# Patient Record
Sex: MALE | Race: WHITE | Employment: OTHER | URBAN - NONMETROPOLITAN AREA
[De-identification: names, ages, dates, MRNs, and addresses within clinical notes are randomized per-mention and may not be internally consistent; named-entity substitution may affect disease eponyms.]

---

## 2017-01-06 RX ORDER — POTASSIUM CHLORIDE 20 MEQ/1
TABLET, EXTENDED RELEASE ORAL
Qty: 180 TABLET | Refills: 3 | Status: SHIPPED | OUTPATIENT
Start: 2017-01-06 | End: 2018-02-19 | Stop reason: SDUPTHER

## 2017-02-13 ENCOUNTER — PROCEDURE VISIT (OUTPATIENT)
Dept: CARDIOLOGY | Age: 57
End: 2017-02-13

## 2017-02-13 DIAGNOSIS — Z95.810 AUTOMATIC IMPLANTABLE CARDIOVERTER-DEFIBRILLATOR IN SITU: Primary | ICD-10-CM

## 2017-02-13 PROCEDURE — 93296 REM INTERROG EVL PM/IDS: CPT | Performed by: INTERNAL MEDICINE

## 2017-02-13 PROCEDURE — 93295 DEV INTERROG REMOTE 1/2/MLT: CPT | Performed by: INTERNAL MEDICINE

## 2017-02-17 ENCOUNTER — TELEPHONE (OUTPATIENT)
Dept: CARDIOLOGY | Age: 57
End: 2017-02-17

## 2017-02-17 DIAGNOSIS — I50.42 CHRONIC COMBINED SYSTOLIC AND DIASTOLIC CONGESTIVE HEART FAILURE (HCC): Primary | ICD-10-CM

## 2017-02-17 DIAGNOSIS — I51.9 HEART DISEASE: ICD-10-CM

## 2017-02-17 DIAGNOSIS — I42.0 CARDIOMYOPATHY, DILATED (HCC): ICD-10-CM

## 2017-02-17 DIAGNOSIS — E78.00 PURE HYPERCHOLESTEROLEMIA: ICD-10-CM

## 2017-03-20 ENCOUNTER — PROCEDURE VISIT (OUTPATIENT)
Dept: CARDIOLOGY | Age: 57
End: 2017-03-20

## 2017-03-20 DIAGNOSIS — Z95.810 AUTOMATIC IMPLANTABLE CARDIOVERTER-DEFIBRILLATOR IN SITU: Primary | ICD-10-CM

## 2017-03-20 PROCEDURE — 93283 PRGRMG EVAL IMPLANTABLE DFB: CPT | Performed by: INTERNAL MEDICINE

## 2017-04-13 ENCOUNTER — OFFICE VISIT (OUTPATIENT)
Dept: CARDIOLOGY | Age: 57
End: 2017-04-13

## 2017-04-13 VITALS
HEIGHT: 69 IN | SYSTOLIC BLOOD PRESSURE: 116 MMHG | BODY MASS INDEX: 42.06 KG/M2 | HEART RATE: 89 BPM | DIASTOLIC BLOOD PRESSURE: 72 MMHG | WEIGHT: 284 LBS

## 2017-04-13 DIAGNOSIS — Z01.818 PRE-OP EVALUATION: ICD-10-CM

## 2017-04-13 DIAGNOSIS — G89.4 CHRONIC PAIN SYNDROME: Primary | ICD-10-CM

## 2017-04-13 DIAGNOSIS — I42.0 CARDIOMYOPATHY, DILATED (HCC): ICD-10-CM

## 2017-04-13 DIAGNOSIS — I51.9 HEART DISEASE: ICD-10-CM

## 2017-04-13 DIAGNOSIS — I50.9 CONGESTIVE HEART FAILURE, UNSPECIFIED CONGESTIVE HEART FAILURE CHRONICITY, UNSPECIFIED CONGESTIVE HEART FAILURE TYPE: ICD-10-CM

## 2017-04-13 PROCEDURE — G8427 DOCREV CUR MEDS BY ELIG CLIN: HCPCS | Performed by: NUCLEAR MEDICINE

## 2017-04-13 PROCEDURE — G8417 CALC BMI ABV UP PARAM F/U: HCPCS | Performed by: NUCLEAR MEDICINE

## 2017-04-13 PROCEDURE — 93000 ELECTROCARDIOGRAM COMPLETE: CPT | Performed by: NUCLEAR MEDICINE

## 2017-04-13 PROCEDURE — 3017F COLORECTAL CA SCREEN DOC REV: CPT | Performed by: NUCLEAR MEDICINE

## 2017-04-13 PROCEDURE — 1036F TOBACCO NON-USER: CPT | Performed by: NUCLEAR MEDICINE

## 2017-04-13 PROCEDURE — 99214 OFFICE O/P EST MOD 30 MIN: CPT | Performed by: NUCLEAR MEDICINE

## 2017-05-16 ENCOUNTER — TELEPHONE (OUTPATIENT)
Dept: CARDIOLOGY | Age: 57
End: 2017-05-16

## 2017-07-24 ENCOUNTER — TELEPHONE (OUTPATIENT)
Dept: CARDIOLOGY CLINIC | Age: 57
End: 2017-07-24

## 2017-08-02 ENCOUNTER — PATIENT MESSAGE (OUTPATIENT)
Dept: CARDIOLOGY | Age: 57
End: 2017-08-02

## 2017-08-03 ENCOUNTER — PROCEDURE VISIT (OUTPATIENT)
Dept: CARDIOLOGY CLINIC | Age: 57
End: 2017-08-03
Payer: MEDICARE

## 2017-08-03 DIAGNOSIS — Z95.810 AUTOMATIC IMPLANTABLE CARDIOVERTER-DEFIBRILLATOR IN SITU: Primary | ICD-10-CM

## 2017-08-03 PROCEDURE — 93296 REM INTERROG EVL PM/IDS: CPT | Performed by: INTERNAL MEDICINE

## 2017-08-03 PROCEDURE — 93295 DEV INTERROG REMOTE 1/2/MLT: CPT | Performed by: INTERNAL MEDICINE

## 2017-08-17 ENCOUNTER — OFFICE VISIT (OUTPATIENT)
Dept: PHYSICAL MEDICINE AND REHAB | Age: 57
End: 2017-08-17
Payer: MEDICARE

## 2017-08-17 VITALS
WEIGHT: 286 LBS | HEART RATE: 75 BPM | HEIGHT: 69 IN | BODY MASS INDEX: 42.36 KG/M2 | SYSTOLIC BLOOD PRESSURE: 110 MMHG | DIASTOLIC BLOOD PRESSURE: 72 MMHG

## 2017-08-17 DIAGNOSIS — M96.1 LUMBAR POSTLAMINECTOMY SYNDROME: ICD-10-CM

## 2017-08-17 DIAGNOSIS — M43.22 FUSION OF SPINE, CERVICAL REGION: ICD-10-CM

## 2017-08-17 DIAGNOSIS — M47.816 SPONDYLOSIS OF LUMBAR REGION WITHOUT MYELOPATHY OR RADICULOPATHY: Primary | ICD-10-CM

## 2017-08-17 DIAGNOSIS — M54.12 CERVICAL RADICULITIS: ICD-10-CM

## 2017-08-17 PROCEDURE — G8427 DOCREV CUR MEDS BY ELIG CLIN: HCPCS | Performed by: NURSE PRACTITIONER

## 2017-08-17 PROCEDURE — 1036F TOBACCO NON-USER: CPT | Performed by: NURSE PRACTITIONER

## 2017-08-17 PROCEDURE — 3017F COLORECTAL CA SCREEN DOC REV: CPT | Performed by: NURSE PRACTITIONER

## 2017-08-17 PROCEDURE — 99213 OFFICE O/P EST LOW 20 MIN: CPT | Performed by: NURSE PRACTITIONER

## 2017-08-17 PROCEDURE — G8417 CALC BMI ABV UP PARAM F/U: HCPCS | Performed by: NURSE PRACTITIONER

## 2017-08-17 ASSESSMENT — ENCOUNTER SYMPTOMS
VOMITING: 0
SORE THROAT: 0
ABDOMINAL PAIN: 0
WHEEZING: 0
PHOTOPHOBIA: 0
EYE PAIN: 0
COUGH: 0
CHEST TIGHTNESS: 0
NAUSEA: 0
CONSTIPATION: 0
DIARRHEA: 0
BACK PAIN: 1
SHORTNESS OF BREATH: 0
COLOR CHANGE: 0
SINUS PRESSURE: 0
RHINORRHEA: 0

## 2017-09-11 ENCOUNTER — HOSPITAL ENCOUNTER (OUTPATIENT)
Age: 57
Setting detail: OUTPATIENT SURGERY
Discharge: HOME | End: 2017-09-11
Attending: PAIN MEDICINE | Admitting: PAIN MEDICINE
Payer: MEDICARE

## 2017-09-11 ENCOUNTER — APPOINTMENT (OUTPATIENT)
Dept: GENERAL RADIOLOGY | Age: 57
End: 2017-09-11
Attending: PAIN MEDICINE
Payer: MEDICARE

## 2017-09-11 ENCOUNTER — ANESTHESIA EVENT (OUTPATIENT)
Dept: OPERATING ROOM | Age: 57
End: 2017-09-11
Payer: MEDICARE

## 2017-09-11 ENCOUNTER — ANESTHESIA (OUTPATIENT)
Dept: OPERATING ROOM | Age: 57
End: 2017-09-11
Payer: MEDICARE

## 2017-09-11 VITALS
HEIGHT: 69 IN | BODY MASS INDEX: 41.95 KG/M2 | RESPIRATION RATE: 16 BRPM | DIASTOLIC BLOOD PRESSURE: 88 MMHG | SYSTOLIC BLOOD PRESSURE: 136 MMHG | TEMPERATURE: 98 F | HEART RATE: 71 BPM | WEIGHT: 283.2 LBS | OXYGEN SATURATION: 95 %

## 2017-09-11 VITALS — SYSTOLIC BLOOD PRESSURE: 126 MMHG | OXYGEN SATURATION: 97 % | DIASTOLIC BLOOD PRESSURE: 75 MMHG

## 2017-09-11 PROCEDURE — 7100000010 HC PHASE II RECOVERY - FIRST 15 MIN: Performed by: PAIN MEDICINE

## 2017-09-11 PROCEDURE — 3209999900 FLUORO FOR SURGICAL PROCEDURES

## 2017-09-11 PROCEDURE — 2500000003 HC RX 250 WO HCPCS: Performed by: PAIN MEDICINE

## 2017-09-11 PROCEDURE — 2580000003 HC RX 258: Performed by: NURSE ANESTHETIST, CERTIFIED REGISTERED

## 2017-09-11 PROCEDURE — 3700000000 HC ANESTHESIA ATTENDED CARE: Performed by: PAIN MEDICINE

## 2017-09-11 PROCEDURE — 3600000057 HC PAIN LEVEL 4 ADDL 15 MIN: Performed by: PAIN MEDICINE

## 2017-09-11 PROCEDURE — 6360000002 HC RX W HCPCS: Performed by: PAIN MEDICINE

## 2017-09-11 PROCEDURE — 64636 DESTROY L/S FACET JNT ADDL: CPT | Performed by: PAIN MEDICINE

## 2017-09-11 PROCEDURE — 6360000002 HC RX W HCPCS: Performed by: NURSE ANESTHETIST, CERTIFIED REGISTERED

## 2017-09-11 PROCEDURE — 3600000056 HC PAIN LEVEL 4 BASE: Performed by: PAIN MEDICINE

## 2017-09-11 PROCEDURE — 64635 DESTROY LUMB/SAC FACET JNT: CPT | Performed by: PAIN MEDICINE

## 2017-09-11 PROCEDURE — 7100000011 HC PHASE II RECOVERY - ADDTL 15 MIN: Performed by: PAIN MEDICINE

## 2017-09-11 PROCEDURE — 3700000001 HC ADD 15 MINUTES (ANESTHESIA): Performed by: PAIN MEDICINE

## 2017-09-11 RX ORDER — FENTANYL CITRATE 50 UG/ML
INJECTION, SOLUTION INTRAMUSCULAR; INTRAVENOUS PRN
Status: DISCONTINUED | OUTPATIENT
Start: 2017-09-11 | End: 2017-09-11 | Stop reason: SDUPTHER

## 2017-09-11 RX ORDER — BUPIVACAINE HYDROCHLORIDE 2.5 MG/ML
INJECTION, SOLUTION EPIDURAL; INFILTRATION; INTRACAUDAL PRN
Status: DISCONTINUED | OUTPATIENT
Start: 2017-09-11 | End: 2017-09-11 | Stop reason: HOSPADM

## 2017-09-11 RX ORDER — PROPOFOL 10 MG/ML
INJECTION, EMULSION INTRAVENOUS PRN
Status: DISCONTINUED | OUTPATIENT
Start: 2017-09-11 | End: 2017-09-11 | Stop reason: SDUPTHER

## 2017-09-11 RX ORDER — BETAMETHASONE SODIUM PHOSPHATE AND BETAMETHASONE ACETATE 3; 3 MG/ML; MG/ML
INJECTION, SUSPENSION INTRA-ARTICULAR; INTRALESIONAL; INTRAMUSCULAR; SOFT TISSUE PRN
Status: DISCONTINUED | OUTPATIENT
Start: 2017-09-11 | End: 2017-09-11 | Stop reason: HOSPADM

## 2017-09-11 RX ORDER — SODIUM CHLORIDE 9 MG/ML
INJECTION, SOLUTION INTRAVENOUS CONTINUOUS PRN
Status: DISCONTINUED | OUTPATIENT
Start: 2017-09-11 | End: 2017-09-11 | Stop reason: SDUPTHER

## 2017-09-11 RX ORDER — LIDOCAINE HYDROCHLORIDE 10 MG/ML
INJECTION, SOLUTION EPIDURAL; INFILTRATION; INTRACAUDAL; PERINEURAL PRN
Status: DISCONTINUED | OUTPATIENT
Start: 2017-09-11 | End: 2017-09-11 | Stop reason: HOSPADM

## 2017-09-11 RX ADMIN — PROPOFOL 60 MG: 10 INJECTION, EMULSION INTRAVENOUS at 08:55

## 2017-09-11 RX ADMIN — SODIUM CHLORIDE: 9 INJECTION, SOLUTION INTRAVENOUS at 08:48

## 2017-09-11 RX ADMIN — FENTANYL CITRATE 100 MCG: 50 INJECTION INTRAMUSCULAR; INTRAVENOUS at 09:00

## 2017-09-11 RX ADMIN — PROPOFOL 40 MG: 10 INJECTION, EMULSION INTRAVENOUS at 08:50

## 2017-09-11 ASSESSMENT — PULMONARY FUNCTION TESTS
PIF_VALUE: 0

## 2017-09-11 ASSESSMENT — PAIN - FUNCTIONAL ASSESSMENT: PAIN_FUNCTIONAL_ASSESSMENT: 0-10

## 2017-09-11 ASSESSMENT — PAIN SCALES - GENERAL: PAINLEVEL_OUTOF10: 0

## 2017-09-11 ASSESSMENT — PAIN DESCRIPTION - DESCRIPTORS: DESCRIPTORS: CONSTANT;SHARP

## 2017-10-18 ENCOUNTER — OFFICE VISIT (OUTPATIENT)
Dept: PHYSICAL MEDICINE AND REHAB | Age: 57
End: 2017-10-18
Payer: MEDICARE

## 2017-10-18 VITALS
WEIGHT: 283.29 LBS | BODY MASS INDEX: 41.96 KG/M2 | HEIGHT: 69 IN | DIASTOLIC BLOOD PRESSURE: 78 MMHG | HEART RATE: 79 BPM | SYSTOLIC BLOOD PRESSURE: 99 MMHG

## 2017-10-18 DIAGNOSIS — M43.22 FUSION OF SPINE, CERVICAL REGION: ICD-10-CM

## 2017-10-18 DIAGNOSIS — M47.816 SPONDYLOSIS OF LUMBAR REGION WITHOUT MYELOPATHY OR RADICULOPATHY: Primary | ICD-10-CM

## 2017-10-18 DIAGNOSIS — M96.1 LUMBAR POSTLAMINECTOMY SYNDROME: ICD-10-CM

## 2017-10-18 DIAGNOSIS — M54.12 CERVICAL RADICULITIS: ICD-10-CM

## 2017-10-18 PROCEDURE — 99213 OFFICE O/P EST LOW 20 MIN: CPT | Performed by: NURSE PRACTITIONER

## 2017-10-18 PROCEDURE — G8484 FLU IMMUNIZE NO ADMIN: HCPCS | Performed by: NURSE PRACTITIONER

## 2017-10-18 PROCEDURE — G8417 CALC BMI ABV UP PARAM F/U: HCPCS | Performed by: NURSE PRACTITIONER

## 2017-10-18 PROCEDURE — 1036F TOBACCO NON-USER: CPT | Performed by: NURSE PRACTITIONER

## 2017-10-18 PROCEDURE — 3017F COLORECTAL CA SCREEN DOC REV: CPT | Performed by: NURSE PRACTITIONER

## 2017-10-18 PROCEDURE — G8427 DOCREV CUR MEDS BY ELIG CLIN: HCPCS | Performed by: NURSE PRACTITIONER

## 2017-10-18 ASSESSMENT — ENCOUNTER SYMPTOMS: BACK PAIN: 1

## 2017-10-18 NOTE — PROGRESS NOTES
[facility]  [address]  Dept: [phone]  Dept Fax: [phone]: [phone]    Visit Date: 10/18/2017    Functionality Assessment/Goals Worksheet     On a scale of 0 (Does not Interfere) to 10 (Completely Interferes)     1. Which number describes how during the past week pain has interfered with       the following:  A. General Activity:  6  B. Mood: 4  C. Walking Ability:  7  D. Normal Work (Includes both work outside the home and housework):  6  E. Relations with Other People:   0  F. Sleep:   5  G. Enjoyment of Life:   0    2. Patient Prefers to Take their Pain Medications:     []  On a regular basis   [x]  Only when necessary    []  Does not take pain medications    3. What are the Patient's Goals/Expectations for Visiting Pain Management? []  Learn about my pain    []  Receive Medication   []  Physical Therapy     []  Treat Depression   [x]  Receive Injections    []  Treat Sleep   []  Deal with Anxiety and Stress   []  Treat Opoid Dependence/Addiction   []  Other:      HPI:   Bunny Bangura is a 59 y.o. male is here today for    Chief Complaint: Lower back pain, Neck pain     HPI   FU L-RFA left side from 9/11/2017. Receiving 95% relief of pain in left lower back. Continues to have lower back pain all on right side. States right lower back is the level that left side used to be. Able to move better. Continues to have achy pain in neck     Taking tylenol prn for pain     Medications reviewed. Patient denies  side effects with medications. Patient states he is  taking medications as prescribed. He denies receiving pain medications from other sources. He denies any ER visits since last visit. Pain scale with out pain medications is 6/10.   Pain scale with pain medications is  4/10 with tylenol     Drug screen reviewed from 8/17/2017 + Ultram last visit- states took from wife, does not take them any more did not help     The patient has No Known Allergies. Past Medical History  Telma Ray  has a past medical history of Cardiomyopathy, dilated ([address] Congestive heart failure (CHF) ([address] Hyperlipidemia; Medtronic dual ICD; Osteoarthritis; Osteoarthritis of spine with radiculopathy, lumbar region; S/P radiofrequency ablation operation for arrhythmia; Spinal stenosis; and Unspecified sleep apnea. Past Surgical History  The patient  has a past surgical history that includes cardiovascular stress test (09/2013); doppler echocardiography (09/2013); doppler echocardiography (09/2013); Nasal sinus surgery; Carpal tunnel release (Right); Colonoscopy; back surgery (8/26/2014); Endoscopy, colon, diagnostic; Vasectomy (???); sinus surgery (1980's???); other surgical history (10/9/2015); Neck surgery (2015); back surgery (2013); Cardiac catheterization (10/2013); Cardiac defibrillator placement (2013???); Tonsillectomy (1980's??); hernia repair (???); pacemaker placement (2013?? ); other surgical history (01/18/2016); other surgical history (2-8-2016); other surgical history (N/A, 03-21-16); other surgical history (Bilateral, 05-16-16); other surgical history (08/01/2016); cervical fusion (05/17/2017); other surgical history (Left, 09/11/2017); and Lumbar spine surgery (Left, 9/11/2017). Family History  This patient's family history includes Coronary Art Dis in his father; Heart Attack in his father; Heart Disease in his father; High Blood Pressure in his father; Parkinsonism in his father. Social History  Cresencio  reports that he has never smoked. He has never used smokeless tobacco. He reports that he drinks about 12.0 oz of alcohol per week . He reports that he does not use drugs.     Medications    Current Outpatient Prescriptions:     sacubitril-valsartan (ENTRESTO) 24-26 MG per tablet, Take 1 tablet by mouth 2 times daily, Disp: 56 tablet, Rfl: 0    potassium chloride (KLOR-CON M) 20 MEQ extended release tablet, TAKE 1 TABLET sounds are normal. He exhibits no distension. There is no tenderness. There is no rebound and no guarding. Musculoskeletal:        Cervical back: He exhibits decreased range of motion, tenderness, pain and spasm. Lumbar back: He exhibits tenderness. Back:    Neurological: He is alert and oriented to person, place, and time. He has normal strength. He is not disoriented. He displays no atrophy. No cranial nerve deficit or sensory deficit. He exhibits normal muscle tone. He displays a negative Romberg sign. Gait abnormal. Coordination normal. He displays no Babinski's sign on the right side. Reflex Scores:       Tricep reflexes are 2+ on the right side and 2+ on the left side. Bicep reflexes are 2+ on the right side and 2+ on the left side. Brachioradialis reflexes are 2+ on the right side and 2+ on the left side. Patellar reflexes are 1+ on the right side and 1+ on the left side. Achilles reflexes are 1+ on the right side and 1+ on the left side. Skin: Skin is warm. No rash noted. He is not diaphoretic. No erythema. No pallor. Psychiatric: His mood appears not anxious. His affect is not angry, not blunt, not labile and not inappropriate. His speech is not rapid and/or pressured, not delayed, not tangential and not slurred. He is not agitated, not aggressive, not hyperactive, not slowed, not withdrawn, not actively hallucinating and not combative. Thought content is not paranoid and not delusional. Cognition and memory are not impaired. He does not express impulsivity or inappropriate judgment. He does not exhibit a depressed mood. He expresses no homicidal and no suicidal ideation. He expresses no suicidal plans and no homicidal plans. He is communicative. He exhibits normal recent memory and normal remote memory. He is attentive. Nursing note and vitals reviewed. Assessment:     1. Spondylosis of lumbar region without myelopathy or radiculopathy    2.  Lumbar postlaminectomy syndrome    3. Cervical radiculitis    4. Fusion of spine, cervical region            Plan:      · OARRS reviewed. · Discussed long term side effects of medications. · Discussed tolerance, dependency and addiction. · Previous UDS reviewed. · Patient told can not receive any pain medications from any other source. · Discussed with pt.may not be pain free. · No evidence of abuse, diversion or aberrant behavior. · Medications and/or procedures improve function and quality of life. · Procedure notes reviewed in detail. · Receiving 95% relief from pain left lower back from L-RFA and continues to receive relief. Pain all in right lower back. · Plan L-RFA right side for longer term pain relief. Procedure and risks discussed in detail with patient. · Patient not interested in any medications- continue tylenol prn     Meds. Prescribed:   No orders of the defined types were placed in this encounter. Return for L-facet RFA @ L2-3, L3-4,L4-5 and L5-S1 RIGHT SIDE. , follow up after procedure.          Electronically signed by Delbert Wheatley CNP on 10/18/2017 at 8:38 AM

## 2017-10-31 ENCOUNTER — HOSPITAL ENCOUNTER (OUTPATIENT)
Age: 57
Setting detail: OUTPATIENT SURGERY
Discharge: HOME | End: 2017-10-31
Attending: PAIN MEDICINE | Admitting: PAIN MEDICINE
Payer: MEDICARE

## 2017-10-31 ENCOUNTER — APPOINTMENT (OUTPATIENT)
Dept: GENERAL RADIOLOGY | Age: 57
End: 2017-10-31
Attending: PAIN MEDICINE
Payer: MEDICARE

## 2017-10-31 ENCOUNTER — ANESTHESIA (OUTPATIENT)
Dept: OPERATING ROOM | Age: 57
End: 2017-10-31
Payer: MEDICARE

## 2017-10-31 ENCOUNTER — ANESTHESIA EVENT (OUTPATIENT)
Dept: OPERATING ROOM | Age: 57
End: 2017-10-31
Payer: MEDICARE

## 2017-10-31 VITALS
OXYGEN SATURATION: 93 % | RESPIRATION RATE: 17 BRPM | SYSTOLIC BLOOD PRESSURE: 144 MMHG | DIASTOLIC BLOOD PRESSURE: 86 MMHG

## 2017-10-31 VITALS
HEIGHT: 69 IN | RESPIRATION RATE: 14 BRPM | HEART RATE: 80 BPM | DIASTOLIC BLOOD PRESSURE: 90 MMHG | WEIGHT: 287 LBS | BODY MASS INDEX: 42.51 KG/M2 | OXYGEN SATURATION: 94 % | TEMPERATURE: 97.1 F | SYSTOLIC BLOOD PRESSURE: 150 MMHG

## 2017-10-31 PROCEDURE — 3600000058 HC PAIN LEVEL 5 BASE: Performed by: PAIN MEDICINE

## 2017-10-31 PROCEDURE — 6360000002 HC RX W HCPCS: Performed by: PAIN MEDICINE

## 2017-10-31 PROCEDURE — 3600000059 HC PAIN LEVEL 5 ADDL 15 MIN: Performed by: PAIN MEDICINE

## 2017-10-31 PROCEDURE — 6360000002 HC RX W HCPCS: Performed by: NURSE ANESTHETIST, CERTIFIED REGISTERED

## 2017-10-31 PROCEDURE — 3700000001 HC ADD 15 MINUTES (ANESTHESIA): Performed by: PAIN MEDICINE

## 2017-10-31 PROCEDURE — 2500000003 HC RX 250 WO HCPCS: Performed by: NURSE ANESTHETIST, CERTIFIED REGISTERED

## 2017-10-31 PROCEDURE — 7100000011 HC PHASE II RECOVERY - ADDTL 15 MIN: Performed by: PAIN MEDICINE

## 2017-10-31 PROCEDURE — 64635 DESTROY LUMB/SAC FACET JNT: CPT | Performed by: PAIN MEDICINE

## 2017-10-31 PROCEDURE — 2500000003 HC RX 250 WO HCPCS: Performed by: PAIN MEDICINE

## 2017-10-31 PROCEDURE — 64636 DESTROY L/S FACET JNT ADDL: CPT | Performed by: PAIN MEDICINE

## 2017-10-31 PROCEDURE — 3209999900 FLUORO FOR SURGICAL PROCEDURES

## 2017-10-31 PROCEDURE — 7100000010 HC PHASE II RECOVERY - FIRST 15 MIN: Performed by: PAIN MEDICINE

## 2017-10-31 PROCEDURE — 2580000003 HC RX 258: Performed by: NURSE ANESTHETIST, CERTIFIED REGISTERED

## 2017-10-31 PROCEDURE — 3700000000 HC ANESTHESIA ATTENDED CARE: Performed by: PAIN MEDICINE

## 2017-10-31 RX ORDER — SODIUM CHLORIDE 9 MG/ML
INJECTION, SOLUTION INTRAVENOUS CONTINUOUS PRN
Status: DISCONTINUED | OUTPATIENT
Start: 2017-10-31 | End: 2017-10-31 | Stop reason: SDUPTHER

## 2017-10-31 RX ORDER — LIDOCAINE HYDROCHLORIDE 20 MG/ML
INJECTION, SOLUTION INFILTRATION; PERINEURAL PRN
Status: DISCONTINUED | OUTPATIENT
Start: 2017-10-31 | End: 2017-10-31 | Stop reason: SDUPTHER

## 2017-10-31 RX ORDER — BETAMETHASONE SODIUM PHOSPHATE AND BETAMETHASONE ACETATE 3; 3 MG/ML; MG/ML
INJECTION, SUSPENSION INTRA-ARTICULAR; INTRALESIONAL; INTRAMUSCULAR; SOFT TISSUE PRN
Status: DISCONTINUED | OUTPATIENT
Start: 2017-10-31 | End: 2017-10-31 | Stop reason: HOSPADM

## 2017-10-31 RX ORDER — PROPOFOL 10 MG/ML
INJECTION, EMULSION INTRAVENOUS PRN
Status: DISCONTINUED | OUTPATIENT
Start: 2017-10-31 | End: 2017-10-31 | Stop reason: SDUPTHER

## 2017-10-31 RX ORDER — LIDOCAINE HYDROCHLORIDE 10 MG/ML
INJECTION, SOLUTION EPIDURAL; INFILTRATION; INTRACAUDAL; PERINEURAL PRN
Status: DISCONTINUED | OUTPATIENT
Start: 2017-10-31 | End: 2017-10-31 | Stop reason: HOSPADM

## 2017-10-31 RX ORDER — BUPIVACAINE HYDROCHLORIDE 2.5 MG/ML
INJECTION, SOLUTION EPIDURAL; INFILTRATION; INTRACAUDAL PRN
Status: DISCONTINUED | OUTPATIENT
Start: 2017-10-31 | End: 2017-10-31 | Stop reason: HOSPADM

## 2017-10-31 RX ORDER — FENTANYL CITRATE 50 UG/ML
INJECTION, SOLUTION INTRAMUSCULAR; INTRAVENOUS PRN
Status: DISCONTINUED | OUTPATIENT
Start: 2017-10-31 | End: 2017-10-31 | Stop reason: SDUPTHER

## 2017-10-31 RX ADMIN — SODIUM CHLORIDE: 9 INJECTION, SOLUTION INTRAVENOUS at 08:39

## 2017-10-31 RX ADMIN — PROPOFOL 60 MG: 10 INJECTION, EMULSION INTRAVENOUS at 08:57

## 2017-10-31 RX ADMIN — FENTANYL CITRATE 50 MCG: 50 INJECTION INTRAMUSCULAR; INTRAVENOUS at 08:40

## 2017-10-31 RX ADMIN — LIDOCAINE HYDROCHLORIDE 60 MG: 20 INJECTION, SOLUTION INFILTRATION; PERINEURAL at 08:57

## 2017-10-31 RX ADMIN — FENTANYL CITRATE 50 MCG: 50 INJECTION INTRAMUSCULAR; INTRAVENOUS at 08:50

## 2017-10-31 ASSESSMENT — PAIN - FUNCTIONAL ASSESSMENT: PAIN_FUNCTIONAL_ASSESSMENT: 0-10

## 2017-10-31 ASSESSMENT — PULMONARY FUNCTION TESTS
PIF_VALUE: 0

## 2017-10-31 ASSESSMENT — PAIN DESCRIPTION - DESCRIPTORS: DESCRIPTORS: ACHING;DULL

## 2017-10-31 NOTE — OP NOTE
Indication for the Procedure: The patient has ahistory of chronic low back pain that is not responding well to the conservative treatment. Patient's pain is mostly axial in nature. Pain is interfering with the activities of daily living. Physical examination revealed facet tenderness and facet loading is positive. Patient had undergone lumbar facet joint injections with pain relief that lasted for only a short period of time and had greater than 70% pain relief with confirmatory median branch blocks. Hence we decided to do radiofrequency abalation of median branches for long term pain releif. The procedure and risks  were discussed with the patient and an informed consent was obtained. Procedure:  Right side   A meaningful communication was kept up with the patient throughout the procedure. The patient is placed in prone position and skin over the back was prepped and draped in sterile manner. Then using fluoroscopy the junction of the transverse process of the vertebra with the superior process of the facet joint was observed and the view was optimized. The skin and deep tissues posterior were infiltrated with 12 ml of  1% xylocaine. The RF canula with the 15 mm active tip was introduced through the skin wheal under fluoroscopy guidance such that the tip of the needle lies in the groove of the transverse process with the superior processes of the facet joint. Then a lateral view of the lumbar spine was obtained to make sure the tip of needle is not in the neural foramen. Then electric impedence was checked to make sure it is acceptable. Then a sensory stimulus was applied at 50 Hz up to 1 volt and concordant pain symptoms were reproduced. Then a motor stimulus was applied at 2 Hz up to 2 volts and no motor stimulation was seen in lower extremities. Some multifidus stimulus was seen.   Then after negative aspiration a mixture of Celestone 12 mg and 0.25%  Marcaine 2  Cc was injected through the needle. Then a initial lesion was done at 80 degrees centigrade for 90 seconds. For L5 median branch block the junction of the ala of  the sacrum with the superior articular process of the facet joint was taken as a reference point. For the L4 median branch the junction of the transverse process of L5 with the superolateral possible facet joint was taken as a reference point and for S1 median branch the most lateral and superior aspect of S1 foramina was taken as a reference point,. For L3 median branch the junction of L4 transverse process and superior articular process of facet joint was taken as reference point and so on. Patient's vital signs and neurological status remained stable throughout the procedure and post procedural period. The patient tolerated the procedure well and was discharged home in stable condition.     Electronically signed by Darius Irene MD on 10/31/2017 at 9:06 AM

## 2017-10-31 NOTE — H&P
[address] [address]  History and Physical Update    Pt Name: Zofia Mccoy  MRN: [id number]  YOB: 1960  Date of evaluation: 10/18/2017    I have examined the patient and reviewed the H&P/Consult and there are no changes to the patient or plans.         Electronically signed by Mike Wade MD on 10/31/2017 at 9:01 AM

## 2017-10-31 NOTE — ANESTHESIA POSTPROCEDURE EVALUATION
Department of Anesthesiology  Postprocedure Note    Patient: Ricky Stuart  MRN: [id number]  YOB: 1960  Date of evaluation: 10/31/2017  Time:  9:45 AM     Procedure Summary     Date:  10/31/17 Room / Location:  [facility] / [address] [facility]    Anesthesia Start:  8607 Anesthesia Stop:      Procedure:  LUMBAR FACET RFA @ L2-3, L3-4, L4-5 AND L5-S1 RIGHT SIDE (Right ) Diagnosis:  (LUMBAR SPONDYLOSIS)    Surgeon:  Bhargav Bryant MD Responsible Provider:  Tyshawn Hernández DO    Anesthesia Type:  MAC ASA Status:  4          Anesthesia Type: MAC    Fifi Phase I:      Fifi Phase II: Fifi Score: 10    Last vitals: Reviewed and per EMR flowsheets.        Anesthesia Post Evaluation    Patient location during evaluation: PACU  Patient participation: complete - patient participated  Level of consciousness: awake and alert  Airway patency: patent  Nausea & Vomiting: no nausea and no vomiting  Complications: no  Cardiovascular status: hemodynamically stable  Respiratory status: acceptable  Hydration status: stable

## 2017-10-31 NOTE — ANESTHESIA PRE PROCEDURE
Department of Anesthesiology  Preprocedure Note       Name:  Bunny Bangura   Age:  59 y.o.  :  1960                                          MRN:  [id number]         Date:  10/31/2017      Surgeon: Livan Nazario):  Julia Wheatley MD    Procedure: Procedure(s):  LUMBAR FACET RFA @ L2-3, L3-4, L4-5 AND L5-S1 RIGHT SIDE    Medications prior to admission:   Prior to Admission medications    Medication Sig Start Date End Date Taking? Authorizing Provider   sacubitril-valsartan (ENTRESTO) 24-26 MG per tablet Take 1 tablet by mouth 2 times daily 17  Yes Val Westfall MD   potassium chloride (KLOR-CON M) 20 MEQ extended release tablet TAKE 1 TABLET TWICE DAILY 17  Yes Val Westfall MD   bumetanide (BUMEX) 2 MG tablet Take 1 tablet by mouth 2 times daily 10/26/16  Yes Val Westfall MD   carvedilol (COREG) 25 MG tablet Take 1 tablet by mouth 2 times daily  Patient taking differently: Take 12.5 mg by mouth 2 times daily  10/26/16  Yes Val Westfall MD   spironolactone (ALDACTONE) 25 MG tablet Take 1 tablet by mouth daily 10/26/16  Yes Val Westfall MD   VIAGRA 50 MG tablet  3/7/16  Yes Historical Provider, MD   allopurinol (ZYLOPRIM) 300 MG tablet Take 300 mg by mouth daily. Yes Historical Provider, MD       Current medications:    No current facility-administered medications for this encounter.         Allergies:  No Known Allergies    Problem List:    Patient Active Problem List   Diagnosis Code    Snoring R06.83    Obstructive sleep apnea G47.33    Insomnia G47.00    Daytime somnolence R40.0    Sleep difficulties G47.9    Sleep talking G47.8    Restless sleeper G47.9    Claustrophobia F40.240    Heart disease I51.9    CHF (congestive heart failure) (HCC) I50.9    Gout M10.9    Arthritis M19.90    Hyperlipemia E78.5    Cardiomyopathy, dilated (HCC) I42.0    Congestive heart failure (CHF) (HCC) I50.9    Medtronic dual ICD Z95.810    Hyponatremia E87.1    S/P lumbar laminectomy Z98.890    Ileus, postoperative (HCC) K91.89, K56.7    Obesity E66.9    Osteoarthritis of spine with radiculopathy, lumbar region M47.26    Lumbar postlaminectomy syndrome M96.1    Chronic pain syndrome G89.4    Spondylosis of lumbar region without myelopathy or radiculopathy M47.816       Past Medical History:        Diagnosis Date    Cardiomyopathy, dilated ([address]     Congestive heart failure (CHF) ([address]     Hyperlipidemia     Medtronic dual ICD 2/14/2014    Osteoarthritis     Osteoarthritis of spine with radiculopathy, lumbar region 12/28/2015    S/P radiofrequency ablation operation for arrhythmia 02-    RFA L3 - S1    Spinal stenosis     Unspecified sleep apnea     unable to wear CPAP       Past Surgical History:        Procedure Laterality Date    BACK SURGERY  8/26/2014    L4- S1 decompression, L4-5 PSF and TLIF    BACK SURGERY  2013   Olery Fenwick CARDIAC CATHETERIZATION  10/2013    [address]  2013???    CARDIOVASCULAR STRESS TEST  09/2013    CARPAL TUNNEL RELEASE Right     CERVICAL FUSION  05/17/2017    [address]    COLONOSCOPY      DOPPLER ECHOCARDIOGRAPHY  09/2013    DOPPLER ECHOCARDIOGRAPHY  09/2013    [facility]    ENDOSCOPY, COLON, DIAGNOSTIC      HERNIA REPAIR  ? ??    umbilical    LUMBAR SPINE SURGERY Left 9/11/2017    LUMBAR RFA L2-3, L3-4, L4-5, L5-S1 LEFT SIDE performed by Ramon Medrano MD at [address] [place] [address]  2015    OTHER SURGICAL HISTORY  10/9/2015    C3-6 ACDF    OTHER SURGICAL HISTORY  01/18/2016    FACET INJECTIONS L3-L4 L4-L5 L5 S1    OTHER SURGICAL HISTORY  2-8-2016    RFA - L3-S1    OTHER SURGICAL HISTORY N/A 03-21-16    cervical epidural block C7    OTHER SURGICAL HISTORY Bilateral 05-16-16    cervical facet block C7-T1    OTHER SURGICAL HISTORY  08/01/2016    CERVICAL ABLATION    OTHER SURGICAL HISTORY Left 09/11/2017    Lumbar RFA at L2-3, L3-4, L4-5, L5-S1    PACEMAKER PLACEMENT  2013??    ICD    SINUS SURGERY  1980's???    TONSILLECTOMY  1980's??    VASECTOMY  ? ??       Social History:    Social History   Substance Use Topics    Smoking status: Never Smoker    Smokeless tobacco: Never Used    Alcohol use 12.0 oz/week     14 Cans of beer, 6 Standard drinks or equivalent per week      Comment: social                                Counseling given: Not Answered      Vital Signs (Current):   Vitals:    10/31/17 0759   BP: 122/78   Pulse: 71   Resp: 16   Temp: 97.9 °F (36.6 °C)   TempSrc: Temporal   SpO2: 96%   Weight: 287 lb (130.2 kg)   Height: 5' 9\" (1.753 m)                                              BP Readings from Last 3 Encounters:   10/31/17 122/78   10/18/17 99/78   09/11/17 136/88       NPO Status: Time of last liquid consumption: 0630 (sip of water of water with meds)                        Time of last solid consumption: 1730                        Date of last liquid consumption: 10/31/17                        Date of last solid food consumption: 10/30/17    BMI:   Wt Readings from Last 3 Encounters:   10/31/17 287 lb (130.2 kg)   10/18/17 283 lb 4.7 oz (128.5 kg)   09/11/17 283 lb 3.2 oz (128.5 kg)     Body mass index is 42.38 kg/m².     CBC:   Lab Results   Component Value Date    WBC 9.4 05/09/2017    RBC 5.06 05/09/2017    HGB 16.2 05/09/2017    HCT 47.6 05/09/2017    MCV 94.0 05/09/2017    RDW 13.9 05/09/2017     05/09/2017       CMP:   Lab Results   Component Value Date     05/09/2017    K 4.1 05/09/2017    CL 95 05/09/2017    CO2 29 05/09/2017    BUN 18 05/09/2017    CREATININE 1.0 05/09/2017    LABGLOM 76 05/09/2017    GLUCOSE 107 05/09/2017    PROT 7.7 08/15/2014    CALCIUM 10.2 05/09/2017    BILITOT 0.5 08/15/2014    ALKPHOS 84 08/15/2014    AST 23 08/15/2014    ALT 27 08/15/2014       POC Tests: No results for input(s): POCGLU, POCNA, POCK, POCCL, POCBUN, POCHEMO, POCHCT in the last 72

## 2017-11-09 ENCOUNTER — PATIENT MESSAGE (OUTPATIENT)
Dept: CARDIOLOGY | Age: 57
End: 2017-11-09

## 2017-11-10 NOTE — TELEPHONE ENCOUNTER
From: Bryanna Mccollum  To: Maxi Sanderson MD  Sent: 11/9/2017 6:17 PM EST  Subject: Prescription Question    Are samples available of Entresto?

## 2017-11-15 ENCOUNTER — PROCEDURE VISIT (OUTPATIENT)
Dept: CARDIOLOGY CLINIC | Age: 57
End: 2017-11-15
Payer: MEDICARE

## 2017-11-15 DIAGNOSIS — Z95.810 AUTOMATIC IMPLANTABLE CARDIOVERTER-DEFIBRILLATOR IN SITU: Primary | ICD-10-CM

## 2017-11-15 PROCEDURE — 93296 REM INTERROG EVL PM/IDS: CPT | Performed by: INTERNAL MEDICINE

## 2017-11-15 PROCEDURE — 93295 DEV INTERROG REMOTE 1/2/MLT: CPT | Performed by: INTERNAL MEDICINE

## 2017-11-15 NOTE — PROGRESS NOTES
DR Darius Lam PT  MEDTRONIC DUAL ICD  BATTERY 7.4 YRS REMAINING ON DEVICE  ATRIAL IMPEDENCE 513  RV IMPEDENCE 494  RV 76  P WAVES 3.8  RV WAVES 6.5  AAI<=>DDD   A PACED 0.4%  V PACED <0.1%  NS VT AND SVT  OPTIVOL WNL

## 2017-11-21 ENCOUNTER — OFFICE VISIT (OUTPATIENT)
Dept: PHYSICAL MEDICINE AND REHAB | Age: 57
End: 2017-11-21
Payer: MEDICARE

## 2017-11-21 VITALS
SYSTOLIC BLOOD PRESSURE: 123 MMHG | BODY MASS INDEX: 42.75 KG/M2 | WEIGHT: 288.6 LBS | DIASTOLIC BLOOD PRESSURE: 79 MMHG | HEART RATE: 82 BPM | HEIGHT: 69 IN

## 2017-11-21 DIAGNOSIS — M96.1 LUMBAR POSTLAMINECTOMY SYNDROME: ICD-10-CM

## 2017-11-21 DIAGNOSIS — M43.22 FUSION OF SPINE, CERVICAL REGION: ICD-10-CM

## 2017-11-21 DIAGNOSIS — M54.12 CERVICAL RADICULITIS: ICD-10-CM

## 2017-11-21 DIAGNOSIS — M47.816 SPONDYLOSIS OF LUMBAR REGION WITHOUT MYELOPATHY OR RADICULOPATHY: Primary | ICD-10-CM

## 2017-11-21 DIAGNOSIS — G89.4 CHRONIC PAIN SYNDROME: ICD-10-CM

## 2017-11-21 PROCEDURE — G8417 CALC BMI ABV UP PARAM F/U: HCPCS | Performed by: NURSE PRACTITIONER

## 2017-11-21 PROCEDURE — G8484 FLU IMMUNIZE NO ADMIN: HCPCS | Performed by: NURSE PRACTITIONER

## 2017-11-21 PROCEDURE — G8427 DOCREV CUR MEDS BY ELIG CLIN: HCPCS | Performed by: NURSE PRACTITIONER

## 2017-11-21 PROCEDURE — 3017F COLORECTAL CA SCREEN DOC REV: CPT | Performed by: NURSE PRACTITIONER

## 2017-11-21 PROCEDURE — 1036F TOBACCO NON-USER: CPT | Performed by: NURSE PRACTITIONER

## 2017-11-21 PROCEDURE — 99213 OFFICE O/P EST LOW 20 MIN: CPT | Performed by: NURSE PRACTITIONER

## 2017-11-21 ASSESSMENT — ENCOUNTER SYMPTOMS: BACK PAIN: 1

## 2017-11-21 NOTE — PROGRESS NOTES
[facility]  [address]  Dept: [phone]  Dept Fax: [phone]: [phone]    Visit Date: 11/21/2017    Functionality Assessment/Goals Worksheet     On a scale of 0 (Does not Interfere) to 10 (Completely Interferes)     1. Which number describes how during the past week pain has interfered with       the following:  A. General Activity:  3  B. Mood: 0  C. Walking Ability:  2  D. Normal Work (Includes both work outside the home and housework):  4  E. Relations with Other People:   0  F. Sleep:   4  G. Enjoyment of Life:   0    2. Patient Prefers to Take their Pain Medications:     []  On a regular basis   [x]  Only when necessary    []  Does not take pain medications    3. What are the Patient's Goals/Expectations for Visiting Pain Management? []  Learn about my pain    []  Receive Medication   []  Physical Therapy     []  Treat Depression   []  Receive Injections    []  Treat Sleep   []  Deal with Anxiety and Stress   []  Treat Opoid Dependence/Addiction   [x]  Other: 80% pain free      HPI:   Arnel Fraser is a 59 y.o. male is here today for    Chief Complaint:  Lower back pain, neck pain     HPI   FU Bilateral L-RFA Left side from 9/11/2017, Right side from 10/31/2017. Receiving 80%- 90% pain relie from bilateral L-RFA. Able to do normal activity, move better. Lower back pain remains tolerable. Continues to have neck pain, not too bad- ache    Only using tylenol prn for pain     Medications reviewed. Patient denies  side effects with medications. Patient states he is  taking medications as prescribed. He denies any ER visits since last visit. Pain scale with out pain medications is 0/10. Drug screen reviewed from 8/17/2017- reina     The patient has No Known Allergies. Past Medical History  Mega Watkins  has a past medical history of Cardiomyopathy, dilated ([facility] [place] [address]  Congestive heart failure (CHF) ([facility] [place] [address] Hyperlipidemia; Medtronic dual ICD; Osteoarthritis; Osteoarthritis of spine with radiculopathy, lumbar region; S/P radiofrequency ablation operation for arrhythmia; Spinal stenosis; and Unspecified sleep apnea. Past Surgical History  The patient  has a past surgical history that includes cardiovascular stress test (09/2013); doppler echocardiography (09/2013); doppler echocardiography (09/2013); Nasal sinus surgery; Carpal tunnel release (Right); Colonoscopy; back surgery (8/26/2014); Endoscopy, colon, diagnostic; Vasectomy (???); sinus surgery (1980's???); other surgical history (10/9/2015); Neck surgery (2015); back surgery (2013); Cardiac catheterization (10/2013); Cardiac defibrillator placement (2013???); Tonsillectomy (1980's??); hernia repair (???); pacemaker placement (2013?? ); other surgical history (01/18/2016); other surgical history (2-8-2016); other surgical history (N/A, 03-21-16); other surgical history (Bilateral, 05-16-16); other surgical history (08/01/2016); cervical fusion (05/17/2017); other surgical history (Left, 09/11/2017); Lumbar spine surgery (Left, 9/11/2017); other surgical history (Right, 10/31/2017); and Lumbar spine surgery (Right, 10/31/2017). Family History  This patient's family history includes Coronary Art Dis in his father; Heart Attack in his father; Heart Disease in his father; High Blood Pressure in his father; Parkinsonism in his father. Social History  Cresencio  reports that he has never smoked. He has never used smokeless tobacco. He reports that he drinks about 12.0 oz of alcohol per week . He reports that he does not use drugs.     Medications    Current Outpatient Prescriptions:     sacubitril-valsartan (ENTRESTO) 24-26 MG per tablet, Take 1 tablet by mouth 2 times daily, Disp: 84 tablet, Rfl: 0    potassium chloride (KLOR-CON M) 20 MEQ extended release tablet, TAKE 1 TABLET TWICE DAILY, Disp: 180 tablet, Rfl: 3    bumetanide (BUMEX) 2 MG tablet, Take 1 tablet guarding. Musculoskeletal:        Cervical back: He exhibits decreased range of motion, tenderness, pain and spasm. Lumbar back: He exhibits tenderness. Back:    Neurological: He is alert and oriented to person, place, and time. He has normal strength. He is not disoriented. He displays no atrophy. No cranial nerve deficit or sensory deficit. He exhibits normal muscle tone. He displays a negative Romberg sign. Gait abnormal. Coordination normal. He displays no Babinski's sign on the right side. Reflex Scores:       Tricep reflexes are 2+ on the right side and 2+ on the left side. Bicep reflexes are 2+ on the right side and 2+ on the left side. Brachioradialis reflexes are 2+ on the right side and 2+ on the left side. Patellar reflexes are 1+ on the right side and 1+ on the left side. Achilles reflexes are 1+ on the right side and 1+ on the left side. Skin: Skin is warm. No rash noted. He is not diaphoretic. No erythema. No pallor. Psychiatric: His mood appears not anxious. His affect is not angry, not blunt, not labile and not inappropriate. His speech is not rapid and/or pressured, not delayed, not tangential and not slurred. He is not agitated, not aggressive, not hyperactive, not slowed, not withdrawn, not actively hallucinating and not combative. Thought content is not paranoid and not delusional. Cognition and memory are not impaired. He does not express impulsivity or inappropriate judgment. He does not exhibit a depressed mood. He expresses no homicidal and no suicidal ideation. He expresses no suicidal plans and no homicidal plans. He is communicative. He exhibits normal recent memory and normal remote memory. He is attentive. Nursing note and vitals reviewed. Assessment:     1. Spondylosis of lumbar region without myelopathy or radiculopathy    2. Lumbar postlaminectomy syndrome    3. Cervical radiculitis    4. Fusion of spine, cervical region    5. Chronic pain syndrome            Plan:      · OARRS reviewed. · Discussed long term side effects of medications. · Discussed tolerance, dependency and addiction. · Previous UDS reviewed. · Patient told can not receive any pain medications from any other source. · Discussed with pt.may not be pain free. · No evidence of abuse, diversion or aberrant behavior. · Medications and/or procedures improve function and quality of life. · Procedure notes reviewed in detail. · Receiving 80%-90% relief of lower back pain from bilateral L-RFA   · Continue tylenol prn- not interested in any medications  · Will plan to repeat bilateral L-RFA in future as needed of and when pain returns   · Follow up prn     Meds. Prescribed:   No orders of the defined types were placed in this encounter. Return if symptoms worsen or fail to improve.          Electronically signed by Frances Garcia CNP on 11/21/2017 at 7:54 AM

## 2017-11-27 ENCOUNTER — PATIENT MESSAGE (OUTPATIENT)
Dept: CARDIOLOGY | Age: 57
End: 2017-11-27

## 2017-11-27 RX ORDER — BUMETANIDE 2 MG/1
TABLET ORAL
Qty: 180 TABLET | Refills: 2 | Status: SHIPPED | OUTPATIENT
Start: 2017-11-27 | End: 2018-01-11 | Stop reason: SDUPTHER

## 2017-11-27 RX ORDER — BUMETANIDE 2 MG/1
2 TABLET ORAL 2 TIMES DAILY
Qty: 60 TABLET | Refills: 0 | Status: SHIPPED | OUTPATIENT
Start: 2017-11-27 | End: 2018-04-30 | Stop reason: SDUPTHER

## 2017-11-27 NOTE — TELEPHONE ENCOUNTER
From: Shaheen Lewis  To: La Shipman MD  Sent: 11/27/2017 8:40 AM EST  Subject: Prescription Question    I need a Bumex sent to [facility] for a 30 day supply until [facility] can request a new RX, fill and ship.

## 2017-12-08 ENCOUNTER — HOSPITAL ENCOUNTER (OUTPATIENT)
Dept: GENERAL RADIOLOGY | Age: 57
Discharge: HOME | End: 2017-12-08
Payer: MEDICARE

## 2017-12-08 ENCOUNTER — HOSPITAL ENCOUNTER (OUTPATIENT)
Age: 57
Discharge: HOME | End: 2017-12-08
Payer: MEDICARE

## 2017-12-08 DIAGNOSIS — J40 BRONCHITIS: ICD-10-CM

## 2017-12-08 PROCEDURE — 71020 XR CHEST STANDARD TWO VW: CPT

## 2018-01-05 ENCOUNTER — TELEPHONE (OUTPATIENT)
Dept: PHYSICAL MEDICINE AND REHAB | Age: 58
End: 2018-01-05

## 2018-01-05 NOTE — TELEPHONE ENCOUNTER
Pt called and had a fall on his back. He saw his family doctor this week regarding it and was given Norco that he filled. He states his right lower back is in a lot of pain since the fall and would like to know if we are able to order him an xray of his lower back. LM to try and get him in here sometime next week. Please advise on xray.

## 2018-01-08 DIAGNOSIS — M54.50 CHRONIC BILATERAL LOW BACK PAIN WITHOUT SCIATICA: ICD-10-CM

## 2018-01-08 DIAGNOSIS — G89.29 CHRONIC BILATERAL LOW BACK PAIN WITHOUT SCIATICA: ICD-10-CM

## 2018-01-08 DIAGNOSIS — M96.1 LUMBAR POST-LAMINECTOMY SYNDROME: ICD-10-CM

## 2018-01-08 DIAGNOSIS — M47.816 LUMBAR SPONDYLOSIS: Primary | ICD-10-CM

## 2018-01-08 DIAGNOSIS — M25.551 RIGHT HIP PAIN: ICD-10-CM

## 2018-01-09 ENCOUNTER — HOSPITAL ENCOUNTER (OUTPATIENT)
Age: 58
Discharge: HOME | End: 2018-01-09
Payer: MEDICARE

## 2018-01-09 ENCOUNTER — HOSPITAL ENCOUNTER (OUTPATIENT)
Dept: GENERAL RADIOLOGY | Age: 58
Discharge: HOME | End: 2018-01-09
Payer: MEDICARE

## 2018-01-09 DIAGNOSIS — M96.1 LUMBAR POST-LAMINECTOMY SYNDROME: ICD-10-CM

## 2018-01-09 DIAGNOSIS — M47.816 LUMBAR SPONDYLOSIS: ICD-10-CM

## 2018-01-09 DIAGNOSIS — G89.29 CHRONIC BILATERAL LOW BACK PAIN WITHOUT SCIATICA: ICD-10-CM

## 2018-01-09 DIAGNOSIS — M54.50 CHRONIC BILATERAL LOW BACK PAIN WITHOUT SCIATICA: ICD-10-CM

## 2018-01-09 DIAGNOSIS — M25.551 RIGHT HIP PAIN: ICD-10-CM

## 2018-01-09 PROCEDURE — 73502 X-RAY EXAM HIP UNI 2-3 VIEWS: CPT

## 2018-01-09 PROCEDURE — 72100 X-RAY EXAM L-S SPINE 2/3 VWS: CPT

## 2018-01-11 ENCOUNTER — OFFICE VISIT (OUTPATIENT)
Dept: PHYSICAL MEDICINE AND REHAB | Age: 58
End: 2018-01-11
Payer: MEDICARE

## 2018-01-11 VITALS
HEART RATE: 81 BPM | SYSTOLIC BLOOD PRESSURE: 132 MMHG | BODY MASS INDEX: 42.65 KG/M2 | DIASTOLIC BLOOD PRESSURE: 88 MMHG | WEIGHT: 288 LBS | HEIGHT: 69 IN

## 2018-01-11 DIAGNOSIS — M54.12 CERVICAL RADICULITIS: ICD-10-CM

## 2018-01-11 DIAGNOSIS — M47.816 LUMBAR SPONDYLOSIS: Primary | ICD-10-CM

## 2018-01-11 DIAGNOSIS — G89.4 CHRONIC PAIN SYNDROME: ICD-10-CM

## 2018-01-11 DIAGNOSIS — M47.816 SPONDYLOSIS OF LUMBAR REGION WITHOUT MYELOPATHY OR RADICULOPATHY: ICD-10-CM

## 2018-01-11 DIAGNOSIS — M25.551 RIGHT HIP PAIN: ICD-10-CM
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
## 2018-01-11 DIAGNOSIS — M54.50 CHRONIC BILATERAL LOW BACK PAIN WITHOUT SCIATICA: ICD-10-CM

## 2018-01-11 DIAGNOSIS — G89.29 CHRONIC BILATERAL LOW BACK PAIN WITHOUT SCIATICA: ICD-10-CM

## 2018-01-11 DIAGNOSIS — G89.11 ACUTE PAIN DUE TO TRAUMA: ICD-10-CM

## 2018-01-11 DIAGNOSIS — M96.1 LUMBAR POST-LAMINECTOMY SYNDROME: ICD-10-CM

## 2018-01-11 PROCEDURE — G8417 CALC BMI ABV UP PARAM F/U: HCPCS | Performed by: NURSE PRACTITIONER

## 2018-01-11 PROCEDURE — 99213 OFFICE O/P EST LOW 20 MIN: CPT | Performed by: NURSE PRACTITIONER

## 2018-01-11 PROCEDURE — G8484 FLU IMMUNIZE NO ADMIN: HCPCS | Performed by: NURSE PRACTITIONER

## 2018-01-11 PROCEDURE — 3017F COLORECTAL CA SCREEN DOC REV: CPT | Performed by: NURSE PRACTITIONER

## 2018-01-11 PROCEDURE — G8427 DOCREV CUR MEDS BY ELIG CLIN: HCPCS | Performed by: NURSE PRACTITIONER

## 2018-01-11 PROCEDURE — 1036F TOBACCO NON-USER: CPT | Performed by: NURSE PRACTITIONER

## 2018-01-11 RX ORDER — TRAMADOL HYDROCHLORIDE 50 MG/1
50 TABLET ORAL EVERY 8 HOURS PRN
Qty: 42 TABLET | Refills: 0 | Status: SHIPPED | OUTPATIENT
Start: 2018-01-11 | End: 2018-01-25

## 2018-01-11 RX ORDER — METHYLPREDNISOLONE 4 MG/1
TABLET ORAL
Qty: 1 KIT | Refills: 0 | Status: SHIPPED | OUTPATIENT
Start: 2018-01-11 | End: 2018-01-17

## 2018-01-11 ASSESSMENT — ENCOUNTER SYMPTOMS: BACK PAIN: 1

## 2018-01-11 NOTE — PROGRESS NOTES
Drug screen reviewed from 8/17/2017    Right hip x-ray:  Normal hip. L-xray reviewed  1. Prior posterior lumbar fusion L5-S1. Metallic pedicle screws and rods are present and there is a disc spacer device at L5-S1 level. 2. Mild degenerative spondylosis lower thoracic and upper lumbar spine. Mild disc space narrowing L1-2 and L4-5.   3. No acute findings. Sacroiliac joints unremarkable. The patient has No Known Allergies. Past Medical History  Chaparro Pena  has a past medical history of Cardiomyopathy, dilated ([address] Congestive heart failure (CHF) ([address] Hyperlipidemia; Medtronic dual ICD; Osteoarthritis; Osteoarthritis of spine with radiculopathy, lumbar region; S/P radiofrequency ablation operation for arrhythmia; Spinal stenosis; and Unspecified sleep apnea. Past Surgical History  The patient  has a past surgical history that includes cardiovascular stress test (09/2013); doppler echocardiography (09/2013); doppler echocardiography (09/2013); Nasal sinus surgery; Carpal tunnel release (Right); Colonoscopy; back surgery (8/26/2014); Endoscopy, colon, diagnostic; Vasectomy (???); sinus surgery (1980's???); other surgical history (10/9/2015); Neck surgery (2015); back surgery (2013); Cardiac catheterization (10/2013); Cardiac defibrillator placement (2013???); Tonsillectomy (1980's??); hernia repair (???); pacemaker placement (2013?? ); other surgical history (01/18/2016); other surgical history (2-8-2016); other surgical history (N/A, 03-21-16); other surgical history (Bilateral, 05-16-16); other surgical history (08/01/2016); cervical fusion (05/17/2017); other surgical history (Left, 09/11/2017); Lumbar spine surgery (Left, 9/11/2017); other surgical history (Right, 10/31/2017); and Lumbar spine surgery (Right, 10/31/2017).     Family History  This patient's family history includes Coronary Art Dis in his father; Heart Attack in his father; Heart Disease in his father; High Blood Pressure in his tablet by mouth every 8 hours as needed for Pain for up to 14 days. Dispense:  42 tablet     Refill:  0       Return in about 1 week (around 1/18/2018), or if symptoms worsen or fail to improve, for follow up  for medications.          Electronically signed by Iza Mustafa CNP on 1/11/2018 at 8:24 AM

## 2018-01-18 ENCOUNTER — OFFICE VISIT (OUTPATIENT)
Dept: PHYSICAL MEDICINE AND REHAB | Age: 58
End: 2018-01-18
Payer: MEDICARE

## 2018-01-18 VITALS
SYSTOLIC BLOOD PRESSURE: 126 MMHG | HEIGHT: 69 IN | WEIGHT: 287.92 LBS | DIASTOLIC BLOOD PRESSURE: 84 MMHG | BODY MASS INDEX: 42.64 KG/M2 | HEART RATE: 76 BPM

## 2018-01-18 DIAGNOSIS — M47.816 SPONDYLOSIS OF LUMBAR REGION WITHOUT MYELOPATHY OR RADICULOPATHY: Primary | ICD-10-CM

## 2018-01-18 DIAGNOSIS — M47.816 LUMBAR SPONDYLOSIS: ICD-10-CM

## 2018-01-18 DIAGNOSIS — G89.4 CHRONIC PAIN SYNDROME: ICD-10-CM

## 2018-01-18 DIAGNOSIS — M54.12 CERVICAL RADICULITIS: ICD-10-CM

## 2018-01-18 DIAGNOSIS — M43.22 FUSION OF SPINE OF CERVICAL REGION: ICD-10-CM

## 2018-01-18 DIAGNOSIS — M47.812 SPONDYLOSIS OF CERVICAL REGION WITHOUT MYELOPATHY OR RADICULOPATHY: ICD-10-CM

## 2018-01-18 DIAGNOSIS — M54.50 CHRONIC BILATERAL LOW BACK PAIN WITHOUT SCIATICA: ICD-10-CM

## 2018-01-18 DIAGNOSIS — G89.29 CHRONIC BILATERAL LOW BACK PAIN WITHOUT SCIATICA: ICD-10-CM

## 2018-01-18 DIAGNOSIS — M96.1 LUMBAR POST-LAMINECTOMY SYNDROME: ICD-10-CM

## 2018-01-18 PROCEDURE — 3017F COLORECTAL CA SCREEN DOC REV: CPT | Performed by: NURSE PRACTITIONER

## 2018-01-18 PROCEDURE — G8427 DOCREV CUR MEDS BY ELIG CLIN: HCPCS | Performed by: NURSE PRACTITIONER

## 2018-01-18 PROCEDURE — G8417 CALC BMI ABV UP PARAM F/U: HCPCS | Performed by: NURSE PRACTITIONER

## 2018-01-18 PROCEDURE — 99213 OFFICE O/P EST LOW 20 MIN: CPT | Performed by: NURSE PRACTITIONER

## 2018-01-18 PROCEDURE — G8484 FLU IMMUNIZE NO ADMIN: HCPCS | Performed by: NURSE PRACTITIONER

## 2018-01-18 PROCEDURE — 1036F TOBACCO NON-USER: CPT | Performed by: NURSE PRACTITIONER

## 2018-01-18 ASSESSMENT — ENCOUNTER SYMPTOMS: BACK PAIN: 1

## 2018-01-18 NOTE — PROGRESS NOTES
[facility]  [facility]  [address]  Dept: [phone]  Dept Fax: [phone]  Loc: [phone]    Visit Date: 1/18/2018    Functionality Assessment/Goals Worksheet     On a scale of 0 (Does not Interfere) to 10 (Completely Interferes)     1. Which number describes how during the past week pain has interfered with       the following:  A. General Activity:  4  B. Mood: 0  C. Walking Ability:  3  D. Normal Work (Includes both work outside the home and housework):  3  E. Relations with Other People:   0  F. Sleep:   0  G. Enjoyment of Life:   0    2. Patient Prefers to Take their Pain Medications:     []  On a regular basis   [x]  Only when necessary    []  Does not take pain medications    3. What are the Patient's Goals/Expectations for Visiting Pain Management? [x]  Learn about my pain    []  Receive Medication   []  Physical Therapy     []  Treat Depression   []  Receive Injections    []  Treat Sleep   []  Deal with Anxiety and Stress   []  Treat Opoid Dependence/Addiction   []  Other:      HPI:   Wilber Paris is a 59 y.o. male is here today for    Chief Complaint:  Lower back, right hip     HPI   1 week follow-up. Pain completely resolved from medrol dose back. Dose have his normal lower back pain- minimal. \"light dull pain\". Still feels good relief from bilateral L-RFA- 80%-90%. Able to lift leg, perform daily activities. Ultram was helping pain     Medications reviewed. Patient denies  side effects with medications. Patient states he is  taking medications as prescribed. He denies receiving pain medications from other sources. He denies any ER visits since last visit. Pain scale with out pain medications is 3/10. Pain scale with pain medications is  0/10. Last dose of Ultram was 1/17/2018- okay   Drug screen reviewed from 8/17/2017- [facility]     The patient has No Known Allergies.     Past Medical History  Katheryn Rios  has a past has no rales. He exhibits no tenderness. Abdominal: Soft. Bowel sounds are normal. He exhibits no distension. There is no tenderness. There is no rebound and no guarding. Musculoskeletal:        Cervical back: He exhibits decreased range of motion, tenderness, pain and spasm. Lumbar back: He exhibits tenderness. Back:    Neurological: He is alert and oriented to person, place, and time. He has normal strength. He is not disoriented. He displays no atrophy. No cranial nerve deficit or sensory deficit. He exhibits normal muscle tone. He displays a negative Romberg sign. Gait abnormal. Coordination normal. He displays no Babinski's sign on the right side. Reflex Scores:       Tricep reflexes are 2+ on the right side and 2+ on the left side. Bicep reflexes are 2+ on the right side and 2+ on the left side. Brachioradialis reflexes are 2+ on the right side and 2+ on the left side. Patellar reflexes are 1+ on the right side and 1+ on the left side. Achilles reflexes are 1+ on the right side and 1+ on the left side. Skin: Skin is warm. No rash noted. He is not diaphoretic. No erythema. No pallor. Psychiatric: His mood appears not anxious. His affect is not angry, not blunt, not labile and not inappropriate. His speech is not rapid and/or pressured, not delayed, not tangential and not slurred. He is not agitated, not aggressive, not hyperactive, not slowed, not withdrawn, not actively hallucinating and not combative. Thought content is not paranoid and not delusional. Cognition and memory are not impaired. He does not express impulsivity or inappropriate judgment. He does not exhibit a depressed mood. He expresses no homicidal and no suicidal ideation. He expresses no suicidal plans and no homicidal plans. He is communicative. He exhibits normal recent memory and normal remote memory. He is attentive. Nursing note and vitals reviewed. Assessment:     1. Spondylosis of lumbar region without myelopathy or radiculopathy    2. Chronic bilateral low back pain without sciatica    3. Lumbar spondylosis    4. Lumbar post-laminectomy syndrome    5. Cervical radiculitis    6. Spondylosis of cervical region without myelopathy or radiculopathy    7. Fusion of spine of cervical region    8. Chronic pain syndrome            Plan:      · OARRS reviewed. · Discussed long term side effects of medications. · Discussed tolerance, dependency and addiction. · Previous UDS reviewed. · Patient told can not receive any pain medications from any other source. · Discussed with pt.may not be pain free. · No evidence of abuse, diversion or aberrant behavior. · Medications and/or procedures improve function and quality of life. · Received complete relief from Medrol dose pack- doing much better. · Continues to receive over 80% relief from bilateral L-RFA  · Will plan to repeat bilateral L-RFA in future as needed of and when pain returns   · Continue tylenol prn   · Follow up prn     Meds. Prescribed:   No orders of the defined types were placed in this encounter. Return in about 1 month (around 2/18/2018), or if symptoms worsen or fail to improve.          Electronically signed by Jie Sharp CNP on 1/18/2018 at 8:14 AM

## 2018-02-19 RX ORDER — SPIRONOLACTONE 25 MG/1
25 TABLET ORAL DAILY
Qty: 30 TABLET | Refills: 0 | Status: SHIPPED | OUTPATIENT
Start: 2018-02-19 | End: 2018-04-13 | Stop reason: ALTCHOICE

## 2018-02-19 RX ORDER — CARVEDILOL 25 MG/1
TABLET ORAL
Qty: 180 TABLET | Refills: 0 | Status: SHIPPED | OUTPATIENT
Start: 2018-02-19 | End: 2018-04-30 | Stop reason: SDUPTHER

## 2018-02-19 RX ORDER — POTASSIUM CHLORIDE 20 MEQ/1
TABLET, EXTENDED RELEASE ORAL
Qty: 180 TABLET | Refills: 0 | Status: SHIPPED | OUTPATIENT
Start: 2018-02-19 | End: 2018-04-30 | Stop reason: SDUPTHER

## 2018-02-19 RX ORDER — SPIRONOLACTONE 25 MG/1
TABLET ORAL
Qty: 90 TABLET | Refills: 0 | Status: SHIPPED | OUTPATIENT
Start: 2018-02-19 | End: 2018-04-30 | Stop reason: SDUPTHER

## 2018-03-19 ENCOUNTER — NURSE ONLY (OUTPATIENT)
Dept: CARDIOLOGY CLINIC | Age: 58
End: 2018-03-19
Payer: MEDICARE

## 2018-03-19 DIAGNOSIS — Z95.810 AUTOMATIC IMPLANTABLE CARDIOVERTER-DEFIBRILLATOR IN SITU: Primary | ICD-10-CM

## 2018-03-19 PROCEDURE — 93283 PRGRMG EVAL IMPLANTABLE DFB: CPT | Performed by: INTERNAL MEDICINE

## 2018-04-13 ENCOUNTER — OFFICE VISIT (OUTPATIENT)
Dept: CARDIOLOGY CLINIC | Age: 58
End: 2018-04-13
Payer: MEDICARE

## 2018-04-13 VITALS
SYSTOLIC BLOOD PRESSURE: 90 MMHG | HEART RATE: 81 BPM | HEIGHT: 69 IN | WEIGHT: 289 LBS | DIASTOLIC BLOOD PRESSURE: 52 MMHG | BODY MASS INDEX: 42.8 KG/M2

## 2018-04-13 DIAGNOSIS — I50.42 CHRONIC COMBINED SYSTOLIC AND DIASTOLIC CONGESTIVE HEART FAILURE (HCC): Primary | ICD-10-CM

## 2018-04-13 DIAGNOSIS — I25.10 CORONARY ARTERY DISEASE INVOLVING NATIVE CORONARY ARTERY OF NATIVE HEART WITHOUT ANGINA PECTORIS: ICD-10-CM

## 2018-04-13 DIAGNOSIS — I42.0 DILATED CARDIOMYOPATHY (HCC): ICD-10-CM

## 2018-04-13 PROCEDURE — G8427 DOCREV CUR MEDS BY ELIG CLIN: HCPCS | Performed by: NUCLEAR MEDICINE

## 2018-04-13 PROCEDURE — 99213 OFFICE O/P EST LOW 20 MIN: CPT | Performed by: NUCLEAR MEDICINE

## 2018-04-13 PROCEDURE — 93000 ELECTROCARDIOGRAM COMPLETE: CPT | Performed by: NUCLEAR MEDICINE

## 2018-04-13 PROCEDURE — 1036F TOBACCO NON-USER: CPT | Performed by: NUCLEAR MEDICINE

## 2018-04-13 PROCEDURE — 3017F COLORECTAL CA SCREEN DOC REV: CPT | Performed by: NUCLEAR MEDICINE

## 2018-04-13 PROCEDURE — G8599 NO ASA/ANTIPLAT THER USE RNG: HCPCS | Performed by: NUCLEAR MEDICINE

## 2018-04-13 PROCEDURE — G8417 CALC BMI ABV UP PARAM F/U: HCPCS | Performed by: NUCLEAR MEDICINE

## 2018-04-25 ENCOUNTER — TELEPHONE (OUTPATIENT)
Dept: SURGERY | Age: 58
End: 2018-04-25

## 2018-04-30 ENCOUNTER — TELEPHONE (OUTPATIENT)
Dept: SURGERY | Age: 58
End: 2018-04-30

## 2018-04-30 ENCOUNTER — OFFICE VISIT (OUTPATIENT)
Dept: SURGERY | Age: 58
End: 2018-04-30
Payer: MEDICARE

## 2018-04-30 VITALS
RESPIRATION RATE: 18 BRPM | HEIGHT: 69 IN | HEART RATE: 72 BPM | DIASTOLIC BLOOD PRESSURE: 64 MMHG | WEIGHT: 290 LBS | OXYGEN SATURATION: 95 % | TEMPERATURE: 96.9 F | SYSTOLIC BLOOD PRESSURE: 118 MMHG | BODY MASS INDEX: 42.95 KG/M2

## 2018-04-30 DIAGNOSIS — Z01.818 PRE-OP TESTING: ICD-10-CM

## 2018-04-30 DIAGNOSIS — M79.89 MASS OF SOFT TISSUE OF FACE: Primary | ICD-10-CM

## 2018-04-30 PROCEDURE — 3017F COLORECTAL CA SCREEN DOC REV: CPT | Performed by: SURGERY

## 2018-04-30 PROCEDURE — G8427 DOCREV CUR MEDS BY ELIG CLIN: HCPCS | Performed by: SURGERY

## 2018-04-30 PROCEDURE — 99202 OFFICE O/P NEW SF 15 MIN: CPT | Performed by: SURGERY

## 2018-04-30 PROCEDURE — G8417 CALC BMI ABV UP PARAM F/U: HCPCS | Performed by: SURGERY

## 2018-04-30 RX ORDER — SPIRONOLACTONE 25 MG/1
TABLET ORAL
Qty: 90 TABLET | Refills: 3 | Status: SHIPPED | OUTPATIENT
Start: 2018-04-30 | End: 2019-06-03 | Stop reason: SDUPTHER

## 2018-04-30 RX ORDER — BUMETANIDE 2 MG/1
2 TABLET ORAL 2 TIMES DAILY
Qty: 180 TABLET | Refills: 3 | Status: SHIPPED | OUTPATIENT
Start: 2018-04-30 | End: 2019-02-28 | Stop reason: SDUPTHER

## 2018-04-30 RX ORDER — POTASSIUM CHLORIDE 20 MEQ/1
TABLET, EXTENDED RELEASE ORAL
Qty: 180 TABLET | Refills: 3 | Status: SHIPPED | OUTPATIENT
Start: 2018-04-30 | End: 2019-06-03 | Stop reason: SDUPTHER

## 2018-04-30 RX ORDER — CARVEDILOL 25 MG/1
TABLET ORAL
Qty: 180 TABLET | Refills: 3 | Status: SHIPPED | OUTPATIENT
Start: 2018-04-30 | End: 2019-01-17 | Stop reason: ALTCHOICE

## 2018-04-30 ASSESSMENT — ENCOUNTER SYMPTOMS
EYE PAIN: 0
ABDOMINAL DISTENTION: 0
BLOOD IN STOOL: 0
COLOR CHANGE: 0
TROUBLE SWALLOWING: 0
APNEA: 1
NAUSEA: 0
BACK PAIN: 1
SHORTNESS OF BREATH: 1

## 2018-05-02 LAB
ANION GAP SERPL CALCULATED.3IONS-SCNC: 12 MEQ/L (ref 10–19)
BUN BLDV-MCNC: 17 MG/DL (ref 8–23)
CALCIUM SERPL-MCNC: 9.4 MG/DL (ref 8.5–10.5)
CHLORIDE BLD-SCNC: 97 MEQ/L (ref 95–107)
CO2: 30 MEQ/L (ref 19–31)
CREAT SERPL-MCNC: 1.3 MG/DL (ref 0.8–1.4)
EGFR AFRICAN AMERICAN: 69.2 ML/MIN/1.73 M2
EGFR IF NONAFRICAN AMERICAN: 59.7 ML/MIN/1.73 M2
GLUCOSE: 100 MG/DL (ref 70–99)
POTASSIUM SERPL-SCNC: 4.5 MEQ/L (ref 3.5–5.4)
SODIUM BLD-SCNC: 139 MEQ/L (ref 135–146)

## 2018-05-09 ENCOUNTER — HOSPITAL ENCOUNTER (OUTPATIENT)
Age: 58
Setting detail: OUTPATIENT SURGERY
Discharge: HOME | End: 2018-05-09
Attending: SURGERY | Admitting: SURGERY
Payer: MEDICARE

## 2018-05-09 ENCOUNTER — ANESTHESIA (OUTPATIENT)
Dept: OPERATING ROOM | Age: 58
End: 2018-05-09
Payer: MEDICARE

## 2018-05-09 ENCOUNTER — ANESTHESIA EVENT (OUTPATIENT)
Dept: OPERATING ROOM | Age: 58
End: 2018-05-09
Payer: MEDICARE

## 2018-05-09 VITALS
HEIGHT: 69 IN | DIASTOLIC BLOOD PRESSURE: 55 MMHG | TEMPERATURE: 97.4 F | WEIGHT: 282 LBS | HEART RATE: 63 BPM | SYSTOLIC BLOOD PRESSURE: 108 MMHG | BODY MASS INDEX: 41.77 KG/M2 | RESPIRATION RATE: 18 BRPM | OXYGEN SATURATION: 96 %

## 2018-05-09 VITALS
OXYGEN SATURATION: 90 % | RESPIRATION RATE: 20 BRPM | DIASTOLIC BLOOD PRESSURE: 79 MMHG | SYSTOLIC BLOOD PRESSURE: 134 MMHG

## 2018-05-09 DIAGNOSIS — R22.0 SCALP MASS: Primary | ICD-10-CM

## 2018-05-09 PROCEDURE — 6360000002 HC RX W HCPCS: Performed by: NURSE ANESTHETIST, CERTIFIED REGISTERED

## 2018-05-09 PROCEDURE — 2500000003 HC RX 250 WO HCPCS: Performed by: NURSE ANESTHETIST, CERTIFIED REGISTERED

## 2018-05-09 PROCEDURE — 3700000000 HC ANESTHESIA ATTENDED CARE: Performed by: SURGERY

## 2018-05-09 PROCEDURE — 3700000001 HC ADD 15 MINUTES (ANESTHESIA): Performed by: SURGERY

## 2018-05-09 PROCEDURE — 88304 TISSUE EXAM BY PATHOLOGIST: CPT

## 2018-05-09 PROCEDURE — 12032 INTMD RPR S/A/T/EXT 2.6-7.5: CPT | Performed by: SURGERY

## 2018-05-09 PROCEDURE — 3600000012 HC SURGERY LEVEL 2 ADDTL 15MIN: Performed by: SURGERY

## 2018-05-09 PROCEDURE — 3600000002 HC SURGERY LEVEL 2 BASE: Performed by: SURGERY

## 2018-05-09 PROCEDURE — 6360000002 HC RX W HCPCS: Performed by: SURGERY

## 2018-05-09 PROCEDURE — 7100000010 HC PHASE II RECOVERY - FIRST 15 MIN: Performed by: SURGERY

## 2018-05-09 PROCEDURE — 2500000003 HC RX 250 WO HCPCS: Performed by: SURGERY

## 2018-05-09 PROCEDURE — 7100000011 HC PHASE II RECOVERY - ADDTL 15 MIN: Performed by: SURGERY

## 2018-05-09 PROCEDURE — 11423 EXC H-F-NK-SP B9+MARG 2.1-3: CPT | Performed by: SURGERY

## 2018-05-09 PROCEDURE — 2580000003 HC RX 258: Performed by: SURGERY

## 2018-05-09 RX ORDER — SODIUM CHLORIDE 9 MG/ML
INJECTION, SOLUTION INTRAVENOUS CONTINUOUS
Status: DISCONTINUED | OUTPATIENT
Start: 2018-05-09 | End: 2018-05-09 | Stop reason: HOSPADM

## 2018-05-09 RX ORDER — PROPOFOL 10 MG/ML
INJECTION, EMULSION INTRAVENOUS PRN
Status: DISCONTINUED | OUTPATIENT
Start: 2018-05-09 | End: 2018-05-09 | Stop reason: SDUPTHER

## 2018-05-09 RX ORDER — ONDANSETRON 2 MG/ML
INJECTION INTRAMUSCULAR; INTRAVENOUS PRN
Status: DISCONTINUED | OUTPATIENT
Start: 2018-05-09 | End: 2018-05-09 | Stop reason: SDUPTHER

## 2018-05-09 RX ORDER — SODIUM CHLORIDE 0.9 % (FLUSH) 0.9 %
10 SYRINGE (ML) INJECTION EVERY 12 HOURS SCHEDULED
Status: DISCONTINUED | OUTPATIENT
Start: 2018-05-09 | End: 2018-05-09 | Stop reason: HOSPADM

## 2018-05-09 RX ORDER — HYDROCODONE BITARTRATE AND ACETAMINOPHEN 5; 325 MG/1; MG/1
1 TABLET ORAL EVERY 6 HOURS PRN
Qty: 10 TABLET | Refills: 0 | Status: SHIPPED | OUTPATIENT
Start: 2018-05-09 | End: 2018-05-14

## 2018-05-09 RX ORDER — SODIUM CHLORIDE 0.9 % (FLUSH) 0.9 %
10 SYRINGE (ML) INJECTION PRN
Status: DISCONTINUED | OUTPATIENT
Start: 2018-05-09 | End: 2018-05-09 | Stop reason: HOSPADM

## 2018-05-09 RX ORDER — LIDOCAINE HYDROCHLORIDE 10 MG/ML
INJECTION, SOLUTION EPIDURAL; INFILTRATION; INTRACAUDAL; PERINEURAL PRN
Status: DISCONTINUED | OUTPATIENT
Start: 2018-05-09 | End: 2018-05-09 | Stop reason: HOSPADM

## 2018-05-09 RX ORDER — ONDANSETRON 2 MG/ML
4 INJECTION INTRAMUSCULAR; INTRAVENOUS EVERY 6 HOURS PRN
Status: DISCONTINUED | OUTPATIENT
Start: 2018-05-09 | End: 2018-05-09 | Stop reason: HOSPADM

## 2018-05-09 RX ORDER — HYDROCODONE BITARTRATE AND ACETAMINOPHEN 5; 325 MG/1; MG/1
1 TABLET ORAL EVERY 4 HOURS PRN
Status: DISCONTINUED | OUTPATIENT
Start: 2018-05-09 | End: 2018-05-09 | Stop reason: HOSPADM

## 2018-05-09 RX ORDER — BUPIVACAINE HYDROCHLORIDE AND EPINEPHRINE 5; 5 MG/ML; UG/ML
INJECTION, SOLUTION EPIDURAL; INTRACAUDAL; PERINEURAL PRN
Status: DISCONTINUED | OUTPATIENT
Start: 2018-05-09 | End: 2018-05-09 | Stop reason: HOSPADM

## 2018-05-09 RX ORDER — DEXAMETHASONE SODIUM PHOSPHATE 4 MG/ML
INJECTION, SOLUTION INTRA-ARTICULAR; INTRALESIONAL; INTRAMUSCULAR; INTRAVENOUS; SOFT TISSUE PRN
Status: DISCONTINUED | OUTPATIENT
Start: 2018-05-09 | End: 2018-05-09 | Stop reason: SDUPTHER

## 2018-05-09 RX ORDER — FENTANYL CITRATE 50 UG/ML
INJECTION, SOLUTION INTRAMUSCULAR; INTRAVENOUS PRN
Status: DISCONTINUED | OUTPATIENT
Start: 2018-05-09 | End: 2018-05-09 | Stop reason: SDUPTHER

## 2018-05-09 RX ORDER — ACETAMINOPHEN 325 MG/1
650 TABLET ORAL EVERY 4 HOURS PRN
Status: DISCONTINUED | OUTPATIENT
Start: 2018-05-09 | End: 2018-05-09 | Stop reason: HOSPADM

## 2018-05-09 RX ORDER — HYDROCODONE BITARTRATE AND ACETAMINOPHEN 5; 325 MG/1; MG/1
2 TABLET ORAL EVERY 4 HOURS PRN
Status: DISCONTINUED | OUTPATIENT
Start: 2018-05-09 | End: 2018-05-09 | Stop reason: HOSPADM

## 2018-05-09 RX ORDER — LIDOCAINE HYDROCHLORIDE 10 MG/ML
INJECTION, SOLUTION INFILTRATION; PERINEURAL PRN
Status: DISCONTINUED | OUTPATIENT
Start: 2018-05-09 | End: 2018-05-09 | Stop reason: SDUPTHER

## 2018-05-09 RX ORDER — MIDAZOLAM HYDROCHLORIDE 1 MG/ML
INJECTION INTRAMUSCULAR; INTRAVENOUS PRN
Status: DISCONTINUED | OUTPATIENT
Start: 2018-05-09 | End: 2018-05-09 | Stop reason: SDUPTHER

## 2018-05-09 RX ADMIN — PROPOFOL 50 MG: 10 INJECTION, EMULSION INTRAVENOUS at 10:10

## 2018-05-09 RX ADMIN — FENTANYL CITRATE 50 MCG: 50 INJECTION INTRAMUSCULAR; INTRAVENOUS at 10:10

## 2018-05-09 RX ADMIN — LIDOCAINE HYDROCHLORIDE 20 MG: 10 INJECTION, SOLUTION INFILTRATION; PERINEURAL at 10:10

## 2018-05-09 RX ADMIN — SODIUM CHLORIDE: 9 INJECTION, SOLUTION INTRAVENOUS at 10:04

## 2018-05-09 RX ADMIN — CEFAZOLIN SODIUM 3 G: 10 INJECTION, POWDER, FOR SOLUTION INTRAVENOUS at 10:08

## 2018-05-09 RX ADMIN — MIDAZOLAM HYDROCHLORIDE 2 MG: 1 INJECTION, SOLUTION INTRAMUSCULAR; INTRAVENOUS at 10:08

## 2018-05-09 RX ADMIN — ONDANSETRON 4 MG: 2 INJECTION INTRAMUSCULAR; INTRAVENOUS at 10:11

## 2018-05-09 RX ADMIN — DEXAMETHASONE SODIUM PHOSPHATE 8 MG: 4 INJECTION, SOLUTION INTRAMUSCULAR; INTRAVENOUS at 10:11

## 2018-05-09 RX ADMIN — PROPOFOL 50 MG: 10 INJECTION, EMULSION INTRAVENOUS at 10:12

## 2018-05-09 ASSESSMENT — PULMONARY FUNCTION TESTS
PIF_VALUE: 0

## 2018-05-09 ASSESSMENT — PAIN SCALES - GENERAL: PAINLEVEL_OUTOF10: 0

## 2018-05-09 ASSESSMENT — ENCOUNTER SYMPTOMS: SHORTNESS OF BREATH: 1

## 2018-05-09 ASSESSMENT — PAIN - FUNCTIONAL ASSESSMENT: PAIN_FUNCTIONAL_ASSESSMENT: 0-10

## 2018-05-10 ENCOUNTER — TELEPHONE (OUTPATIENT)
Dept: SURGERY | Age: 58
End: 2018-05-10

## 2018-05-17 ENCOUNTER — OFFICE VISIT (OUTPATIENT)
Dept: SURGERY | Age: 58
End: 2018-05-17

## 2018-05-17 VITALS
HEART RATE: 71 BPM | DIASTOLIC BLOOD PRESSURE: 80 MMHG | OXYGEN SATURATION: 96 % | WEIGHT: 290 LBS | HEIGHT: 69 IN | BODY MASS INDEX: 42.95 KG/M2 | RESPIRATION RATE: 18 BRPM | TEMPERATURE: 96.7 F | SYSTOLIC BLOOD PRESSURE: 122 MMHG

## 2018-05-17 DIAGNOSIS — M79.89 MASS OF SOFT TISSUE OF FACE: Primary | ICD-10-CM

## 2018-05-17 DIAGNOSIS — Z98.890 POSTOPERATIVE STATE: ICD-10-CM

## 2018-05-17 PROCEDURE — 99024 POSTOP FOLLOW-UP VISIT: CPT | Performed by: SURGERY

## 2018-05-22 ENCOUNTER — TELEPHONE (OUTPATIENT)
Dept: PHYSICAL MEDICINE AND REHAB | Age: 58
End: 2018-05-22

## 2018-05-22 DIAGNOSIS — M54.6 ACUTE MIDLINE THORACIC BACK PAIN: ICD-10-CM

## 2018-05-22 DIAGNOSIS — M54.12 CERVICAL RADICULITIS: Primary | ICD-10-CM

## 2018-05-22 DIAGNOSIS — Z01.812 PRE-PROCEDURE LAB EXAM: Primary | ICD-10-CM

## 2018-05-23 ENCOUNTER — TELEPHONE (OUTPATIENT)
Dept: PHYSICAL MEDICINE AND REHAB | Age: 58
End: 2018-05-23

## 2018-05-23 DIAGNOSIS — M54.12 CERVICAL RADICULITIS: Primary | ICD-10-CM

## 2018-05-23 DIAGNOSIS — M54.6 ACUTE MIDLINE THORACIC BACK PAIN: ICD-10-CM

## 2018-05-24 DIAGNOSIS — G89.4 CHRONIC PAIN SYNDROME: Primary | ICD-10-CM

## 2018-05-24 RX ORDER — HYDROCODONE BITARTRATE AND ACETAMINOPHEN 5; 325 MG/1; MG/1
1 TABLET ORAL EVERY 8 HOURS PRN
Qty: 42 TABLET | Refills: 0 | Status: SHIPPED | OUTPATIENT
Start: 2018-05-24 | End: 2018-05-25 | Stop reason: SDUPTHER

## 2018-05-25 DIAGNOSIS — G89.4 CHRONIC PAIN SYNDROME: ICD-10-CM

## 2018-05-25 RX ORDER — HYDROCODONE BITARTRATE AND ACETAMINOPHEN 5; 325 MG/1; MG/1
1 TABLET ORAL EVERY 8 HOURS PRN
Qty: 42 TABLET | Refills: 0 | Status: SHIPPED | OUTPATIENT
Start: 2018-05-25 | End: 2018-06-08

## 2018-06-01 RX ORDER — SODIUM CHLORIDE 450 MG/100ML
INJECTION, SOLUTION INTRAVENOUS CONTINUOUS
Status: CANCELLED | OUTPATIENT
Start: 2018-06-01

## 2018-06-04 ENCOUNTER — HOSPITAL ENCOUNTER (OUTPATIENT)
Dept: GENERAL RADIOLOGY | Age: 58
Discharge: HOME | End: 2018-06-04
Payer: MEDICARE

## 2018-06-04 ENCOUNTER — HOSPITAL ENCOUNTER (OUTPATIENT)
Dept: CT IMAGING | Age: 58
Discharge: HOME | End: 2018-06-04
Payer: MEDICARE

## 2018-06-04 VITALS
OXYGEN SATURATION: 99 % | TEMPERATURE: 98.3 F | DIASTOLIC BLOOD PRESSURE: 85 MMHG | WEIGHT: 282 LBS | BODY MASS INDEX: 41.64 KG/M2 | RESPIRATION RATE: 16 BRPM | SYSTOLIC BLOOD PRESSURE: 124 MMHG | HEART RATE: 80 BPM

## 2018-06-04 DIAGNOSIS — M54.6 ACUTE MIDLINE THORACIC BACK PAIN: ICD-10-CM

## 2018-06-04 DIAGNOSIS — M54.12 CERVICAL RADICULITIS: ICD-10-CM

## 2018-06-04 PROCEDURE — 62303 MYELOGRAPHY LUMBAR INJECTION: CPT

## 2018-06-04 PROCEDURE — 6360000004 HC RX CONTRAST MEDICATION: Performed by: PAIN MEDICINE

## 2018-06-04 PROCEDURE — 72129 CT CHEST SPINE W/DYE: CPT

## 2018-06-04 PROCEDURE — 2580000003 HC RX 258: Performed by: RADIOLOGY

## 2018-06-04 RX ORDER — ONDANSETRON 2 MG/ML
4 INJECTION INTRAMUSCULAR; INTRAVENOUS EVERY 6 HOURS PRN
Status: DISCONTINUED | OUTPATIENT
Start: 2018-06-04 | End: 2018-06-05 | Stop reason: HOSPADM

## 2018-06-04 RX ORDER — SODIUM CHLORIDE 450 MG/100ML
INJECTION, SOLUTION INTRAVENOUS CONTINUOUS
Status: DISCONTINUED | OUTPATIENT
Start: 2018-06-04 | End: 2018-06-05 | Stop reason: HOSPADM

## 2018-06-04 RX ADMIN — SODIUM CHLORIDE: 4.5 INJECTION, SOLUTION INTRAVENOUS at 08:58

## 2018-06-04 RX ADMIN — IOHEXOL 20 ML: 180 INJECTION INTRAVENOUS at 09:57

## 2018-06-04 ASSESSMENT — PAIN - FUNCTIONAL ASSESSMENT: PAIN_FUNCTIONAL_ASSESSMENT: 0-10

## 2018-06-05 RX ORDER — LOSARTAN POTASSIUM 25 MG/1
25 TABLET ORAL DAILY
Qty: 30 TABLET | Refills: 3 | Status: SHIPPED | OUTPATIENT
Start: 2018-06-05 | End: 2018-07-27 | Stop reason: SDUPTHER

## 2018-06-06 ENCOUNTER — OFFICE VISIT (OUTPATIENT)
Dept: FAMILY MEDICINE CLINIC | Age: 58
End: 2018-06-06
Payer: MEDICARE

## 2018-06-06 VITALS
SYSTOLIC BLOOD PRESSURE: 124 MMHG | HEART RATE: 80 BPM | TEMPERATURE: 98.5 F | OXYGEN SATURATION: 96 % | HEIGHT: 69 IN | WEIGHT: 283 LBS | RESPIRATION RATE: 16 BRPM | DIASTOLIC BLOOD PRESSURE: 80 MMHG | BODY MASS INDEX: 41.92 KG/M2

## 2018-06-06 DIAGNOSIS — S93.602A FOOT SPRAIN, LEFT, INITIAL ENCOUNTER: Primary | ICD-10-CM

## 2018-06-06 PROCEDURE — 99213 OFFICE O/P EST LOW 20 MIN: CPT | Performed by: NURSE PRACTITIONER

## 2018-06-06 RX ORDER — LOSARTAN POTASSIUM 25 MG/1
25 TABLET ORAL DAILY
Qty: 30 TABLET | Refills: 1 | Status: SHIPPED | OUTPATIENT
Start: 2018-06-06 | End: 2018-07-18

## 2018-06-06 ASSESSMENT — ENCOUNTER SYMPTOMS
BACK PAIN: 0
COLOR CHANGE: 1

## 2018-06-06 ASSESSMENT — PATIENT HEALTH QUESTIONNAIRE - PHQ9
1. LITTLE INTEREST OR PLEASURE IN DOING THINGS: 0
SUM OF ALL RESPONSES TO PHQ9 QUESTIONS 1 & 2: 0
SUM OF ALL RESPONSES TO PHQ QUESTIONS 1-9: 0
2. FEELING DOWN, DEPRESSED OR HOPELESS: 0

## 2018-06-14 ENCOUNTER — OFFICE VISIT (OUTPATIENT)
Dept: PHYSICAL MEDICINE AND REHAB | Age: 58
End: 2018-06-14
Payer: MEDICARE

## 2018-06-14 VITALS — SYSTOLIC BLOOD PRESSURE: 128 MMHG | DIASTOLIC BLOOD PRESSURE: 84 MMHG | HEART RATE: 92 BPM

## 2018-06-14 DIAGNOSIS — G89.29 CHRONIC BILATERAL LOW BACK PAIN WITHOUT SCIATICA: ICD-10-CM

## 2018-06-14 DIAGNOSIS — M54.12 LEFT CERVICAL RADICULOPATHY: ICD-10-CM

## 2018-06-14 DIAGNOSIS — M47.816 LUMBAR SPONDYLOSIS: ICD-10-CM

## 2018-06-14 DIAGNOSIS — M47.816 SPONDYLOSIS OF LUMBAR REGION WITHOUT MYELOPATHY OR RADICULOPATHY: Primary | ICD-10-CM

## 2018-06-14 DIAGNOSIS — M54.6 ACUTE MIDLINE THORACIC BACK PAIN: ICD-10-CM

## 2018-06-14 DIAGNOSIS — M96.1 LUMBAR POST-LAMINECTOMY SYNDROME: ICD-10-CM

## 2018-06-14 DIAGNOSIS — M54.12 CERVICAL RADICULITIS: ICD-10-CM

## 2018-06-14 DIAGNOSIS — M54.50 CHRONIC BILATERAL LOW BACK PAIN WITHOUT SCIATICA: ICD-10-CM

## 2018-06-14 DIAGNOSIS — M47.812 SPONDYLOSIS OF CERVICAL REGION WITHOUT MYELOPATHY OR RADICULOPATHY: ICD-10-CM

## 2018-06-14 DIAGNOSIS — G89.4 CHRONIC PAIN SYNDROME: ICD-10-CM

## 2018-06-14 PROCEDURE — 1036F TOBACCO NON-USER: CPT | Performed by: NURSE PRACTITIONER

## 2018-06-14 PROCEDURE — G8599 NO ASA/ANTIPLAT THER USE RNG: HCPCS | Performed by: NURSE PRACTITIONER

## 2018-06-14 PROCEDURE — G8427 DOCREV CUR MEDS BY ELIG CLIN: HCPCS | Performed by: NURSE PRACTITIONER

## 2018-06-14 PROCEDURE — 3017F COLORECTAL CA SCREEN DOC REV: CPT | Performed by: NURSE PRACTITIONER

## 2018-06-14 PROCEDURE — G8417 CALC BMI ABV UP PARAM F/U: HCPCS | Performed by: NURSE PRACTITIONER

## 2018-06-14 PROCEDURE — 99214 OFFICE O/P EST MOD 30 MIN: CPT | Performed by: NURSE PRACTITIONER

## 2018-06-14 ASSESSMENT — ENCOUNTER SYMPTOMS: BACK PAIN: 1

## 2018-06-19 ENCOUNTER — ANESTHESIA (OUTPATIENT)
Dept: OPERATING ROOM | Age: 58
End: 2018-06-19
Payer: MEDICARE

## 2018-06-19 ENCOUNTER — TELEPHONE (OUTPATIENT)
Dept: CARDIOLOGY CLINIC | Age: 58
End: 2018-06-19

## 2018-06-19 ENCOUNTER — HOSPITAL ENCOUNTER (OUTPATIENT)
Age: 58
Setting detail: OUTPATIENT SURGERY
Discharge: HOME | End: 2018-06-19
Attending: PAIN MEDICINE | Admitting: PAIN MEDICINE
Payer: MEDICARE

## 2018-06-19 ENCOUNTER — ANESTHESIA EVENT (OUTPATIENT)
Dept: OPERATING ROOM | Age: 58
End: 2018-06-19
Payer: MEDICARE

## 2018-06-19 ENCOUNTER — APPOINTMENT (OUTPATIENT)
Dept: GENERAL RADIOLOGY | Age: 58
End: 2018-06-19
Attending: PAIN MEDICINE
Payer: MEDICARE

## 2018-06-19 VITALS
BODY MASS INDEX: 43.44 KG/M2 | WEIGHT: 286.6 LBS | DIASTOLIC BLOOD PRESSURE: 58 MMHG | OXYGEN SATURATION: 95 % | HEIGHT: 68 IN | SYSTOLIC BLOOD PRESSURE: 113 MMHG | HEART RATE: 90 BPM | TEMPERATURE: 97.1 F | RESPIRATION RATE: 18 BRPM

## 2018-06-19 VITALS
DIASTOLIC BLOOD PRESSURE: 77 MMHG | SYSTOLIC BLOOD PRESSURE: 131 MMHG | OXYGEN SATURATION: 94 % | RESPIRATION RATE: 14 BRPM

## 2018-06-19 PROCEDURE — 6360000002 HC RX W HCPCS: Performed by: PAIN MEDICINE

## 2018-06-19 PROCEDURE — 64635 DESTROY LUMB/SAC FACET JNT: CPT | Performed by: PAIN MEDICINE

## 2018-06-19 PROCEDURE — 3700000000 HC ANESTHESIA ATTENDED CARE: Performed by: PAIN MEDICINE

## 2018-06-19 PROCEDURE — 7100000011 HC PHASE II RECOVERY - ADDTL 15 MIN: Performed by: PAIN MEDICINE

## 2018-06-19 PROCEDURE — 3700000001 HC ADD 15 MINUTES (ANESTHESIA): Performed by: PAIN MEDICINE

## 2018-06-19 PROCEDURE — 6360000002 HC RX W HCPCS: Performed by: NURSE ANESTHETIST, CERTIFIED REGISTERED

## 2018-06-19 PROCEDURE — 2500000003 HC RX 250 WO HCPCS: Performed by: PAIN MEDICINE

## 2018-06-19 PROCEDURE — 3600000056 HC PAIN LEVEL 4 BASE: Performed by: PAIN MEDICINE

## 2018-06-19 PROCEDURE — 3600000057 HC PAIN LEVEL 4 ADDL 15 MIN: Performed by: PAIN MEDICINE

## 2018-06-19 PROCEDURE — 3209999900 FLUORO FOR SURGICAL PROCEDURES

## 2018-06-19 PROCEDURE — 7100000010 HC PHASE II RECOVERY - FIRST 15 MIN: Performed by: PAIN MEDICINE

## 2018-06-19 PROCEDURE — 64636 DESTROY L/S FACET JNT ADDL: CPT | Performed by: PAIN MEDICINE

## 2018-06-19 RX ORDER — BUPIVACAINE HYDROCHLORIDE 2.5 MG/ML
INJECTION, SOLUTION EPIDURAL; INFILTRATION; INTRACAUDAL PRN
Status: DISCONTINUED | OUTPATIENT
Start: 2018-06-19 | End: 2018-06-19 | Stop reason: HOSPADM

## 2018-06-19 RX ORDER — LIDOCAINE HYDROCHLORIDE 10 MG/ML
INJECTION, SOLUTION EPIDURAL; INFILTRATION; INTRACAUDAL; PERINEURAL PRN
Status: DISCONTINUED | OUTPATIENT
Start: 2018-06-19 | End: 2018-06-19 | Stop reason: HOSPADM

## 2018-06-19 RX ORDER — PROPOFOL 10 MG/ML
INJECTION, EMULSION INTRAVENOUS PRN
Status: DISCONTINUED | OUTPATIENT
Start: 2018-06-19 | End: 2018-06-19 | Stop reason: SDUPTHER

## 2018-06-19 RX ORDER — FENTANYL CITRATE 50 UG/ML
INJECTION, SOLUTION INTRAMUSCULAR; INTRAVENOUS PRN
Status: DISCONTINUED | OUTPATIENT
Start: 2018-06-19 | End: 2018-06-19 | Stop reason: SDUPTHER

## 2018-06-19 RX ORDER — BETAMETHASONE SODIUM PHOSPHATE AND BETAMETHASONE ACETATE 3; 3 MG/ML; MG/ML
INJECTION, SUSPENSION INTRA-ARTICULAR; INTRALESIONAL; INTRAMUSCULAR; SOFT TISSUE PRN
Status: DISCONTINUED | OUTPATIENT
Start: 2018-06-19 | End: 2018-06-19 | Stop reason: HOSPADM

## 2018-06-19 RX ADMIN — PROPOFOL 50 MG: 10 INJECTION, EMULSION INTRAVENOUS at 10:16

## 2018-06-19 RX ADMIN — FENTANYL CITRATE 50 MCG: 50 INJECTION INTRAMUSCULAR; INTRAVENOUS at 09:58

## 2018-06-19 RX ADMIN — PROPOFOL 50 MG: 10 INJECTION, EMULSION INTRAVENOUS at 10:15

## 2018-06-19 RX ADMIN — FENTANYL CITRATE 50 MCG: 50 INJECTION INTRAMUSCULAR; INTRAVENOUS at 10:15

## 2018-06-19 ASSESSMENT — PULMONARY FUNCTION TESTS
PIF_VALUE: 0

## 2018-06-19 ASSESSMENT — PAIN SCALES - GENERAL: PAINLEVEL_OUTOF10: 0

## 2018-06-19 ASSESSMENT — ENCOUNTER SYMPTOMS: SHORTNESS OF BREATH: 1

## 2018-06-19 ASSESSMENT — PAIN - FUNCTIONAL ASSESSMENT: PAIN_FUNCTIONAL_ASSESSMENT: 0-10

## 2018-06-28 ENCOUNTER — PROCEDURE VISIT (OUTPATIENT)
Dept: CARDIOLOGY CLINIC | Age: 58
End: 2018-06-28
Payer: MEDICARE

## 2018-06-28 DIAGNOSIS — Z95.810 AUTOMATIC IMPLANTABLE CARDIOVERTER-DEFIBRILLATOR IN SITU: Primary | ICD-10-CM

## 2018-06-28 PROCEDURE — 93296 REM INTERROG EVL PM/IDS: CPT | Performed by: INTERNAL MEDICINE

## 2018-06-28 PROCEDURE — 93294 REM INTERROG EVL PM/LDLS PM: CPT | Performed by: INTERNAL MEDICINE

## 2018-07-05 ENCOUNTER — HOSPITAL ENCOUNTER (OUTPATIENT)
Dept: PHYSICAL THERAPY | Age: 58
Setting detail: THERAPIES SERIES
Discharge: HOME | End: 2018-07-05
Payer: MEDICARE

## 2018-07-05 PROCEDURE — 97161 PT EVAL LOW COMPLEX 20 MIN: CPT

## 2018-07-05 PROCEDURE — G8979 MOBILITY GOAL STATUS: HCPCS

## 2018-07-05 PROCEDURE — G8978 MOBILITY CURRENT STATUS: HCPCS

## 2018-07-05 PROCEDURE — 97110 THERAPEUTIC EXERCISES: CPT

## 2018-07-05 ASSESSMENT — PAIN DESCRIPTION - LOCATION: LOCATION: NECK;ARM

## 2018-07-05 ASSESSMENT — PAIN SCALES - GENERAL: PAINLEVEL_OUTOF10: 0

## 2018-07-05 ASSESSMENT — PAIN DESCRIPTION - FREQUENCY: FREQUENCY: INTERMITTENT

## 2018-07-05 ASSESSMENT — PAIN DESCRIPTION - PAIN TYPE: TYPE: CHRONIC PAIN

## 2018-07-05 ASSESSMENT — PAIN DESCRIPTION - ORIENTATION: ORIENTATION: LEFT

## 2018-07-05 ASSESSMENT — PAIN DESCRIPTION - PROGRESSION: CLINICAL_PROGRESSION: NOT CHANGED

## 2018-07-05 NOTE — PROGRESS NOTES
extension 20 degrees, retraction moderate restriction, protrusion WNLS. ROM RUE: Active Shoulder ROM WNLS painfree   ROM LUE: Active shoulder ROM WNLS and painfree no neck pain or strain reported, with above shoulder level. Comment: Strength: right and left shoulder strength 5/5,  strength with Bishop unit right 80 # left 78#  (right hand dominant)             Special Tests: Movement testing: neutral position of neck sitting with lumbar roll abolishes pain and dull ache between shoulder blades,  Protrusion of neck causes symptoms pulling aching between shoulder blades. Exercises  Exercise 1: Discussion on diagnosis of stenosis and spondylosis at lower cervical spine. Exercise 2: posture awarenss with sitting and standing posture. Use of lumbar roll to correct sitting posture. Exercise 3: Discussed neutral posture/position of neck to avoid extension and retraction past neutral.    Exercise 4: neck retraction to neutral 5x hold  rotation painfree ranges 5x hold 5 seconds,    Exercise 5: pec major stretch 3x hold 10 seconds (pec stretch in doorway. Activity Tolerance:  Activity Tolerance: Patient Tolerated treatment well    Assessment: Body structures, Functions, Activity limitations: Decreased ROM, Decreased sensation  Assessment: 59 year male with neck pain and intermittent radicular symptoms LUE with difficulty sleeping and completing work activities at home. Patient demonstrates moderate to major restriction with neck ROM. Moderate muscle gaurding or tightness noted also. Patient does have history of stenosis at cervical spine so will avoid extension and ROM in painful ranges. Posture awareness will also be addressed. Will follow 3x per week . Prognosis: Good  Discharge Recommendations: Continue to assess pending progress    Patient Education:  Patient Education: Patient educated in plan of care.  Initiated posture and position at left arm to hand at night from 6/10 to 3/10 and improved sleeping pattern. Short term goal 2: Patient to demonstrate increased neck ROM with left 55, right 52 degrees with increased rotation to 60 degrees with increased ease with turning neck when driving. Short term goal 3: Patient to demonstrate increased posture and position awareness of neck to manage pain and radicular symptoms LUE with no more than 3 cues in session. Short term goal 4: Neck Disability Index from 14% to 10%. Long term goals  Time Frame for Long term goals : 8 weeks  Long term goal 1: Patient to demonstrate independence in home ex program in order to meet above goals. Long term goal 2: Neck Disability Index to 5%. PT G-Codes  Functional Assessment Tool Used: Neck disability index  Score: 7/50 @ 14%   Functional Limitation: Mobility: Walking and moving around  Mobility: Walking and Moving Around Current Status (): At least 1 percent but less than 20 percent impaired, limited or restricted  Mobility: Walking and Moving Around Goal Status ():  At least 1 percent but less than 20 percent impaired, limited or restricted    Chilo Tovar, [id number]

## 2018-07-09 ENCOUNTER — HOSPITAL ENCOUNTER (OUTPATIENT)
Dept: PHYSICAL THERAPY | Age: 58
Setting detail: THERAPIES SERIES
Discharge: HOME | End: 2018-07-09
Payer: MEDICARE

## 2018-07-09 PROCEDURE — 97124 MASSAGE THERAPY: CPT

## 2018-07-09 PROCEDURE — 97110 THERAPEUTIC EXERCISES: CPT

## 2018-07-09 NOTE — PROGRESS NOTES
[place]     Time In: 0815  Time Out: 0845  Minutes: 30  Timed Code Treatment Minutes: 30 Minutes     Date: 2018  Patient Name: Liseth Nolan,  Gender:  male        CSN: [id number]   : 1960  (59 y.o.)       Referring Practitioner: Idania Toledo DO       Diagnosis: M48.02 spinal stenosis cervical region, M47.812 spondylosis without cervical myelopathy   Treatment Diagnosis: neck pain with difficulty with ROM, radicular symptoms LUE. Additional Pertinent Hx: comorbidities: back surgeries , , Cervical fusion , Lumbar spine surgery 17, 10/31/17 lumbar fusion L4-S1/TLIF , multiple  and lumbar facet blocks and nerve ablation  , CHF, ICD internal cardiac defibrillator, shortness of breath following activity. General:  PT Visit Information  Onset Date: 18  PT Insurance Information: Medicare aquatic therapy yes, modalities yes, iontophoresis, HP/CP are not covered. no precertification  Total # of Visits to Date: 2  Plan of Care/Certification Expiration Date: 18  Progress Note Counter: 2/10 for PN               Subjective:  Chart Reviewed: Yes  Patient assessed for rehabilitation services?: Yes  Response To Previous Treatment: Patient with no complaints from previous session. Family / Caregiver Present: No  Comments: Follow up with Dr. Efrain Torres in 2018. Subjective: Pt reports \" I don't know why I'm here. I'm stiff from diggin holes yesterday. \"     Pain:  Patient Currently in Pain: No         Objective               Exercises  Exercise 2: posture awarenss with sitting and standing posture. Use of lumbar roll to correct sitting posture.    Exercise 3: Discussed neutral posture/position of neck to avoid extension and retraction past neutral.    Exercise 4: neck retraction to neutral 5x hold  rotation painfree ranges 5x hold 5 seconds,    Exercise 5: pec major increased rotation to 60 degrees with increased ease with turning neck when driving. Short term goal 3: Patient to demonstrate increased posture and position awareness of neck to manage pain and radicular symptoms LUE with no more than 3 cues in session. Short term goal 4: Neck Disability Index from 14% to 10%. Long term goals  Time Frame for Long term goals : 8 weeks  Long term goal 1: Patient to demonstrate independence in home ex program in order to meet above goals. Long term goal 2: Neck Disability Index to 5%.            April Davis  [id number]

## 2018-07-11 ENCOUNTER — HOSPITAL ENCOUNTER (OUTPATIENT)
Dept: PHYSICAL THERAPY | Age: 58
Setting detail: THERAPIES SERIES
Discharge: HOME | End: 2018-07-11
Payer: MEDICARE

## 2018-07-11 PROCEDURE — 97124 MASSAGE THERAPY: CPT

## 2018-07-11 PROCEDURE — 97110 THERAPEUTIC EXERCISES: CPT

## 2018-07-11 ASSESSMENT — PAIN SCALES - GENERAL: PAINLEVEL_OUTOF10: 7

## 2018-07-11 ASSESSMENT — PAIN DESCRIPTION - ORIENTATION: ORIENTATION: LEFT

## 2018-07-11 ASSESSMENT — PAIN DESCRIPTION - LOCATION: LOCATION: NECK

## 2018-07-11 ASSESSMENT — PAIN DESCRIPTION - PAIN TYPE: TYPE: CHRONIC PAIN

## 2018-07-13 ENCOUNTER — APPOINTMENT (OUTPATIENT)
Dept: PHYSICAL THERAPY | Age: 58
End: 2018-07-13
Payer: MEDICARE

## 2018-07-16 ENCOUNTER — HOSPITAL ENCOUNTER (OUTPATIENT)
Dept: PHYSICAL THERAPY | Age: 58
Setting detail: THERAPIES SERIES
Discharge: HOME | End: 2018-07-16
Payer: MEDICARE

## 2018-07-16 PROCEDURE — 97110 THERAPEUTIC EXERCISES: CPT

## 2018-07-16 PROCEDURE — 97124 MASSAGE THERAPY: CPT

## 2018-07-16 ASSESSMENT — PAIN DESCRIPTION - PAIN TYPE: TYPE: CHRONIC PAIN

## 2018-07-16 ASSESSMENT — PAIN SCALES - GENERAL: PAINLEVEL_OUTOF10: 8

## 2018-07-16 ASSESSMENT — PAIN DESCRIPTION - LOCATION: LOCATION: NECK;ARM;HAND

## 2018-07-16 ASSESSMENT — PAIN DESCRIPTION - ORIENTATION: ORIENTATION: LEFT

## 2018-07-16 NOTE — PROGRESS NOTES
Exercise 6: Attempted Cerv stabs , but increased pain and stopped x 10 3 way shoulder 0 wt and posture exs x 10 each > sh shrugs, retro rolls and scap retractions with ball at head  Exercise 7: UBE x 2 min retro 120 rpm increased ache in L UE and tingling reported after activity. Exercise 8: Red T band x 15 sh rows/scap retractions, horiz sh abd   with cerv neutral   Exercise 9: Massage x 10 -12 min B cerv/ UT/scap areas \"Taught\" tissues noted throughout. \"Feels better\" after massage Monitor relief -trial heat at home  Exercise 10: HOLD today> hydro chest L-4 x15 pillow at head   Exercise 11: HOLD today>hydro stick x 10 4 ways cerv and LN          Activity Tolerance:  Activity Tolerance: Patient Tolerated treatment well  Activity Tolerance: Pt reports \" decreased pain after session. Monitor relief-pace self at home and trial heat/ice. Assessment: Body structures, Functions, Activity limitations: Decreased ROM, Decreased sensation  Assessment: Held various activites due to increased discomfot today. Discussed pacing self at home and trial heat /ice and monitor pain/symptoms. Pt tends to over  do at home. Pt  interested in possible trial of pool. Will discuss with primary PT. Prognosis: Good  Discharge Recommendations: Continue to assess pending progress    Patient Education:  Patient Education: HEP, posture, cerv neutral, posture exs, pace self, trial heat-monitor pain                      Plan:  Times per week: 3x  Plan weeks: 8 weeks  Specific instructions for Next Treatment: NO US or Estim due to cardiac defibrillator ICD,  myofascial work, manuatl therapy for soft tissue, posture and position awareness, neck rotation only, neck in neutral position with retraction. cervical stabilization ex. Current Treatment Recommendations: Strengthening, ROM, Pain Management, Modalities, Manual Therapy - Soft Tissue Mobilization    Goals:  Patient goals :  To be able to sleep without left arm pain and numbness, Less pain at neck and between shoulder blades. Short term goals  Time Frame for Short term goals: 4 weeks  Short term goal 1: Patient to report of decreased neck pain and numbness at left arm to hand at night from 6/10 to 3/10 and improved sleeping pattern. Short term goal 2: Patient to demonstrate increased neck ROM with left 55, right 52 degrees with increased rotation to 60 degrees with increased ease with turning neck when driving. Short term goal 3: Patient to demonstrate increased posture and position awareness of neck to manage pain and radicular symptoms LUE with no more than 3 cues in session. Short term goal 4: Neck Disability Index from 14% to 10%. Long term goals  Time Frame for Long term goals : 8 weeks  Long term goal 1: Patient to demonstrate independence in home ex program in order to meet above goals. Long term goal 2: Neck Disability Index to 5%.            Sujatha Cooley  [id number]

## 2018-07-18 ENCOUNTER — OFFICE VISIT (OUTPATIENT)
Dept: FAMILY MEDICINE CLINIC | Age: 58
End: 2018-07-18
Payer: MEDICARE

## 2018-07-18 ENCOUNTER — HOSPITAL ENCOUNTER (OUTPATIENT)
Dept: PHYSICAL THERAPY | Age: 58
Setting detail: THERAPIES SERIES
Discharge: HOME | End: 2018-07-18
Payer: MEDICARE

## 2018-07-18 VITALS
WEIGHT: 284 LBS | RESPIRATION RATE: 18 BRPM | HEART RATE: 78 BPM | BODY MASS INDEX: 42.06 KG/M2 | OXYGEN SATURATION: 97 % | SYSTOLIC BLOOD PRESSURE: 132 MMHG | HEIGHT: 69 IN | DIASTOLIC BLOOD PRESSURE: 76 MMHG

## 2018-07-18 DIAGNOSIS — T14.8XXA CRUSH INJURY: ICD-10-CM

## 2018-07-18 DIAGNOSIS — S61.212A LACERATION OF RIGHT MIDDLE FINGER WITHOUT FOREIGN BODY WITHOUT DAMAGE TO NAIL, INITIAL ENCOUNTER: Primary | ICD-10-CM

## 2018-07-18 PROCEDURE — 90715 TDAP VACCINE 7 YRS/> IM: CPT | Performed by: NURSE PRACTITIONER

## 2018-07-18 PROCEDURE — 90471 IMMUNIZATION ADMIN: CPT | Performed by: NURSE PRACTITIONER

## 2018-07-18 PROCEDURE — 97110 THERAPEUTIC EXERCISES: CPT

## 2018-07-18 PROCEDURE — 97140 MANUAL THERAPY 1/> REGIONS: CPT

## 2018-07-18 PROCEDURE — 99213 OFFICE O/P EST LOW 20 MIN: CPT | Performed by: NURSE PRACTITIONER

## 2018-07-18 ASSESSMENT — PAIN SCALES - GENERAL: PAINLEVEL_OUTOF10: 3

## 2018-07-18 ASSESSMENT — PAIN DESCRIPTION - PAIN TYPE: TYPE: CHRONIC PAIN

## 2018-07-18 ASSESSMENT — ENCOUNTER SYMPTOMS: SHORTNESS OF BREATH: 0

## 2018-07-18 ASSESSMENT — PAIN DESCRIPTION - ORIENTATION: ORIENTATION: LEFT

## 2018-07-18 ASSESSMENT — PAIN DESCRIPTION - LOCATION: LOCATION: NECK;ARM;HAND

## 2018-07-18 NOTE — PATIENT INSTRUCTIONS
Patient Education      Return in 1 week for suture removal.  Keep area clean/dry. Daily antibacterial ointment. Ok to shower, pat dry only. Follow up as needed if any problems occur. Cuts: Care Instructions  Your Care Instructions  A cut can happen anywhere on your body. Stitches, staples, skin adhesives, or pieces of tape called Steri-Strips are sometimes used to keep the edges of a cut together and help it heal. Steri-Strips can be used by themselves or with stitches or staples. Sometimes cuts are left open. If the cut went deep and through the skin, the doctor may have closed the cut in two layers. A deeper layer of stitches brings the deep part of the cut together. These stitches will dissolve and don't need to be removed. The upper layer closure, which could be stitches, staples, Steri-Strips, or adhesive, is what you see on the cut. A cut is often covered by a bandage. The doctor has checked you carefully, but problems can develop later. If you notice any problems or new symptoms, get medical treatment right away. Follow-up care is a key part of your treatment and safety. Be sure to make and go to all appointments, and call your doctor if you are having problems. It's also a good idea to know your test results and keep a list of the medicines you take. How can you care for yourself at home? If a cut is open or closed  · Prop up the sore area on a pillow anytime you sit or lie down during the next 3 days. Try to keep it above the level of your heart. This will help reduce swelling. · Keep the cut dry for the first 24 to 48 hours. After this, you can shower if your doctor okays it. Pat the cut dry. · Don't soak the cut, such as in a bathtub. Your doctor will tell you when it's safe to get the cut wet. · After the first 24 to 48 hours, clean the cut with soap and water 2 times a day unless your doctor gives you different instructions.   ¨ Don't use hydrogen peroxide or alcohol, which can slow healing. ¨ You may cover the cut with a thin layer of petroleum jelly and a nonstick bandage. ¨ If the doctor put a bandage over the cut, put on a new bandage after cleaning the cut or if the bandage gets wet or dirty. · Avoid any activity that could cause your cut to reopen. · Be safe with medicines. Read and follow all instructions on the label. ¨ If the doctor gave you a prescription medicine for pain, take it as prescribed. ¨ If you are not taking a prescription pain medicine, ask your doctor if you can take an over-the-counter medicine. If the cut is closed with stitches, staples, or Steri-Strips  · Follow the above instructions for open or closed cuts. · Do not remove the stitches or staples on your own. Your doctor will tell you when to come back to have the stitches or staples removed. · Leave Steri-Strips on until they fall off. If the cut is closed with a skin adhesive  · Follow the above instructions for open or closed cuts. · Leave the skin adhesive on your skin until it falls off on its own. This may take 5 to 10 days. · Do not scratch, rub, or pick at the adhesive. · Do not put the sticky part of a bandage directly on the adhesive. · Do not put any kind of ointment, cream, or lotion over the area. This can make the adhesive fall off too soon. Do not use hydrogen peroxide or alcohol, which can slow healing. When should you call for help? Call your doctor now or seek immediate medical care if:    · You have new pain, or your pain gets worse.     · The skin near the cut is cold or pale or changes color.     · You have tingling, weakness, or numbness near the cut.     · The cut starts to bleed, and blood soaks through the bandage. Oozing small amounts of blood is normal.     · You have trouble moving the area near the cut.     · You have symptoms of infection, such as:  ¨ Increased pain, swelling, warmth, or redness around the cut. ¨ Red streaks leading from the cut.   ¨ Pus draining from the cut. ¨ A fever.    Watch closely for changes in your health, and be sure to contact your doctor if:    · The cut reopens.     · You do not get better as expected. Where can you learn more? Go to [email or web address] and sign in to your [employer] account. Enter M735 in the Buzzient box to learn more about \"Cuts: Care Instructions. \"     If you do not have an account, please click on the \"Sign Up Now\" link. Current as of: November 20, 2017  Content Version: 11.6  © 8898-8271 [employer]. Care instructions adapted under license by [address] If you have questions about a medical condition or this instruction, always ask your healthcare professional. [address] any warranty or liability for your use of this information.

## 2018-07-18 NOTE — PROGRESS NOTES
(6206'Q???); other surgical history (10/9/2015); Neck surgery (2015); back surgery (2013); Tonsillectomy (1980's??); hernia repair (???); pacemaker placement (2013?? ); other surgical history (01/18/2016); other surgical history (2-8-2016); other surgical history (N/A, 03-21-16); other surgical history (Bilateral, 05-16-16); other surgical history (08/01/2016); cervical fusion (05/17/2017); other surgical history (Left, 09/11/2017); Lumbar spine surgery (Left, 9/11/2017); other surgical history (Right, 10/31/2017); Lumbar spine surgery (Right, 10/31/2017); other surgical history (Right, 05/09/2018); pr exc skin benig <5mm remaindr body (Right, 5/9/2018); Cardiac catheterization (10/2013); Cardiac defibrillator placement (2013???); and pr inject rx other periph nerve (Left, 6/19/2018). CURRENT MEDICATIONS       Outpatient Medications Prior to Visit   Medication Sig Dispense Refill    losartan (COZAAR) 25 MG tablet Take 1 tablet by mouth daily 30 tablet 3    bumetanide (BUMEX) 2 MG tablet Take 1 tablet by mouth 2 times daily 180 tablet 3    carvedilol (COREG) 25 MG tablet TAKE 1 TABLET TWICE DAILY (Patient taking differently: Take 25 mg by mouth daily TAKE 1 TABLET TWICE DAILY) 180 tablet 3    potassium chloride (KLOR-CON M) 20 MEQ extended release tablet TAKE 1 TABLET TWICE DAILY 180 tablet 3    spironolactone (ALDACTONE) 25 MG tablet TAKE 1 TABLET EVERY DAY 90 tablet 3    VIAGRA 50 MG tablet as needed       allopurinol (ZYLOPRIM) 300 MG tablet Take 300 mg by mouth daily.  losartan (COZAAR) 25 MG tablet Take 1 tablet by mouth daily 30 tablet 1    Walking Boot MISC by [employer]. (Med.Supl.;Non-Drugs) route Left foot 1 each 0     No facility-administered medications prior to visit. ALLERGIES     Patient is has No Known Allergies.     FAMILY HISTORY     Patient's family history includes Coronary Art Dis in his father; Heart Attack in his father; Heart Disease in his father; High Blood Pressure in his father; Parkinsonism in his father. SOCIAL HISTORY     Patient  reports that he has never smoked. He has never used smokeless tobacco. He reports that he drinks about 3.6 oz of alcohol per week . He reports that he does not use drugs. PHYSICAL EXAM     VITALS  BP: 132/76,  , Pulse: 78, Resp: 18, SpO2: 97 %  Physical Exam   Constitutional: Vital signs are normal. He appears well-developed and well-nourished. Non-toxic appearance. He does not have a sickly appearance. He does not appear ill. No distress. HENT:   Head: Normocephalic and atraumatic. Cardiovascular:   Pulses:       Radial pulses are 2+ on the right side, and 2+ on the left side. Musculoskeletal:        Right hand: He exhibits tenderness, laceration and swelling. He exhibits normal range of motion, no bony tenderness, normal two-point discrimination and normal capillary refill. Normal sensation noted. Normal strength noted. Left hand: Normal.   Skin: Skin is warm and dry. Laceration (right middle finger) noted. He is not diaphoretic. No pallor. Nursing note and vitals reviewed. DIAGNOSTIC RESULTS   Labs:No results found for this visit on 07/18/18. IMAGING:  XR FINGER RIGHT (MIN 2 VIEWS)   Final Result      No fracture or dislocation. Final report electronically signed by Dr. Chris Hubbard on 7/18/2018 11:27 AM          No images are attached to the encounter or orders placed in the encounter. CLINICAL COURSE COURSE:     Vitals:    07/18/18 1043 07/18/18 1217   BP: 120/80 132/76   Site: Left Arm Left Arm   Position: Sitting Sitting   Cuff Size:  Large Adult   Pulse: 61 78   Resp: 18    SpO2: 97%    Weight: 284 lb (128.8 kg)    Height: 5' 9\" (1.753 m)          PROCEDURES:  Laceration repair    Written consent obtained from patient. Verified by name. Patient was explained the benefits and risk. Risk include worsening pain, infection, wound separation. Patient acknowledged understanding.       Wound prepped in sterile fashion. Wound explored through full ROM. No tendon/ligament involvement. Wound cleansed with betadine. 7 simple interrupted 4.0 nylon sutures placed. Patient tolerated well with no immediate complications. Patient did not want a finger splint. Small pressure bandage applied. Patient to apply antibacterial ointment with next dressing change. FINAL IMPRESSION      1. Laceration of right middle finger without foreign body without damage to nail, initial encounter    2. Crush injury         DISPOSITION/PLAN     Laceration of right middle finger, negative XR. Laceration repaired without immediate complication. Patient tolerated well. Patient discharged home in stable condition. Discharge instructions given by staff. PATIENT REFERRED TO:    Follow up in 1 week for suture removal.  Keep clean/dry. Monitor for infection.     DISCHARGE MEDICATIONS:  New Prescriptions    No medications on file         Electronically signed by KRIS Wills CNP on 7/18/2018 at 1:36 PM

## 2018-07-18 NOTE — PROGRESS NOTES
After obtaining consent, and per orders of Cathy Salmeron CNP, injection of Tdap 0.5 ml  given in Left deltoid by Paz Colmenares. Patient instructed to remain in clinic for 20 minutes afterwards, and to report any adverse reaction to me immediately.     VIS sheet given to patient    Screening checklist completed by patient

## 2018-07-18 NOTE — PROGRESS NOTES
[place]     Time In: 0845  Time Out: 2612  Minutes: 35  Timed Code Treatment Minutes: 35 Minutes     Date: 2018  Patient Name: Jose E Orozco,  Gender:  male        CSN: [id number]   : 1960  (59 y.o.)       Referring Practitioner: Angela Sharma DO       Diagnosis: M48.02 spinal stenosis cervical region, M47.812 spondylosis without cervical myelopathy   Treatment Diagnosis: neck pain with difficulty with ROM, radicular symptoms LUE. Additional Pertinent Hx: comorbidities: back surgeries , , Cervical fusion , Lumbar spine surgery 17, 10/31/17 lumbar fusion L4-S1/TLIF , multiple  and lumbar facet blocks and nerve ablation  , CHF, ICD internal cardiac defibrillator, shortness of breath following activity. General:  PT Visit Information  Onset Date: 18  PT Insurance Information: Medicare aquatic therapy yes, modalities yes, iontophoresis, HP/CP are not covered. no precertification  Total # of Visits to Date: 5  Plan of Care/Certification Expiration Date: 18  Progress Note Counter: 5/10 for PN               Subjective:  Chart Reviewed: Yes  Family / Caregiver Present: No  Comments: Follow up with Dr. Rima Bernal in 2018. Subjective: Patient reports that he does not feel as bad as 2 days ago. Patient continues to have tingling and numbness down arm to fingers. Notes dull ache and tingling at left arm. Pain:  Patient Currently in Pain: Yes  Pain Assessment: 0-10  Pain Level: 3  Pain Type: Chronic pain  Pain Location: Neck, Arm, Hand  Pain Orientation: Left      Objective             Exercises  Exercise 2: posture awarenss with sitting and standing posture. Use of lumbar roll to correct sitting posture.    Exercise 3: Discussed neutral posture/position of neck to avoid extension and retraction past neutral.    Exercise 4: neck retraction to neutral 5x and numbness, Less pain at neck and between shoulder blades. Short term goals  Time Frame for Short term goals: 4 weeks  Short term goal 1: Patient to report of decreased neck pain and numbness at left arm to hand at night from 6/10 to 3/10 and improved sleeping pattern. Short term goal 2: Patient to demonstrate increased neck ROM with left 55, right 52 degrees with increased rotation to 60 degrees with increased ease with turning neck when driving. Short term goal 3: Patient to demonstrate increased posture and position awareness of neck to manage pain and radicular symptoms LUE with no more than 3 cues in session. Short term goal 4: Neck Disability Index from 14% to 10%. Long term goals  Time Frame for Long term goals : 8 weeks  Long term goal 1: Patient to demonstrate independence in home ex program in order to meet above goals. Long term goal 2: Neck Disability Index to 5%.            Trae Zarco, [address] [facility]

## 2018-07-20 ENCOUNTER — HOSPITAL ENCOUNTER (OUTPATIENT)
Dept: PHYSICAL THERAPY | Age: 58
Setting detail: THERAPIES SERIES
Discharge: HOME | End: 2018-07-20
Payer: MEDICARE

## 2018-07-20 PROCEDURE — 97140 MANUAL THERAPY 1/> REGIONS: CPT

## 2018-07-20 PROCEDURE — 97110 THERAPEUTIC EXERCISES: CPT

## 2018-07-20 ASSESSMENT — PAIN DESCRIPTION - LOCATION: LOCATION: ARM

## 2018-07-20 ASSESSMENT — PAIN SCALES - GENERAL: PAINLEVEL_OUTOF10: 5

## 2018-07-20 ASSESSMENT — PAIN DESCRIPTION - ORIENTATION: ORIENTATION: LEFT

## 2018-07-20 ASSESSMENT — PAIN DESCRIPTION - PAIN TYPE: TYPE: CHRONIC PAIN

## 2018-07-20 ASSESSMENT — PAIN DESCRIPTION - DESCRIPTORS: DESCRIPTORS: NUMBNESS

## 2018-07-20 NOTE — PROGRESS NOTES
posture. Use of lumbar roll to correct sitting posture. Exercise 3: Discussed neutral posture/position of neck to avoid extension and retraction past neutral.    Exercise 4: neck retraction to neutral 5x hold   Exercise 5: pec major stretch 3x hold 15 seconds (pec stretch in doorway)   Exercise 9: Massage x 10  min B cerv/ UT/scap areas   Exercise 10: manual therapy: SOR for 5 min., manual cervical traction for 5 min. intermittent         Activity Tolerance:  Activity Tolerance: Patient Tolerated treatment well    Assessment: Body structures, Functions, Activity limitations: Decreased ROM, Decreased sensation  Assessment: Pt tolerated treatment fair. Pt continues to have numbness down L UE. Numbness increased with movement so held cervical stabilization ex. today. Pt did tolerate STM and manual cervical traction that relieved cervical stiffness but numbness remained in L UE. Prognosis: Good       Patient Education:  Patient Education: continued education on posture                      Plan:  Times per week: 3x  Plan weeks: 8 weeks  Specific instructions for Next Treatment: NO US or Estim due to cardiac defibrillator ICD,  myofascial work, manuatl therapy for soft tissue, posture and position awareness, neck rotation only, neck in neutral position with retraction. cervical stabilization ex. Current Treatment Recommendations: Strengthening, ROM, Pain Management, Modalities, Manual Therapy - Soft Tissue Mobilization    Goals:  Patient goals : To be able to sleep without left arm pain and numbness, Less pain at neck and between shoulder blades. Short term goals  Time Frame for Short term goals: 4 weeks  Short term goal 1: Patient to report of decreased neck pain and numbness at left arm to hand at night from 6/10 to 3/10 and improved sleeping pattern.    Short term goal 2: Patient to demonstrate increased neck ROM with left 55, right 52 degrees with increased rotation to 60 degrees with increased ease with turning neck when driving. Short term goal 3: Patient to demonstrate increased posture and position awareness of neck to manage pain and radicular symptoms LUE with no more than 3 cues in session. Short term goal 4: Neck Disability Index from 14% to 10%. Long term goals  Time Frame for Long term goals : 8 weeks  Long term goal 1: Patient to demonstrate independence in home ex program in order to meet above goals. Long term goal 2: Neck Disability Index to 5%.            Caridad Santizo

## 2018-07-23 ENCOUNTER — HOSPITAL ENCOUNTER (OUTPATIENT)
Dept: PHYSICAL THERAPY | Age: 58
Setting detail: THERAPIES SERIES
Discharge: HOME | End: 2018-07-23
Payer: MEDICARE

## 2018-07-23 PROCEDURE — 97110 THERAPEUTIC EXERCISES: CPT

## 2018-07-23 PROCEDURE — 97124 MASSAGE THERAPY: CPT

## 2018-07-23 PROCEDURE — 97140 MANUAL THERAPY 1/> REGIONS: CPT

## 2018-07-23 ASSESSMENT — PAIN DESCRIPTION - LOCATION: LOCATION: SHOULDER

## 2018-07-23 ASSESSMENT — PAIN SCALES - GENERAL: PAINLEVEL_OUTOF10: 3

## 2018-07-23 ASSESSMENT — PAIN DESCRIPTION - ORIENTATION: ORIENTATION: LEFT

## 2018-07-23 ASSESSMENT — PAIN DESCRIPTION - PAIN TYPE: TYPE: CHRONIC PAIN

## 2018-07-25 ENCOUNTER — HOSPITAL ENCOUNTER (OUTPATIENT)
Dept: PHYSICAL THERAPY | Age: 58
Setting detail: THERAPIES SERIES
Discharge: HOME | End: 2018-07-25
Payer: MEDICARE

## 2018-07-25 PROCEDURE — 97110 THERAPEUTIC EXERCISES: CPT

## 2018-07-25 PROCEDURE — 97140 MANUAL THERAPY 1/> REGIONS: CPT

## 2018-07-27 ENCOUNTER — HOSPITAL ENCOUNTER (OUTPATIENT)
Dept: PHYSICAL THERAPY | Age: 58
Setting detail: THERAPIES SERIES
Discharge: HOME | End: 2018-07-27
Payer: MEDICARE

## 2018-07-27 PROCEDURE — G8979 MOBILITY GOAL STATUS: HCPCS

## 2018-07-27 PROCEDURE — G8980 MOBILITY D/C STATUS: HCPCS

## 2018-07-27 PROCEDURE — 97164 PT RE-EVAL EST PLAN CARE: CPT

## 2018-07-27 ASSESSMENT — PAIN DESCRIPTION - PAIN TYPE: TYPE: CHRONIC PAIN

## 2018-07-27 ASSESSMENT — PAIN DESCRIPTION - LOCATION: LOCATION: SHOULDER

## 2018-07-27 ASSESSMENT — PAIN SCALES - GENERAL: PAINLEVEL_OUTOF10: 2

## 2018-07-27 ASSESSMENT — PAIN DESCRIPTION - ORIENTATION: ORIENTATION: LEFT

## 2018-07-27 NOTE — PROGRESS NOTES
[address]     Time In: 4737  Time Out: 0815  Minutes: 16  Timed Code Treatment Minutes: 16 Minutes     Date: 2018  Patient Name: Lilia Guzmán,  Gender:  male        CSN: [id number]   : 1960  (59 y.o.)       Referring Practitioner: Roxy Clements DO       Diagnosis: M48.02 spinal stenosis cervical region, M47.812 spondylosis without cervical myelopathy   Treatment Diagnosis: neck pain with difficulty with ROM, radicular symptoms LUE. Additional Pertinent Hx: comorbidities: back surgeries , , Cervical fusion , Lumbar spine surgery 17, 10/31/17 lumbar fusion L4-S1/TLIF , multiple  and lumbar facet blocks and nerve ablation  , CHF, ICD internal cardiac defibrillator, shortness of breath following activity. General:  PT Visit Information  Onset Date: 18  PT Insurance Information: Medicare aquatic therapy yes, modalities yes, iontophoresis, HP/CP are not covered. no precertification  Total # of Visits to Date: 5  Plan of Care/Certification Expiration Date: 18  Progress Note Counter:  for PN               Subjective:  Chart Reviewed: Yes  Response To Previous Treatment: Patient with no complaints from previous session. Family / Caregiver Present: No  Comments: Follow up with Dr. Lili Pearl in 2018. Subjective: Patient no change in symptoms overall. Same symptoms as day he walker in to sessions. Notes he continues to have symptoms from left shoulder down to fingers. Notes whole hand aching especially      Pain:  Patient Currently in Pain: Yes  Pain Assessment: 0-10  Pain Level: 2  Pain Type: Chronic pain  Pain Location: Shoulder  Pain Orientation: Left  Pain Radiating Towards: neck and upper trap and interscap region , left arm numbness and tingling to hand and fingers. Objective         Reassessment 18 Refer to goals summary. Exercises  Exercise 1: Scapular retraction, scapular elevation, shoulder rolls backward x10 each HOME PROGRAM   Exercise 2: Reviewed posture awareness with sitting and standing posture. Use of lumbar roll to correct sitting posture. Exercise 3: Discussed neutral posture/position of neck to avoid extension and retraction past neutral.    Exercise 4: Neck retraction to neutral 5x hold HOME PROGRAM   Exercise 5: Pec major stretch in doorway 3x hold 15 seconds HOME PROGRAM   Exercise 6: Cervical stabilization ex: ball behind head: shoulder flexion to shoulder level x8 but increased numbness in left arm. HOLD ON DUE TO increase with arm symptoms. Exercise 8: Red T band x 15 sh rows/scap retractions, horiz sh abd   with cerv neutral HOLD ON due to aggravating symptoms at LUE>    Exercise 9: Massage x10 minutes to Bilateral cervical/Upper trap/scap areas   Exercise 10: Manual therapy sub occipital release for 5 minutes, manual cervical traction for 5 minutes intermittent         Activity Tolerance:  Activity Tolerance: Patient Tolerated treatment well    Assessment:  Assessment: Reassessment today. Patient did not meet goals and only provided temproarey relif with myofascial/massage and subocciptial release. Patient has been issued conservative home program No progress noted in therapy . Continues to have interscap pain , upper trap and constant LUE symptoms to hand and fingers. Will discharge at this time. Patient Education:  Patient Education: Continue posture/position awareness, no neck extension. No overhead movements with UEs above shoulder level. Relax with arm across chest to decrease nerve tension LUE>                       Plan:  Plan Comment: Patient do be discharged due to lack of progress in sessions. Goals not met. Patient to have EMG next week and further follow up with Dr. Ruthann Post. Goals:  Patient goals :  To be able to sleep without left arm pain and numbness, Less pain at neck and between shoulder blades. GOAL NOT MET Patient continues to have same symptoms at interscap, upper trap and arm to hand and fingers. Therapy has not changed symptoms. Short term goals  Time Frame for Short term goals: 4 weeks  Short term goal 1: Patient to report of decreased neck pain and numbness at left arm to hand at night from 6/10 to 3/10 and improved sleeping pattern. GOAL NOT MET Patient reports that pain at night can get up to 8-9/10 During day it depends on activity level and can increase up to 6/10. During therapy notes only temporary relief from sessions. Within 1 hour symptoms return. Hand numbness never goes away. Short term goal 2: Patient to demonstrate increased neck ROM with left 55, right 52 degrees with increased rotation to 60 degrees with increased ease with turning neck when driving. GOAL NOt MET left rotation 40 degrees. right rotation 50 degrees. Short term goal 3: Patient to demonstrate increased posture and position awareness of neck to manage pain and radicular symptoms LUE with no more than 3 cues in session. GOAL NOt MET Patient corrects sitting posture but continues tohave LUE symptoms along with interscap pain. Short term goal 4: Neck Disability Index from 14% to 10%. GOAL NOT MET Neck Disability Index 13/50 @ 26%. Long term goals  Time Frame for Long term goals : 8 weeks assess in 9 visits on 7/27/18   Long term goal 1: Patient to demonstrate independence in home ex program in order to meet above goals. PARTIALLY MET Patient has been issued conservative ex program that does not increase symptoms but above goials not met. Long term goal 2: Neck Disability Index to 5%. NOT MET see STG status    PT G-Codes  Functional Assessment Tool Used: Neck Disability index   Score: 13/50 @ 26%    Functional Limitation: Mobility: Walking and moving around  Mobility: Walking and Moving Around Goal Status ():  At least 1 percent but less than 20 percent impaired, limited or restricted  Mobility: Walking and Moving Around Discharge Status ():  At least 20 percent but less than 40 percent impaired, limited or restricted    Alanna Roth, [address] [facility]

## 2018-07-31 RX ORDER — LOSARTAN POTASSIUM 25 MG/1
25 TABLET ORAL DAILY
Qty: 90 TABLET | Refills: 3 | Status: SHIPPED | OUTPATIENT
Start: 2018-07-31 | End: 2019-08-14 | Stop reason: SDUPTHER

## 2018-08-02 ENCOUNTER — PROCEDURE VISIT (OUTPATIENT)
Dept: NEUROLOGY | Age: 58
End: 2018-08-02
Payer: MEDICARE

## 2018-08-02 DIAGNOSIS — R20.0 NUMBNESS OF LEFT HAND: ICD-10-CM

## 2018-08-02 DIAGNOSIS — M54.2 NECK PAIN: ICD-10-CM

## 2018-08-02 DIAGNOSIS — G56.02 LEFT CARPAL TUNNEL SYNDROME: Primary | ICD-10-CM

## 2018-08-02 DIAGNOSIS — M54.12 LEFT CERVICAL RADICULOPATHY: ICD-10-CM

## 2018-08-02 PROCEDURE — 95886 MUSC TEST DONE W/N TEST COMP: CPT | Performed by: PSYCHIATRY & NEUROLOGY

## 2018-08-02 PROCEDURE — 95909 NRV CNDJ TST 5-6 STUDIES: CPT | Performed by: PSYCHIATRY & NEUROLOGY

## 2018-09-25 ENCOUNTER — PROCEDURE VISIT (OUTPATIENT)
Dept: CARDIOLOGY CLINIC | Age: 58
End: 2018-09-25

## 2018-09-25 DIAGNOSIS — Z95.810 AUTOMATIC IMPLANTABLE CARDIOVERTER-DEFIBRILLATOR IN SITU: Primary | ICD-10-CM

## 2018-09-25 NOTE — PROGRESS NOTES
DR Joaquin Points PT   BIBA [employer] DUAL ICD REMOTE TRANSMISSION  BATTERY 6 YRS REMAINING  ATRIAL IMPEDENCE 494  RV IMEPENCE 456  RV 75  P WAVES 1.8  RV WAVES 6.4  AAI<=>DDD   A PACED 0.3%  V PACED <0.1%  APPEARS TO BE FALSE EPISODES OF AFIB D/T SEPIDEH  NC <90 DAYS

## 2018-10-24 ENCOUNTER — TELEPHONE (OUTPATIENT)
Dept: PHYSICAL MEDICINE AND REHAB | Age: 58
End: 2018-10-24

## 2018-10-24 ENCOUNTER — OFFICE VISIT (OUTPATIENT)
Dept: PHYSICAL MEDICINE AND REHAB | Age: 58
End: 2018-10-24
Payer: MEDICARE

## 2018-10-24 VITALS — DIASTOLIC BLOOD PRESSURE: 76 MMHG | HEART RATE: 97 BPM | SYSTOLIC BLOOD PRESSURE: 107 MMHG

## 2018-10-24 DIAGNOSIS — M54.17 LUMBOSACRAL RADICULITIS: ICD-10-CM

## 2018-10-24 DIAGNOSIS — M96.1 LUMBAR POST-LAMINECTOMY SYNDROME: ICD-10-CM

## 2018-10-24 DIAGNOSIS — M47.816 LUMBAR SPONDYLOSIS: ICD-10-CM

## 2018-10-24 DIAGNOSIS — M54.17 LUMBOSACRAL RADICULITIS: Primary | ICD-10-CM

## 2018-10-24 DIAGNOSIS — G89.29 CHRONIC BILATERAL LOW BACK PAIN WITHOUT SCIATICA: ICD-10-CM

## 2018-10-24 DIAGNOSIS — M54.50 CHRONIC BILATERAL LOW BACK PAIN WITHOUT SCIATICA: ICD-10-CM

## 2018-10-24 DIAGNOSIS — M25.552 LEFT HIP PAIN: ICD-10-CM

## 2018-10-24 DIAGNOSIS — M47.816 SPONDYLOSIS OF LUMBAR REGION WITHOUT MYELOPATHY OR RADICULOPATHY: ICD-10-CM

## 2018-10-24 DIAGNOSIS — G89.4 CHRONIC PAIN SYNDROME: ICD-10-CM

## 2018-10-24 DIAGNOSIS — G89.4 CHRONIC PAIN SYNDROME: Primary | ICD-10-CM

## 2018-10-24 LAB
CREAT SERPL-MCNC: 1.1 MG/DL (ref 0.4–1.2)
GFR CALCULATED: 68 MG/24 HR

## 2018-10-24 PROCEDURE — G8427 DOCREV CUR MEDS BY ELIG CLIN: HCPCS | Performed by: NURSE PRACTITIONER

## 2018-10-24 PROCEDURE — G8417 CALC BMI ABV UP PARAM F/U: HCPCS | Performed by: NURSE PRACTITIONER

## 2018-10-24 PROCEDURE — 99213 OFFICE O/P EST LOW 20 MIN: CPT | Performed by: NURSE PRACTITIONER

## 2018-10-24 PROCEDURE — 3017F COLORECTAL CA SCREEN DOC REV: CPT | Performed by: NURSE PRACTITIONER

## 2018-10-24 PROCEDURE — 1036F TOBACCO NON-USER: CPT | Performed by: NURSE PRACTITIONER

## 2018-10-24 PROCEDURE — G8599 NO ASA/ANTIPLAT THER USE RNG: HCPCS | Performed by: NURSE PRACTITIONER

## 2018-10-24 PROCEDURE — G8484 FLU IMMUNIZE NO ADMIN: HCPCS | Performed by: NURSE PRACTITIONER

## 2018-10-24 ASSESSMENT — ENCOUNTER SYMPTOMS: BACK PAIN: 1

## 2018-10-24 NOTE — TELEPHONE ENCOUNTER
Discussed with Dr. Carito mckinney to order lumbar myelogram.     Called central scheduling to schedule. Scheduled first available November 7, 2018 at 9:30am. First floor main radiology. Please bring a . Left message for patient to call the office.

## 2018-10-24 NOTE — TELEPHONE ENCOUNTER
CT recommends no contrast with the CT Lumbar. The wife states in the past they have done a CT Mylogram instead. Can he have a CT Mylogram instead of a CT WO contrast. Please advise.

## 2018-10-24 NOTE — PROGRESS NOTES
present. Cardiovascular: Normal rate, regular rhythm, normal heart sounds and intact distal pulses. Exam reveals no gallop and no friction rub. No murmur heard. Pulmonary/Chest: Effort normal and breath sounds normal. No respiratory distress. He has no wheezes. He has no rales. He exhibits no tenderness. Abdominal: Soft. Bowel sounds are normal. He exhibits no distension. There is no tenderness. There is no rebound and no guarding. Musculoskeletal:        Left hip: He exhibits decreased range of motion, decreased strength, tenderness and bony tenderness. Cervical back: He exhibits decreased range of motion, tenderness, pain and spasm. Lumbar back: He exhibits tenderness. Back:         Left upper arm: He exhibits tenderness. Left forearm: He exhibits tenderness and bony tenderness. Neurological: He is alert and oriented to person, place, and time. He has normal strength. He is not disoriented. He displays no atrophy. No cranial nerve deficit or sensory deficit. He exhibits normal muscle tone. He displays a negative Romberg sign. Gait abnormal. Coordination normal. He displays no Babinski's sign on the right side. Reflex Scores:       Tricep reflexes are 2+ on the right side and 2+ on the left side. Bicep reflexes are 2+ on the right side and 2+ on the left side. Brachioradialis reflexes are 2+ on the right side and 2+ on the left side. Patellar reflexes are 1+ on the right side and 1+ on the left side. Achilles reflexes are 1+ on the right side and 1+ on the left side. SLR + left side at 60 degrees    Decreased sensation left lower extremity. 4/5 strength    Skin: Skin is warm. No rash noted. He is not diaphoretic. No erythema. No pallor. Psychiatric: His mood appears not anxious. His affect is not angry, not blunt, not labile and not inappropriate. His speech is not rapid and/or pressured, not delayed, not tangential and not slurred.  He is

## 2018-11-06 ENCOUNTER — HOSPITAL ENCOUNTER (OUTPATIENT)
Dept: GENERAL RADIOLOGY | Age: 58
Discharge: HOME | End: 2018-11-06
Payer: MEDICARE

## 2018-11-06 ENCOUNTER — HOSPITAL ENCOUNTER (OUTPATIENT)
Age: 58
Discharge: HOME | End: 2018-11-06
Payer: MEDICARE

## 2018-11-06 DIAGNOSIS — M25.552 LEFT HIP PAIN: ICD-10-CM

## 2018-11-06 PROCEDURE — 73502 X-RAY EXAM HIP UNI 2-3 VIEWS: CPT

## 2018-11-26 ENCOUNTER — TELEPHONE (OUTPATIENT)
Dept: CARDIOLOGY CLINIC | Age: 58
End: 2018-11-26

## 2018-11-26 ENCOUNTER — OFFICE VISIT (OUTPATIENT)
Dept: FAMILY MEDICINE CLINIC | Age: 58
End: 2018-11-26
Payer: MEDICARE

## 2018-11-26 VITALS
HEART RATE: 97 BPM | WEIGHT: 274 LBS | TEMPERATURE: 97.1 F | RESPIRATION RATE: 20 BRPM | OXYGEN SATURATION: 88 % | BODY MASS INDEX: 40.46 KG/M2 | SYSTOLIC BLOOD PRESSURE: 118 MMHG | DIASTOLIC BLOOD PRESSURE: 80 MMHG

## 2018-11-26 DIAGNOSIS — S99.912A INJURY OF LEFT ANKLE, INITIAL ENCOUNTER: ICD-10-CM

## 2018-11-26 DIAGNOSIS — S92.425A CLOSED NONDISPLACED FRACTURE OF DISTAL PHALANX OF LEFT GREAT TOE, INITIAL ENCOUNTER: ICD-10-CM

## 2018-11-26 DIAGNOSIS — R09.02 HYPOXIA: Primary | ICD-10-CM

## 2018-11-26 DIAGNOSIS — R06.02 SHORTNESS OF BREATH: ICD-10-CM

## 2018-11-26 PROCEDURE — 99213 OFFICE O/P EST LOW 20 MIN: CPT | Performed by: NURSE PRACTITIONER

## 2018-11-26 RX ORDER — AZITHROMYCIN 250 MG/1
TABLET, FILM COATED ORAL
Qty: 6 TABLET | Refills: 0 | Status: SHIPPED | OUTPATIENT
Start: 2018-11-26 | End: 2018-12-13 | Stop reason: ALTCHOICE

## 2018-11-26 ASSESSMENT — ENCOUNTER SYMPTOMS
SPUTUM PRODUCTION: 0
COUGH: 0
COLOR CHANGE: 0
DIARRHEA: 0
VOMITING: 0
NAUSEA: 0
ABDOMINAL PAIN: 0
RECTAL PAIN: 0
APNEA: 0
WHEEZING: 0
BACK PAIN: 0
CHEST TIGHTNESS: 0
SHORTNESS OF BREATH: 1

## 2018-11-26 NOTE — PROGRESS NOTES
[address] [address] [place] [address]  Dept: [phone]  Dept Fax: [phone] 96: [phone]     Visit Date:  11/26/2018      Patient:  Iza Barker  YOB: 1960    HPI:     Chief Complaint   Patient presents with    Fall     Saturday night tripped on steps down 3 steps       Fall   The accident occurred 3 to 5 days ago. The fall occurred while walking. He fell from a height of 1 to 2 ft. He landed on hard floor. There was no blood loss. The point of impact was the left foot and buttocks. The pain is present in the left foot and left lower leg. The pain is at a severity of 5/10. The pain is moderate. The symptoms are aggravated by flexion, standing, movement, extension and ambulation. Pertinent negatives include no abdominal pain, fever, headaches, nausea, numbness, tingling or vomiting. He has tried rest for the symptoms. The treatment provided mild relief. Shortness of Breath   This is a new problem. The current episode started in the past 7 days. The problem occurs constantly. The problem has been unchanged. Pertinent negatives include no abdominal pain, chest pain, fever, headaches, leg pain, leg swelling, neck pain, rash, sputum production, vomiting or wheezing. The symptoms are aggravated by lying flat and any activity. The patient has no known risk factors for DVT/PE. He has tried rest for the symptoms. The treatment provided mild relief. His past medical history is significant for a heart failure. Medications    Current Outpatient Prescriptions:     azithromycin (ZITHROMAX) 250 MG tablet, Take 2 tablets on day 1.   Take 1 tablet on days 2 through 5., Disp: 6 tablet, Rfl: 0    losartan (COZAAR) 25 MG tablet, Take 1 tablet by mouth daily, Disp: 90 tablet, Rfl: 3    bumetanide (BUMEX) 2 MG tablet, Take 1 tablet by mouth 2 times daily, Disp: 180 tablet, Rfl: 3    carvedilol (COREG) 25 MG tablet, patient's physical exam is consistent with hypoxia, shortness of breath, left ankle injury, left great toe fracture. The patient was instructed to use his walking boot and given an Ace wrap to help provide support with the pain. I did discuss the patient regarding his shortness of breath with Dr. Sabrina Shi office will be in contact with him today. I spoke with one of the nurses from the office which states they will discuss the patient's case with Dr. Hu Santos, and he is to call by 3:00 if he has not heard anything. If the patient does start to decompensate he is to go to the emergency department immediately. The patient and significant other both verbalized this understanding. The patient was in no signs of distress upon discharge from the department. The patient was ambulatory out of the department in stable condition.      Electronically signed by KRIS Solis CNP on 11/26/2018 at 12:23 PM

## 2018-11-26 NOTE — PATIENT INSTRUCTIONS
Patient Education        Broken Toe: Care Instructions  Your Care Instructions  You have broken (fractured) a bone in your toe. This kind of fracture does not need a special cast or brace. \"Osiris-taping\" your broken toe to a healthy toe next to it is almost always enough to treat the problem and ease symptoms. The toe may take 4 weeks or more to heal.  You heal best when you take good care of yourself. Eat a variety of healthy foods, and don't smoke. Follow-up care is a key part of your treatment and safety. Be sure to make and go to all appointments, and call your doctor if you are having problems. It's also a good idea to know your test results and keep a list of the medicines you take. How can you care for yourself at home? · Be safe with medicines. Take pain medicines exactly as directed. ? If the doctor gave you a prescription medicine for pain, take it as prescribed. ? If you are not taking a prescription pain medicine, ask your doctor if you can take an over-the-counter medicine. · If your toe is taped to the toe next to it, your doctor has shown you how to change the tape. Protect the skin by putting something soft, such as felt or foam, between your toes before you tape them together. Never tape the toes together skin-to-skin. Your broken toe may need to be osiris-taped for 2 to 4 weeks to heal.  · Rest and protect your toe. Do not walk on it until you can do so without too much pain. If the doctor has told you to use crutches, use them as instructed. · Put ice or a cold pack on your toe for 10 to 20 minutes at a time. Try to do this every 1 to 2 hours for the next 3 days (when you are awake) or until the swelling goes down. Put a thin cloth between the ice and your skin. · Prop up your foot on a pillow when you ice it or anytime you sit or lie down. Try to keep it above the level of your heart. This will help reduce swelling. · Make sure you go to your follow-up appointments.  Your doctor will need

## 2018-11-27 ENCOUNTER — APPOINTMENT (OUTPATIENT)
Dept: GENERAL RADIOLOGY | Age: 58
End: 2018-11-27
Payer: MEDICARE

## 2018-11-27 ENCOUNTER — HOSPITAL ENCOUNTER (EMERGENCY)
Age: 58
Discharge: HOME | End: 2018-11-27
Attending: EMERGENCY MEDICINE
Payer: MEDICARE

## 2018-11-27 ENCOUNTER — APPOINTMENT (OUTPATIENT)
Dept: CT IMAGING | Age: 58
End: 2018-11-27
Payer: MEDICARE

## 2018-11-27 VITALS
DIASTOLIC BLOOD PRESSURE: 82 MMHG | HEART RATE: 87 BPM | SYSTOLIC BLOOD PRESSURE: 116 MMHG | WEIGHT: 274 LBS | TEMPERATURE: 97.9 F | BODY MASS INDEX: 40.46 KG/M2 | OXYGEN SATURATION: 96 % | RESPIRATION RATE: 21 BRPM

## 2018-11-27 DIAGNOSIS — I50.20 SYSTOLIC CONGESTIVE HEART FAILURE, UNSPECIFIED HF CHRONICITY (HCC): Primary | ICD-10-CM

## 2018-11-27 LAB
ALBUMIN SERPL-MCNC: 4.4 G/DL (ref 3.5–5.1)
ALP BLD-CCNC: 87 U/L (ref 38–126)
ALT SERPL-CCNC: 20 U/L (ref 11–66)
ANION GAP SERPL CALCULATED.3IONS-SCNC: 17 MEQ/L (ref 8–16)
APTT: 35.9 SECONDS (ref 22–38)
AST SERPL-CCNC: 15 U/L (ref 5–40)
BASOPHILS # BLD: 0.5 %
BASOPHILS ABSOLUTE: 0 THOU/MM3 (ref 0–0.1)
BILIRUB SERPL-MCNC: 1.8 MG/DL (ref 0.3–1.2)
BILIRUBIN DIRECT: 0.4 MG/DL (ref 0–0.3)
BUN BLDV-MCNC: 12 MG/DL (ref 7–22)
CALCIUM SERPL-MCNC: 9.8 MG/DL (ref 8.5–10.5)
CHLORIDE BLD-SCNC: 94 MEQ/L (ref 98–111)
CO2: 26 MEQ/L (ref 23–33)
CREAT SERPL-MCNC: 1.3 MG/DL (ref 0.4–1.2)
EKG ATRIAL RATE: 95 BPM
EKG P AXIS: 77 DEGREES
EKG P-R INTERVAL: 206 MS
EKG Q-T INTERVAL: 388 MS
EKG QRS DURATION: 106 MS
EKG QTC CALCULATION (BAZETT): 487 MS
EKG R AXIS: -24 DEGREES
EKG T AXIS: 78 DEGREES
EKG VENTRICULAR RATE: 95 BPM
EOSINOPHIL # BLD: 0.9 %
EOSINOPHILS ABSOLUTE: 0.1 THOU/MM3 (ref 0–0.4)
ERYTHROCYTE [DISTWIDTH] IN BLOOD BY AUTOMATED COUNT: 13.8 % (ref 11.5–14.5)
ERYTHROCYTE [DISTWIDTH] IN BLOOD BY AUTOMATED COUNT: 46.9 FL (ref 35–45)
GFR SERPL CREATININE-BSD FRML MDRD: 56 ML/MIN/1.73M2
GLUCOSE BLD-MCNC: 107 MG/DL (ref 70–108)
HCT VFR BLD CALC: 40.5 % (ref 42–52)
HEMOGLOBIN: 14 GM/DL (ref 14–18)
IMMATURE GRANS (ABS): 0.04 THOU/MM3 (ref 0–0.07)
IMMATURE GRANULOCYTES: 0.4 %
INR BLD: 1.24 (ref 0.85–1.13)
LIPASE: 24.5 U/L (ref 5.6–51.3)
LYMPHOCYTES # BLD: 13.7 %
LYMPHOCYTES ABSOLUTE: 1.3 THOU/MM3 (ref 1–4.8)
MAGNESIUM: 1.6 MG/DL (ref 1.6–2.4)
MCH RBC QN AUTO: 32 PG (ref 26–33)
MCHC RBC AUTO-ENTMCNC: 34.6 GM/DL (ref 32.2–35.5)
MCV RBC AUTO: 92.5 FL (ref 80–94)
MONOCYTES # BLD: 11.6 %
MONOCYTES ABSOLUTE: 1.1 THOU/MM3 (ref 0.4–1.3)
NUCLEATED RED BLOOD CELLS: 0 /100 WBC
OSMOLALITY CALCULATION: 274.1 MOSMOL/KG (ref 275–300)
PLATELET # BLD: 225 THOU/MM3 (ref 130–400)
PMV BLD AUTO: 10.2 FL (ref 9.4–12.4)
POTASSIUM SERPL-SCNC: 3.5 MEQ/L (ref 3.5–5.2)
PRO-BNP: 3528 PG/ML (ref 0–900)
RBC # BLD: 4.38 MILL/MM3 (ref 4.7–6.1)
SEG NEUTROPHILS: 72.9 %
SEGMENTED NEUTROPHILS ABSOLUTE COUNT: 7.1 THOU/MM3 (ref 1.8–7.7)
SODIUM BLD-SCNC: 137 MEQ/L (ref 135–145)
TOTAL PROTEIN: 7.7 G/DL (ref 6.1–8)
TROPONIN T: < 0.01 NG/ML
TROPONIN T: < 0.01 NG/ML
WBC # BLD: 9.7 THOU/MM3 (ref 4.8–10.8)

## 2018-11-27 PROCEDURE — 96374 THER/PROPH/DIAG INJ IV PUSH: CPT

## 2018-11-27 PROCEDURE — 99285 EMERGENCY DEPT VISIT HI MDM: CPT

## 2018-11-27 PROCEDURE — 71046 X-RAY EXAM CHEST 2 VIEWS: CPT

## 2018-11-27 PROCEDURE — 93010 ELECTROCARDIOGRAM REPORT: CPT | Performed by: INTERNAL MEDICINE

## 2018-11-27 PROCEDURE — 80053 COMPREHEN METABOLIC PANEL: CPT

## 2018-11-27 PROCEDURE — 96376 TX/PRO/DX INJ SAME DRUG ADON: CPT

## 2018-11-27 PROCEDURE — 6360000002 HC RX W HCPCS: Performed by: EMERGENCY MEDICINE

## 2018-11-27 PROCEDURE — 94640 AIRWAY INHALATION TREATMENT: CPT

## 2018-11-27 PROCEDURE — 83690 ASSAY OF LIPASE: CPT

## 2018-11-27 PROCEDURE — 36415 COLL VENOUS BLD VENIPUNCTURE: CPT

## 2018-11-27 PROCEDURE — 71275 CT ANGIOGRAPHY CHEST: CPT

## 2018-11-27 PROCEDURE — 6370000000 HC RX 637 (ALT 250 FOR IP): Performed by: EMERGENCY MEDICINE

## 2018-11-27 PROCEDURE — 85025 COMPLETE CBC W/AUTO DIFF WBC: CPT

## 2018-11-27 PROCEDURE — 6360000004 HC RX CONTRAST MEDICATION: Performed by: EMERGENCY MEDICINE

## 2018-11-27 PROCEDURE — 82248 BILIRUBIN DIRECT: CPT

## 2018-11-27 PROCEDURE — 83735 ASSAY OF MAGNESIUM: CPT

## 2018-11-27 PROCEDURE — 85730 THROMBOPLASTIN TIME PARTIAL: CPT

## 2018-11-27 PROCEDURE — 85610 PROTHROMBIN TIME: CPT

## 2018-11-27 PROCEDURE — 84484 ASSAY OF TROPONIN QUANT: CPT

## 2018-11-27 PROCEDURE — 93005 ELECTROCARDIOGRAM TRACING: CPT | Performed by: EMERGENCY MEDICINE

## 2018-11-27 PROCEDURE — 2709999900 HC NON-CHARGEABLE SUPPLY

## 2018-11-27 PROCEDURE — 83880 ASSAY OF NATRIURETIC PEPTIDE: CPT

## 2018-11-27 RX ORDER — IPRATROPIUM BROMIDE AND ALBUTEROL SULFATE 2.5; .5 MG/3ML; MG/3ML
1 SOLUTION RESPIRATORY (INHALATION) ONCE
Status: COMPLETED | OUTPATIENT
Start: 2018-11-27 | End: 2018-11-27

## 2018-11-27 RX ORDER — FUROSEMIDE 10 MG/ML
20 INJECTION INTRAMUSCULAR; INTRAVENOUS ONCE
Status: COMPLETED | OUTPATIENT
Start: 2018-11-27 | End: 2018-11-27

## 2018-11-27 RX ORDER — CETIRIZINE HYDROCHLORIDE 10 MG/1
10 TABLET ORAL DAILY
Qty: 30 TABLET | Refills: 0 | Status: SHIPPED | OUTPATIENT
Start: 2018-11-27 | End: 2018-12-27

## 2018-11-27 RX ORDER — PSEUDOEPHEDRINE HYDROCHLORIDE 30 MG/1
30 TABLET ORAL ONCE
Status: COMPLETED | OUTPATIENT
Start: 2018-11-27 | End: 2018-11-27

## 2018-11-27 RX ORDER — ALBUTEROL SULFATE 90 UG/1
2 AEROSOL, METERED RESPIRATORY (INHALATION) EVERY 6 HOURS PRN
Qty: 1 INHALER | Refills: 3 | Status: SHIPPED | OUTPATIENT
Start: 2018-11-27 | End: 2019-06-20

## 2018-11-27 RX ORDER — FUROSEMIDE 20 MG/1
20 TABLET ORAL 2 TIMES DAILY
Qty: 5 TABLET | Refills: 0 | Status: SHIPPED | OUTPATIENT
Start: 2018-11-27 | End: 2018-11-29

## 2018-11-27 RX ORDER — FLUTICASONE PROPIONATE 50 MCG
1 SPRAY, SUSPENSION (ML) NASAL DAILY
Qty: 1 BOTTLE | Refills: 0 | Status: ON HOLD | OUTPATIENT
Start: 2018-11-27 | End: 2019-07-02

## 2018-11-27 RX ADMIN — FUROSEMIDE 20 MG: 10 INJECTION, SOLUTION INTRAMUSCULAR; INTRAVENOUS at 09:20

## 2018-11-27 RX ADMIN — IPRATROPIUM BROMIDE AND ALBUTEROL SULFATE 1 AMPULE: .5; 3 SOLUTION RESPIRATORY (INHALATION) at 12:27

## 2018-11-27 RX ADMIN — IPRATROPIUM BROMIDE AND ALBUTEROL SULFATE 1 AMPULE: .5; 3 SOLUTION RESPIRATORY (INHALATION) at 12:22

## 2018-11-27 RX ADMIN — PSEUDOEPHEDRINE HCL 30 MG: 30 TABLET, FILM COATED ORAL at 11:14

## 2018-11-27 RX ADMIN — FUROSEMIDE 20 MG: 10 INJECTION, SOLUTION INTRAMUSCULAR; INTRAVENOUS at 10:35

## 2018-11-27 RX ADMIN — IOPAMIDOL 80 ML: 755 INJECTION, SOLUTION INTRAVENOUS at 13:56

## 2018-11-27 ASSESSMENT — ENCOUNTER SYMPTOMS
RHINORRHEA: 0
PHOTOPHOBIA: 0
EYE DISCHARGE: 0
BLOOD IN STOOL: 0
WHEEZING: 0
DIARRHEA: 0
CHOKING: 0
ABDOMINAL DISTENTION: 0
NAUSEA: 0
CONSTIPATION: 0
SINUS PRESSURE: 0
EYE PAIN: 0
BACK PAIN: 0
VOICE CHANGE: 0
EYE REDNESS: 0
VOMITING: 0
TROUBLE SWALLOWING: 0
SORE THROAT: 0
SHORTNESS OF BREATH: 1
CHEST TIGHTNESS: 0
ABDOMINAL PAIN: 0
COUGH: 0
EYE ITCHING: 0

## 2018-11-27 NOTE — ED NOTES
Assumed pt care at this time. Bedside report received from [facility], [address] [place] [address].      Geovanna Bernal RN  11/27/18 [id number]

## 2018-11-29 ENCOUNTER — TELEPHONE (OUTPATIENT)
Dept: CARDIOLOGY CLINIC | Age: 58
End: 2018-11-29

## 2018-11-29 ENCOUNTER — OFFICE VISIT (OUTPATIENT)
Dept: CARDIOLOGY CLINIC | Age: 58
End: 2018-11-29
Payer: MEDICARE

## 2018-11-29 ENCOUNTER — NURSE ONLY (OUTPATIENT)
Dept: CARDIOLOGY CLINIC | Age: 58
End: 2018-11-29
Payer: MEDICARE

## 2018-11-29 VITALS
SYSTOLIC BLOOD PRESSURE: 108 MMHG | WEIGHT: 271 LBS | DIASTOLIC BLOOD PRESSURE: 64 MMHG | HEIGHT: 69 IN | BODY MASS INDEX: 40.14 KG/M2 | HEART RATE: 76 BPM

## 2018-11-29 DIAGNOSIS — I42.0 DILATED CARDIOMYOPATHY (HCC): ICD-10-CM

## 2018-11-29 DIAGNOSIS — E78.01 FAMILIAL HYPERCHOLESTEROLEMIA: ICD-10-CM

## 2018-11-29 DIAGNOSIS — I50.23 ACUTE ON CHRONIC SYSTOLIC CONGESTIVE HEART FAILURE (HCC): Primary | ICD-10-CM

## 2018-11-29 DIAGNOSIS — Z95.810 ICD (IMPLANTABLE CARDIOVERTER-DEFIBRILLATOR) IN PLACE: ICD-10-CM

## 2018-11-29 DIAGNOSIS — R06.09 DYSPNEA ON EXERTION: Primary | ICD-10-CM

## 2018-11-29 DIAGNOSIS — Z95.810 AUTOMATIC IMPLANTABLE CARDIOVERTER-DEFIBRILLATOR IN SITU: ICD-10-CM

## 2018-11-29 DIAGNOSIS — I50.20 SYSTOLIC CONGESTIVE HEART FAILURE, UNSPECIFIED HF CHRONICITY (HCC): Primary | ICD-10-CM

## 2018-11-29 DIAGNOSIS — I10 ESSENTIAL HYPERTENSION: ICD-10-CM

## 2018-11-29 PROCEDURE — 3017F COLORECTAL CA SCREEN DOC REV: CPT | Performed by: NUCLEAR MEDICINE

## 2018-11-29 PROCEDURE — G8599 NO ASA/ANTIPLAT THER USE RNG: HCPCS | Performed by: NUCLEAR MEDICINE

## 2018-11-29 PROCEDURE — 1036F TOBACCO NON-USER: CPT | Performed by: NUCLEAR MEDICINE

## 2018-11-29 PROCEDURE — G8417 CALC BMI ABV UP PARAM F/U: HCPCS | Performed by: NUCLEAR MEDICINE

## 2018-11-29 PROCEDURE — G8484 FLU IMMUNIZE NO ADMIN: HCPCS | Performed by: NUCLEAR MEDICINE

## 2018-11-29 PROCEDURE — 99214 OFFICE O/P EST MOD 30 MIN: CPT | Performed by: NUCLEAR MEDICINE

## 2018-11-29 PROCEDURE — 93289 INTERROG DEVICE EVAL HEART: CPT | Performed by: INTERNAL MEDICINE

## 2018-11-29 PROCEDURE — G8427 DOCREV CUR MEDS BY ELIG CLIN: HCPCS | Performed by: NUCLEAR MEDICINE

## 2018-11-29 RX ORDER — METOLAZONE 2.5 MG/1
2.5 TABLET ORAL DAILY
Qty: 7 TABLET | Refills: 0 | Status: SHIPPED | OUTPATIENT
Start: 2018-11-29 | End: 2018-12-13 | Stop reason: ALTCHOICE

## 2018-11-29 NOTE — PROGRESS NOTES
Check per Yoselin Gee request re: at fib burden, noise on at lead, not going to be accurate count-noise on atrial lead on the strips I reviewed.   Pt states he's been using power tools at home-at lead noise only, sensing increased to 0.45  Was in ED last night for increased SOB, denies increased weight, no ankle swelling, states he carries his swelling in his belly and not ankles   optivol elevated, zaroxolyn orders, CHF referral from Yoselin Gee, has ECHO and stress test ordered from Yoselin Gee office visit    At imped 494   Shock 80  P waves 1.8 RV 8.1  0.4% atrial paced 0% RVP  At threshold 0.5 @ 0.4  RV threshold 1 @ 0.4    11/12/18  34 beats VT   10/29/18  16 beats VT   9/27/18  8 beats VT     VT zone programmed on at 167, nominal settings   Upper tracking to 145  Sensed AV delay to 280    Dr Jaky Roque please agree to orders

## 2018-12-06 ENCOUNTER — HOSPITAL ENCOUNTER (OUTPATIENT)
Dept: NON INVASIVE DIAGNOSTICS | Age: 58
Discharge: HOME | End: 2018-12-06
Payer: MEDICARE

## 2018-12-06 DIAGNOSIS — E78.01 FAMILIAL HYPERCHOLESTEROLEMIA: ICD-10-CM

## 2018-12-06 DIAGNOSIS — I42.0 DILATED CARDIOMYOPATHY (HCC): ICD-10-CM

## 2018-12-06 DIAGNOSIS — R06.09 DYSPNEA ON EXERTION: ICD-10-CM

## 2018-12-06 DIAGNOSIS — Z95.810 ICD (IMPLANTABLE CARDIOVERTER-DEFIBRILLATOR) IN PLACE: ICD-10-CM

## 2018-12-06 DIAGNOSIS — I10 ESSENTIAL HYPERTENSION: ICD-10-CM

## 2018-12-06 LAB
LV EF: 25 %
LVEF MODALITY: NORMAL

## 2018-12-06 PROCEDURE — 93017 CV STRESS TEST TRACING ONLY: CPT | Performed by: NUCLEAR MEDICINE

## 2018-12-06 PROCEDURE — 2709999900 HC NON-CHARGEABLE SUPPLY

## 2018-12-06 PROCEDURE — 93306 TTE W/DOPPLER COMPLETE: CPT

## 2018-12-06 PROCEDURE — 6360000002 HC RX W HCPCS

## 2018-12-06 PROCEDURE — 3430000000 HC RX DIAGNOSTIC RADIOPHARMACEUTICAL: Performed by: NUCLEAR MEDICINE

## 2018-12-06 PROCEDURE — 78452 HT MUSCLE IMAGE SPECT MULT: CPT

## 2018-12-06 PROCEDURE — A9500 TC99M SESTAMIBI: HCPCS | Performed by: NUCLEAR MEDICINE

## 2018-12-06 RX ADMIN — Medication 10.3 MILLICURIE: at 11:20

## 2018-12-06 RX ADMIN — Medication 33.4 MILLICURIE: at 12:25

## 2018-12-07 ENCOUNTER — TELEPHONE (OUTPATIENT)
Dept: CARDIOLOGY CLINIC | Age: 58
End: 2018-12-07

## 2018-12-13 ENCOUNTER — OFFICE VISIT (OUTPATIENT)
Dept: CARDIOLOGY CLINIC | Age: 58
End: 2018-12-13
Payer: MEDICARE

## 2018-12-13 VITALS
BODY MASS INDEX: 39.99 KG/M2 | HEIGHT: 69 IN | WEIGHT: 270 LBS | SYSTOLIC BLOOD PRESSURE: 130 MMHG | HEART RATE: 88 BPM | DIASTOLIC BLOOD PRESSURE: 66 MMHG | OXYGEN SATURATION: 92 %

## 2018-12-13 DIAGNOSIS — I50.22 CHF (CONGESTIVE HEART FAILURE), NYHA CLASS III, CHRONIC, SYSTOLIC (HCC): Primary | ICD-10-CM

## 2018-12-13 PROCEDURE — G8599 NO ASA/ANTIPLAT THER USE RNG: HCPCS | Performed by: NURSE PRACTITIONER

## 2018-12-13 PROCEDURE — G8427 DOCREV CUR MEDS BY ELIG CLIN: HCPCS | Performed by: NURSE PRACTITIONER

## 2018-12-13 PROCEDURE — 1036F TOBACCO NON-USER: CPT | Performed by: NURSE PRACTITIONER

## 2018-12-13 PROCEDURE — G8484 FLU IMMUNIZE NO ADMIN: HCPCS | Performed by: NURSE PRACTITIONER

## 2018-12-13 PROCEDURE — G8417 CALC BMI ABV UP PARAM F/U: HCPCS | Performed by: NURSE PRACTITIONER

## 2018-12-13 PROCEDURE — 3017F COLORECTAL CA SCREEN DOC REV: CPT | Performed by: NURSE PRACTITIONER

## 2018-12-13 PROCEDURE — 99213 OFFICE O/P EST LOW 20 MIN: CPT | Performed by: NURSE PRACTITIONER

## 2018-12-13 RX ORDER — METOLAZONE 2.5 MG/1
2.5 TABLET ORAL DAILY
Qty: 30 TABLET | Refills: 1 | Status: SHIPPED | OUTPATIENT
Start: 2018-12-13 | End: 2019-02-28 | Stop reason: SDUPTHER

## 2018-12-13 ASSESSMENT — ENCOUNTER SYMPTOMS
NAUSEA: 0
WHEEZING: 0
ABDOMINAL DISTENTION: 1
VOMITING: 0
COUGH: 0
SHORTNESS OF BREATH: 1

## 2018-12-13 NOTE — PROGRESS NOTES
Heart Failure Clinic       Visit Date: 12/13/2018  Cardiologist:  Dr. Yoselin Gee  Primary Care Physician: Dr. Diana Cook MD    Marvin Sanchez is a 59 y.o. male who presents today for:  No chief complaint on file. HPI:   Marvin Sanchez is a 59 y.o. male who presents to the office for a new patient visit in the heart failure clinic. Accompanied by no one  HX: Non Ischemic Cardiomyopathy (viral illness years ago), ICD (2013), SYED, HLD  Lexiscan 12/6 - Negative  Echo 12/6 - EF down to 25% from 30%    Activity: Sprained ankle - boot off recently thus mobility has been limited. Completes all ADLs without rest/SOB. He walked from parking lot without SOB/fatigue. Diet: Adds some salt - He is a former , makes all his food homemade - no processed or canned      At Yoselin Gee office on 11/29 - ICD interrogated, No Afib. Started on Metolazone 2.5 mg daily x 7 days. Completed this 8 days ago and notes he is feeling slightly more SOB today. Weight went down to lowest of 257 at home and now back up to 266#. However he states he is feeling 80% better than then.       Last hospital admission r/t Heart Failure:  > 1year    Patient has:  Chest Pain: Few weeks ago  Worsening SOB: today worse  Difficulty sleeping: SYED: does not tolerate mask   Orthopnea/PND: none today  Edema: no  Fatigue: Ongoing  Abdominal bloating: Yes  Appetite: Good  Cough: None today  Any extra diuretic use: Finished last Metolazone 8 days ago  Weight gain: Yes - is up 6# 1-2 days   Compliant checking home weight: Yes  Compliant checking blood pressure: No  Any refills on CHF medications needed at this time: No    Past Medical History:   Diagnosis Date    Cardiomyopathy, dilated ([facility] [address]     VIRAL    Claustrophobia     Congestive heart failure (CHF) (HCC)     Hyperlipidemia     Medtronic dual ICD 2/14/2014    Osteoarthritis     Osteoarthritis of spine with radiculopathy, lumbar region 12/28/2015    Spinal stenosis     Unspecified sleep apnea     unable to wear CPAP     Past Surgical History:   Procedure Laterality Date    BACK SURGERY  8/26/2014    L4- S1 decompression, L4-5 PSF and TLIF    BACK SURGERY  2013   [facility] CARDIAC CATHETERIZATION  10/2013    [facility]    CARDIAC DEFIBRILLATOR PLACEMENT  2013???    CARDIOVASCULAR STRESS TEST  09/2013    CARPAL TUNNEL RELEASE Right     CERVICAL FUSION  05/17/2017    [facility] [address]    COLONOSCOPY      DOPPLER ECHOCARDIOGRAPHY  09/2013    DOPPLER ECHOCARDIOGRAPHY  09/2013    [facility]    ENDOSCOPY, COLON, DIAGNOSTIC      HERNIA REPAIR  ? ??    umbilical    LUMBAR SPINE SURGERY Left 9/11/2017    LUMBAR RFA L2-3, L3-4, L4-5, L5-S1 LEFT SIDE performed by Daniela Hyde MD at [address]"" Right 10/31/2017    LUMBAR FACET RFA @ L2-3, L3-4, L4-5 AND L5-S1 RIGHT SIDE performed by Daniela Hyde MD at [address] [facility]  2015    OTHER SURGICAL HISTORY  10/9/2015    C3-6 ACDF    OTHER SURGICAL HISTORY  01/18/2016    FACET INJECTIONS L3-L4 L4-L5 L5 S1    OTHER SURGICAL HISTORY  2-8-2016    RFA - L3-S1    OTHER SURGICAL HISTORY N/A 03-21-16    cervical epidural block C7    OTHER SURGICAL HISTORY Bilateral 05-16-16    cervical facet block C7-T1    OTHER SURGICAL HISTORY  08/01/2016    CERVICAL ABLATION    OTHER SURGICAL HISTORY Left 09/11/2017    Lumbar RFA at L2-3, L3-4, L4-5, L5-S1    OTHER SURGICAL HISTORY Right 10/31/2017    Lumbar RFA at L2-3, L3-4, L4-5, L5-S1    OTHER SURGICAL HISTORY Right 05/09/2018    Excision scalp mass right forehead    PACEMAKER PLACEMENT  2013??    ICD    CO EXC SKIN BENIG <5MM REMAINDR BODY Right 5/9/2018    EXCISION RIGHT FOREHEAD CYST performed by Fan Ness MD at [address] Left 6/19/2018    L-RFA @ L2-3, L3-4, L4-5, L5-S1 LEFT SIDE FIRST.  performed by Daniela Hyde MD at [address] [facility] palpitations and leg swelling. Gastrointestinal: Positive for abdominal distention (slight). Negative for nausea and vomiting. Genitourinary: Negative for difficulty urinating. Musculoskeletal: Negative for gait problem. Skin: Negative for pallor. Neurological: Negative for dizziness. Psychiatric/Behavioral: Negative for agitation and sleep disturbance. OBJECTIVE:   Today's Vitals:  /66 (Site: Left Upper Arm)   Pulse 88   Ht 5' 9\" (1.753 m)   Wt 270 lb (122.5 kg)   SpO2 92%   BMI 39.87 kg/m²     Physical Exam   Constitutional: He is oriented to person, place, and time. He appears well-developed and well-nourished. No distress. HENT:   Head: Normocephalic and atraumatic. Eyes: Conjunctivae are normal.   Neck: Normal range of motion. Neck supple. No JVD   Cardiovascular: Normal rate, regular rhythm and normal heart sounds. No murmur heard. Pulmonary/Chest: Effort normal and breath sounds normal. No respiratory distress. He has no wheezes. He has no rales. Abdominal: Soft. Bowel sounds are normal. He exhibits distension (mild). There is no tenderness. Musculoskeletal: Normal range of motion. He exhibits no edema. Neurological: He is alert and oriented to person, place, and time. Coordination normal.   Skin: Skin is warm and dry. He is not diaphoretic. Psychiatric: He has a normal mood and affect. His behavior is normal.   Vitals reviewed. Wt Readings from Last 3 Encounters:   12/13/18 270 lb (122.5 kg)   11/29/18 271 lb (122.9 kg)   11/27/18 274 lb (124.3 kg)     BP Readings from Last 3 Encounters:   12/13/18 130/66   11/29/18 108/64   11/27/18 116/82     Pulse Readings from Last 3 Encounters:   12/13/18 88   11/29/18 76   11/27/18 87     Body mass index is 39.87 kg/m².     ECHO:   Conclusions      Summary   Ejection fraction is visually estimated at 25%.   There was severe global hypokinesis of the left ventricle.   Left Ventricular size is Moderately increased .      Signature      ----------------------------------------------------------------   Electronically signed by Bassam Bowden MD (Interpreting   [id number]) on 12/06/2018 at 05:13 PM   ----------------------------------------------------------------     STRESS:   Summary   This Nuclear Medicine study was negative for ischemia .   dilated cardiomyopathy   difficult scan      Recommendation   Clinical correlation is recommended.      Signatures      ----------------------------------------------------------------   Electronically signed by Bassam Bowden MD (Interpreting   Cardiologist) on 12/06/2018 at 17:22   ----------------------------------------------------------------    Results reviewed:  BNP: No results found for: BNP  CBC:   Lab Results   Component Value Date    WBC 9.7 11/27/2018    RBC 4.38 11/27/2018    HGB 14.0 11/27/2018    HCT 40.5 11/27/2018     11/27/2018     CMP:    Lab Results   Component Value Date     11/27/2018    K 3.5 11/27/2018    CL 94 11/27/2018    CO2 26 11/27/2018    BUN 12 11/27/2018    CREATININE 1.3 11/27/2018    LABGLOM 56 11/27/2018    GLUCOSE 107 11/27/2018    GLUCOSE 100 05/02/2018    CALCIUM 9.8 11/27/2018     Hepatic Function Panel:    Lab Results   Component Value Date    ALKPHOS 87 11/27/2018    ALT 20 11/27/2018    AST 15 11/27/2018    PROT 7.7 11/27/2018    BILITOT 1.8 11/27/2018    BILIDIR 0.4 11/27/2018    LABALBU 4.4 11/27/2018     Magnesium:    Lab Results   Component Value Date    MG 1.6 11/27/2018     PT/INR:    Lab Results   Component Value Date    INR 1.24 11/27/2018     Lipids:  No results found for: TRIG, HDL, LDLCALC, LDLDIRECT, LABVLDL    ASSESSMENT AND PLAN:   The patient's condition/symptoms are Stable, however fluid appears to be increasing. see orders below.      Diagnosis Orders   1.  CHF (congestive heart failure), NYHA class III, chronic, systolic (HCC)  Brain Natriuretic Peptide    metolazone (ZAROXOLYN) 2.5 MG tablet

## 2018-12-14 ENCOUNTER — TELEPHONE (OUTPATIENT)
Dept: CARDIOLOGY CLINIC | Age: 58
End: 2018-12-14

## 2018-12-14 LAB
ANION GAP SERPL CALCULATED.3IONS-SCNC: 15 MEQ/L (ref 10–19)
BUN BLDV-MCNC: 26 MG/DL (ref 8–23)
CALCIUM SERPL-MCNC: 10 MG/DL (ref 8.5–10.5)
CHLORIDE BLD-SCNC: 96 MEQ/L (ref 95–107)
CO2: 30 MEQ/L (ref 19–31)
CREAT SERPL-MCNC: 1.3 MG/DL (ref 0.8–1.4)
EGFR AFRICAN AMERICAN: 68.7 ML/MIN/1.73 M2
EGFR IF NONAFRICAN AMERICAN: 59.3 ML/MIN/1.73 M2
GLUCOSE: 104 MG/DL (ref 70–99)
POTASSIUM SERPL-SCNC: 4.3 MEQ/L (ref 3.5–5.4)
SODIUM BLD-SCNC: 141 MEQ/L (ref 135–146)

## 2018-12-14 NOTE — TELEPHONE ENCOUNTER
Spoke with pt - took Metolazone 2.5 mg yesterday and is down 6# this morning. Discussed needing weekly Metolazone - will monitor and call with update next week. Labs reviewed.   Rand Hamm

## 2018-12-27 ENCOUNTER — TELEPHONE (OUTPATIENT)
Dept: CARDIOLOGY CLINIC | Age: 58
End: 2018-12-27

## 2019-01-17 ENCOUNTER — OFFICE VISIT (OUTPATIENT)
Dept: CARDIOLOGY CLINIC | Age: 59
End: 2019-01-17
Payer: MEDICARE

## 2019-01-17 VITALS
HEART RATE: 83 BPM | SYSTOLIC BLOOD PRESSURE: 106 MMHG | WEIGHT: 260 LBS | BODY MASS INDEX: 38.51 KG/M2 | DIASTOLIC BLOOD PRESSURE: 70 MMHG | HEIGHT: 69 IN | OXYGEN SATURATION: 96 %

## 2019-01-17 DIAGNOSIS — I50.22 CHF (CONGESTIVE HEART FAILURE), NYHA CLASS III, CHRONIC, SYSTOLIC (HCC): Primary | ICD-10-CM

## 2019-01-17 PROCEDURE — G8484 FLU IMMUNIZE NO ADMIN: HCPCS | Performed by: NURSE PRACTITIONER

## 2019-01-17 PROCEDURE — G8417 CALC BMI ABV UP PARAM F/U: HCPCS | Performed by: NURSE PRACTITIONER

## 2019-01-17 PROCEDURE — 99213 OFFICE O/P EST LOW 20 MIN: CPT | Performed by: NURSE PRACTITIONER

## 2019-01-17 PROCEDURE — G8427 DOCREV CUR MEDS BY ELIG CLIN: HCPCS | Performed by: NURSE PRACTITIONER

## 2019-01-17 PROCEDURE — 3017F COLORECTAL CA SCREEN DOC REV: CPT | Performed by: NURSE PRACTITIONER

## 2019-01-17 PROCEDURE — 1036F TOBACCO NON-USER: CPT | Performed by: NURSE PRACTITIONER

## 2019-01-17 RX ORDER — CARVEDILOL 6.25 MG/1
6.25 TABLET ORAL 2 TIMES DAILY
Qty: 180 TABLET | Refills: 1 | Status: SHIPPED | OUTPATIENT
Start: 2019-01-17 | End: 2019-05-21 | Stop reason: ALTCHOICE

## 2019-01-17 ASSESSMENT — ENCOUNTER SYMPTOMS
WHEEZING: 0
ABDOMINAL DISTENTION: 0
COUGH: 0
SHORTNESS OF BREATH: 1
VOMITING: 0
NAUSEA: 0

## 2019-01-30 ENCOUNTER — PROCEDURE VISIT (OUTPATIENT)
Dept: CARDIOLOGY CLINIC | Age: 59
End: 2019-01-30
Payer: MEDICARE

## 2019-01-30 DIAGNOSIS — I50.20 SYSTOLIC CONGESTIVE HEART FAILURE, UNSPECIFIED HF CHRONICITY (HCC): Primary | ICD-10-CM

## 2019-01-30 PROCEDURE — 93299 PR REM INTERROG ICPMS/SCRMS <30 D TECH REVIEW: CPT | Performed by: INTERNAL MEDICINE

## 2019-01-30 PROCEDURE — 93297 REM INTERROG DEV EVAL ICPMS: CPT | Performed by: INTERNAL MEDICINE

## 2019-02-06 ENCOUNTER — OFFICE VISIT (OUTPATIENT)
Dept: PHYSICAL MEDICINE AND REHAB | Age: 59
End: 2019-02-06
Payer: MEDICARE

## 2019-02-06 VITALS
SYSTOLIC BLOOD PRESSURE: 96 MMHG | HEART RATE: 83 BPM | DIASTOLIC BLOOD PRESSURE: 70 MMHG | WEIGHT: 259.92 LBS | BODY MASS INDEX: 38.5 KG/M2 | HEIGHT: 69 IN

## 2019-02-06 DIAGNOSIS — M43.22 FUSION OF SPINE OF CERVICAL REGION: ICD-10-CM

## 2019-02-06 DIAGNOSIS — M47.816 SPONDYLOSIS OF LUMBAR REGION WITHOUT MYELOPATHY OR RADICULOPATHY: ICD-10-CM

## 2019-02-06 DIAGNOSIS — M54.81 OCCIPITAL NEURALGIA OF LEFT SIDE: ICD-10-CM

## 2019-02-06 DIAGNOSIS — G89.4 CHRONIC PAIN SYNDROME: ICD-10-CM

## 2019-02-06 DIAGNOSIS — M47.812 SPONDYLOSIS OF CERVICAL REGION WITHOUT MYELOPATHY OR RADICULOPATHY: Primary | ICD-10-CM

## 2019-02-06 DIAGNOSIS — M96.1 LUMBAR POST-LAMINECTOMY SYNDROME: ICD-10-CM

## 2019-02-06 PROCEDURE — 3017F COLORECTAL CA SCREEN DOC REV: CPT | Performed by: PAIN MEDICINE

## 2019-02-06 PROCEDURE — 99213 OFFICE O/P EST LOW 20 MIN: CPT | Performed by: PAIN MEDICINE

## 2019-02-06 PROCEDURE — 1036F TOBACCO NON-USER: CPT | Performed by: PAIN MEDICINE

## 2019-02-06 PROCEDURE — G8417 CALC BMI ABV UP PARAM F/U: HCPCS | Performed by: PAIN MEDICINE

## 2019-02-06 PROCEDURE — G8484 FLU IMMUNIZE NO ADMIN: HCPCS | Performed by: PAIN MEDICINE

## 2019-02-06 PROCEDURE — G8427 DOCREV CUR MEDS BY ELIG CLIN: HCPCS | Performed by: PAIN MEDICINE

## 2019-02-06 ASSESSMENT — ENCOUNTER SYMPTOMS
SINUS PRESSURE: 0
ABDOMINAL PAIN: 0
RHINORRHEA: 0
CHEST TIGHTNESS: 0
EYE PAIN: 0
CONSTIPATION: 0
PHOTOPHOBIA: 0
COUGH: 0
BACK PAIN: 1
COLOR CHANGE: 0
NAUSEA: 0
DIARRHEA: 0
WHEEZING: 0
VOMITING: 0
SORE THROAT: 0
SHORTNESS OF BREATH: 0

## 2019-02-14 ENCOUNTER — HOSPITAL ENCOUNTER (OUTPATIENT)
Dept: PHYSICAL THERAPY | Age: 59
Setting detail: THERAPIES SERIES
Discharge: HOME | End: 2019-02-14
Payer: MEDICARE

## 2019-02-14 PROCEDURE — 97162 PT EVAL MOD COMPLEX 30 MIN: CPT

## 2019-02-14 PROCEDURE — 97110 THERAPEUTIC EXERCISES: CPT

## 2019-02-14 ASSESSMENT — PAIN DESCRIPTION - PAIN TYPE: TYPE: CHRONIC PAIN

## 2019-02-14 ASSESSMENT — PAIN SCALES - GENERAL: PAINLEVEL_OUTOF10: 7

## 2019-02-14 ASSESSMENT — PAIN DESCRIPTION - LOCATION: LOCATION: NECK

## 2019-02-14 ASSESSMENT — PAIN DESCRIPTION - ORIENTATION: ORIENTATION: LEFT

## 2019-02-18 ENCOUNTER — HOSPITAL ENCOUNTER (OUTPATIENT)
Dept: PHYSICAL THERAPY | Age: 59
Setting detail: THERAPIES SERIES
Discharge: HOME | End: 2019-02-18
Payer: MEDICARE

## 2019-02-18 PROCEDURE — 97140 MANUAL THERAPY 1/> REGIONS: CPT

## 2019-02-18 PROCEDURE — 97110 THERAPEUTIC EXERCISES: CPT

## 2019-02-18 ASSESSMENT — PAIN DESCRIPTION - ORIENTATION: ORIENTATION: LEFT

## 2019-02-18 ASSESSMENT — PAIN DESCRIPTION - LOCATION: LOCATION: NECK

## 2019-02-18 ASSESSMENT — PAIN SCALES - GENERAL: PAINLEVEL_OUTOF10: 5

## 2019-02-18 ASSESSMENT — PAIN DESCRIPTION - PAIN TYPE: TYPE: CHRONIC PAIN

## 2019-02-20 ENCOUNTER — HOSPITAL ENCOUNTER (OUTPATIENT)
Dept: PHYSICAL THERAPY | Age: 59
Setting detail: THERAPIES SERIES
Discharge: HOME | End: 2019-02-20
Payer: MEDICARE

## 2019-02-20 PROCEDURE — 97124 MASSAGE THERAPY: CPT

## 2019-02-20 PROCEDURE — 97110 THERAPEUTIC EXERCISES: CPT

## 2019-02-20 ASSESSMENT — PAIN SCALES - GENERAL: PAINLEVEL_OUTOF10: 3

## 2019-02-20 ASSESSMENT — PAIN DESCRIPTION - LOCATION: LOCATION: NECK

## 2019-02-20 ASSESSMENT — PAIN DESCRIPTION - ORIENTATION: ORIENTATION: LEFT

## 2019-02-20 ASSESSMENT — PAIN DESCRIPTION - PAIN TYPE: TYPE: CHRONIC PAIN

## 2019-02-25 ENCOUNTER — HOSPITAL ENCOUNTER (OUTPATIENT)
Dept: PHYSICAL THERAPY | Age: 59
Setting detail: THERAPIES SERIES
Discharge: HOME | End: 2019-02-25
Payer: MEDICARE

## 2019-02-25 PROCEDURE — 97110 THERAPEUTIC EXERCISES: CPT

## 2019-02-25 PROCEDURE — 97140 MANUAL THERAPY 1/> REGIONS: CPT

## 2019-02-25 ASSESSMENT — PAIN SCALES - GENERAL: PAINLEVEL_OUTOF10: 4

## 2019-02-25 ASSESSMENT — PAIN DESCRIPTION - PAIN TYPE: TYPE: CHRONIC PAIN

## 2019-02-25 ASSESSMENT — PAIN DESCRIPTION - LOCATION: LOCATION: NECK

## 2019-02-25 ASSESSMENT — PAIN DESCRIPTION - ORIENTATION: ORIENTATION: RIGHT;LEFT

## 2019-02-27 ENCOUNTER — HOSPITAL ENCOUNTER (OUTPATIENT)
Dept: PHYSICAL THERAPY | Age: 59
Setting detail: THERAPIES SERIES
Discharge: HOME | End: 2019-02-27
Payer: MEDICARE

## 2019-02-27 LAB
ANION GAP SERPL CALCULATED.3IONS-SCNC: 13 MEQ/L (ref 10–19)
BUN BLDV-MCNC: 34 MG/DL (ref 8–23)
CALCIUM SERPL-MCNC: 9.9 MG/DL (ref 8.5–10.5)
CHLORIDE BLD-SCNC: 85 MEQ/L (ref 95–107)
CO2: 36 MEQ/L (ref 19–31)
CREAT SERPL-MCNC: 1.7 MG/DL (ref 0.8–1.4)
EGFR AFRICAN AMERICAN: 49.7 ML/MIN/1.73 M2
EGFR IF NONAFRICAN AMERICAN: 42.9 ML/MIN/1.73 M2
GLUCOSE: 109 MG/DL (ref 70–99)
POTASSIUM SERPL-SCNC: 3.3 MEQ/L (ref 3.5–5.4)
SODIUM BLD-SCNC: 134 MEQ/L (ref 135–146)

## 2019-02-27 PROCEDURE — 97140 MANUAL THERAPY 1/> REGIONS: CPT

## 2019-02-27 PROCEDURE — 97110 THERAPEUTIC EXERCISES: CPT

## 2019-02-27 ASSESSMENT — PAIN SCALES - GENERAL: PAINLEVEL_OUTOF10: 0

## 2019-02-28 ENCOUNTER — TELEPHONE (OUTPATIENT)
Dept: CARDIOLOGY CLINIC | Age: 59
End: 2019-02-28

## 2019-02-28 DIAGNOSIS — I50.22 CHF (CONGESTIVE HEART FAILURE), NYHA CLASS III, CHRONIC, SYSTOLIC (HCC): ICD-10-CM

## 2019-02-28 RX ORDER — BUMETANIDE 2 MG/1
2 TABLET ORAL DAILY
Qty: 180 TABLET | Refills: 3
Start: 2019-02-28 | End: 2019-03-15 | Stop reason: ALTCHOICE

## 2019-02-28 RX ORDER — METOLAZONE 2.5 MG/1
2.5 TABLET ORAL DAILY PRN
Qty: 30 TABLET | Refills: 1
Start: 2019-02-28 | End: 2019-04-04

## 2019-03-04 ENCOUNTER — HOSPITAL ENCOUNTER (OUTPATIENT)
Dept: PHYSICAL THERAPY | Age: 59
Setting detail: THERAPIES SERIES
Discharge: HOME | End: 2019-03-04
Payer: MEDICARE

## 2019-03-04 PROCEDURE — 97140 MANUAL THERAPY 1/> REGIONS: CPT

## 2019-03-04 PROCEDURE — 97110 THERAPEUTIC EXERCISES: CPT

## 2019-03-04 ASSESSMENT — PAIN SCALES - GENERAL: PAINLEVEL_OUTOF10: 3

## 2019-03-04 ASSESSMENT — PAIN DESCRIPTION - LOCATION: LOCATION: NECK

## 2019-03-06 ENCOUNTER — PROCEDURE VISIT (OUTPATIENT)
Dept: CARDIOLOGY CLINIC | Age: 59
End: 2019-03-06
Payer: MEDICARE

## 2019-03-06 DIAGNOSIS — I50.22 CHF (CONGESTIVE HEART FAILURE), NYHA CLASS III, CHRONIC, SYSTOLIC (HCC): Primary | ICD-10-CM

## 2019-03-06 PROCEDURE — 93297 REM INTERROG DEV EVAL ICPMS: CPT | Performed by: INTERNAL MEDICINE

## 2019-03-06 PROCEDURE — 93299 PR REM INTERROG ICPMS/SCRMS <30 D TECH REVIEW: CPT | Performed by: INTERNAL MEDICINE

## 2019-03-07 ENCOUNTER — HOSPITAL ENCOUNTER (OUTPATIENT)
Dept: PHYSICAL THERAPY | Age: 59
Setting detail: THERAPIES SERIES
Discharge: HOME | End: 2019-03-07
Payer: MEDICARE

## 2019-03-07 PROCEDURE — 97124 MASSAGE THERAPY: CPT

## 2019-03-07 PROCEDURE — 97110 THERAPEUTIC EXERCISES: CPT

## 2019-03-07 ASSESSMENT — PAIN SCALES - GENERAL: PAINLEVEL_OUTOF10: 3

## 2019-03-08 LAB
ANION GAP SERPL CALCULATED.3IONS-SCNC: 14 MEQ/L (ref 10–19)
BUN BLDV-MCNC: 14 MG/DL (ref 8–23)
CALCIUM SERPL-MCNC: 9.4 MG/DL (ref 8.5–10.5)
CHLORIDE BLD-SCNC: 90 MEQ/L (ref 95–107)
CO2: 33 MEQ/L (ref 19–31)
CREAT SERPL-MCNC: 1.2 MG/DL (ref 0.8–1.4)
EGFR AFRICAN AMERICAN: 75.7 ML/MIN/1.73 M2
EGFR IF NONAFRICAN AMERICAN: 65.3 ML/MIN/1.73 M2
GLUCOSE: 97 MG/DL (ref 70–99)
POTASSIUM SERPL-SCNC: 4.1 MEQ/L (ref 3.5–5.4)
SODIUM BLD-SCNC: 137 MEQ/L (ref 135–146)

## 2019-03-11 ENCOUNTER — HOSPITAL ENCOUNTER (OUTPATIENT)
Dept: PHYSICAL THERAPY | Age: 59
Setting detail: THERAPIES SERIES
Discharge: HOME | End: 2019-03-11
Payer: MEDICARE

## 2019-03-11 ENCOUNTER — TELEPHONE (OUTPATIENT)
Dept: PHYSICAL MEDICINE AND REHAB | Age: 59
End: 2019-03-11

## 2019-03-11 DIAGNOSIS — M47.816 SPONDYLOSIS OF LUMBAR REGION WITHOUT MYELOPATHY OR RADICULOPATHY: Primary | ICD-10-CM

## 2019-03-11 PROCEDURE — 97110 THERAPEUTIC EXERCISES: CPT

## 2019-03-11 ASSESSMENT — PAIN SCALES - GENERAL: PAINLEVEL_OUTOF10: 0

## 2019-03-11 ASSESSMENT — PAIN DESCRIPTION - PAIN TYPE: TYPE: CHRONIC PAIN

## 2019-03-14 ENCOUNTER — TELEPHONE (OUTPATIENT)
Dept: PHYSICAL MEDICINE AND REHAB | Age: 59
End: 2019-03-14

## 2019-03-14 DIAGNOSIS — M25.552 LEFT HIP PAIN: ICD-10-CM

## 2019-03-14 DIAGNOSIS — M47.816 LUMBAR SPONDYLOSIS: ICD-10-CM

## 2019-03-14 DIAGNOSIS — M47.816 SPONDYLOSIS OF LUMBAR REGION WITHOUT MYELOPATHY OR RADICULOPATHY: Primary | ICD-10-CM

## 2019-03-14 DIAGNOSIS — M54.81 OCCIPITAL NEURALGIA OF LEFT SIDE: ICD-10-CM

## 2019-03-14 DIAGNOSIS — M47.812 SPONDYLOSIS OF CERVICAL REGION WITHOUT MYELOPATHY OR RADICULOPATHY: ICD-10-CM

## 2019-03-14 DIAGNOSIS — M54.17 LUMBOSACRAL RADICULITIS: ICD-10-CM

## 2019-03-14 DIAGNOSIS — M54.50 CHRONIC BILATERAL LOW BACK PAIN WITHOUT SCIATICA: ICD-10-CM

## 2019-03-14 DIAGNOSIS — M54.12 CERVICAL RADICULITIS: ICD-10-CM

## 2019-03-14 DIAGNOSIS — G89.29 CHRONIC BILATERAL LOW BACK PAIN WITHOUT SCIATICA: ICD-10-CM

## 2019-03-14 DIAGNOSIS — M96.1 LUMBAR POST-LAMINECTOMY SYNDROME: ICD-10-CM

## 2019-03-14 DIAGNOSIS — G89.4 CHRONIC PAIN SYNDROME: ICD-10-CM

## 2019-03-14 DIAGNOSIS — M54.6 ACUTE MIDLINE THORACIC BACK PAIN: ICD-10-CM

## 2019-03-15 RX ORDER — TORSEMIDE 20 MG/1
20 TABLET ORAL DAILY
Qty: 30 TABLET | Refills: 3 | Status: SHIPPED | OUTPATIENT
Start: 2019-03-15 | End: 2019-04-04

## 2019-03-21 ENCOUNTER — TELEPHONE (OUTPATIENT)
Dept: CARDIOLOGY CLINIC | Age: 59
End: 2019-03-21

## 2019-03-25 ENCOUNTER — HOSPITAL ENCOUNTER (OUTPATIENT)
Dept: PHYSICAL THERAPY | Age: 59
Setting detail: THERAPIES SERIES
Discharge: HOME | End: 2019-03-25
Payer: MEDICARE

## 2019-03-25 PROCEDURE — 97164 PT RE-EVAL EST PLAN CARE: CPT

## 2019-03-25 PROCEDURE — 97110 THERAPEUTIC EXERCISES: CPT

## 2019-03-25 ASSESSMENT — PAIN DESCRIPTION - LOCATION: LOCATION: BACK;BUTTOCKS

## 2019-03-25 ASSESSMENT — PAIN DESCRIPTION - ORIENTATION: ORIENTATION: LEFT;RIGHT

## 2019-03-25 ASSESSMENT — PAIN DESCRIPTION - PAIN TYPE: TYPE: CHRONIC PAIN

## 2019-03-25 ASSESSMENT — PAIN SCALES - GENERAL: PAINLEVEL_OUTOF10: 5

## 2019-03-29 ENCOUNTER — HOSPITAL ENCOUNTER (OUTPATIENT)
Dept: PHYSICAL THERAPY | Age: 59
Setting detail: THERAPIES SERIES
Discharge: HOME | End: 2019-03-29
Payer: MEDICARE

## 2019-03-29 LAB
ANION GAP SERPL CALCULATED.3IONS-SCNC: 13 MEQ/L (ref 10–19)
BUN BLDV-MCNC: 13 MG/DL (ref 8–23)
CALCIUM SERPL-MCNC: 9.2 MG/DL (ref 8.5–10.5)
CHLORIDE BLD-SCNC: 96 MEQ/L (ref 95–107)
CO2: 28 MEQ/L (ref 19–31)
CREAT SERPL-MCNC: 1.2 MG/DL (ref 0.8–1.4)
EGFR AFRICAN AMERICAN: 75.7 ML/MIN/1.73 M2
EGFR IF NONAFRICAN AMERICAN: 65.3 ML/MIN/1.73 M2
GLUCOSE: 106 MG/DL (ref 70–99)
POTASSIUM SERPL-SCNC: 4.3 MEQ/L (ref 3.5–5.4)
SODIUM BLD-SCNC: 137 MEQ/L (ref 135–146)

## 2019-03-29 PROCEDURE — 97110 THERAPEUTIC EXERCISES: CPT

## 2019-03-29 PROCEDURE — 97140 MANUAL THERAPY 1/> REGIONS: CPT

## 2019-03-29 ASSESSMENT — PAIN DESCRIPTION - ORIENTATION: ORIENTATION: LEFT;RIGHT

## 2019-03-29 ASSESSMENT — PAIN SCALES - GENERAL: PAINLEVEL_OUTOF10: 7

## 2019-03-29 ASSESSMENT — PAIN DESCRIPTION - PAIN TYPE: TYPE: CHRONIC PAIN

## 2019-03-29 ASSESSMENT — PAIN DESCRIPTION - LOCATION: LOCATION: NECK;BACK;BUTTOCKS

## 2019-04-01 ENCOUNTER — PREP FOR PROCEDURE (OUTPATIENT)
Dept: PHYSICAL MEDICINE AND REHAB | Age: 59
End: 2019-04-01

## 2019-04-01 ENCOUNTER — TELEPHONE (OUTPATIENT)
Dept: CARDIOLOGY CLINIC | Age: 59
End: 2019-04-01

## 2019-04-01 NOTE — TELEPHONE ENCOUNTER
----- Message from KRIS Carney CNP sent at 3/31/2019  2:02 PM EDT -----  Labs look good. Continue Torsemide.

## 2019-04-01 NOTE — H&P (VIEW-ONLY)
Zenaida Ceron is a 59 y.o. male is here today for     Chief Complaint: lower back pain          Medications reviewed. The patienthas No Known Allergies. Past Medical History  Fran Friend  has a past medical history of Cardiomyopathy, dilated ([address] Claustrophobia; Congestive heart failure (CHF) ([address] Hyperlipidemia; Medtronic dual ICD; Osteoarthritis; Osteoarthritis of spine with radiculopathy, lumbar region; Spinal stenosis; and Unspecified sleep apnea. Past Surgical History  The patient  has a past surgical history that includes cardiovascular stress test (09/2013); doppler echocardiography (09/2013); doppler echocardiography (09/2013); Nasal sinus surgery; Carpal tunnel release (Right); Colonoscopy; back surgery (8/26/2014); Endoscopy, colon, diagnostic; Vasectomy (???); sinus surgery (1980's???); other surgical history (10/9/2015); Neck surgery (2015); back surgery (2013); Tonsillectomy (1980's??); hernia repair (???); pacemaker placement (2013?? ); other surgical history (01/18/2016); other surgical history (2-8-2016); other surgical history (N/A, 03-21-16); other surgical history (Bilateral, 05-16-16); other surgical history (08/01/2016); cervical fusion (05/17/2017); other surgical history (Left, 09/11/2017); Lumbar spine surgery (Left, 9/11/2017); other surgical history (Right, 10/31/2017); Lumbar spine surgery (Right, 10/31/2017); other surgical history (Right, 05/09/2018); pr exc skin benig <5mm remaindr body (Right, 5/9/2018); Cardiac catheterization (10/2013); Cardiac defibrillator placement (2013???); and pr inject rx other periph nerve (Left, 6/19/2018). Family History  This patient's family history includes Coronary Art Dis in his father; Heart Attack in his father; Heart Disease in his father; High Blood Pressure in his father; Parkinsonism in his father. Social History  Cresencio  reports that he has never smoked.  He has never used smokeless tobacco. He reports that he drinks about 3.6 oz hematuria. Musculoskeletal: Positive for arthralgias, back pain and neck pain. Negative for gait problem, joint swelling, myalgias and neck stiffness. Skin: Negative for color change and rash. Allergic/Immunologic: Negative for food allergies and immunocompromised state. Neurological: Positive for numbness. Negative for dizziness, tremors, seizures, syncope, facial asymmetry, speech difficulty, weakness, light-headedness and headaches. Hematological: Does not bruise/bleed easily. Psychiatric/Behavioral: Positive for sleep disturbance. Negative for agitation, behavioral problems, confusion, decreased concentration, dysphoric mood, hallucinations, self-injury and suicidal ideas. The patient is nervous/anxious. The patient is not hyperactive. Objective:      Vitals                                  Weight: 259 lb 14.8 oz (117.9 kg)   Height: 5' 9.02\" (1.753 m)            Physical Exam   Constitutional: He is oriented to person, place, and time. He appears well-developed and well-nourished. No distress. HENT:   Head: Normocephalic and atraumatic. Right Ear: External ear normal.   Left Ear: External ear normal.   Nose: Nose normal.   Mouth/Throat: Oropharynx is clear and moist. No oropharyngeal exudate. Eyes: Pupils are equal, round, and reactive to light. Conjunctivae and EOM are normal. Right eye exhibits no discharge. Left eye exhibits no discharge. No scleral icterus. Neck: Normal range of motion and full passive range of motion without pain. Neck supple. No muscular tenderness present. No neck rigidity. No edema, no erythema and normal range of motion present. No thyromegaly present. Cardiovascular: Normal rate, regular rhythm, normal heart sounds and intact distal pulses. Exam reveals no gallop and no friction rub. No murmur heard. Pulmonary/Chest: Effort normal and breath sounds normal. No respiratory distress. He has no wheezes. He has no rales. He exhibits no tenderness. Abdominal: Soft. Bowel sounds are normal. He exhibits no distension. There is no tenderness. There is no rebound and no guarding. Musculoskeletal:        Right knee: He exhibits normal range of motion. No tenderness found. Left knee: He exhibits normal range of motion. No tenderness found. Right ankle: He exhibits normal range of motion and no swelling. Left ankle: He exhibits normal range of motion and no swelling. Cervical back: He exhibits decreased range of motion, tenderness and pain. Thoracic back: He exhibits tenderness. Lumbar back: He exhibits decreased range of motion, tenderness and pain. Back:         Right lower leg: He exhibits no tenderness. Left lower leg: He exhibits no tenderness. Right foot: There is no tenderness. Left foot: There is no tenderness. Neurological: He is alert and oriented to person, place, and time. He has normal strength and normal reflexes. He is not disoriented. He displays no atrophy. No cranial nerve deficit or sensory deficit. He exhibits normal muscle tone. He displays a negative Romberg sign. Coordination and gait normal. He displays no Babinski's sign on the right side. He displays no Babinski's sign on the left side. Skin: Skin is warm. No rash noted. He is not diaphoretic. No erythema. No pallor. Psychiatric: His speech is normal and behavior is normal. Judgment normal. His mood appears not anxious. His affect is blunt. His affect is not angry, not labile and not inappropriate. He is not agitated, not aggressive, not hyperactive, not slowed, not withdrawn, not actively hallucinating and not combative. Thought content is not paranoid and not delusional. Cognition and memory are normal. Cognition and memory are not impaired. He does not express impulsivity or inappropriate judgment. He does not exhibit a depressed mood. He expresses no homicidal and no suicidal ideation.  He expresses no suicidal plans and no homicidal plans. He exhibits normal recent memory and normal remote memory. He is attentive. Nursing note and vitals reviewed. Assessment:     3. Spondylosis of lumbar region without myelopathy or radiculopathy    4. Lumbar post-laminectomy syndrome    5. Chronic pain syndrome    6.  Occipital neuralgia of left side            Plan:  L-RFA @ L2-3, L3-4, L4-5, L5-S1 LEFT SIDE FIRST       Desmond Perry, KRIS - CNP  4/1/2019

## 2019-04-01 NOTE — H&P
Beryl Baldwin is a 59 y.o. male is here today for     Chief Complaint: lower back pain          Medications reviewed. The patienthas No Known Allergies. Past Medical History  Justin Lebron  has a past medical history of Cardiomyopathy, dilated ([address] Claustrophobia; Congestive heart failure (CHF) ([address] Hyperlipidemia; Medtronic dual ICD; Osteoarthritis; Osteoarthritis of spine with radiculopathy, lumbar region; Spinal stenosis; and Unspecified sleep apnea. Past Surgical History  The patient  has a past surgical history that includes cardiovascular stress test (09/2013); doppler echocardiography (09/2013); doppler echocardiography (09/2013); Nasal sinus surgery; Carpal tunnel release (Right); Colonoscopy; back surgery (8/26/2014); Endoscopy, colon, diagnostic; Vasectomy (???); sinus surgery (1980's???); other surgical history (10/9/2015); Neck surgery (2015); back surgery (2013); Tonsillectomy (1980's??); hernia repair (???); pacemaker placement (2013?? ); other surgical history (01/18/2016); other surgical history (2-8-2016); other surgical history (N/A, 03-21-16); other surgical history (Bilateral, 05-16-16); other surgical history (08/01/2016); cervical fusion (05/17/2017); other surgical history (Left, 09/11/2017); Lumbar spine surgery (Left, 9/11/2017); other surgical history (Right, 10/31/2017); Lumbar spine surgery (Right, 10/31/2017); other surgical history (Right, 05/09/2018); pr exc skin benig <5mm remaindr body (Right, 5/9/2018); Cardiac catheterization (10/2013); Cardiac defibrillator placement (2013???); and pr inject rx other periph nerve (Left, 6/19/2018). Family History  This patient's family history includes Coronary Art Dis in his father; Heart Attack in his father; Heart Disease in his father; High Blood Pressure in his father; Parkinsonism in his father. Social History  Cresencio  reports that he has never smoked.  He has never used smokeless tobacco. He reports that he drinks about 3.6 oz of alcohol per week . He reports that he does not use drugs. Medications    Current Medication      Current Outpatient Prescriptions:     carvedilol (COREG) 6.25 MG tablet, Take 1 tablet by mouth 2 times daily, Disp: 180 tablet, Rfl: 1    metolazone (ZAROXOLYN) 2.5 MG tablet, Take 1 tablet by mouth daily, Disp: 30 tablet, Rfl: 1    fluticasone (FLONASE) 50 MCG/ACT nasal spray, 1 spray by Each Nare route daily, Disp: 1 Bottle, Rfl: 0    albuterol sulfate HFA (PROVENTIL HFA) 108 (90 Base) MCG/ACT inhaler, Inhale 2 puffs into the lungs every 6 hours as needed for Wheezing, Disp: 1 Inhaler, Rfl: 3    losartan (COZAAR) 25 MG tablet, Take 1 tablet by mouth daily, Disp: 90 tablet, Rfl: 3    bumetanide (BUMEX) 2 MG tablet, Take 1 tablet by mouth 2 times daily, Disp: 180 tablet, Rfl: 3    potassium chloride (KLOR-CON M) 20 MEQ extended release tablet, TAKE 1 TABLET TWICE DAILY, Disp: 180 tablet, Rfl: 3    spironolactone (ALDACTONE) 25 MG tablet, TAKE 1 TABLET EVERY DAY, Disp: 90 tablet, Rfl: 3    VIAGRA 50 MG tablet, as needed , Disp: , Rfl:     allopurinol (ZYLOPRIM) 300 MG tablet, Take 300 mg by mouth daily. , Disp: , Rfl:         Subjective:      Review of Systems   Constitutional: Negative for activity change, appetite change, chills, diaphoresis, fatigue, fever and unexpected weight change. HENT: Negative for congestion, ear pain, hearing loss, mouth sores, nosebleeds, rhinorrhea, sinus pressure and sore throat. Eyes: Negative for photophobia, pain and visual disturbance. Respiratory: Negative for cough, chest tightness, shortness of breath and wheezing. Cardiovascular: Negative for chest pain and palpitations. Gastrointestinal: Negative for abdominal pain, constipation, diarrhea, nausea and vomiting. Endocrine: Negative for cold intolerance, heat intolerance, polydipsia, polyphagia and polyuria.    Genitourinary: Negative for decreased urine volume, difficulty urinating, frequency and hematuria. Musculoskeletal: Positive for arthralgias, back pain and neck pain. Negative for gait problem, joint swelling, myalgias and neck stiffness. Skin: Negative for color change and rash. Allergic/Immunologic: Negative for food allergies and immunocompromised state. Neurological: Positive for numbness. Negative for dizziness, tremors, seizures, syncope, facial asymmetry, speech difficulty, weakness, light-headedness and headaches. Hematological: Does not bruise/bleed easily. Psychiatric/Behavioral: Positive for sleep disturbance. Negative for agitation, behavioral problems, confusion, decreased concentration, dysphoric mood, hallucinations, self-injury and suicidal ideas. The patient is nervous/anxious. The patient is not hyperactive. Objective:      Vitals                                  Weight: 259 lb 14.8 oz (117.9 kg)   Height: 5' 9.02\" (1.753 m)            Physical Exam   Constitutional: He is oriented to person, place, and time. He appears well-developed and well-nourished. No distress. HENT:   Head: Normocephalic and atraumatic. Right Ear: External ear normal.   Left Ear: External ear normal.   Nose: Nose normal.   Mouth/Throat: Oropharynx is clear and moist. No oropharyngeal exudate. Eyes: Pupils are equal, round, and reactive to light. Conjunctivae and EOM are normal. Right eye exhibits no discharge. Left eye exhibits no discharge. No scleral icterus. Neck: Normal range of motion and full passive range of motion without pain. Neck supple. No muscular tenderness present. No neck rigidity. No edema, no erythema and normal range of motion present. No thyromegaly present. Cardiovascular: Normal rate, regular rhythm, normal heart sounds and intact distal pulses. Exam reveals no gallop and no friction rub. No murmur heard. Pulmonary/Chest: Effort normal and breath sounds normal. No respiratory distress. He has no wheezes. He has no rales. He exhibits no tenderness. Abdominal: Soft. Bowel sounds are normal. He exhibits no distension. There is no tenderness. There is no rebound and no guarding. Musculoskeletal:        Right knee: He exhibits normal range of motion. No tenderness found. Left knee: He exhibits normal range of motion. No tenderness found. Right ankle: He exhibits normal range of motion and no swelling. Left ankle: He exhibits normal range of motion and no swelling. Cervical back: He exhibits decreased range of motion, tenderness and pain. Thoracic back: He exhibits tenderness. Lumbar back: He exhibits decreased range of motion, tenderness and pain. Back:         Right lower leg: He exhibits no tenderness. Left lower leg: He exhibits no tenderness. Right foot: There is no tenderness. Left foot: There is no tenderness. Neurological: He is alert and oriented to person, place, and time. He has normal strength and normal reflexes. He is not disoriented. He displays no atrophy. No cranial nerve deficit or sensory deficit. He exhibits normal muscle tone. He displays a negative Romberg sign. Coordination and gait normal. He displays no Babinski's sign on the right side. He displays no Babinski's sign on the left side. Skin: Skin is warm. No rash noted. He is not diaphoretic. No erythema. No pallor. Psychiatric: His speech is normal and behavior is normal. Judgment normal. His mood appears not anxious. His affect is blunt. His affect is not angry, not labile and not inappropriate. He is not agitated, not aggressive, not hyperactive, not slowed, not withdrawn, not actively hallucinating and not combative. Thought content is not paranoid and not delusional. Cognition and memory are normal. Cognition and memory are not impaired. He does not express impulsivity or inappropriate judgment. He does not exhibit a depressed mood. He expresses no homicidal and no suicidal ideation.  He expresses no suicidal plans and no homicidal plans. He exhibits normal recent memory and normal remote memory. He is attentive. Nursing note and vitals reviewed. Assessment:     3. Spondylosis of lumbar region without myelopathy or radiculopathy    4. Lumbar post-laminectomy syndrome    5. Chronic pain syndrome    6.  Occipital neuralgia of left side            Plan:  L-RFA @ L2-3, L3-4, L4-5, L5-S1 LEFT SIDE FIRST       Abiola Diaz, APRN - CNP  4/1/2019

## 2019-04-04 ENCOUNTER — HOSPITAL ENCOUNTER (OUTPATIENT)
Dept: PHYSICAL THERAPY | Age: 59
Setting detail: THERAPIES SERIES
Discharge: HOME | End: 2019-04-04
Payer: MEDICARE

## 2019-04-04 DIAGNOSIS — I50.22 CHF (CONGESTIVE HEART FAILURE), NYHA CLASS III, CHRONIC, SYSTOLIC (HCC): ICD-10-CM

## 2019-04-04 PROCEDURE — 97140 MANUAL THERAPY 1/> REGIONS: CPT

## 2019-04-04 RX ORDER — METOLAZONE 2.5 MG/1
2.5 TABLET ORAL DAILY PRN
Qty: 30 TABLET | Refills: 1 | Status: ON HOLD | OUTPATIENT
Start: 2019-04-04 | End: 2019-04-11 | Stop reason: CLARIF

## 2019-04-04 RX ORDER — TORSEMIDE 20 MG/1
TABLET ORAL
Qty: 45 TABLET | Refills: 3 | Status: SHIPPED | OUTPATIENT
Start: 2019-04-04 | End: 2019-06-19

## 2019-04-04 ASSESSMENT — PAIN SCALES - GENERAL: PAINLEVEL_OUTOF10: 4

## 2019-04-04 ASSESSMENT — PAIN DESCRIPTION - PAIN TYPE: TYPE: CHRONIC PAIN

## 2019-04-04 ASSESSMENT — PAIN DESCRIPTION - ORIENTATION: ORIENTATION: LEFT;RIGHT

## 2019-04-04 ASSESSMENT — PAIN DESCRIPTION - LOCATION: LOCATION: NECK

## 2019-04-04 NOTE — PROGRESS NOTES
[facility] [place]     Time In: 7645  Time Out: 451 [facility]  Minutes: 45  Timed Code Treatment Minutes: 30 Minutes             Date: 2019  Patient Name: Michelle Boo,  Gender:  male        CSN: [id number]   : 1960  (59 y.o.)       Referring Practitioner: Camila Ingram MD      Diagnosis: V07.433 (ICD-10-CM) - Spondylosis of cervical region without myelopathy or [id number] (ICD-10-CM) - Fusion of spine of cervical region G96.1  lumbar post laminectomy syndrome, G89.4 chronic pain syndrome, M54.17 lumbosacral radiculitis. M54.6 acute midline thoracic pain. Treatment Diagnosis: cervical pain with headaches. low back pain with radiculopathy and muscle tension with difficulty walking. Additional Pertinent Hx: comorbidities: history of back surgery lumbar spine L4-S1 decompressions and fusion , CDF fibrillator placement, Cervical fusion 17, history of lumbar facet blocks. General:  PT Visit Information  Onset Date: 19  PT Insurance Information: Medicare no precertification, aquatic therapy yes, modalities yes HP, CP and iontophoresis not covered. Total # of Visits to Date: 6  Plan of Care/Certification Expiration Date: 19  Progress Note Counter: 3/8 for PN               Subjective:  Chart Reviewed: Yes  Family / Caregiver Present: No  Comments: Follow up with Dr. Rebecca Dunham  for nerve ablation on 19    Other (Comment): Dry Needling , therapy     Subjective: I detailed my truck yesterday and I am a little sore. Low back is 7/10 and neck 4/10. My neck is not that bad. Notes feels good for 2-3 days after  session. Notes activity like vacuuming can aggravate his symptoms. Notes that gentle pressure on his upper neck made it feel better 60% . Silver Forester Decreased soreness on left side of neck. Nerve ablation scheduled for 19 left lumbar area.        Pain:  Patient Currently in Pain: Yes  Pain Assessment: 0-10  Pain Level: 4  Pain Type: Chronic pain  Pain Location: Neck  Pain Orientation: Left, Right  Pain Radiating Towards: bilateral low back p ain buttock pain left>right 7/10 and neck 4/10       Objective      Exercises  Exercise 6: manual therapyy : soft tissue myofascial release to upper trap, levator scap muscles right and left, bilateral cervical, suboccipital region 15 minutes  upper cervical flexion traction x3 minutes   Exercise 17: Integrated dry needling: left PSIS region with 3 needles 50 mm.  left piriformis with 75 mm needles, left gluteus medius 60 mm needle  inferior gluteal HP: 40 mm needles right/left gluteal, gluteus kristy 2 other points on each side of gluteal right/left. superior  cluneal 30 mm needle 3 on left and 3 on right Dry needling therapy: consisted needle was inserted, piston, rotated, and coned to produce intramuscular mobilization. These techniques were used to restore functional range of motion, reduce muscle spasm and induce healing in the corresponding musculature. (94444)  Clean Technique was utilized today while applying Dry needling treatment. The treatment sites where cleaned with 70% solution of isopropyl alcohol . The PT washed their hands and utilized treatment gloves along with hand  prior to inserting the needles. All needles where removed and discarded in the appropriate sharps container. Activity Tolerance:  Activity Tolerance: Patient Tolerated treatment well  Activity Tolerance: Pain level at low back and SI decreased 2/10 and neck 0/10 Note painfree neck range of motion in rotation Notes slight pulling sensation on left side of neck with rotation. Assessment: Body structures, Functions, Activity limitations: Decreased ROM, Increased Pain, Decreased functional mobility   Assessment: completed dry needling to left SI region and cluneals and gluteas medius, piriformis and inferior gluteal region.  Noted decreased pain level to 2/10 in this region. Neck following manual therapy with suboccipital release and upper cervical traction noted 0/10 pain. Improved neck rotation ROM noted and painfree. Discharge Recommendations: Continue to assess pending progress    Patient Education:  Patient Education: Continue HEP as previously instructed for neck and shoulder girdles region. Concentrate on abdominal bracing with activities to decrease stress on low back. Plan:  Times per week: 1-2 x per week   Plan weeks: 8 weeks  Specific instructions for Next Treatment: Initiate core strengthening and back stabilization program as well as dry needling for low back pain and buttock symtptoms Progression and resume dry needling if indicated. Progress strengthening ex for shoulder and use of UBE as tolerated. Current Treatment Recommendations: Strengthening, ROM, Integrated Dry Needling, Manual Therapy - Soft Tissue Mobilization, Pain Management, Home Exercise Program, Patient/Caregiver Education & Training    Goals:  Patient goals : To have less left sided neck pain and less pain with movement of neck. Less headaches. BEING MET Patient has had no dizziness or headaches for 2 weeks. Noting less pain with neck movements and less pain at left side of neck 0/10 at beginning of session today    Short term goals  Time Frame for Short term goals: 4 weeks  Short term goal 1: Patient to report of decreased pain levels at left lateral cervical spine, left upper trap, levator scap region from 7/10 to no greater than 3/10 with movement. GOAL MET  Patient 0/10 today with pain level today. Pain range can be anywhere from 0/10 to 3/10    Short term goal 2: Patient to demonstrate increased ease with rotation from 30-35 degrees to 45 degrees and less symptoms of pain. GOAL NOT MET but did improve to right rotation at 57degrees and left rotation to 35 degrees.    Short term goal 3: Patient to report of decreased frequency

## 2019-04-04 NOTE — PROGRESS NOTES
Attempted to update patient health hx and give instructions via phone call- Patient states he has\" been thru this before and just recently reviewed his hx and meds with Doctor Alber Williamson. He states no changes and does not wish to go thru his hx or meds at this time. Patient states he knows Dr. Fe De La Paz procedure instructions and med instructions, stating he has had this done several times at the surgery center.

## 2019-04-08 ENCOUNTER — HOSPITAL ENCOUNTER (OUTPATIENT)
Dept: PHYSICAL THERAPY | Age: 59
Setting detail: THERAPIES SERIES
Discharge: HOME | End: 2019-04-08
Payer: MEDICARE

## 2019-04-08 PROCEDURE — 97140 MANUAL THERAPY 1/> REGIONS: CPT

## 2019-04-08 ASSESSMENT — PAIN SCALES - GENERAL: PAINLEVEL_OUTOF10: 3

## 2019-04-08 ASSESSMENT — PAIN DESCRIPTION - PAIN TYPE: TYPE: CHRONIC PAIN

## 2019-04-08 ASSESSMENT — PAIN DESCRIPTION - LOCATION: LOCATION: BACK;NECK

## 2019-04-08 NOTE — PROGRESS NOTES
low back SI region 3/10, neck 3/10 mid thoracic. Objective           Exercises  Exercise 13: Manual therapy seated Left C2 rotation, x3 (1) ,  C1 left rotation x3 (1) ,  thoracic SNAG T1 extension x3,    Exercise 14: Manual therapy: suboccipital release 5 minutes,  upper cervical traction x10 .  decreased neck pain with improved left neck rotation 2/10 pain level . 0/10 with rotation movement now. follwing C2 rotation  and upper cervical traction. Exercise 17: Integrated dry needling: left PSIS region with 3 needles 40 mm.  left piriformis with 75 mm needles, left gluteus medius 60 mm needle  inferior gluteal HP: 40 mm needles right/left gluteal, gluteus kristy 2 other points on each side of gluteal right/left. superior  cluneal 30 mm needle 2 on left. Dry needling herapy: consisted on the placement  needle was inserted, produce intramuscular mobilization. These techniques were used to restore functional range of motion, reduce muscle spasm and induce healing in the corresponding musculature. (63555)  Clean Technique was utilized today while applying Dry needling treatment. The treatment sites where cleaned with 70% solution of isopropyl alcohol . The PT washed their hands and utilized treatment gloves along with hand  prior to inserting the needles. All needles where removed and discarded in the appropriate sharps container. Activity Tolerance:  Activity Tolerance: Patient Tolerated treatment well  Activity Tolerance: Low back and SI region 0/10 pain level, neck 2/10 at rest.  0/10 with neck rotation to right/left. Note improved left rotation painfree following C2 SNAG. rotation mob. Assessment: Body structures, Functions, Activity limitations: Decreased ROM, Increased Pain, Decreased functional mobility   Assessment: Mulligan technique for C2 left rotation and upper cervical traction. 0/10 pain following with rotation with increased ROM.   Noted dry needling continued with weeks.    Short term goal 4: Note decreased muscle gaurding and tenderness over left lateral cervical, upper trap and levator scap muscles. GOAL MET Noting muscle tension on right upper trap and levator normal.  left upper trap and levator mild tension but significant reduction in tenderness and muscle gaurding. Short term goal 5: Patient to report of decreased low back pain in AM and improved sleeping pattern with pain levle in AM 8/10  and 3/10. During day pain level decreased 2/10. Long term goals  Time Frame for Long term goals : 8 weeks ( 4 weeks addressed on 3/11/19   Long term goal 1: Neck Disability Index from 40% to no greater than 20%. GOAL MET Neck Disability Index at 12%. Long term goal 2: Patient to demonstrate independence in home ex program with progression to neck stabilization ex, posture awareness, with decreased frequency and intensity of symptoms. ONGOING. Patient has home ex program with resistance bands, neck stabilizaiton and scap stabilization ex. shoulder strengthening. posture and position awarenss. pec stretch. Long term goal 3: Patient to demonstrate progression in lumbar stabilization program and core strengthening program with progression to 15 reps. Long term goal 4: Improve left ankle inversion strength from 3+/5 to 4/5.  with iincreased stability at left ankle with walking.       Darroll Junes, [address] [facility]

## 2019-04-09 ENCOUNTER — TELEPHONE (OUTPATIENT)
Dept: CARDIOLOGY CLINIC | Age: 59
End: 2019-04-09

## 2019-04-10 ENCOUNTER — PROCEDURE VISIT (OUTPATIENT)
Dept: CARDIOLOGY CLINIC | Age: 59
End: 2019-04-10
Payer: MEDICARE

## 2019-04-10 DIAGNOSIS — Z95.810 AUTOMATIC IMPLANTABLE CARDIOVERTER-DEFIBRILLATOR IN SITU: Primary | ICD-10-CM

## 2019-04-10 PROCEDURE — 93295 DEV INTERROG REMOTE 1/2/MLT: CPT | Performed by: INTERNAL MEDICINE

## 2019-04-10 PROCEDURE — 93296 REM INTERROG EVL PM/IDS: CPT | Performed by: INTERNAL MEDICINE

## 2019-04-10 NOTE — PROGRESS NOTES
5.6 years on device   At imped 513  shock 81  P waves 3.3 RV 6.4  0.4% atrial paced 0% RVP  0 mode switches     4/1/19  6 beats VT   3/30/19 9 beats VT   3/15/19  5 beats VT   3/11/19  14 beats VT   optivol elevated but is improving     Call to Cresencio, weight today was 252, normal weight for him is 250-251. States weight yesterday was 256 and 257 the day before. Metolazone has been denied by his insurance, they want him to try other diuretics first.  Will discuss with chf .  Is taking demadex 20mg 1.5 tabs . Breathing is ok today, was worse a couple days ago. No pedal swelling. Belly is not as tight as it had been. Leaves for a cruise on Friday.

## 2019-04-10 NOTE — TELEPHONE ENCOUNTER
Please call and check on Cresencio, Metolazone not covered - how much was he using the Metolazone since starting the Torsemide? We have room to go up on Torsemide if needed.   Encourage him to call us if weights go up and s/s HF.  thx

## 2019-04-11 ENCOUNTER — HOSPITAL ENCOUNTER (OUTPATIENT)
Age: 59
Setting detail: OUTPATIENT SURGERY
Discharge: HOME | End: 2019-04-11
Attending: PAIN MEDICINE | Admitting: PAIN MEDICINE
Payer: MEDICARE

## 2019-04-11 ENCOUNTER — ANESTHESIA EVENT (OUTPATIENT)
Dept: OPERATING ROOM | Age: 59
End: 2019-04-11
Payer: MEDICARE

## 2019-04-11 ENCOUNTER — ANESTHESIA (OUTPATIENT)
Dept: OPERATING ROOM | Age: 59
End: 2019-04-11
Payer: MEDICARE

## 2019-04-11 ENCOUNTER — APPOINTMENT (OUTPATIENT)
Dept: GENERAL RADIOLOGY | Age: 59
End: 2019-04-11
Attending: PAIN MEDICINE
Payer: MEDICARE

## 2019-04-11 VITALS
HEART RATE: 95 BPM | OXYGEN SATURATION: 95 % | BODY MASS INDEX: 37.62 KG/M2 | SYSTOLIC BLOOD PRESSURE: 130 MMHG | HEIGHT: 69 IN | DIASTOLIC BLOOD PRESSURE: 71 MMHG | RESPIRATION RATE: 14 BRPM | TEMPERATURE: 99.2 F | WEIGHT: 254 LBS

## 2019-04-11 VITALS
DIASTOLIC BLOOD PRESSURE: 99 MMHG | SYSTOLIC BLOOD PRESSURE: 148 MMHG | OXYGEN SATURATION: 92 % | RESPIRATION RATE: 19 BRPM

## 2019-04-11 PROCEDURE — 2500000003 HC RX 250 WO HCPCS: Performed by: PAIN MEDICINE

## 2019-04-11 PROCEDURE — 6360000002 HC RX W HCPCS: Performed by: PAIN MEDICINE

## 2019-04-11 PROCEDURE — 64636 DESTROY L/S FACET JNT ADDL: CPT | Performed by: PAIN MEDICINE

## 2019-04-11 PROCEDURE — 7100000011 HC PHASE II RECOVERY - ADDTL 15 MIN: Performed by: PAIN MEDICINE

## 2019-04-11 PROCEDURE — 64635 DESTROY LUMB/SAC FACET JNT: CPT | Performed by: PAIN MEDICINE

## 2019-04-11 PROCEDURE — 3600000056 HC PAIN LEVEL 4 BASE: Performed by: PAIN MEDICINE

## 2019-04-11 PROCEDURE — 6360000002 HC RX W HCPCS: Performed by: NURSE ANESTHETIST, CERTIFIED REGISTERED

## 2019-04-11 PROCEDURE — 2709999900 HC NON-CHARGEABLE SUPPLY: Performed by: PAIN MEDICINE

## 2019-04-11 PROCEDURE — 3700000000 HC ANESTHESIA ATTENDED CARE: Performed by: PAIN MEDICINE

## 2019-04-11 PROCEDURE — 3209999900 FLUORO FOR SURGICAL PROCEDURES

## 2019-04-11 PROCEDURE — 7100000010 HC PHASE II RECOVERY - FIRST 15 MIN: Performed by: PAIN MEDICINE

## 2019-04-11 RX ORDER — LIDOCAINE HYDROCHLORIDE 10 MG/ML
INJECTION, SOLUTION EPIDURAL; INFILTRATION; INTRACAUDAL; PERINEURAL PRN
Status: DISCONTINUED | OUTPATIENT
Start: 2019-04-11 | End: 2019-04-11 | Stop reason: ALTCHOICE

## 2019-04-11 RX ORDER — ROPIVACAINE HYDROCHLORIDE 2 MG/ML
INJECTION, SOLUTION EPIDURAL; INFILTRATION; PERINEURAL PRN
Status: DISCONTINUED | OUTPATIENT
Start: 2019-04-11 | End: 2019-04-11 | Stop reason: ALTCHOICE

## 2019-04-11 RX ORDER — FENTANYL CITRATE 50 UG/ML
INJECTION, SOLUTION INTRAMUSCULAR; INTRAVENOUS PRN
Status: DISCONTINUED | OUTPATIENT
Start: 2019-04-11 | End: 2019-04-11 | Stop reason: SDUPTHER

## 2019-04-11 RX ORDER — METHYLPREDNISOLONE ACETATE 80 MG/ML
INJECTION, SUSPENSION INTRA-ARTICULAR; INTRALESIONAL; INTRAMUSCULAR; SOFT TISSUE PRN
Status: DISCONTINUED | OUTPATIENT
Start: 2019-04-11 | End: 2019-04-11 | Stop reason: ALTCHOICE

## 2019-04-11 RX ADMIN — FENTANYL CITRATE 50 MCG: 50 INJECTION INTRAMUSCULAR; INTRAVENOUS at 14:12

## 2019-04-11 RX ADMIN — FENTANYL CITRATE 50 MCG: 50 INJECTION INTRAMUSCULAR; INTRAVENOUS at 14:13

## 2019-04-11 RX ADMIN — PROPOFOL 80 MG: 10 INJECTION, EMULSION INTRAVENOUS at 14:29

## 2019-04-11 ASSESSMENT — PULMONARY FUNCTION TESTS
PIF_VALUE: 0

## 2019-04-11 ASSESSMENT — PAIN DESCRIPTION - DESCRIPTORS: DESCRIPTORS: ACHING;CONSTANT;DISCOMFORT

## 2019-04-11 ASSESSMENT — ENCOUNTER SYMPTOMS: SHORTNESS OF BREATH: 1

## 2019-04-11 ASSESSMENT — PAIN - FUNCTIONAL ASSESSMENT: PAIN_FUNCTIONAL_ASSESSMENT: 0-10

## 2019-04-11 ASSESSMENT — PAIN SCALES - GENERAL: PAINLEVEL_OUTOF10: 0

## 2019-04-11 NOTE — ANESTHESIA POSTPROCEDURE EVALUATION
Department of Anesthesiology  Postprocedure Note    Patient: Malissa Maddox  MRN: [id number]  YOB: 1960  Date of evaluation: 4/11/2019  Time:  2:41 PM     Procedure Summary     Date:  04/11/19 Room / Location:  [facility] OR 03 / [address] [place]    Anesthesia Start:  1410 Anesthesia Stop:  9244    Procedure:  L-RFA @ L2-3, L3-4, L4-5, L5-S1 LEFT SIDE FIRST. (Left ) Diagnosis:  (LUMBAR SPONDYLOSIS)    Surgeon:  John Andrews MD Responsible Provider:  Bessie Nance MD    Anesthesia Type:  MAC ASA Status:  3          Anesthesia Type: MAC    Fifi Phase I:      Fifi Phase II:      Last vitals: Reviewed and per EMR flowsheets. Anesthesia Post Evaluation    Patient location during evaluation: PACU  Patient participation: complete - patient participated  Level of consciousness: awake and alert  Airway patency: patent  Nausea & Vomiting: no nausea and no vomiting  Complications: no  Cardiovascular status: hemodynamically stable  Respiratory status: acceptable  Hydration status: euvolemic      [address]  POST-ANESTHESIA NOTE       Name:  Malissa Maddox                                         Age:  [age] [facility] y.o.   MRN:  [id number]      Last Vitals:  /72   Pulse 87   Temp 97.1 °F (36.2 °C) (Temporal)   Resp 16   Ht 5' 9\" (1.753 m)   Wt 254 lb (115.2 kg)   SpO2 96%   BMI 37.51 kg/m²   Patient Vitals for the past 4 hrs:   BP Temp Temp src Pulse Resp SpO2 Height Weight   04/11/19 1350 120/72 97.1 °F (36.2 °C) Temporal 87 16 96 % 5' 9\" (1.753 m) 254 lb (115.2 kg)       Level of Consciousness:  Awake    Respiratory:  Stable    Oxygen Saturation:  Stable    Cardiovascular:  Stable    Hydration:  Adequate    PONV:  Stable    Post-op Pain:  Adequate analgesia    Post-op Assessment:  No apparent anesthetic complications    Additional Follow-Up / Treatment / Comment:  None    Lebron Grigsby MD  April 11, 2019   2:42 PM

## 2019-04-11 NOTE — INTERVAL H&P NOTE
H&P Update    Patient's History and Physical from April 1, 2019 was reviewed. Patient examined. There has been no change.     Janiya Skaggs

## 2019-04-11 NOTE — OP NOTE
Pre-Procedure Note    Patient Name: Jojo Mcneil   YOB: 1960  Medical Record Number: [id number]  Date: 3/19/2019    Indication:  Lower back pain    Consent: On file. Vital Signs:   Vitals:    04/11/19 1350   BP: 120/72   Pulse: 87   Resp: 16   Temp: 97.1 °F (36.2 °C)   SpO2: 96%       Past Medical History:   has a past medical history of Cardiomyopathy, dilated ([address] Claustrophobia, Congestive heart failure (CHF) ([address] Hyperlipidemia, Medtronic dual ICD, Osteoarthritis, Osteoarthritis of spine with radiculopathy, lumbar region, Spinal stenosis, and Unspecified sleep apnea. Past Surgical History:   has a past surgical history that includes cardiovascular stress test (09/2013); doppler echocardiography (09/2013); doppler echocardiography (09/2013); Nasal sinus surgery; Carpal tunnel release (Right); Colonoscopy; back surgery (8/26/2014); Endoscopy, colon, diagnostic; Vasectomy (???); sinus surgery (1980's???); other surgical history (10/9/2015); Neck surgery (2015); back surgery (2013); Tonsillectomy (1980's??); hernia repair (???); pacemaker placement (2013?? ); other surgical history (01/18/2016); other surgical history (2-8-2016); other surgical history (N/A, 03-21-16); other surgical history (Bilateral, 05-16-16); other surgical history (08/01/2016); cervical fusion (05/17/2017); other surgical history (Left, 09/11/2017); Lumbar spine surgery (Left, 9/11/2017); other surgical history (Right, 10/31/2017); Lumbar spine surgery (Right, 10/31/2017); other surgical history (Right, 05/09/2018); pr exc skin benig <5mm remaindr body (Right, 5/9/2018); Cardiac catheterization (10/2013); Cardiac defibrillator placement (2013???); and pr inject rx other periph nerve (Left, 6/19/2018). Pre-Sedation Documentation and Exam:   Vital signs have been reviewed (see flow sheet for vitals).      Sedation/ Anesthesia Plan:   MAC    Patient is an appropriate candidate for plan of sedation: yes    Preoperative stimulation was seen in lower extremities. Some multifidus stimulus was seen. Then after negative aspiration a mixture of depomedrol 80 mg and 0.2% ropivacaine 2 cc was injected through the needle. Then a initial lesion was done at 80 degrees centigrade for 90 seconds. For L5 median branch block the junction of the ala of  the sacrum with the superior articular process of the facet joint was taken as a reference point. For the L4 median branch the junction of the transverse process of L5 with the superolateral possible facet joint was taken as a reference point and for S1 median branch the most lateral and superior aspect of S1 foramina was taken as a reference point,. For L3 median branch the junction of L4 transverse process and superior articular process of facet joint was taken as reference point and so on. Patient's vital signs and neurological status remained stable throughout the procedure and post procedural period. The patient tolerated the procedure well and was discharged home in stable condition.     Electronically signed by Fannie Ferguson MD on 3/19/2019 at 2:32 PM

## 2019-04-11 NOTE — ANESTHESIA PRE PROCEDURE
Department of Anesthesiology  Preprocedure Note       Name:  Jessica Hopkins   Age:  59 y.o.  :  1960                                          MRN:  [id number]         Date:  2019      Surgeon: Carlos Alberto Rivera):  Sharon Dotson MD    Procedure: Procedure(s):  L-RFA @ L2-3, L3-4, L4-5, L5-S1 LEFT SIDE FIRST. (Left )    Medications prior to admission:   Prior to Admission medications    Medication Sig Start Date End Date Taking? Authorizing Provider   torsemide (DEMADEX) 20 MG tablet Take 1.5 tabs daily 19   [place] Laughter, APRN - CNP   carvedilol (COREG) 6.25 MG tablet Take 1 tablet by mouth 2 times daily 19   [place] Laughter, KRIS - CNP   fluticasone (FLONASE) 50 MCG/ACT nasal spray 1 spray by Each Nare route daily 18   Vito Adrian DO   albuterol sulfate HFA (PROVENTIL HFA) 108 (90 Base) MCG/ACT inhaler Inhale 2 puffs into the lungs every 6 hours as needed for Wheezing 18   Vito Adrian DO   losartan (COZAAR) 25 MG tablet Take 1 tablet by mouth daily 18   KRIS Mayes CNP   potassium chloride (KLOR-CON M) 20 MEQ extended release tablet TAKE 1 TABLET TWICE DAILY 18   Kyler Cox MD   spironolactone (ALDACTONE) 25 MG tablet TAKE 1 TABLET EVERY DAY 18   Kyler Cox MD   VIAGRA 50 MG tablet as needed  3/7/16   Historical Provider, MD   allopurinol (ZYLOPRIM) 300 MG tablet Take 300 mg by mouth daily. Historical Provider, MD       Current medications:    No current outpatient medications on file. No current facility-administered medications for this visit.         Allergies:  No Known Allergies    Problem List:    Patient Active Problem List   Diagnosis Code    Snoring R06.83    Obstructive sleep apnea G47.33    Insomnia G47.00    Daytime somnolence R40.0    Sleep difficulties G47.9    Sleep talking G47.8    Restless sleeper G47.9    Claustrophobia F40.240    Heart disease I51.9    CHF (congestive heart failure) (HCC) I50.9    Gout M10.9    Arthritis M19.90    Hyperlipemia E78.5    Cardiomyopathy, dilated (HCC) I42.0    Congestive heart failure (CHF) (HCC) I50.9    Medtronic dual ICD Z95.810    Hyponatremia E87.1    S/P lumbar laminectomy Z98.890    Ileus, postoperative (HCC) K91.89, K56.7    Obesity E66.9    Lumbar spondylosis M47.816    Lumbar postlaminectomy syndrome M96.1    Chronic pain syndrome G89.4    Spondylosis of lumbar region without myelopathy or radiculopathy M47.816    Scalp mass R22.0       Past Medical History:        Diagnosis Date    Cardiomyopathy, dilated ([address]     VIRAL    Claustrophobia     Congest[facility] failure [facility]F) ([facility])     Hyperlipidemia     Medtronic dual ICD 2/14/2014    Osteoarthritis     Osteoarthritis of spine with radiculopathy, lumbar region 12/28/2015    Spinal stenosis     Unspecified sleep apnea     unable to wear CPAP       Past Surgical History:        Procedure Laterality Date    BACK SURGERY  8/26/2014    L4- S1 decompression, L4-5 PSF and TLIF    BACK SURGERY  2013   [facility] CARDIAC CATHETERIZATION  10/2013    [address]  2013???    CARDIOVASCULAR STRESS TEST  09/2013    CARPAL TUNNEL RELEASE Right     CERVICAL FUSION  05/17/2017    [address]    COLONOSCOPY      DOPPLER ECHOCARDIOGRAPHY  09/2013    DOPPLER [facility]CHOCARDIOGRAPHY  09/2013    [facility]    ENDOSCOPY, COLON, DIAGNOSTIC      HERNIA REPAIR  ? ??    umbilical    LUMBAR SPINE SURGERY Left 9/11/2017    LUMBAR RFA L2-3, L3-4, L4-5, L5-S1 LEFT SIDE performed by Sharon Dotson MD at [address] [facility] Right 10/31/2017    LUMBAR FACET RFA @ L2-3, L3-4, L4-5 AND L5-S1 RIGHT SIDE performed by Sharon Dotson MD at [facility]  2015    OTHER SURGICAL HISTORY  10/9/2015    C3-6 ACDF    OTHER SURGICAL HISTORY  01/18/2016    FACET INJECTIONS L3-L4 L4-L5 L5 S1    OTHER SURGICAL HISTORY 2-8-2016    RFA - L3-S1    OTHER SURGICAL HISTORY N/A 03-21-16    cervical epidural block C7    OTHER SURGICAL HISTORY Bilateral 05-16-16    cervical facet block C7-T1    OTHER SURGICAL HISTORY  08/01/2016    CERVICAL ABLATION    OTHER SURGICAL HISTORY Left 09/11/2017    Lumbar RFA at L2-3, L3-4, L4-5, L5-S1    OTHER SURGICAL HISTORY Right 10/31/2017    Lumbar RFA at L2-3, L3-4, L4-5, L5-S1    OTHER SURGICAL HISTORY Right 05/09/2018    Excision scalp mass right forehead    PACEMAKER PLACEMENT  2013??    ICD    AL EXC SKIN BENIG <5MM REMAINDR BODY Right 5/9/2018    EXCISION RIGHT FOREHEAD CYST performed by Rafaela Arreola MD at [address] Left 6/19/2018    L-RFA @ L2-3, L3-4, L4-5, L5-S1 LEFT SIDE FIRST. performed by Niall Scott MD at [address]  1980's???    TONSILLECTOMY  1980's??    VASECTOMY  ? ??       Social History:    Social History     Tobacco Use    Smoking status: Never Smoker    Smokeless tobacco: Never Used   Substance Use Topics    Alcohol use: Yes     Alcohol/week: 3.6 oz     Types: 6 Standard drinks or equivalent per week     Comment: social                                Counseling given: Not Answered      Vital Signs (Current): There were no vitals filed for this visit.                                            BP Readings from Last 3 Encounters:   04/11/19 120/72   02/06/19 96/70   01/17/19 106/70       NPO Status:                                                                                 BMI:   Wt Readings from Last 3 Encounters:   04/11/19 254 lb (115.2 kg)   02/06/19 259 lb 14.8 oz (117.9 kg)   01/17/19 260 lb (117.9 kg)     There is no height or weight on file to calculate BMI.    CBC:   Lab Results   Component Value Date    WBC 9.7 11/27/2018    RBC 4.38 11/27/2018    HGB 14.0 11/27/2018    HCT 40.5 11/27/2018    MCV 92.5 11/27/2018    RDW 13.9 05/09/2017     11/27/2018       CMP: Lab Results   Component Value Date     03/29/2019    K 4.3 03/29/2019    CL 96 03/29/2019    CO2 28 03/29/2019    BUN 13 03/29/2019    CREATININE 1.2 03/29/2019    LABGLOM 56 11/27/2018    GLUCOSE 106 03/29/2019    PROT 7.7 11/27/2018    CALCIUM 9.2 03/29/2019    BILITOT 1.8 11/27/2018    ALKPHOS 87 11/27/2018    AST 15 11/27/2018    ALT 20 11/27/2018       POC Tests: No results for input(s): POCGLU, POCNA, POCK, POCCL, POCBUN, POCHEMO, POCHCT in the last 72 hours. Coags:   Lab Results   Component Value Date    INR 1.24 11/27/2018    APTT 35.9 11/27/2018       HCG (If Applicable): No results found for: PREGTESTUR, PREGSERUM, HCG, HCGQUANT     ABGs: No results found for: PHART, PO2ART, CUW7PHI, DMP2TNT, BEART, G0HCXQJI     Type & Screen (If Applicable):  Lab Results   Component Value Date    LABRH NEG 10/09/2015       Anesthesia Evaluation  Patient summary reviewed and Nursing notes reviewed  Airway: Mallampati: III  TM distance: >3 FB   Neck ROM: full  Mouth opening: > = 3 FB Dental:          Pulmonary: breath sounds clear to auscultation  (+) shortness of breath:  sleep apnea: on noncompliant,                             Cardiovascular:  Exercise tolerance: poor (<4 METS),   (+) pacemaker: AICD, CHF: no interval change,       ECG reviewed  Rhythm: regular  Rate: normal  Echocardiogram reviewed         Beta Blocker:  Dose within 24 Hrs         Neuro/Psych:   (+) neuromuscular disease:,             GI/Hepatic/Renal:             Endo/Other:                     Abdominal:       Abdomen: soft. Vascular:                                          Anesthesia Plan      MAC     ASA 3       Induction: intravenous. Anesthetic plan and risks discussed with patient and spouse.       Plan discussed with CRNA and surgical team.                  Lurdes Arora MD   4/11/2019

## 2019-04-15 ENCOUNTER — APPOINTMENT (OUTPATIENT)
Dept: PHYSICAL THERAPY | Age: 59
End: 2019-04-15
Payer: MEDICARE

## 2019-04-25 ENCOUNTER — HOSPITAL ENCOUNTER (OUTPATIENT)
Dept: PHYSICAL THERAPY | Age: 59
Setting detail: THERAPIES SERIES
Discharge: HOME | End: 2019-04-25
Payer: MEDICARE

## 2019-04-25 ENCOUNTER — HOSPITAL ENCOUNTER (OUTPATIENT)
Dept: PHYSICAL THERAPY | Age: 59
Setting detail: THERAPIES SERIES
End: 2019-04-25
Payer: MEDICARE

## 2019-04-25 PROCEDURE — 97110 THERAPEUTIC EXERCISES: CPT

## 2019-04-25 ASSESSMENT — PAIN SCALES - GENERAL: PAINLEVEL_OUTOF10: 1

## 2019-04-25 ASSESSMENT — PAIN DESCRIPTION - LOCATION: LOCATION: BACK;NECK

## 2019-04-25 ASSESSMENT — PAIN DESCRIPTION - PAIN TYPE: TYPE: CHRONIC PAIN

## 2019-04-25 NOTE — DISCHARGE SUMMARY
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 M54.6 acute midline thoracic pain. Treatment Diagnosis: cervical pain with headaches. low back pain with radiculopathy and muscle tension with difficulty walking. Additional Pertinent Hx: comorbidities: history of back surgery lumbar spine L4-S1 decompressions and fusion , CDF fibrillator placement, Cervical fusion 17, history of lumbar facet blocks. General:  PT Visit Information  Onset Date: 19  PT Insurance Information: Medicare no precertification, aquatic therapy yes, modalities yes HP, CP and iontophoresis not covered. Total # of Visits to Date: 15  Plan of Care/Certification Expiration Date: 19  Progress Note Counter:   for PN               Subjective:  Chart Reviewed: Yes  Family / Caregiver Present: No  Comments: Follow up with Dr. Sharmin Reed  for nerve ablation on 19    Other (Comment): Dry Needling , therapy     Subjective: Pateint reports that he had nerve ablation on 19 and felt it was successful in decreasing pain on left side of his neck. Continues to have some discomfort just below fusion site of his neck (pointing to upper thoracic and lower cervical level.) Patient has just returned from vacation and is not feeling too bad.  Notes 2/10 at between shoulder blades and neck 1/10.  back 1/10. Feels he understands and knows his ex to continue with. Feels he is much better than when he started PT. Pain:  Patient Currently in Pain: Yes  Pain Level: 1  Pain Type: Chronic pain  Pain Location: Back, Neck      Objective             Exercises  Exercise 2: Patient was instructed in posture/position awareness. Use of lumbar roll to correct sitting posture to decrease strain on neck. HEP   Exercise 3: scapular ex of retraction, elevation and retro rolls. 2x per day at 5-10 reps. (HEP now)   Exercise 7: neck rotation only 5 reps to right/left  more pain with rotation to left. HEP   Exercise 8: pec stretch in doorway 3x hold 10 seconds HEP   Exercise 9: cervical stabiilzation ex: small ball behind head: shoulder flexion, shoulder abduction, horizontal abduction/adduction x10, shoulder extension  each notes pulling at upper trap region and shoulders when doing ex. HEP   Exercise 10: Standing: rows, shoulder extension peach band x10 each   horizontal abduction/adduction peach band x10 HEP   Exercise 11: push up at desk x15 HEP   Exercise 12: UBE 1 minute and 30 seconds  each 15 seconds switch from forward and retro RPM  120 NT  Exercise 13: Manual therapy seated Left C2 rotation, x3 (1) ,  C1 left rotation x3 (1) ,  thoracic SNAG T1 extension x3,  NT  Exercise 15: Reassessment. Cervical and low back 4/25/19 Dry needling not completed today due to low pain levels at neck 0/10, low back 2/10, upper thoracic 2/10          Activity Tolerance:  Activity Tolerance: Patient Tolerated treatment well  Activity Tolerance: final session today with min to no pain complaints. Independent in HEP . Assessment:  Assessment: Reassessment today. Refer to goal summary for status. Patient has demonstrated good progress in therapy with pain management, neck ROM, decreased muscle tension and guarding.   Patient has improved functionally with increased activity levels as home with only minor flare  ups if overdoing it with activities. T3 area remains tender. Patient demonstrates independence in HEP to follow. Patient Education:  Patient Education: Continue HEP as previously instructed for neck and shoulder girdles region. Concentrate on abdominal bracing with activities to decrease stress on low back. Plan:  Plan Comment: Discharge from PT due to meeting goals and progressing well in sessions. Patient to follow up with physician as needed. Goals:  Patient goals : To have less left sided neck pain and less pain with movement of neck. Less headaches. GOAL  MET Patient has had no dizziness or headaches . Noting less pain with neck movements and less pain at left side of neck 0/10 at beginning of session today Back pain 1-2/10 , upper thoracic 2/10   Feels therapy has been beneficial.  Nerve ablation also helpful. Short term goals  Time Frame for Short term goals: 4 weeks 4/25/19  Short term goal 1: Patient to report of decreased pain levels at left lateral cervical spine, left upper trap, levator scap region from 7/10 to no greater than 3/10 with movement. GOAL MET  Patient 0/10 today with pain level today. Pain range can be anywhere from 0/10 to 3/10    Short term goal 2: Patient to demonstrate increased ease with rotation from 30-35 degrees to 45 degrees and less symptoms of pain. GOAL  MET right rotation 65 degrees, left rotation 60 degrees through painfree range. Short term goal 3: Patient to report of decreased frequency of headaches at suboccipital region to 1-2 per week. GOAL MET Patient has not had any headaches since last session. Short term goal 4: Note decreased muscle gaurding and tenderness over left lateral cervical, upper trap and levator scap muscles. GOAL MET Noting muscle tension on right upper trap and levator normal.  left upper trap and levator mild tension but significant reduction in tenderness and muscle gaurding. T3 tenderness noted. Tenderness no longer at right and left PSIS region. Note tenderness at lumbosacral L5 S1 levels. Short term goal 5: Patient to report of decreased low back pain in AM and improved sleeping pattern with pain levle in AM 8/10  and 3/10. During day pain level decreased 2/10. GOAL MET low back pain discomfort less with laying on his side. Notes pain level 1/10 to 2/10 at present. Long term goals  Time Frame for Long term goals : 8 weeks  4/25/19    Long term goal 1: Neck Disability Index from 40% to no greater than 20%. GOAL MET Neck Disability Index at 12%. Long term goal 2: Patient to demonstrate independence in home ex program with progression to neck stabilization ex, posture awareness, with decreased frequency and intensity of symptoms. GOAL MET    Patient has home ex program with resistance bands, neck stabilizaiton and scap stabilization ex. shoulder strengthening. posture and position awarenss. pec stretch. Long term goal 3: Patient to demonstrate progression in lumbar stabilization program and core strengthening program with progression to 15 reps. GOAL NOt MET  Patient has been instructed in abdominal bracing with back stabilization but did not progress in ex.due to limited time in sessions. Long term goal 4: Improve left ankle inversion strength from 3+/5 to 4/5.  with iincreased stability at left ankle with walking. GOAL MET Left ankle 5/5 with 5/5 also at inversion .       Jess Friedman, PT

## 2019-04-26 ENCOUNTER — OFFICE VISIT (OUTPATIENT)
Dept: CARDIOLOGY CLINIC | Age: 59
End: 2019-04-26
Payer: MEDICARE

## 2019-04-26 VITALS
SYSTOLIC BLOOD PRESSURE: 120 MMHG | WEIGHT: 257 LBS | HEIGHT: 69 IN | BODY MASS INDEX: 38.06 KG/M2 | DIASTOLIC BLOOD PRESSURE: 64 MMHG | HEART RATE: 80 BPM

## 2019-04-26 DIAGNOSIS — I42.0 DILATED CARDIOMYOPATHY (HCC): Primary | ICD-10-CM

## 2019-04-26 DIAGNOSIS — Z95.810 ICD (IMPLANTABLE CARDIOVERTER-DEFIBRILLATOR) IN PLACE: ICD-10-CM

## 2019-04-26 DIAGNOSIS — I10 ESSENTIAL HYPERTENSION: ICD-10-CM

## 2019-04-26 PROCEDURE — 1036F TOBACCO NON-USER: CPT | Performed by: NUCLEAR MEDICINE

## 2019-04-26 PROCEDURE — G8417 CALC BMI ABV UP PARAM F/U: HCPCS | Performed by: NUCLEAR MEDICINE

## 2019-04-26 PROCEDURE — 3017F COLORECTAL CA SCREEN DOC REV: CPT | Performed by: NUCLEAR MEDICINE

## 2019-04-26 PROCEDURE — 99213 OFFICE O/P EST LOW 20 MIN: CPT | Performed by: NUCLEAR MEDICINE

## 2019-04-26 PROCEDURE — G8427 DOCREV CUR MEDS BY ELIG CLIN: HCPCS | Performed by: NUCLEAR MEDICINE

## 2019-04-26 NOTE — PROGRESS NOTES
SINUS SURGERY      NECK SURGERY  2015    OTHER SURGICAL HISTORY  10/9/2015    C3-6 ACDF    OTHER SURGICAL HISTORY  01/18/2016    FACET INJECTIONS L3-L4 L4-L5 L5 S1    OTHER SURGICAL HISTORY  2-8-2016    RFA - L3-S1    OTHER SURGICAL HISTORY N/A 03-21-16    cervical epidural block C7    OTHER SURGICAL HISTORY Bilateral 05-16-16    cervical facet block C7-T1    OTHER SURGICAL HISTORY  08/01/2016    CERVICAL ABLATION    OTHER SURGICAL HISTORY Left 09/11/2017    Lumbar RFA at L2-3, L3-4, L4-5, L5-S1    OTHER SURGICAL HISTORY Right 10/31/2017    Lumbar RFA at L2-3, L3-4, L4-5, L5-S1    OTHER SURGICAL HISTORY Right 05/09/2018    Excision scalp mass right forehead    PACEMAKER PLACEMENT  2013??    ICD    AR EXC SKIN BENIG <5MM REMAINDR BODY Right 5/9/2018    EXCISION RIGHT FOREHEAD CYST performed by Cass Layton MD at [address] Left 6/19/2018    L-RFA @ L2-3, L3-4, L4-5, L5-S1 LEFT SIDE FIRST. performed by Jasen Schwarz MD at [address]  1980's???    TONSILLECTOMY  1980's??    VASECTOMY  ? ??     Family History   Problem Relation Age of Onset    Heart Disease Father     Coronary Art Dis Father     Heart Attack Father     High Blood Pressure Father     Parkinsonism Father      Social History     Tobacco Use    Smoking status: Never Smoker    Smokeless tobacco: Never Used   Substance Use Topics    Alcohol use:  Yes     Alcohol/week: 3.6 oz     Types: 6 Standard drinks or equivalent per week     Comment: social      Current Outpatient Medications   Medication Sig Dispense Refill    torsemide (DEMADEX) 20 MG tablet Take 1.5 tabs daily 45 tablet 3    carvedilol (COREG) 6.25 MG tablet Take 1 tablet by mouth 2 times daily 180 tablet 1    fluticasone (FLONASE) 50 MCG/ACT nasal spray 1 spray by Each Nare route daily 1 Bottle 0    albuterol sulfate HFA (PROVENTIL HFA) 108 (90 Base) MCG/ACT inhaler Inhale 2 puffs into the lungs every 6 hours as needed for Wheezing 1 Inhaler 3    losartan (COZAAR) 25 MG tablet Take 1 tablet by mouth daily 90 tablet 3    potassium chloride (KLOR-CON M) 20 MEQ extended release tablet TAKE 1 TABLET TWICE DAILY 180 tablet 3    spironolactone (ALDACTONE) 25 MG tablet TAKE 1 TABLET EVERY DAY 90 tablet 3    VIAGRA 50 MG tablet as needed       allopurinol (ZYLOPRIM) 300 MG tablet Take 300 mg by mouth daily. No current facility-administered medications for this visit. No Known Allergies  Health Maintenance   Topic Date Due    Hepatitis C screen  1960    Pneumococcal 0-64 years Vaccine (1 of 1 - PPSV23) 05/26/1964    HIV screen  05/26/1973    Lipid screen  05/26/1998    Diabetes screen  05/26/1998    Shingles Vaccine (1 of 2) 05/26/2008    Colon cancer screen colonoscopy  05/26/2008    Flu vaccine (Season Ended) 09/01/2019    Potassium monitoring  03/29/2020    Creatinine monitoring  03/29/2020    DTaP/Tdap/Td vaccine (2 - Td) 07/18/2028       Subjective:  Review of Systems  General:   No fever, no chills, No fatigue or weight loss  Pulmonary:    No dyspnea, no wheezing  Cardiac:    Denies recent chest pain,   GI:     No nausea or vomiting, no abdominal pain  Neuro:    No dizziness or light headedness,   Musculoskeletal:  No recent active issues  Extremities:   No edema, good peripheral pulses      Objective:  Physical Exam  /64   Pulse 80   Ht 5' 9\" (1.753 m)   Wt 257 lb (116.6 kg)   BMI 37.95 kg/m²   General:   Well developed, well nourished  Lungs:   Clear to auscultation  Heart:    Normal S1 S2, Slight murmur. no rubs, no gallops  Abdomen:   Soft, non tender, no organomegalies, positive bowel sounds  Extremities:   No edema, no cyanosis, good peripheral pulses  Neurological:   Awake, alert, oriented. No obvious focal deficits  Musculoskelatal:  No obvious deformities    Assessment:      Diagnosis Orders   1. Dilated cardiomyopathy ([address]     2.  Essential hypertension     3. ICD (implantable cardioverter-defibrillator) in place     cardiac fair for now      Plan:  No follow-ups on file. As above  Continue risk factor modification and medical management  Thank you for allowing me to participate in the care of your patient. Please don't hesitate to contact me regarding any further issues related to the patient care    Orders Placed:  No orders of the defined types were placed in this encounter. Medications Prescribed:  No orders of the defined types were placed in this encounter. Discussed use, benefit, and side effects of prescribed medications. All patient questions answered. Pt voicedunderstanding. Instructed to continue current medications, diet and exercise. Continue risk factor modification and medical management. Patient agreed with treatment plan. Follow up as directed.     Electronically signedby Gama Rahman MD on 4/26/2019 at 11:22 AM

## 2019-05-08 ENCOUNTER — OFFICE VISIT (OUTPATIENT)
Dept: DERMATOLOGY | Age: 59
End: 2019-05-08
Payer: MEDICARE

## 2019-05-08 VITALS — BODY MASS INDEX: 37.45 KG/M2 | WEIGHT: 253.6 LBS

## 2019-05-08 DIAGNOSIS — L82.1 SEBORRHEIC KERATOSES: Primary | ICD-10-CM

## 2019-05-08 DIAGNOSIS — R20.9 DISTURBANCE OF SKIN SENSATION: ICD-10-CM

## 2019-05-08 PROCEDURE — 11306 SHAVE SKIN LESION 0.6-1.0 CM: CPT | Performed by: DERMATOLOGY

## 2019-05-08 NOTE — PROGRESS NOTES
5/8/2019    Subjective   CC:  Chief Complaint   Patient presents with    Lesion(s)     Lt. knee     HPI:  Pt is a 59 y.o. male with a lesion on outer Lt. Knee  Longstanding Hx. Changing in appearance, enlarging in size  Picks area to remove and then area comes back and is bigger in size  +itching, +Tenderness  No treatment to area    Sunscreen: no SPF 50+  Burns: no  Tanning bed use: no  Melanoma family or personal hx: no  NMSC family or personal hx: no  H/o eczema: no  H/o psoriasis: no         Past Medical Hx:   Past Medical History:   Diagnosis Date    Cardiomyopathy, dilated ([address]     VIRAL    Claustrophobia     Congestive heart failure (CHF) (HCC)     Hyperlipidemia     Medtronic dual ICD 2/14/2014    Osteoarthritis     Osteoarthritis of spine with radiculopathy, lumbar region 12/28/2015    Spinal stenosis     Unspecified sleep apnea     unable to wear CPAP     Past Social Hx:  Social History     Tobacco Use    Smoking status: Never Smoker    Smokeless tobacco: Never Used   Substance Use Topics    Alcohol use: Yes     Alcohol/week: 3.6 oz     Types: 6 Standard drinks or equivalent per week     Comment: social     Past Family Hx:   Family History   Problem Relation Age of Onset    Heart Disease Father     Coronary Art Dis Father     Heart Attack Father     High Blood Pressure Father     Parkinsonism Father        ROS: yes seasonal allergies, no fever, no  easy bruisability and/or prolonged bleeding after procedures, no joint pain, no itching, no new lesions       Physical Examination:  Gen: alert,  Nad, normal mood/affect  Examination was performed of the following:   psych/neuro, scalp/hair, head/face, conjunctivae/eyelids, gums/teeth/lips, neck, breast/axilla/chest, abdomen, back, RUE, LUE, RLE, LLE, nails/digits, oral mucosa/tongue, genitalia/groin/buttocks, peripheral vascular and lymphatic     8mm plaque Lt.  Knee    Assessment/Plan  1-Seborrheic Keratosis, other DDX: DF, Prurigo  Due to

## 2019-05-08 NOTE — TELEPHONE ENCOUNTER
Finally got a hold of pt to increase torsemide to 40 daily and LCL 20 BID, he wants to know when you want labs?

## 2019-05-08 NOTE — PATIENT INSTRUCTIONS
Vaseline, and a nonstick bandage. · Check all the skin on your body once a month for skin growths or other changes, such as color and feel of the skin. ?  front of a full-length mirror. Look carefully at the front and back of your body. Then look at your right and left sides with your arms raised. ? Bend your elbows and look carefully at your forearms, the back of your upper arms, and your palms. ? Look at your feet, the soles of your feet, and the spaces between your toes. ? Use a hand mirror to look at the back of your legs, the back of your neck, and your back, rear end (buttocks), and genital area. Part the hair on your head to look at your scalp. · If you see a change in a skin growth, contact your doctor. Look for:  ? A mole that bleeds. ? A fast-growing mole. ? A scaly or crusted growth on the skin. ? A sore that will not heal.  When should you call for help? Call your doctor now or seek immediate medical care if:    · You have an area of normal skin that suddenly changes in shape, size, or how it looks.     · Your skin is badly broken from scratching.     · You have signs of infection such as:  ? Pain, warmth, or swelling in your skin. ? Red streaks near a wound in your skin. ? Pus coming from a wound in your skin. ? A fever not due to the flu or other illness.    Watch closely for changes in your health, and be sure to contact your doctor if:    · You do not get better as expected. Where can you learn more? Go to [email or web address]://[email or web address]eb.Storem[employer]. org and sign in to your [employer] account. Enter B518 in the [facility] box to learn more about \"Seborrheic Keratosis: Care Instructions. \"     If you do not have an account, please click on the \"Sign Up Now\" link. Current as of: April 17, 2018  Content Version: 12.0  © 3962-1146 [employer] Inc[employer]. Care instructions adapted under license by [facility] [place] ([facility]).  If you have questions about a medical condition or this

## 2019-05-14 DIAGNOSIS — L82.1 SEBORRHEIC KERATOSIS: ICD-10-CM

## 2019-05-14 DIAGNOSIS — D23.9 DERMATOFIBROMA: ICD-10-CM

## 2019-05-14 PROCEDURE — 88305 TISSUE EXAM BY PATHOLOGIST: CPT | Performed by: DERMATOLOGY

## 2019-05-14 NOTE — PROGRESS NOTES
Pathology read on 5/14/2019    [id number] x 1 entered into charge capture  Derm Clinical staff Shahla Nunez MD FAAD   Board-Certified Dermatologist

## 2019-05-15 ENCOUNTER — PROCEDURE VISIT (OUTPATIENT)
Dept: CARDIOLOGY CLINIC | Age: 59
End: 2019-05-15
Payer: MEDICARE

## 2019-05-15 DIAGNOSIS — Z95.810 AUTOMATIC IMPLANTABLE CARDIOVERTER-DEFIBRILLATOR IN SITU: ICD-10-CM

## 2019-05-15 DIAGNOSIS — I50.30 DIASTOLIC CONGESTIVE HEART FAILURE, UNSPECIFIED HF CHRONICITY (HCC): Primary | ICD-10-CM

## 2019-05-15 PROCEDURE — 93297 REM INTERROG DEV EVAL ICPMS: CPT | Performed by: NUCLEAR MEDICINE

## 2019-05-15 PROCEDURE — 93299 PR REM INTERROG ICPMS/SCRMS <30 D TECH REVIEW: CPT | Performed by: NUCLEAR MEDICINE

## 2019-05-15 NOTE — PROGRESS NOTES
5.3 years on device     5/13/19  11 beats VT   5/11/19   12 Beats VT   5/11/19 9 beats VT   optivol WNL

## 2019-05-16 ENCOUNTER — TELEPHONE (OUTPATIENT)
Dept: DERMATOLOGY | Age: 59
End: 2019-05-16

## 2019-05-21 DIAGNOSIS — I50.22 CHF (CONGESTIVE HEART FAILURE), NYHA CLASS III, CHRONIC, SYSTOLIC (HCC): ICD-10-CM

## 2019-05-21 RX ORDER — CARVEDILOL 6.25 MG/1
6.25 TABLET ORAL 2 TIMES DAILY WITH MEALS
Qty: 60 TABLET | Refills: 1 | Status: SHIPPED | OUTPATIENT
Start: 2019-05-21 | End: 2019-07-29 | Stop reason: SDUPTHER

## 2019-05-29 LAB
ANION GAP SERPL CALCULATED.3IONS-SCNC: 10 MMOL/L (ref 4–12)
BUN BLDV-MCNC: 19 MG/DL (ref 5–27)
CALCIUM SERPL-MCNC: 9.4 MG/DL (ref 8.5–10.5)
CHLORIDE BLD-SCNC: 96 MMOL/L (ref 98–109)
CO2: 29 MMOL/L (ref 22–32)
CREAT SERPL-MCNC: 1.32 MG/DL (ref 0.6–1.3)
EGFR AFRICAN AMERICAN: >60 ML/MIN/1.73SQ.M
EGFR IF NONAFRICAN AMERICAN: 55 ML/MIN/1.73SQ.M
GLUCOSE: 89 MG/DL (ref 65–99)
POTASSIUM SERPL-SCNC: 4.3 MMOL/L (ref 3.5–5)
SODIUM BLD-SCNC: 135 MMOL/L (ref 134–146)

## 2019-06-04 RX ORDER — POTASSIUM CHLORIDE 20 MEQ/1
TABLET, EXTENDED RELEASE ORAL
Qty: 180 TABLET | Refills: 3 | Status: SHIPPED | OUTPATIENT
Start: 2019-06-04 | End: 2019-09-09 | Stop reason: SDUPTHER

## 2019-06-04 RX ORDER — SPIRONOLACTONE 25 MG/1
TABLET ORAL
Qty: 90 TABLET | Refills: 3 | Status: SHIPPED | OUTPATIENT
Start: 2019-06-04 | End: 2019-09-09

## 2019-06-05 ENCOUNTER — OFFICE VISIT (OUTPATIENT)
Dept: PHYSICAL MEDICINE AND REHAB | Age: 59
End: 2019-06-05
Payer: MEDICARE

## 2019-06-05 ENCOUNTER — PATIENT MESSAGE (OUTPATIENT)
Dept: PHYSICAL MEDICINE AND REHAB | Age: 59
End: 2019-06-05

## 2019-06-05 VITALS
DIASTOLIC BLOOD PRESSURE: 79 MMHG | WEIGHT: 253 LBS | BODY MASS INDEX: 37.47 KG/M2 | HEART RATE: 73 BPM | SYSTOLIC BLOOD PRESSURE: 139 MMHG | HEIGHT: 69 IN

## 2019-06-05 DIAGNOSIS — M47.816 SPONDYLOSIS OF LUMBAR REGION WITHOUT MYELOPATHY OR RADICULOPATHY: ICD-10-CM

## 2019-06-05 DIAGNOSIS — M47.812 SPONDYLOSIS OF CERVICAL REGION WITHOUT MYELOPATHY OR RADICULOPATHY: ICD-10-CM

## 2019-06-05 DIAGNOSIS — M96.1 LUMBAR POST-LAMINECTOMY SYNDROME: ICD-10-CM

## 2019-06-05 DIAGNOSIS — M46.1 SACROILIAC INFLAMMATION (HCC): Primary | ICD-10-CM

## 2019-06-05 DIAGNOSIS — G89.4 CHRONIC PAIN SYNDROME: ICD-10-CM

## 2019-06-05 PROCEDURE — 1036F TOBACCO NON-USER: CPT | Performed by: PAIN MEDICINE

## 2019-06-05 PROCEDURE — G8427 DOCREV CUR MEDS BY ELIG CLIN: HCPCS | Performed by: PAIN MEDICINE

## 2019-06-05 PROCEDURE — G8417 CALC BMI ABV UP PARAM F/U: HCPCS | Performed by: PAIN MEDICINE

## 2019-06-05 PROCEDURE — 99213 OFFICE O/P EST LOW 20 MIN: CPT | Performed by: PAIN MEDICINE

## 2019-06-05 PROCEDURE — 3017F COLORECTAL CA SCREEN DOC REV: CPT | Performed by: PAIN MEDICINE

## 2019-06-05 ASSESSMENT — ENCOUNTER SYMPTOMS
PHOTOPHOBIA: 0
COUGH: 0
NAUSEA: 0
WHEEZING: 0
EYE PAIN: 0
DIARRHEA: 0
COLOR CHANGE: 0
ABDOMINAL PAIN: 0
RHINORRHEA: 0
BACK PAIN: 1
SHORTNESS OF BREATH: 0
CHEST TIGHTNESS: 0
CONSTIPATION: 0
SINUS PRESSURE: 0
VOMITING: 0
SORE THROAT: 0

## 2019-06-05 NOTE — PROGRESS NOTES
[facility]  [address] [facility] [address]  Dept: [phone]  Dept Fax: [phone]: [phone]    Visit Date: 6/5/2019    Functionality Assessment/Goals Worksheet     On a scale of 0 (Does not Interfere) to 10 (Completely Interferes)     1. Which number describes how during the past week pain has interfered with       the following:  A. General Activity:  4  B. Mood: 5  C. Walking Ability:  6  D. Normal Work (Includes both work outside the home and housework):  0  E. Relations with Other People:   7  F. Sleep:   4  G. Enjoyment of Life:   4    2. Patient Prefers to Take their Pain Medications:     []  On a regular basis   [x]  Only when necessary    []  Does not take pain medications    3. What are the Patient's Goals/Expectations for Visiting Pain Management? [x]  Learn about my pain    []  Receive Medication   []  Physical Therapy     []  Treat Depression   []  Receive Injections    []  Treat Sleep   []  Deal with Anxiety and Stress   []  Treat Opoid Dependence/Addiction   []  Other:      HPI:   Sean Courtney is a 62 y.o. male is here today for    Chief Complaint: Low back pain , Left SI pain, Neck pain    HPI      S/P Left L-Facet RFA L2-3,L3-4,L4-5 and L5-S1 on 04/11/19 helped 80%. States pain is at left SI pain. Medications reviewed. The patienthas No Known Allergies. Past Medical History  Dm Goldsmith  has a past medical history of Cardiomyopathy, dilated ([address] Claustrophobia, Congestive heart failure (CHF) ([address] Hyperlipidemia, Medtronic dual ICD, Osteoarthritis, Osteoarthritis of spine with radiculopathy, lumbar region, Spinal stenosis, and Unspecified sleep apnea. Past Surgical History  The patient  has a past surgical history that includes cardiovascular stress test (09/2013); doppler echocardiography (09/2013); doppler echocardiography (09/2013);  Nasal sinus surgery; Carpal tunnel release (Right); Colonoscopy; back surgery (8/26/2014); Endoscopy, colon, diagnostic; Vasectomy (???); sinus surgery (1980's???); other surgical history (10/9/2015); Neck surgery (2015); back surgery (2013); Tonsillectomy (1980's??); hernia repair (???); pacemaker placement (2013?? ); other surgical history (01/18/2016); other surgical history (2-8-2016); other surgical history (N/A, 03-21-16); other surgical history (Bilateral, 05-16-16); other surgical history (08/01/2016); cervical fusion (05/17/2017); other surgical history (Left, 09/11/2017); Lumbar spine surgery (Left, 9/11/2017); other surgical history (Right, 10/31/2017); Lumbar spine surgery (Right, 10/31/2017); other surgical history (Right, 05/09/2018); pr exc skin benig <5mm remaindr body (Right, 5/9/2018); Cardiac catheterization (10/2013); Cardiac defibrillator placement (2013???); pr inject rx other periph nerve (Left, 6/19/2018); and Lumbar spine surgery (Left, 4/11/2019). Family History  This patient's family history includes Coronary Art Dis in his father; Heart Attack in his father; Heart Disease in his father; High Blood Pressure in his father; Parkinsonism in his father. Social History  Cresencio  reports that he has never smoked. He has never used smokeless tobacco. He reports that he drinks about 3.6 oz of alcohol per week. He reports that he does not use drugs.     Medications    Current Outpatient Medications:     spironolactone (ALDACTONE) 25 MG tablet, TAKE 1 TABLET EVERY DAY, Disp: 90 tablet, Rfl: 3    potassium chloride (KLOR-CON M) 20 MEQ extended release tablet, TAKE 1 TABLET TWICE DAILY, Disp: 180 tablet, Rfl: 3    carvedilol (COREG) 6.25 MG tablet, Take 1 tablet by mouth 2 times daily (with meals), Disp: 60 tablet, Rfl: 1    torsemide (DEMADEX) 20 MG tablet, Take 1.5 tabs daily, Disp: 45 tablet, Rfl: 3    fluticasone (FLONASE) 50 MCG/ACT nasal spray, 1 spray by Each Nare route daily, Disp: 1 Bottle, Rfl: 0   albuterol sulfate HFA (PROVENTIL HFA) 108 (90 Base) MCG/ACT inhaler, Inhale 2 puffs into the lungs every 6 hours as needed for Wheezing, Disp: 1 Inhaler, Rfl: 3    losartan (COZAAR) 25 MG tablet, Take 1 tablet by mouth daily, Disp: 90 tablet, Rfl: 3    VIAGRA 50 MG tablet, as needed , Disp: , Rfl:     allopurinol (ZYLOPRIM) 300 MG tablet, Take 300 mg by mouth daily. , Disp: , Rfl:     Subjective:      Review of Systems   Constitutional: Negative for activity change, appetite change, chills, diaphoresis, fatigue, fever and unexpected weight change. HENT: Negative for congestion, ear pain, hearing loss, mouth sores, nosebleeds, rhinorrhea, sinus pressure and sore throat. Eyes: Negative for photophobia, pain and visual disturbance. Respiratory: Negative for cough, chest tightness, shortness of breath and wheezing. Cardiovascular: Negative for chest pain and palpitations. Gastrointestinal: Negative for abdominal pain, constipation, diarrhea, nausea and vomiting. Endocrine: Negative for cold intolerance, heat intolerance, polydipsia, polyphagia and polyuria. Genitourinary: Negative for decreased urine volume, difficulty urinating, frequency and hematuria. Musculoskeletal: Positive for arthralgias, back pain, gait problem and neck pain. Negative for joint swelling, myalgias and neck stiffness. Skin: Negative for color change and rash. Allergic/Immunologic: Negative for food allergies and immunocompromised state. Neurological: Positive for numbness. Negative for dizziness, tremors, seizures, syncope, facial asymmetry, speech difficulty, weakness, light-headedness and headaches. Hematological: Does not bruise/bleed easily. Psychiatric/Behavioral: Positive for sleep disturbance. Negative for agitation, behavioral problems, confusion, decreased concentration, dysphoric mood, hallucinations, self-injury and suicidal ideas. The patient is nervous/anxious. The patient is not hyperactive. Objective:     Vitals:    06/05/19 1342   BP: 139/79   Site: Left Upper Arm   Position: Sitting   Cuff Size: Large Adult   Pulse: 73   Weight: 253 lb (114.8 kg)   Height: 5' 9\" (1.753 m)       Physical Exam   Constitutional: He is oriented to person, place, and time. He appears well-developed and well-nourished. No distress. HENT:   Head: Normocephalic and atraumatic. Right Ear: External ear normal.   Left Ear: External ear normal.   Nose: Nose normal.   Mouth/Throat: Oropharynx is clear and moist. No oropharyngeal exudate. Eyes: Pupils are equal, round, and reactive to light. Conjunctivae and EOM are normal. Right eye exhibits no discharge. Left eye exhibits no discharge. No scleral icterus. Neck: Normal range of motion and full passive range of motion without pain. Neck supple. No muscular tenderness present. No neck rigidity. No edema, no erythema and normal range of motion present. No thyromegaly present. Cardiovascular: Normal rate, regular rhythm, normal heart sounds and intact distal pulses. Exam reveals no gallop and no friction rub. No murmur heard. Pulmonary/Chest: Effort normal and breath sounds normal. No respiratory distress. He has no wheezes. He has no rales. He exhibits no tenderness. Abdominal: Soft. Bowel sounds are normal. He exhibits no distension. There is no tenderness. There is no rebound and no guarding. Musculoskeletal:        Right knee: He exhibits normal range of motion. No tenderness found. Left knee: He exhibits normal range of motion. No tenderness found. Right ankle: He exhibits normal range of motion and no swelling. Left ankle: He exhibits normal range of motion and no swelling. Cervical back: He exhibits decreased range of motion, tenderness and pain. Thoracic back: He exhibits tenderness. Lumbar back: He exhibits decreased range of motion, tenderness and pain.         Back:         Right lower leg: He exhibits no tenderness. Left lower leg: He exhibits no tenderness. Right foot: There is no tenderness. Left foot: There is no tenderness. Neurological: He is alert and oriented to person, place, and time. He has normal strength and normal reflexes. He is not disoriented. He displays no atrophy. No cranial nerve deficit or sensory deficit. He exhibits normal muscle tone. He displays a negative Romberg sign. Coordination and gait normal. He displays no Babinski's sign on the right side. He displays no Babinski's sign on the left side. Skin: Skin is warm. No rash noted. He is not diaphoretic. No erythema. No pallor. Psychiatric: His speech is normal and behavior is normal. Judgment normal. His mood appears not anxious. His affect is blunt. His affect is not angry, not labile and not inappropriate. He is not agitated, not aggressive, not hyperactive, not slowed, not withdrawn, not actively hallucinating and not combative. Thought content is not paranoid and not delusional. Cognition and memory are normal. Cognition and memory are not impaired. He does not express impulsivity or inappropriate judgment. He does not exhibit a depressed mood. He expresses no homicidal and no suicidal ideation. He expresses no suicidal plans and no homicidal plans. He exhibits normal recent memory and normal remote memory. He is attentive. Nursing note and vitals reviewed. SHREE test: left pos  Yeomans test: left pos  Gaenslen test: left pos     Assessment:     1. Sacroiliac inflammation (HCC)    2. Spondylosis of lumbar region without myelopathy or radiculopathy    3. Lumbar post-laminectomy syndrome    4. Spondylosis of cervical region without myelopathy or radiculopathy    5. Chronic pain syndrome            Plan:      · OARRS reviewed. Current MED: 0  · Patient was not offered naloxone for home. · Discussed long term side effects of medications, tolerance, dependency and addiction.   · Previous UDS reviewed  · UDS preformed today for compliance. · Patient told can not receive any pain medications from any other source. · No evidence of abuse, diversion or aberrant behavior.  Medications and/or procedures to improve function and quality of life- patient understanding with this and that may not be pain free   Discussed with patient about safe storage of medications at home   Discussed possible weaning of medication dosing dependent on treatment/procedure results.  Testing: Reviewed   Procedures: Left SI Injection   Discussed with patient about risks with procedure including infection, reaction to medication, increased pain, or bleeding.  Medications:Reviewed. Meds. Prescribed:   No orders of the defined types were placed in this encounter. Return for Left SI injection.          Electronically signed by Jasen Schwarz MD on6/5/2019 at 2:14 PM

## 2019-06-06 ENCOUNTER — TELEPHONE (OUTPATIENT)
Dept: OPERATING ROOM | Age: 59
End: 2019-06-06

## 2019-06-06 RX ORDER — MELOXICAM 15 MG/1
30 TABLET ORAL DAILY
Qty: 30 TABLET | Refills: 0 | Status: SHIPPED | OUTPATIENT
Start: 2019-06-06 | End: 2019-07-31

## 2019-06-06 RX ORDER — LIDOCAINE 50 MG/G
1 PATCH TOPICAL DAILY
Qty: 30 PATCH | Refills: 0 | Status: SHIPPED | OUTPATIENT
Start: 2019-06-06 | End: 2019-07-31

## 2019-06-06 NOTE — TELEPHONE ENCOUNTER
Patient has SI injection scheduled for 07/02/2019.  Seen in office yesterday 06/05/2019    Please advise

## 2019-06-06 NOTE — TELEPHONE ENCOUNTER
From: Enoch Marlow  To: Albert Sánchez MD  Sent: 6/5/2019 7:08 PM EDT  Subject: Visit Follow-Up Question    I asked about getting an anti inflammatory med to see if it would have with my pain but didnt get question answered. Also wanted to ask about lidocaine patches. I had them a few years ago. [address], if approved.

## 2019-06-06 NOTE — TELEPHONE ENCOUNTER
Lidoderm patches were denied by insurance, see reason below. You asked for the drug listed above for your sacroiliitis Pain. [employer] follows Medicare rules. The Medicare rule in the Prescription Drug Manual (Chapter 6, Section 10.6) says an off-label use of a drug is a use that is not included on the drugs label as approved by the U.S. Food and Drug Administration (FDA). FDA approved drugs used for a disease other than what is on the official label may be covered under Medicare if [employer] determines the use to be medically accepted. Off-label use is medically accepted when there is evidence in one or more of the compendia that it works for the disease in question. [employer] has determined that the off-label use of this drug for your condition is not medically accepted per Medicare rules and isn't covered based on available information.     Please advise

## 2019-06-19 ENCOUNTER — TELEPHONE (OUTPATIENT)
Dept: CARDIOLOGY CLINIC | Age: 59
End: 2019-06-19

## 2019-06-19 ENCOUNTER — PROCEDURE VISIT (OUTPATIENT)
Dept: CARDIOLOGY CLINIC | Age: 59
End: 2019-06-19
Payer: MEDICARE

## 2019-06-19 DIAGNOSIS — I50.20 SYSTOLIC CONGESTIVE HEART FAILURE, UNSPECIFIED HF CHRONICITY (HCC): Primary | ICD-10-CM

## 2019-06-19 DIAGNOSIS — Z95.810 AUTOMATIC IMPLANTABLE CARDIOVERTER-DEFIBRILLATOR IN SITU: ICD-10-CM

## 2019-06-19 PROCEDURE — 93299 PR REM INTERROG ICPMS/SCRMS <30 D TECH REVIEW: CPT | Performed by: NUCLEAR MEDICINE

## 2019-06-19 PROCEDURE — 93297 REM INTERROG DEV EVAL ICPMS: CPT | Performed by: NUCLEAR MEDICINE

## 2019-06-19 RX ORDER — TORSEMIDE 20 MG/1
40 TABLET ORAL DAILY
Qty: 60 TABLET | Refills: 3 | Status: SHIPPED | OUTPATIENT
Start: 2019-06-19 | End: 2019-06-20

## 2019-06-19 NOTE — TELEPHONE ENCOUNTER
Mariposa Cook RN         Systolic congestive heart failure, unspecified HF chronicity ([facility] [address] +1 more       Dx        Progress Notes        5.2 years on device   5/16/19 VT successfully treated with one round ATP  6/15/19  6 beats VT   optivol elevated      Call to pt, he was not aware of ATP in May. States he attended a wedding this weekend and [place] and drank things I normally do not\"  Normal weight is 250, he was 255 on Monday and 253 yesterday. Has not yet weighed this morning. SOB \"no worse than usual\", states his pants \"always fit tight\"  Denies pedal edema. Is taking spironolactone 25 mg daily and demadex 20mg 2 tabs daily-new Rx sent to [facility] today, states dose was recently increased and he is running out of pills. He is aware of his appt with Eleonora Melgar tomorrow at 9.

## 2019-06-19 NOTE — PROGRESS NOTES
5.2 years on device   5/16/19 VT successfully treated with one round ATP  6/15/19  6 beats VT   optivol elevated     Call to pt, he was not aware of ATP in May. States he attended a wedding this weekend and [place] and drank things I normally do not\"  Normal weight is 250, he was 255 on Monday and 253 yesterday. Has not yet weighed this morning. SOB \"no worse than usual\", states his pants \"always fit tight\"  Denies pedal edema. Is taking spironolactone 25 mg daily and demadex 20mg 2 tabs daily-new Rx sent to [facility] today, states dose was recently increased and he is running out of pills. He is aware of his appt with Yeny Garibay tomorrow at 9.

## 2019-06-20 ENCOUNTER — OFFICE VISIT (OUTPATIENT)
Dept: CARDIOLOGY CLINIC | Age: 59
End: 2019-06-20
Payer: MEDICARE

## 2019-06-20 ENCOUNTER — PREP FOR PROCEDURE (OUTPATIENT)
Dept: PHYSICAL MEDICINE AND REHAB | Age: 59
End: 2019-06-20

## 2019-06-20 VITALS
SYSTOLIC BLOOD PRESSURE: 126 MMHG | OXYGEN SATURATION: 98 % | WEIGHT: 254 LBS | BODY MASS INDEX: 37.51 KG/M2 | DIASTOLIC BLOOD PRESSURE: 72 MMHG | HEART RATE: 87 BPM

## 2019-06-20 DIAGNOSIS — I50.22 CHF (CONGESTIVE HEART FAILURE), NYHA CLASS III, CHRONIC, SYSTOLIC (HCC): Primary | ICD-10-CM

## 2019-06-20 LAB
ANION GAP SERPL CALCULATED.3IONS-SCNC: 18 MEQ/L (ref 8–16)
BUN BLDV-MCNC: 24 MG/DL (ref 7–22)
CALCIUM SERPL-MCNC: 10 MG/DL (ref 8.5–10.5)
CHLORIDE BLD-SCNC: 94 MEQ/L (ref 98–111)
CO2: 26 MEQ/L (ref 23–33)
CREAT SERPL-MCNC: 1.3 MG/DL (ref 0.4–1.2)
GFR SERPL CREATININE-BSD FRML MDRD: 56 ML/MIN/1.73M2
GLUCOSE BLD-MCNC: 120 MG/DL (ref 70–108)
MAGNESIUM: 2.1 MG/DL (ref 1.6–2.4)
POTASSIUM SERPL-SCNC: 4.2 MEQ/L (ref 3.5–5.2)
PRO-BNP: 923 PG/ML (ref 0–900)
SODIUM BLD-SCNC: 138 MEQ/L (ref 135–145)

## 2019-06-20 PROCEDURE — 36415 COLL VENOUS BLD VENIPUNCTURE: CPT | Performed by: NURSE PRACTITIONER

## 2019-06-20 PROCEDURE — G8427 DOCREV CUR MEDS BY ELIG CLIN: HCPCS | Performed by: NURSE PRACTITIONER

## 2019-06-20 PROCEDURE — 3017F COLORECTAL CA SCREEN DOC REV: CPT | Performed by: NURSE PRACTITIONER

## 2019-06-20 PROCEDURE — 1036F TOBACCO NON-USER: CPT | Performed by: NURSE PRACTITIONER

## 2019-06-20 PROCEDURE — 99213 OFFICE O/P EST LOW 20 MIN: CPT | Performed by: NURSE PRACTITIONER

## 2019-06-20 PROCEDURE — G8417 CALC BMI ABV UP PARAM F/U: HCPCS | Performed by: NURSE PRACTITIONER

## 2019-06-20 RX ORDER — TORSEMIDE 20 MG/1
60 TABLET ORAL DAILY
Qty: 90 TABLET | Refills: 5 | Status: SHIPPED | OUTPATIENT
Start: 2019-06-20 | End: 2019-07-09

## 2019-06-20 RX ORDER — METOLAZONE 5 MG/1
2.5 TABLET ORAL DAILY PRN
Qty: 15 TABLET | Refills: 0 | Status: SHIPPED | OUTPATIENT
Start: 2019-06-20 | End: 2020-01-29 | Stop reason: SDUPTHER

## 2019-06-20 ASSESSMENT — ENCOUNTER SYMPTOMS
COUGH: 0
SHORTNESS OF BREATH: 1
ABDOMINAL DISTENTION: 1

## 2019-06-20 NOTE — PATIENT INSTRUCTIONS
You may receive a survey regarding the care you received during your visit. Your input is valuable to us. We encourage you to complete and return your survey. We hope you will choose us in the future for your healthcare needs. Continue:  · Continue current medications  · Daily weights and record  · Fluid restriction of 2 Liters per day  · Limit sodium in diet to around 2244-0084 mg/day  · Monitor BP  · Activity as tolerated     Call the [facility] for any of the following symptoms: [phone]   Weight gain of 2-3 pounds in 1 day or 5 pounds in 1 week   Increased shortness of breath   Shortness of breath while laying down   Cough   Chest pain   Swelling in feet, ankles or legs   Tenderness or bloating in the abdomen   Fatigue    Decreased appetite or nausea    Confusion     Take 1/2 tab of Metolazone x 3 days. Increase Torsemide to 60 mg/day.   Get blood work in 2-3 weeks

## 2019-06-20 NOTE — H&P
Enoch Marlow is a 62 y.o. male is here today for     Chief Complaint:  Left SI pain          The patienthas No Known Allergies. Past Medical History  Ric Peter  has a past medical history of Cardiomyopathy, dilated ([address] Claustrophobia, Congestive heart failure (CHF) ([address] Hyperlipidemia, Medtronic dual ICD, Osteoarthritis, Osteoarthritis of spine with radiculopathy, lumbar region, Spinal stenosis, and Unspecified sleep apnea. Past Surgical History  The patient  has a past surgical history that includes cardiovascular stress test (09/2013); doppler echocardiography (09/2013); doppler echocardiography (09/2013); Nasal sinus surgery; Carpal tunnel release (Right); Colonoscopy; back surgery (8/26/2014); Endoscopy, colon, diagnostic; Vasectomy (???); sinus surgery (1980's???); other surgical history (10/9/2015); Neck surgery (2015); back surgery (2013); Tonsillectomy (1980's??); hernia repair (???); pacemaker placement (2013?? ); other surgical history (01/18/2016); other surgical history (2-8-2016); other surgical history (N/A, 03-21-16); other surgical history (Bilateral, 05-16-16); other surgical history (08/01/2016); cervical fusion (05/17/2017); other surgical history (Left, 09/11/2017); Lumbar spine surgery (Left, 9/11/2017); other surgical history (Right, 10/31/2017); Lumbar spine surgery (Right, 10/31/2017); other surgical history (Right, 05/09/2018); pr exc skin benig <5mm remaindr body (Right, 5/9/2018); Cardiac catheterization (10/2013); Cardiac defibrillator placement (2013???); pr inject rx other periph nerve (Left, 6/19/2018); and Lumbar spine surgery (Left, 4/11/2019). Family History  This patient's family history includes Coronary Art Dis in his father; Heart Attack in his father; Heart Disease in his father; High Blood Pressure in his father; Parkinsonism in his father. Social History  Cresencio  reports that he has never smoked.  He has never used smokeless tobacco. He reports that he pain. Negative for joint swelling, myalgias and neck stiffness. Skin: Negative for color change and rash. Allergic/Immunologic: Negative for food allergies and immunocompromised state. Neurological: Positive for numbness. Negative for dizziness, tremors, seizures, syncope, facial asymmetry, speech difficulty, weakness, light-headedness and headaches. Hematological: Does not bruise/bleed easily. Psychiatric/Behavioral: Positive for sleep disturbance. Negative for agitation, behavioral problems, confusion, decreased concentration, dysphoric mood, hallucinations, self-injury and suicidal ideas. The patient is nervous/anxious. The patient is not hyperactive. Objective:      Vitals                                  Weight: 253 lb (114.8 kg)   Height: 5' 9\" (1.753 m)            Physical Exam   Constitutional: He is oriented to person, place, and time. He appears well-developed and well-nourished. No distress. HENT:   Head: Normocephalic and atraumatic. Right Ear: External ear normal.   Left Ear: External ear normal.   Nose: Nose normal.   Mouth/Throat: Oropharynx is clear and moist. No oropharyngeal exudate. Eyes: Pupils are equal, round, and reactive to light. Conjunctivae and EOM are normal. Right eye exhibits no discharge. Left eye exhibits no discharge. No scleral icterus. Neck: Normal range of motion and full passive range of motion without pain. Neck supple. No muscular tenderness present. No neck rigidity. No edema, no erythema and normal range of motion present. No thyromegaly present. Cardiovascular: Normal rate, regular rhythm, normal heart sounds and intact distal pulses. Exam reveals no gallop and no friction rub. No murmur heard. Pulmonary/Chest: Effort normal and breath sounds normal. No respiratory distress. He has no wheezes. He has no rales. He exhibits no tenderness. Abdominal: Soft. Bowel sounds are normal. He exhibits no distension. There is no tenderness.  There is no rebound and no guarding. Musculoskeletal:        Right knee: He exhibits normal range of motion. No tenderness found. Left knee: He exhibits normal range of motion. No tenderness found. Right ankle: He exhibits normal range of motion and no swelling. Left ankle: He exhibits normal range of motion and no swelling. Cervical back: He exhibits decreased range of motion, tenderness and pain. Thoracic back: He exhibits tenderness. Lumbar back: He exhibits decreased range of motion, tenderness and pain. Back:         Right lower leg: He exhibits no tenderness. Left lower leg: He exhibits no tenderness. Right foot: There is no tenderness. Left foot: There is no tenderness. Neurological: He is alert and oriented to person, place, and time. He has normal strength and normal reflexes. He is not disoriented. He displays no atrophy. No cranial nerve deficit or sensory deficit. He exhibits normal muscle tone. He displays a negative Romberg sign. Coordination and gait normal. He displays no Babinski's sign on the right side. He displays no Babinski's sign on the left side. Skin: Skin is warm. No rash noted. He is not diaphoretic. No erythema. No pallor. Psychiatric: His speech is normal and behavior is normal. Judgment normal. His mood appears not anxious. His affect is blunt. His affect is not angry, not labile and not inappropriate. He is not agitated, not aggressive, not hyperactive, not slowed, not withdrawn, not actively hallucinating and not combative. Thought content is not paranoid and not delusional. Cognition and memory are normal. Cognition and memory are not impaired. He does not express impulsivity or inappropriate judgment. He does not exhibit a depressed mood. He expresses no homicidal and no suicidal ideation. He expresses no suicidal plans and no homicidal plans. He exhibits normal recent memory and normal remote memory.  He is attentive. Nursing note and vitals reviewed. SHREE test: left pos  Yeomans test: left pos  Gaenslen test: left pos  Assessment:      1. Sacroiliac inflammation (HCC)    2. Spondylosis of lumbar region without myelopathy or radiculopathy    3. Lumbar post-laminectomy syndrome    4. Spondylosis of cervical region without myelopathy or radiculopathy    5. Chronic pain syndrome          Plan:  Left SI injection.        Huber Hernández, KRIS - CNP  6/20/2019

## 2019-06-20 NOTE — PROGRESS NOTES
Smoker    Smokeless tobacco: Never Used   Substance Use Topics    Alcohol use: Yes     Alcohol/week: 3.6 oz     Types: 6 Standard drinks or equivalent per week     Comment: social     Current Outpatient Medications   Medication Sig Dispense Refill    torsemide (DEMADEX) 20 MG tablet Take 3 tablets by mouth daily 90 tablet 5    metolazone (ZAROXOLYN) 5 MG tablet Take 0.5 tablets by mouth daily as needed (as directed by HF clinic) 15 tablet 0    meloxicam (MOBIC) 15 MG tablet Take 2 tablets by mouth daily 30 tablet 0    lidocaine (LIDODERM) 5 % Place 1 patch onto the skin daily 12 hours on, 12 hours off. 30 patch 0    spironolactone (ALDACTONE) 25 MG tablet TAKE 1 TABLET EVERY DAY 90 tablet 3    potassium chloride (KLOR-CON M) 20 MEQ extended release tablet TAKE 1 TABLET TWICE DAILY 180 tablet 3    carvedilol (COREG) 6.25 MG tablet Take 1 tablet by mouth 2 times daily (with meals) 60 tablet 1    fluticasone (FLONASE) 50 MCG/ACT nasal spray 1 spray by Each Nare route daily 1 Bottle 0    losartan (COZAAR) 25 MG tablet Take 1 tablet by mouth daily 90 tablet 3    allopurinol (ZYLOPRIM) 300 MG tablet Take 300 mg by mouth daily.  VIAGRA 50 MG tablet as needed        No current facility-administered medications for this visit. No Known Allergies    SUBJECTIVE:   Review of Systems   Constitutional: Positive for fatigue. Negative for activity change and appetite change. Respiratory: Positive for shortness of breath (BREWER). Negative for cough. Cardiovascular: Negative for chest pain, palpitations and leg swelling. Gastrointestinal: Positive for abdominal distention. Neurological: Negative for weakness, light-headedness and headaches. Hematological: Negative for adenopathy. Psychiatric/Behavioral: Negative for sleep disturbance.        OBJECTIVE:   Today's Vitals:  /72   Pulse 87   Wt 254 lb (115.2 kg)   SpO2 98%   BMI 37.51 kg/m²     Physical Exam   Constitutional: He is oriented to person, place, and time. He appears well-developed and well-nourished. No distress. HENT:   Head: Normocephalic and atraumatic. Eyes: Conjunctivae are normal.   Neck: Normal range of motion. Neck supple. No JVD   Cardiovascular: Normal rate, regular rhythm and normal heart sounds. No murmur heard. Pulmonary/Chest: Effort normal and breath sounds normal. No respiratory distress. He has no wheezes. He has no rales. Abdominal: Soft. Bowel sounds are normal. He exhibits no distension. There is no tenderness. Musculoskeletal: Normal range of motion. He exhibits no edema. Neurological: He is alert and oriented to person, place, and time. Coordination normal.   Skin: Skin is warm and dry. He is not diaphoretic. Psychiatric: He has a normal mood and affect. His behavior is normal.   Vitals reviewed. Wt Readings from Last 3 Encounters:   06/20/19 254 lb (115.2 kg)   06/05/19 253 lb (114.8 kg)   05/08/19 253 lb 9.6 oz (115 kg)     BP Readings from Last 3 Encounters:   06/20/19 126/72   06/05/19 139/79   04/26/19 120/64     Pulse Readings from Last 3 Encounters:   06/20/19 87   06/05/19 73   04/26/19 80     Body mass index is 37.51 kg/m².     ECHO:   Conclusions      Summary   Ejection fraction is visually estimated at 25%.   There was severe global hypokinesis of the left ventricle.   Left Ventricular size is Moderately increased .      Signature      ----------------------------------------------------------------   Electronically signed by Main Cole MD (Interpreting   physician) on 12/06/2018 at 05:13 PM   ----------------------------------------------------------------    Results reviewed:  BNP: No results found for: BNP  CBC:   Lab Results   Component Value Date    WBC 9.7 11/27/2018    RBC 4.38 11/27/2018    HGB 14.0 11/27/2018    HCT 40.5 11/27/2018     11/27/2018     CMP:    Lab Results   Component Value Date     06/20/2019    K 4.2 06/20/2019    CL 94 06/20/2019    CO2 26 sodium diet, higher sodium foods to avoid and better low sodium food options. Patient verbalizes understanding of plan of care using teach back method, and is agreeable to the treatment plan.        Electronically signed by KRIS Cherry CNP on 6/21/2019 at 9:05 AM

## 2019-06-20 NOTE — PROGRESS NOTES
Venipuncture obtained from [address] [place] [address]. Patient tolerated procedure without complications or complaints.

## 2019-06-21 ENCOUNTER — TELEPHONE (OUTPATIENT)
Dept: CARDIOLOGY CLINIC | Age: 59
End: 2019-06-21

## 2019-06-21 NOTE — TELEPHONE ENCOUNTER
Please let him know labs yesterday were normal.  K+ 4.2 - Ok to continue Kcl 20 TID. BNP just slightly elevated - 923. Remind recheck labs in 2-3 weeks since increasing his Demadex.   Thanks

## 2019-06-25 NOTE — TELEPHONE ENCOUNTER
Spoke with patient and notified of results  States wt went from 254 down to 247 and back up 2 lb 249 now at his normal wt  Has c/o cramping in his legs

## 2019-07-02 ENCOUNTER — HOSPITAL ENCOUNTER (OUTPATIENT)
Age: 59
Setting detail: OUTPATIENT SURGERY
Discharge: HOME | End: 2019-07-02
Attending: PAIN MEDICINE | Admitting: PAIN MEDICINE
Payer: MEDICARE

## 2019-07-02 ENCOUNTER — APPOINTMENT (OUTPATIENT)
Dept: GENERAL RADIOLOGY | Age: 59
End: 2019-07-02
Attending: PAIN MEDICINE
Payer: MEDICARE

## 2019-07-02 ENCOUNTER — ANESTHESIA (OUTPATIENT)
Dept: OPERATING ROOM | Age: 59
End: 2019-07-02
Payer: MEDICARE

## 2019-07-02 ENCOUNTER — ANESTHESIA EVENT (OUTPATIENT)
Dept: OPERATING ROOM | Age: 59
End: 2019-07-02
Payer: MEDICARE

## 2019-07-02 VITALS
DIASTOLIC BLOOD PRESSURE: 77 MMHG | OXYGEN SATURATION: 90 % | SYSTOLIC BLOOD PRESSURE: 127 MMHG | RESPIRATION RATE: 14 BRPM

## 2019-07-02 VITALS
BODY MASS INDEX: 37.65 KG/M2 | OXYGEN SATURATION: 94 % | TEMPERATURE: 97.2 F | RESPIRATION RATE: 16 BRPM | HEART RATE: 88 BPM | DIASTOLIC BLOOD PRESSURE: 63 MMHG | WEIGHT: 254.2 LBS | HEIGHT: 69 IN | SYSTOLIC BLOOD PRESSURE: 114 MMHG

## 2019-07-02 PROCEDURE — 3700000000 HC ANESTHESIA ATTENDED CARE: Performed by: PAIN MEDICINE

## 2019-07-02 PROCEDURE — 3209999900 FLUORO FOR SURGICAL PROCEDURES

## 2019-07-02 PROCEDURE — 6360000004 HC RX CONTRAST MEDICATION: Performed by: PAIN MEDICINE

## 2019-07-02 PROCEDURE — 6360000002 HC RX W HCPCS: Performed by: NURSE ANESTHETIST, CERTIFIED REGISTERED

## 2019-07-02 PROCEDURE — 6360000002 HC RX W HCPCS: Performed by: PAIN MEDICINE

## 2019-07-02 PROCEDURE — 7100000010 HC PHASE II RECOVERY - FIRST 15 MIN: Performed by: PAIN MEDICINE

## 2019-07-02 PROCEDURE — 2709999900 HC NON-CHARGEABLE SUPPLY: Performed by: PAIN MEDICINE

## 2019-07-02 PROCEDURE — 7100000011 HC PHASE II RECOVERY - ADDTL 15 MIN: Performed by: PAIN MEDICINE

## 2019-07-02 PROCEDURE — 3600000054 HC PAIN LEVEL 3 BASE: Performed by: PAIN MEDICINE

## 2019-07-02 PROCEDURE — 27096 INJECT SACROILIAC JOINT: CPT | Performed by: PAIN MEDICINE

## 2019-07-02 PROCEDURE — 2500000003 HC RX 250 WO HCPCS: Performed by: PAIN MEDICINE

## 2019-07-02 RX ORDER — LIDOCAINE HYDROCHLORIDE 10 MG/ML
INJECTION, SOLUTION INFILTRATION; PERINEURAL PRN
Status: DISCONTINUED | OUTPATIENT
Start: 2019-07-02 | End: 2019-07-02 | Stop reason: ALTCHOICE

## 2019-07-02 RX ORDER — ROPIVACAINE HYDROCHLORIDE 2 MG/ML
INJECTION, SOLUTION EPIDURAL; INFILTRATION; PERINEURAL PRN
Status: DISCONTINUED | OUTPATIENT
Start: 2019-07-02 | End: 2019-07-02 | Stop reason: ALTCHOICE

## 2019-07-02 RX ORDER — METHYLPREDNISOLONE ACETATE 80 MG/ML
INJECTION, SUSPENSION INTRA-ARTICULAR; INTRALESIONAL; INTRAMUSCULAR; SOFT TISSUE PRN
Status: DISCONTINUED | OUTPATIENT
Start: 2019-07-02 | End: 2019-07-02 | Stop reason: ALTCHOICE

## 2019-07-02 RX ORDER — PROPOFOL 10 MG/ML
INJECTION, EMULSION INTRAVENOUS PRN
Status: DISCONTINUED | OUTPATIENT
Start: 2019-07-02 | End: 2019-07-02 | Stop reason: SDUPTHER

## 2019-07-02 RX ADMIN — PROPOFOL 100 MG: 10 INJECTION, EMULSION INTRAVENOUS at 09:02

## 2019-07-02 RX ADMIN — PROPOFOL 100 MG: 10 INJECTION, EMULSION INTRAVENOUS at 09:00

## 2019-07-02 ASSESSMENT — PULMONARY FUNCTION TESTS
PIF_VALUE: 0

## 2019-07-02 ASSESSMENT — PAIN - FUNCTIONAL ASSESSMENT: PAIN_FUNCTIONAL_ASSESSMENT: 0-10

## 2019-07-02 NOTE — INTERVAL H&P NOTE
H&P Update    Patient's History and Physical from June 20, 2019 was reviewed. Patient examined. There has been no change.     Cierra De Los Santos

## 2019-07-02 NOTE — ANESTHESIA POSTPROCEDURE EVALUATION
Department of Anesthesiology  Postprocedure Note    Patient: Shagufta Byrd  MRN: [id number]  YOB: 1960  Date of evaluation: 7/2/2019  Time:  10:33 AM     Procedure Summary     Date:  07/02/19 Room / Location:  [address]    Anesthesia Start:  0857 Anesthesia Stop:  475    Procedure:  Left SI injection (Left ) Diagnosis:  (SI)    Surgeon:  Koffi Liu MD Responsible Provider:  Maury Pillai DO    Anesthesia Type:  MAC ASA Status:  3          Anesthesia Type: MAC    Fifi Phase I:      Fifi Phase II: Fifi Score: 9    Last vitals: Reviewed and per EMR flowsheets.        Anesthesia Post Evaluation    Patient location during evaluation: PACU  Patient participation: complete - patient participated  Level of consciousness: awake and alert  Pain score: 0  Airway patency: patent  Nausea & Vomiting: no nausea and no vomiting  Complications: no  Cardiovascular status: hemodynamically stable and blood pressure returned to baseline  Respiratory status: spontaneous ventilation, room air and acceptable  Hydration status: stable

## 2019-07-08 LAB
ANION GAP SERPL CALCULATED.3IONS-SCNC: 13 MMOL/L (ref 4–12)
BUN BLDV-MCNC: 47 MG/DL (ref 5–27)
CALCIUM SERPL-MCNC: 10 MG/DL (ref 8.5–10.5)
CHLORIDE BLD-SCNC: 85 MMOL/L (ref 98–109)
CO2: 34 MMOL/L (ref 22–32)
CREAT SERPL-MCNC: 1.73 MG/DL (ref 0.6–1.3)
EGFR AFRICAN AMERICAN: 49 ML/MIN/1.73SQ.M
EGFR IF NONAFRICAN AMERICAN: 40 ML/MIN/1.73SQ.M
GLUCOSE: 97 MG/DL (ref 65–99)
POTASSIUM SERPL-SCNC: 3.4 MMOL/L (ref 3.5–5)
SODIUM BLD-SCNC: 132 MMOL/L (ref 134–146)

## 2019-07-09 ENCOUNTER — TELEPHONE (OUTPATIENT)
Dept: CARDIOLOGY CLINIC | Age: 59
End: 2019-07-09

## 2019-07-09 DIAGNOSIS — I50.22 CHF (CONGESTIVE HEART FAILURE), NYHA CLASS III, CHRONIC, SYSTOLIC (HCC): Primary | ICD-10-CM

## 2019-07-09 RX ORDER — TORSEMIDE 20 MG/1
40 TABLET ORAL DAILY
Qty: 90 TABLET | Refills: 5
Start: 2019-07-09 | End: 2019-09-30 | Stop reason: DRUGHIGH

## 2019-07-18 NOTE — TELEPHONE ENCOUNTER
Pt called back and stated he took 3 potassium tabs daily on his own starting last weekend. He also had decreased his torsemide to 40 daily. He stated he made the changes on his own last weekend due to leg cramps, he had several questions regarding a low carb and low sodium diet, I discussed low sodium diet with him and asked him if he feels he needs to be seen, he doesn't feel he needs to be seen at this time. Pt will have labs done next week, and will call us if he experiences any changes, Zones reviewed. He states he will take the medications as prescribed by BIANK[facility] WELD[facility].

## 2019-07-24 ENCOUNTER — OFFICE VISIT (OUTPATIENT)
Dept: PHYSICAL MEDICINE AND REHAB | Age: 59
End: 2019-07-24
Payer: MEDICARE

## 2019-07-24 ENCOUNTER — PROCEDURE VISIT (OUTPATIENT)
Dept: CARDIOLOGY CLINIC | Age: 59
End: 2019-07-24
Payer: MEDICARE

## 2019-07-24 VITALS
HEART RATE: 75 BPM | BODY MASS INDEX: 37.65 KG/M2 | WEIGHT: 254.19 LBS | DIASTOLIC BLOOD PRESSURE: 80 MMHG | HEIGHT: 69 IN | SYSTOLIC BLOOD PRESSURE: 110 MMHG

## 2019-07-24 DIAGNOSIS — M46.1 SACROILIAC INFLAMMATION (HCC): Primary | ICD-10-CM

## 2019-07-24 DIAGNOSIS — M47.812 SPONDYLOSIS OF CERVICAL REGION WITHOUT MYELOPATHY OR RADICULOPATHY: ICD-10-CM

## 2019-07-24 DIAGNOSIS — M96.1 LUMBAR POST-LAMINECTOMY SYNDROME: ICD-10-CM

## 2019-07-24 DIAGNOSIS — M47.816 SPONDYLOSIS OF LUMBAR REGION WITHOUT MYELOPATHY OR RADICULOPATHY: ICD-10-CM

## 2019-07-24 DIAGNOSIS — Z95.810 AUTOMATIC IMPLANTABLE CARDIOVERTER-DEFIBRILLATOR IN SITU: Primary | ICD-10-CM

## 2019-07-24 DIAGNOSIS — G89.4 CHRONIC PAIN SYNDROME: ICD-10-CM

## 2019-07-24 PROCEDURE — 3017F COLORECTAL CA SCREEN DOC REV: CPT | Performed by: PAIN MEDICINE

## 2019-07-24 PROCEDURE — 99214 OFFICE O/P EST MOD 30 MIN: CPT | Performed by: PAIN MEDICINE

## 2019-07-24 PROCEDURE — G8427 DOCREV CUR MEDS BY ELIG CLIN: HCPCS | Performed by: PAIN MEDICINE

## 2019-07-24 PROCEDURE — 1036F TOBACCO NON-USER: CPT | Performed by: PAIN MEDICINE

## 2019-07-24 PROCEDURE — 93296 REM INTERROG EVL PM/IDS: CPT | Performed by: INTERNAL MEDICINE

## 2019-07-24 PROCEDURE — 93295 DEV INTERROG REMOTE 1/2/MLT: CPT | Performed by: INTERNAL MEDICINE

## 2019-07-24 PROCEDURE — G8417 CALC BMI ABV UP PARAM F/U: HCPCS | Performed by: PAIN MEDICINE

## 2019-07-24 ASSESSMENT — ENCOUNTER SYMPTOMS
VOMITING: 0
SORE THROAT: 0
BACK PAIN: 1
WHEEZING: 0
COUGH: 0
NAUSEA: 0
CHEST TIGHTNESS: 0
RHINORRHEA: 0
CONSTIPATION: 0
EYE PAIN: 0
SHORTNESS OF BREATH: 0
DIARRHEA: 0
SINUS PRESSURE: 0
PHOTOPHOBIA: 0
COLOR CHANGE: 0
ABDOMINAL PAIN: 0

## 2019-07-24 NOTE — PROGRESS NOTES
patch onto the skin daily 12 hours on, 12 hours off., Disp: 30 patch, Rfl: 0    spironolactone (ALDACTONE) 25 MG tablet, TAKE 1 TABLET EVERY DAY, Disp: 90 tablet, Rfl: 3    potassium chloride (KLOR-CON M) 20 MEQ extended release tablet, TAKE 1 TABLET TWICE DAILY, Disp: 180 tablet, Rfl: 3    carvedilol (COREG) 6.25 MG tablet, Take 1 tablet by mouth 2 times daily (with meals), Disp: 60 tablet, Rfl: 1    losartan (COZAAR) 25 MG tablet, Take 1 tablet by mouth daily, Disp: 90 tablet, Rfl: 3    VIAGRA 50 MG tablet, as needed , Disp: , Rfl:     allopurinol (ZYLOPRIM) 300 MG tablet, Take 300 mg by mouth daily. , Disp: , Rfl:     meloxicam (MOBIC) 15 MG tablet, Take 2 tablets by mouth daily, Disp: 30 tablet, Rfl: 0    Subjective:      Review of Systems   Constitutional: Negative for activity change, appetite change, chills, diaphoresis, fatigue, fever and unexpected weight change. HENT: Negative for congestion, ear pain, hearing loss, mouth sores, nosebleeds, rhinorrhea, sinus pressure and sore throat. Eyes: Negative for photophobia, pain and visual disturbance. Respiratory: Negative for cough, chest tightness, shortness of breath and wheezing. Cardiovascular: Negative for chest pain and palpitations. Gastrointestinal: Negative for abdominal pain, constipation, diarrhea, nausea and vomiting. Endocrine: Negative for cold intolerance, heat intolerance, polydipsia, polyphagia and polyuria. Genitourinary: Negative for decreased urine volume, difficulty urinating, frequency and hematuria. Musculoskeletal: Positive for arthralgias, back pain, gait problem and neck pain. Negative for joint swelling, myalgias and neck stiffness. Skin: Negative for color change and rash. Allergic/Immunologic: Negative for food allergies and immunocompromised state. Neurological: Positive for numbness.  Negative for dizziness, tremors, seizures, syncope, facial asymmetry, speech difficulty, weakness, light-headedness and swelling. Left ankle: He exhibits normal range of motion and no swelling. Cervical back: He exhibits decreased range of motion, tenderness and pain. Thoracic back: He exhibits tenderness. Lumbar back: He exhibits decreased range of motion, tenderness and pain. Back:         Right lower leg: He exhibits no tenderness. Left lower leg: He exhibits no tenderness. Right foot: There is no tenderness. Left foot: There is no tenderness. Neurological: He is alert and oriented to person, place, and time. He has normal strength and normal reflexes. He is not disoriented. He displays no atrophy. No cranial nerve deficit or sensory deficit. He exhibits normal muscle tone. He displays a negative Romberg sign. Coordination and gait normal. He displays no Babinski's sign on the right side. He displays no Babinski's sign on the left side. Skin: Skin is warm. No rash noted. He is not diaphoretic. No erythema. No pallor. Psychiatric: His speech is normal and behavior is normal. Judgment normal. His mood appears not anxious. His affect is blunt. His affect is not angry, not labile and not inappropriate. He is not agitated, not aggressive, not hyperactive, not slowed, not withdrawn, not actively hallucinating and not combative. Thought content is not paranoid and not delusional. Cognition and memory are normal. Cognition and memory are not impaired. He does not express impulsivity or inappropriate judgment. He does not exhibit a depressed mood. He expresses no homicidal and no suicidal ideation. He expresses no suicidal plans and no homicidal plans. He exhibits normal recent memory and normal remote memory. He is attentive. Nursing note and vitals reviewed. SHREE test: pos  Yeomans test: pos  Gaenslen test: pos     Assessment:     1. Sacroiliac inflammation (HCC)    2. Spondylosis of lumbar region without myelopathy or radiculopathy    3.  Lumbar post-laminectomy

## 2019-07-29 ENCOUNTER — TELEPHONE (OUTPATIENT)
Dept: CARDIOLOGY CLINIC | Age: 59
End: 2019-07-29

## 2019-07-30 LAB
ANION GAP SERPL CALCULATED.3IONS-SCNC: 14 MMOL/L (ref 4–12)
BUN BLDV-MCNC: 17 MG/DL (ref 5–27)
CALCIUM SERPL-MCNC: 9.8 MG/DL (ref 8.5–10.5)
CHLORIDE BLD-SCNC: 93 MMOL/L (ref 98–109)
CO2: 28 MMOL/L (ref 22–32)
CREAT SERPL-MCNC: 1.18 MG/DL (ref 0.6–1.3)
EGFR AFRICAN AMERICAN: >60 ML/MIN/1.73SQ.M
EGFR IF NONAFRICAN AMERICAN: >60 ML/MIN/1.73SQ.M
GLUCOSE: 103 MG/DL (ref 65–99)
POTASSIUM SERPL-SCNC: 3.7 MMOL/L (ref 3.5–5)
SODIUM BLD-SCNC: 135 MMOL/L (ref 134–146)

## 2019-07-30 RX ORDER — CARVEDILOL 6.25 MG/1
6.25 TABLET ORAL 2 TIMES DAILY WITH MEALS
Qty: 60 TABLET | Refills: 2 | Status: SHIPPED | OUTPATIENT
Start: 2019-07-30 | End: 2019-12-04 | Stop reason: SDUPTHER

## 2019-07-31 ENCOUNTER — OFFICE VISIT (OUTPATIENT)
Dept: CARDIOLOGY CLINIC | Age: 59
End: 2019-07-31
Payer: MEDICARE

## 2019-07-31 VITALS
SYSTOLIC BLOOD PRESSURE: 130 MMHG | WEIGHT: 247.8 LBS | HEART RATE: 85 BPM | BODY MASS INDEX: 36.7 KG/M2 | HEIGHT: 69 IN | DIASTOLIC BLOOD PRESSURE: 70 MMHG

## 2019-07-31 DIAGNOSIS — I50.22 CHRONIC SYSTOLIC HEART FAILURE (HCC): ICD-10-CM

## 2019-07-31 DIAGNOSIS — I42.8 NONISCHEMIC CARDIOMYOPATHY (HCC): Primary | ICD-10-CM

## 2019-07-31 DIAGNOSIS — I10 ESSENTIAL HYPERTENSION: ICD-10-CM

## 2019-07-31 PROCEDURE — G8417 CALC BMI ABV UP PARAM F/U: HCPCS | Performed by: PHYSICIAN ASSISTANT

## 2019-07-31 PROCEDURE — 99213 OFFICE O/P EST LOW 20 MIN: CPT | Performed by: PHYSICIAN ASSISTANT

## 2019-07-31 PROCEDURE — 1036F TOBACCO NON-USER: CPT | Performed by: PHYSICIAN ASSISTANT

## 2019-07-31 PROCEDURE — 3017F COLORECTAL CA SCREEN DOC REV: CPT | Performed by: PHYSICIAN ASSISTANT

## 2019-07-31 PROCEDURE — G8427 DOCREV CUR MEDS BY ELIG CLIN: HCPCS | Performed by: PHYSICIAN ASSISTANT

## 2019-07-31 PROCEDURE — 93000 ELECTROCARDIOGRAM COMPLETE: CPT | Performed by: PHYSICIAN ASSISTANT

## 2019-07-31 RX ORDER — ISOSORBIDE MONONITRATE 30 MG/1
30 TABLET, EXTENDED RELEASE ORAL DAILY
Qty: 30 TABLET | Refills: 3 | Status: SHIPPED | OUTPATIENT
Start: 2019-07-31 | End: 2019-09-09 | Stop reason: SINTOL

## 2019-07-31 NOTE — PROGRESS NOTES
HF clinic) 15 tablet 0    spironolactone (ALDACTONE) 25 MG tablet TAKE 1 TABLET EVERY DAY 90 tablet 3    potassium chloride (KLOR-CON M) 20 MEQ extended release tablet TAKE 1 TABLET TWICE DAILY 180 tablet 3    losartan (COZAAR) 25 MG tablet Take 1 tablet by mouth daily 90 tablet 3     No current facility-administered medications for this visit. Social History     Socioeconomic History    Marital status:      Spouse name: None    Number of children: 1    Years of education: None    Highest education level: None   Occupational History    None   Social Needs    Financial resource strain: None    Food insecurity:     Worry: None     Inability: None    Transportation needs:     Medical: None     Non-medical: None   Tobacco Use    Smoking status: Never Smoker    Smokeless tobacco: Never Used   Substance and Sexual Activity    Alcohol use: Yes     Alcohol/week: 6.0 standard drinks     Types: 6 Standard drinks or equivalent per week     Comment: social    Drug use: No    Sexual activity: Yes     Partners: Female   Lifestyle    Physical activity:     Days per week: None     Minutes per session: None    Stress: None   Relationships    Social connections:     Talks on phone: None     Gets together: None     Attends [employer] service: None     Active member of club or organization: None     Attends meetings of clubs or organizations: None     Relationship status: None    Intimate partner violence:     Fear of current or ex partner: None     Emotionally abused: None     Physically abused: None     Forced sexual activity: None   Other Topics Concern    None   Social History Narrative    None       Family History   Problem Relation Age of Onset    Heart Disease Father     Coronary Art Dis Father     Heart Attack Father     High Blood Pressure Father     Parkinsonism Father        Blood pressure 130/70, pulse 85, height 5' 9\" (1.753 m), weight 247 lb 12.8 oz (112.4 kg).     General:   Well

## 2019-08-14 RX ORDER — LOSARTAN POTASSIUM 25 MG/1
25 TABLET ORAL DAILY
Qty: 90 TABLET | Refills: 3 | Status: SHIPPED | OUTPATIENT
Start: 2019-08-14 | End: 2019-08-15 | Stop reason: SDUPTHER

## 2019-08-15 RX ORDER — LOSARTAN POTASSIUM 25 MG/1
25 TABLET ORAL DAILY
Qty: 90 TABLET | Refills: 3 | Status: SHIPPED
Start: 2019-08-15 | End: 2020-02-05 | Stop reason: ALTCHOICE

## 2019-08-20 ENCOUNTER — PREP FOR PROCEDURE (OUTPATIENT)
Dept: PHYSICAL MEDICINE AND REHAB | Age: 59
End: 2019-08-20

## 2019-08-20 NOTE — H&P
tobacco. He reports that he drinks about 6.0 standard drinks of alcohol per week. He reports that he does not use drugs. Medications    Current Medication      Current Outpatient Medications:     torsemide (DEMADEX) 20 MG tablet, Take 2 tablets by mouth daily, Disp: 90 tablet, Rfl: 5    metolazone (ZAROXOLYN) 5 MG tablet, Take 0.5 tablets by mouth daily as needed (as directed by HF clinic), Disp: 15 tablet, Rfl: 0    lidocaine (LIDODERM) 5 %, Place 1 patch onto the skin daily 12 hours on, 12 hours off., Disp: 30 patch, Rfl: 0    spironolactone (ALDACTONE) 25 MG tablet, TAKE 1 TABLET EVERY DAY, Disp: 90 tablet, Rfl: 3    potassium chloride (KLOR-CON M) 20 MEQ extended release tablet, TAKE 1 TABLET TWICE DAILY, Disp: 180 tablet, Rfl: 3    carvedilol (COREG) 6.25 MG tablet, Take 1 tablet by mouth 2 times daily (with meals), Disp: 60 tablet, Rfl: 1    losartan (COZAAR) 25 MG tablet, Take 1 tablet by mouth daily, Disp: 90 tablet, Rfl: 3    VIAGRA 50 MG tablet, as needed , Disp: , Rfl:     allopurinol (ZYLOPRIM) 300 MG tablet, Take 300 mg by mouth daily. , Disp: , Rfl:     meloxicam (MOBIC) 15 MG tablet, Take 2 tablets by mouth daily, Disp: 30 tablet, Rfl: 0        Subjective:      Review of Systems   Constitutional: Negative for activity change, appetite change, chills, diaphoresis, fatigue, fever and unexpected weight change. HENT: Negative for congestion, ear pain, hearing loss, mouth sores, nosebleeds, rhinorrhea, sinus pressure and sore throat. Eyes: Negative for photophobia, pain and visual disturbance. Respiratory: Negative for cough, chest tightness, shortness of breath and wheezing. Cardiovascular: Negative for chest pain and palpitations. Gastrointestinal: Negative for abdominal pain, constipation, diarrhea, nausea and vomiting. Endocrine: Negative for cold intolerance, heat intolerance, polydipsia, polyphagia and polyuria.    Genitourinary: Negative for decreased urine volume,

## 2019-08-20 NOTE — H&P (VIEW-ONLY)
Geovanna Aguilar is a 62 y.o. male is here today for     Chief Complaint:   Left SI pain          The patienthas No Known Allergies. Past Medical History  Amanda Garcia  has a past medical history of Cardiomyopathy, dilated ([address] Claustrophobia, Congestive heart failure (CHF) ([address] Hyperlipidemia, Medtronic dual ICD, Osteoarthritis, Osteoarthritis of spine with radiculopathy, lumbar region, Spinal stenosis, and Unspecified sleep apnea. Past Surgical History  The patient  has a past surgical history that includes cardiovascular stress test (09/2013); doppler echocardiography (09/2013); doppler echocardiography (09/2013); Nasal sinus surgery; Carpal tunnel release (Right); Colonoscopy; back surgery (8/26/2014); Endoscopy, colon, diagnostic; Vasectomy (???); sinus surgery (1980's???); other surgical history (10/9/2015); Neck surgery (2015); back surgery (2013); Tonsillectomy (1980's??); hernia repair (???); other surgical history (01/18/2016); other surgical history (2-8-2016); other surgical history (N/A, 03-21-16); other surgical history (Bilateral, 05-16-16); other surgical history (08/01/2016); cervical fusion (05/17/2017); other surgical history (Left, 09/11/2017); Lumbar spine surgery (Left, 9/11/2017); other surgical history (Right, 10/31/2017); Lumbar spine surgery (Right, 10/31/2017); other surgical history (Right, 05/09/2018); pr exc skin benig <5mm remaindr body (Right, 5/9/2018); pr inject rx other periph nerve (Left, 6/19/2018); Lumbar spine surgery (Left, 4/11/2019); Cardiac catheterization (10/2013); Cardiac defibrillator placement (2013); and Injection Procedure For Sacroiliac Joint (Left, 7/2/2019). Family History  This patient's family history includes Coronary Art Dis in his father; Heart Attack in his father; Heart Disease in his father; High Blood Pressure in his father; Parkinsonism in his father. Social History  Cresencio  reports that he has never smoked.  He has never used smokeless He exhibits no tenderness. Abdominal: Soft. Bowel sounds are normal. He exhibits no distension. There is no tenderness. There is no rebound and no guarding. Musculoskeletal:        Right knee: He exhibits normal range of motion. No tenderness found. Left knee: He exhibits normal range of motion. No tenderness found. Right ankle: He exhibits normal range of motion and no swelling. Left ankle: He exhibits normal range of motion and no swelling. Cervical back: He exhibits decreased range of motion, tenderness and pain. Thoracic back: He exhibits tenderness. Lumbar back: He exhibits decreased range of motion, tenderness and pain. Back:         Right lower leg: He exhibits no tenderness. Left lower leg: He exhibits no tenderness. Right foot: There is no tenderness. Left foot: There is no tenderness. Neurological: He is alert and oriented to person, place, and time. He has normal strength and normal reflexes. He is not disoriented. He displays no atrophy. No cranial nerve deficit or sensory deficit. He exhibits normal muscle tone. He displays a negative Romberg sign. Coordination and gait normal. He displays no Babinski's sign on the right side. He displays no Babinski's sign on the left side. Skin: Skin is warm. No rash noted. He is not diaphoretic. No erythema. No pallor. Psychiatric: His speech is normal and behavior is normal. Judgment normal. His mood appears not anxious. His affect is blunt. His affect is not angry, not labile and not inappropriate. He is not agitated, not aggressive, not hyperactive, not slowed, not withdrawn, not actively hallucinating and not combative. Thought content is not paranoid and not delusional. Cognition and memory are normal. Cognition and memory are not impaired. He does not express impulsivity or inappropriate judgment. He does not exhibit a depressed mood.  He expresses no homicidal and no suicidal

## 2019-08-26 ENCOUNTER — HOSPITAL ENCOUNTER (OUTPATIENT)
Age: 59
Setting detail: OUTPATIENT SURGERY
Discharge: HOME | End: 2019-08-26
Attending: PAIN MEDICINE | Admitting: PAIN MEDICINE
Payer: MEDICARE

## 2019-08-26 ENCOUNTER — APPOINTMENT (OUTPATIENT)
Dept: GENERAL RADIOLOGY | Age: 59
End: 2019-08-26
Attending: PAIN MEDICINE
Payer: MEDICARE

## 2019-08-26 ENCOUNTER — ANESTHESIA (OUTPATIENT)
Dept: OPERATING ROOM | Age: 59
End: 2019-08-26
Payer: MEDICARE

## 2019-08-26 ENCOUNTER — ANESTHESIA EVENT (OUTPATIENT)
Dept: OPERATING ROOM | Age: 59
End: 2019-08-26
Payer: MEDICARE

## 2019-08-26 VITALS
SYSTOLIC BLOOD PRESSURE: 110 MMHG | TEMPERATURE: 97.1 F | BODY MASS INDEX: 36.85 KG/M2 | DIASTOLIC BLOOD PRESSURE: 60 MMHG | OXYGEN SATURATION: 95 % | HEIGHT: 69 IN | WEIGHT: 248.8 LBS | RESPIRATION RATE: 14 BRPM | HEART RATE: 75 BPM

## 2019-08-26 VITALS
DIASTOLIC BLOOD PRESSURE: 63 MMHG | OXYGEN SATURATION: 96 % | RESPIRATION RATE: 8 BRPM | SYSTOLIC BLOOD PRESSURE: 118 MMHG

## 2019-08-26 DIAGNOSIS — I50.22 CHRONIC SYSTOLIC HEART FAILURE (HCC): Primary | ICD-10-CM

## 2019-08-26 PROCEDURE — 2709999900 HC NON-CHARGEABLE SUPPLY: Performed by: PAIN MEDICINE

## 2019-08-26 PROCEDURE — 3700000000 HC ANESTHESIA ATTENDED CARE: Performed by: PAIN MEDICINE

## 2019-08-26 PROCEDURE — 6360000002 HC RX W HCPCS: Performed by: PAIN MEDICINE

## 2019-08-26 PROCEDURE — 2500000003 HC RX 250 WO HCPCS: Performed by: NURSE ANESTHETIST, CERTIFIED REGISTERED

## 2019-08-26 PROCEDURE — 7100000011 HC PHASE II RECOVERY - ADDTL 15 MIN: Performed by: PAIN MEDICINE

## 2019-08-26 PROCEDURE — 3600000054 HC PAIN LEVEL 3 BASE: Performed by: PAIN MEDICINE

## 2019-08-26 PROCEDURE — 6360000004 HC RX CONTRAST MEDICATION: Performed by: PAIN MEDICINE

## 2019-08-26 PROCEDURE — 7100000010 HC PHASE II RECOVERY - FIRST 15 MIN: Performed by: PAIN MEDICINE

## 2019-08-26 PROCEDURE — 6360000002 HC RX W HCPCS: Performed by: NURSE ANESTHETIST, CERTIFIED REGISTERED

## 2019-08-26 PROCEDURE — 3209999900 FLUORO FOR SURGICAL PROCEDURES

## 2019-08-26 PROCEDURE — 27096 INJECT SACROILIAC JOINT: CPT | Performed by: PAIN MEDICINE

## 2019-08-26 PROCEDURE — 2500000003 HC RX 250 WO HCPCS: Performed by: PAIN MEDICINE

## 2019-08-26 RX ORDER — METHYLPREDNISOLONE ACETATE 80 MG/ML
INJECTION, SUSPENSION INTRA-ARTICULAR; INTRALESIONAL; INTRAMUSCULAR; SOFT TISSUE PRN
Status: DISCONTINUED | OUTPATIENT
Start: 2019-08-26 | End: 2019-08-26 | Stop reason: ALTCHOICE

## 2019-08-26 RX ORDER — LIDOCAINE HYDROCHLORIDE 10 MG/ML
INJECTION, SOLUTION INFILTRATION; PERINEURAL PRN
Status: DISCONTINUED | OUTPATIENT
Start: 2019-08-26 | End: 2019-08-26 | Stop reason: ALTCHOICE

## 2019-08-26 RX ORDER — POTASSIUM CHLORIDE 20 MEQ/1
20 TABLET, EXTENDED RELEASE ORAL 3 TIMES DAILY
Qty: 90 TABLET | Refills: 3 | Status: SHIPPED | OUTPATIENT
Start: 2019-08-26 | End: 2019-09-09

## 2019-08-26 RX ORDER — LIDOCAINE HYDROCHLORIDE 20 MG/ML
INJECTION, SOLUTION INFILTRATION; PERINEURAL PRN
Status: DISCONTINUED | OUTPATIENT
Start: 2019-08-26 | End: 2019-08-26 | Stop reason: SDUPTHER

## 2019-08-26 RX ORDER — TRAMADOL HYDROCHLORIDE 50 MG/1
50 TABLET ORAL EVERY 6 HOURS PRN
COMMUNITY
End: 2019-09-09 | Stop reason: ALTCHOICE

## 2019-08-26 RX ORDER — ROPIVACAINE HYDROCHLORIDE 2 MG/ML
INJECTION, SOLUTION EPIDURAL; INFILTRATION; PERINEURAL PRN
Status: DISCONTINUED | OUTPATIENT
Start: 2019-08-26 | End: 2019-08-26 | Stop reason: ALTCHOICE

## 2019-08-26 RX ADMIN — PROPOFOL 40 MG: 10 INJECTION, EMULSION INTRAVENOUS at 12:17

## 2019-08-26 RX ADMIN — LIDOCAINE HYDROCHLORIDE 40 MG: 20 INJECTION, SOLUTION INFILTRATION; PERINEURAL at 12:17

## 2019-08-26 RX ADMIN — PROPOFOL 40 MG: 10 INJECTION, EMULSION INTRAVENOUS at 12:18

## 2019-08-26 ASSESSMENT — PAIN SCALES - GENERAL: PAINLEVEL_OUTOF10: 0

## 2019-08-26 ASSESSMENT — PAIN - FUNCTIONAL ASSESSMENT: PAIN_FUNCTIONAL_ASSESSMENT: 0-10

## 2019-08-26 NOTE — PROGRESS NOTES
1222: Patient to phase 2 recovery room via cart. Patient is awake and alert. Report received from surgical RN, Christiana Lazar. Patient's vitals obtained, see charting. 1224: Patient is denying pain and nausea at this time. 1226: Offered drink and snack provided to the patient. Bed is in the lowest position, call light is within reach and patient's wife brought to the room. 1241: Discharge instructions given and explained to the patient and the patient's wife, both verbalized understanding. 1243: IV removed at this time, no complications and dressing applied. 1248: Patient ambulated to the car and discharged home in stable condition with his wife.

## 2019-08-28 ENCOUNTER — TELEPHONE (OUTPATIENT)
Dept: CARDIOLOGY CLINIC | Age: 59
End: 2019-08-28

## 2019-08-28 ENCOUNTER — PROCEDURE VISIT (OUTPATIENT)
Dept: CARDIOLOGY CLINIC | Age: 59
End: 2019-08-28
Payer: MEDICARE

## 2019-08-28 DIAGNOSIS — I50.22 CHRONIC SYSTOLIC HEART FAILURE (HCC): Primary | ICD-10-CM

## 2019-08-28 PROCEDURE — 93299 PR REM INTERROG ICPMS/SCRMS <30 D TECH REVIEW: CPT | Performed by: INTERNAL MEDICINE

## 2019-08-28 PROCEDURE — 93297 REM INTERROG DEV EVAL ICPMS: CPT | Performed by: INTERNAL MEDICINE

## 2019-08-28 NOTE — PROGRESS NOTES
Medtronic dual icd optivol  Battery 4.7yrs  Episodes of NS VT  Lasting :02seconds  optivol Mildly elevated
Attending Attestation (For Attendings USE Only)...

## 2019-09-03 LAB
ANION GAP SERPL CALCULATED.3IONS-SCNC: 13 MMOL/L (ref 4–12)
BUN BLDV-MCNC: 17 MG/DL (ref 5–27)
CALCIUM SERPL-MCNC: 9.3 MG/DL (ref 8.5–10.5)
CHLORIDE BLD-SCNC: 97 MMOL/L (ref 98–109)
CO2: 30 MMOL/L (ref 22–32)
CREAT SERPL-MCNC: 1.19 MG/DL (ref 0.6–1.3)
EGFR AFRICAN AMERICAN: >60 ML/MIN/1.73SQ.M
EGFR IF NONAFRICAN AMERICAN: >60 ML/MIN/1.73SQ.M
GLUCOSE: 85 MG/DL (ref 65–99)
POTASSIUM SERPL-SCNC: 3.9 MMOL/L (ref 3.5–5)
SODIUM BLD-SCNC: 140 MMOL/L (ref 134–146)

## 2019-09-09 ENCOUNTER — OFFICE VISIT (OUTPATIENT)
Dept: CARDIOLOGY CLINIC | Age: 59
End: 2019-09-09
Payer: MEDICARE

## 2019-09-09 VITALS
DIASTOLIC BLOOD PRESSURE: 70 MMHG | HEART RATE: 83 BPM | BODY MASS INDEX: 36.77 KG/M2 | OXYGEN SATURATION: 98 % | WEIGHT: 249 LBS | SYSTOLIC BLOOD PRESSURE: 116 MMHG

## 2019-09-09 DIAGNOSIS — I50.22 CHF (CONGESTIVE HEART FAILURE), NYHA CLASS III, CHRONIC, SYSTOLIC (HCC): Primary | ICD-10-CM

## 2019-09-09 PROCEDURE — G8427 DOCREV CUR MEDS BY ELIG CLIN: HCPCS | Performed by: NURSE PRACTITIONER

## 2019-09-09 PROCEDURE — G8417 CALC BMI ABV UP PARAM F/U: HCPCS | Performed by: NURSE PRACTITIONER

## 2019-09-09 PROCEDURE — 1036F TOBACCO NON-USER: CPT | Performed by: NURSE PRACTITIONER

## 2019-09-09 PROCEDURE — 99213 OFFICE O/P EST LOW 20 MIN: CPT | Performed by: NURSE PRACTITIONER

## 2019-09-09 PROCEDURE — 3017F COLORECTAL CA SCREEN DOC REV: CPT | Performed by: NURSE PRACTITIONER

## 2019-09-09 RX ORDER — POTASSIUM CHLORIDE 20 MEQ/1
20 TABLET, EXTENDED RELEASE ORAL 2 TIMES DAILY
Qty: 90 TABLET | Refills: 3 | Status: ON HOLD
Start: 2019-09-09 | End: 2020-05-05 | Stop reason: HOSPADM

## 2019-09-09 RX ORDER — ALLOPURINOL 300 MG/1
300 TABLET ORAL DAILY
COMMUNITY
Start: 2019-09-05

## 2019-09-09 RX ORDER — SPIRONOLACTONE 25 MG/1
25 TABLET ORAL 2 TIMES DAILY
Qty: 90 TABLET | Refills: 3
Start: 2019-09-09 | End: 2019-09-09 | Stop reason: SDUPTHER

## 2019-09-09 RX ORDER — SPIRONOLACTONE 25 MG/1
25 TABLET ORAL 2 TIMES DAILY
Qty: 90 TABLET | Refills: 3 | Status: SHIPPED | OUTPATIENT
Start: 2019-09-09 | End: 2019-09-10 | Stop reason: SDUPTHER

## 2019-09-09 ASSESSMENT — ENCOUNTER SYMPTOMS
SHORTNESS OF BREATH: 1
ABDOMINAL DISTENTION: 0
COUGH: 0

## 2019-09-09 NOTE — PROGRESS NOTES
Heart Failure Clinic       Visit Date: 9/9/2019  Cardiologist:  Dr. Kumar Dear  Primary Care Physician: Dr. Daniella Page MD    Salas Araujo is a 62 y.o. male who presents today for:  Chief Complaint   Patient presents with    Congestive Heart Failure       HPI:   Salas Araujo is a 62 y.o. male who presents to the office for a follow up patient visit in the heart failure clinic. Accompanied by no one  HX: Non Ischemic Cardiomyopathy (EF 25%) (viral illness years ago), ICD (2013), SYED, HLD  Lexiscan 12/6/18 - Negative  Echo 12/6/18 - EF down to 25% from 30%     Hospitalization r/t Heart Failure:  > 1 year  Saw Daniel in July - started Imdur (d/t chest discomfort, jaw pain) - Cresencio states not taking d/t making him feel \"weird\". Concerns today: doing ok, pretty much same - flares occasionally, abd bloating, increased SOB - takes one dose of Metolazone - average once/month. Activity: Walked in from back parking lot, no SOB (back pain), mows lawn w/ puch mowers (occ takes break)  Diet: Avoids salt, watches fluid    Patient has:  Chest Pain: No  SOB: no worse (baseline)  Difficulty sleeping: No  Orthopnea/PND: No  Edema: No  Fatigue: Fair  Abdominal bloating:  At times  Cough: No  Appetite: No  Any extra diuretic use: took Metolazone yesterday d/t 5# - average taking once/month  Weight gain: No  Home weight: 244-246  Home blood pressure: Stable - 110-140    Past Medical History:   Diagnosis Date    Cardiomyopathy, dilated ([address]     VIRAL    Claustrophobia     Congestive heart failure (CHF) ([address]     Depression 12/26/2013    HTN (hypertension)     Hyperlipidemia     Medtronic dual ICD 2/14/2014    Osteoarthritis     Osteoarthritis of spine with radiculopathy, lumbar region 12/28/2015    Spinal stenosis     Unspecified sleep apnea     unable to wear CPAP     Past Surgical History:   Procedure Laterality Date    BACK SURGERY  8/26/2014    L4- S1 decompression, L4-5 PSF and TLIF    BACK SURGERY swelling. Gastrointestinal: Negative for abdominal distention. Neurological: Negative for weakness, light-headedness and headaches. Hematological: Negative for adenopathy. Psychiatric/Behavioral: Negative for sleep disturbance. OBJECTIVE:   Today's Vitals:  /70 (Site: Left Upper Arm)   Pulse 83   Wt 249 lb (112.9 kg)   SpO2 98%   BMI 36.77 kg/m²     Physical Exam   Constitutional: He is oriented to person, place, and time. He appears well-developed and well-nourished. No distress. HENT:   Head: Normocephalic and atraumatic. Eyes: Conjunctivae are normal.   Neck: Normal range of motion. Neck supple. No JVD   Cardiovascular: Normal rate, regular rhythm and normal heart sounds. No murmur heard. Pulmonary/Chest: Effort normal and breath sounds normal. No respiratory distress. He has no wheezes. He has no rales. Abdominal: Soft. Bowel sounds are normal. He exhibits no distension. There is no tenderness. Musculoskeletal: Normal range of motion. He exhibits no edema. Neurological: He is alert and oriented to person, place, and time. Coordination normal.   Skin: Skin is warm and dry. He is not diaphoretic. Psychiatric: He has a normal mood and affect. His behavior is normal.   Vitals reviewed. Wt Readings from Last 3 Encounters:   09/09/19 249 lb (112.9 kg)   08/26/19 248 lb 12.8 oz (112.9 kg)   07/31/19 247 lb 12.8 oz (112.4 kg)     BP Readings from Last 3 Encounters:   09/09/19 116/70   08/26/19 110/60   08/26/19 118/63     Pulse Readings from Last 3 Encounters:   09/09/19 83   08/26/19 75   07/31/19 85     Body mass index is 36.77 kg/m².     ECHO:       Summary   Ejection fraction is visually estimated at 25%.   There was severe global hypokinesis of the left ventricle.   Left Ventricular size is Moderately increased .      Signature      ----------------------------------------------------------------   Electronically signed by Tra Giron MD (Interpreting   [email or web address])

## 2019-09-10 RX ORDER — SPIRONOLACTONE 25 MG/1
25 TABLET ORAL 2 TIMES DAILY
Qty: 60 TABLET | Refills: 3 | Status: SHIPPED | OUTPATIENT
Start: 2019-09-10 | End: 2020-01-28

## 2019-09-25 LAB
ANION GAP SERPL CALCULATED.3IONS-SCNC: 13 MMOL/L (ref 4–12)
BUN BLDV-MCNC: 20 MG/DL (ref 5–27)
CALCIUM SERPL-MCNC: 9.6 MG/DL (ref 8.5–10.5)
CHLORIDE BLD-SCNC: 96 MMOL/L (ref 98–109)
CO2: 28 MMOL/L (ref 22–32)
CREAT SERPL-MCNC: 1.33 MG/DL (ref 0.6–1.3)
EGFR AFRICAN AMERICAN: >60 ML/MIN/1.73SQ.M
EGFR IF NONAFRICAN AMERICAN: 55 ML/MIN/1.73SQ.M
GLUCOSE: 88 MG/DL (ref 65–99)
POTASSIUM SERPL-SCNC: 4.5 MMOL/L (ref 3.5–5)
SODIUM BLD-SCNC: 137 MMOL/L (ref 134–146)

## 2019-09-26 ENCOUNTER — OFFICE VISIT (OUTPATIENT)
Dept: PHYSICAL MEDICINE AND REHAB | Age: 59
End: 2019-09-26
Payer: MEDICARE

## 2019-09-26 VITALS
DIASTOLIC BLOOD PRESSURE: 88 MMHG | OXYGEN SATURATION: 98 % | SYSTOLIC BLOOD PRESSURE: 118 MMHG | HEIGHT: 69 IN | BODY MASS INDEX: 36.87 KG/M2 | HEART RATE: 90 BPM | WEIGHT: 248.9 LBS

## 2019-09-26 DIAGNOSIS — M46.1 SACROILIAC INFLAMMATION (HCC): Primary | ICD-10-CM

## 2019-09-26 DIAGNOSIS — G89.4 CHRONIC PAIN SYNDROME: ICD-10-CM

## 2019-09-26 PROCEDURE — 99213 OFFICE O/P EST LOW 20 MIN: CPT | Performed by: NURSE PRACTITIONER

## 2019-09-26 PROCEDURE — 1036F TOBACCO NON-USER: CPT | Performed by: NURSE PRACTITIONER

## 2019-09-26 PROCEDURE — G8417 CALC BMI ABV UP PARAM F/U: HCPCS | Performed by: NURSE PRACTITIONER

## 2019-09-26 PROCEDURE — 3017F COLORECTAL CA SCREEN DOC REV: CPT | Performed by: NURSE PRACTITIONER

## 2019-09-26 PROCEDURE — G8427 DOCREV CUR MEDS BY ELIG CLIN: HCPCS | Performed by: NURSE PRACTITIONER

## 2019-09-26 ASSESSMENT — ENCOUNTER SYMPTOMS: BACK PAIN: 1

## 2019-09-27 ENCOUNTER — PREP FOR PROCEDURE (OUTPATIENT)
Dept: PHYSICAL MEDICINE AND REHAB | Age: 59
End: 2019-09-27

## 2019-09-30 ENCOUNTER — TELEPHONE (OUTPATIENT)
Dept: CARDIOLOGY CLINIC | Age: 59
End: 2019-09-30

## 2019-09-30 DIAGNOSIS — I50.22 CHF (CONGESTIVE HEART FAILURE), NYHA CLASS III, CHRONIC, SYSTOLIC (HCC): Primary | ICD-10-CM

## 2019-09-30 RX ORDER — TORSEMIDE 20 MG/1
20 TABLET ORAL DAILY
COMMUNITY
End: 2020-02-04

## 2019-09-30 NOTE — TELEPHONE ENCOUNTER
Ok to do but Does not need 3 Kcl/day if doing that, K+ will get too high.    if any HF symptoms need to call or re-increase dose

## 2019-09-30 NOTE — TELEPHONE ENCOUNTER
Called pt to give lab results, he states he is taking aldactone 25 mg BID, torsemide 20 bid and KCL 20 TID, and he has been getting severe leg cramping in his calves and thigh. Last OV note says decrease kcl to BID. Please advise.

## 2019-10-02 ENCOUNTER — PROCEDURE VISIT (OUTPATIENT)
Dept: CARDIOLOGY CLINIC | Age: 59
End: 2019-10-02
Payer: MEDICARE

## 2019-10-02 DIAGNOSIS — I50.22 CHF (CONGESTIVE HEART FAILURE), NYHA CLASS III, CHRONIC, SYSTOLIC (HCC): Primary | ICD-10-CM

## 2019-10-02 PROCEDURE — 93297 REM INTERROG DEV EVAL ICPMS: CPT | Performed by: INTERNAL MEDICINE

## 2019-10-02 PROCEDURE — 93299 PR REM INTERROG ICPMS/SCRMS <30 D TECH REVIEW: CPT | Performed by: INTERNAL MEDICINE

## 2019-10-03 ENCOUNTER — APPOINTMENT (OUTPATIENT)
Dept: GENERAL RADIOLOGY | Age: 59
End: 2019-10-03
Attending: PAIN MEDICINE
Payer: MEDICARE

## 2019-10-03 ENCOUNTER — ANESTHESIA EVENT (OUTPATIENT)
Dept: OPERATING ROOM | Age: 59
End: 2019-10-03
Payer: MEDICARE

## 2019-10-03 ENCOUNTER — ANESTHESIA (OUTPATIENT)
Dept: OPERATING ROOM | Age: 59
End: 2019-10-03
Payer: MEDICARE

## 2019-10-03 ENCOUNTER — HOSPITAL ENCOUNTER (OUTPATIENT)
Age: 59
Setting detail: OUTPATIENT SURGERY
Discharge: HOME | End: 2019-10-03
Attending: PAIN MEDICINE | Admitting: PAIN MEDICINE
Payer: MEDICARE

## 2019-10-03 VITALS
HEIGHT: 69 IN | RESPIRATION RATE: 18 BRPM | WEIGHT: 250 LBS | OXYGEN SATURATION: 92 % | HEART RATE: 84 BPM | SYSTOLIC BLOOD PRESSURE: 112 MMHG | BODY MASS INDEX: 37.03 KG/M2 | TEMPERATURE: 96.9 F | DIASTOLIC BLOOD PRESSURE: 75 MMHG

## 2019-10-03 VITALS
RESPIRATION RATE: 16 BRPM | OXYGEN SATURATION: 96 % | SYSTOLIC BLOOD PRESSURE: 117 MMHG | DIASTOLIC BLOOD PRESSURE: 77 MMHG

## 2019-10-03 PROCEDURE — 2709999900 HC NON-CHARGEABLE SUPPLY: Performed by: PAIN MEDICINE

## 2019-10-03 PROCEDURE — 3600000057 HC PAIN LEVEL 4 ADDL 15 MIN: Performed by: PAIN MEDICINE

## 2019-10-03 PROCEDURE — 6360000002 HC RX W HCPCS: Performed by: NURSE ANESTHETIST, CERTIFIED REGISTERED

## 2019-10-03 PROCEDURE — 3700000001 HC ADD 15 MINUTES (ANESTHESIA): Performed by: PAIN MEDICINE

## 2019-10-03 PROCEDURE — 7100000010 HC PHASE II RECOVERY - FIRST 15 MIN: Performed by: PAIN MEDICINE

## 2019-10-03 PROCEDURE — 2500000003 HC RX 250 WO HCPCS: Performed by: NURSE ANESTHETIST, CERTIFIED REGISTERED

## 2019-10-03 PROCEDURE — 3209999900 FLUORO FOR SURGICAL PROCEDURES

## 2019-10-03 PROCEDURE — 64640 INJECTION TREATMENT OF NERVE: CPT | Performed by: PAIN MEDICINE

## 2019-10-03 PROCEDURE — 6360000002 HC RX W HCPCS: Performed by: PAIN MEDICINE

## 2019-10-03 PROCEDURE — 3600000056 HC PAIN LEVEL 4 BASE: Performed by: PAIN MEDICINE

## 2019-10-03 PROCEDURE — 3700000000 HC ANESTHESIA ATTENDED CARE: Performed by: PAIN MEDICINE

## 2019-10-03 PROCEDURE — 2500000003 HC RX 250 WO HCPCS: Performed by: PAIN MEDICINE

## 2019-10-03 PROCEDURE — 7100000011 HC PHASE II RECOVERY - ADDTL 15 MIN: Performed by: PAIN MEDICINE

## 2019-10-03 PROCEDURE — 2720000010 HC SURG SUPPLY STERILE: Performed by: PAIN MEDICINE

## 2019-10-03 RX ORDER — LIDOCAINE HYDROCHLORIDE 20 MG/ML
INJECTION, SOLUTION INFILTRATION; PERINEURAL PRN
Status: DISCONTINUED | OUTPATIENT
Start: 2019-10-03 | End: 2019-10-03 | Stop reason: SDUPTHER

## 2019-10-03 RX ORDER — ROPIVACAINE HYDROCHLORIDE 2 MG/ML
INJECTION, SOLUTION EPIDURAL; INFILTRATION; PERINEURAL PRN
Status: DISCONTINUED | OUTPATIENT
Start: 2019-10-03 | End: 2019-10-03 | Stop reason: ALTCHOICE

## 2019-10-03 RX ORDER — KETOROLAC TROMETHAMINE 30 MG/ML
INJECTION, SOLUTION INTRAMUSCULAR; INTRAVENOUS PRN
Status: DISCONTINUED | OUTPATIENT
Start: 2019-10-03 | End: 2019-10-03 | Stop reason: SDUPTHER

## 2019-10-03 RX ORDER — PROPOFOL 10 MG/ML
INJECTION, EMULSION INTRAVENOUS PRN
Status: DISCONTINUED | OUTPATIENT
Start: 2019-10-03 | End: 2019-10-03 | Stop reason: SDUPTHER

## 2019-10-03 RX ORDER — FENTANYL CITRATE 50 UG/ML
INJECTION, SOLUTION INTRAMUSCULAR; INTRAVENOUS PRN
Status: DISCONTINUED | OUTPATIENT
Start: 2019-10-03 | End: 2019-10-03 | Stop reason: SDUPTHER

## 2019-10-03 RX ORDER — LIDOCAINE HYDROCHLORIDE 10 MG/ML
INJECTION, SOLUTION EPIDURAL; INFILTRATION; INTRACAUDAL; PERINEURAL PRN
Status: DISCONTINUED | OUTPATIENT
Start: 2019-10-03 | End: 2019-10-03 | Stop reason: ALTCHOICE

## 2019-10-03 RX ADMIN — FENTANYL CITRATE 50 MCG: 50 INJECTION INTRAMUSCULAR; INTRAVENOUS at 09:02

## 2019-10-03 RX ADMIN — KETOROLAC TROMETHAMINE 30 MG: 30 INJECTION, SOLUTION INTRAMUSCULAR; INTRAVENOUS at 09:20

## 2019-10-03 RX ADMIN — LIDOCAINE HYDROCHLORIDE 50 MG: 20 INJECTION, SOLUTION INFILTRATION; PERINEURAL at 08:59

## 2019-10-03 RX ADMIN — FENTANYL CITRATE 50 MCG: 50 INJECTION INTRAMUSCULAR; INTRAVENOUS at 09:01

## 2019-10-03 RX ADMIN — PROPOFOL 230 MG: 10 INJECTION, EMULSION INTRAVENOUS at 08:58

## 2019-10-03 ASSESSMENT — PULMONARY FUNCTION TESTS
PIF_VALUE: 0

## 2019-10-03 ASSESSMENT — PAIN - FUNCTIONAL ASSESSMENT: PAIN_FUNCTIONAL_ASSESSMENT: 0-10

## 2019-10-03 ASSESSMENT — PAIN DESCRIPTION - DESCRIPTORS: DESCRIPTORS: ACHING

## 2019-11-04 ENCOUNTER — OFFICE VISIT (OUTPATIENT)
Dept: PHYSICAL MEDICINE AND REHAB | Age: 59
End: 2019-11-04
Attending: PAIN MEDICINE
Payer: MEDICARE

## 2019-11-04 VITALS
HEART RATE: 80 BPM | HEIGHT: 69 IN | SYSTOLIC BLOOD PRESSURE: 130 MMHG | DIASTOLIC BLOOD PRESSURE: 70 MMHG | BODY MASS INDEX: 36.9 KG/M2 | WEIGHT: 249.12 LBS

## 2019-11-04 DIAGNOSIS — G89.29 CHRONIC BILATERAL LOW BACK PAIN WITHOUT SCIATICA: ICD-10-CM

## 2019-11-04 DIAGNOSIS — M46.1 SACROILIAC INFLAMMATION (HCC): Primary | ICD-10-CM

## 2019-11-04 DIAGNOSIS — G89.4 CHRONIC PAIN SYNDROME: ICD-10-CM

## 2019-11-04 DIAGNOSIS — M54.50 CHRONIC BILATERAL LOW BACK PAIN WITHOUT SCIATICA: ICD-10-CM

## 2019-11-04 PROCEDURE — G8417 CALC BMI ABV UP PARAM F/U: HCPCS | Performed by: NURSE PRACTITIONER

## 2019-11-04 PROCEDURE — 3017F COLORECTAL CA SCREEN DOC REV: CPT | Performed by: NURSE PRACTITIONER

## 2019-11-04 PROCEDURE — 1036F TOBACCO NON-USER: CPT | Performed by: NURSE PRACTITIONER

## 2019-11-04 PROCEDURE — 99214 OFFICE O/P EST MOD 30 MIN: CPT | Performed by: NURSE PRACTITIONER

## 2019-11-04 PROCEDURE — G8484 FLU IMMUNIZE NO ADMIN: HCPCS | Performed by: NURSE PRACTITIONER

## 2019-11-04 PROCEDURE — G8427 DOCREV CUR MEDS BY ELIG CLIN: HCPCS | Performed by: NURSE PRACTITIONER

## 2019-11-04 ASSESSMENT — ENCOUNTER SYMPTOMS: BACK PAIN: 1

## 2019-11-07 ENCOUNTER — TELEPHONE (OUTPATIENT)
Dept: CARDIOLOGY CLINIC | Age: 59
End: 2019-11-07

## 2019-11-18 LAB
ANION GAP SERPL CALCULATED.3IONS-SCNC: 13 MMOL/L (ref 4–12)
BUN BLDV-MCNC: 19 MG/DL (ref 5–27)
CALCIUM SERPL-MCNC: 9.8 MG/DL (ref 8.5–10.5)
CHLORIDE BLD-SCNC: 98 MMOL/L (ref 98–109)
CO2: 26 MMOL/L (ref 22–32)
CREAT SERPL-MCNC: 1.42 MG/DL (ref 0.6–1.3)
EGFR AFRICAN AMERICAN: >60 ML/MIN/1.73SQ.M
EGFR IF NONAFRICAN AMERICAN: 51 ML/MIN/1.73SQ.M
GLUCOSE: 84 MG/DL (ref 65–99)
POTASSIUM SERPL-SCNC: 3.5 MMOL/L (ref 3.5–5)
SODIUM BLD-SCNC: 137 MMOL/L (ref 134–146)

## 2019-11-19 DIAGNOSIS — I50.22 CHF (CONGESTIVE HEART FAILURE), NYHA CLASS III, CHRONIC, SYSTOLIC (HCC): Primary | ICD-10-CM

## 2019-12-02 ENCOUNTER — TELEPHONE (OUTPATIENT)
Dept: CARDIOLOGY CLINIC | Age: 59
End: 2019-12-02

## 2019-12-05 RX ORDER — CARVEDILOL 6.25 MG/1
TABLET ORAL
Qty: 180 TABLET | Refills: 1 | Status: SHIPPED | OUTPATIENT
Start: 2019-12-05 | End: 2020-02-04 | Stop reason: SDUPTHER

## 2019-12-12 ENCOUNTER — NURSE ONLY (OUTPATIENT)
Dept: CARDIOLOGY CLINIC | Age: 59
End: 2019-12-12
Payer: MEDICARE

## 2019-12-12 DIAGNOSIS — Z95.810 AUTOMATIC IMPLANTABLE CARDIOVERTER-DEFIBRILLATOR IN SITU: Primary | ICD-10-CM

## 2019-12-12 PROCEDURE — 93283 PRGRMG EVAL IMPLANTABLE DFB: CPT | Performed by: INTERNAL MEDICINE

## 2019-12-17 ENCOUNTER — TELEPHONE (OUTPATIENT)
Dept: CARDIOLOGY CLINIC | Age: 59
End: 2019-12-17

## 2019-12-17 DIAGNOSIS — I50.22 CHF (CONGESTIVE HEART FAILURE), NYHA CLASS III, CHRONIC, SYSTOLIC (HCC): Primary | ICD-10-CM

## 2020-01-02 LAB
ANION GAP SERPL CALCULATED.3IONS-SCNC: 10 MMOL/L (ref 4–12)
BUN BLDV-MCNC: 21 MG/DL (ref 5–27)
CALCIUM SERPL-MCNC: 9.8 MG/DL (ref 8.5–10.5)
CHLORIDE BLD-SCNC: 98 MMOL/L (ref 98–109)
CO2: 27 MMOL/L (ref 22–32)
CREAT SERPL-MCNC: 1.32 MG/DL (ref 0.6–1.3)
EGFR AFRICAN AMERICAN: >60 ML/MIN/1.73SQ.M
EGFR IF NONAFRICAN AMERICAN: 55 ML/MIN/1.73SQ.M
GLUCOSE: 106 MG/DL (ref 65–99)
POTASSIUM SERPL-SCNC: 4.3 MMOL/L (ref 3.5–5)
SODIUM BLD-SCNC: 135 MMOL/L (ref 134–146)

## 2020-01-22 ENCOUNTER — TELEPHONE (OUTPATIENT)
Dept: CARDIOLOGY CLINIC | Age: 60
End: 2020-01-22

## 2020-01-22 ENCOUNTER — PROCEDURE VISIT (OUTPATIENT)
Dept: CARDIOLOGY CLINIC | Age: 60
End: 2020-01-22
Payer: MEDICARE

## 2020-01-22 PROCEDURE — 93297 REM INTERROG DEV EVAL ICPMS: CPT | Performed by: INTERNAL MEDICINE

## 2020-01-22 NOTE — PROGRESS NOTES
carelink optivol medtronic icd     3.9 years on device     1/20/20   11 beats VT   1/17/20  6 beats VT   1/7/20   11+ VT   1/7/20  13 VT   1/2/20  8 beats VT   Elevated optivol

## 2020-01-22 NOTE — TELEPHONE ENCOUNTER
Progress Notes   Unsigned      carelink optivol medtronic icd      3.9 years on device      1/20/20   11 beats VT   1/17/20  6 beats VT   1/7/20   11+ VT   1/7/20  13 VT   1/2/20  8 beats VT   Elevated optivol

## 2020-01-22 NOTE — TELEPHONE ENCOUNTER
Spoke with pt regarding elevated optivol, he states he was up 10 pounds and was SOB, he went to PCP yesterday and is down 8 pounds and feeling much better, symptoms have resolved, PCP ordered Metolazone for 5 days, but did not order repeat lab work. PCP also told pt he may benefit from CardioMems and pt would like to discuss at next appt 2/10/2020. Do you want repeat labs following the 5 days of metolazone?

## 2020-01-28 RX ORDER — SPIRONOLACTONE 25 MG/1
TABLET ORAL
Qty: 180 TABLET | Refills: 3 | Status: SHIPPED | OUTPATIENT
Start: 2020-01-28 | End: 2020-02-03 | Stop reason: SDUPTHER

## 2020-01-30 RX ORDER — METOLAZONE 5 MG/1
2.5 TABLET ORAL DAILY PRN
Qty: 15 TABLET | Refills: 0 | Status: SHIPPED | OUTPATIENT
Start: 2020-01-30 | End: 2020-02-03 | Stop reason: SDUPTHER

## 2020-01-31 LAB
ANION GAP SERPL CALCULATED.3IONS-SCNC: 10 MMOL/L (ref 5–15)
BUN BLDV-MCNC: 35 MG/DL (ref 5–27)
CALCIUM SERPL-MCNC: 10 MG/DL (ref 8.5–10.5)
CHLORIDE BLD-SCNC: 94 MMOL/L (ref 98–109)
CO2: 31 MMOL/L (ref 22–32)
CREAT SERPL-MCNC: 1.51 MG/DL (ref 0.6–1.3)
EGFR AFRICAN AMERICAN: 57 ML/MIN/1.73SQ.M
EGFR IF NONAFRICAN AMERICAN: 47 ML/MIN/1.73SQ.M
GLUCOSE: 95 MG/DL (ref 65–99)
POTASSIUM SERPL-SCNC: 4.3 MMOL/L (ref 3.5–5)
SODIUM BLD-SCNC: 135 MMOL/L (ref 134–146)

## 2020-02-03 RX ORDER — SPIRONOLACTONE 25 MG/1
TABLET ORAL
Qty: 180 TABLET | Refills: 3 | Status: ON HOLD | OUTPATIENT
Start: 2020-02-03 | End: 2020-05-05 | Stop reason: HOSPADM

## 2020-02-03 RX ORDER — METOLAZONE 5 MG/1
2.5 TABLET ORAL DAILY PRN
Qty: 15 TABLET | Refills: 0 | Status: SHIPPED | OUTPATIENT
Start: 2020-02-03 | End: 2020-08-06

## 2020-02-04 ENCOUNTER — OFFICE VISIT (OUTPATIENT)
Dept: CARDIOLOGY CLINIC | Age: 60
End: 2020-02-04
Payer: MEDICARE

## 2020-02-04 VITALS
WEIGHT: 262 LBS | HEART RATE: 88 BPM | DIASTOLIC BLOOD PRESSURE: 70 MMHG | BODY MASS INDEX: 38.69 KG/M2 | OXYGEN SATURATION: 97 % | SYSTOLIC BLOOD PRESSURE: 110 MMHG

## 2020-02-04 PROCEDURE — 3017F COLORECTAL CA SCREEN DOC REV: CPT | Performed by: NURSE PRACTITIONER

## 2020-02-04 PROCEDURE — 1036F TOBACCO NON-USER: CPT | Performed by: NURSE PRACTITIONER

## 2020-02-04 PROCEDURE — G8427 DOCREV CUR MEDS BY ELIG CLIN: HCPCS | Performed by: NURSE PRACTITIONER

## 2020-02-04 PROCEDURE — G8417 CALC BMI ABV UP PARAM F/U: HCPCS | Performed by: NURSE PRACTITIONER

## 2020-02-04 PROCEDURE — G8484 FLU IMMUNIZE NO ADMIN: HCPCS | Performed by: NURSE PRACTITIONER

## 2020-02-04 PROCEDURE — 99213 OFFICE O/P EST LOW 20 MIN: CPT | Performed by: NURSE PRACTITIONER

## 2020-02-04 RX ORDER — TORSEMIDE 20 MG/1
20 TABLET ORAL 2 TIMES DAILY
Qty: 180 TABLET | Refills: 3 | Status: SHIPPED | OUTPATIENT
Start: 2020-02-04 | End: 2020-12-17 | Stop reason: DRUGHIGH

## 2020-02-04 RX ORDER — CARVEDILOL 6.25 MG/1
TABLET ORAL
Qty: 180 TABLET | Refills: 3 | Status: ON HOLD | OUTPATIENT
Start: 2020-02-04 | End: 2020-05-05 | Stop reason: HOSPADM

## 2020-02-04 RX ORDER — METOLAZONE 5 MG/1
5 TABLET ORAL DAILY
Qty: 30 TABLET | Refills: 1 | Status: SHIPPED | OUTPATIENT
Start: 2020-02-04 | End: 2020-04-28

## 2020-02-04 ASSESSMENT — ENCOUNTER SYMPTOMS
SHORTNESS OF BREATH: 1
COUGH: 0
ABDOMINAL DISTENTION: 1
BACK PAIN: 1

## 2020-02-04 NOTE — PROGRESS NOTES
Heart Failure Clinic       Visit Date: 2/4/2020  Cardiologist:  Dr. Huyen Busby  Primary Care Physician: Dr. Lisa Valle MD    Aarti Willams is a 62 y.o. male who presents today for:  Chief Complaint   Patient presents with    Congestive Heart Failure       HPI:   Aarti Willams is a 62 y.o. male who presents to the office for a follow up patient visit in the heart failure clinic. Accompanied by no one  HX:  NICM (EF 25%) (viral illness years ago), ICD (2013), SYED, HLD  Lexiscan 12/6/18 - Negative  Echo 12/6/18 - EF down to 25% from 30%     Hospitalization r/t Heart Failure:  > 1 year    Concerns today: 2 weeks ago saw PCP, up 10#, had him take Metolazone 2.5 x 5 days. Hadn't had Metolazone in over month - takes average 5 or so times/month   Up 13# in 3 months - increased bloating  Activity: Walked in from back parking lot - not stop, but winded. Diet: No changes - watches salt/fluid    Patient has:  Chest Pain: No  SOB: BREWER  Orthopnea/PND: No  SYED: Not tolerate machine  Edema: No  Fatigue:  Yes  Abdominal bloating: Yes  Cough: No  Appetite: Good  Any extra diuretic use: See HPI  Weight gain: Yes   Home weight: Up  Home blood pressure: Not check - no dizziness/lightedheadness    Past Medical History:   Diagnosis Date    Cardiomyopathy, dilated ([address]     VIRAL    Claustrophobia     Congestive heart failure (CHF) ([address]     Depression 12/26/2013    HTN (hypertension)     Hyperlipidemia     Medtronic dual ICD 2/14/2014    Osteoarthritis     Osteoarthritis of spine with radiculopathy, lumbar region 12/28/2015    Spinal stenosis     Unspecified sleep apnea     unable to wear CPAP     Past Surgical History:   Procedure Laterality Date    BACK SURGERY  8/26/2014    L4- S1 decompression, L4-5 PSF and TLIF    BACK SURGERY  2013    CARDIAC CATHETERIZATION  10/2013    [address]  2013    Medtronic    CARDIOVASCULAR STRESS TEST  09/2013    CARPAL TUNNEL RELEASE

## 2020-02-05 ENCOUNTER — TELEPHONE (OUTPATIENT)
Dept: CARDIOLOGY CLINIC | Age: 60
End: 2020-02-05

## 2020-02-05 NOTE — TELEPHONE ENCOUNTER
Patient stated he had another 4 lbs of wt gain over night and is going to take 1/2 tab of metolazone today        Patient approved for [employer]  Information given to [id number]  Patient notified stop losartan next day start [facility]  Get labs as directed by Viet Mancilla at appointment  Waiting on final approval of Entresto [facility]  Patient and Meryle Leisure need notified once receive final approval

## 2020-02-18 ENCOUNTER — TELEPHONE (OUTPATIENT)
Dept: PHYSICAL MEDICINE AND REHAB | Age: 60
End: 2020-02-18

## 2020-02-19 RX ORDER — METHYLPREDNISOLONE 4 MG/1
TABLET ORAL
Qty: 1 KIT | Refills: 0 | Status: SHIPPED | OUTPATIENT
Start: 2020-02-19 | End: 2020-02-25

## 2020-02-26 ENCOUNTER — HOSPITAL ENCOUNTER (OUTPATIENT)
Dept: GENERAL RADIOLOGY | Age: 60
Discharge: HOME | End: 2020-02-26
Payer: MEDICARE

## 2020-02-26 ENCOUNTER — OFFICE VISIT (OUTPATIENT)
Dept: PHYSICAL MEDICINE AND REHAB | Age: 60
End: 2020-02-26
Payer: MEDICARE

## 2020-02-26 ENCOUNTER — PROCEDURE VISIT (OUTPATIENT)
Dept: CARDIOLOGY CLINIC | Age: 60
End: 2020-02-26
Payer: MEDICARE

## 2020-02-26 ENCOUNTER — HOSPITAL ENCOUNTER (OUTPATIENT)
Age: 60
Discharge: HOME | End: 2020-02-26
Payer: MEDICARE

## 2020-02-26 ENCOUNTER — TELEPHONE (OUTPATIENT)
Dept: CARDIOLOGY CLINIC | Age: 60
End: 2020-02-26

## 2020-02-26 VITALS
BODY MASS INDEX: 38.53 KG/M2 | HEART RATE: 80 BPM | DIASTOLIC BLOOD PRESSURE: 60 MMHG | SYSTOLIC BLOOD PRESSURE: 118 MMHG | WEIGHT: 260.14 LBS | HEIGHT: 69 IN

## 2020-02-26 PROCEDURE — G8417 CALC BMI ABV UP PARAM F/U: HCPCS | Performed by: PAIN MEDICINE

## 2020-02-26 PROCEDURE — 99213 OFFICE O/P EST LOW 20 MIN: CPT | Performed by: PAIN MEDICINE

## 2020-02-26 PROCEDURE — 93297 REM INTERROG DEV EVAL ICPMS: CPT | Performed by: INTERNAL MEDICINE

## 2020-02-26 PROCEDURE — G8484 FLU IMMUNIZE NO ADMIN: HCPCS | Performed by: PAIN MEDICINE

## 2020-02-26 PROCEDURE — 3017F COLORECTAL CA SCREEN DOC REV: CPT | Performed by: PAIN MEDICINE

## 2020-02-26 PROCEDURE — G8427 DOCREV CUR MEDS BY ELIG CLIN: HCPCS | Performed by: PAIN MEDICINE

## 2020-02-26 PROCEDURE — 72040 X-RAY EXAM NECK SPINE 2-3 VW: CPT

## 2020-02-26 PROCEDURE — 1036F TOBACCO NON-USER: CPT | Performed by: PAIN MEDICINE

## 2020-02-26 ASSESSMENT — ENCOUNTER SYMPTOMS
PHOTOPHOBIA: 0
RHINORRHEA: 0
SHORTNESS OF BREATH: 0
VOMITING: 0
ABDOMINAL PAIN: 0
NAUSEA: 0
SINUS PRESSURE: 0
CONSTIPATION: 0
WHEEZING: 0
EYE PAIN: 0
BACK PAIN: 1
SORE THROAT: 0
CHEST TIGHTNESS: 0
COLOR CHANGE: 0
DIARRHEA: 0
COUGH: 0

## 2020-02-26 NOTE — PROGRESS NOTES
[facility]  [address] [facility] [address]  Dept: [phone]  Dept Fax: [phone]: [phone]    Visit Date: 2/26/2020    Functionality Assessment/Goals Worksheet     On a scale of 0 (Does not Interfere) to 10 (Completely Interferes)     1. Which number describes how during the past week pain has interfered with       the following:  A. General Activity:  10  B. Mood: 8  C. Walking Ability:  9  D. Normal Work (Includes both work outside the home and housework):  10  E. Relations with Other People:   6  F. Sleep:   10  G. Enjoyment of Life:   9    2. Patient Prefers to Take their Pain Medications:     []  On a regular basis   []  Only when necessary    [x]  Does not take pain medications    3. What are the Patient's Goals/Expectations for Visiting Pain Management? [x]  Learn about my pain    []  Receive Medication   [x]  Physical Therapy     []  Treat Depression   []  Receive Injections    [x]  Treat Sleep   []  Deal with Anxiety and Stress   []  Treat Opoid Dependence/Addiction   []  Other:      HPI:   Lia Tejeda is a 62 y.o. male is here today for    Chief Complaint: Neck pain , lower back pain and SI pain    HPI     States had fall about 10 days and hit head. States tripped over dog. States now neck is flared up. States having dizzy spells. Left arm numbness present. Difficult to more neck. Medications reviewed. The patienthas No Known Allergies. Past Medical History  [facility]  has a past medical history of Cardiomyopathy, dilated ([address] Claustrophobia, Congestive heart failure (CHF) ([address] Depression, HTN (hypertension), Hyperlipidemia, Medtronic dual ICD, Osteoarthritis, Osteoarthritis of spine with radiculopathy, lumbar region, Spinal stenosis, and Unspecified sleep apnea.     Past Surgical History  The patient  has a past surgical history that includes cardiovascular not bruise/bleed easily. Psychiatric/Behavioral: Positive for sleep disturbance. Negative for agitation, behavioral problems, confusion, decreased concentration, dysphoric mood, hallucinations, self-injury and suicidal ideas. The patient is nervous/anxious. The patient is not hyperactive. Objective:     Vitals:    02/26/20 0823   BP: 118/60   Site: Left Upper Arm   Position: Sitting   Cuff Size: Large Adult   Pulse: 80   Weight: 260 lb 2.3 oz (118 kg)   Height: 5' 9\" (1.753 m)       Physical Exam  Vitals signs and nursing note reviewed. Constitutional:       General: He is not in acute distress. Appearance: He is well-developed. He is not diaphoretic. HENT:      Head: Normocephalic and atraumatic. Right Ear: External ear normal.      Left Ear: External ear normal.      Nose: Nose normal.      Mouth/Throat:      Pharynx: No oropharyngeal exudate. Eyes:      General: No scleral icterus. Right eye: No discharge. Left eye: No discharge. Conjunctiva/sclera: Conjunctivae normal.      Pupils: Pupils are equal, round, and reactive to light. Neck:      Musculoskeletal: Full passive range of motion without pain, normal range of motion and neck supple. Normal range of motion. No edema, erythema, neck rigidity or muscular tenderness. Thyroid: No thyromegaly. Cardiovascular:      Rate and Rhythm: Normal rate and regular rhythm. Heart sounds: Normal heart sounds. No murmur. No friction rub. No gallop. Pulmonary:      Effort: Pulmonary effort is normal. No respiratory distress. Breath sounds: Normal breath sounds. No wheezing or rales. Chest:      Chest wall: No tenderness. Abdominal:      General: Bowel sounds are normal. There is no distension. Palpations: Abdomen is soft. Tenderness: There is no abdominal tenderness. There is no guarding or rebound. Musculoskeletal:      Right knee: He exhibits normal range of motion. No tenderness found. Left knee: He exhibits normal range of motion. No tenderness found. Right ankle: He exhibits normal range of motion and no swelling. Left ankle: He exhibits normal range of motion and no swelling. Cervical back: He exhibits decreased range of motion, tenderness and pain. Thoracic back: He exhibits tenderness. Lumbar back: He exhibits decreased range of motion, tenderness and pain. Back:       Right lower leg: He exhibits no tenderness. Left lower leg: He exhibits no tenderness. Right foot: No tenderness. Left foot: No tenderness. Skin:     General: Skin is warm. Coloration: Skin is not pale. Findings: No erythema or rash. Neurological:      Mental Status: He is alert and oriented to person, place, and time. He is not disoriented. Cranial Nerves: No cranial nerve deficit. Sensory: No sensory deficit. Motor: No atrophy or abnormal muscle tone. Coordination: Coordination normal.      Gait: Gait normal.      Deep Tendon Reflexes: Reflexes are normal and symmetric. Babinski sign absent on the right side. Babinski sign absent on the left side. Psychiatric:         Attention and Perception: He is attentive. Mood and Affect: Mood is not anxious or depressed. Affect is blunt. Affect is not labile, angry or inappropriate. Speech: Speech normal.         Behavior: Behavior normal. Behavior is not agitated, slowed, aggressive, withdrawn, hyperactive or combative. Behavior is cooperative. Thought Content: Thought content normal. Thought content is not paranoid or delusional. Thought content does not include homicidal or suicidal ideation. Thought content does not include homicidal or suicidal plan. Cognition and Memory: Cognition normal. Memory is not impaired. He does not exhibit impaired recent memory or impaired remote memory. Judgment: Judgment normal. Judgment is not impulsive or inappropriate. Assessment:     1. Bilateral occipital neuralgia    2. Spondylosis of cervical region without myelopathy or radiculopathy    3. Spondylosis of lumbar region without myelopathy or radiculopathy    4. Lumbar post-laminectomy syndrome    5. Sacroiliac inflammation ([address]    6. Chronic pain syndrome            Plan:      · OARRS reviewed. Current MED: 0  · Patient was not offered naloxone for home. · Discussed long term side effects of medications, tolerance, dependency and addiction. · Previous UDS reviewed  · UDS NOT preformed today for compliance. · Patient told can not receive any pain medications from any other source. · No evidence of abuse, diversion or aberrant behavior.  Medications and/or procedures to improve function and quality of life- patient understanding with this and that may not be pain free   Discussed with patient about safe storage of medications at home   Discussed possible weaning of medication dosing dependent on treatment/procedure results.  Testing: Ordered XR neck   Procedures: Bilateral Occipital nerve block   Discussed with patient about risks with procedure including infection, reaction to medication, increased pain, or bleeding.  Medications:None ordered. Meds. Prescribed:   No orders of the defined types were placed in this encounter. Return for Bilateral Occipital Nerve Block.          Electronically signed by Adam Schroeder MD on2/26/2020 at 9:02 AM

## 2020-03-03 ENCOUNTER — OFFICE VISIT (OUTPATIENT)
Dept: CARDIOLOGY CLINIC | Age: 60
End: 2020-03-03
Payer: MEDICARE

## 2020-03-03 VITALS
HEART RATE: 86 BPM | HEIGHT: 69 IN | DIASTOLIC BLOOD PRESSURE: 68 MMHG | BODY MASS INDEX: 37.92 KG/M2 | WEIGHT: 256 LBS | SYSTOLIC BLOOD PRESSURE: 128 MMHG

## 2020-03-03 PROCEDURE — 3017F COLORECTAL CA SCREEN DOC REV: CPT | Performed by: NUCLEAR MEDICINE

## 2020-03-03 PROCEDURE — G8417 CALC BMI ABV UP PARAM F/U: HCPCS | Performed by: NUCLEAR MEDICINE

## 2020-03-03 PROCEDURE — 93000 ELECTROCARDIOGRAM COMPLETE: CPT | Performed by: NUCLEAR MEDICINE

## 2020-03-03 PROCEDURE — 99213 OFFICE O/P EST LOW 20 MIN: CPT | Performed by: NUCLEAR MEDICINE

## 2020-03-03 PROCEDURE — G8427 DOCREV CUR MEDS BY ELIG CLIN: HCPCS | Performed by: NUCLEAR MEDICINE

## 2020-03-03 PROCEDURE — 1036F TOBACCO NON-USER: CPT | Performed by: NUCLEAR MEDICINE

## 2020-03-03 PROCEDURE — G8484 FLU IMMUNIZE NO ADMIN: HCPCS | Performed by: NUCLEAR MEDICINE

## 2020-03-03 NOTE — PROGRESS NOTES
[address] [place] [address],[address]  [address]  [facility] [address]  Dept: [phone]  Dept Fax: [phone]  Loc: [phone]    Visit Date: 3/3/2020    Katelynn Quick is a 62 y.o. male who presents todayfor:  Chief Complaint   Patient presents with    Check-Up    Coronary Artery Disease    Cardiomyopathy    Hypertension     worsening CHF symptoms  No chest pain   Does have more dyspnea  Dyspnea on exertion   Following the CHF clinic  Does have CMP and ICD  No recent v tach   No shocks  BP is stable     HPI:  HPI  Past Medical History:   Diagnosis Date    Cardiomyopathy, dilated ([address]     VIRAL    Claustrophobia     Congestive heart failure (CHF) ([address]     Depression 12/26/2013    HTN (hypertension)     Hyperlipidemia     Medtronic dual ICD 2/14/2014    Osteoarthritis     Osteoarthritis of spine with radiculopathy, lumbar region 12/28/2015    Spinal stenosis     Unspecified sleep apnea     unable to wear CPAP      Past Surgical History:   Procedure Laterality Date    BACK SURGERY  8/26/2014    L4- S1 decompression, L4-5 PSF and TLIF    BACK SURGERY  2013    CARDIAC CATHETERIZATION  10/2013    [address]  2013    Medtronic    CARDIOVASCULAR STRESS TEST  09/2013    CARPAL TUNNEL RELEASE Right     CERVICAL FUSION  05/17/2017    [address]    COLONOSCOPY      DOPPLER ECHOCARDIOGRAPHY  09/2013    DOPPLER ECHOCARDIOGRAPHY  09/2013    [facility]    ENDOSCOPY, COLON, DIAGNOSTIC      HC INJECTION PROCEDURE FOR SACROILIAC JOINT Left 7/2/2019    Left SI injection performed by Stevenson Lynn MD at [address] [place] [address] Left 8/26/2019    Left SI injection # 2 performed by Stevenson Lynn MD at [address]  ? ??    umbilical    LUMBAR SPINE SURGERY Left 9/11/2017    LUMBAR RFA L2-3, L3-4, L4-5, L5-S1 LEFT SIDE Types: 6 Standard drinks or equivalent per week     Comment: social      Current Outpatient Medications   Medication Sig Dispense Refill    carvedilol (COREG) 6.25 MG tablet TAKE 1 TABLET TWICE DAILY 180 tablet 3    metOLazone (ZAROXOLYN) 5 MG tablet Take 1 tablet by mouth daily 30 tablet 1    torsemide (DEMADEX) 20 MG tablet Take 1 tablet by mouth 2 times daily 180 tablet 3    sacubitril-valsartan (ENTRESTO) 24-26 MG per tablet Take 1 tablet by mouth 2 times daily 60 tablet 5    spironolactone (ALDACTONE) 25 MG tablet TAKE 1 TABLET TWICE DAILY 180 tablet 3    metOLazone (ZAROXOLYN) 5 MG tablet Take 0.5 tablets by mouth daily as needed (as directed by HF clinic) 15 tablet 0    allopurinol (ZYLOPRIM) 300 MG tablet Take 300 mg by mouth daily      potassium chloride (KLOR-CON M20) 20 MEQ extended release tablet Take 1 tablet by mouth 2 times daily 90 tablet 3    Acetaminophen (TYLENOL ARTHRITIS PAIN PO) Take by mouth 3 times daily as needed       No current facility-administered medications for this visit.       No Known Allergies  Health Maintenance   Topic Date Due    Hepatitis C screen  1960    Pneumococcal 0-64 years Vaccine (1 of 1 - PPSV23) 05/26/1964    HIV screen  05/26/1973    Lipid screen  05/26/1998    Shingles Vaccine (1 of 2) 05/26/2008    Colon cancer screen colonoscopy  05/26/2008    Annual Wellness Visit (AWV)  06/19/2019    Flu vaccine (1) 09/01/2019    Potassium monitoring  01/31/2021    Creatinine monitoring  01/31/2021    DTaP/Tdap/Td vaccine (2 - Td) 07/18/2028    Hepatitis A vaccine  Aged Out    Hepatitis B vaccine  Aged Out    Hib vaccine  Aged Out    Meningococcal (ACWY) vaccine  Aged Out       Subjective:  Review of Systems  General:   No fever, no chills, some fatigue or weight loss  Pulmonary:    some dyspnea, no wheezing  Cardiac:    Denies recent chest pain,   GI:     No nausea or vomiting, no abdominal pain  Neuro:    No dizziness or light headedness,

## 2020-03-03 NOTE — PROGRESS NOTES
Pt here for fu  States he has been having weight gain overnight   Gets some chest pain with the weight gain   Is also SOB all the time     States when he takes the entresto his BP drops

## 2020-03-04 ENCOUNTER — OFFICE VISIT (OUTPATIENT)
Dept: PHYSICAL MEDICINE AND REHAB | Age: 60
End: 2020-03-04
Payer: MEDICARE

## 2020-03-04 VITALS
DIASTOLIC BLOOD PRESSURE: 68 MMHG | SYSTOLIC BLOOD PRESSURE: 110 MMHG | HEIGHT: 69 IN | WEIGHT: 255.95 LBS | BODY MASS INDEX: 37.91 KG/M2

## 2020-03-04 PROCEDURE — 64405 NJX AA&/STRD GR OCPL NRV: CPT | Performed by: PAIN MEDICINE

## 2020-03-04 RX ORDER — METHYLPREDNISOLONE ACETATE 40 MG/ML
80 INJECTION, SUSPENSION INTRA-ARTICULAR; INTRALESIONAL; INTRAMUSCULAR; SOFT TISSUE ONCE
Status: COMPLETED | OUTPATIENT
Start: 2020-03-04 | End: 2020-03-04

## 2020-03-04 RX ADMIN — METHYLPREDNISOLONE ACETATE 80 MG: 40 INJECTION, SUSPENSION INTRA-ARTICULAR; INTRALESIONAL; INTRAMUSCULAR; SOFT TISSUE at 10:32

## 2020-04-01 ENCOUNTER — PROCEDURE VISIT (OUTPATIENT)
Dept: CARDIOLOGY CLINIC | Age: 60
End: 2020-04-01
Payer: MEDICARE

## 2020-04-01 PROCEDURE — 93295 DEV INTERROG REMOTE 1/2/MLT: CPT | Performed by: INTERNAL MEDICINE

## 2020-04-01 PROCEDURE — 93296 REM INTERROG EVL PM/IDS: CPT | Performed by: INTERNAL MEDICINE

## 2020-04-21 LAB
ANION GAP SERPL CALCULATED.3IONS-SCNC: 11 MMOL/L (ref 5–15)
BUN BLDV-MCNC: 30 MG/DL (ref 5–27)
CALCIUM SERPL-MCNC: 9.7 MG/DL (ref 8.5–10.5)
CHLORIDE BLD-SCNC: 96 MMOL/L (ref 98–109)
CO2: 30 MMOL/L (ref 22–32)
CREAT SERPL-MCNC: 1.54 MG/DL (ref 0.6–1.3)
EGFR AFRICAN AMERICAN: 56 ML/MIN/1.73SQ.M
EGFR IF NONAFRICAN AMERICAN: 46 ML/MIN/1.73SQ.M
GLUCOSE: 100 MG/DL (ref 65–99)
POTASSIUM SERPL-SCNC: 3.9 MMOL/L (ref 3.5–5)
SODIUM BLD-SCNC: 137 MMOL/L (ref 134–146)

## 2020-04-22 ENCOUNTER — OFFICE VISIT (OUTPATIENT)
Dept: PHYSICAL MEDICINE AND REHAB | Age: 60
End: 2020-04-22
Payer: MEDICARE

## 2020-04-22 VITALS
HEART RATE: 70 BPM | DIASTOLIC BLOOD PRESSURE: 70 MMHG | SYSTOLIC BLOOD PRESSURE: 128 MMHG | WEIGHT: 255.73 LBS | BODY MASS INDEX: 37.88 KG/M2 | HEIGHT: 69 IN

## 2020-04-22 PROCEDURE — 64405 NJX AA&/STRD GR OCPL NRV: CPT | Performed by: PAIN MEDICINE

## 2020-04-22 RX ORDER — METHYLPREDNISOLONE ACETATE 40 MG/ML
80 INJECTION, SUSPENSION INTRA-ARTICULAR; INTRALESIONAL; INTRAMUSCULAR; SOFT TISSUE ONCE
Status: COMPLETED | OUTPATIENT
Start: 2020-04-22 | End: 2020-04-22

## 2020-04-22 RX ADMIN — METHYLPREDNISOLONE ACETATE 80 MG: 40 INJECTION, SUSPENSION INTRA-ARTICULAR; INTRALESIONAL; INTRAMUSCULAR; SOFT TISSUE at 11:45

## 2020-04-22 NOTE — PROGRESS NOTES
Procedure  Visit Date: 4/22/20    Kristi Adamson is a 62 y.o. male presents today in the office for the following:      History of Present Illness   Rolene April is here today for occipital nerve block. Pre-Op Diagnosis   Bilateral Occipital neuralgia    Post-Op Diagnosis   Bilateral Occipital neuralgia    Procedure Documentation   The patient is positioned and landmarks identified. The occipital artery is palpated. Site was sprayed with Ethyl Chloride. Skin over the area was prepped with Chloro-Prep. #25-gauge 1-1/2 inch hypodermic needle was inserted through the skin so that the tip of the needle is over the bone medial to the artery. Then a mixture of 4 cc of 0.25% Marcaine with 40mg DepoMedrol was injected medial to the artery after negative aspiration. This was done over the Bilateral Occipital nerve. Vitals:    04/22/20 1209   BP: 128/70   Site: Left Upper Arm   Position: Sitting   Cuff Size: Large Adult   Pulse: 70   Weight: 255 lb 11.7 oz (116 kg)   Height: 5' 9\" (1.753 m)       Conclusion   The patient tolerated the procedure well and with out complication Patient's vital signs remained stable and was discharged home in stable condition. Patient will follow up in office. No orders of the defined types were placed in this encounter.       Electronically signed by Aly Taylor MD on 4/22/20 at 12:57 PM EDT

## 2020-04-28 ENCOUNTER — OFFICE VISIT (OUTPATIENT)
Dept: CARDIOLOGY CLINIC | Age: 60
End: 2020-04-28
Payer: MEDICARE

## 2020-04-28 VITALS
HEART RATE: 77 BPM | BODY MASS INDEX: 38.06 KG/M2 | SYSTOLIC BLOOD PRESSURE: 124 MMHG | WEIGHT: 257 LBS | HEIGHT: 69 IN | DIASTOLIC BLOOD PRESSURE: 66 MMHG | OXYGEN SATURATION: 96 %

## 2020-04-28 PROCEDURE — 1036F TOBACCO NON-USER: CPT | Performed by: NURSE PRACTITIONER

## 2020-04-28 PROCEDURE — 3017F COLORECTAL CA SCREEN DOC REV: CPT | Performed by: NURSE PRACTITIONER

## 2020-04-28 PROCEDURE — G8417 CALC BMI ABV UP PARAM F/U: HCPCS | Performed by: NURSE PRACTITIONER

## 2020-04-28 PROCEDURE — G8427 DOCREV CUR MEDS BY ELIG CLIN: HCPCS | Performed by: NURSE PRACTITIONER

## 2020-04-28 PROCEDURE — 99213 OFFICE O/P EST LOW 20 MIN: CPT | Performed by: NURSE PRACTITIONER

## 2020-04-28 ASSESSMENT — ENCOUNTER SYMPTOMS
SHORTNESS OF BREATH: 0
ABDOMINAL DISTENTION: 0
COUGH: 0

## 2020-04-28 NOTE — PROGRESS NOTES
.      Signature      ----------------------------------------------------------------   Electronically signed by Jose Graves MD (Interpreting   GALO) on 12/06/2018 at 05:13 PM   ----------------------------------------------------------------     CATH/STRESS:   Stress test   Imaging:Results:Calculated gated LVEF 25 %. technically difficult study  patchy uptake  no clear ischemia pattern  abnormal dilated cardiomyopathy  EF 25%     Conclusions      Summary   This Nuclear Medicine study was negative for ischemia .   dilated cardiomyopathy   difficult scan      Recommendation   Clinical correlation is recommended.      Signatures      ----------------------------------------------------------------   Electronically signed by Jose Graves MD (Interpreting   Cardiologist) on 12/06/2018 at 17:22   ---------------------------------------------------------------      Results reviewed:  BNP: No results found for: BNP  CBC:   Lab Results   Component Value Date    WBC 9.7 11/27/2018    RBC 4.38 11/27/2018    HGB 14.0 11/27/2018    HCT 40.5 11/27/2018     11/27/2018     CMP:    Lab Results   Component Value Date     04/21/2020    K 3.9 04/21/2020    CL 96 04/21/2020    CO2 30 04/21/2020    BUN 30 04/21/2020    CREATININE 1.54 04/21/2020    LABGLOM 56 06/20/2019    GLUCOSE 100 04/21/2020    CALCIUM 9.7 04/21/2020     Hepatic Function Panel:    Lab Results   Component Value Date    ALKPHOS 87 11/27/2018    ALT 20 11/27/2018    AST 15 11/27/2018    PROT 7.7 11/27/2018    BILITOT 1.8 11/27/2018    BILIDIR 0.4 11/27/2018    LABALBU 4.4 11/27/2018     Magnesium:    Lab Results   Component Value Date    MG 2.1 06/20/2019     PT/INR:    Lab Results   Component Value Date    INR 1.24 11/27/2018     Lipids:  No results found for: TRIG, HDL, LDLCALC, LDLDIRECT, LABVLDL    ASSESSMENT AND PLAN:   The patient's condition/symptoms are Stable: No clinical evidence of fluid overload today.  Continue current medical regimen without changes at present time. Diagnosis Orders   1. Chronic systolic congestive heart failure, NYHA class 2/3 ([facility]  Echo 2D w doppler w color complete     Continue:  GDMT:   ACE/ARB/ARNI - Entresto 24/26 daily   BB - Coreg 6.25 BID   Diuretic - Torsemide 20 BID   Hydralazine/Isos. - None   Aldosterone- Aldactone 25 BID  Continue Current medications  Stable - Appears Euvolemic, at baseline  Labs 4/21 = stable  Check Lipids  Routine ECHO in July  Discussed [address] [facility] Referral to establish care = symptoms stable, he will let us know if/when he wants referral  F/U w/ Rodo Das in Sept  F/U in Dec or sooner if needed. · Daily weights  · Fluid restriction of 2 Liters per day  · Limit sodium in diet to around 5811-4230 mg/day  · Monitor BP  · Activity as tolerated     Patient was instructed to call the [employer] for any changes in symptoms as noted in AVS.      Return in about 8 months (around 12/28/2020). or sooner if needed     Patient given educational materials - see patient instructions. We discussed the importance of weighing oneself and recording daily. We also discussed the importance of a low sodium diet, higher sodium foods to avoid and better low sodium food options. Patient verbalizes understanding of plan of care using teach back method, and is agreeable to the treatment plan.        Electronically signed by KRIS Alba CNP on 4/28/2020 at 10:26 AM

## 2020-05-01 ENCOUNTER — HOSPITAL ENCOUNTER (INPATIENT)
Age: 60
LOS: 3 days | Discharge: HOME | DRG: 309 | End: 2020-05-05
Attending: FAMILY MEDICINE | Admitting: INTERNAL MEDICINE
Payer: MEDICARE

## 2020-05-01 PROBLEM — Z45.02 AICD DISCHARGE: Status: ACTIVE | Noted: 2020-05-01

## 2020-05-01 LAB
ALBUMIN SERPL-MCNC: 4.3 G/DL (ref 3.5–5.1)
ALP BLD-CCNC: 80 U/L (ref 38–126)
ALT SERPL-CCNC: 18 U/L (ref 11–66)
ANION GAP SERPL CALCULATED.3IONS-SCNC: 17 MEQ/L (ref 8–16)
AST SERPL-CCNC: 19 U/L (ref 5–40)
BASOPHILS # BLD: 0.6 %
BASOPHILS ABSOLUTE: 0.1 THOU/MM3 (ref 0–0.1)
BILIRUB SERPL-MCNC: 0.7 MG/DL (ref 0.3–1.2)
BUN BLDV-MCNC: 30 MG/DL (ref 7–22)
CALCIUM SERPL-MCNC: 9.3 MG/DL (ref 8.5–10.5)
CHLORIDE BLD-SCNC: 89 MEQ/L (ref 98–111)
CO2: 22 MEQ/L (ref 23–33)
CREAT SERPL-MCNC: 1.4 MG/DL (ref 0.4–1.2)
EOSINOPHIL # BLD: 1.3 %
EOSINOPHILS ABSOLUTE: 0.2 THOU/MM3 (ref 0–0.4)
ERYTHROCYTE [DISTWIDTH] IN BLOOD BY AUTOMATED COUNT: 13.8 % (ref 11.5–14.5)
ERYTHROCYTE [DISTWIDTH] IN BLOOD BY AUTOMATED COUNT: 46.3 FL (ref 35–45)
GFR SERPL CREATININE-BSD FRML MDRD: 51 ML/MIN/1.73M2
GLUCOSE BLD-MCNC: 97 MG/DL (ref 70–108)
HCT VFR BLD CALC: 41.8 % (ref 42–52)
HEMOGLOBIN: 14.4 GM/DL (ref 14–18)
IMMATURE GRANS (ABS): 0.08 THOU/MM3 (ref 0–0.07)
IMMATURE GRANULOCYTES: 0.7 %
LYMPHOCYTES # BLD: 23.6 %
LYMPHOCYTES ABSOLUTE: 2.8 THOU/MM3 (ref 1–4.8)
MAGNESIUM: 2 MG/DL (ref 1.6–2.4)
MCH RBC QN AUTO: 31.9 PG (ref 26–33)
MCHC RBC AUTO-ENTMCNC: 34.4 GM/DL (ref 32.2–35.5)
MCV RBC AUTO: 92.5 FL (ref 80–94)
MONOCYTES # BLD: 10.2 %
MONOCYTES ABSOLUTE: 1.2 THOU/MM3 (ref 0.4–1.3)
NUCLEATED RED BLOOD CELLS: 0 /100 WBC
OSMOLALITY CALCULATION: 263.2 MOSMOL/KG (ref 275–300)
PLATELET # BLD: 265 THOU/MM3 (ref 130–400)
PMV BLD AUTO: 10.3 FL (ref 9.4–12.4)
POTASSIUM SERPL-SCNC: 4 MEQ/L (ref 3.5–5.2)
PRO-BNP: 3978 PG/ML (ref 0–900)
RBC # BLD: 4.52 MILL/MM3 (ref 4.7–6.1)
SEG NEUTROPHILS: 63.6 %
SEGMENTED NEUTROPHILS ABSOLUTE COUNT: 7.6 THOU/MM3 (ref 1.8–7.7)
SODIUM BLD-SCNC: 128 MEQ/L (ref 135–145)
TOTAL PROTEIN: 7.2 G/DL (ref 6.1–8)
TROPONIN T: < 0.01 NG/ML
WBC # BLD: 11.9 THOU/MM3 (ref 4.8–10.8)

## 2020-05-01 PROCEDURE — 96361 HYDRATE IV INFUSION ADD-ON: CPT

## 2020-05-01 PROCEDURE — 83735 ASSAY OF MAGNESIUM: CPT

## 2020-05-01 PROCEDURE — 96360 HYDRATION IV INFUSION INIT: CPT

## 2020-05-01 PROCEDURE — 99220 PR INITIAL OBSERVATION CARE/DAY 70 MINUTES: CPT | Performed by: FAMILY MEDICINE

## 2020-05-01 PROCEDURE — 2709999900 HC NON-CHARGEABLE SUPPLY

## 2020-05-01 PROCEDURE — 85025 COMPLETE CBC W/AUTO DIFF WBC: CPT

## 2020-05-01 PROCEDURE — 2580000003 HC RX 258: Performed by: FAMILY MEDICINE

## 2020-05-01 PROCEDURE — 83935 ASSAY OF URINE OSMOLALITY: CPT

## 2020-05-01 PROCEDURE — 84300 ASSAY OF URINE SODIUM: CPT

## 2020-05-01 PROCEDURE — 81001 URINALYSIS AUTO W/SCOPE: CPT

## 2020-05-01 PROCEDURE — 6370000000 HC RX 637 (ALT 250 FOR IP): Performed by: FAMILY MEDICINE

## 2020-05-01 PROCEDURE — 93005 ELECTROCARDIOGRAM TRACING: CPT | Performed by: NURSE PRACTITIONER

## 2020-05-01 PROCEDURE — 36415 COLL VENOUS BLD VENIPUNCTURE: CPT

## 2020-05-01 PROCEDURE — 84484 ASSAY OF TROPONIN QUANT: CPT

## 2020-05-01 PROCEDURE — 80053 COMPREHEN METABOLIC PANEL: CPT

## 2020-05-01 PROCEDURE — 99285 EMERGENCY DEPT VISIT HI MDM: CPT

## 2020-05-01 PROCEDURE — G0378 HOSPITAL OBSERVATION PER HR: HCPCS

## 2020-05-01 PROCEDURE — 2580000003 HC RX 258: Performed by: NURSE PRACTITIONER

## 2020-05-01 PROCEDURE — 83880 ASSAY OF NATRIURETIC PEPTIDE: CPT

## 2020-05-01 RX ORDER — CARVEDILOL 6.25 MG/1
6.25 TABLET ORAL 2 TIMES DAILY WITH MEALS
Status: DISCONTINUED | OUTPATIENT
Start: 2020-05-02 | End: 2020-05-01

## 2020-05-01 RX ORDER — ALLOPURINOL 300 MG/1
300 TABLET ORAL DAILY
Status: DISCONTINUED | OUTPATIENT
Start: 2020-05-02 | End: 2020-05-05 | Stop reason: HOSPADM

## 2020-05-01 RX ORDER — ACETAMINOPHEN 325 MG/1
650 TABLET ORAL EVERY 6 HOURS PRN
Status: DISCONTINUED | OUTPATIENT
Start: 2020-05-01 | End: 2020-05-05 | Stop reason: HOSPADM

## 2020-05-01 RX ORDER — SODIUM CHLORIDE 0.9 % (FLUSH) 0.9 %
10 SYRINGE (ML) INJECTION EVERY 12 HOURS SCHEDULED
Status: DISCONTINUED | OUTPATIENT
Start: 2020-05-01 | End: 2020-05-05 | Stop reason: HOSPADM

## 2020-05-01 RX ORDER — ZINC SULFATE 50(220)MG
50 CAPSULE ORAL DAILY
COMMUNITY

## 2020-05-01 RX ORDER — SODIUM CHLORIDE 0.9 % (FLUSH) 0.9 %
10 SYRINGE (ML) INJECTION PRN
Status: DISCONTINUED | OUTPATIENT
Start: 2020-05-01 | End: 2020-05-05 | Stop reason: HOSPADM

## 2020-05-01 RX ORDER — CARVEDILOL 6.25 MG/1
6.25 TABLET ORAL 2 TIMES DAILY WITH MEALS
Status: DISCONTINUED | OUTPATIENT
Start: 2020-05-01 | End: 2020-05-03

## 2020-05-01 RX ORDER — MAGNESIUM 30 MG
30 TABLET ORAL DAILY
Status: ON HOLD | COMMUNITY
End: 2020-05-05 | Stop reason: HOSPADM

## 2020-05-01 RX ORDER — MAGNESIUM 30 MG
30 TABLET ORAL DAILY
Status: DISCONTINUED | OUTPATIENT
Start: 2020-05-02 | End: 2020-05-05

## 2020-05-01 RX ORDER — ACETAMINOPHEN 650 MG/1
650 SUPPOSITORY RECTAL EVERY 6 HOURS PRN
Status: DISCONTINUED | OUTPATIENT
Start: 2020-05-01 | End: 2020-05-05 | Stop reason: HOSPADM

## 2020-05-01 RX ORDER — POTASSIUM CHLORIDE 20 MEQ/1
20 TABLET, EXTENDED RELEASE ORAL 2 TIMES DAILY
Status: DISCONTINUED | OUTPATIENT
Start: 2020-05-01 | End: 2020-05-05

## 2020-05-01 RX ORDER — 0.9 % SODIUM CHLORIDE 0.9 %
500 INTRAVENOUS SOLUTION INTRAVENOUS ONCE
Status: COMPLETED | OUTPATIENT
Start: 2020-05-01 | End: 2020-05-01

## 2020-05-01 RX ADMIN — CARVEDILOL 6.25 MG: 6.25 TABLET, FILM COATED ORAL at 23:39

## 2020-05-01 RX ADMIN — POTASSIUM CHLORIDE 20 MEQ: 1500 TABLET, EXTENDED RELEASE ORAL at 23:39

## 2020-05-01 RX ADMIN — SODIUM CHLORIDE 500 ML: 9 INJECTION, SOLUTION INTRAVENOUS at 18:38

## 2020-05-01 RX ADMIN — SODIUM CHLORIDE, PRESERVATIVE FREE 10 ML: 5 INJECTION INTRAVENOUS at 23:41

## 2020-05-01 ASSESSMENT — ENCOUNTER SYMPTOMS
EYE REDNESS: 0
VOICE CHANGE: 0
BACK PAIN: 0
SINUS PRESSURE: 0
DIARRHEA: 0
BLOOD IN STOOL: 0
SHORTNESS OF BREATH: 0
COUGH: 0
RHINORRHEA: 0
NAUSEA: 0
WHEEZING: 0
CHEST TIGHTNESS: 0
CONSTIPATION: 0
VOMITING: 0
COLOR CHANGE: 0
SORE THROAT: 0
ABDOMINAL DISTENTION: 0
ABDOMINAL PAIN: 0
PHOTOPHOBIA: 0

## 2020-05-01 ASSESSMENT — PAIN SCALES - GENERAL: PAINLEVEL_OUTOF10: 0

## 2020-05-01 NOTE — H&P
History & Physical       Patient: Tiff Sierra  YOB: 1960    MRN: [id number]     Acct: [de-identified]    PCP: Lucinda Gamino MD    Date of Admission: 5/1/2020    Date of Service: Patient seen / examined on 05/01/20 and admitted to outpatient with expected LOS less than two midnights due to medical therapy. ASSESSMENT / PLAN:    AICD discharge / Vfib x 4 on interrogation  K 4.0, Mg 2.0, Ca 9.3. Patient with history of chronic systolic CHF (EF 89% per ECHO 12/2018). Endorses strict compliance with home medications. Trop negative. EKG reviewed. Hemodynamically appropriate and asymptomatic on RA. Admit under observation to cardiac stepdown unit. Repeat ECHO ordered for the AM. Cardiology consulted for additional recommendations / will keep NPO after midnight pending evaluation. Maintain telemetry. Dilated cardiomyopathy (viral; dx 2013) / chronic systolic CHF (EF 57% per ECHO 12/2018) / s/p Medtronic dual AICD placement  Appears compensated. Resume coreg. Holding torsemide/metolazone, ARNI and aldactone for RYAN (see below) / resume as indicated. Monitor I/O, daily weights. Acute hypotonic hyponatremia, with associated hypochloremia  Moderate (Na 128). Volume status difficult to determine / patient appears euvolemic on exam. Already received 500 cc 0.9 NS bolus in the ED. Given hx low EF, will not provide additional IVF at this time. Repeat BMP ordered to assess for improvement following bolus. Aurora/UOsm ordered. Plan to resume diuretics if indicated. RYAN  BUN:Cr ratio supports pre-renal etiology +/- ATN from outpatient diuretic use. Clinically, the patient appears euvolemic. S/p 500 cc IVF bolus in the ED. Hold ARNI, aldactone, torsemide/metolazone for now / resume as indicated. Avoid additional nephrotoxins. Renally dose medications. Repeat BMP in the AM to monitor for improvement. Mild AG metabolic acidosis  Etiology unclear. Per above.  Will trend gap until closed. Prolonged QTc  On EKG. Patient has pacer/AICD but is not paced. Previous EKGs reviewed / QTc wnl. Avoid QT-prolonging agents. Repeat EKG in the AM to document down-trend. Leukocytosis  Mild. Suspect reactive, secondary to above. Low suspicion for acute infection at this time. Repeat CBC in the AM.    Chronic HTN  Controlled. Resume BB. Holding other antihypertensives (per above). Monitor BP. HLD   No current statin therapy (or allergy) listed -- unclear why patient is not on this therapy. Will clarify with cardiology in the AM.    SYED (unable to wear CPAP), obesity  Noted.  on weight loss. May benefit from outpatient bariatrics referral.    OA, DDD  Historical. Trial tylenol / heating pad as needed. Gout  Without exacerbation. Resume allopurinol. Chief Complaint: AICD discharge    History of Present Illness:  63 y/o M PMHx dilated cardiomyopathy (viral; dx 8167), chronic systolic CHF (EF 57% per ECHO 12/2018); s/p Medtronic dual AICD placement, HTN, HLD, SYED (unable to wear CPAP), obesity, OA, gout and depression -- presents to [facility] with a chief complaint of AICD discharge. History provided by the patient. Patient endorses 3 spontaneous, back-to-back AICD discharges which occurred earlier today while he was ambulating at a normal pace. Reports placement of AICD following diagnosis of dilated cardiomyopathy many years ago -- states it has never fired until today. Endorses strict compliance with routine interrogations and daily medications. He denies recent illness, stressors, trauma or changes to diet or level of exertion. He also denies associated fevers/chills, headaches, chest pain, palpitations, cough, shortness of breath, nausea/emesis, abdominal pain, diarrhea, weakness, paresthesias or syncope. He follows with Dr. Brook Hathaway as an outpatient. Denies recurrent discharges since arrival to [facility]. No additional questions / concerns identified at this time.     ED course: afebrile, hemodynamically appropriate on RA. Workup grossly unremarkable, except for: mild leukocytosis (11.9), K 4.0, Mg 2.0, Ca 9.3, Na 128 (BG normal), Cr 1.4 / BUN 30 / eGFR 51, trop negative. EKG: sinus tach, low voltage, prolonged QTc (570). ICD interrogated -- revealed Vfib x 4. Cardiology consulted -- requested admission. Hospitalist service contacted. Past Medical History:    Diagnosis Date    Cardiomyopathy, dilated ([address]     VIRAL    Claustrophobia     Congestive heart failure (CHF) (HCC)     HTN (hypertension)     Hyperlipidemia     Medtronic dual ICD 2/14/2014    Osteoarthritis     Osteoarthritis of spine with radiculopathy, lumbar region 12/28/2015    Spinal stenosis     Unspecified sleep apnea     unable to wear CPAP   Gout    Past Surgical History:    Procedure Laterality Date    BACK SURGERY  8/26/2014    L4- S1 decompression, L4-5 PSF and TLIF    BACK SURGERY  2013    CARDIAC CATHETERIZATION  10/2013    [address]  2013    [employer]    CARDIOVASCULAR STRESS TEST  09/2013    CARPAL TUNNEL RELEASE Right     CERVICAL FUSION  05/17/2017    [address]    COLONOSCOPY      DOPPLER ECHOCARDIOGRAPHY  09/2013    DOPPLER ECHOCARDIOGRAPHY  09/2013    [facility]    ENDOSCOPY, COLON, DIAGNOSTIC      HC INJECTION PROCEDURE FOR SACROILIAC JOINT Left 7/2/2019    Left SI injection performed by Rhonda Brady MD at [address] [facility] Left 8/26/2019    Left SI injection # 2 performed by Rhonda Brady MD at [address]  ? ??    umbilical    LUMBAR SPINE SURGERY Left 9/11/2017    LUMBAR RFA L2-3, L3-4, L4-5, L5-S1 LEFT SIDE performed by Rhonda Brady MD at [address] [facility] Right 10/31/2017    LUMBAR FACET RFA @ L2-3, L3-4, L4-5 AND L5-S1 RIGHT SIDE performed by Rhonda Brady MD at [address] [facility] (KLOR-CON M20) 20 MEQ extended release tablet Take 1 tablet by mouth 2 times daily    Acetaminophen (TYLENOL ARTHRITIS PAIN PO) Take by mouth 3 times daily as needed     Allergies:   Patient has no known allergies. Social History:  Socioeconomic History    Marital status:    Tobacco Use    Smoking status: Never Smoker    Smokeless tobacco: Never Used   Substance and Sexual Activity    Alcohol use: Yes     Alcohol/week: 6.0 standard drinks     Types: 6 Standard drinks or equivalent per week     Comment: social    Drug use: No     Family History:   Problem Relation Age of Onset    Heart Disease Father     Coronary Art Dis Father     Heart Attack Father     High Blood Pressure Father     Parkinsonism Father      REVIEW OF SYSTEMS:  A 14-point ROS was obtained and negative, with the exception of pertinent positives as stated in the HPI. PHYSICAL EXAM:  Vitals:    05/01/20 1831   BP: 109/77   Pulse: 101   Resp: 18   Temp: 98.7 °F (37.1 °C)   TempSrc: Oral   Weight: 252 lb (114.3 kg)   Height: 5' 9\" (1.753 m)   RA    General appearance: Alert / well-appearing  male. Cooperative. NAD. HEENT: Normocephalic / atraumatic. Conjunctivae appear normal.  Neck: Supple. No JVD. Respiratory: Normal respiratory effort on RA. Lung sounds CTA with good air movement bilaterally. No wheezes / rales / rhonchi. Cardiovascular: RRR. Normal S1/S2. No obvious murmurs / rubs / gallops. Abdomen: Obese. Soft / non-tender / non-distended. BS present. Musculoskeletal: No cyanosis or LE edema. Skin: Warm / dry. Good turgor. No pallor. Neurologic: A/O x 3. No obvious focal neurologic deficits. Psychiatric: Thought content / judgment / insight appear appropriate. Peripheral pulses: +2 bilaterally.     Labs:   Results for orders placed or performed during the hospital encounter of 05/01/20   CBC auto differential   Result Value Ref Range    WBC 11.9 (H) 4.8 - 10.8 thou/mm3    RBC 4.52 (L) 4.70 - 6.10 mill/mm3

## 2020-05-01 NOTE — ED PROVIDER NOTES
[facility] Emergency Department    CHIEF COMPLAINT       Chief Complaint   Patient presents with    AICD Problem       Nurses Notes reviewed and I agree except as noted in the HPI. HISTORY OF PRESENT ILLNESS    Klaudia Maldonado dipesh 62 y.o. male who presents to the ED via EMS for evaluation of AICD problem. Patient states he was working in the yard today and did not eat. He states he went to a friends house and when he went to sit down he felt his AICD shock him 3 times. He states it almost knocked him out. He states he went to the Heart Failure clinic 3 days ago and his potassium was low. He denies changes to his medications. He states he is taking OTC magnesium, zinc, and a herb to protect him from COVID-19. The AICD was placed in 2013 due to dilated cardiomyopathy. He denies physical symptoms. The patient has a past medical history of CHF, HTN, HLD, and OA. No further complaints at the time of the initial encounter. Pain description:  Onset: today  Location: AICD problem   Severity: moderate     Experienced previously: No    HPI was provided by the patient. REVIEW OF SYSTEMS     Review of Systems   Constitutional: Negative for appetite change, chills, diaphoresis, fatigue, fever and unexpected weight change. HENT: Negative for congestion, hearing loss, postnasal drip, rhinorrhea, sinus pressure, sore throat and voice change. Eyes: Negative for photophobia, redness and visual disturbance. Respiratory: Negative for cough, chest tightness, shortness of breath and wheezing. Cardiovascular: Negative for chest pain and palpitations. Gastrointestinal: Negative for abdominal distention, abdominal pain, blood in stool, constipation, diarrhea, nausea and vomiting. Endocrine: Negative for cold intolerance, heat intolerance, polydipsia, polyphagia and polyuria. Genitourinary: Negative for decreased urine volume, difficulty urinating, dysuria, flank pain and frequency.    Musculoskeletal: Not on file    Number of children: 1    Years of education: Not on file    Highest education level: Not on file   Occupational History    Not on file   Social Needs    Financial resource strain: Not on file    Food insecurity     Worry: Not on file     Inability: Not on file    Transportation needs     Medical: Not on file     Non-medical: Not on file   Tobacco Use    Smoking status: Never Smoker    Smokeless tobacco: Never Used   Substance and Sexual Activity    Alcohol use: Yes     Alcohol/week: 6.0 standard drinks     Types: 6 Standard drinks or equivalent per week     Comment: social    Drug use: No    Sexual activity: Yes     Partners: Female   Lifestyle    Physical activity     Days per week: Not on file     Minutes per session: Not on file    Stress: Not on file   Relationships    Social connections     Talks on phone: Not on file     Gets together: Not on file     Attends [employer] service: Not on file     Active member of club or organization: Not on file     Attends meetings of clubs or organizations: Not on file     Relationship status: Not on file    Intimate partner violence     Fear of current or ex partner: Not on file     Emotionally abused: Not on file     Physically abused: Not on file     Forced sexual activity: Not on file   Other Topics Concern    Not on file   Social History Narrative    Not on file       PHYSICAL EXAM     INITIAL VITALS:  height is 5' 9\" (1.753 m) and weight is 260 lb 8 oz (118.2 kg). His oral temperature is 98 °F (36.7 °C). His blood pressure is 105/62 and his pulse is 86. His respiration is 16 and oxygen saturation is 98%. Physical Exam  Vitals signs and nursing note reviewed. Constitutional:       Appearance: Normal appearance. He is well-developed. HENT:      Head: Normocephalic. Right Ear: External ear normal.      Left Ear: External ear normal.      Nose: Nose normal.      Mouth/Throat:      Pharynx: Uvula midline.    Eyes: All other components within normal limits   COMPREHENSIVE METABOLIC PANEL - Abnormal; Notable for the following components:    CREATININE 1.4 (*)     BUN 30 (*)     Sodium 128 (*)     Chloride 89 (*)     CO2 22 (*)     All other components within normal limits   ANION GAP - Abnormal; Notable for the following components:    Anion Gap 17.0 (*)     All other components within normal limits   OSMOLALITY - Abnormal; Notable for the following components:    Osmolality Calc 263.2 (*)     All other components within normal limits   GLOMERULAR FILTRATION RATE, ESTIMATED - Abnormal; Notable for the following components:    Est, Glom Filt Rate 51 (*)     All other components within normal limits   BRAIN NATRIURETIC PEPTIDE - Abnormal; Notable for the following components:    Pro-BNP 3978.0 (*)     All other components within normal limits   OSMOLALITY, URINE - Abnormal; Notable for the following components:    Osmolality, Ur 198 (*)     All other components within normal limits   MAGNESIUM   TROPONIN   SODIUM, URINE, RANDOM   URINALYSIS WITH MICROSCOPIC   COMPREHENSIVE METABOLIC PANEL W/ REFLEX TO MG FOR LOW K   CBC WITH AUTO DIFFERENTIAL   PROTIME-INR   APTT   SODIUM   TROPONIN       EMERGENCY DEPARTMENT COURSE:   Vitals:    Vitals:    05/01/20 1831 05/1960 05/01/20 2030 05/01/20 2330   BP: 109/77 104/76 123/71 105/62   Pulse: 101 101 93 86   Resp: 18 20 16 16   Temp: 98.7 °F (37.1 °C)  98 °F (36.7 °C)    TempSrc: Oral  Oral    SpO2:  96% 98%    Weight: 252 lb (114.3 kg)  260 lb 8 oz (118.2 kg)    Height: 5' 9\" (1.753 m)  5' 9\" (1.753 m)        MDM    Patient was seen and evaluated in the emergency department, patient appeared to be no acute distress, vital signs were reviewed, no significant findings were noted. EKG was obtained, prolonged QT was noted, his AICD was interrogated, 4 shocks were noted for VF. Labs were obtained, he had some hyponatremia otherwise no significant findings noted.   Discussed the case with cardiology on-call Dr. Whitney Pandya, who suggests admission for further evaluation. Discussed case with Dr. Mikaela Gutierrez who graciously except the patient for admission. Discussed my findings my plan of care the patient was amenable with this plan of care. Medications   allopurinol (ZYLOPRIM) tablet 300 mg (has no administration in time range)   magnesium tablet 30 mg (has no administration in time range)   potassium chloride (KLOR-CON M) extended release tablet 20 mEq (20 mEq Oral Given 5/1/20 2339)   sodium chloride flush 0.9 % injection 10 mL (10 mLs Intravenous Given 5/1/20 2341)   sodium chloride flush 0.9 % injection 10 mL (has no administration in time range)   acetaminophen (TYLENOL) tablet 650 mg (has no administration in time range)     Or   acetaminophen (TYLENOL) suppository 650 mg (has no administration in time range)   perflutren lipid microspheres (DEFINITY) injection 1.65 mg (has no administration in time range)   carvedilol (COREG) tablet 6.25 mg (6.25 mg Oral Given 5/1/20 2339)   0.9 % sodium chloride bolus (0 mLs Intravenous Stopped 5/1/20 2248)       Patient was seenindependently by myself. The patient's final impression and disposition and plan was determined by myself. CRITICAL CARE:   None    CONSULTS:  Dr. Radha Roger:  None    FINAL IMPRESSION     1. AICD discharge          DISPOSITION/PLAN   Patient admitted to the hospital service    PATIENT REFERREDTO:  Wesly Sands MD  [place] ,C  [phone]            DISCHARGE MEDICATIONS:  Current Discharge Medication List          (Please note that portions of this note were completed with a voice recognition program.  Efforts were made to edit the dictations but occasionally words are mis-transcribed.)    Scribe:  David Cowart 5/1/20 6:49 PM EDT Scribing for and in the presence of Richardson Ervin CNP.     Signed by: Maite Story, 05/02/20 12:52 AM    Provider:  I personally performed the services described in the documentation,reviewed and edited the documentation which was dictated to the scribe in my presence, and it accurately records my words and actions.     Richardson Ervin, TEODORO 05/02/20 12:52 AM    Renny Thomson. Arcadio, APRN - TEODORO          [employer], KRIS - CNP  05/02/20 1534

## 2020-05-02 ENCOUNTER — APPOINTMENT (OUTPATIENT)
Dept: GENERAL RADIOLOGY | Age: 60
DRG: 309 | End: 2020-05-02
Payer: MEDICARE

## 2020-05-02 LAB
ALBUMIN SERPL-MCNC: 4 G/DL (ref 3.5–5.1)
ALP BLD-CCNC: 74 U/L (ref 38–126)
ALT SERPL-CCNC: 16 U/L (ref 11–66)
ANION GAP SERPL CALCULATED.3IONS-SCNC: 12 MEQ/L (ref 8–16)
APTT: 34.5 SECONDS (ref 22–38)
AST SERPL-CCNC: 18 U/L (ref 5–40)
BACTERIA: NORMAL
BASOPHILS # BLD: 0.7 %
BASOPHILS ABSOLUTE: 0.1 THOU/MM3 (ref 0–0.1)
BILIRUB SERPL-MCNC: 0.8 MG/DL (ref 0.3–1.2)
BILIRUBIN URINE: NEGATIVE
BLOOD, URINE: NEGATIVE
BUN BLDV-MCNC: 25 MG/DL (ref 7–22)
CALCIUM SERPL-MCNC: 9.3 MG/DL (ref 8.5–10.5)
CASTS: NORMAL /LPF
CASTS: NORMAL /LPF
CHARACTER, URINE: CLEAR
CHLORIDE BLD-SCNC: 94 MEQ/L (ref 98–111)
CHOLESTEROL, TOTAL: 222 MG/DL (ref 100–199)
CO2: 25 MEQ/L (ref 23–33)
COLOR: YELLOW
CREAT SERPL-MCNC: 1.1 MG/DL (ref 0.4–1.2)
CRYSTALS: NORMAL
EKG ATRIAL RATE: 102 BPM
EKG ATRIAL RATE: 85 BPM
EKG P AXIS: 75 DEGREES
EKG P-R INTERVAL: 160 MS
EKG P-R INTERVAL: 220 MS
EKG Q-T INTERVAL: 422 MS
EKG Q-T INTERVAL: 438 MS
EKG QRS DURATION: 118 MS
EKG QRS DURATION: 118 MS
EKG QTC CALCULATION (BAZETT): 510 MS
EKG QTC CALCULATION (BAZETT): 570 MS
EKG R AXIS: -24 DEGREES
EKG R AXIS: -26 DEGREES
EKG T AXIS: 71 DEGREES
EKG T AXIS: 83 DEGREES
EKG VENTRICULAR RATE: 102 BPM
EKG VENTRICULAR RATE: 88 BPM
EOSINOPHIL # BLD: 1.6 %
EOSINOPHILS ABSOLUTE: 0.1 THOU/MM3 (ref 0–0.4)
EPITHELIAL CELLS, UA: NORMAL /HPF
ERYTHROCYTE [DISTWIDTH] IN BLOOD BY AUTOMATED COUNT: 13.8 % (ref 11.5–14.5)
ERYTHROCYTE [DISTWIDTH] IN BLOOD BY AUTOMATED COUNT: 48 FL (ref 35–45)
GFR SERPL CREATININE-BSD FRML MDRD: 68 ML/MIN/1.73M2
GLUCOSE BLD-MCNC: 100 MG/DL (ref 70–108)
GLUCOSE, URINE: NEGATIVE MG/DL
HCT VFR BLD CALC: 40.7 % (ref 42–52)
HDLC SERPL-MCNC: 67 MG/DL
HEMOGLOBIN: 13.7 GM/DL (ref 14–18)
IMMATURE GRANS (ABS): 0.05 THOU/MM3 (ref 0–0.07)
IMMATURE GRANULOCYTES: 0.5 %
INR BLD: 1.03 (ref 0.85–1.13)
KETONES, URINE: NEGATIVE
LDL CHOLESTEROL CALCULATED: 127 MG/DL
LEUKOCYTE ESTERASE, URINE: NEGATIVE
LV EF: 23 %
LVEF MODALITY: NORMAL
LYMPHOCYTES # BLD: 27.5 %
LYMPHOCYTES ABSOLUTE: 2.5 THOU/MM3 (ref 1–4.8)
MCH RBC QN AUTO: 31.8 PG (ref 26–33)
MCHC RBC AUTO-ENTMCNC: 33.7 GM/DL (ref 32.2–35.5)
MCV RBC AUTO: 94.4 FL (ref 80–94)
MISCELLANEOUS LAB TEST RESULT: NORMAL
MONOCYTES # BLD: 11.4 %
MONOCYTES ABSOLUTE: 1 THOU/MM3 (ref 0.4–1.3)
NITRITE, URINE: NEGATIVE
NUCLEATED RED BLOOD CELLS: 0 /100 WBC
OSMOLALITY URINE: 198 MOSMOL/KG (ref 250–750)
PH UA: 6.5 (ref 5–9)
PLATELET # BLD: 222 THOU/MM3 (ref 130–400)
PMV BLD AUTO: 10.4 FL (ref 9.4–12.4)
POTASSIUM REFLEX MAGNESIUM: 3.8 MEQ/L (ref 3.5–5.2)
PROTEIN UA: NEGATIVE MG/DL
RBC # BLD: 4.31 MILL/MM3 (ref 4.7–6.1)
RBC URINE: NORMAL /HPF
RENAL EPITHELIAL, UA: NORMAL
SEG NEUTROPHILS: 58.3 %
SEGMENTED NEUTROPHILS ABSOLUTE COUNT: 5.4 THOU/MM3 (ref 1.8–7.7)
SODIUM BLD-SCNC: 129 MEQ/L (ref 135–145)
SODIUM BLD-SCNC: 131 MEQ/L (ref 135–145)
SODIUM URINE: < 20 MEQ/L
SPECIFIC GRAVITY UA: 1 (ref 1–1.03)
TOTAL PROTEIN: 6.6 G/DL (ref 6.1–8)
TRIGL SERPL-MCNC: 139 MG/DL (ref 0–199)
TROPONIN T: < 0.01 NG/ML
UROBILINOGEN, URINE: 0.2 EU/DL (ref 0–1)
WBC # BLD: 9.2 THOU/MM3 (ref 4.8–10.8)
WBC UA: NORMAL /HPF
YEAST: NORMAL

## 2020-05-02 PROCEDURE — 2140000000 HC CCU INTERMEDIATE R&B

## 2020-05-02 PROCEDURE — 71045 X-RAY EXAM CHEST 1 VIEW: CPT

## 2020-05-02 PROCEDURE — 6370000000 HC RX 637 (ALT 250 FOR IP): Performed by: INTERNAL MEDICINE

## 2020-05-02 PROCEDURE — 84295 ASSAY OF SERUM SODIUM: CPT

## 2020-05-02 PROCEDURE — 99223 1ST HOSP IP/OBS HIGH 75: CPT | Performed by: INTERNAL MEDICINE

## 2020-05-02 PROCEDURE — 99222 1ST HOSP IP/OBS MODERATE 55: CPT | Performed by: INTERNAL MEDICINE

## 2020-05-02 PROCEDURE — 93005 ELECTROCARDIOGRAM TRACING: CPT | Performed by: FAMILY MEDICINE

## 2020-05-02 PROCEDURE — 80053 COMPREHEN METABOLIC PANEL: CPT

## 2020-05-02 PROCEDURE — 84484 ASSAY OF TROPONIN QUANT: CPT

## 2020-05-02 PROCEDURE — 80061 LIPID PANEL: CPT

## 2020-05-02 PROCEDURE — 85730 THROMBOPLASTIN TIME PARTIAL: CPT

## 2020-05-02 PROCEDURE — 93306 TTE W/DOPPLER COMPLETE: CPT

## 2020-05-02 PROCEDURE — 2580000003 HC RX 258: Performed by: FAMILY MEDICINE

## 2020-05-02 PROCEDURE — 85025 COMPLETE CBC W/AUTO DIFF WBC: CPT

## 2020-05-02 PROCEDURE — 85610 PROTHROMBIN TIME: CPT

## 2020-05-02 PROCEDURE — 36415 COLL VENOUS BLD VENIPUNCTURE: CPT

## 2020-05-02 PROCEDURE — 99232 SBSQ HOSP IP/OBS MODERATE 35: CPT | Performed by: INTERNAL MEDICINE

## 2020-05-02 PROCEDURE — 93010 ELECTROCARDIOGRAM REPORT: CPT | Performed by: INTERNAL MEDICINE

## 2020-05-02 PROCEDURE — 6370000000 HC RX 637 (ALT 250 FOR IP): Performed by: FAMILY MEDICINE

## 2020-05-02 PROCEDURE — 94760 N-INVAS EAR/PLS OXIMETRY 1: CPT

## 2020-05-02 RX ORDER — TORSEMIDE 20 MG/1
20 TABLET ORAL 2 TIMES DAILY
Status: DISCONTINUED | OUTPATIENT
Start: 2020-05-02 | End: 2020-05-05 | Stop reason: HOSPADM

## 2020-05-02 RX ORDER — ZINC SULFATE 50(220)MG
50 CAPSULE ORAL DAILY
Status: DISCONTINUED | OUTPATIENT
Start: 2020-05-02 | End: 2020-05-05 | Stop reason: HOSPADM

## 2020-05-02 RX ADMIN — SODIUM CHLORIDE, PRESERVATIVE FREE 10 ML: 5 INJECTION INTRAVENOUS at 21:24

## 2020-05-02 RX ADMIN — POTASSIUM CHLORIDE 20 MEQ: 1500 TABLET, EXTENDED RELEASE ORAL at 21:20

## 2020-05-02 RX ADMIN — Medication 50 MG: at 13:03

## 2020-05-02 RX ADMIN — SODIUM CHLORIDE, PRESERVATIVE FREE 10 ML: 5 INJECTION INTRAVENOUS at 10:31

## 2020-05-02 RX ADMIN — TORSEMIDE 20 MG: 20 TABLET ORAL at 13:03

## 2020-05-02 RX ADMIN — CARVEDILOL 6.25 MG: 6.25 TABLET, FILM COATED ORAL at 21:20

## 2020-05-02 RX ADMIN — ALLOPURINOL 300 MG: 300 TABLET ORAL at 10:24

## 2020-05-02 RX ADMIN — POTASSIUM CHLORIDE 20 MEQ: 1500 TABLET, EXTENDED RELEASE ORAL at 10:24

## 2020-05-02 RX ADMIN — SACUBITRIL AND VALSARTAN 1 TABLET: 24; 26 TABLET, FILM COATED ORAL at 21:20

## 2020-05-02 RX ADMIN — CARVEDILOL 6.25 MG: 6.25 TABLET, FILM COATED ORAL at 10:24

## 2020-05-02 ASSESSMENT — ENCOUNTER SYMPTOMS
BLURRED VISION: 0
NAUSEA: 0
SHORTNESS OF BREATH: 0
COUGH: 0
LEFT EYE: 0
CONSTIPATION: 0
ABDOMINAL PAIN: 0
DIARRHEA: 0
BACK PAIN: 0
RIGHT EYE: 0
VOMITING: 0
DOUBLE VISION: 0
WHEEZING: 0
SORE THROAT: 0

## 2020-05-02 ASSESSMENT — PAIN SCALES - GENERAL: PAINLEVEL_OUTOF10: 0

## 2020-05-02 NOTE — CONSULTS
[facility] [address], [facility]  Dept: [phone]  Dept Fax: [phone]      CARDIAC ELECTROPHYSIOLOGY  CONSULTATION    5/1/2020      REFERRING PROVIDER:  Dr. Yessi Olivera: I was shocked 4 times by my ICD. Chief Complaint   Patient presents with    AICD Problem       HPI:  HPI    05/02/2020: The patient is s/p insertion of a dual chamber Medtronic ICD in 2013 for primary prevention. The patient has never been shocked by his device before. The patient was walking with his friends yesterday afternoon and he was shocked by his defibrillator 4 times. The patient did not report having any palpitations prior to the event. He denies having any chest pains. He went to the hospital and was admitted for further evaluation. He noted on his device interrogation to have intermittent episodes of nonsustained ventricular tachycardia and would break spontaneously. The patient would have ATP that failed so he was shocked a total of 4 times. PMH:  History obtained from chart review and the patient.   Past Medical History:   Diagnosis Date    Cardiomyopathy, dilated ([address]     VIRAL    Claustrophobia     Congestive heart failure (CHF) (HCC)     Depression 12/26/2013    HTN (hypertension)     Hyperlipidemia     Medtronic dual ICD 2/14/2014    Osteoarthritis     Osteoarthritis of spine with radiculopathy, lumbar region 12/28/2015    Spinal stenosis     Unspecified sleep apnea     unable to wear CPAP       PSH:  Past Surgical History:   Procedure Laterality Date    BACK SURGERY  8/26/2014    L4- S1 decompression, L4-5 PSF and TLIF    BACK SURGERY  2013   [place] CARDIAC CATHETERIZATION  10/2013    [address]  2013    [employer]    CARDIOVASCULAR STRESS TEST  09/2013    CARPAL TUNNEL RELEASE Right     [facility]ERV[facility]  [facility]/17/2017    [facility]      DOPPLER ECHOCARDIOGRAPHY  09/2013    Jessica Phillips MD at [address]  1980's???    TONSILLECTOMY  1980's??    VASECTOMY  ? ??       FAMILY HISTORY:  Family History   Problem Relation Age of Onset    Heart Disease Father     Coronary Art Dis Father     Heart Attack Father     High Blood Pressure Father     Parkinsonism Father        SOCIAL HISTORY:  Social History     Socioeconomic History    Marital status:      Spouse name: None    Number of children: 1    Years of education: None    Highest education level: None   Occupational History    None   Social Needs    Financial resource strain: None    Food insecurity     Worry: None     Inability: None    Transportation needs     Medical: None     Non-medical: None   Tobacco Use    Smoking status: Never Smoker    Smokeless tobacco: Never Used   Substance and Sexual Activity    Alcohol use:  Yes     Alcohol/week: 6.0 standard drinks     Types: 6 Standard drinks or equivalent per week     Comment: social    Drug use: No    Sexual activity: Yes     Partners: Female   Lifestyle    Physical activity     Days per week: None     Minutes per session: None    Stress: None   Relationships    Social connections     Talks on phone: None     Gets together: None     Attends [employer] service: None     Active member of club or organization: None     Attends meetings of clubs or organizations: None     Relationship status: None    Intimate partner violence     Fear of current or ex partner: None     Emotionally abused: None     Physically abused: None     Forced sexual activity: None   Other Topics Concern    None   Social History Narrative    None       MEDICATIONS:  Current Facility-Administered Medications   Medication Dose Route Frequency Provider Last Rate Last Dose    sacubitril-valsartan (ENTRESTO) 24-26 MG per tablet 1 tablet  1 tablet Oral BID Emerald Medina MD        torsemide (DEMADEX) tablet 20 mg  20 mg Oral BID MD Addis Corbin Caro

## 2020-05-02 NOTE — PROGRESS NOTES
Pt admitted to  3B32 bed. Complaints: None. IV site free of s/s of infection or infiltration. Vital signs obtained. Assessment and data collection initiated. Two nurse skin assessment performed by this RN and Ofelia Rose. Oriented to room. Policies and procedures for  explained. This RN discussed hourly rounding with patient addressing 5 P's. Fall prevention and safety brochure discussed with patient. Bed alarm on. Call light in reach. The best day to schedule a follow up Dr appointment is:  Thursday a.m. Explained patients right to have family, representative or physician notified of their admission. Patient has Requested for physician to be notified. Patient has Declined for family/representative to be notified. All questions answered with no further questions at this time.

## 2020-05-02 NOTE — CONSULTS
[facility]    Patient's Name/Date of Birth: Austin Rosales / 1960 (9 y.o.)    Date: May 2, 2020     Referring Provider: Amanda Gilliland MD    CHIEF COMPLAINT: AICD discharge      HPI: This is a pleasant 62 y.o. male presents with hx of NICM (viral 2013), EF 25%, D-ICD (MDT), RYAN, SYED, who had ICD x 3 firings in a row. No prior shocks. No major or strenuous activity at the time. He was walking. No f/c/ns. No chest pain preceding. No LOC, denies any drug ingestions. Taking all medications. Follows with CHF clinic. CXR without pulmonary edema. Cr 1.4-->1.1, Trop negative, Hgb 13.7. Mild hypovolemic hyponatremia--he was on home torsemide/metolazone. Interrogation reveals VF. BNP 3978. Mg 2.0. No trauma to the chest.  Does not take AAD. No current chest pain, angina, BREWER, orthopnea, PND, sob at rest, palpitations, LE edema, or syncope. Echo:   Results for orders placed during the hospital encounter of 12/06/18   ECHO Complete 2D W Doppler W Color    Narrative Transthoracic Echocardiography Report (TTE)     Demographics      Patient Name   Dipesh Otero  Gender              Male                  A      MR #           [id number]       Race                                                     Ethnicity      Account #      [de-identified]       Room Number      Accession      [id number]       Date of Study       12/06/2018   Number      Date of Birth  1960      Referring Physician Chelsi Hairston MD      Age            59 year(s)      Zonia Joy RDCS                                     Interpreting        Chelsi Rao MD                                  Physician     Procedure    Type of Study      TTE procedure:ECHOCARDIOGRAM COMPLETE 2D W DOPPLER W COLOR.      Procedure Date  Date: Pericardial Effusion   The pericardium was normal in appearance with no evidence of a pericardial   effusion. Pleural Effusion   No evidence of pleural effusion. Aorta / Great Vessels   -Aortic root dimension within normal limits.   -The Pulmonary artery is within normal limits. -IVC size is within normal limits with normal respiratory phasic changes.      M-Mode/2D Measurements & Calculations      LV Diastolic    LV Systolic Dimension:    AV Cusp Separation: 2 cmLA   Dimension: 6.5  6.1 cm                    Dimension: 4.9 cmAO Root   cm              LV Volume Diastolic: 752  Dimension: 3 cmLA Area: 20.7   LV FS:6.2 %     ml                        cm^2   LV PW           LV Volume Systolic: 036   Diastolic: 1 cm ml   Septum          LV EDV/LV EDV Index: 060   Diastolic: 0.9  FS/79 Q^9GW ESV/LV ESV    RV Diastolic Dimension: 3.9 cm   cm              Index: 187 ml/80 m^2                   EF Calculated: 13.4 %     LA/Aorta: 1.63                      LV Length: 8.9 cm         LA volume/Index: 53.5 ml /23m^2   LV Area   Diastolic: 21.5   cm^2   LV Area   Systolic: 10.7   cm^2     Doppler Measurements & Calculations      MV Peak E-Wave: 94.6  AV Peak Velocity: 101  LVOT Peak Velocity: 56.1 cm/s   cm/s                  cm/s                   LVOT Mean Velocity: 38.8 cm/s   MV Peak A-Wave: 42.7  AV Peak Gradient: 4.08 LVOT Peak Gradient: 1   cm/s                  mmHg                   mmHgLVOT Mean Gradient: 1   MV E/A Ratio: 2.22    AV Mean Velocity: 71.8 mmHg   MV Peak Gradient:     cm/s   3.58 mmHg             AV Mean Gradient: 2    TV Peak E-Wave: 56.8 cm/s                         mmHg                   TV Peak A-Wave: 29.6 cm/s   MV Deceleration Time: AV VTI: 21.2 cm   197 msec                                     TV Peak Gradient: 1.29 mmHg   MV P1/2t: 58 msec   MVA by PHT:3.79 cm^2  LVOT VTI: 11 cm        PV Peak Velocity: 64 cm/s                         IVRT: 92 msec          PV Peak Gradient: 1.64 [facility] [address] [facility] [address] SURGERY Right 10/31/2017    LUMBAR FACET RFA @ L2-3, L3-4, L4-5 AND L5-S1 RIGHT SIDE performed by Lucrecia Reed MD at [address]"" Left 4/11/2019    L-RFA @ L2-3, L3-4, L4-5, L5-S1 LEFT SIDE FIRST. performed by Lucrecia Reed MD at [address]\"" Left 10/3/2019    LEFT SI RFA performed by Lucrecia Reed MD at [address] [facility]  2015    OTHER SURGICAL HISTORY  10/9/2015    C3-6 ACDF    OTHER SURGICAL HISTORY  01/18/2016    FACET INJECTIONS L3-L4 L4-L5 L5 S1    OTHER SURGICAL HISTORY  2-8-2016    RFA - L3-S1    OTHER SURGICAL HISTORY N/A 03-21-16    cervical epidural block C7    OTHER SURGICAL HISTORY Bilateral 05-16-16    cervical facet block C7-T1    OTHER SURGICAL HISTORY  08/01/2016    CERVICAL ABLATION    OTHER SURGICAL HISTORY Left 09/11/2017    Lumbar RFA at L2-3, L3-4, L4-5, L5-S1    OTHER SURGICAL HISTORY Right 10/31/2017    Lumbar RFA at L2-3, L3-4, L4-5, L5-S1    OTHER SURGICAL HISTORY Right 05/09/2018    Excision scalp mass right forehead    AK EXC SKIN BENIG <5MM REMAINDR BODY Right 5/9/2018    EXCISION RIGHT FOREHEAD CYST performed by Nehemias Singleton MD at [address] Left 6/19/2018    L-RFA @ L2-3, L3-4, L4-5, L5-S1 LEFT SIDE FIRST. performed by Lucrecia Reed MD at [address]  1980's???    TONSILLECTOMY  1980's??    VASECTOMY  ? ??     Current Facility-Administered Medications   Medication Dose Route Frequency Provider Last Rate Last Dose    allopurinol (ZYLOPRIM) tablet 300 mg  300 mg Oral Daily Марина Hernández MD        magnesium tablet 30 mg  30 mg Oral Daily Марина Hernández MD        potassium chloride (KLOR-CON M) extended release tablet 20 mEq  20 mEq Oral BID Марина Hernández MD   20 mEq at 05/01/20 2503    sodium chloride flush 0.9 % injection 10 mL  10 mL Intravenous 2 times per day Patience MD Kathi   10 mL at 05/01/20 2341    sodium chloride flush 0.9 % injection 10 mL  10 mL Intravenous PRN Марина Hernández MD        acetaminophen (TYLENOL) tablet 650 mg  650 mg Oral Q6H PRN Марина Hernández MD        Or    acetaminophen (TYLENOL) suppository 650 mg  650 mg Rectal Q6H PRN Марина Hernández MD        perflutren lipid microspheres (DEFINITY) injection 1.65 mg  1.5 mL Intravenous ONCE PRN Марина Hernández MD        carvedilol (COREG) tablet 6.25 mg  6.25 mg Oral BID  Tiffany Ventura MD   6.25 mg at 05/01/20 2339     Prior to Admission medications    Medication Sig Start Date End Date Taking?  Authorizing Provider   zinc sulfate (ZINCATE) 220 (50 Zn) MG capsule Take 50 mg by mouth daily   Yes Historical Provider, MD   magnesium 30 MG tablet Take 30 mg by mouth daily   Yes Historical Provider, MD   carvedilol (COREG) 6.25 MG tablet TAKE 1 TABLET TWICE DAILY 2/4/20  Yes Rosa Fulling, APRN - CNP   torsemide (DEMADEX) 20 MG tablet Take 1 tablet by mouth 2 times daily 2/4/20  Yes Rosa Fulling, APRN - CNP   sacubitril-valsartan (ENTRESTO) 24-26 MG per tablet Take 1 tablet by mouth 2 times daily  Patient taking differently: Take 1 tablet by mouth daily  2/4/20  Yes Rosa Fulling, APRN - CNP   spironolactone (ALDACTONE) 25 MG tablet TAKE 1 TABLET TWICE DAILY 2/3/20  Yes Rosa Fulling, APRN - CNP   metOLazone (ZAROXOLYN) 5 MG tablet Take 0.5 tablets by mouth daily as needed (as directed by  clinic) 2/3/20  Yes Rosa Fulling, APRN - CNP   allopurinol (ZYLOPRIM) 300 MG tablet Take 300 mg by mouth daily 9/5/19  Yes Historical Provider, MD   potassium chloride (KLOR-CON M20) 20 MEQ extended release tablet Take 1 tablet by mouth 2 times daily 9/9/19  Yes Rosa Fulling, APRN - CNP   Acetaminophen (TYLENOL ARTHRITIS PAIN PO) Take by mouth 3 times daily as needed   Yes Historical Provider, MD   Scheduled Meds:   allopurinol  300 mg Oral Daily    magnesium  30 mg Oral Daily    potassium chloride  20 mEq Oral BID    sodium chloride flush  10 mL Intravenous 2 times per day    carvedilol  6.25 mg Oral BID WC     Continuous Infusions:  PRN Meds:.sodium chloride flush, acetaminophen **OR** acetaminophen, perflutren lipid microspheres    No Known Allergies  Family History   Problem Relation Age of Onset    Heart Disease Father     Coronary Art Dis Father     Heart Attack Father     High Blood Pressure Father     Parkinsonism Father      Social History     Socioeconomic History    Marital status:      Spouse name: Not on file    Number of children: 1    Years of education: Not on file    Highest education level: Not on file   Occupational History    Not on file   Social Needs    Financial resource strain: Not on file    Food insecurity     Worry: Not on file     Inability: Not on file   [employer] needs     Medical: Not on file     Non-medical: Not on file   Tobacco Use    Smoking status: Never Smoker    Smokeless tobacco: Never Used   Substance and Sexual Activity    Alcohol use:  Yes     Alcohol/week: 6.0 standard drinks     Types: 6 Standard drinks or equivalent per week     Comment: social    Drug use: No    Sexual activity: Yes     Partners: Female   Lifestyle    Physical activity     Days per week: Not on file     Minutes per session: Not on file    Stress: Not on file   Relationships    Social connections     Talks on phone: Not on file     Gets together: Not on file     Attends Yarsani service: Not on file     Active member of club or organization: Not on file     Attends meetings of clubs or organizations: Not on file     Relationship status: Not on file    Intimate partner violence     Fear of current or ex partner: Not on file     Emotionally abused: Not on file     Physically abused: Not on file     Forced sexual activity: Not on file   Other Topics Concern    Not on file BACTERIA NONE SEEN 05/01/2020    SPECGRAV 1.005 05/01/2020    LEUKOCYTESUR NEGATIVE 05/01/2020    UROBILINOGEN 0.2 05/01/2020    BILIRUBINUR NEGATIVE 05/01/2020    BLOODU NEGATIVE 05/01/2020    GLUCOSEU NEGATIVE 05/09/2017         Physical Exam:  Vitals:    05/02/20 0830   BP: 120/80   Pulse: 79   Resp: 18   Temp: 98 °F (36.7 °C)   SpO2: 95%        Intake/Output Summary (Last 24 hours) at 5/2/2020 2785  Last data filed at 5/2/2020 0400  Gross per 24 hour   Intake 500 ml   Output 450 ml   Net 50 ml      General:  No acute distress  Neck: Supple, no JVD, no carotid bruits  Heart: Regular rhythm normal S1 and S2, no rubs, murmurs or gallops  Lungs: clear to ascultation no rales, wheezes, or rhonchi  Abdomen: positive bowel sounds, soft, non-tender, non-distended, no bruits, no masses  Extremities:no clubbing, cyanosis or edema  Neurologic: alert and oriented x 3, cranial nerves 2-12 grossly intact, motor and sensory intact, moving all extremities  Skin: No rashes    Assessment:  AICD firing for VF x 3  NICM - Viral, NYHA II  + Compliance  Dehydration due to overdiuresis may have resulted in the VF  RYAN - resolved  Hyponatremia  SYED  Wide QRS - borderline LBBB morphology on prior ECG      Plan:  1. Increase Coreg to 12.5 mg BID  2. Cut diuretics in half and use Torsemide only, Metolazone only as needed   3. Cut Aldactone to 12.5 mg daily  4. Add back Entresto prior to discharge  5. Agree with IVF  6. EP consult - question upgrade to BiV ICD - can be done as outpatient  7. If recurrent, may need to consider AAD as it does not appear to be VT that would be amenable to ablation  8. Labs 3 days after discharge  9. F/u CHF clinic  10. Further recommendations based on results and clinical course    Thank you for allowing us to participate in the care of this patient. Please do not hesitate to call us with questions.     Electronically signed by Eva Yarbrough MD on 5/2/2020 at 9:21 AM    Interventional Cardiology - The

## 2020-05-02 NOTE — PROGRESS NOTES
12/2018); s/p Medtronic dual AICD placement, HTN, HLD, SYED (unable to wear CPAP), obesity, OA, gout and depression who presented to [facility] on 5/1/2020 with 3 spontaneous, back-to-back AICD discharges which occurred earlier in the day while he was ambulating at a normal pace. Reports placement of AICD following diagnosis of dilated cardiomyopathy many years ago -- states it has never fired until the day of admission. Endorses strict compliance with routine interrogations and daily medications. He denies recent illness, stressors, trauma or changes to diet or level of exertion. He also denies associated fevers/chills, headaches, chest pain, palpitations, cough, shortness of breath, nausea/emesis, abdominal pain, diarrhea, weakness, paresthesias or syncope. He follows with Dr. Zita Engle as an outpatient. In the ED the pacer was interrogated and it revealed v fib x4.      - Patient is concerned that his weight increased by 8 lbs over the last few days.  Would like to resume his diuretics    OBJECTIVE     Medications:  Reviewed    Infusion Medications   Scheduled Medications    sacubitril-valsartan  1 tablet Oral BID    torsemide  20 mg Oral BID    zinc sulfate  50 mg Oral Daily    allopurinol  300 mg Oral Daily    magnesium  30 mg Oral Daily    potassium chloride  20 mEq Oral BID    sodium chloride flush  10 mL Intravenous 2 times per day    carvedilol  6.25 mg Oral BID WC     PRN Meds: sodium chloride flush, acetaminophen **OR** acetaminophen, perflutren lipid microspheres    Ins and outs:      Intake/Output Summary (Last 24 hours) at 5/2/2020 1043  Last data filed at 5/2/2020 0400  Gross per 24 hour   Intake 500 ml   Output 450 ml   Net 50 ml       Physical Examination     /80   Pulse 79   Temp 98 °F (36.7 °C) (Oral)   Resp 18   Ht 5' 9\" (1.753 m)   Wt 260 lb 8 oz (118.2 kg)   SpO2 96% Comment: evaluated patient for o2 he does not need at this time  BMI 38.47 kg/m²     General appearance: No apparent

## 2020-05-03 LAB
ANION GAP SERPL CALCULATED.3IONS-SCNC: 12 MEQ/L (ref 8–16)
BUN BLDV-MCNC: 21 MG/DL (ref 7–22)
CALCIUM SERPL-MCNC: 9.3 MG/DL (ref 8.5–10.5)
CHLORIDE BLD-SCNC: 96 MEQ/L (ref 98–111)
CO2: 27 MEQ/L (ref 23–33)
CREAT SERPL-MCNC: 1.1 MG/DL (ref 0.4–1.2)
GFR SERPL CREATININE-BSD FRML MDRD: 68 ML/MIN/1.73M2
GLUCOSE BLD-MCNC: 105 MG/DL (ref 70–108)
MAGNESIUM: 1.8 MG/DL (ref 1.6–2.4)
POTASSIUM SERPL-SCNC: 3.6 MEQ/L (ref 3.5–5.2)
SODIUM BLD-SCNC: 135 MEQ/L (ref 135–145)

## 2020-05-03 PROCEDURE — 99232 SBSQ HOSP IP/OBS MODERATE 35: CPT | Performed by: PHYSICIAN ASSISTANT

## 2020-05-03 PROCEDURE — 6370000000 HC RX 637 (ALT 250 FOR IP): Performed by: PHYSICIAN ASSISTANT

## 2020-05-03 PROCEDURE — 80048 BASIC METABOLIC PNL TOTAL CA: CPT

## 2020-05-03 PROCEDURE — 99232 SBSQ HOSP IP/OBS MODERATE 35: CPT | Performed by: INTERNAL MEDICINE

## 2020-05-03 PROCEDURE — 6360000002 HC RX W HCPCS: Performed by: PHYSICIAN ASSISTANT

## 2020-05-03 PROCEDURE — 6370000000 HC RX 637 (ALT 250 FOR IP): Performed by: FAMILY MEDICINE

## 2020-05-03 PROCEDURE — 2140000000 HC CCU INTERMEDIATE R&B

## 2020-05-03 PROCEDURE — 6370000000 HC RX 637 (ALT 250 FOR IP): Performed by: INTERNAL MEDICINE

## 2020-05-03 PROCEDURE — 83735 ASSAY OF MAGNESIUM: CPT

## 2020-05-03 PROCEDURE — 36415 COLL VENOUS BLD VENIPUNCTURE: CPT

## 2020-05-03 PROCEDURE — 2580000003 HC RX 258: Performed by: FAMILY MEDICINE

## 2020-05-03 RX ORDER — POTASSIUM CHLORIDE 20 MEQ/1
40 TABLET, EXTENDED RELEASE ORAL PRN
Status: DISCONTINUED | OUTPATIENT
Start: 2020-05-03 | End: 2020-05-03

## 2020-05-03 RX ORDER — MAGNESIUM SULFATE IN WATER 40 MG/ML
2 INJECTION, SOLUTION INTRAVENOUS ONCE
Status: COMPLETED | OUTPATIENT
Start: 2020-05-03 | End: 2020-05-03

## 2020-05-03 RX ORDER — CARVEDILOL 6.25 MG/1
12.5 TABLET ORAL 2 TIMES DAILY WITH MEALS
Status: DISCONTINUED | OUTPATIENT
Start: 2020-05-03 | End: 2020-05-03

## 2020-05-03 RX ORDER — CARVEDILOL 6.25 MG/1
12.5 TABLET ORAL 2 TIMES DAILY WITH MEALS
Status: DISCONTINUED | OUTPATIENT
Start: 2020-05-03 | End: 2020-05-04

## 2020-05-03 RX ORDER — POTASSIUM CHLORIDE 750 MG/1
20 TABLET, FILM COATED, EXTENDED RELEASE ORAL ONCE
Status: COMPLETED | OUTPATIENT
Start: 2020-05-03 | End: 2020-05-03

## 2020-05-03 RX ORDER — POTASSIUM CHLORIDE 7.45 MG/ML
10 INJECTION INTRAVENOUS PRN
Status: DISCONTINUED | OUTPATIENT
Start: 2020-05-03 | End: 2020-05-03

## 2020-05-03 RX ADMIN — Medication 50 MG: at 10:36

## 2020-05-03 RX ADMIN — CARVEDILOL 12.5 MG: 6.25 TABLET, FILM COATED ORAL at 10:36

## 2020-05-03 RX ADMIN — TORSEMIDE 20 MG: 20 TABLET ORAL at 21:23

## 2020-05-03 RX ADMIN — MAGNESIUM SULFATE HEPTAHYDRATE 2 G: 40 INJECTION, SOLUTION INTRAVENOUS at 12:32

## 2020-05-03 RX ADMIN — SODIUM CHLORIDE, PRESERVATIVE FREE 10 ML: 5 INJECTION INTRAVENOUS at 10:37

## 2020-05-03 RX ADMIN — SODIUM CHLORIDE, PRESERVATIVE FREE 10 ML: 5 INJECTION INTRAVENOUS at 21:15

## 2020-05-03 RX ADMIN — POTASSIUM CHLORIDE 20 MEQ: 1500 TABLET, EXTENDED RELEASE ORAL at 21:23

## 2020-05-03 RX ADMIN — POTASSIUM CHLORIDE 20 MEQ: 750 TABLET, FILM COATED, EXTENDED RELEASE ORAL at 10:36

## 2020-05-03 RX ADMIN — ALLOPURINOL 300 MG: 300 TABLET ORAL at 10:36

## 2020-05-03 RX ADMIN — TORSEMIDE 20 MG: 20 TABLET ORAL at 12:36

## 2020-05-03 RX ADMIN — POTASSIUM CHLORIDE 20 MEQ: 1500 TABLET, EXTENDED RELEASE ORAL at 10:36

## 2020-05-03 RX ADMIN — CARVEDILOL 12.5 MG: 6.25 TABLET, FILM COATED ORAL at 21:23

## 2020-05-03 ASSESSMENT — PAIN SCALES - GENERAL: PAINLEVEL_OUTOF10: 0

## 2020-05-03 NOTE — PROGRESS NOTES
AP upright view of the chest. FINDINGS: There is a stable left-sided cardiac pacer/defibrillator generator with 2 leads. The lungs appear grossly clear. The cardiac-based also what is stable. Visualized osseous structures appear grossly intact. Stable radiographic appearance of the chest. No evidence of an acute process. **This report has been created using voice recognition software. It may contain minor errors which are inherent in voice recognition technology. ** Final report electronically signed by Dr. Lonny Durham MD on 5/2/2020 8:44 AM      No results found.     DVT prophylaxis: [x] Lovenox held in case patient goes for procedure                                 [] SCDs                                 [] SQ Heparin                                 [] Encourage ambulation           [] Already on Anticoagulation     Disposition:    [x] Home       [] TCU       [] Rehab       [] Psych       [] SNF       [] [place]       [] Other-

## 2020-05-03 NOTE — PROGRESS NOTES
Cardiology Progress Note      Patient:  Elisa Stauffer  YOB: 1960  MRN: [id number]   Acct: [de-identified]  Admit Date:  5/1/2020  Primary Cardiologist: Antonina Lozada MD  Seen by dr Aleshia Hu    Note per dr Belia Franks: AICD discharge        HPI: This is a pleasant 62 y.o. male presents with hx of NICM (viral 2013), EF 25%, D-ICD (MDT), RYAN, SYED, who had ICD x 3 firings in a row. No prior shocks. No major or strenuous activity at the time. He was walking. No f/c/ns. No chest pain preceding. No LOC, denies any drug ingestions. Taking all medications. Follows with CHF clinic. CXR without pulmonary edema. Cr 1.4-->1.1, Trop negative, Hgb 13.7. Mild hypovolemic hyponatremia--he was on home torsemide/metolazone. Interrogation reveals VF. BNP 3978. Mg 2.0. No trauma to the chest.  Does not take AAD. No current chest pain, angina, BREWER, orthopnea, PND, sob at rest, palpitations, LE edema, or syncope\"    Subjective (Events in last 24 hours): pt awake and alert. NAD. No cp or sob.   Lower bp and some meds held  Had total 2 L IVF and it was stopped      Objective:   /62   Pulse 77   Temp 98.1 °F (36.7 °C) (Oral)   Resp 18   Ht 5' 9\" (1.753 m)   Wt 253 lb 9.6 oz (115 kg)   SpO2 94%   BMI 37.45 kg/m²        TELEMETRY: nsr, PVCs    Physical Exam:  General Appearance: alert and oriented to person, place and time, in no acute distress  Cardiovascular: normal rate, regular rhythm, normal S1 and S2, no murmurs, rubs, clicks, or gallops, distal pulses intact, no carotid bruits, no JVD  Pulmonary/Chest: clear to auscultation bilaterally- no wheezes, rales or rhonchi, normal air movement, no respiratory distress  Abdomen: soft, non-tender, non-distended, normal bowel sounds, no masses Extremities: no cyanosis, clubbing or edema, pulse   Skin: warm and dry, no rash or erythema  Head: normocephalic and atraumatic  Eyes: pupils equal, round, and reactive to light  Neck: supple and non-tender without mass, no thyromegaly   Musculoskeletal: normal range of motion, no joint swelling, deformity or tenderness  Neurological: alert, oriented, normal speech, no focal findings or movement disorder noted    Medications:    potassium (CARDIAC) replacement protocol   Other RX Placeholder    potassium chloride  20 mEq Oral Once    sacubitril-valsartan  1 tablet Oral BID    torsemide  20 mg Oral BID    zinc sulfate  50 mg Oral Daily    allopurinol  300 mg Oral Daily    magnesium  30 mg Oral Daily    potassium chloride  20 mEq Oral BID    sodium chloride flush  10 mL Intravenous 2 times per day    carvedilol  6.25 mg Oral BID WC       sodium chloride flush, 10 mL, PRN  acetaminophen, 650 mg, Q6H PRN    Or  acetaminophen, 650 mg, Q6H PRN  perflutren lipid microspheres, 1.5 mL, ONCE PRN        Diagnostics:  Echo 5/2/20  Summary   Ejection fraction was estimated at 20-25%. Left atrial size was severely dilated. The right ventricular size was normal with normal systolic function and   wall thickness. Device lead seen in the right heart. There was trace tricuspid regurgitation. Assuming RAP 5 mmHg, the estimated RVSP = 52 mmHg. There was mild-moderate mitral regurgitation. Signature      ----------------------------------------------------------------   Electronically signed by Vik Bryan:    Cardiac Enzymes:  No results for input(s): CKTOTAL, CKMB, CKMBINDEX, TROPONINI in the last 72 hours.     CBC:   Lab Results   Component Value Date    WBC 9.2 05/02/2020    RBC 4.31 05/02/2020    HGB 13.7 05/02/2020    HCT 40.7 05/02/2020     05/02/2020       CMP:    Lab Results   Component Value Date     05/03/2020    K 3.6 05/03/2020    K 3.8 05/02/2020    CL 96 05/03/2020    CO2 27 05/03/2020    BUN 21 05/03/2020    CREATININE 1.1 05/03/2020    LABGLOM 68 05/03/2020    GLUCOSE 105 05/03/2020    GLUCOSE 100 04/21/2020    CALCIUM 9.3 05/03/2020       Hepatic Function

## 2020-05-04 LAB
ANION GAP SERPL CALCULATED.3IONS-SCNC: 13 MEQ/L (ref 8–16)
BUN BLDV-MCNC: 22 MG/DL (ref 7–22)
CALCIUM SERPL-MCNC: 9.2 MG/DL (ref 8.5–10.5)
CHLORIDE BLD-SCNC: 95 MEQ/L (ref 98–111)
CO2: 27 MEQ/L (ref 23–33)
CREAT SERPL-MCNC: 1.2 MG/DL (ref 0.4–1.2)
GFR SERPL CREATININE-BSD FRML MDRD: 61 ML/MIN/1.73M2
GLUCOSE BLD-MCNC: 100 MG/DL (ref 70–108)
MAGNESIUM: 1.8 MG/DL (ref 1.6–2.4)
POTASSIUM SERPL-SCNC: 3.5 MEQ/L (ref 3.5–5.2)
SODIUM BLD-SCNC: 135 MEQ/L (ref 135–145)

## 2020-05-04 PROCEDURE — 80048 BASIC METABOLIC PNL TOTAL CA: CPT

## 2020-05-04 PROCEDURE — 94760 N-INVAS EAR/PLS OXIMETRY 1: CPT

## 2020-05-04 PROCEDURE — 6370000000 HC RX 637 (ALT 250 FOR IP): Performed by: FAMILY MEDICINE

## 2020-05-04 PROCEDURE — 2580000003 HC RX 258: Performed by: FAMILY MEDICINE

## 2020-05-04 PROCEDURE — 36415 COLL VENOUS BLD VENIPUNCTURE: CPT

## 2020-05-04 PROCEDURE — 2140000000 HC CCU INTERMEDIATE R&B

## 2020-05-04 PROCEDURE — 6360000002 HC RX W HCPCS: Performed by: PHYSICIAN ASSISTANT

## 2020-05-04 PROCEDURE — 6370000000 HC RX 637 (ALT 250 FOR IP): Performed by: PHYSICIAN ASSISTANT

## 2020-05-04 PROCEDURE — 6370000000 HC RX 637 (ALT 250 FOR IP): Performed by: INTERNAL MEDICINE

## 2020-05-04 PROCEDURE — 99232 SBSQ HOSP IP/OBS MODERATE 35: CPT | Performed by: INTERNAL MEDICINE

## 2020-05-04 PROCEDURE — 83735 ASSAY OF MAGNESIUM: CPT

## 2020-05-04 PROCEDURE — 99232 SBSQ HOSP IP/OBS MODERATE 35: CPT | Performed by: PHYSICIAN ASSISTANT

## 2020-05-04 RX ORDER — POTASSIUM CHLORIDE 750 MG/1
40 TABLET, FILM COATED, EXTENDED RELEASE ORAL ONCE
Status: COMPLETED | OUTPATIENT
Start: 2020-05-04 | End: 2020-05-04

## 2020-05-04 RX ORDER — CARVEDILOL 6.25 MG/1
6.25 TABLET ORAL 2 TIMES DAILY WITH MEALS
Status: DISCONTINUED | OUTPATIENT
Start: 2020-05-04 | End: 2020-05-05

## 2020-05-04 RX ORDER — CARVEDILOL 12.5 MG/1
12.5 TABLET ORAL 2 TIMES DAILY WITH MEALS
Qty: 60 TABLET | Refills: 3 | Status: CANCELLED | OUTPATIENT
Start: 2020-05-04

## 2020-05-04 RX ORDER — MAGNESIUM SULFATE IN WATER 40 MG/ML
2 INJECTION, SOLUTION INTRAVENOUS ONCE
Status: COMPLETED | OUTPATIENT
Start: 2020-05-04 | End: 2020-05-04

## 2020-05-04 RX ADMIN — POTASSIUM CHLORIDE 40 MEQ: 750 TABLET, FILM COATED, EXTENDED RELEASE ORAL at 09:19

## 2020-05-04 RX ADMIN — SODIUM CHLORIDE, PRESERVATIVE FREE 10 ML: 5 INJECTION INTRAVENOUS at 20:16

## 2020-05-04 RX ADMIN — CARVEDILOL 6.25 MG: 6.25 TABLET, FILM COATED ORAL at 18:50

## 2020-05-04 RX ADMIN — CARVEDILOL 12.5 MG: 6.25 TABLET, FILM COATED ORAL at 08:47

## 2020-05-04 RX ADMIN — TORSEMIDE 20 MG: 20 TABLET ORAL at 20:16

## 2020-05-04 RX ADMIN — SODIUM CHLORIDE, PRESERVATIVE FREE 10 ML: 5 INJECTION INTRAVENOUS at 12:36

## 2020-05-04 RX ADMIN — MAGNESIUM SULFATE HEPTAHYDRATE 2 G: 40 INJECTION, SOLUTION INTRAVENOUS at 12:37

## 2020-05-04 RX ADMIN — TORSEMIDE 20 MG: 20 TABLET ORAL at 08:47

## 2020-05-04 RX ADMIN — ALLOPURINOL 300 MG: 300 TABLET ORAL at 08:47

## 2020-05-04 RX ADMIN — POTASSIUM CHLORIDE 20 MEQ: 1500 TABLET, EXTENDED RELEASE ORAL at 20:16

## 2020-05-04 RX ADMIN — Medication 50 MG: at 08:47

## 2020-05-04 RX ADMIN — POTASSIUM CHLORIDE 20 MEQ: 1500 TABLET, EXTENDED RELEASE ORAL at 09:20

## 2020-05-04 ASSESSMENT — PAIN SCALES - GENERAL
PAINLEVEL_OUTOF10: 0

## 2020-05-04 NOTE — PROGRESS NOTES
Cardiology Progress Note      Patient:  Carol De Dios  YOB: 1960  MRN: [id number]   Acct: [de-identified]  Admit Date:  5/1/2020  Primary Cardiologist: Shirin Beard MD  Seen by dr Lavinia Bernardo    Note per dr Darling Orozco: AICD discharge        HPI: This is a pleasant 62 y.o. male presents with hx of NICM (viral 2013), EF 25%, D-ICD (MDT), RYAN, SYED, who had ICD x 3 firings in a row. No prior shocks. No major or strenuous activity at the time. He was walking. No f/c/ns. No chest pain preceding. No LOC, denies any drug ingestions. Taking all medications. Follows with CHF clinic. CXR without pulmonary edema. Cr 1.4-->1.1, Trop negative, Hgb 13.7. Mild hypovolemic hyponatremia--he was on home torsemide/metolazone. Interrogation reveals VF. BNP 3978. Mg 2.0. No trauma to the chest.  Does not take AAD. No current chest pain, angina, BREWER, orthopnea, PND, sob at rest, palpitations, LE edema, or syncope\"    Subjective (Events in last 24 hours):  Pt awake and alert. NAD. No cp or sob. Hx chronic BREWER but unchanged.   No hx exertional cp      Objective:   BP (!) 101/49   Pulse 91   Temp 97.9 °F (36.6 °C) (Oral)   Resp 16   Ht 5' 9\" (1.753 m)   Wt 252 lb 6.4 oz (114.5 kg)   SpO2 96%   BMI 37.27 kg/m²        TELEMETRY: nsr, PVCs    Physical Exam:  General Appearance: alert and oriented to person, place and time, in no acute distress  Cardiovascular: normal rate, regular rhythm, normal S1 and S2, no murmurs, rubs, clicks, or gallops, distal pulses intact, no carotid bruits, no JVD  Pulmonary/Chest: clear to auscultation bilaterally- no wheezes, rales or rhonchi, normal air movement, no respiratory distress  Abdomen: soft, non-tender, non-distended, normal bowel sounds, no masses Extremities: no cyanosis, clubbing or edema, pulse   Skin: warm and dry, no rash or erythema  Head: normocephalic and atraumatic  Eyes: pupils equal, round, and reactive to light  Neck: supple and non-tender 74 05/02/2020    ALT 16 05/02/2020    AST 18 05/02/2020    PROT 6.6 05/02/2020    BILITOT 0.8 05/02/2020    BILIDIR 0.4 11/27/2018    LABALBU 4.0 05/02/2020       Magnesium:    Lab Results   Component Value Date    MG 1.8 05/03/2020       PT/INR:    Lab Results   Component Value Date    INR 1.03 05/02/2020       HgBA1c:  No results found for: LABA1C    FLP:    Lab Results   Component Value Date    TRIG 139 05/02/2020    HDL 67 05/02/2020    LDLCALC 127 05/02/2020       TSH:  No results found for: TSH      Assessment:    S/p AICD shock x3 due to VT - dr Gustavo Garcia consulted  NICMP - viral, NYHA II - ef 20-25 per echo 5/2/20  Hx dual chamber medtronic ICD 2013  Mild dehydration due to overdiuresis - resolved  RYAN - resolved  Hyponatremia - improved  SYED - noncompliance with cpap  Hx HTN - bp on lower side    Plan:  Plan per dr Gustavo Garcia   \"1. Gentle hydration. 2. Try and increase Coreg to 12.5 mg 1 po BID. 3. Continue to monitor for VT recurrence. 4. F/U with Cardiology. 5. Monitor for QRS widening to greater than 120 ms for upgrade to Bi V system. · 6. Recommend ablation over AAD if the patient has recurrent VT.   \"    Keep K >4 and mag >2, replace prn  Cont demadex/coreg  No ace/arb/entresto for now due to hypotension  F/up chf clinic 1 week  fup dr Susan Cross 2-4 weeks     Electronically signed by Doc Parks PA-C on 5/4/2020 at 8:59 AM

## 2020-05-04 NOTE — CARE COORDINATION
No  Does patient want to participate in local refill/ meds to beds program?  Yes  Type of Home Care Services:  None  Patient expects to be discharged to:  home  Expected Discharge date:  05/04/20  Follow Up Appointment: Best Day/ Time: Wednesday AM    Patient Goals/Plan/Treatment Preferences: Spoke with pt. He lives at home with his wife. He is independent and drives. He has a PCP. Denies any needs for [facility] or DME at discharge. Transportation/Food Security/Housekeeping Addressed:  No issues identified.  Evaluation: no    Anticipate discharge home today. 5/4/20, 12:19 PM EDT    Patient goals/plan/ treatment preferences discussed by  and . Patient goals/plan/ treatment preferences reviewed with patient/ family. Patient/ family verbalize understanding of discharge plan and are in agreement with goal/plan/treatment preferences. Understanding was demonstrated using the teach back method. AVS provided by RN at time of discharge, which includes all necessary medical information pertaining to the patients current course of illness, treatment, post-discharge goals of care, and treatment preferences.         IMM Letter  IMM Letter given to Patient/Family/Significant other/Guardian/POA/by[de-identified]   IMM Letter date given[de-identified] 05/04/20  IMM Letter time given[de-identified] 0933  Observation Status Letter date given[de-identified] 05/01/20  Observation Status Letter time given[de-identified] 1956  Observation Status Letter given to Patient/Family/Significant other/Guardian/POA/by[de-identified] Staff       Electronically signed by Irene Dockery RN on 5/4/2020 at 12:19 PM

## 2020-05-04 NOTE — FLOWSHEET NOTE
05/04/20 1003   Encounter Summary   Services provided to: Patient   Referral/Consult From: Bret   Continue Visiting Yes  (5/4/2020 P )   Complexity of Encounter Moderate   Length of Encounter 15 minutes   Routine   Type Initial   Assessment Calm; Approachable   Intervention Nurtured hope   Outcome Comfort   S- During my contact with the 62 yr old patient I wanted to assess the spiritual and emotional        needs. The pt was coping from SYED/ Obstructive sleep apnea. The pt is listed as a Full Code. O- The patient was in bed and was receptive to my presences. The pt didnt share any             major concerns at the time. The pt does have support through their family. A- I offered emotional support as well as words of encouragement. P-  Continued support would be helpful in order to meet the future Spiritual needs of the         patient.

## 2020-05-04 NOTE — PROGRESS NOTES
Hospitalist Progress Note    Patient:  Maria Victoria Wagner  YOB: 1960  MRN: [id number]   PCP: Ori Mccollum MD         Acct: [de-identified]  Unit/Bed: [address] [place] [address]    Date of Admission: 5/1/2020      ASSESSMENT/ PLAN     1. AICD discharge with evidence for V fib on interrogation. Cardiology consulted. Echo showed EF of 20-25%, mild to moderate MR and pulmonary HTN with RVSP 52 mmHg. Troponin unremarkable. Seen by Dr. Shwetha Kaminski and does not qualify based on strict criteria for the upgrade of his AICD. Spoke to Dr. Tala Morataya today and he is ok with decreasing the carvedilol back to 6.25 mg bid since the patient is getting hypotensive versus using metoprolol. 2. Unspecified leukocytosis, improved: evidence for infection versus hemoconcentration in the setting of volume depletion, inflammatory response. Seems to be trending downwards. CXR stable with no evidence for pneumonia. 3. Dilated cardiomyopathy (viral; dx 2013) / chronic systolic CHF (EF 43% per ECHO 12/2018) / s/p Medtronic dual AICD placement. Resume torsemide. Will hold off on adding metolazone for now as patient is hypotensive. Patient states that he has not been able to tolerate Entresto in the past and it was discontinued. He has also not been able to tolerate his current medications as he is becoming hypotensive- and may benefit from surgical intervention with an upgrade of his AICD, since he is symptomatic and is unable to tolerate oral medications due to hypotension and other side effects. Spoke to Dr. Ash Rosa- who had spoken to Dr. Shwetha Kaminski and the patient does  Not qualify for upgraded of his AICD. Weight has improved. Will continue to monitor. 4. Hyponatremia: Related to mild volume depletion. Improved. Spironolactone discontinued. 5. Acute kidney injury/ volume depletion/ anion gap metabolic acidosis, improved: Improved with IV fluids, so likely pre-renal azotemia in the setting of mild volume depletion.  Resume torsemide and will continue to Scheduled Medications    carvedilol  6.25 mg Oral BID WC    potassium (CARDIAC) replacement protocol   Other RX Placeholder    torsemide  20 mg Oral BID    zinc sulfate  50 mg Oral Daily    allopurinol  300 mg Oral Daily    magnesium  30 mg Oral Daily    potassium chloride  20 mEq Oral BID    sodium chloride flush  10 mL Intravenous 2 times per day     PRN Meds: sodium chloride flush, acetaminophen **OR** acetaminophen, perflutren lipid microspheres    Ins and outs:      Intake/Output Summary (Last 24 hours) at 5/4/2020 1557  Last data filed at 5/4/2020 1422  Gross per 24 hour   Intake 2675 ml   Output 2425 ml   Net 250 ml       Physical Examination     BP (!) 100/59   Pulse 72   Temp 97.8 °F (36.6 °C) (Oral)   Resp 18   Ht 5' 9\" (1.753 m)   Wt 252 lb 6.4 oz (114.5 kg)   SpO2 96% Comment: O2 not needed at this time per O2 protocol. BMI 37.27 kg/m²     General appearance: No apparent distress. HEENT: Extraocular motion intact. Trachea midline. Neck: Supple. Respiratory:  CTA bilaterally without rales/wheezes/rhonchi. Cardiovascular: RRR with normal S1/S2 without murmurs, rubs or gallops. Abdomen: Soft, non-tender, non-distended with normal bowel sounds. Musculoskeletal: Patient is moving extremities x 4 spontaneously  Neurologic: Grossly non focal. CN: II-XII intact  Psychiatric: Alert and oriented  Vascular: Dorsalis pedis palpable bilaterally. Radial pulses palpable bilaterally. Skin:  No visible rashes or lesions.     Labs     Recent Labs     05/01/20 1838 05/02/20 0427   WBC 11.9* 9.2   HGB 14.4 13.7*   HCT 41.8* 40.7*    222     Recent Labs     05/02/20  0427 05/03/20  0340 05/04/20  0340   * 135 135   K 3.8 3.6 3.5   CL 94* 96* 95*   CO2 25 27 27   BUN 25* 21 22   CREATININE 1.1 1.1 1.2   CALCIUM 9.3 9.3 9.2     Recent Labs     05/01/20  1838 05/02/20 0427   AST 19 18   ALT 18 16   BILITOT 0.7 0.8   ALKPHOS 80 74     Recent Labs     05/02/20 0427   INR 1.03     No results for input(s): Jovana Mathur in the last 72 hours. Urinalysis:      Lab Results   Component Value Date    NITRU NEGATIVE 05/01/2020    WBCUA NONE SEEN 05/01/2020    BACTERIA NONE SEEN 05/01/2020    RBCUA NONE SEEN 05/01/2020    BLOODU NEGATIVE 05/01/2020    SPECGRAV 1.005 05/01/2020    GLUCOSEU NEGATIVE 05/09/2017       Diagnostic imaging/procedures     Xr Chest Portable    Result Date: 5/2/2020  PROCEDURE: XR CHEST PORTABLE CLINICAL INFORMATION: AICD discharge. Chronic shortness of breath. COMPARISON: 11/27/2018. TECHNIQUE: AP upright view of the chest. FINDINGS: There is a stable left-sided cardiac pacer/defibrillator generator with 2 leads. The lungs appear grossly clear. The cardiac-based also what is stable. Visualized osseous structures appear grossly intact. Stable radiographic appearance of the chest. No evidence of an acute process. **This report has been created using voice recognition software. It may contain minor errors which are inherent in voice recognition technology. ** Final report electronically signed by Dr. Laura Conrad MD on 5/2/2020 8:44 AM      No results found.     DVT prophylaxis: [x] Lovenox held in case patient goes for procedure                                 [] SCDs                                 [] SQ Heparin                                 [] Encourage ambulation           [] Already on Anticoagulation     Disposition:    [x] Home       [] TCU       [] Rehab       [] Psych       [] SNF       [] [place]       [] Other-

## 2020-05-04 NOTE — PROGRESS NOTES
capsule  Take 50 mg by mouth daily                     Physical Examination     Vitals:  Vitals:    05/04/20 1650 05/04/20 2011 05/05/20 0615 05/05/20 0836   BP: 105/71 104/69 105/71    Pulse: 97 83 84    Resp: 16 16 16    Temp: 97.2 °F (36.2 °C) 98.4 °F (36.9 °C)     TempSrc: Oral Oral     SpO2: 96% 96% 96% 97%   Weight:   251 lb 6.4 oz (114 kg)    Height:           Weight: Weight: 251 lb 6.4 oz (114 kg)     24 hour intake/output:    Intake/Output Summary (Last 24 hours) at 5/5/2020 0957  Last data filed at 5/4/2020 2016  Gross per 24 hour   Intake 1195 ml   Output --   Net 1195 ml     General appearance:  No apparent distress. Obese  HEENT: Normocephalic. Extraocular motion intact. Conjunctivae clear. Nose symmetric without evidence of discharge. Oral mucosa moist w/o erythema or exudate. Neck: Supple. No JVD. Terry Maffucci Trachea midline. Cardiovascular:  RRR w/ normal S1/S2. No murmurs, rubs or gallops. Respiratory: Clear to auscultation, bilaterally without rales/wheezes/rhonchi. Abdomen: Soft, non-tender, non-distended with normal bowel sounds. Musculoskeletal:  Full ROM without deformity. Neurologic:  No focal sensory/motor deficits. Cranial nerves: II-XII intact. Lymphatic: No cervical lymphadenopathy. Psychiatric:  Alert and oriented. Vascular: Dorsalis pedis pulses bilaterally palpable 2+. Radial pulses palpable bilaterally 2+. No peripheral edema. Capillary refill<3 seconds  Genitourinary: Deferred. Skin: No visible rashes or lesions. Labs     Labs: For convenience and continuity at follow-up the following most recent labs are provided:      No results for input(s): WBC, HGB, HCT, PLT in the last 72 hours. Recent Labs     05/03/20  0340 05/04/20  0340    135   K 3.6 3.5   CL 96* 95*   CO2 27 27   BUN 21 22   CREATININE 1.1 1.2   CALCIUM 9.3 9.2     No results for input(s): AST, ALT, BILIDIR, BILITOT, ALKPHOS in the last 72 hours.   No results for input(s): INR in the last 72

## 2020-05-05 ENCOUNTER — TELEPHONE (OUTPATIENT)
Dept: CARDIOLOGY CLINIC | Age: 60
End: 2020-05-05

## 2020-05-05 VITALS
HEART RATE: 86 BPM | TEMPERATURE: 98.1 F | BODY MASS INDEX: 37.23 KG/M2 | HEIGHT: 69 IN | SYSTOLIC BLOOD PRESSURE: 105 MMHG | RESPIRATION RATE: 18 BRPM | WEIGHT: 251.4 LBS | DIASTOLIC BLOOD PRESSURE: 71 MMHG | OXYGEN SATURATION: 96 %

## 2020-05-05 PROCEDURE — 94760 N-INVAS EAR/PLS OXIMETRY 1: CPT

## 2020-05-05 PROCEDURE — 6370000000 HC RX 637 (ALT 250 FOR IP): Performed by: FAMILY MEDICINE

## 2020-05-05 PROCEDURE — 99239 HOSP IP/OBS DSCHRG MGMT >30: CPT | Performed by: INTERNAL MEDICINE

## 2020-05-05 PROCEDURE — 6370000000 HC RX 637 (ALT 250 FOR IP): Performed by: PHYSICIAN ASSISTANT

## 2020-05-05 PROCEDURE — 2580000003 HC RX 258: Performed by: FAMILY MEDICINE

## 2020-05-05 PROCEDURE — 6370000000 HC RX 637 (ALT 250 FOR IP): Performed by: INTERNAL MEDICINE

## 2020-05-05 RX ORDER — MAGNESIUM 30 MG
30 TABLET ORAL 2 TIMES DAILY
Status: DISCONTINUED | OUTPATIENT
Start: 2020-05-05 | End: 2020-05-05 | Stop reason: HOSPADM

## 2020-05-05 RX ORDER — POTASSIUM CHLORIDE 750 MG/1
30 TABLET, FILM COATED, EXTENDED RELEASE ORAL 2 TIMES DAILY
Qty: 180 TABLET | Refills: 11 | Status: SHIPPED | OUTPATIENT
Start: 2020-05-05 | End: 2020-05-05

## 2020-05-05 RX ORDER — METOPROLOL SUCCINATE 25 MG/1
25 TABLET, EXTENDED RELEASE ORAL DAILY
Status: DISCONTINUED | OUTPATIENT
Start: 2020-05-06 | End: 2020-05-05 | Stop reason: HOSPADM

## 2020-05-05 RX ORDER — MAGNESIUM 30 MG
30 TABLET ORAL 2 TIMES DAILY
Qty: 30 TABLET | Refills: 11 | Status: SHIPPED | OUTPATIENT
Start: 2020-05-05 | End: 2020-05-05

## 2020-05-05 RX ORDER — POTASSIUM CHLORIDE 750 MG/1
30 TABLET, FILM COATED, EXTENDED RELEASE ORAL 2 TIMES DAILY
Qty: 180 TABLET | Refills: 11 | Status: SHIPPED | OUTPATIENT
Start: 2020-05-05 | End: 2020-07-15 | Stop reason: SDUPTHER

## 2020-05-05 RX ORDER — MAGNESIUM 30 MG
30 TABLET ORAL 2 TIMES DAILY
Qty: 60 TABLET | Refills: 11 | Status: SHIPPED | OUTPATIENT
Start: 2020-05-05 | End: 2020-05-05

## 2020-05-05 RX ORDER — METOPROLOL SUCCINATE 25 MG/1
25 TABLET, EXTENDED RELEASE ORAL DAILY
Qty: 30 TABLET | Refills: 3 | Status: SHIPPED | OUTPATIENT
Start: 2020-05-06 | End: 2020-05-05

## 2020-05-05 RX ORDER — MAGNESIUM 30 MG
30 TABLET ORAL 2 TIMES DAILY
Qty: 60 TABLET | Refills: 11 | Status: SHIPPED | OUTPATIENT
Start: 2020-05-05 | End: 2020-08-06

## 2020-05-05 RX ORDER — POTASSIUM CHLORIDE 750 MG/1
30 TABLET, FILM COATED, EXTENDED RELEASE ORAL 2 TIMES DAILY
Status: DISCONTINUED | OUTPATIENT
Start: 2020-05-05 | End: 2020-05-05 | Stop reason: HOSPADM

## 2020-05-05 RX ORDER — METOPROLOL SUCCINATE 25 MG/1
25 TABLET, EXTENDED RELEASE ORAL DAILY
Qty: 30 TABLET | Refills: 3 | Status: SHIPPED | OUTPATIENT
Start: 2020-05-06 | End: 2020-05-12

## 2020-05-05 RX ADMIN — TORSEMIDE 20 MG: 20 TABLET ORAL at 08:10

## 2020-05-05 RX ADMIN — SODIUM CHLORIDE, PRESERVATIVE FREE 10 ML: 5 INJECTION INTRAVENOUS at 08:08

## 2020-05-05 RX ADMIN — CARVEDILOL 6.25 MG: 6.25 TABLET, FILM COATED ORAL at 08:10

## 2020-05-05 RX ADMIN — POTASSIUM CHLORIDE 30 MEQ: 750 TABLET, FILM COATED, EXTENDED RELEASE ORAL at 08:10

## 2020-05-05 RX ADMIN — Medication 50 MG: at 08:10

## 2020-05-05 RX ADMIN — ALLOPURINOL 300 MG: 300 TABLET ORAL at 08:10

## 2020-05-05 NOTE — DISCHARGE SUMMARY
Hospital Medicine Discharge Summary      Patient:  Merced Olvera  YOB: 1960  MRN: [id number]   PCP: Kristyn White MD         Acct: [de-identified]     [facility] Date: 5/1/2020    Discharge Date: 5/5/2020       Admitting Physician: Eva Mosley MD  Discharge Physician: Eva Mosley MD     Discharge Diagnoses     DISCHARGE DIAGNOSES:  1. AICD discharge with evidence for V fib on interrogation, possibly related to volume depletion? 2. Unspecified leukocytosis, improved  3. Dilated cardiomyopathy (viral; dx 2013) / chronic systolic CHF (EF 47% per ECHO 12/2018) / s/p Medtronic dual AICD placement. 4. Pulmonary hypertension  5. Hyponatremia, improved  6. Acute kidney injury/ volume depletion/ anion gap metabolic acidosis  7. Prolonged Qtc- difficult to interpret in setting of ICD  8. Essential HTN: Continue carvedilol  9. SYED  10. Obesity: BMI 37.5  11. OA/DD: Tylenol prn  12. Gout: allopurinol    Disposition:    [x] Home           Condition at Discharge: Stable    The patient was seen and examined on day of discharge and this discharge summary is in conjunction with any daily progress note from day of discharge. Hospital course     Merced Olvera is a 62 y.o. male w/ PMH  Of dilated cardiomyopathy (viral; dx 8188), chronic systolic CHF (EF 88% per ECHO 12/2018); s/p Medtronic dual AICD placement, HTN, HLD, SYED (unable to wear CPAP), obesity, OA, gout and depression who presented to [facility] on 5/1/2020 with 3 spontaneous, back-to-back AICD discharges which occurred earlier in the day while he was ambulating at a normal pace. Reports placement of AICD following diagnosis of dilated cardiomyopathy many years ago -- states it has never fired until the day of admission. Endorses strict compliance with routine interrogations and daily medications. He denies recent illness, stressors, trauma or changes to diet or level of exertion.  He also denies associated fevers/chills, headaches, chest pain, capsule  Take 50 mg by mouth daily                     Physical Examination     Vitals:  Vitals:    05/04/20 1650 05/04/20 2011 05/05/20 0615 05/05/20 0836   BP: 105/71 104/69 105/71    Pulse: 97 83 84    Resp: 16 16 16    Temp: 97.2 °F (36.2 °C) 98.4 °F (36.9 °C)     TempSrc: Oral Oral     SpO2: 96% 96% 96% 97%   Weight:   251 lb 6.4 oz (114 kg)    Height:           Weight: Weight: 251 lb 6.4 oz (114 kg)     24 hour intake/output:    Intake/Output Summary (Last 24 hours) at 5/5/2020 0959  Last data filed at 5/4/2020 2016  Gross per 24 hour   Intake 1195 ml   Output --   Net 1195 ml     General appearance:  No apparent distress. Obese  HEENT: Normocephalic. Extraocular motion intact. Conjunctivae clear. Nose symmetric without evidence of discharge. Oral mucosa moist w/o erythema or exudate. Neck: Supple. No JVD. Harvis Pock Trachea midline. Cardiovascular:  RRR w/ normal S1/S2. No murmurs, rubs or gallops. Respiratory: Clear to auscultation, bilaterally without rales/wheezes/rhonchi. Abdomen: Soft, non-tender, non-distended with normal bowel sounds. Musculoskeletal:  Full ROM without deformity. Neurologic:  No focal sensory/motor deficits. Cranial nerves: II-XII intact. Lymphatic: No cervical lymphadenopathy. Psychiatric:  Alert and oriented. Vascular: Dorsalis pedis pulses bilaterally palpable 2+. Radial pulses palpable bilaterally 2+. No peripheral edema. Capillary refill<3 seconds  Genitourinary: Deferred. Skin: No visible rashes or lesions. Labs     Labs: For convenience and continuity at follow-up the following most recent labs are provided:      No results for input(s): WBC, HGB, HCT, PLT in the last 72 hours. Recent Labs     05/03/20  0340 05/04/20  0340    135   K 3.6 3.5   CL 96* 95*   CO2 27 27   BUN 21 22   CREATININE 1.1 1.2   CALCIUM 9.3 9.2     No results for input(s): AST, ALT, BILIDIR, BILITOT, ALKPHOS in the last 72 hours.   No results for input(s): INR in the last 72

## 2020-05-07 ENCOUNTER — PROCEDURE VISIT (OUTPATIENT)
Dept: CARDIOLOGY CLINIC | Age: 60
End: 2020-05-07
Payer: MEDICARE

## 2020-05-07 ENCOUNTER — TELEPHONE (OUTPATIENT)
Dept: CARDIOLOGY CLINIC | Age: 60
End: 2020-05-07

## 2020-05-07 PROCEDURE — G2066 INTER DEVC REMOTE 30D: HCPCS | Performed by: INTERNAL MEDICINE

## 2020-05-07 PROCEDURE — 93297 REM INTERROG DEV EVAL ICPMS: CPT | Performed by: INTERNAL MEDICINE

## 2020-05-07 NOTE — TELEPHONE ENCOUNTER
Called and spoke to Jonatan. Has only been on Toprol x 2 days. Will continue meds same at this time and monitor BP/HR at this time. He would like to go to [address] [facility] [place].   He will let us know next week at [address].

## 2020-05-12 ENCOUNTER — OFFICE VISIT (OUTPATIENT)
Dept: CARDIOLOGY CLINIC | Age: 60
End: 2020-05-12
Payer: MEDICARE

## 2020-05-12 VITALS
HEIGHT: 69 IN | WEIGHT: 256 LBS | HEART RATE: 79 BPM | BODY MASS INDEX: 37.92 KG/M2 | SYSTOLIC BLOOD PRESSURE: 122 MMHG | OXYGEN SATURATION: 95 % | DIASTOLIC BLOOD PRESSURE: 70 MMHG

## 2020-05-12 LAB
ANION GAP SERPL CALCULATED.3IONS-SCNC: 13 MEQ/L (ref 8–16)
BUN BLDV-MCNC: 17 MG/DL (ref 7–22)
CALCIUM SERPL-MCNC: 9.4 MG/DL (ref 8.5–10.5)
CHLORIDE BLD-SCNC: 100 MEQ/L (ref 98–111)
CO2: 26 MEQ/L (ref 23–33)
CREAT SERPL-MCNC: 1.1 MG/DL (ref 0.4–1.2)
GFR SERPL CREATININE-BSD FRML MDRD: 68 ML/MIN/1.73M2
GLUCOSE BLD-MCNC: 107 MG/DL (ref 70–108)
MAGNESIUM: 2.3 MG/DL (ref 1.6–2.4)
POTASSIUM SERPL-SCNC: 3.9 MEQ/L (ref 3.5–5.2)
PRO-BNP: 5803 PG/ML (ref 0–900)
SODIUM BLD-SCNC: 139 MEQ/L (ref 135–145)

## 2020-05-12 PROCEDURE — 1036F TOBACCO NON-USER: CPT | Performed by: NURSE PRACTITIONER

## 2020-05-12 PROCEDURE — 36415 COLL VENOUS BLD VENIPUNCTURE: CPT | Performed by: NURSE PRACTITIONER

## 2020-05-12 PROCEDURE — 99213 OFFICE O/P EST LOW 20 MIN: CPT | Performed by: NURSE PRACTITIONER

## 2020-05-12 PROCEDURE — G8417 CALC BMI ABV UP PARAM F/U: HCPCS | Performed by: NURSE PRACTITIONER

## 2020-05-12 PROCEDURE — 1111F DSCHRG MED/CURRENT MED MERGE: CPT | Performed by: NURSE PRACTITIONER

## 2020-05-12 PROCEDURE — 3017F COLORECTAL CA SCREEN DOC REV: CPT | Performed by: NURSE PRACTITIONER

## 2020-05-12 PROCEDURE — G8427 DOCREV CUR MEDS BY ELIG CLIN: HCPCS | Performed by: NURSE PRACTITIONER

## 2020-05-12 RX ORDER — METOPROLOL SUCCINATE 25 MG/1
25 TABLET, EXTENDED RELEASE ORAL 2 TIMES DAILY
Qty: 60 TABLET | Refills: 3 | Status: SHIPPED | OUTPATIENT
Start: 2020-05-12 | End: 2020-06-03

## 2020-05-12 ASSESSMENT — ENCOUNTER SYMPTOMS
SHORTNESS OF BREATH: 1
ABDOMINAL DISTENTION: 0
COUGH: 0

## 2020-05-12 NOTE — PROGRESS NOTES
Venipuncture obtained from [facility]. Patient tolerated procedure without complications or complaints.
ARTHRITIS PAIN PO) Take by mouth 3 times daily as needed       No current facility-administered medications for this visit. No Known Allergies    SUBJECTIVE:   Review of Systems   Constitutional: Positive for fatigue. Negative for activity change and appetite change. Respiratory: Positive for shortness of breath (BREWER). Negative for cough. Cardiovascular: Negative for chest pain, palpitations and leg swelling. Gastrointestinal: Negative for abdominal distention. Neurological: Negative for weakness, light-headedness and headaches. Hematological: Negative for adenopathy. Psychiatric/Behavioral: Negative for sleep disturbance. OBJECTIVE:   Today's Vitals:  /70   Pulse 79   Ht 5' 9\" (1.753 m)   Wt 256 lb (116.1 kg)   SpO2 95%   BMI 37.80 kg/m²     Physical Exam  Vitals signs reviewed. Constitutional:       General: He is not in acute distress. Appearance: Normal appearance. He is well-developed. He is not diaphoretic. HENT:      Head: Normocephalic and atraumatic. Eyes:      Conjunctiva/sclera: Conjunctivae normal.   Neck:      Musculoskeletal: Normal range of motion and neck supple. Comments: No JVD  Cardiovascular:      Rate and Rhythm: Normal rate and regular rhythm. Heart sounds: Normal heart sounds. No murmur. Comments: ICD  Pulmonary:      Effort: Pulmonary effort is normal. No respiratory distress. Breath sounds: Normal breath sounds. No wheezing or rales. Abdominal:      General: Bowel sounds are normal. There is no distension. Palpations: Abdomen is soft. Tenderness: There is no abdominal tenderness. Musculoskeletal: Normal range of motion. Right lower leg: No edema. Left lower leg: No edema. Skin:     General: Skin is warm and dry. Capillary Refill: Capillary refill takes less than 2 seconds. Neurological:      Mental Status: He is alert and oriented to person, place, and time.       Coordination: Coordination

## 2020-05-13 ENCOUNTER — OFFICE VISIT (OUTPATIENT)
Dept: CARDIOLOGY CLINIC | Age: 60
End: 2020-05-13
Payer: MEDICARE

## 2020-05-13 VITALS
SYSTOLIC BLOOD PRESSURE: 134 MMHG | WEIGHT: 253.8 LBS | HEIGHT: 69 IN | DIASTOLIC BLOOD PRESSURE: 74 MMHG | HEART RATE: 88 BPM | BODY MASS INDEX: 37.59 KG/M2

## 2020-05-13 PROCEDURE — 1036F TOBACCO NON-USER: CPT | Performed by: NUCLEAR MEDICINE

## 2020-05-13 PROCEDURE — G8417 CALC BMI ABV UP PARAM F/U: HCPCS | Performed by: NUCLEAR MEDICINE

## 2020-05-13 PROCEDURE — 3017F COLORECTAL CA SCREEN DOC REV: CPT | Performed by: NUCLEAR MEDICINE

## 2020-05-13 PROCEDURE — G8427 DOCREV CUR MEDS BY ELIG CLIN: HCPCS | Performed by: NUCLEAR MEDICINE

## 2020-05-13 PROCEDURE — 99214 OFFICE O/P EST MOD 30 MIN: CPT | Performed by: NUCLEAR MEDICINE

## 2020-05-13 PROCEDURE — 1111F DSCHRG MED/CURRENT MED MERGE: CPT | Performed by: NUCLEAR MEDICINE

## 2020-05-13 RX ORDER — AMIODARONE HYDROCHLORIDE 200 MG/1
TABLET ORAL
Qty: 104 TABLET | Refills: 1 | Status: SHIPPED | OUTPATIENT
Start: 2020-05-13 | End: 2020-12-07 | Stop reason: SDUPTHER

## 2020-05-13 RX ORDER — LOSARTAN POTASSIUM 25 MG/1
25 TABLET ORAL DAILY
COMMUNITY
End: 2020-05-13 | Stop reason: SDUPTHER

## 2020-05-13 RX ORDER — AMIODARONE HYDROCHLORIDE 200 MG/1
200 TABLET ORAL DAILY
COMMUNITY
End: 2020-05-13 | Stop reason: SDUPTHER

## 2020-05-13 RX ORDER — LOSARTAN POTASSIUM 25 MG/1
25 TABLET ORAL DAILY
Qty: 90 TABLET | Refills: 1 | Status: SHIPPED | OUTPATIENT
Start: 2020-05-13 | End: 2020-06-18 | Stop reason: DRUGHIGH

## 2020-05-13 NOTE — PROGRESS NOTES
Meningococcal (ACWY) vaccine  Aged Out       Subjective:  Review of Systems  General:   No fever, no chills, No fatigue or weight loss  Pulmonary:    some dyspnea, no wheezing  Cardiac:    Denies recent chest pain,   GI:     No nausea or vomiting, no abdominal pain  Neuro:     No dizziness or light headedness,   Musculoskeletal:  No recent active issues  Extremities:   No edema, no obvious claudication     Objective:  Physical Exam  /74   Pulse 88   Ht 5' 9\" (1.753 m)   Wt 253 lb 12.8 oz (115.1 kg)   BMI 37.48 kg/m²   General:   Well developed, well nourished  Lungs:    Clear to auscultation  Heart:    Normal S1 S2, Slight murmur. no rubs, no gallops  Abdomen:   Soft, non tender, no organomegalies, positive bowel sounds  Extremities:   No edema, no cyanosis, good peripheral pulses  Neurological:   Awake, alert, oriented. No obvious focal deficits  Musculoskelatal:  No obvious deformities    Assessment:      Diagnosis Orders   1. Essential hypertension     2. Dilated cardiomyopathy ([address]     3. ICD (implantable cardioverter-defibrillator) in place     4. V tach ([address]     recurrent v  Tach   Known CMP   Seeing [facility] for opinion     Plan:  No follow-ups on file. Resume low dose losartan 25 daily  Monitor the BP  Add amiodarone 200 mg daily   Refer to [place] for transplant evaluation   Continue risk factor modification and medical management  Thank you for allowing me to participate in the care of your patient. Please don't hesitate to contact me regarding any further issues related to the patient care    Orders Placed:  No orders of the defined types were placed in this encounter. Medications Prescribed:  No orders of the defined types were placed in this encounter. Discussed use, benefit, and side effects of prescribed medications. All patient questions answered. Pt voicedunderstanding. Instructed to continue current medications, diet and exercise.  Continue risk factor modification and

## 2020-05-14 ENCOUNTER — PROCEDURE VISIT (OUTPATIENT)
Dept: CARDIOLOGY CLINIC | Age: 60
End: 2020-05-14

## 2020-05-19 ENCOUNTER — TELEPHONE (OUTPATIENT)
Dept: PHYSICAL MEDICINE AND REHAB | Age: 60
End: 2020-05-19

## 2020-05-20 ENCOUNTER — OFFICE VISIT (OUTPATIENT)
Dept: PHYSICAL MEDICINE AND REHAB | Age: 60
End: 2020-05-20
Payer: MEDICARE

## 2020-05-20 VITALS
WEIGHT: 253 LBS | HEIGHT: 69 IN | SYSTOLIC BLOOD PRESSURE: 132 MMHG | BODY MASS INDEX: 37.47 KG/M2 | TEMPERATURE: 97.4 F | DIASTOLIC BLOOD PRESSURE: 72 MMHG | HEART RATE: 72 BPM

## 2020-05-20 PROCEDURE — 1111F DSCHRG MED/CURRENT MED MERGE: CPT | Performed by: PAIN MEDICINE

## 2020-05-20 PROCEDURE — G8427 DOCREV CUR MEDS BY ELIG CLIN: HCPCS | Performed by: PAIN MEDICINE

## 2020-05-20 PROCEDURE — G8417 CALC BMI ABV UP PARAM F/U: HCPCS | Performed by: PAIN MEDICINE

## 2020-05-20 PROCEDURE — 1036F TOBACCO NON-USER: CPT | Performed by: PAIN MEDICINE

## 2020-05-20 PROCEDURE — 99213 OFFICE O/P EST LOW 20 MIN: CPT | Performed by: PAIN MEDICINE

## 2020-05-20 PROCEDURE — 3017F COLORECTAL CA SCREEN DOC REV: CPT | Performed by: PAIN MEDICINE

## 2020-05-20 ASSESSMENT — ENCOUNTER SYMPTOMS
SINUS PRESSURE: 0
DIARRHEA: 0
RHINORRHEA: 0
NAUSEA: 0
EYE PAIN: 0
PHOTOPHOBIA: 0
SHORTNESS OF BREATH: 0
VOMITING: 0
ABDOMINAL PAIN: 0
CHEST TIGHTNESS: 0
COUGH: 0
CONSTIPATION: 0
BACK PAIN: 1
COLOR CHANGE: 0
SORE THROAT: 0
WHEEZING: 0

## 2020-05-20 NOTE — PROGRESS NOTES
[facility]  [address]  Dept: [phone]  Dept Fax: [phone]: [phone]    Visit Date: 5/20/2020    Functionality Assessment/Goals Worksheet     On a scale of 0 (Does not Interfere) to 10 (Completely Interferes)     1. Which number describes how during the past week pain has interfered with       the following:  A. General Activity:  8  B. Mood: 6  C. Walking Ability:  8  D. Normal Work (Includes both work outside the home and housework):  9  E. Relations with Other People:   0  F. Sleep:   9  G. Enjoyment of Life:   5    2. Patient Prefers to Take their Pain Medications:     []  On a regular basis   []  Only when necessary    [x]  Does not take pain medications    3. What are the Patient's Goals/Expectations for Visiting Pain Management? [x]  Learn about my pain    []  Receive Medication   []  Physical Therapy     []  Treat Depression   []  Receive Injections    []  Treat Sleep   []  Deal with Anxiety and Stress   []  Treat Opoid Dependence/Addiction   []  Other:      HPI:   Ousmane Whitney is a 62 y.o. male is here today for    Chief Complaint: Low back pain, Headaches    HPI     States last Occipital nerve block preformed on 04/22/2020 helped 90%. States lower back pain has increased. Patient has had RFA's helped in the past. States most pain on right lower back area. Last L-facet RFA @ L2-3,L3-4,L4-5 and L5-S1 right was preformed 10/31/2017 which provided 80-90% benefit. Medications reviewed. The patienthas No Known Allergies.     Past Medical History  [address]  has a past medical history of Acute kidney injury ([address] Cardiomyopathy, dilated ([address] Claustrophobia, Congestive heart failure (CHF) ([address] Depression, HTN (hypertension), Hyperlipidemia, Medtronic dual ICD, Osteoarthritis, Osteoarthritis of spine with radiculopathy, lumbar region, Spinal stenosis, tablet twice a day for 7 days, then decrease to 1 tablet by mouth once a day, Disp: 104 tablet, Rfl: 1    metoprolol succinate (TOPROL XL) 25 MG extended release tablet, Take 1 tablet by mouth 2 times daily Hold for HR<50 or SBP<100 (Patient taking differently: Take 25 mg by mouth 2 times daily Hold for HR<50 or SBP<100   25mg in am and 12.5mg in pm), Disp: 60 tablet, Rfl: 3    potassium chloride (KLOR-CON) 10 MEQ extended release tablet, Take 3 tablets by mouth 2 times daily, Disp: 180 tablet, Rfl: 11    magnesium 30 MG tablet, Take 1 tablet by mouth 2 times daily, Disp: 60 tablet, Rfl: 11    zinc sulfate (ZINCATE) 220 (50 Zn) MG capsule, Take 50 mg by mouth daily, Disp: , Rfl:     torsemide (DEMADEX) 20 MG tablet, Take 1 tablet by mouth 2 times daily, Disp: 180 tablet, Rfl: 3    metOLazone (ZAROXOLYN) 5 MG tablet, Take 0.5 tablets by mouth daily as needed (as directed by HF clinic), Disp: 15 tablet, Rfl: 0    allopurinol (ZYLOPRIM) 300 MG tablet, Take 300 mg by mouth daily, Disp: , Rfl:     Acetaminophen (TYLENOL ARTHRITIS PAIN PO), Take by mouth 3 times daily as needed, Disp: , Rfl:     Subjective:      Review of Systems   Constitutional: Negative for activity change, appetite change, chills, diaphoresis, fatigue, fever and unexpected weight change. HENT: Negative for congestion, ear pain, hearing loss, mouth sores, nosebleeds, rhinorrhea, sinus pressure and sore throat. Eyes: Negative for photophobia, pain and visual disturbance. Respiratory: Negative for cough, chest tightness, shortness of breath and wheezing. Cardiovascular: Negative for chest pain and palpitations. Gastrointestinal: Negative for abdominal pain, constipation, diarrhea, nausea and vomiting. Endocrine: Negative for cold intolerance, heat intolerance, polydipsia, polyphagia and polyuria. Genitourinary: Negative for decreased urine volume, difficulty urinating, frequency and hematuria.    Musculoskeletal: Positive for arthralgias, back pain, gait problem, myalgias, neck pain and neck stiffness. Negative for joint swelling. Skin: Negative for color change and rash. Allergic/Immunologic: Negative for food allergies and immunocompromised state. Neurological: Positive for numbness. Negative for dizziness, tremors, seizures, syncope, facial asymmetry, speech difficulty, weakness, light-headedness and headaches. Hematological: Does not bruise/bleed easily. Psychiatric/Behavioral: Positive for sleep disturbance. Negative for agitation, behavioral problems, confusion, decreased concentration, dysphoric mood, hallucinations, self-injury and suicidal ideas. The patient is nervous/anxious. The patient is not hyperactive. Objective:     Vitals:    05/20/20 1448   BP: 132/72   Site: Left Upper Arm   Position: Sitting   Cuff Size: Medium Adult   Pulse: 72   Temp: 97.4 °F (36.3 °C)   TempSrc: Oral   Weight: 253 lb (114.8 kg)   Height: 5' 9\" (1.753 m)       Physical Exam  Vitals signs and nursing note reviewed. Constitutional:       General: He is not in acute distress. Appearance: He is well-developed. He is not diaphoretic. HENT:      Head: Normocephalic and atraumatic. Right Ear: External ear normal.      Left Ear: External ear normal.      Nose: Nose normal.      Mouth/Throat:      Pharynx: No oropharyngeal exudate. Eyes:      General: No scleral icterus. Right eye: No discharge. Left eye: No discharge. Conjunctiva/sclera: Conjunctivae normal.      Pupils: Pupils are equal, round, and reactive to light. Neck:      Musculoskeletal: Full passive range of motion without pain, normal range of motion and neck supple. Normal range of motion. No edema, erythema, neck rigidity or muscular tenderness. Thyroid: No thyromegaly. Cardiovascular:      Rate and Rhythm: Normal rate and regular rhythm. Heart sounds: Normal heart sounds. No murmur. No friction rub. No gallop.     Pulmonary:

## 2020-05-23 ENCOUNTER — HOSPITAL ENCOUNTER (OUTPATIENT)
Age: 60
Discharge: HOME | End: 2020-05-23
Payer: MEDICARE

## 2020-05-23 LAB
PERFORMING LAB: NORMAL
REPORT: NORMAL
SARS-COV-2: NOT DETECTED

## 2020-05-23 PROCEDURE — U0002 COVID-19 LAB TEST NON-CDC: HCPCS

## 2020-05-26 ENCOUNTER — ANESTHESIA (OUTPATIENT)
Dept: OPERATING ROOM | Age: 60
End: 2020-05-26
Payer: MEDICARE

## 2020-05-26 ENCOUNTER — HOSPITAL ENCOUNTER (OUTPATIENT)
Age: 60
Setting detail: OUTPATIENT SURGERY
Discharge: HOME | End: 2020-05-26
Attending: PAIN MEDICINE | Admitting: PAIN MEDICINE
Payer: MEDICARE

## 2020-05-26 ENCOUNTER — ANESTHESIA EVENT (OUTPATIENT)
Dept: OPERATING ROOM | Age: 60
End: 2020-05-26
Payer: MEDICARE

## 2020-05-26 ENCOUNTER — APPOINTMENT (OUTPATIENT)
Dept: GENERAL RADIOLOGY | Age: 60
End: 2020-05-26
Attending: PAIN MEDICINE
Payer: MEDICARE

## 2020-05-26 VITALS
DIASTOLIC BLOOD PRESSURE: 85 MMHG | TEMPERATURE: 96.8 F | SYSTOLIC BLOOD PRESSURE: 132 MMHG | OXYGEN SATURATION: 94 % | RESPIRATION RATE: 10 BRPM

## 2020-05-26 VITALS
WEIGHT: 251.2 LBS | SYSTOLIC BLOOD PRESSURE: 112 MMHG | RESPIRATION RATE: 16 BRPM | HEART RATE: 98 BPM | BODY MASS INDEX: 37.2 KG/M2 | HEIGHT: 69 IN | DIASTOLIC BLOOD PRESSURE: 72 MMHG | TEMPERATURE: 97.1 F | OXYGEN SATURATION: 97 %

## 2020-05-26 PROCEDURE — 2709999900 HC NON-CHARGEABLE SUPPLY: Performed by: PAIN MEDICINE

## 2020-05-26 PROCEDURE — 3600000054 HC PAIN LEVEL 3 BASE: Performed by: PAIN MEDICINE

## 2020-05-26 PROCEDURE — 3700000001 HC ADD 15 MINUTES (ANESTHESIA): Performed by: PAIN MEDICINE

## 2020-05-26 PROCEDURE — 64636 DESTROY L/S FACET JNT ADDL: CPT | Performed by: PAIN MEDICINE

## 2020-05-26 PROCEDURE — 2580000003 HC RX 258: Performed by: NURSE ANESTHETIST, CERTIFIED REGISTERED

## 2020-05-26 PROCEDURE — 3209999900 FLUORO FOR SURGICAL PROCEDURES

## 2020-05-26 PROCEDURE — 7100000010 HC PHASE II RECOVERY - FIRST 15 MIN: Performed by: PAIN MEDICINE

## 2020-05-26 PROCEDURE — 6360000002 HC RX W HCPCS: Performed by: PAIN MEDICINE

## 2020-05-26 PROCEDURE — 2500000003 HC RX 250 WO HCPCS: Performed by: PAIN MEDICINE

## 2020-05-26 PROCEDURE — 6360000002 HC RX W HCPCS: Performed by: NURSE ANESTHETIST, CERTIFIED REGISTERED

## 2020-05-26 PROCEDURE — 3600000055 HC PAIN LEVEL 3 ADDL 15 MIN: Performed by: PAIN MEDICINE

## 2020-05-26 PROCEDURE — 7100000011 HC PHASE II RECOVERY - ADDTL 15 MIN: Performed by: PAIN MEDICINE

## 2020-05-26 PROCEDURE — 64635 DESTROY LUMB/SAC FACET JNT: CPT | Performed by: PAIN MEDICINE

## 2020-05-26 PROCEDURE — 3700000000 HC ANESTHESIA ATTENDED CARE: Performed by: PAIN MEDICINE

## 2020-05-26 RX ORDER — PROPOFOL 10 MG/ML
INJECTION, EMULSION INTRAVENOUS PRN
Status: DISCONTINUED | OUTPATIENT
Start: 2020-05-26 | End: 2020-05-26 | Stop reason: SDUPTHER

## 2020-05-26 RX ORDER — FENTANYL CITRATE 50 UG/ML
INJECTION, SOLUTION INTRAMUSCULAR; INTRAVENOUS PRN
Status: DISCONTINUED | OUTPATIENT
Start: 2020-05-26 | End: 2020-05-26 | Stop reason: SDUPTHER

## 2020-05-26 RX ORDER — SODIUM CHLORIDE 9 MG/ML
INJECTION, SOLUTION INTRAVENOUS CONTINUOUS PRN
Status: DISCONTINUED | OUTPATIENT
Start: 2020-05-26 | End: 2020-05-26 | Stop reason: SDUPTHER

## 2020-05-26 RX ORDER — LIDOCAINE HYDROCHLORIDE 10 MG/ML
INJECTION, SOLUTION INFILTRATION; PERINEURAL PRN
Status: DISCONTINUED | OUTPATIENT
Start: 2020-05-26 | End: 2020-05-26 | Stop reason: ALTCHOICE

## 2020-05-26 RX ORDER — BUPIVACAINE HYDROCHLORIDE 2.5 MG/ML
INJECTION, SOLUTION EPIDURAL; INFILTRATION; INTRACAUDAL PRN
Status: DISCONTINUED | OUTPATIENT
Start: 2020-05-26 | End: 2020-05-26 | Stop reason: ALTCHOICE

## 2020-05-26 RX ORDER — METHYLPREDNISOLONE ACETATE 80 MG/ML
INJECTION, SUSPENSION INTRA-ARTICULAR; INTRALESIONAL; INTRAMUSCULAR; SOFT TISSUE PRN
Status: DISCONTINUED | OUTPATIENT
Start: 2020-05-26 | End: 2020-05-26 | Stop reason: ALTCHOICE

## 2020-05-26 RX ADMIN — LIDOCAINE HYDROCHLORIDE 40 MG: 10 INJECTION, SOLUTION INFILTRATION; PERINEURAL at 08:55

## 2020-05-26 RX ADMIN — PROPOFOL 40 MG: 10 INJECTION, EMULSION INTRAVENOUS at 08:56

## 2020-05-26 RX ADMIN — SODIUM CHLORIDE: 9 INJECTION, SOLUTION INTRAVENOUS at 08:43

## 2020-05-26 RX ADMIN — LIDOCAINE HYDROCHLORIDE 20 MG: 10 INJECTION, SOLUTION INFILTRATION; PERINEURAL at 08:54

## 2020-05-26 RX ADMIN — FENTANYL CITRATE 25 MCG: 50 INJECTION, SOLUTION INTRAMUSCULAR; INTRAVENOUS at 08:45

## 2020-05-26 RX ADMIN — FENTANYL CITRATE 25 MCG: 50 INJECTION, SOLUTION INTRAMUSCULAR; INTRAVENOUS at 08:49

## 2020-05-26 ASSESSMENT — PULMONARY FUNCTION TESTS
PIF_VALUE: 1
PIF_VALUE: 0
PIF_VALUE: 1
PIF_VALUE: 0
PIF_VALUE: 1
PIF_VALUE: 0
PIF_VALUE: 1
PIF_VALUE: 0
PIF_VALUE: 0
PIF_VALUE: 1
PIF_VALUE: 0
PIF_VALUE: 1

## 2020-05-26 ASSESSMENT — PAIN DESCRIPTION - DESCRIPTORS: DESCRIPTORS: ACHING;CONSTANT

## 2020-05-26 ASSESSMENT — PAIN - FUNCTIONAL ASSESSMENT: PAIN_FUNCTIONAL_ASSESSMENT: 0-10

## 2020-05-26 NOTE — PROGRESS NOTES
56 Dr Sariah García spoke to Jhonathan Pate [facility] to proceed. 3332 Discharge instructions given, understanding voiced, questions answered. Family in car.

## 2020-05-26 NOTE — ANESTHESIA PRE PROCEDURE
(COZAAR) 25 MG tablet Take 1 tablet by mouth daily 90 tablet 1    amiodarone (CORDARONE) 200 MG tablet Take 1 tablet twice a day for 7 days, then decrease to 1 tablet by mouth once a day 104 tablet 1    metoprolol succinate (TOPROL XL) 25 MG extended release tablet Take 1 tablet by mouth 2 times daily Hold for HR<50 or SBP<100 (Patient taking differently: Take 25 mg by mouth 2 times daily Hold for HR<50 or SBP<100   25mg in am and 12.5mg in pm) 60 tablet 3    torsemide (DEMADEX) 20 MG tablet Take 1 tablet by mouth 2 times daily 180 tablet 3    metOLazone (ZAROXOLYN) 5 MG tablet Take 0.5 tablets by mouth daily as needed (as directed by HF clinic) 15 tablet 0    allopurinol (ZYLOPRIM) 300 MG tablet Take 300 mg by mouth daily      Acetaminophen (TYLENOL ARTHRITIS PAIN PO) Take by mouth 3 times daily as needed      potassium chloride (KLOR-CON) 10 MEQ extended release tablet Take 3 tablets by mouth 2 times daily 180 tablet 11    magnesium 30 MG tablet Take 1 tablet by mouth 2 times daily 60 tablet 11    zinc sulfate (ZINCATE) 220 (50 Zn) MG capsule Take 50 mg by mouth daily         Allergies:  No Known Allergies    Problem List:    Patient Active Problem List   Diagnosis Code    Snoring R06.83    SYED (obstructive sleep apnea) G47.33    Insomnia G47.00    Daytime somnolence R40.0    Sleep difficulties G47.9    Sleep talking G47.8    Restless sleeper G47.9    Claustrophobia F40.240    HTN (hypertension) I10    CHF (congestive heart failure) (HCC) I50.9    Gout M10.9    Arthritis M19.90    HLD (hyperlipidemia) E78.5    Dilated cardiomyopathy (HCC) I42.0    Congestive heart failure (CHF) (HCC) I50.9    Medtronic dual ICD Z95.810    Hyponatremia E87.1    S/P lumbar laminectomy Z98.890    Ileus, postoperative ([facility]) K91.89, K56.7    Obesity E66.9    Lumbar spondylosis M47.816    Lumbar postlaminectomy syndrome M96.1    Chronic pain syndrome G89.4    Spondylosis of lumbar region without performed by Yeny Daniels MD at [address] [facility]"" Right 10/31/2017    LUMBAR FACET RFA @ L2-3, L3-4, L4-5 AND L5-S1 RIGHT SIDE performed by Yeny Daniels MD at [address] [facility]\"" Left 4/11/2019    L-RFA @ L2-3, L3-4, L4-5, L5-S1 LEFT SIDE FIRST. performed by Yeny Daniels MD at [address] [facility]\"" Left 10/3/2019    LEFT SI RFA performed by Yeny Daniels MD at [address] [place] [address]  2015    OTHER SURGICAL HISTORY  10/9/2015    C3-6 ACDF    OTHER SURGICAL HISTORY  01/18/2016    FACET INJECTIONS L3-L4 L4-L5 L5 S1    OTHER SURGICAL HISTORY  2-8-2016    RFA - L3-S1    OTHER SURGICAL HISTORY N/A 03-21-16    cervical epidural block C7    OTHER SURGICAL HISTORY Bilateral 05-16-16    cervical facet block C7-T1    OTHER SURGICAL HISTORY  08/01/2016    CERVICAL ABLATION    OTHER SURGICAL HISTORY Left 09/11/2017    Lumbar RFA at L2-3, L3-4, L4-5, L5-S1    OTHER SURGICAL HISTORY Right 10/31/2017    Lumbar RFA at L2-3, L3-4, L4-5, L5-S1    OTHER SURGICAL HISTORY Right 05/09/2018    Excision scalp mass right forehead    LA EXC SKIN BENIG <5MM REMAINDR BODY Right 5/9/2018    EXCISION RIGHT FOREHEAD CYST performed by Annalise Mari MD at [address] [facility] Left 6/19/2018    L-RFA @ L2-3, L3-4, L4-5, L5-S1 LEFT SIDE FIRST. performed by Yeny Daniels MD at [address] [facility]  1980's???    TONSILLECTOMY  1980's??    VASECTOMY  ? ??       Social History:    Social History     Tobacco Use    Smoking status: Never Smoker    Smokeless tobacco: Never Used   Substance Use Topics    Alcohol use:  Yes     Alcohol/week: 6.0 standard drinks     Types: 6 Standard drinks or equivalent per week     Comment: social                                Counseling given: Not Answered      Vital Signs (Current): Vitals:    05/26/20 0728 05/26/20 0902   BP: 113/73 112/72   Pulse: 84 98   Resp: 15 16   Temp: 98.4 °F (36.9 °C) 97.1 °F (36.2 °C)   TempSrc: Temporal Temporal   SpO2: 96% 97%   Weight: 251 lb 3.2 oz (113.9 kg)    Height: 5' 9\" (1.753 m)                                               BP Readings from Last 3 Encounters:   05/26/20 112/72   05/26/20 132/85   05/20/20 132/72       NPO Status: Time of last liquid consumption: 2200                        Time of last solid consumption: 1730                        Date of last liquid consumption: 05/25/20                        Date of last solid food consumption: 05/25/20    BMI:   Wt Readings from Last 3 Encounters:   05/26/20 251 lb 3.2 oz (113.9 kg)   05/20/20 253 lb (114.8 kg)   05/13/20 253 lb 12.8 oz (115.1 kg)     Body mass index is 37.1 kg/m². CBC:   Lab Results   Component Value Date    WBC 9.2 05/02/2020    RBC 4.31 05/02/2020    HGB 13.7 05/02/2020    HCT 40.7 05/02/2020    MCV 94.4 05/02/2020    RDW 13.9 05/09/2017     05/02/2020       CMP:   Lab Results   Component Value Date     05/12/2020    K 3.9 05/12/2020    K 3.8 05/02/2020     05/12/2020    CO2 26 05/12/2020    BUN 17 05/12/2020    CREATININE 1.1 05/12/2020    LABGLOM 68 05/12/2020    GLUCOSE 107 05/12/2020    GLUCOSE 100 04/21/2020    PROT 6.6 05/02/2020    CALCIUM 9.4 05/12/2020    BILITOT 0.8 05/02/2020    ALKPHOS 74 05/02/2020    AST 18 05/02/2020    ALT 16 05/02/2020       POC Tests: No results for input(s): POCGLU, POCNA, POCK, POCCL, POCBUN, POCHEMO, POCHCT in the last 72 hours.     Coags:   Lab Results   Component Value Date    INR 1.03 05/02/2020    APTT 34.5 05/02/2020       HCG (If Applicable): No results found for: PREGTESTUR, PREGSERUM, HCG, HCGQUANT     ABGs: No results found for: PHART, PO2ART, PRB2BEA, SIG7YIR, BEART, U8BYZNGB     Type & Screen (If Applicable):  Lab Results   Component Value Date    LABRH NEG 10/09/2015       Drug/Infectious Status (If Applicable):  No results found for: HIV, HEPCAB    COVID-19 Screening (If Applicable):   Lab Results   Component Value Date    COVID19 NOT DETECTED 05/23/2020         Anesthesia Evaluation  Patient summary reviewed and Nursing notes reviewed no history of anesthetic complications:   Airway: Mallampati: III  TM distance: >3 FB   Neck ROM: full  Mouth opening: > = 3 FB Dental:          Pulmonary:   (+) sleep apnea:                             Cardiovascular:  Exercise tolerance: poor (<4 METS),   (+) hypertension:, pacemaker: AICD, dysrhythmias (Pt with episode of VT on 05/04/20):, CHF (EF 20%):,       ECG reviewed      Echocardiogram reviewed    Cleared by cardiology           ROS comment: Pt had an episode of VT for which defibrillator fired x 4 attempts on 05/04/2020. Pt admitted x 4 days at [address] [place] [address]. Dr. Hemalatha Lindo treated patient in hospital and has referred the patient to [facility] for further management. Pt reports that Dr. Skye Farmer impression of why this happened was likely secondary to dehydration and \"electrolytes out of balance\" per the patient. Dr. Skye Farmer notes in chart reviewed. Pt denies any further problems or symptoms. Neuro/Psych:   (+) neuromuscular disease:, psychiatric history:            GI/Hepatic/Renal: Neg GI/Hepatic/Renal ROS            Endo/Other:    (+) : arthritis:., .          Pt had no PAT visit       Abdominal:           Vascular: negative vascular ROS. Anesthesia Plan      MAC     ASA 4     (I had a long discussion with the patient regarding his admission to [address] [place] [address] at the beginning of the month. Pt told about the risks of arrythmias, damage to the AICD, MI, and sudden death. Pt verbalizes that he understands these risks and that the anesthetic for this procedure today will be very light sedation.   Also had a discussion with Dr. Fany Edwards (he states he would like to proceed with the procedure) and he agrees with the anesthetic plan.)  Induction:

## 2020-05-26 NOTE — PROGRESS NOTES
0352- Pt arrived to PACU phase 2, Rosi SIDDIQUI at bedside for report and Aster Jensen. Pt vitals obtained, VSS, pt breathing deeply on room air, mask removed. 5599- Pt given water, call light within reach. 2531- IV removed, pt getting dressed will call wife. 8721- Pt discharged taken down by wheelchair with Evangelina. Discharge instructions done pre op with patient.

## 2020-05-26 NOTE — OP NOTE
Operative Note  Pre-Procedure Note    Patient Name: Maulik Greene   YOB: 1960  Medical Record Number: [id number]  Date: 5/26/20    Indication:  Lower back pain  Consent: On file. Vital Signs:   Vitals:    05/26/20 0728   BP: 113/73   Pulse: 84   Resp: 15   Temp: 98.4 °F (36.9 °C)   SpO2: 96%       Past Medical History:   has a past medical history of Acute kidney injury ([facility] [address] Cardiomyopathy, dilated ([address] Claustrophobia, Congestive heart failure (CHF) ([facility] [address] Depression, HTN (hypertension), Hyperlipidemia, Medtronic dual ICD, Osteoarthritis, Osteoarthritis of spine with radiculopathy, lumbar region, Spinal stenosis, and Unspecified sleep apnea. Past Surgical History:   has a past surgical history that includes cardiovascular stress test (09/2013); doppler echocardiography (09/2013); doppler echocardiography (09/2013); Nasal sinus surgery; Carpal tunnel release (Right); Colonoscopy; back surgery (8/26/2014); Endoscopy, colon, diagnostic; Vasectomy (???); sinus surgery (1980's???); other surgical history (10/9/2015); Neck surgery (2015); back surgery (2013); Tonsillectomy (1980's??); hernia repair (???); other surgical history (01/18/2016); other surgical history (2-8-2016); other surgical history (N/A, 03-21-16); other surgical history (Bilateral, 05-16-16); other surgical history (08/01/2016); cervical fusion (05/17/2017); other surgical history (Left, 09/11/2017); Lumbar spine surgery (Left, 9/11/2017); other surgical history (Right, 10/31/2017); Lumbar spine surgery (Right, 10/31/2017); other surgical history (Right, 05/09/2018); pr exc skin benig <5mm remaindr body (Right, 5/9/2018); pr inject rx other periph nerve (Left, 6/19/2018); Lumbar spine surgery (Left, 4/11/2019); Cardiac catheterization (10/2013); Cardiac defibrillator placement (2013); Injection Procedure For Sacroiliac Joint (Left, 7/2/2019);  Injection Procedure For Sacroiliac Joint (Left, 8/26/2019); and Lumbar spine surgery

## 2020-05-26 NOTE — H&P
Chief Complaint: Low back pain, Headaches     H & P     States last Occipital nerve block preformed on 04/22/2020 helped 90%.    States lower back pain has increased. Patient has had RFA's helped in the past. States most pain on right lower back area. Last L-facet RFA @ L2-3,L3-4,L4-5 and L5-S1 right was preformed 10/31/2017 which provided 80-90% benefit.     Medications reviewed.      The patienthas No Known Allergies.     Past Medical History  Cresencio  has a past medical history of Acute kidney injury ([address] Cardiomyopathy, dilated ([address] Claustrophobia, Congestive heart failure (CHF) ([facility] [address] Depression, HTN (hypertension), Hyperlipidemia, Medtronic dual ICD, Osteoarthritis, Osteoarthritis of spine with radiculopathy, lumbar region, Spinal stenosis, and Unspecified sleep apnea.     Past Surgical History  The patient  has a past surgical history that includes cardiovascular stress test (09/2013); doppler echocardiography (09/2013); doppler echocardiography (09/2013); Nasal sinus surgery; Carpal tunnel release (Right); Colonoscopy; back surgery (8/26/2014); Endoscopy, colon, diagnostic; Vasectomy (???); sinus surgery (1980's???); other surgical history (10/9/2015); Neck surgery (2015); back surgery (2013); Tonsillectomy (1980's??); hernia repair (???); other surgical history (01/18/2016); other surgical history (2-8-2016); other surgical history (N/A, 03-21-16); other surgical history (Bilateral, 05-16-16); other surgical history (08/01/2016); cervical fusion (05/17/2017); other surgical history (Left, 09/11/2017); Lumbar spine surgery (Left, 9/11/2017); other surgical history (Right, 10/31/2017); Lumbar spine surgery (Right, 10/31/2017); other surgical history (Right, 05/09/2018); pr exc skin benig <5mm remaindr body (Right, 5/9/2018); pr inject rx other periph nerve (Left, 6/19/2018); Lumbar spine surgery (Left, 4/11/2019); Cardiac catheterization (10/2013); Cardiac defibrillator placement (2013);  Injection Systems   Constitutional: Negative for activity change, appetite change, chills, diaphoresis, fatigue, fever and unexpected weight change. HENT: Negative for congestion, ear pain, hearing loss, mouth sores, nosebleeds, rhinorrhea, sinus pressure and sore throat. Eyes: Negative for photophobia, pain and visual disturbance. Respiratory: Negative for cough, chest tightness, shortness of breath and wheezing. Cardiovascular: Negative for chest pain and palpitations. Gastrointestinal: Negative for abdominal pain, constipation, diarrhea, nausea and vomiting. Endocrine: Negative for cold intolerance, heat intolerance, polydipsia, polyphagia and polyuria. Genitourinary: Negative for decreased urine volume, difficulty urinating, frequency and hematuria. Musculoskeletal: Positive for arthralgias, back pain, gait problem, myalgias, neck pain and neck stiffness. Negative for joint swelling. Skin: Negative for color change and rash. Allergic/Immunologic: Negative for food allergies and immunocompromised state. Neurological: Positive for numbness. Negative for dizziness, tremors, seizures, syncope, facial asymmetry, speech difficulty, weakness, light-headedness and headaches. Hematological: Does not bruise/bleed easily. Psychiatric/Behavioral: Positive for sleep disturbance. Negative for agitation, behavioral problems, confusion, decreased concentration, dysphoric mood, hallucinations, self-injury and suicidal ideas. The patient is nervous/anxious. The patient is not hyperactive.          Objective:      Vitals       Vitals:     05/20/20 1448   BP: 132/72   Site: Left Upper Arm   Position: Sitting   Cuff Size: Medium Adult   Pulse: 72   Temp: 97.4 °F (36.3 °C)   TempSrc: Oral   Weight: 253 lb (114.8 kg)   Height: 5' 9\" (1.753 m)            Physical Exam  Vitals signs and nursing note reviewed. Constitutional:       General: He is not in acute distress. Appearance: He is well-developed.  He is not

## 2020-06-03 ENCOUNTER — TELEPHONE (OUTPATIENT)
Dept: CARDIOLOGY CLINIC | Age: 60
End: 2020-06-03

## 2020-06-03 RX ORDER — METOPROLOL SUCCINATE 25 MG/1
25 TABLET, EXTENDED RELEASE ORAL DAILY
Qty: 30 TABLET | Refills: 5 | Status: SHIPPED | OUTPATIENT
Start: 2020-06-03 | End: 2020-12-17

## 2020-06-03 RX ORDER — POTASSIUM CHLORIDE 750 MG/1
30 TABLET, EXTENDED RELEASE ORAL 2 TIMES DAILY
Qty: 180 TABLET | Refills: 5 | Status: SHIPPED | OUTPATIENT
Start: 2020-06-03 | End: 2020-07-15

## 2020-06-03 NOTE — TELEPHONE ENCOUNTER
Nurse called and stated referral is under physician review, and they will contact patient as soon as done.

## 2020-06-04 ENCOUNTER — OFFICE VISIT (OUTPATIENT)
Dept: PHYSICAL MEDICINE AND REHAB | Age: 60
End: 2020-06-04
Payer: MEDICARE

## 2020-06-04 VITALS
BODY MASS INDEX: 37.18 KG/M2 | HEART RATE: 78 BPM | HEIGHT: 69 IN | WEIGHT: 251 LBS | SYSTOLIC BLOOD PRESSURE: 118 MMHG | DIASTOLIC BLOOD PRESSURE: 70 MMHG | TEMPERATURE: 97.4 F

## 2020-06-04 PROCEDURE — 20552 NJX 1/MLT TRIGGER POINT 1/2: CPT | Performed by: NURSE PRACTITIONER

## 2020-06-04 PROCEDURE — 99214 OFFICE O/P EST MOD 30 MIN: CPT | Performed by: NURSE PRACTITIONER

## 2020-06-04 PROCEDURE — 1111F DSCHRG MED/CURRENT MED MERGE: CPT | Performed by: NURSE PRACTITIONER

## 2020-06-04 PROCEDURE — G8427 DOCREV CUR MEDS BY ELIG CLIN: HCPCS | Performed by: NURSE PRACTITIONER

## 2020-06-04 PROCEDURE — G8417 CALC BMI ABV UP PARAM F/U: HCPCS | Performed by: NURSE PRACTITIONER

## 2020-06-04 PROCEDURE — 3017F COLORECTAL CA SCREEN DOC REV: CPT | Performed by: NURSE PRACTITIONER

## 2020-06-04 PROCEDURE — 1036F TOBACCO NON-USER: CPT | Performed by: NURSE PRACTITIONER

## 2020-06-04 RX ORDER — CYCLOBENZAPRINE HCL 5 MG
5 TABLET ORAL 3 TIMES DAILY PRN
Qty: 90 TABLET | Refills: 0 | Status: SHIPPED | OUTPATIENT
Start: 2020-06-04 | End: 2020-08-10 | Stop reason: SDUPTHER

## 2020-06-04 ASSESSMENT — ENCOUNTER SYMPTOMS: BACK PAIN: 1

## 2020-06-04 NOTE — PROGRESS NOTES
[address] [facility]  [address]  Dept: [phone]  Dept Fax: [phone]: [phone]    Visit Date: 6/4/2020    Functionality Assessment/Goals Worksheet     On a scale of 0 (Does not Interfere) to 10 (Completely Interferes)     1. Which number describes how during the past week pain has interfered with       the following:  A. General Activity:  7  B. Mood: 0  C. Walking Ability:  7  D. Normal Work (Includes both work outside the home and housework):  9  E. Relations with Other People:   0  F. Sleep:   7  G. Enjoyment of Life:   5    2. Patient Prefers to Take their Pain Medications:     []  On a regular basis   []  Only when necessary    [x]  Does not take pain medications    3. What are the Patient's Goals/Expectations for Visiting Pain Management? [x]  Learn about my pain    []  Receive Medication   []  Physical Therapy     []  Treat Depression   []  Receive Injections    []  Treat Sleep   []  Deal with Anxiety and Stress   []  Treat Opoid Dependence/Addiction   [x]  Other: pain still       HPI:   Maria Victoria Wagner is a 56 y.o. male is here today for    Chief Complaint: Low back pain    HPI     Radiofrequency ablation of median branches at the levels of L2-3,L3-4 and L4-5 right under fluoroscopic guidance with Dr Sheryl Pratt 5/26/2020. Patient with focal pain in the right lower back. Patient states that the RFA has helped with right leg pain at almost 100% relief. Patient states back pain was helped for a couple days then returned. Taking tylenol OTC - discussed dosing. Discussed with patient about CT lumbar myelogram, has not had imaging since 2015. Occipital nerve block bilateral 4/22/2020: continues with great relief.      \"Left Radiofrequency ablation of Dorsal Ramus of L5, median branch of L4 and S1,S2,S3 lateral branches at the levels of SI  under fluoroscopic guidance  With back pain and myalgias. Objective:     Vitals:    06/04/20 0730   BP: 118/70   Pulse: 78   Temp: 97.4 °F (36.3 °C)   Weight: 251 lb (113.9 kg)   Height: 5' 9\" (1.753 m)       Physical Exam  Vitals signs reviewed. Constitutional:       General: He is not in acute distress. Appearance: He is well-developed. He is not diaphoretic. HENT:      Head: Normocephalic and atraumatic. Not macrocephalic and not microcephalic. Right Ear: External ear normal.      Left Ear: External ear normal.   Eyes:      General:         Right eye: No discharge. Left eye: No discharge. Conjunctiva/sclera: Conjunctivae normal.   Neck:      Trachea: No tracheal deviation. Pulmonary:      Effort: Pulmonary effort is normal. No respiratory distress. Musculoskeletal:         General: Tenderness present. Back:    Skin:     General: Skin is warm and dry. Coloration: Skin is not pale. Findings: No rash. Neurological:      Mental Status: He is alert and oriented to person, place, and time. Cranial Nerves: No cranial nerve deficit. Psychiatric:         Attention and Perception: He is attentive. Speech: Speech normal.         Behavior: Behavior normal.         Thought Content: Thought content normal.         Judgment: Judgment normal.            Assessment:     1. Spondylosis of lumbar region without myelopathy or radiculopathy    2. Sacroiliac inflammation ([address]    3. Chronic bilateral low back pain without sciatica    4. Lumbar post-laminectomy syndrome    5. Spondylosis of cervical region without myelopathy or radiculopathy    6. Bilateral occipital neuralgia    7. Chronic pain syndrome    8. Myofascial muscle pain            Plan:      · OARRS reviewed. Current MED: 0  · Patient was not offered naloxone for home. · Discussed long term side effects of medications, tolerance, dependency and addiction.   · Previous UDS reviewed  · Patient told can not receive any pain medications from any other source. · No evidence of abuse, diversion or aberrant behavior.  Medications and/or procedures to improve function and quality of life- patient understanding with this and that may not be pain free   Discussed with patient about safe storage of medications at home   Discussed possible weaning of medication dosing dependent on treatment/procedure results.  Testing: CT Lumbar myelogram.    Procedures: TPI right lumbar today   Discussed with patient about risks with procedure including infection, reaction to medication, increased pain, or bleeding.  Medications: continue OTC tylenol - limit 1000mg per dose, 4000mg a day. Flexeril 5mg TID PRN for pain and spasms      Meds. Prescribed:   Orders Placed This Encounter   Medications    cyclobenzaprine (FLEXERIL) 5 MG tablet     Sig: Take 1 tablet by mouth 3 times daily as needed for Muscle spasms     Dispense:  90 tablet     Refill:  0       Return for follow up after CT. Electronically signed by KRIS Rodrigues CNP on6/4/2020 at 8:06 AM            Procedure  Visit Date: 6/4/20    Alcira German is a 56 y.o. male presents today in the office for the following:  Chief Complaint   Patient presents with    Follow-up     f/u after injection        History of Present Illness   Mendel Hals is here today for trigger point injections. Pre-Op Diagnosis   Myofacial pain syndrome     Post-Op Diagnosis   Myofacial pain syndrome     Procedure Documentation   Patient identified. Consent signed. Site identified. A solution of 9cc 0.25% marcaine and 40mg (1cc) DepoMedrol was combined in each syringe. Site was sprayed with Ethyl chloride and then prepped with alcohol swap X 3. Then with a 25g needle entered each trigger point and after negative aspiration, injected 1-2 cc of Marcaine/DepoMedrol solution at each site. A total of 3 cc was used for procedure. Total of 1 sites were injected into 1 muscles.      Vitals:    06/04/20 0730   BP: 118/70   Pulse: 78   Temp: 97.4 °F (36.3 °C)   Weight: 251 lb (113.9 kg)   Height: 5' 9\" (1.753 m)       Conclusion   No complications encountered. Patient discharged stable condition. Patient told if any problems to call office or go to ER.     Orders Placed This Encounter   Medications    cyclobenzaprine (FLEXERIL) 5 MG tablet     Sig: Take 1 tablet by mouth 3 times daily as needed for Muscle spasms     Dispense:  90 tablet     Refill:  0       Electronically signed by KRIS Espana CNP on 6/4/20 at 8:00 AM EDT

## 2020-06-10 ENCOUNTER — PROCEDURE VISIT (OUTPATIENT)
Dept: CARDIOLOGY CLINIC | Age: 60
End: 2020-06-10
Payer: MEDICARE

## 2020-06-10 PROCEDURE — 93297 REM INTERROG DEV EVAL ICPMS: CPT | Performed by: NUCLEAR MEDICINE

## 2020-06-10 PROCEDURE — G2066 INTER DEVC REMOTE 30D: HCPCS | Performed by: NUCLEAR MEDICINE

## 2020-06-18 ENCOUNTER — TELEPHONE (OUTPATIENT)
Dept: CARDIOLOGY CLINIC | Age: 60
End: 2020-06-18

## 2020-06-18 RX ORDER — LOSARTAN POTASSIUM 25 MG/1
12.5 TABLET ORAL DAILY
COMMUNITY
End: 2020-08-06

## 2020-06-23 RX ORDER — SODIUM CHLORIDE 450 MG/100ML
INJECTION, SOLUTION INTRAVENOUS CONTINUOUS
Status: CANCELLED | OUTPATIENT
Start: 2020-06-23

## 2020-06-24 ENCOUNTER — HOSPITAL ENCOUNTER (OUTPATIENT)
Dept: GENERAL RADIOLOGY | Age: 60
Discharge: HOME | End: 2020-06-24
Payer: MEDICARE

## 2020-06-24 ENCOUNTER — HOSPITAL ENCOUNTER (OUTPATIENT)
Dept: CT IMAGING | Age: 60
Discharge: HOME | End: 2020-06-24
Payer: MEDICARE

## 2020-06-24 NOTE — PROGRESS NOTES
0700 pt arrives ambulatory for myelogram procedure. Procedure explained and questions answered. PT RIGHTS AND RESPONSIBILITIES OFFERED TO PT. Pt states he last took flexeril 6/23/20.  0720 Spoke with tim in fluoro and she gives availability on 7/2/20 at 7:00 for OPN and 8:00 fluoro. 0725 pt states he does not want to wait until 0800 to speak to Dr. Cassandra Miramontes and prefers to reschedule. Pt  updated with reschedule date. 0730 pt provided with discharge instructions and pre myelogram screening sheets and denies questions. Pt verbalized understanding. Pt discharged ambulatory with instructions with no complaints.                _m___ Safety:       (Environmental)   [place] to environment   Ensure ID band is correct and in place/ allergy band as needed   Assess for fall risk   Initiate fall precautions as applicable (fall band, side rails, etc.)   Call light within reach   Bed in low position/ wheels locked    __m__ Pain:        Assess pain level and characteristics   Administer analgesics as ordered   Assess effectiveness of pain management and report to MD as needed    _m___ Knowledge Deficit:   Assess baseline knowledge   Provide teaching at level of understanding   Provide teaching via preferred learning method   Evaluate teaching effectiveness    __m__ Hemodynamic/Respiratory Status:       (Pre and Post Procedure Monitoring)   Assess/Monitor vital signs and LOC   Assess Baseline SpO2 prior to any sedation   Obtain weight/height   Assess vital signs/ LOC until patient meets discharge criteria   Monitor procedure site and notify MD of any issues
(4) no limitation

## 2020-07-02 ENCOUNTER — HOSPITAL ENCOUNTER (OUTPATIENT)
Dept: GENERAL RADIOLOGY | Age: 60
Discharge: HOME | End: 2020-07-02
Payer: MEDICARE

## 2020-07-02 ENCOUNTER — HOSPITAL ENCOUNTER (OUTPATIENT)
Dept: CT IMAGING | Age: 60
Discharge: HOME | End: 2020-07-02
Payer: MEDICARE

## 2020-07-02 VITALS
RESPIRATION RATE: 18 BRPM | HEART RATE: 88 BPM | BODY MASS INDEX: 37.77 KG/M2 | DIASTOLIC BLOOD PRESSURE: 71 MMHG | TEMPERATURE: 96.6 F | SYSTOLIC BLOOD PRESSURE: 110 MMHG | HEIGHT: 69 IN | WEIGHT: 255 LBS | OXYGEN SATURATION: 95 %

## 2020-07-02 PROCEDURE — 72132 CT LUMBAR SPINE W/DYE: CPT

## 2020-07-02 PROCEDURE — 6360000004 HC RX CONTRAST MEDICATION: Performed by: NURSE PRACTITIONER

## 2020-07-02 PROCEDURE — 2580000003 HC RX 258: Performed by: RADIOLOGY

## 2020-07-02 PROCEDURE — 72265 MYELOGRAPHY L-S SPINE: CPT

## 2020-07-02 RX ORDER — SODIUM CHLORIDE 450 MG/100ML
INJECTION, SOLUTION INTRAVENOUS CONTINUOUS
Status: DISCONTINUED | OUTPATIENT
Start: 2020-07-02 | End: 2020-07-03 | Stop reason: HOSPADM

## 2020-07-02 RX ADMIN — IOHEXOL 20 ML: 180 INJECTION INTRAVENOUS at 08:40

## 2020-07-02 RX ADMIN — SODIUM CHLORIDE: 4.5 INJECTION, SOLUTION INTRAVENOUS at 07:24

## 2020-07-02 ASSESSMENT — PAIN DESCRIPTION - LOCATION: LOCATION: BACK

## 2020-07-02 ASSESSMENT — PAIN DESCRIPTION - DESCRIPTORS: DESCRIPTORS: ACHING

## 2020-07-02 ASSESSMENT — PAIN SCALES - GENERAL: PAINLEVEL_OUTOF10: 7

## 2020-07-02 ASSESSMENT — PAIN - FUNCTIONAL ASSESSMENT: PAIN_FUNCTIONAL_ASSESSMENT: 0-10

## 2020-07-02 ASSESSMENT — PAIN DESCRIPTION - ORIENTATION: ORIENTATION: LOWER

## 2020-07-02 NOTE — PROGRESS NOTES
_M___ Safety:       (Environmental)   [place] to environment   Ensure ID band is correct and in place/ allergy band as needed   Assess for fall risk   Initiate fall precautions as applicable (fall band, side rails, etc.)   Call light within reach   Bed in low position/ wheels locked    __M__ Pain:        Assess pain level and characteristics   Administer analgesics as ordered   Assess effectiveness of pain management and report to MD as needed    __M__ Knowledge Deficit:   Assess baseline knowledge   Provide teaching at level of understanding   Provide teaching via preferred learning method   Evaluate teaching effectiveness    __M__ Hemodynamic/Respiratory Status:       (Pre and Post Procedure Monitoring)   Assess/Monitor vital signs and LOC   Assess Baseline SpO2 prior to any sedation   Obtain weight/height   Assess vital signs/ LOC until patient meets discharge criteria   Monitor procedure site and notify MD of any issues    _

## 2020-07-02 NOTE — PROGRESS NOTES
[address] INJECTION SITE SOFT AND DRY. HEAD ELEVATED PAIN IN LOW BACK RATES AT 7.  DENIES HEADACHE

## 2020-07-06 ENCOUNTER — TELEPHONE (OUTPATIENT)
Dept: PHYSICAL MEDICINE AND REHAB | Age: 60
End: 2020-07-06

## 2020-07-06 NOTE — TELEPHONE ENCOUNTER
----- Message from KRIS Lowery CNP sent at 7/6/2020  1:11 PM EDT -----  Please schedule follow up with patient to discuss results of CT and findings in the nerve roots in the spine. Can take a new patient slot if needed. Will need broken down to two f/u.

## 2020-07-09 ENCOUNTER — OFFICE VISIT (OUTPATIENT)
Dept: PHYSICAL MEDICINE AND REHAB | Age: 60
End: 2020-07-09
Payer: MEDICARE

## 2020-07-09 VITALS
TEMPERATURE: 96.7 F | BODY MASS INDEX: 37.77 KG/M2 | SYSTOLIC BLOOD PRESSURE: 118 MMHG | DIASTOLIC BLOOD PRESSURE: 80 MMHG | HEIGHT: 69 IN | WEIGHT: 255 LBS

## 2020-07-09 PROCEDURE — 99214 OFFICE O/P EST MOD 30 MIN: CPT | Performed by: NURSE PRACTITIONER

## 2020-07-09 PROCEDURE — G8417 CALC BMI ABV UP PARAM F/U: HCPCS | Performed by: NURSE PRACTITIONER

## 2020-07-09 PROCEDURE — G8427 DOCREV CUR MEDS BY ELIG CLIN: HCPCS | Performed by: NURSE PRACTITIONER

## 2020-07-09 PROCEDURE — 3017F COLORECTAL CA SCREEN DOC REV: CPT | Performed by: NURSE PRACTITIONER

## 2020-07-09 PROCEDURE — 1036F TOBACCO NON-USER: CPT | Performed by: NURSE PRACTITIONER

## 2020-07-09 ASSESSMENT — ENCOUNTER SYMPTOMS: BACK PAIN: 1

## 2020-07-09 NOTE — PROGRESS NOTES
[facility]  [address]  Dept: [phone]  Dept Fax: [phone]: [phone]    Visit Date: 7/9/2020    Functionality Assessment/Goals Worksheet     On a scale of 0 (Does not Interfere) to 10 (Completely Interferes)     1. Which number describes how during the past week pain has interfered with       the following:  A. General Activity:  8  B. Mood: 7  C. Walking Ability:  3  D. Normal Work (Includes both work outside the home and housework):  3  E. Relations with Other People:   9  F. Sleep:   8  G. Enjoyment of Life:   7    2. Patient Prefers to Take their Pain Medications:     []  On a regular basis   []  Only when necessary    [x]  Does not take pain medications    3. What are the Patient's Goals/Expectations for Visiting Pain Management? [x]  Learn about my pain    []  Receive Medication   []  Physical Therapy     []  Treat Depression   []  Receive Injections    []  Treat Sleep   []  Deal with Anxiety and Stress   []  Treat Opoid Dependence/Addiction   []  Other:      HPI:   Katja Rashid is a 56 y.o. male is here today for    Chief Complaint: SI and Low back pain    HPI     Patient here today for follow up after CT myelogram. Reviewed with patient below. Patient states that he was able to look some things up about arachnoiditis. Questions answered. Discussed lab work to monitor for inflammation and infection. Patient with chronic occasional weakness with give out feeling in legs, R>L. Patient without fever or intense increase in pain. Discussed causes: had epidurals prior to lumbar surgery. No recent epidurals though. Have performed RFAs with patient in our office. Unable to have MRI due to AICD. Discussed PM&R consult. Treatment includes symptom management, short discussion on SCS in future, would need clearance due to AICD though.  Continues with cardiac workup at [facility] [place]. Wife on phone, patient and wife understanding with plan. Patient and wife deny any further questions. Medications reviewed. PROCEDURE: CT LUMBAR SPINE W CONTRAST       CLINICAL INFORMATION: Lumbar spondylosis.       COMPARISON: 4/2/2015.       TECHNIQUE: Helical CT through the lumbar spine following intrathecal injection of 18 mL Omnipaque 180 with sagittal and coronal reconstructions. Angled images were reconstructed through the L3-4, L4-5 and L5-S1 disc levels.       All CT scans at this facility use dose modulation, iterative reconstruction, and/or weight-based dosing when appropriate to reduce radiation dose to as low as reasonably achievable.       FINDINGS:       Redemonstration of laminectomies at L4-5 and L5-S1 with interbody fusion at L5-S1 and bilateral pedicle screws at L5 and S1 with interconnecting rods. There is no evidence of hardware failure or loosening. There is stable alignment with grade 1    retrolisthesis of L4 relative to L5 on the basis of degenerative change. No acute fracture of the lumbar vertebral column is identified. There are mild to moderate osteophytes at the sacroiliac joints, similar to prior exam. There is anterior    osteophytosis at T12-L1 and L1-2, similar to prior exam. 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 There are mild mural calcifications of the aorta and iliac vessels, similar to prior exam. Paraspinal soft    tissues are otherwise unremarkable. The conus terminates at the L1 level. However, there is prominent central filling defect in the thecal sac extending from the conus inferiorly to the lumbosacral junction. Individual nerve roots of the cauda equina are    not visualized. There is only minimal contrast within the thecal sac at the lumbosacral junction. At T12-L1 there is a small right paracentral protrusion which mildly indents thecal sac and may contact the conus.  There is no significant neural foraminal stenosis. At L1-2 there is a shallow disc bulge which are prominent compared to prior exam. Indents thecal sac but does not contact the clumped cauda equina. There is mild to moderate bilateral frontal stenosis in association with facet hypertrophy. At L2-3 there is no significant spinal canal stenosis and mild bilateral neural foraminal stenosis in association with facet hypertrophy. At L3-4 there is a minimal disc bulge without significant spinal canal stenosis and mild bilateral neural foraminal stenosis in association with facet hypertrophy. At L4-5 there is partial uncovering of the disc with superimposed right paracentral extrusion which extends cephalad to the mid L4 vertebral body and compresses the thecal sac. There is moderate right and moderate to severe left neural foraminal stenosis    in association with facet hypertrophy. At L5-S1 spinal canal is decompressed though there is prominent central filling defect within the thecal sac. There is mild bilateral neural foraminal stenosis.                 Impression           1. Redemonstration of laminectomies at L4-5 and L5-S1 with interbody and posterior fusion at L5-S1 without evidence of acute osseous abnormality. 2. Prominent elongated filling defect extending from the conus to the lumbosacral junction likely representing thickened and clumped cauda equina nerve roots as evidence for arachnoiditis. Recommend MRI of the lumbar spine with contrast if the patient is    able. 3. Redemonstration of extrusion at L4-5 extends cephalad to the mid L4 vertebral body perhaps mildly more prominent compared to prior exam.             The patienthas No Known Allergies.     Past Medical History  Leatha Capellan  has a past medical history of Acute kidney injury ([address] Cardiomyopathy, dilated ([address] Claustrophobia, Congestive heart failure (CHF) ([address] Depression, HTN (hypertension), Hyperlipidemia, [employer] dual ICD, Osteoarthritis, Osteoarthritis of Take 12.5 mg by mouth daily, Disp: , Rfl:     cyclobenzaprine (FLEXERIL) 5 MG tablet, Take 1 tablet by mouth 3 times daily as needed for Muscle spasms, Disp: 90 tablet, Rfl: 0    metoprolol succinate (TOPROL XL) 25 MG extended release tablet, Take 1 tablet by mouth daily, Disp: 30 tablet, Rfl: 5    potassium chloride (KLOR-CON M10) 10 MEQ extended release tablet, Take 3 tablets by mouth 2 times daily, Disp: 180 tablet, Rfl: 5    amiodarone (CORDARONE) 200 MG tablet, Take 1 tablet twice a day for 7 days, then decrease to 1 tablet by mouth once a day, Disp: 104 tablet, Rfl: 1    potassium chloride (KLOR-CON) 10 MEQ extended release tablet, Take 3 tablets by mouth 2 times daily, Disp: 180 tablet, Rfl: 11    magnesium 30 MG tablet, Take 1 tablet by mouth 2 times daily, Disp: 60 tablet, Rfl: 11    zinc sulfate (ZINCATE) 220 (50 Zn) MG capsule, Take 50 mg by mouth daily, Disp: , Rfl:     torsemide (DEMADEX) 20 MG tablet, Take 1 tablet by mouth 2 times daily, Disp: 180 tablet, Rfl: 3    metOLazone (ZAROXOLYN) 5 MG tablet, Take 0.5 tablets by mouth daily as needed (as directed by  clinic), Disp: 15 tablet, Rfl: 0    allopurinol (ZYLOPRIM) 300 MG tablet, Take 300 mg by mouth daily, Disp: , Rfl:     Acetaminophen (TYLENOL ARTHRITIS PAIN PO), Take by mouth 3 times daily as needed, Disp: , Rfl:     Subjective:      Review of Systems   Constitutional: Positive for activity change. Musculoskeletal: Positive for arthralgias, back pain and myalgias. Objective:     Vitals:    07/09/20 0957   BP: 118/80   Temp: 96.7 °F (35.9 °C)   Weight: 255 lb (115.7 kg)   Height: 5' 9\" (1.753 m)       Physical Exam  Vitals signs reviewed. Constitutional:       General: He is not in acute distress. Appearance: He is well-developed. He is not diaphoretic. HENT:      Head: Normocephalic and atraumatic. Not macrocephalic and not microcephalic.       Right Ear: External ear normal.      Left Ear: External ear normal.   Eyes: General:         Right eye: No discharge. Left eye: No discharge. Conjunctiva/sclera: Conjunctivae normal.   Neck:      Trachea: No tracheal deviation. Pulmonary:      Effort: Pulmonary effort is normal. No respiratory distress. Musculoskeletal:         General: Tenderness present. Back:    Skin:     General: Skin is warm and dry. Coloration: Skin is not pale. Findings: No rash. Neurological:      Mental Status: He is alert and oriented to person, place, and time. Cranial Nerves: No cranial nerve deficit. Psychiatric:         Attention and Perception: He is attentive. Speech: Speech normal.         Behavior: Behavior normal.         Thought Content: Thought content normal.         Judgment: Judgment normal.            Assessment:     1. Lumbar stenosis with neurogenic claudication    2. Spondylosis of lumbar region without myelopathy or radiculopathy    3. Sacroiliac inflammation (HCC)    4. Spondylosis of cervical region without myelopathy or radiculopathy            Plan:      · OARRS reviewed. Current MED: 0  · Patient was not offered naloxone for home. · Discussed long term side effects of medications, tolerance, dependency and addiction. · Patient told can not receive any pain medications from any other source. · No evidence of abuse, diversion or aberrant behavior.  Medications and/or procedures to improve function and quality of life- patient understanding with this and that may not be pain free   Discussed with patient about safe storage of medications at home   Discussed possible weaning of medication dosing dependent on treatment/procedure results.  Testing: CBC, ESR, CRP   Procedures: none   Discussed with patient about risks with procedure including infection, reaction to medication, increased pain, or bleeding.  Medications: continue tylenol   PM&R consult to Dr Dre Stevenson.  Prescribed:   No orders of the defined types were placed in this encounter. Return in about 2 months (around 9/9/2020).            Electronically signed by KRIS Gould CNP on7/9/2020 at 10:45 AM

## 2020-07-10 LAB
ABSOLUTE BASO #: 0.1 X10E9/L (ref 0–0.9)
ABSOLUTE EOS #: 0.2 X10E9/L (ref 0–0.4)
ABSOLUTE LYMPH #: 2.3 X10E9/L (ref 1–3.5)
ABSOLUTE MONO #: 1.2 X10E9/L (ref 0–0.9)
ABSOLUTE NEUT #: 9.9 X10E9/L (ref 1.5–6.6)
BASOPHILS RELATIVE PERCENT: 0.4 %
C-REACTIVE PROTEIN: 3.6 MG/DL (ref 0–0.74)
EOSINOPHILS RELATIVE PERCENT: 1.3 %
HCT VFR BLD CALC: 42.5 % (ref 39–49)
HEMOGLOBIN: 14.5 G/DL (ref 13.1–17.3)
LYMPHOCYTE %: 16.8 %
MCH RBC QN AUTO: 32.3 PG (ref 27–35)
MCHC RBC AUTO-ENTMCNC: 34.2 G/DL (ref 32–36)
MCV RBC AUTO: 95 FL (ref 81–101)
MONOCYTES # BLD: 8.8 %
NEUTROPHILS RELATIVE PERCENT: 72.7 %
PDW BLD-RTO: 14.8 % (ref 11.4–14.3)
PLATELETS: 317 X10E9/L (ref 150–450)
PMV BLD AUTO: 9.3 FL (ref 7–12)
RBC: 4.49 X10E12/L (ref 4.25–5.65)
SEDIMENTATION RATE, ERYTHROCYTE: 28 MM/H (ref 0–20)
WBC: 13.7 X10E9/L (ref 4.4–12)

## 2020-07-14 ENCOUNTER — NURSE ONLY (OUTPATIENT)
Dept: LAB | Age: 60
End: 2020-07-14

## 2020-07-14 LAB — PROSTATE SPECIFIC ANTIGEN: 1.59 NG/ML (ref 0–1)

## 2020-07-15 ENCOUNTER — TELEPHONE (OUTPATIENT)
Dept: CARDIOLOGY CLINIC | Age: 60
End: 2020-07-15

## 2020-07-15 ENCOUNTER — PROCEDURE VISIT (OUTPATIENT)
Dept: CARDIOLOGY CLINIC | Age: 60
End: 2020-07-15
Payer: MEDICARE

## 2020-07-15 PROCEDURE — 93295 DEV INTERROG REMOTE 1/2/MLT: CPT | Performed by: NUCLEAR MEDICINE

## 2020-07-15 PROCEDURE — 93296 REM INTERROG EVL PM/IDS: CPT | Performed by: NUCLEAR MEDICINE

## 2020-07-15 RX ORDER — POTASSIUM CHLORIDE 750 MG/1
20 TABLET, EXTENDED RELEASE ORAL 2 TIMES DAILY
COMMUNITY
End: 2020-08-06 | Stop reason: ALTCHOICE

## 2020-07-15 RX ORDER — SPIRONOLACTONE 25 MG/1
12.5 TABLET ORAL 2 TIMES DAILY
COMMUNITY
End: 2020-12-17 | Stop reason: DRUGHIGH

## 2020-07-15 NOTE — TELEPHONE ENCOUNTER
Patient has been seeing [facility] and is scheduled for heart cath with them next week and they recently made med changes. He is going to call them and see what they want to do with elevated optivol.

## 2020-07-15 NOTE — PROGRESS NOTES
[facility]       . Carito Russell Battery longevity:  3.5 years   Presenting rhythm  AS VS     Atrial impedance 437  RV impedance 399  Shock 82    P wave sensing 1  R wave sensing 6.5    0 % atrial paced  0 % RV paced     Atrial threshold 0.625 V  at 0.4ms  RV threshold 1.25 V at 0.4ms  Mode switches   5 mode switches, all under 1 minute     7/14/20   10 beats VT   7/9/20  14 beats VT   7/5/20   7 beats VT   optivol elevated   This note sent to [facility]

## 2020-07-20 ENCOUNTER — HOSPITAL ENCOUNTER (OUTPATIENT)
Age: 60
Discharge: HOME | End: 2020-07-20
Payer: MEDICARE

## 2020-07-20 LAB
ANION GAP SERPL CALCULATED.3IONS-SCNC: 13 MEQ/L (ref 8–16)
BUN BLDV-MCNC: 15 MG/DL (ref 7–22)
CALCIUM SERPL-MCNC: 9.1 MG/DL (ref 8.5–10.5)
CHLORIDE BLD-SCNC: 99 MEQ/L (ref 98–111)
CO2: 28 MEQ/L (ref 23–33)
CREAT SERPL-MCNC: 1.6 MG/DL (ref 0.4–1.2)
GFR SERPL CREATININE-BSD FRML MDRD: 44 ML/MIN/1.73M2
GLUCOSE BLD-MCNC: 98 MG/DL (ref 70–108)
PERFORMING LAB: NORMAL
POTASSIUM SERPL-SCNC: 3.7 MEQ/L (ref 3.5–5.2)
REPORT: NORMAL
SARS-COV-2: NOT DETECTED
SODIUM BLD-SCNC: 140 MEQ/L (ref 135–145)

## 2020-07-20 PROCEDURE — 36415 COLL VENOUS BLD VENIPUNCTURE: CPT

## 2020-07-20 PROCEDURE — 80048 BASIC METABOLIC PNL TOTAL CA: CPT

## 2020-07-20 PROCEDURE — U0002 COVID-19 LAB TEST NON-CDC: HCPCS

## 2020-08-04 ENCOUNTER — HOSPITAL ENCOUNTER (OUTPATIENT)
Dept: ULTRASOUND IMAGING | Age: 60
Discharge: HOME | End: 2020-08-04
Payer: MEDICARE

## 2020-08-04 PROCEDURE — 76770 US EXAM ABDO BACK WALL COMP: CPT

## 2020-08-04 NOTE — PLAN OF CARE
Hospital Facility-Based Program  [facility]/Traditional Cardiac Rehab  PHYSICIAN ORDER  Class I Level B based on research  Medical Director:  Dr. Ambika Vera MD     Patient Name: Jaycee Wynn : 1960  Referring Physician: Dr. Edy Bales  Date: 2020  Allergies: Allergies as of 2020    (No Known Allergies)        Diagnosis:  CHF on 8/3/20    [x] [facility] with telemetry monitoring, resting and exercise        BPs & HRs with each session. Hospital setting for patient safety. [x] 72 sessions: 36 exercise sessions, 36 education sessions   [] 36 sessions: 18 exercise sessions, 18 education sessions  [] Traditional Cardiac Rehab with telemetry monitoring, resting and exercise BPs &       HRs with each session. Hospital setting for patient safety. [] 36 sessions:  32 exercise sessions, 4 education sessions     Per Patient symptoms, proceed with:   [x]Nitroglycerine 0.4mg SL every 5 minutes prn, maximum of 3, for chest pain   [x]12-lead EKG for symptoms of chest pain or noted change in heart rhythm   [x]Administer O2 per nasal cannula for symptoms of chest pain or acute dyspnea    Physician Prescribed Exercise:  Plan of Care:  Patient to attend exercise sessions with aerobic endurance and strength training for a total of 31-60 min/day, 3 days/week with supplemented 30+ minutes of aerobic exercise at home on days not participating in Cardiac Rehab. Aerobic Endurance Training  Aerobic Endurance mode (TM, AD, NS) starting at 5-8 minutes progressing by 2-3 minutes each week to a total of 15-30 minutes 2-3x/week. Arms only 5 min  Stair step increasing to 2 min  Resistance/strength training:  Hand weights starting at 1-5 lbs increasing in weight by 1-2 lbs and/or per patient tolerance weekly. Start with 8 repetitions and increase the repetitions each exercise session per patient tolerance for a total of 15 repetitions.   Measurable Endurance Goal:  Aerobic endurance goal to be measured in minutes. Start endurance training per patient tolerance at 1-8 minutes per exercise type, progressing to a total of 31-60 minutes using various modes of training (see Exercise Prescription). Progression:    [x] Weekly 5-10% intensity progression, as tolerated, during cardiac rehab sessions. [x] 13-17 Rate of Perceived Exertion on the Chong Scale (6-20). Measurable Muscular Strength Goal:  Starting at 1-5 lbs x 8 reps, progressing to 5-25 lbs x 15 reps per patient tolerance.       Electronically signed by Isreal Harada on 8/4/2020 at 2:50 PM    Exercise Physiologist/Date/Time

## 2020-08-06 ENCOUNTER — OFFICE VISIT (OUTPATIENT)
Dept: CARDIOLOGY CLINIC | Age: 60
End: 2020-08-06
Payer: MEDICARE

## 2020-08-06 VITALS
OXYGEN SATURATION: 98 % | HEIGHT: 69 IN | DIASTOLIC BLOOD PRESSURE: 72 MMHG | WEIGHT: 248.5 LBS | SYSTOLIC BLOOD PRESSURE: 107 MMHG | HEART RATE: 79 BPM | BODY MASS INDEX: 36.81 KG/M2

## 2020-08-06 PROCEDURE — 99213 OFFICE O/P EST LOW 20 MIN: CPT | Performed by: NURSE PRACTITIONER

## 2020-08-06 PROCEDURE — 1036F TOBACCO NON-USER: CPT | Performed by: NURSE PRACTITIONER

## 2020-08-06 PROCEDURE — G8427 DOCREV CUR MEDS BY ELIG CLIN: HCPCS | Performed by: NURSE PRACTITIONER

## 2020-08-06 PROCEDURE — 3017F COLORECTAL CA SCREEN DOC REV: CPT | Performed by: NURSE PRACTITIONER

## 2020-08-06 PROCEDURE — G8417 CALC BMI ABV UP PARAM F/U: HCPCS | Performed by: NURSE PRACTITIONER

## 2020-08-06 RX ORDER — DIGOXIN 125 MCG
0.12 TABLET ORAL EVERY OTHER DAY
COMMUNITY
Start: 2020-07-27

## 2020-08-06 RX ORDER — GABAPENTIN 100 MG/1
100 CAPSULE ORAL NIGHTLY
COMMUNITY
Start: 2020-07-27 | End: 2020-09-12

## 2020-08-06 RX ORDER — VALSARTAN 40 MG/1
20 TABLET ORAL 2 TIMES DAILY
COMMUNITY
Start: 2020-07-28

## 2020-08-06 RX ORDER — FOLIC ACID 0.8 MG
TABLET ORAL DAILY
COMMUNITY

## 2020-08-06 RX ORDER — ATORVASTATIN CALCIUM 80 MG/1
80 TABLET, FILM COATED ORAL NIGHTLY
COMMUNITY
Start: 2020-07-27

## 2020-08-06 RX ORDER — DULOXETIN HYDROCHLORIDE 30 MG/1
60 CAPSULE, DELAYED RELEASE ORAL
COMMUNITY
Start: 2020-07-29 | End: 2021-02-23

## 2020-08-06 ASSESSMENT — ENCOUNTER SYMPTOMS
COUGH: 0
ABDOMINAL DISTENTION: 1
SHORTNESS OF BREATH: 0

## 2020-08-06 NOTE — PATIENT INSTRUCTIONS
You may receive a survey regarding the care you received during your visit. Your input is valuable to us. We encourage you to complete and return your survey. We hope you will choose us in the future for your healthcare needs.     Continue:  · Continue current medications  · Daily weights and record  · Fluid restriction of 2 Liters per day  · Limit sodium in diet to around 3819-8043 mg/day  · Monitor BP  · Activity as tolerated     Call the [facility] for any of the following symptoms: [phone]   Weight gain of 2-3 pounds in 1 day or 5 pounds in 1 week   Increased shortness of breath   Shortness of breath while laying down   Cough   Chest pain   Swelling in feet, ankles or legs   Tenderness or bloating in the abdomen   Fatigue    Decreased appetite or nausea    Confusion

## 2020-08-06 NOTE — PROGRESS NOTES
Heart Failure Clinic       Visit Date: 8/6/2020  Cardiologist:  Dr. Franky Kennedy  Primary Care Physician: Dr. Hal Rodriguez MD    Beltran Barbosa is a 56 y.o. male who presents today for:  Chief Complaint   Patient presents with    Congestive Heart Failure       HPI:   Beltran Barbosa is a 56 y.o. male who presents to the office for a follow up patient visit in the heart failure clinic. Accompanied by no one    TYPE HF: NICM (EF 25%) (viral illness years ago)   Device: ICD (2013)  HX: SYED, HLD  Lexiscan 12/6/18 - Negative  Echo 12/6/18 - EF down to 25% from 30%     Dry Wt:  250     Hospitalization for HF:  > 1 year  Recently admitted for ICD discharge - ?related to volume depletion, Na+ was 128, creat 1.4, BUN 30, K+ 4.0  Dexter saw - changed BB. If recurrent recommend ablation vs. antiarrhythmic  Started w/ [facility] = Dr Soo Chavez  Admitted 7/23-7/27 = Hospital Course: He presented at least 6 lb above his dry weight despite escalating diuretic doses. He underwent elective RHC which revealed elevated left sided pressures RA 15, PA 80/40, PCWP 45, CO 3.38, CI 1.48. He was transferred to CICU for swan guided management. He was given IV diuresis and afterload reduction with improvement. Once optimized, he was transitioned to oral regimen as below. He was transitioned to ARB as he did report intolerance to ARNI. By 7/27, he was felt stable for discharge at a weight of 251lb. VV 8/3 w/ Soo Chavez [facility] - resumed Toprol 12.5/day  Has F/U 9/8 = states having more work up    Concerns today: Feeling good - better since admission at [address] [place] [address]. Bloating improved, little more energy. He is staying in close contact w/ Dr Soo Chavez. No dizziness since resuming Toprol few days ago. Activity: improved. Walked from parking lot, no break. Stairs make little winded.     Diet: watches    Patient has:  Chest Pain: No  SOB: No  Orthopnea/PND: No  SYED: not tolerate  Edema: No  Fatigue: improved  Abdominal bloating: improved  Cough: No  Appetite: Good  Any extra diuretic use: No  Weight gain: no -down few #  Home weight: down  Home blood pressure: run \"low\" but no dizziness/lightheadedness    Past Medical History:   Diagnosis Date    [facility] injury ([facility]     Cardiomyopathy, dilated ([address]     VIRAL    Claustrophobia     [facility] failure ([facility]F) [facility]     Depression 12/26/2013    HTN (hypertension)     Hyperlipidemia     Medtronic dual ICD 2/14/2014    Osteoarthritis     Osteoarthritis of spine with radiculopathy, lumbar region 12/28/2015    Spinal stenosis     Unspecified sleep apnea     unable to wear CPAP     Past Surgical History:   Procedure Laterality Date    BACK SURGERY  8/26/2014    L4- S1 decompression, L4-5 PSF and TLIF    BACK SURGERY  2013    CARDIAC CATHETERIZATION  10/2013    [address]  2013    Medtronic    CARDIOVASCULAR STRESS TEST  09/2013    CARPAL TUNNEL RELEASE Right     CERVICAL FUSION  05/17/2017    [address]    COLONOSCOPY      DIAGNOSTIC CARDIAC CATH LAB PROCEDURE  07/2020    [facility]    DOPPLER ECHOCARDIOGRAPHY  09/2013    DOPPLER ECHOCARDIOGRAPHY  09/2013    [facility]    ENDOSCOPY, COLON, DIAGNOSTIC      FACET JOINT INJECTION Right 5/26/2020    L-facet RFA @ L2-3,L3-4,L4-5  RIGHT performed by Jose Vigil MD at [address] [facility] Left 7/2/2019    Left SI injection performed by Jose Vigil MD at [address] [facility] Left 8/26/2019    Left SI injection # 2 performed by Jose Vigil MD at [address]  ? ??    umbilical    LUMBAR SPINE SURGERY Left 9/11/2017    LUMBAR RFA L2-3, L3-4, L4-5, L5-S1 LEFT SIDE performed by Jose Vigil MD at [address] [facility] Right 10/31/2017    LUMBAR FACET RFA @ L2-3, L3-4, L4-5 AND L5-S1 RIGHT SIDE performed by Rajiv Champagne MD at [address] [facility] Left 4/11/2019    L-RFA @ L2-3, L3-4, L4-5, L5-S1 LEFT SIDE FIRST. performed by Rajiv Champagne MD at [address] [facility] Left 10/3/2019    LEFT SI RFA performed by Rajiv Champagne MD at [address] [facility]  2015    OTHER SURGICAL HISTORY  10/9/2015    C3-6 ACDF    OTHER SURGICAL HISTORY  01/18/2016    FACET INJECTIONS L3-L4 L4-L5 L5 S1    OTHER SURGICAL HISTORY  2-8-2016    RFA - L3-S1    OTHER SURGICAL HISTORY N/A 03-21-16    cervical epidural block C7    OTHER SURGICAL HISTORY Bilateral 05-16-16    cervical facet block C7-T1    OTHER SURGICAL HISTORY  08/01/2016    CERVICAL ABLATION    OTHER SURGICAL HISTORY Left 09/11/2017    Lumbar RFA at L2-3, L3-4, L4-5, L5-S1    OTHER SURGICAL HISTORY Right 10/31/2017    Lumbar RFA at L2-3, L3-4, L4-5, L5-S1    OTHER SURGICAL HISTORY Right 05/09/2018    Excision scalp mass right forehead    KS EXC SKIN BENIG <5MM REMAINDR BODY Right 5/9/2018    EXCISION RIGHT FOREHEAD CYST performed by Reg Campos MD at [address] Left 6/19/2018    L-RFA @ L2-3, L3-4, L4-5, L5-S1 LEFT SIDE FIRST. performed by Rajiv Champagne MD at [address]  1980's???    TONSILLECTOMY  1980's??    VASECTOMY  ? ??     Family History   Problem Relation Age of Onset    Heart Disease Father     Coronary Art Dis Father     Heart Attack Father     High Blood Pressure Father     Parkinsonism Father      Social History     Tobacco Use    Smoking status: Never Smoker    Smokeless tobacco: Never Used   Substance Use Topics    Alcohol use:  Yes     Alcohol/week: 6.0 standard drinks     Types: 6 Standard drinks or equivalent per week     Comment: social     Current Outpatient Medications   Medication Sig Dispense Refill    atorvastatin (LIPITOR) 80 MG tablet Take 80 mg by mouth nightly      digoxin (LANOXIN) 125 MCG tablet Take 0.125 mg by mouth every 48 hours      gabapentin (NEURONTIN) 100 MG capsule Take 100 mg by mouth nightly.  valsartan (DIOVAN) 80 MG tablet Take 80 mg by mouth 2 times daily      Magnesium 500 MG CAPS Take by mouth daily      DULoxetine (CYMBALTA) 30 MG extended release capsule       spironolactone (ALDACTONE) 25 MG tablet Take 12.5 mg by mouth daily      cyclobenzaprine (FLEXERIL) 5 MG tablet Take 1 tablet by mouth 3 times daily as needed for Muscle spasms 90 tablet 0    metoprolol succinate (TOPROL XL) 25 MG extended release tablet Take 1 tablet by mouth daily (Patient taking differently: Take 12.5 mg by mouth daily ) 30 tablet 5    amiodarone (CORDARONE) 200 MG tablet Take 1 tablet twice a day for 7 days, then decrease to 1 tablet by mouth once a day 104 tablet 1    zinc sulfate (ZINCATE) 220 (50 Zn) MG capsule Take 50 mg by mouth daily      torsemide (DEMADEX) 20 MG tablet Take 1 tablet by mouth 2 times daily (Patient taking differently: Take 20 mg by mouth daily ) 180 tablet 3    allopurinol (ZYLOPRIM) 300 MG tablet Take 300 mg by mouth daily      Acetaminophen (TYLENOL ARTHRITIS PAIN PO) Take by mouth 3 times daily as needed       No current facility-administered medications for this visit. No Known Allergies    SUBJECTIVE:   Review of Systems   Constitutional: Positive for fatigue. Negative for activity change and appetite change. Respiratory: Negative for cough and shortness of breath. Cardiovascular: Negative for chest pain, palpitations and leg swelling. Gastrointestinal: Positive for abdominal distention (improved). Neurological: Negative for weakness, light-headedness and headaches. Hematological: Negative for adenopathy. Psychiatric/Behavioral: Negative for sleep disturbance.        OBJECTIVE:   Today's Vitals:  /72   Pulse 79   Ht 5' 9\" (1.753 m)   Wt 248 lb 8 oz (112.7 kg) SpO2 98%   BMI 36.70 kg/m²     Physical Exam  Vitals signs reviewed. Constitutional:       General: He is not in acute distress. Appearance: Normal appearance. He is well-developed. He is not diaphoretic. HENT:      Head: Normocephalic and atraumatic. Eyes:      Conjunctiva/sclera: Conjunctivae normal.   Neck:      Musculoskeletal: Normal range of motion and neck supple. Comments: No JVD  Cardiovascular:      Rate and Rhythm: Normal rate and regular rhythm. Heart sounds: Normal heart sounds. No murmur. Pulmonary:      Effort: Pulmonary effort is normal. No respiratory distress. Breath sounds: Normal breath sounds. No wheezing or rales. Abdominal:      General: Bowel sounds are normal. There is no distension. Palpations: Abdomen is soft. Tenderness: There is no abdominal tenderness. Musculoskeletal: Normal range of motion. Right lower leg: No edema. Left lower leg: No edema. Skin:     General: Skin is warm and dry. Capillary Refill: Capillary refill takes less than 2 seconds. Neurological:      Mental Status: He is alert and oriented to person, place, and time. Coordination: Coordination normal.   Psychiatric:         Behavior: Behavior normal.         Wt Readings from Last 3 Encounters:   08/06/20 248 lb 8 oz (112.7 kg)   07/09/20 255 lb (115.7 kg)   07/02/20 255 lb (115.7 kg)     BP Readings from Last 3 Encounters:   08/06/20 107/72   07/09/20 118/80   07/02/20 110/71     Pulse Readings from Last 3 Encounters:   08/06/20 79   07/02/20 88   06/04/20 78     Body mass index is 36.7 kg/m². ECHO:   CONCLUSIONS:  - Exam indication: Initial evaluation of Heart Failure  - The left ventricle is moderately dilated. Left ventricular systolic function is   severely decreased. EF = 30 ± 5% (2D biplane) Definity contrast used for   endocardial border detection. Indeterminate left ventricular diastolic dysfunction   due to tachycardia.  Frequent ectopy throughout the exam.  - The right ventricle is normal in size. Right ventricular systolic function is   normal.  - The left atrial cavity is moderately dilated. - The right atrial cavity is dilated. - There is moderate (2+) holosystolic mitral valve regurgitation.  - Estimated right ventricular systolic pressure is 57 mmHg consistent with   moderate pulmonary hypertension. Estimated right atrial pressure is 8 mmHg based   on IVC assessment. - Exam was compared with the prior  echocardiographic exam performed on   12/10/01, LV function has decreased compared to the prior. Electronically signed by Carmen Trujillo MD on 7/23/2020 at 6:27:07 PM      ECHO   Summary   Ejection fraction is visually estimated at 25%.   There was severe global hypokinesis of the left ventricle.   Left Ventricular size is Moderately increased .      Signature      ----------------------------------------------------------------   Electronically signed by Halle Hills MD (Interpreting   physician) on 12/06/2018 at 05:13 PM   ----------------------------------------------------------------     CATH/STRESS:   Stress test   Imaging:Results:Calculated gated LVEF 25 %.   technically difficult study  patchy uptake  no clear ischemia pattern  abnormal dilated cardiomyopathy  EF 25%     Conclusions      Summary   This Nuclear Medicine study was negative for ischemia .   dilated cardiomyopathy   difficult scan      Recommendation   Clinical correlation is recommended.      Signatures      ----------------------------------------------------------------   Electronically signed by Halle Hills MD (Interpreting   Cardiologist) on 12/06/2018 at 17:22   ---------------------------------------------------------------      Results reviewed:  BNP: No results found for: BNP  CBC:   Lab Results   Component Value Date    WBC 13.7 07/09/2020    WBC 9.2 05/02/2020    RBC 4.49 07/09/2020    HGB 14.5 07/09/2020    HCT 42.5 07/09/2020     07/09/2020  05/02/2020     CMP:    Lab Results   Component Value Date     07/30/2020    K 4.2 07/30/2020    K 3.8 05/02/2020    CL 98 07/30/2020    CO2 25 07/30/2020    BUN 19 07/30/2020    CREATININE 1.5 07/30/2020    LABGLOM 47 07/30/2020    GLUCOSE 94 07/30/2020    GLUCOSE 100 04/21/2020    CALCIUM 9.5 07/30/2020     Hepatic Function Panel:    Lab Results   Component Value Date    ALKPHOS 95 07/30/2020    ALT 20 07/30/2020    AST 24 07/30/2020    PROT 7.2 07/30/2020    BILITOT 1.1 07/30/2020    BILIDIR 0.4 11/27/2018    LABALBU 4.1 07/30/2020     Magnesium:    Lab Results   Component Value Date    MG 2.5 07/30/2020     PT/INR:    Lab Results   Component Value Date    INR 1.03 05/02/2020     Lipids:    Lab Results   Component Value Date    TRIG 139 05/02/2020    HDL 67 05/02/2020    LDLCALC 127 05/02/2020       ASSESSMENT AND PLAN:   The patient's condition/symptoms are Stable: No clinical evidence of fluid overload today. Continue current medical regimen without changes at present time. Diagnosis Orders   1. CHF (congestive heart failure), NYHA class III, chronic, systolic ([address]     2. Nonischemic cardiomyopathy ([address]     3. Medtronic dual ICD       Continue:  GDMT:   ACE/ARB/ARNI - Valsartan 80 BID   BB - Recently resumed Toprol 12.5/day   Diuretic - Torsemide 20/day   Vasodilator - None   MRA - Aldactone 12.5/day  Digoxin - 125/QOD   Antiarrhythmic - Amiodarone   Continue Current medications  Stable. Appears Euvolemic  Starting Cardiac rehab in few weeks  F/U w/ [facility] Dr Kt Ding in Sept  Continue meds same. Labs in [facility] next months. F/U w/ [facility] in Oct   F/U in clinic in Dec or sooner if needed. · Daily weights  · Fluid restriction of 2 Liters per day  · Limit sodium in diet to around 0936-6400 mg/day  · Monitor BP  · Activity as tolerated     Patient was instructed to call the [facility] for any changes in symptoms as noted in AVS.      Return in about 4 months (around 12/6/2020).  or sooner if needed     Patient given educational materials - see patient instructions. We discussed the importance of weighing oneself and recording daily. We also discussed the importance of a low sodium diet, higher sodium foods to avoid and better low sodium food options. Patient verbalizes understanding of plan of care using teach back method, and is agreeable to the treatment plan.        Electronically signed by KRIS Borja CNP on 8/6/2020 at 3:33 PM

## 2020-08-11 RX ORDER — CYCLOBENZAPRINE HCL 5 MG
5 TABLET ORAL 3 TIMES DAILY PRN
Qty: 90 TABLET | Refills: 3 | Status: SHIPPED | OUTPATIENT
Start: 2020-08-11 | End: 2020-09-10

## 2020-08-19 ENCOUNTER — PROCEDURE VISIT (OUTPATIENT)
Dept: CARDIOLOGY CLINIC | Age: 60
End: 2020-08-19
Payer: MEDICARE

## 2020-08-19 ENCOUNTER — TELEPHONE (OUTPATIENT)
Dept: CARDIOLOGY CLINIC | Age: 60
End: 2020-08-19

## 2020-08-19 PROCEDURE — G2066 INTER DEVC REMOTE 30D: HCPCS | Performed by: NUCLEAR MEDICINE

## 2020-08-19 PROCEDURE — 93297 REM INTERROG DEV EVAL ICPMS: CPT | Performed by: NUCLEAR MEDICINE

## 2020-08-19 NOTE — PROGRESS NOTES
[employer] optivol medtronic icd     3.1 years on device   Elevated optivol  This note also sent to [facility]

## 2020-08-25 NOTE — TELEPHONE ENCOUNTER
I called the pt and he is up 3 lbs from yesterday. He said he saw DR Reece Mckenzie at the [facility] and he told him that whenever his weight is up to take an extra water pill and an extra K+. He said his weight is always in his belly. I told pt I will speak to Pastor Mar to see what she thinks . Please see his labs from today . I told him to take the extra medicine if we do not call him back with any other instructions . PT WANTED ME TO LET YOU KNOW HE IS GOING TO [facility] ON 9/8/2020 AND 9/9/2020 TO COMPLETE HIS WORKUP FOR HIS HEART TRANSPLANT.  HE ALSO SAID TO TELL YOU THAT HE FEES BETTER THAN HE HAS FELT IN A LONG TIME

## 2020-09-01 ENCOUNTER — HOSPITAL ENCOUNTER (OUTPATIENT)
Dept: CARDIAC REHAB | Age: 60
Setting detail: THERAPIES SERIES
Discharge: HOME | End: 2020-09-01
Payer: MEDICARE

## 2020-09-01 VITALS — WEIGHT: 251.8 LBS | HEIGHT: 69 IN | BODY MASS INDEX: 37.29 KG/M2

## 2020-09-01 PROCEDURE — G0422 INTENS CARDIAC REHAB W/EXERC: HCPCS

## 2020-09-01 PROCEDURE — G0423 INTENS CARDIAC REHAB NO EXER: HCPCS

## 2020-09-01 NOTE — PROGRESS NOTES
Video Education Report - ICR/CR    Name:  Benjamin Campbell     Date:  9/1/2020  MRN: [id number]     Session #:  1  Session Length: 45 min    Recommended Videos        []01 [employer] - Program Overview   34:22    [x]02 Overview of [employer] Eating Plan   34:10    []03 Becoming a Jacqualine Otis   33:08     []04 Diseases of Our Time - Part 1   34:22    []05 Calorie Density     33:39   []06 Label Reading - Part 1    32:15   []07 Move it      32.54   []08 Healthy Minds, Bodies, Hearts   32:14   []09 Dining Out - Part 1    32:28   []10 Heart Disease Risk Reduction   93:60   []06 Metabolic Syndrome and Belly Fat  31:52   []12 Facts on Fat     35:29   []13 Diseases of Our Time - Part 2   33:07   []14 Biology of Weight Control   32:36   []15 Biomechanical Limitations   35:20   []16 Nutrition Action Plan    34:23        Comments:  Video and program orientation completed.

## 2020-09-01 NOTE — PLAN OF CARE
Maintenance  [] Relapse [] Pre Contemplation  [] Contemplation  [] Preparation  [] Action  [] Maintenance  [] Relapse   EXERCISE ASSESSMENT EXERCISE ASSESSMENT EXERCISE ASSESSMENT EXERCISE ASSESSMENT EXERCISE ASSESSMENT   6 Min Walk Test  Distance walked: on NS  0.19 miles  1003 ft.  1.6 METs  Max HR:75 BPM      RPE:  13    THR:  101-120  Rhythm:  NSR    6 Min Walk Test  Distance walked:   ** miles  ** ft  ** METs  Max HR:** BPM      RPE:  **  %Change ft= **    Rhythm:  **   DASI: 4.7 METs DASI: ** METs DASI: ** METs DASI: ** METs DASI: ** METs   Return to Work  [employer] on returning to work? [] Yes              [] No   [x] Disabled-since 2013    [] Retired   Return to work:  Has Cresencio returned to work? [] Yes    [] No    Return to work date set? [] Yes, **    [] No    Cresencio is doing ** at work. Return to work:  Has Yasmany Corado returned to work? [] Yes    [] No    Return to work date set? [] Yes, **    [] No    Cresencio is doing ** at work. Return to work:  Has Yasmany Corado returned to work? [] Yes    [] No    Return to work date set? [] Yes, **    [] No    Cresencio is doing ** at work. Return to work:  Has Yasmany Corado returned to work? [] Yes    [] No    Return to work? [] Yes, **    [] No    *Required MET Level achieved for job duties? [] Yes    [] No   Orthopedic Limitations/  [x] Yes    [] No  If yes please list:  Right leg goes numb, multiple back sugeries     Orthopedic Limitations  *If patient has orthopedic issue:   Actions/  accomodations needed to make Cresencio successful : Orthopedic Limitations   Orthopedic Limitations   Orthopedic Limitations     Fall Risk  Fall risk assessed? [x] Yes      [] No    Balance Issues?   [x] Yes      [] No     [] Walker [] Cane    [x] Safety issues reviewed      Fall Risk  *If patient is a fall risk, action needed to accommodate:  [address] [place] Exercise  [] Yes    [x] No   Home Exercise  [] Yes    [] No  Type: **  Frequency:**  Duration: ** Home Exercise  [] Yes    [] No  Type: **  Frequency: **  Duration: ** Home Exercise  [] Yes    [] No  Type: **  Frequency: **  Duration: ** Home Exercise  [] Yes    [] No  Type: **  Frequency: **  Duration: **   Angina with Activity? [] Yes    [x] No  Angina Management: na Angina with Activity? [] Yes    [] No  Angina Management: ** Angina with Activity? [] Yes    [] No  Angina Management: ** Angina with Activity? [] Yes    [] No  Angina Management: ** Angina with Activity?   [] Yes    [] No  Angina Management: **   EXERCISE PLAN EXERCISE PLAN EXERCISE PLAN EXERCISE PLAN EXERCISE PLAN   *Interventions* *Interventions* *Interventions* *Interventions* *Interventions*   Exercise Prescription  (per physician & CR staff) Exercise Prescription  (per physician & CR staff) Exercise Prescription  (per physician & CR staff) Exercise Prescription  (per physician & CR staff) Exercise Prescription  (per physician & CR staff)   Cardiovascular Cardiovascular Cardiovascular Cardiovascular Cardiovascular   Mode:    [] Treadmill (TM)  [x] Schwinn Airdyne (AD)  [x] Arms Ergometer (AE)  [x] NuStep  [] Elliptical (E) MODE:    [] Treadmill (TM)  [] Schwinn Airdyne (AD)  [] Arms Ergometer (AE)  [] NuStep  [] Elliptical (E) MODE:    [] Treadmill (TM)  [] Schwinn Airdyne (AD)  [] Arms Ergometer (AE)  [] NuStep  [] Elliptical (E) MODE:    [] Treadmill (TM)  [] Schwinn Airdyne (AD)  [] Arms Ergometer (AE)  [] NuStep  [] Elliptical (E) MODE:    [] Treadmill (TM)  [] Schwinn Airdyne (AD)  [] Arms Ergometer (AE)  [] NuStep  [] Elliptical (E)   Initial Workloads    AD: 0.5 level = 1.6 METs  NS: 28  Brizuela= 1.9 METs  AE: 0.2 level = 1.3 METs Current Workloads  TM:  @ %=  METs  AD:  level =  METs  NS:   Brizuela=  METs  AE:  level =  METs Current Workloads  TM:  @ %=  METs  AD:  level =  METs  NS:   Brizuela=  METs  AE:  level =  METs Current Workloads  TM:  @ %=  METs  AD:  level =  METs  NS:   Brizuela=  METs  AE:  level =  METs Current Workloads  TM:  @ %=  METs  AD: level =  METs  NS:   Brizuela=  METs  AE:  level =  METs     Frequency:    ICR: 3x/week  Home: 2-3x/wk Frequency:   ICR: 3x/week  Home: 3x/wk Frequency:  ICR: 3x/week  Home: 3-4x/wk Frequency:  ICR: 3x/week  Home: 3-4x/wk Frequency:  Cresencio will continue exercise at  5-7 days/week   Duration:   Total aerobic exercise = 30-45 min    5-8 min/mode Duration:  Total aerobic exercises = ** min     **min/mode Duration:  Total aerobic exercises = ** min     **min/mode Duration:  Total aerobic exercises = ** min     **min/mode Duration:  Total erobic exercise =  60-90 min   Intensity:   MET Level = 1.9  RPE = 12-15 Intensity:  Max MET Level = **  RPE = 12-15 Intensity:  Max MET Level = **  RPE = 12-15 Intensity:  Max MET Level = **  RPE = 12-15 Intensity:  Max MET Level = ** RPE = 12-15   Progression: increase aerobic activity up to 15 min over next 4 weeks by increasing time 2-3 min/week. Progression:   Progression:   Progression: Progression:  Increase time/intensity when RPE <13, and HR is in [facility]   [x] Yes      [] No  Upper and Lower body strength training 2x/wk    Wt: 2#       Reps:  8-15    *Increase wt. after completing 15 reps with an RPE of <12/13. [] Yes      [] No  Upper and Lower body strength training 2x/wk    Wt: **#       Reps:  8-15    *Increase wt. after completing 15 reps with an RPE of <12/13. [] Yes      [] No  Upper and Lower body strength training 2x/wk    Wt: **#       Reps:  8-15    *Increase wt. after completing 15 reps with an RPE of <12/13. [] Yes      [] No  Upper and Lower body strength training 2x/wk    Wt: **#       Reps:  8-15    *Increase wt. after completing 15 reps with an RPE of <12/13. Continue Strength Training at home   [] Exercise Log & Strength training handout given     Wt: **#       Reps:  8-15    *Increase wt. after completing 15 reps with an RPE of <12/13.    Flexibility Flexibility Flexibility Flexibility Flexibility   [x] Yes      [] No  25 min session of Core Strength & Flexibility 1x/per week  Attends Core Strength & Flexibility   [] Yes      [] No Attends Core Strength & Flexibility   [] Yes      [] No Attends Core Strength & Flexibility   [] Yes      [] No Continue Core Strength & Flexibility at home   Home Exercise  *Cresencio verbalizes planning to initiate home exercise. Home Exercise  *Cresencio verbalizes planning to ** ** days/week for ** min on days not in rehab. Home Exercise  *Cresencio verbalizes planning to ** ** days/week for ** min on days not in rehab. Home Exercise  *Cresencio verbalizes planning to ** ** days/week for ** min on days not in rehab. Home Exercise  *Cresencio verbalizes his/her plan to ** ** days/week for ** min @ **   *Education* *Education* *Education* *Education* *Education*   RPE Scale  [x] Yes      [] No  Exercise Safety  [x] Yes      [] No  Equipment Orientation  [x] Yes      [] No  S/S to Report  [x] Yes      [] No  Warm Up/Cool Down  [x] Yes      [] No  Home Exercise  [x] Yes      [] No    All Exercise Education Completed  [] Yes      [] No   Exercise Education Recommended    Workshops  [x] Improving Performance  [x] Balance Training & Fall Prevention  [x] Exercise Biomechanics  [x] Resistance Training & Core Strength Exercise Education Attended/Date Exercise Education Attended/Date Exercise Education Attended/Date All Sessions Completed?     [] Yes  [] No   *Goals* *Goals* *Goals* *Goals* *Goals*   Initial Exercise Goals Exercise Goals  Exercise Goals   Exercise Goals  Exercise Goals   Cresencio plans to:  [x] Attend exercise sessions 3x/wk  [x] initiate home exercise 2-3x/wk for 10-20 min  [x] Increase 6 min walk distance by 10%  [x] Attend Exercise workshops Cresencio plans to:  [x] Attend exercise sessions 3x/wk  [x] continue home exercise 2-3x/wk for 20-30 min  [x] Attend Exercise workshops Cresencio's plans to:  [x] Attend exercise sessions 3x/wk  [x] continue home exercise 3-4x/wk for 30-45 min  [x] Determine plan of exercise following rehab  [x] Attend Exercise workshops Cresencio's plans to:  **   Cresencio achieved exercise goals? []  Yes    [] No  If no, why?  **  [] Increased 6 min walk distance by 10%  [] Currently exercising 30-60 min/day, 5-7days/wk   [] Plans to continue exercise on own  [] Plans to join a local fitness center to continue exercise  [] Does not plan to continue to exercise after rehab   Return to ADL or Hobbies:  Whitley Rahman would like to improve strength and endurance so he is able to return to doing everything.  Return to ADL or Hobbies:  Whitley Rahman would like to improve strength and endurance so he/she is able to return to ** Return to ADL or Hobbies:  Whitley Rahman would like to improve strength and endurance so he/she is able to return to ** Return to ADL or Hobbies:  Whitley Rahman would like to improve strength and endurance so he/she is able to return to ** Return to ADL or Hobbies:  Whitley Rahman would like to improve strength and endurance so he/she is able to return to **    *MET level required for above goal:  4.0 METs MET level Achieved:  **METs MET level Achieved:  **METs MET level Achieved:  **METs MET level Achieved:  **METs     Individual Cardiac Treatment Plan - Nutrition  NUTRITION  ASSESSMENT/PLAN NUTRITION  REASSESSMENT NUTRITION   REASSESSMENT NUTRITION   REASSESSMENT NUTRITION  DISCHARGE/FOLLOW-UP   Stages of Change Stages of Change Stages of Change Stages of Change Stages of Change   [] Pre Contemplation  [] Contemplation  [x] Preparation  [] Action  [] Maintenance  [] Relapse [] Pre Contemplation  [] Contemplation  [] Preparation  [] Action  [] Maintenance  [] Relapse [] Pre Contemplation  [] Contemplation  [] Preparation  [] Action  [] Maintenance  [] Relapse [] Pre Contemplation  [] Contemplation  [] Preparation  [] Action  [] Maintenance  [] Relapse [] Pre Contemplation  [] Contemplation  [] Preparation  [] Action  [] Maintenance  [] Relapse   NUTRITION ASSESSMENT NUTRITION ASSESSMENT NUTRITION ASSESSMENT NUTRITION ASSESSMENT NUTRITION ASSESSMENT   Weight Management  Weight: 251.8lbs   Height: 69in  BMI: 37.3 Weight Management  Weight: **                  Weight Management  Weight: **                  Weight Management  Weight: ** Weight Management  Weight: **                    BMI: **   Eating Plan  Current eating habits: none Eating Plan  Changes: Eating Plan  Changes: Eating Plan  Changes: Eating Plan Improvements:   Alcohol Use  [] none          [] daily  [x] weekly      [x] special   Type: beer/wine  Amount: 6       Diet Assessment Tool:  RATE YOUR PLATE  *Given to patient to complete and return. Diet Assessment Tool:    Score: **/69       Diet Assessment Tool: RATE YOUR PLATE  Score: **/88   NUTRITION PLAN NUTRITION PLAN NUTRITION PLAN NUTRITION PLAN NUTRITION PLAN   *Interventions* *Interventions* *Interventions* *Interventions* *Interventions*   Initial Survey given Goal Setting Discussion:   [] Yes      [] No       Follow Up Survey Reviewed & Goals Updated:     Professional Referral  Please check if needed. [] Dietitian Consult   [] Wt. Management Referral  [] Other:  Professional Referral  Please check if needed. [] Dietitian Consult   [] Wt. Management Referral  [] Other: Professional Referral  Please check if needed. [] Dietitian Consult   [] Wt. Management Referral  [] Other: Professional Referral  Please check if needed. [] Dietitian Consult   [] Wt. Management Referral  [] Other: Professional Referral  Please check if needed. [] Dietitian Consult   [] Wt. Management Referral  [] Other:   *Education* *Education* *Education* *Education* *Education*   Nutritional Education Recommended    [x] 1:1 Registered Dietitian    Workshops  [x] Label Reading   [x] Menu  [x] Targeting Nutrition Priorities  [x] Fueling a Healthy Body   Nutritional Education Attended/Date Nutritional Education Attended/Date Nutritional Education Attended/Date All Sessions Completed?     [] Yes  [] No   Cooking School  Recommended     [x] Adding Flavor  [x] Fast & Healthy     Breakfasts  [x] Salads & Dressings  [x] Soups & Simple     Sauces  [x] Simple Sides  [x] Appetizers &     Snacks  [x] Delicious Desserts  [x] Plant Proteins  [x] Fast Evening Meals  [x] Weekend Breakfasts  [x] Cook once, Eat       twice  [x] [employer]  Sessions Completed   [facility]  Sessions Completed [facility]  Sessions Completed     Cooking School    # of sessions completed:  **   *Goals* *Goals* *Goals* *Goals* *Goals*   Cresencio's nutritional goals are as follows:  Complete and return diet survey Cresencio's nutritional goals are as follows:  [] Attend Nutrition Workshops  [] Attend 1:1   [] Attend Cooking Classes  [] ** Cresencio's nutritional goals are as follows:  [] Attend Nutrition Workshops  [] Attend 1:1   [] Attend Cooking Classes  [] Complete and return diet survey  [] ** Cresencio's nutritional goals are as follows:  [] Attend Nutrition Workshops  [] Attend 1:1   [] Attend Cooking Classes  [] ** Cresencio achieved nutritional goals   [] Yes    [] No  If no, why?   Use knowledge gained to continue Pritikin eating plan at home       Individual Cardiac Treatment Plan - Psychosocial  PSYCHOSOCIAL  ASSESSMENT/PLAN PSYCHOSOCIAL  REASSESSMENT PSYCHOSOCIAL   REASSESSMENT PSYCHOSOCIAL   REASSESSMENT PSYCHOSOCIAL  DISCHARGE/FOLLOW-UP   Stages of Change Stages of Change Stages of Change Stages of Change Stages of Change   [] Pre Contemplation  [] Contemplation  [] Preparation  [] Action  [] Maintenance  [] Relapse [] Pre Contemplation  [] Contemplation  [] Preparation  [] Action  [] Maintenance  [] Relapse [] Pre Contemplation  [] Contemplation  [] Preparation  [] Action  [] Maintenance  [] Relapse [] Pre Contemplation  [] Contemplation  [] Preparation  [] Action  [] Maintenance  [] Relapse [] Pre Contemplation  [] Contemplation  [] Preparation  [] Action  [] Maintenance  [] Relapse   PSYCHOSOCIAL ASSESSMENT PSYCHOSOCIAL ASSESSMENT PSYCHOSOCIAL ASSESSMENT PSYCHOSOCIAL ASSESSMENT PSYCHOSOCIAL ASSESSMENT   Behavioral Outcomes Behavioral Outcomes Behavioral Outcomes Behavioral Outcomes Behavioral Outcomes   Tool Used:  Rolan Fraser, Quality of Life Index, Cardiac Version IV  *Given to patient to complete. Tool Used:    Rolan & Evelio, Quality of Life Index, Cardiac Version IV     QOL Index Score: **  HF:**  S&E:**  P&S: **  Family: **   Tool Used:     Rolan & Evelio, Quality of Life Index, Cardiac Version IV    QOL Index Score: **  HF:**  S&E:**  P&S: **  Family: **   PHQ-9 score 7  Depression Severity  []Minimal  [x]Mild   []Moderate  []Moderately Severe  []Severe    PHQ-9 score **  Depression Severity  []Minimal  []Mild   []Moderate  []Moderately Severe []Severe   Does patient have Family Support? [x] Yes      [] No  No signs of marital/family distress       Within the Past Month:  *Have you wished you were dead or wished you could go to sleep and not wake up? [] Yes      [x] No  *Have you had any thoughts of killing yourself? [] Yes      [x] No         Using a scale of 0-10, 0=none, 10=very:   Rate your depression: 0  Rate your anxiety:  0  Using a scale of 0-10, 0=none, 10=very:   Rate your depression: **  Rate your anxiety:  ** Using a scale of 0-10, 0=none, 10=very:   Rate your depression: **  Rate your anxiety:  ** Using a scale of 0-10, 0=none, 10=very:   Rate your depression: **  Rate your anxiety:  ** Using a scale of 0-10, 0=none, 10=very:   Rate your depression: **  Rate your anxiety:  **   Signs and Symptoms of Depression Present? [] Yes      [x] No   Signs and Symptoms of Depression Present? [] Yes      [] No  If yes, please explain:  ** Signs and Symptoms of Depression Present? [] Yes      [] No  If yes, please explain:  ** Signs and Symptoms of Depression Present? [] Yes      [] No  If yes, please explain:  ** Signs and Symptoms of Depression Present?     [] Yes      [] No  If yes, please explain: **   Signs and Symptoms of Anxiety Present? [] Yes      [x] No   Signs and Symptoms of Anxiety Present? [] Yes      [] No  If yes, please explain:  ** Signs and Symptoms of Anxiety Present? [] Yes      [] No  If yes, please explain:  ** Signs and Symptoms of Anxiety Present? [] Yes      [] No  If yes, please explain:  ** Signs and Symptoms of Anxiety Present? [] Yes      [] No  If yes, please explain:  **   [] Patient has poor eye contact   [] Flat affect present. [] Signs of anxiety, anger or hostility    [] Signs social isolation present. []  Signs of alcohol or substance abuse       PSYCHOSOCIAL PLAN PSYCHOSOCIAL PLAN PSYCHOSOCIAL PLAN PSYCHOSOCIAL PLAN PSYCHOSOCIAL PLAN   *Interventions* *Interventions* *Interventions* *Interventions* *Interventions*   *Please check if needed  [] Psych Consult  [] Physician Referral  [x] Stress Management Skills *Please check if needed  [] Psych Consult  [] Physician Referral  [] Stress Management Skills *Please check if needed  [] Psych Consult  [] Physician Referral  [] Stress Management Skills *Please check if needed  [] Psych Consult  [] Physician Referral  [] Stress Management Skills *Please check if needed  [] Psych Consult  [] Physician Referral  [] Stress Management Skills   Is patient currently taking anti-depressant or anti-anxiety medications? [x] Yes      [] No  If yes, please list medications: cymbalta Change in anti-depressant or anti-anxiety medications? [] Yes      [] No  If yes, please list medications:  ** Change in anti-depressant or anti-anxiety medications? [] Yes      [] No  If yes, please list medications:  ** Change in anti-depressant or anti-anxiety medications? [] Yes      [] No  If yes, please list medications:  ** Change in anti-depressant or anti-anxiety medications?   [] Yes      [] No  If yes, please list medications:  **   *Education* *Education* *Education* *Education* *Education*   Healthy Mind-Set Workshops Recommended  [x] FACTOR /EDUCATION ASSESSMENT   Hypertension  [x] Yes      [] No    Resting BP: 108/60  Peak Ex BP:120/60  Medication: diovan   Hypertension  Resting BP: **  Peak Ex BP:**  Medication Changes:  [] Yes      [] No Hypertension  Resting BP: **  Peak Ex BP:**  Medication Changes:  [] Yes      [] No Hypertension  Resting BP: **  Peak Ex BP:**  Medication Changes:  [] Yes      [] No Hypertension  Resting BP: **  Peak Ex BP:**  Medication Changes:  [] Yes      [] No   Lipids  HLD/DLD  [x] Yes      [] No  TOTAL CHOL: 222  HDL:  67  LDL:  127  TRI  Medication: lipitor Lipids  Medication Changes:  [] Yes      [] No     Lipids  Medication Changes:  [] Yes      [] No     Lipids  Medication Changes:  [] Yes      [] No     Lipids    TOTAL CHOL: **  HDL:  **  LDL:  **  TRIG:  **  Medication Changes:  [] Yes      [] No   Diabetes  [] Yes      [x] No  FBS: 94           HbA1C:        Monitor BS @ home:   [] Yes      [x] No Diabetes  Most Recent BS:  BS have been in range  [] Yes      [] No  Medication Changes  [] Yes      [] No   Diabetes  Most Recent BS:  BS have been in range  [] Yes      [] No  Medication Changes  [] Yes      [] No     Diabetes  Most Recent BS:  BS have been in range  [] Yes      [] No  Medication Changes  [] Yes      [] No     Diabetes  Most Recent BS:  BS have been in range  [] Yes      [] No  Medication Changes  [] Yes      [] No       Tobacco Use  [] Current  [] Former  [x] Never Tobacco Use  Change in smoking status   [] Yes      [] No    Quit date: **   Tobacco Use  Change in smoking status   [] Yes      [] No    Quit date: **   Tobacco Use  Change in smoking status   [] Yes      [] No    Quit date: ** Tobacco Use  Change in smoking status   [] Yes      [] No    Quit date: **            Learning Barriers  Please select one:  [] Speech  [] Literacy  [] Hearing  [] Cognitive  [] Vision  [x] Ready to Learn Learning Barriers Addressed:   [] Yes      [] No   Learning Barriers Addressed:   [] Yes      [] Medication Changes? [] Yes    [] No Medication Changes? [] Yes    [] No Medication Changes? [] Yes    [] No   *Education* *Education* *Education* *Education* *Education*   Does Cresencio require any additional education? [] Yes    [x] No   Does Cresencio require any additional education? [] Yes    [] No Does Creesncio require any additional education? [] Yes    [] No Does Cresencio require any additional education? [] Yes    [] No Does Cresencio require any additional education?   [] Yes    [] No   Recommended Additional Educational Videos    Medical  [] Hypertension & Heart Disease  [] Decoding Lab Results  [] Aging Enhancing Your QoL  [] Sleep Disorders  Exercise  [] Body Composition  [] Improve Performance  [] Exercise Action Plan  [] Intro to Yoga  Behavioral  [] How Our Thoughts Can Heal Our Hearts  [] Smoking Cessation  Nutrition  [] Planning Your Eating Strategy  [] Lable Reading- Part 2  [] Dining Out - Part 2  [] Targeting Your Nutrition Priorities  [] Fueling a Healthy Body  [] Menu Workshop  [employer] [] Breakfast & Snacks  [] [employer] Salads & Dressing  [] [address] [place] [address]  [] Soups & Desserts   Additional Educational Videos Completed Additional Educational Videos Completed Additional Educational Videos Completed Additional Educational Videos Completed    [] Yes    [] No   *Goals* *Goals* *Goals* *Goals* *Goals*   Cresencio's risk factor/education goals are as follows:    [x] Optimal BP <140/90  [] Blood Sugar <120  [x] Attend recommended video education sessions  [x] Takes medications as prescribed 100% of the time   [] ** Cresencio's risk factor/education goals are as follows:    [x] Optimal BP <140/90  [] Blood Sugar <120  [x] Attend recommended video education sessions  [x] Takes medications as prescribed 100% of the time   [] ** Cresencio's risk factor/education goals are as follows:    [x] Optimal BP <140/90  [] Blood Sugar <120  [x] Attend recommended video education sessions  [x] Takes medications as prescribed 100% of the time   [] ** Cresencio's risk factor/education goals are as follows:    [x] Optimal BP <140/90  [] Blood Sugar <120  [x] Attend recommended video education sessions  [x] Takes medications as prescribed 100% of the time   [] ** Cresencio achieved risk factor goals?   [] Yes    [] No  If no, why?  **     Monitored telemetry has revealed NSR   Monitored telemetry has revealed **  [] documented arrhythmia at increasing workloads  [] associated symptoms ** Monitored telemetry has revealed  [] documented arrhythmia at increasing workloads  [] associated symptoms ** Monitored telemetry has revealed **  [] documented arrhythmia at increasing workloads  [] associated symptoms ** Monitored telemetry has revealed **  [] documented arrhythmia at increasing workloads  [] associated symptoms **   Physician Response    [x] Cardiac rehab is reasonably and medically necessary for continuous cardiac monitoring surveillance  of patient's cardiac activity  [x] Initiate continuous telemerty monitoring and notify me with any concerns  [] Other   Physician Response    [x] Cardiac rehab is reasonably and medically necessary for continuous cardiac monitoring surveillance  of patient's cardiac activity  [x] Continue continuous telemerty monitoring and notify me with any concerns  [] Other     Physician Response    [x] Cardiac rehab is reasonably and medically necessary for continuous cardiac monitoring surveillance  of patient's cardiac activity  [x] Continue continuous telemerty monitoring and notify me with any concerns   [] Other     Physician Response    [x] Cardiac rehab is reasonably and medically necessary for continuous cardiac monitoring surveillance  of patient's cardiac activity  [x] Continue continuous telemerty monitoring and notify me with any concerns   [] Other

## 2020-09-02 ENCOUNTER — APPOINTMENT (OUTPATIENT)
Dept: CARDIAC REHAB | Age: 60
End: 2020-09-02
Payer: MEDICARE

## 2020-09-07 ENCOUNTER — APPOINTMENT (OUTPATIENT)
Dept: CARDIAC REHAB | Age: 60
End: 2020-09-07
Payer: MEDICARE

## 2020-09-09 ENCOUNTER — APPOINTMENT (OUTPATIENT)
Dept: CARDIAC REHAB | Age: 60
End: 2020-09-09
Payer: MEDICARE

## 2020-09-12 ENCOUNTER — HOSPITAL ENCOUNTER (EMERGENCY)
Age: 60
Discharge: HOME | End: 2020-09-12
Attending: EMERGENCY MEDICINE
Payer: MEDICARE

## 2020-09-12 VITALS
DIASTOLIC BLOOD PRESSURE: 59 MMHG | SYSTOLIC BLOOD PRESSURE: 113 MMHG | OXYGEN SATURATION: 96 % | RESPIRATION RATE: 18 BRPM | TEMPERATURE: 99.1 F | HEART RATE: 79 BPM

## 2020-09-12 LAB
ALBUMIN SERPL-MCNC: 3.8 G/DL (ref 3.5–5.1)
ALP BLD-CCNC: 112 U/L (ref 38–126)
ALT SERPL-CCNC: 17 U/L (ref 11–66)
ANION GAP SERPL CALCULATED.3IONS-SCNC: 10 MEQ/L (ref 8–16)
AST SERPL-CCNC: 17 U/L (ref 5–40)
BASOPHILS # BLD: 0.5 %
BASOPHILS ABSOLUTE: 0 THOU/MM3 (ref 0–0.1)
BILIRUB SERPL-MCNC: 1.2 MG/DL (ref 0.3–1.2)
BILIRUBIN DIRECT: 0.3 MG/DL (ref 0–0.3)
BUN BLDV-MCNC: 17 MG/DL (ref 7–22)
CALCIUM SERPL-MCNC: 8.6 MG/DL (ref 8.5–10.5)
CHLORIDE BLD-SCNC: 96 MEQ/L (ref 98–111)
CO2: 24 MEQ/L (ref 23–33)
CREAT SERPL-MCNC: 1.7 MG/DL (ref 0.4–1.2)
DIGOXIN LEVEL: 0.6 NG/ML (ref 0.5–2)
EKG ATRIAL RATE: 82 BPM
EKG P AXIS: 58 DEGREES
EKG P-R INTERVAL: 276 MS
EKG Q-T INTERVAL: 418 MS
EKG QRS DURATION: 116 MS
EKG QTC CALCULATION (BAZETT): 488 MS
EKG R AXIS: -63 DEGREES
EKG T AXIS: 98 DEGREES
EKG VENTRICULAR RATE: 82 BPM
EOSINOPHIL # BLD: 1.8 %
EOSINOPHILS ABSOLUTE: 0.1 THOU/MM3 (ref 0–0.4)
ERYTHROCYTE [DISTWIDTH] IN BLOOD BY AUTOMATED COUNT: 14 % (ref 11.5–14.5)
ERYTHROCYTE [DISTWIDTH] IN BLOOD BY AUTOMATED COUNT: 48.6 FL (ref 35–45)
GFR SERPL CREATININE-BSD FRML MDRD: 41 ML/MIN/1.73M2
GLUCOSE BLD-MCNC: 83 MG/DL (ref 70–108)
HCT VFR BLD CALC: 37.9 % (ref 42–52)
HEMOGLOBIN: 12.4 GM/DL (ref 14–18)
IMMATURE GRANS (ABS): 0.03 THOU/MM3 (ref 0–0.07)
IMMATURE GRANULOCYTES: 0.4 %
LIPASE: 25.2 U/L (ref 5.6–51.3)
LYMPHOCYTES # BLD: 10.6 %
LYMPHOCYTES ABSOLUTE: 0.8 THOU/MM3 (ref 1–4.8)
MCH RBC QN AUTO: 31 PG (ref 26–33)
MCHC RBC AUTO-ENTMCNC: 32.7 GM/DL (ref 32.2–35.5)
MCV RBC AUTO: 94.8 FL (ref 80–94)
MONOCYTES # BLD: 13.1 %
MONOCYTES ABSOLUTE: 1 THOU/MM3 (ref 0.4–1.3)
NUCLEATED RED BLOOD CELLS: 0 /100 WBC
OSMOLALITY CALCULATION: 261.5 MOSMOL/KG (ref 275–300)
PLATELET # BLD: 181 THOU/MM3 (ref 130–400)
PMV BLD AUTO: 10.5 FL (ref 9.4–12.4)
POTASSIUM SERPL-SCNC: 4.3 MEQ/L (ref 3.5–5.2)
PRO-BNP: 1440 PG/ML (ref 0–900)
RBC # BLD: 4 MILL/MM3 (ref 4.7–6.1)
SEG NEUTROPHILS: 73.6 %
SEGMENTED NEUTROPHILS ABSOLUTE COUNT: 5.7 THOU/MM3 (ref 1.8–7.7)
SODIUM BLD-SCNC: 130 MEQ/L (ref 135–145)
TOTAL PROTEIN: 5.8 G/DL (ref 6.1–8)
TROPONIN T: < 0.01 NG/ML
WBC # BLD: 7.7 THOU/MM3 (ref 4.8–10.8)

## 2020-09-12 PROCEDURE — 85025 COMPLETE CBC W/AUTO DIFF WBC: CPT

## 2020-09-12 PROCEDURE — 36415 COLL VENOUS BLD VENIPUNCTURE: CPT

## 2020-09-12 PROCEDURE — 96361 HYDRATE IV INFUSION ADD-ON: CPT

## 2020-09-12 PROCEDURE — 80053 COMPREHEN METABOLIC PANEL: CPT

## 2020-09-12 PROCEDURE — 2580000003 HC RX 258: Performed by: EMERGENCY MEDICINE

## 2020-09-12 PROCEDURE — 80162 ASSAY OF DIGOXIN TOTAL: CPT

## 2020-09-12 PROCEDURE — 84484 ASSAY OF TROPONIN QUANT: CPT

## 2020-09-12 PROCEDURE — 83690 ASSAY OF LIPASE: CPT

## 2020-09-12 PROCEDURE — 96360 HYDRATION IV INFUSION INIT: CPT

## 2020-09-12 PROCEDURE — 83880 ASSAY OF NATRIURETIC PEPTIDE: CPT

## 2020-09-12 PROCEDURE — 82248 BILIRUBIN DIRECT: CPT

## 2020-09-12 PROCEDURE — 99282 EMERGENCY DEPT VISIT SF MDM: CPT

## 2020-09-12 PROCEDURE — 93005 ELECTROCARDIOGRAM TRACING: CPT | Performed by: EMERGENCY MEDICINE

## 2020-09-12 PROCEDURE — 99283 EMERGENCY DEPT VISIT LOW MDM: CPT

## 2020-09-12 RX ORDER — 0.9 % SODIUM CHLORIDE 0.9 %
500 INTRAVENOUS SOLUTION INTRAVENOUS ONCE
Status: COMPLETED | OUTPATIENT
Start: 2020-09-12 | End: 2020-09-12

## 2020-09-12 RX ORDER — 0.9 % SODIUM CHLORIDE 0.9 %
1000 INTRAVENOUS SOLUTION INTRAVENOUS ONCE
Status: COMPLETED | OUTPATIENT
Start: 2020-09-12 | End: 2020-09-12

## 2020-09-12 RX ORDER — PREGABALIN 100 MG/1
100 CAPSULE ORAL 2 TIMES DAILY
COMMUNITY
End: 2020-11-20

## 2020-09-12 RX ADMIN — SODIUM CHLORIDE 1000 ML: 9 INJECTION, SOLUTION INTRAVENOUS at 15:30

## 2020-09-12 RX ADMIN — SODIUM CHLORIDE 500 ML: 9 INJECTION, SOLUTION INTRAVENOUS at 16:32

## 2020-09-12 ASSESSMENT — ENCOUNTER SYMPTOMS
SHORTNESS OF BREATH: 0
ABDOMINAL DISTENTION: 0
VOMITING: 0
ABDOMINAL PAIN: 0
DIARRHEA: 0
CHEST TIGHTNESS: 0
BACK PAIN: 1
COUGH: 0
COLOR CHANGE: 0
NAUSEA: 0

## 2020-09-12 NOTE — ED NOTES
**This is a Medical/ PA/ APRN Student Note and is charted for educational purposes. The non-physician staff attested note is not to be used for billing purposes or to guide patient care. Please see the physician modifications/ attestation for treatment plan/suggestions. This note has been reviewed and feedback has been provided to the student. **    [address] [facility]  ED visit Note  Pt Name: Brandon Copeland  Medical Record Number: [id number]  Date of Birth 1960   Today's Date: 9/12/2020    CHIEF COMPLAINT:     Chief Complaint   Patient presents with    Hypotension       HISTORY OF PRESENT Sherri Sutherland is a 56 y.o. male with HX of AICD, CHF,HTN, spinal stenosis,  who presents to the ED c/o Hypotension. PT states that he was standing outside when he suddenly felt weak, started shaking and fell to the ground. HE remembers the incident and denies LOC or hitting his head. This episode lasted less than 5 minutes and he was ambulatory on scene for EMS. Currently he still feels weak, is shaking, and complains of LBP/ Buttock pain from falling. Patient denies AICD shock, or forgetting any f his medication. The only thing new is that he started gabapentin yesterday. REVIEW OF SYSTEMS:    Review of Systems   Constitutional: Positive for fatigue. Negative for chills, diaphoresis, fever and unexpected weight change. Eyes: Negative for visual disturbance. Respiratory: Negative for cough, chest tightness and shortness of breath. Cardiovascular: Negative for chest pain, palpitations and leg swelling. Gastrointestinal: Negative for abdominal distention, abdominal pain, diarrhea, nausea and vomiting. Musculoskeletal: Positive for back pain. Negative for gait problem and neck pain. Skin: Negative for color change. Neurological: Positive for tremors, syncope, weakness and light-headedness.  Negative for dizziness, drinks or equivalent per week     Comment: social         PREVIOUS RECORDS REVIEWED:       VITAL SIGNS   CURRENT VITALS:  oral temperature is 99.1 °F (37.3 °C). His blood pressure is 83/53 (abnormal) and his pulse is 79. His respiration is 16 and oxygen saturation is 95%. Temperature Range (24h):Temp: 99.1 °F (37.3 °C) Temp  Av.1 °F (37.3 °C)  Min: 99.1 °F (37.3 °C)  Max: 99.1 °F (37.3 °C)  BP Range (59W): Systolic (23PMK), FSF:39 , Min:83 , HWZ:84     Diastolic (62DJT), NAK:92, Min:53, Max:53    Pulse Range (24h): Pulse  Av  Min: 79  Max: 79  Respiration Range (24h): Resp  Av  Min: 16  Max: 16  Current Pulse Ox (24h):  SpO2: 95 %  Pulse Ox Range (24h):  SpO2  Av %  Min: 95 %  Max: 95 %  Oxygen Amount and Delivery:    Vital signs are abnormal for hypotension    PHYSICAL EXAM:   Physical Exam  Constitutional:       General: He is awake. Appearance: He is well-groomed and overweight. HENT:      Head: Normocephalic and atraumatic. Mouth/Throat:      Lips: Pink. Mouth: Mucous membranes are dry. No lacerations. Tongue: No lesions. Eyes:      General: Lids are normal. No scleral icterus. Extraocular Movements: Extraocular movements intact. Conjunctiva/sclera: Conjunctivae normal.      Pupils: Pupils are equal, round, and reactive to light. Cardiovascular:      Rate and Rhythm: Normal rate and regular rhythm. Pulses: Normal pulses. Radial pulses are 2+ on the right side and 2+ on the left side. Dorsalis pedis pulses are 2+ on the right side and 2+ on the left side. Posterior tibial pulses are 2+ on the right side and 2+ on the left side. Heart sounds: Heart sounds are distant. No murmur. No friction rub. No gallop. Pulmonary:      Effort: Pulmonary effort is normal.      Breath sounds: Normal breath sounds. No wheezing, rhonchi or rales. Abdominal:      General: Abdomen is flat. Bowel sounds are normal. There is no distension. Palpations: Abdomen is soft. There is no mass. Musculoskeletal:      Right lower leg: No edema. Left lower leg: No edema. Skin:     General: Skin is warm. Capillary Refill: Capillary refill takes 2 to 3 seconds. Coloration: Skin is not jaundiced. Findings: No bruising or lesion. Neurological:      General: No focal deficit present. Mental Status: He is alert and oriented to person, place, and time. Cranial Nerves: No cranial nerve deficit. Sensory: Sensation is intact. No sensory deficit. Motor: Tremor present. No weakness. Deep Tendon Reflexes: Reflexes abnormal.      Reflex Scores:       Patellar reflexes are 0 on the right side and 1+ on the left side. Achilles reflexes are 0 on the right side and 1+ on the left side. Comments: Diffuse tremor in UE and LE. Psychiatric:         Behavior: Behavior is cooperative. LABS     Labs Reviewed   CBC WITH AUTO DIFFERENTIAL - Abnormal; Notable for the following components:       Result Value    RBC 4.00 (*)     Hemoglobin 12.4 (*)     Hematocrit 37.9 (*)     MCV 94.8 (*)     RDW-SD 48.6 (*)     Lymphocytes Absolute 0.8 (*)     All other components within normal limits   DIGOXIN LEVEL   BASIC METABOLIC PANEL   HEPATIC FUNCTION PANEL   LIPASE   TROPONIN   BRAIN NATRIURETIC PEPTIDE       RADIOLOGY     No orders to display       DIFFERENTIALS:   1. Dehydration  2. Medication Adverse effect  3. Arrhythmia  4. Electrolyte imbalance    PLAN:   1. EKG/ CBC/ BMP/ LFTs/ MG2+/ UA/ Digoxin level/ BNP/ Troponin  2. Fluid bolus  3. AICD interrogation    Electronically signed by Tong Coker on 9/12/2020 at 3:35 PM   **This is a Medical/ PA/ APRN Student Note and is charted for educational purposes. The non-physician staff attested note is not to be used for billing purposes or to guide patient care. Please see the physician modifications/ attestation for treatment plan/suggestions.  This note has been reviewed and feedback has been provided to the student.  Lexy Lowery  09/12/20 1545

## 2020-09-12 NOTE — ED PROVIDER NOTES
[facility] [address] [address] EMERGENCY DEPT      CHIEF COMPLAINT       Chief Complaint   Patient presents with    Hypotension       Nurses Notes reviewed and I agree except as noted in the HPI. HISTORY OF PRESENT ILLNESS    Mirian Mcclain is a 56 y.o. male who presents with complaint of hypotension with subsequent fall,  denies loss of consciousness. States that he felt lightheaded and proceeded to fall to the ground, complaining of pain around his coccyx. Patient's partner says that she was shaking when he arrived home. He denies any neuro deficits. He has no chest pain, no belly pain. Patient said that he currently feels fine. Patient was noted to be hypotensive at home. Wife reports poor oral intake. Onset: Acute  Duration: Prior to arrival  Timing: Resolved  Location of Pain: Coccyx  Intesity/severity: Mild pain  Modifying Factors: Ambulation  Relieved by;  Previous Episodes; Tx Before arrival: None  REVIEW OF SYSTEMS      Review of Systems   Constitutional: Negative for fever, chills, diaphoresis and fatigue. HENT: Negative for congestion, drooling, facial swelling and sore throat. Eyes: Negative for photophobia, pain and discharge. Respiratory: Negative for cough, shortness of breath, wheezing and stridor. Cardiovascular: Negative for chest pain, palpitations and leg swelling. Positive for syncopal episode. Gastrointestinal: Negative for abdominal pain, blood in stool and abdominal distention. Genitourinary: Negative for dysuria, urgency, hematuria and difficulty urinating. Musculoskeletal: Negative for gait problem, neck pain and neck stiffness. Positive for low back pain. Skin; No rash, No itching  Neurological: Negative for seizures, weakness and numbness. Psychiatric/Behavioral: Negative for hallucinations, confusion and agitation.      PAST MEDICAL HISTORY    has a past medical history of Acute kidney injury ([facility] [address] Cardiomyopathy, dilated ([facility] [address] Claustrophobia, Congestive heart failure (CHF) ([facility] [address] Depression, HTN (hypertension), Hyperlipidemia, Medtronic dual ICD, Osteoarthritis, Osteoarthritis of spine with radiculopathy, lumbar region, Spinal stenosis, and Unspecified sleep apnea. SURGICAL HISTORY      has a past surgical history that includes cardiovascular stress test (09/2013); doppler echocardiography (09/2013); doppler echocardiography (09/2013); Nasal sinus surgery; Carpal tunnel release (Right); Colonoscopy; back surgery (8/26/2014); Endoscopy, colon, diagnostic; Vasectomy (???); sinus surgery (1980's???); other surgical history (10/9/2015); Neck surgery (2015); back surgery (2013); Tonsillectomy (1980's??); hernia repair (???); other surgical history (01/18/2016); other surgical history (2-8-2016); other surgical history (N/A, 03-21-16); other surgical history (Bilateral, 05-16-16); other surgical history (08/01/2016); cervical fusion (05/17/2017); other surgical history (Left, 09/11/2017); Lumbar spine surgery (Left, 9/11/2017); other surgical history (Right, 10/31/2017); Lumbar spine surgery (Right, 10/31/2017); other surgical history (Right, 05/09/2018); pr exc skin benig <5mm remaindr body (Right, 5/9/2018); pr inject rx other periph nerve (Left, 6/19/2018); Lumbar spine surgery (Left, 4/11/2019); Cardiac catheterization (10/2013); Cardiac defibrillator placement (2013); Injection Procedure For Sacroiliac Joint (Left, 7/2/2019); Injection Procedure For Sacroiliac Joint (Left, 8/26/2019); Lumbar spine surgery (Left, 10/3/2019); Facet joint injection (Right, 5/26/2020); and Diagnostic Cardiac Cath Lab Procedure (07/2020).     CURRENT MEDICATIONS       Previous Medications    ACETAMINOPHEN (TYLENOL ARTHRITIS PAIN PO)    Take by mouth 3 times daily as needed    ALLOPURINOL (ZYLOPRIM) 300 MG TABLET    Take 300 mg by mouth daily    AMIODARONE (CORDARONE) 200 MG TABLET    Take 1 tablet twice a day for 7 days, then decrease to 1 tablet by mouth once a day    ATORVASTATIN (LIPITOR) 80 MG TABLET    Take 80 mg by mouth nightly    DIGOXIN (LANOXIN) 125 MCG TABLET    Take 0.125 mg by mouth every 48 hours    DULOXETINE (CYMBALTA) 30 MG EXTENDED RELEASE CAPSULE    60 mg     MAGNESIUM 500 MG CAPS    Take by mouth daily    METOPROLOL SUCCINATE (TOPROL XL) 25 MG EXTENDED RELEASE TABLET    Take 1 tablet by mouth daily    PREGABALIN (LYRICA) 100 MG CAPSULE    Take 100 mg by mouth 2 times daily. SPIRONOLACTONE (ALDACTONE) 25 MG TABLET    Take 12.5 mg by mouth daily    TORSEMIDE (DEMADEX) 20 MG TABLET    Take 1 tablet by mouth 2 times daily    VALSARTAN (DIOVAN) 80 MG TABLET    Take 80 mg by mouth 2 times daily    ZINC SULFATE (ZINCATE) 220 (50 ZN) MG CAPSULE    Take 50 mg by mouth daily       ALLERGIES     has No Known Allergies. FAMILY HISTORY     He indicated that his mother is alive. He indicated that his father is . He indicated that his sister is alive. He indicated that his brother is alive. family history includes Coronary Art Dis in his father; Heart Attack in his father; Heart Disease in his father; High Blood Pressure in his father; Parkinsonism in his father. SOCIAL HISTORY      reports that he has never smoked. He has never used smokeless tobacco. He reports current alcohol use of about 6.0 standard drinks of alcohol per week. He reports that he does not use drugs. PHYSICAL EXAM     INITIAL VITALS:  oral temperature is 99.1 °F (37.3 °C). His blood pressure is 113/59 (abnormal) and his pulse is 79. His respiration is 18 and oxygen saturation is 96%. Physical Exam   Constitutional:  well-developed and well-nourished. HENT: Head: Normocephalic, atraumatic, Bilateral external ears normal, Oropharynx mosit, No oral exudates, Nose normal.   Eyes: PERRL, EOMI, Conjunctiva normal, No discharge. No scleral icterus  Neck: Normal range of motion, No tenderness, Supple  Cardiovascular: Normal rate, regular rhythm, S1 normal and S2 normal.  Exam reveals no gallop.     Pulmonary/Chest: Effort normal and breath sounds normal. No accessory muscle usage or stridor. No respiratory distress. no wheezes. has no rales. exhibits no tenderness. Abdominal: Soft. Bowel sounds are normal.  exhibits no distension. There is no tenderness. There is no rebound and no guarding. Extremities: No edema, no tenderness, no cyanosis, no clubbing. Musculoskeletal: Good range of motion in major joints is observed. No major deformities noted. Positive for pain in his coccyx. Neurological: Alert and oriented ×3, normal motor function, normal sensory function, no focal deficits. GCS 15. Skin: Skin is warm, dry and intact. No rash noted. No erythema. Psychiatric: Affect normal, judgment normal, mood normal.  DIFFERENTIAL DIAGNOSIS:   PE, MI, cardiac arrhythmia, generalized weakness, dehydration, panic attack    DIAGNOSTIC RESULTS     EKG: All EKG's are interpreted by the Emergency Department Physician who either signs or Co-signs this chart in the absence of a cardiologist.      RADIOLOGY: non-plain film images(s) such as CT, Ultrasound and MRI are read by the radiologist.  Plain radiographic images are visualized and preliminarily interpreted by the emergency physician unless otherwise stated below. LABS:   Labs Reviewed   CBC WITH AUTO DIFFERENTIAL - Abnormal; Notable for the following components:       Result Value    RBC 4.00 (*)     Hemoglobin 12.4 (*)     Hematocrit 37.9 (*)     MCV 94.8 (*)     RDW-SD 48.6 (*)     Lymphocytes Absolute 0.8 (*)     All other components within normal limits   BASIC METABOLIC PANEL - Abnormal; Notable for the following components:    Sodium 130 (*)     Chloride 96 (*)     CREATININE 1.7 (*)     All other components within normal limits   HEPATIC FUNCTION PANEL - Abnormal; Notable for the following components:     Total Protein 5.8 (*)     All other components within normal limits   BRAIN NATRIURETIC PEPTIDE - Abnormal; Notable for the following components:    Pro-BNP 1440.0 (*)     All other components within normal limits   GLOMERULAR FILTRATION RATE, ESTIMATED - Abnormal; Notable for the following components:    Est, Glom Filt Rate 41 (*)     All other components within normal limits   OSMOLALITY - Abnormal; Notable for the following components:    Osmolality Calc 261.5 (*)     All other components within normal limits   LIPASE   TROPONIN   DIGOXIN LEVEL   ANION GAP       EMERGENCY DEPARTMENT COURSE:   Vitals:    Vitals:    09/12/20 1447 09/12/20 1554 09/12/20 1640   BP: (!) 83/53 98/66 (!) 113/59   Pulse: 79 78 79   Resp: 16 21 18   Temp: 99.1 °F (37.3 °C)     TempSrc: Oral     SpO2: 95% 96% 96%     Patient presenting with complaint of weakness which led to him falling onto the ground. Patient has pain around his coccyx, refused CT scan/x-ray of his coccyx. Patient blood pressure improved with IV fluid rehydration. Patient lab work shows elevated BNP, he has no shortness of breath. Lung sounds are clear to auscultation. Patient ambulated in the ED, patient's walks without hesitation. Patient has no signs of sepsis, normal heart rate, normal white count. We will discharge patient home to follow-up with her care physician for recheck on Monday. Called patient today(9/13), states that he is feeling better. Otherwise he reports that he is gained 5 pounds, this is most likely secondary to rehydration try to get blood pressure up yesterday. Patient instructed to take extra dose of his torsemide and also can take an extra dose of potassium. Patient given instructions to come back to the ED for evaluation if he has any worsening weight gain/shortness of breath. CRITICAL CARE:       CONSULTS:  None    PROCEDURES:  None    FINAL IMPRESSION      1.  Dehydration          DISPOSITION/PLAN   Decision To Discharge    PATIENT REFERRED TO:  Rogelio Paris MD  [address] [place] [address]  [phone]    Schedule an appointment as soon as possible for a visit in 2 days      [facility] EMERGENCY DEPT  [address] [facility]  [phone]    If symptoms worsen      DISCHARGE MEDICATIONS:  New Prescriptions    No medications on file       (Please note that portions of this note were completed with a voice recognition program.  Efforts were made to edit the dictations but occasionally words are mis-transcribed.)    Cass Yeboah, [address] [place] [address],   09/13/20 4709

## 2020-09-12 NOTE — ED NOTES
Patient stood at bedside to urinate, reports feeling \"shaky\" but states he feels much better.      Bola Mccord RN  09/12/20 7363

## 2020-09-12 NOTE — ED TRIAGE NOTES
Patient presents via EMS after hypotension at near syncope at home. Patient's family states patient's BP was in the 25'G systolic prior to calling EMS. She states he drank some powerade and they called EMS. Upon EMS arrival, patient's BP was 77/44 and IV was established and fluids were running. Patient received about 600mL of NS by the time he arrived to the ED. Patient placed on telemetry and EKG obtained. Patient's blood pressure now 83/53 and patient states he feels fine. He states he did not lose consciousness but almost passed out, which caused him to fall to the ground. He states he did not injury anything during the fall. Patient moving all extremities without difficulty. Patient moved himself from cot to bed without difficulty. Patient states his blood pressure is normally 13T-782P systolic due to medications he is on at home. Denies any chest pain or shortness of breath. No cough or abdominal pain.  Patient states he feels back to normal.

## 2020-09-13 PROCEDURE — 93010 ELECTROCARDIOGRAM REPORT: CPT | Performed by: NUCLEAR MEDICINE

## 2020-09-14 ENCOUNTER — HOSPITAL ENCOUNTER (OUTPATIENT)
Dept: CARDIAC REHAB | Age: 60
Setting detail: THERAPIES SERIES
Discharge: HOME | End: 2020-09-14
Payer: MEDICARE

## 2020-09-14 PROCEDURE — G0423 INTENS CARDIAC REHAB NO EXER: HCPCS

## 2020-09-14 PROCEDURE — G0422 INTENS CARDIAC REHAB W/EXERC: HCPCS

## 2020-09-14 NOTE — PROGRESS NOTES
Video Education Report - ICR/CR    Name:  Fredrik Kehr     Date:  9/14/2020  MRN: [id number]     Session #:    Session Length: 40 min    Recommended Videos        []01 [employer] - Program Overview   34:22    []02 Overview of Pritikin Eating Plan   34:10    []03 Becoming a Pritikin    33:08     []04 Diseases of Our Time - Part 1   34:22    []05 Calorie Density     33:39   []06 Label Reading - Part 1    32:15   []07 Move it      32.54   []08 Healthy Minds, Bodies, Hearts   32:14   []09 Dining Out - Part 1    32:28   [x]10 Heart Disease Risk Reduction   26:00   []16 Metabolic Syndrome and Belly Fat  31:52   []12 Facts on Fat     35:29   []13 Diseases of Our Time - Part 2   33:07   []14 Biology of Weight Control   32:36   []15 Biomechanical Limitations   35:20   []16 Nutrition Action Plan    34:23      Comments:  Video completed,

## 2020-09-14 NOTE — PROGRESS NOTES
Hospital Facility-Based Program  Phase 2 Cardiac Rehab Weekly Progress Report      Patient prescribed exercise:  9:00 class. 3 times per week in rehab, 1-4 times per week at home for the amount of sessions/weeks specified by insurance. Current Levels:  NuStep:  28 Brizuela for 8 minutes, UBE: Level 18 for 5 minutes. Progression Discussion: Increase Aerobic exercise 10 minutes to work on endurance. Attempt to increase intensity by 5-20% for each modality this week. Try to increase intensities until Cresencio rates the exercises a 13-17 on Chong RPE.

## 2020-09-16 ENCOUNTER — OFFICE VISIT (OUTPATIENT)
Dept: PHYSICAL MEDICINE AND REHAB | Age: 60
End: 2020-09-16
Payer: MEDICARE

## 2020-09-16 ENCOUNTER — HOSPITAL ENCOUNTER (OUTPATIENT)
Dept: CARDIAC REHAB | Age: 60
Setting detail: THERAPIES SERIES
Discharge: HOME | End: 2020-09-16
Payer: MEDICARE

## 2020-09-16 VITALS
SYSTOLIC BLOOD PRESSURE: 116 MMHG | BODY MASS INDEX: 37.18 KG/M2 | TEMPERATURE: 97.1 F | WEIGHT: 251 LBS | HEIGHT: 69 IN | DIASTOLIC BLOOD PRESSURE: 60 MMHG | HEART RATE: 70 BPM

## 2020-09-16 PROCEDURE — G0423 INTENS CARDIAC REHAB NO EXER: HCPCS

## 2020-09-16 PROCEDURE — G0422 INTENS CARDIAC REHAB W/EXERC: HCPCS

## 2020-09-16 PROCEDURE — 64405 NJX AA&/STRD GR OCPL NRV: CPT | Performed by: PAIN MEDICINE

## 2020-09-16 RX ORDER — METHYLPREDNISOLONE ACETATE 40 MG/ML
80 INJECTION, SUSPENSION INTRA-ARTICULAR; INTRALESIONAL; INTRAMUSCULAR; SOFT TISSUE ONCE
Status: COMPLETED | OUTPATIENT
Start: 2020-09-16 | End: 2020-09-16

## 2020-09-16 RX ADMIN — METHYLPREDNISOLONE ACETATE 80 MG: 40 INJECTION, SUSPENSION INTRA-ARTICULAR; INTRALESIONAL; INTRAMUSCULAR; SOFT TISSUE at 13:24

## 2020-09-16 NOTE — PROGRESS NOTES
Procedure  Visit Date: 9/16/20    Kyler Maynard is a 56 y.o. male presents today in the office for the following:      History of Present Illness   Yasmany Corado is here today for occipital nerve block. Pre-Op Diagnosis   Bilateral Occipital neuralgia    Post-Op Diagnosis   Bilateral Occipital neuralgia    Procedure Documentation   The patient is positioned and landmarks identified. The occipital artery is palpated. Site was sprayed with Ethyl Chloride. Skin over the area was prepped with Betadine. #25-gauge 1-1/2 inch hypodermic needle was inserted through the skin so that the tip of the needle is over the bone medial to the artery. Then a mixture of 4 cc of 0.25% Marcaine with 40mg DepoMedrol was injected medial to the artery after negative aspiration. This was done over the Bilateral Occipital nerve. Vitals:    09/16/20 1227   BP: 116/60   Site: Left Upper Arm   Position: Sitting   Cuff Size: Medium Adult   Pulse: 70   Temp: 97.1 °F (36.2 °C)   TempSrc: Temporal   Weight: 251 lb (113.9 kg)   Height: 5' 9\" (1.753 m)       Conclusion   The patient tolerated the procedure well and with out complication Patient's vital signs remained stable and was discharged home in stable condition. Patient will follow up in office. No orders of the defined types were placed in this encounter.       Electronically signed by Ashok Balbuena MD on 9/16/20 at 12:58 PM EDT

## 2020-09-18 ENCOUNTER — HOSPITAL ENCOUNTER (OUTPATIENT)
Dept: CARDIAC REHAB | Age: 60
Setting detail: THERAPIES SERIES
Discharge: HOME | End: 2020-09-18
Payer: MEDICARE

## 2020-09-18 PROCEDURE — G0422 INTENS CARDIAC REHAB W/EXERC: HCPCS

## 2020-09-18 PROCEDURE — G0423 INTENS CARDIAC REHAB NO EXER: HCPCS

## 2020-09-21 ENCOUNTER — HOSPITAL ENCOUNTER (OUTPATIENT)
Dept: CARDIAC REHAB | Age: 60
Setting detail: THERAPIES SERIES
Discharge: HOME | End: 2020-09-21
Payer: MEDICARE

## 2020-09-21 PROCEDURE — G0423 INTENS CARDIAC REHAB NO EXER: HCPCS

## 2020-09-21 PROCEDURE — G0422 INTENS CARDIAC REHAB W/EXERC: HCPCS

## 2020-09-21 NOTE — PROGRESS NOTES
Hospital Facility-Based Program  Phase 2 Cardiac Rehab Weekly Progress Report      Patient prescribed exercise:  9:00 class. 3 times per week in rehab, 1-4 times per week at home for the amount of sessions/weeks specified by insurance. Current Levels:  NuStep:  38 Brizuela for 15 minutes, UBE:20W for 5 minutes. Progression Discussion: Maintain/Increase Aerobic exercise 15 minutes to work on endurance. Attempt to increase intensity by 5-20% for each modality this week. Try to increase intensities until Cresencio rates the exercises a 13-17 on Chong RPE.

## 2020-09-23 ENCOUNTER — HOSPITAL ENCOUNTER (OUTPATIENT)
Dept: CARDIAC REHAB | Age: 60
Setting detail: THERAPIES SERIES
Discharge: HOME | End: 2020-09-23
Payer: MEDICARE

## 2020-09-23 ENCOUNTER — TELEPHONE (OUTPATIENT)
Dept: CARDIOLOGY CLINIC | Age: 60
End: 2020-09-23

## 2020-09-23 ENCOUNTER — PROCEDURE VISIT (OUTPATIENT)
Dept: CARDIOLOGY CLINIC | Age: 60
End: 2020-09-23
Payer: MEDICARE

## 2020-09-23 PROCEDURE — G0422 INTENS CARDIAC REHAB W/EXERC: HCPCS

## 2020-09-23 PROCEDURE — 93297 REM INTERROG DEV EVAL ICPMS: CPT | Performed by: NUCLEAR MEDICINE

## 2020-09-23 PROCEDURE — G0423 INTENS CARDIAC REHAB NO EXER: HCPCS

## 2020-09-23 PROCEDURE — G2066 INTER DEVC REMOTE 30D: HCPCS | Performed by: NUCLEAR MEDICINE

## 2020-09-23 NOTE — PROGRESS NOTES
[facility] medtronic optivol icd     2.9 years on device     9/6/20 10+ beats VT   optivol  Elevated  This note also sent to Cindy Rodriguez

## 2020-09-24 NOTE — TELEPHONE ENCOUNTER
Call and inquire if any HF symptoms. Has been managed by [facility] recently - getting work up for transplant. Was in ED recently for dehydration.

## 2020-09-24 NOTE — TELEPHONE ENCOUNTER
Spoke with Cresencio and informed him of elevated optivol reading. Patient denied any CP, SOB, weight gain, swelling, fluid overload. Patient stated he is doing really well and is continuing to do cardiac rehab. Patient did state he changed the time of when he takes him water pill. Patient used to take water pill at 12:00pm and is now taking it in the evening. Patient was informed if he is doing well with no HF symptoms to keep continuing his current regimen. Patient voiced understanding.

## 2020-09-25 ENCOUNTER — HOSPITAL ENCOUNTER (OUTPATIENT)
Dept: CARDIAC REHAB | Age: 60
Setting detail: THERAPIES SERIES
Discharge: HOME | End: 2020-09-25
Payer: MEDICARE

## 2020-09-25 PROCEDURE — G0422 INTENS CARDIAC REHAB W/EXERC: HCPCS

## 2020-09-25 PROCEDURE — G0423 INTENS CARDIAC REHAB NO EXER: HCPCS

## 2020-09-25 NOTE — PROGRESS NOTES
Mirian CRANDALL.:  1960  Acct Number: [de-identified]  MRN:  [id number]                  [facility] COOKING SCHOOL WORKSHOP             Date: 2020        Session # ________   Tita Camarena class covered:      () Adding Flavor     () Fast Breakfasts     () Salads and Dressings     () Soups and Sauces     () Simple Sides     () Appetizers and Snacks     () Delicious Desserts     () Plant Based Proteins     () Fast Evening Meals     (X) Weekend Breakfasts     () Efficiency Cooking     () Meat Alternatives     Patients were shown how to choose, prep, and cook; substitutions and other options were given. Samples were offered. Recipes were given and questions answered. The patient above was in the [employer] for 50 minutes.       Electronically signed by Dk PA [id number] on 2020 at 10:59 AM

## 2020-09-28 ENCOUNTER — HOSPITAL ENCOUNTER (OUTPATIENT)
Dept: CARDIAC REHAB | Age: 60
Setting detail: THERAPIES SERIES
Discharge: HOME | End: 2020-09-28
Payer: MEDICARE

## 2020-09-28 PROCEDURE — G0423 INTENS CARDIAC REHAB NO EXER: HCPCS

## 2020-09-28 PROCEDURE — G0422 INTENS CARDIAC REHAB W/EXERC: HCPCS

## 2020-09-28 NOTE — PLAN OF CARE
[address] [place] [address] Facility-Based Program  Individualized Cardiac Treatment Plan    Patient Name:  Jasiel Schrader  :  1960  Age:  56 y.o. MRN:  [id number]  Diagnosis: CHF  Date of Event: 8/3/2020   Physician:  Ivanna Liz MD [facility])  Next Office Visit:  2020  Date Entered Program: 2020  Risk Stratifications: [] Low [] Intermediate [x] High  Allergies: No Known Allergies    COVID -19 Screen  Do you have any of the following symptoms:  [] Fever [] Cough [] SOB [] Muscle/Body Ache [] Loss of taste/smell [x] None    Have you traveled outside of the [place]? [] Yes      [x] No    Have you been around anyone who has tested Positive for COVID 19?  [] Yes      [x] No    The following Education has been completed with patient. [x] Wait in the lobby until we call you back to rehab. [x] You must wear a mask while in the medical center, and in cardiac rehab. Please bring your own. [x] Bring your own bottle of water with you. [x] You can not bring anyone with you into the medical center at this time. They are welcome to sit in their car and wait for you.     Individual Cardiac Treatment Plan -EXERCISE  INITIAL 30 DAY 60 DAY 90 DAY FINAL DAY   EXERCISE  ASSESSMENT/PLAN EXERCISE  REASSESSMENT EXERCISE   REASSESSMENT EXERCISE   REASSESSMENT EXERCISE  DISCHARGE/FOLLOW-UP   Date: 2020 Date: 2020 Date: Date: Date:   Session #1 Session # 12 Session # ** Session # ** Session # **  Last session completed on **   Stages of Change Stages of Change Stages of Change Stages of Change Stages of Change   [] Pre Contemplation  [] Contemplation  [x] Preparation  [] Action  [] Maintenance  [] Relapse [] Pre Contemplation  [] Contemplation  [x] Preparation  [] Action  [] Maintenance  [] Relapse [] Pre Contemplation  [] Contemplation  [] Preparation  [] Action  [] Maintenance  [] Relapse [] Pre Contemplation  [] Contemplation  [] Preparation  [] Action  [] Maintenance  [] Relapse [] Pre Contemplation  [] Contemplation  [] Preparation  [] Action  [] Maintenance  [] Relapse   EXERCISE ASSESSMENT EXERCISE ASSESSMENT EXERCISE ASSESSMENT EXERCISE ASSESSMENT EXERCISE ASSESSMENT   6 Min Walk Test  Distance walked: on NS  0.19 miles  1003 ft.  1.6 METs  Max HR:75 BPM      RPE:  13    THR:  101-120  Rhythm:  NSR    6 Min Walk Test  Distance walked:   ** miles  ** ft  ** METs  Max HR:** BPM      RPE:  **  %Change ft= **    Rhythm:  **   DASI: 4.7 METs DASI: 5.3 METs DASI: ** METs DASI: ** METs DASI: ** METs   Return to Work  [employer] on returning to work? [] Yes              [] No   [x] Disabled-since 2013    [] Retired   Return to work:  Has Cresencio returned to work? [] Yes    [x] No - disabled     Return to work:  Has Gopi Aldana returned to work? [] Yes    [] No    Return to work date set? [] Yes, **    [] No    Cresencio is doing ** at work. Return to work:  Has Gopi Aldana returned to work? [] Yes    [] No    Return to work date set? [] Yes, **    [] No    Cresencio is doing ** at work. Return to work:  Has Gopi Aldana returned to work? [] Yes    [] No    Return to work? [] Yes, **    [] No    *Required MET Level achieved for job duties? [] Yes    [] No   Orthopedic Limitations/  [x] Yes    [] No  If yes please list:  Right leg goes numb, multiple back sugeries     Orthopedic Limitations  *If patient has orthopedic issue:   Actions/  accomodations needed to make Cresencio successful : NUSTEP Orthopedic Limitations   Orthopedic Limitations   Orthopedic Limitations     Fall Risk  Fall risk assessed? [x] Yes      [] No    Balance Issues?   [x] Yes      [] No     [] Walker [] Cane    [x] Safety issues reviewed      Fall Risk  *If patient is a fall risk, action needed to accommodate: Laila [address] Exercise  [] Yes    [x] No   Home Exercise  [] Yes    [x] No Home Exercise  [] Yes    [] No  Type: **  Frequency: **  Duration: ** Home Exercise  [] Yes    [] No  Type: **  Frequency: **  Duration: ** Home Exercise  [] Yes    [] No  Type: **  Frequency: **  Duration: **   Angina with Activity? [] Yes    [x] No  Angina Management: na Angina with Activity? [] Yes    [x] No   Angina with Activity? [] Yes    [] No  Angina Management: ** Angina with Activity? [] Yes    [] No  Angina Management: ** Angina with Activity?   [] Yes    [] No  Angina Management: **   EXERCISE PLAN EXERCISE PLAN EXERCISE PLAN EXERCISE PLAN EXERCISE PLAN   *Interventions* *Interventions* *Interventions* *Interventions* *Interventions*   Exercise Prescription  (per physician & CR staff) Exercise Prescription  (per physician & CR staff) Exercise Prescription  (per physician & CR staff) Exercise Prescription  (per physician & CR staff) Exercise Prescription  (per physician & CR staff)   Cardiovascular Cardiovascular Cardiovascular Cardiovascular Cardiovascular   Mode:    [] Treadmill (TM)  [x] Schwinn Airdyne (AD)  [x] Arms Ergometer (AE)  [x] NuStep  [] Elliptical (E) MODE:    [x] Arms Ergometer (AE)  [x] NuStep   MODE:    [] Treadmill (TM)  [] Schwinn Airdyne (AD)  [] Arms Ergometer (AE)  [] NuStep  [] Elliptical (E) MODE:    [] Treadmill (TM)  [] Schwinn Airdyne (AD)  [] Arms Ergometer (AE)  [] NuStep  [] Elliptical (E) MODE:    [] Treadmill (TM)  [] Schwinn Airdyne (AD)  [] Arms Ergometer (AE)  [] NuStep  [] Elliptical (E)   Initial Workloads    AD: 0.5 level = 1.6 METs  NS: 28  Brizuela= 1.9 METs  AE: 0.2 level = 1.3 METs Current Workloads    NS:  48 Brizuela= 2.3 METs  AE: 26w  =  1.9METs Current Workloads  TM:  @ %=  METs  AD:  level =  METs  NS:   Brizuela=  METs  AE:  level =  METs Current Workloads  TM:  @ %=  METs  AD:  level =  METs  NS:   Brizuela=  METs  AE:  level =  METs Current Workloads  TM:  @ %=  METs  AD:  level =  METs  NS:   Brizuela=  METs  AE:  level =  METs     Frequency:    ICR: 3x/week  Home: 2-3x/wk Frequency:   ICR: 3x/week  Home: 3x/wk Frequency:  ICR: 3x/week  Home: 3-4x/wk Frequency:  ICR: 3x/week  Home: 3-4x/wk Frequency:  Cresencio will continue exercise at  5-7 days/week   Duration:   Total aerobic exercise = 30-45 min    5-8 min/mode Duration:  Total aerobic exercises =35 min     15min/mode Duration:  Total aerobic exercises = ** min     **min/mode Duration:  Total aerobic exercises = ** min     **min/mode Duration:  Total erobic exercise =  60-90 min   Intensity:   MET Level = 1.9  RPE = 12-15 Intensity:  Max MET Level = 2.3  RPE = 12-15 Intensity:  Max MET Level = **  RPE = 12-15 Intensity:  Max MET Level = **  RPE = 12-15 Intensity:  Max MET Level = ** RPE = 12-15   Progression: increase aerobic activity up to 15 min over next 4 weeks by increasing time 2-3 min/week. Progression: increase aerobic intensity as tolerated by pt   Progression:   Progression: Progression:  Increase time/intensity when RPE <13, and HR is in [facility]   [x] Yes      [] No  Upper and Lower body strength training 2x/wk    Wt: 2#       Reps:  8-15    *Increase wt. after completing 15 reps with an RPE of <12/13. [x] Yes      [] No  Upper and Lower body strength training 2x/wk    Wt: 3#       Reps:  8-15    *Increase wt. after completing 15 reps with an RPE of <12/13. [] Yes      [] No  Upper and Lower body strength training 2x/wk    Wt: **#       Reps:  8-15    *Increase wt. after completing 15 reps with an RPE of <12/13. [] Yes      [] No  Upper and Lower body strength training 2x/wk    Wt: **#       Reps:  8-15    *Increase wt. after completing 15 reps with an RPE of <12/13. Continue Strength Training at home   [] Exercise Log & Strength training handout given     Wt: **#       Reps:  8-15    *Increase wt. after completing 15 reps with an RPE of <12/13.    Flexibility Flexibility Flexibility Flexibility Flexibility   [x] Yes      [] No  25 min session of Core Strength & Flexibility 1x/per week  Attends Core Strength & Flexibility   [x] Yes      [] No Attends Core Strength & Flexibility   [] Yes      [] No Attends Core Strength & Flexibility   [] Yes      [] No Continue Core Strength & Flexibility at home   Home Exercise  *Cresencio verbalizes planning to initiate home exercise. Home Exercise  *Cresencio verbalizes planning to initiate home exercise Home Exercise  *Cresencio verbalizes planning to ** ** days/week for ** min on days not in rehab. Home Exercise  *Cresencio verbalizes planning to ** ** days/week for ** min on days not in rehab. Home Exercise  *Cresencio verbalizes his/her plan to ** ** days/week for ** min @ **   *Education* *Education* *Education* *Education* *Education*   RPE Scale  [x] Yes      [] No  Exercise Safety  [x] Yes      [] No  Equipment Orientation  [x] Yes      [] No  S/S to Report  [x] Yes      [] No  Warm Up/Cool Down  [x] Yes      [] No  Home Exercise  [x] Yes      [] No    All Exercise Education Completed  [] Yes      [] No   Exercise Education Recommended    Workshops  [x] Improving Performance  [x] Balance Training & Fall Prevention  [x] Exercise Biomechanics  [x] Resistance Training & Core Strength Exercise Education Attended/Date    Yoga video - 9/16/2020 Exercise Education Attended/Date Exercise Education Attended/Date All Sessions Completed? [] Yes  [] No   *Goals* *Goals* *Goals* *Goals* *Goals*   Initial Exercise Goals Exercise Goals  Exercise Goals   Exercise Goals  Exercise Goals   Cresencio plans to:  [x] Attend exercise sessions 3x/wk  [x] initiate home exercise 2-3x/wk for 10-20 min  [x] Increase 6 min walk distance by 10%  [x] Attend Exercise workshops Cresencio plans to:  [x] Attend exercise sessions 3x/wk  [x] continue home exercise 2-3x/wk for 20-30 min  [x] Attend Exercise workshops Cresencio's plans to:  [x] Attend exercise sessions 3x/wk  [x] continue home exercise 3-4x/wk for 30-45 min  [x] Determine plan of exercise following rehab  [x] Attend Exercise workshops Cresencio's plans to:  **   Cresencio achieved exercise goals?     [] 37.3 Weight Management  Weight: 254.8                  Weight Management  Weight: **                  Weight Management  Weight: ** Weight Management  Weight: **                    BMI: **   Eating Plan  Current eating habits: none Eating Plan  Changes:no salt Eating Plan  Changes: Eating Plan  Changes: Eating Plan Improvements:   Alcohol Use  [] none          [] daily  [x] weekly      [x] special   Type: beer/wine  Amount: 6       Diet Assessment Tool:  RATE YOUR PLATE  *Given to patient to complete and return. Diet Assessment Tool:    Score: 45/69       Diet Assessment Tool: RATE YOUR PLATE  Score: **/48   NUTRITION PLAN NUTRITION PLAN NUTRITION PLAN NUTRITION PLAN NUTRITION PLAN   *Interventions* *Interventions* *Interventions* *Interventions* *Interventions*   Initial Survey given Goal Setting Discussion:   [x] Yes      [] No       Follow Up Survey Reviewed & Goals Updated:     Professional Referral  Please check if needed. [] Dietitian Consult   [] Wt. Management Referral  [] Other:  Professional Referral  Please check if needed. [] Dietitian Consult   [] Wt. Management Referral  [] Other: Professional Referral  Please check if needed. [] Dietitian Consult   [] Wt. Management Referral  [] Other: Professional Referral  Please check if needed. [] Dietitian Consult   [] Wt. Management Referral  [] Other: Professional Referral  Please check if needed. [] Dietitian Consult   [] Wt. Management Referral  [] Other:   *Education* *Education* *Education* *Education* *Education*   Nutritional Education Recommended    [x] 1:1 Registered Dietitian    Workshops  [x] Label Reading   [x] Menu  [x] Targeting Nutrition Priorities  [x] Fueling a Healthy Body   Nutritional Education Attended/Date Nutritional Education Attended/Date Nutritional Education Attended/Date All Sessions Completed?     [] Yes  [] No   Cooking School  Recommended     [x] Adding Flavor  [x] Fast & Healthy     Breakfasts  [x] Salads & Dressings  [x] Soups & Simple     Sauces  [x] Simple Sides  [x] Appetizers &     Snacks  [x] Delicious Desserts  [x] Plant Proteins  [x] Fast Evening Meals  [x] Weekend Breakfasts  [x] Cook once, Eat       twice  [x] [employer]  Sessions Completed    Fast evening meals - 9/18/2020    Weekend breakfast - 9/25/2020   Cooking School  Sessions Completed [facility]  Sessions Completed     Cooking School    # of sessions completed:  **   *Goals* *Goals* *Goals* *Goals* *Goals*   Cresencio's nutritional goals are as follows:  Complete and return diet survey Cresencio's nutritional goals are as follows:  [x] Attend Nutrition Workshops  [x] Attend 1:1   [x] Attend Cooking Classes   Cresencio's nutritional goals are as follows:  [] Attend Nutrition Workshops  [] Attend 1:1   [] Attend Cooking Classes  [] Complete and return diet survey  [] ** Cresencio's nutritional goals are as follows:  [] Attend Nutrition Workshops  [] Attend 1:1   [] Attend Cooking Classes  [] ** Cresencio achieved nutritional goals   [] Yes    [] No  If no, why?   Use knowledge gained to continue Pritikin eating plan at home       Individual Cardiac Treatment Plan - Psychosocial  PSYCHOSOCIAL  ASSESSMENT/PLAN PSYCHOSOCIAL  REASSESSMENT PSYCHOSOCIAL   REASSESSMENT PSYCHOSOCIAL   REASSESSMENT PSYCHOSOCIAL  DISCHARGE/FOLLOW-UP   Stages of Change Stages of Change Stages of Change Stages of Change Stages of Change   [] Pre Contemplation  [] Contemplation  [] Preparation  [] Action  [] Maintenance  [] Relapse [] Pre Contemplation  [] Contemplation  [x] Preparation  [] Action  [] Maintenance  [] Relapse [] Pre Contemplation  [] Contemplation  [] Preparation  [] Action  [] Maintenance  [] Relapse [] Pre Contemplation  [] Contemplation  [] Preparation  [] Action  [] Maintenance  [] Relapse [] Pre Contemplation  [] Contemplation  [] Preparation  [] Action  [] Maintenance  [] Relapse   PSYCHOSOCIAL ASSESSMENT PSYCHOSOCIAL ASSESSMENT PSYCHOSOCIAL ASSESSMENT PSYCHOSOCIAL ASSESSMENT PSYCHOSOCIAL ASSESSMENT   Behavioral Outcomes Behavioral Outcomes Behavioral Outcomes Behavioral Outcomes Behavioral Outcomes   Tool Used:  Rolan Fraser, Quality of Life Index, Cardiac Version IV  *Given to patient to complete. Tool Used:    Rolan & Evelio, Quality of Life Index, Cardiac Version IV     QOL Index Score: 21.52  HF:16.86  S&E:25.25  P&S: 25  Family: 27   Tool Used:     Rolan & Evelio, Quality of Life Index, Cardiac Version IV    QOL Index Score: **  HF:**  S&E:**  P&S: **  Family: **   PHQ-9 score 7  Depression Severity  []Minimal  [x]Mild   []Moderate  []Moderately Severe  []Severe    PHQ-9 score **  Depression Severity  []Minimal  []Mild   []Moderate  []Moderately Severe []Severe   Does patient have Family Support? [x] Yes      [] No  No signs of marital/family distress       Within the Past Month:  *Have you wished you were dead or wished you could go to sleep and not wake up? [] Yes      [x] No  *Have you had any thoughts of killing yourself? [] Yes      [x] No         Using a scale of 0-10, 0=none, 10=very:   Rate your depression: 0  Rate your anxiety:  0  Using a scale of 0-10, 0=none, 10=very:   Rate your depression: 1  Rate your anxiety:  1 Using a scale of 0-10, 0=none, 10=very:   Rate your depression: **  Rate your anxiety:  ** Using a scale of 0-10, 0=none, 10=very:   Rate your depression: **  Rate your anxiety:  ** Using a scale of 0-10, 0=none, 10=very:   Rate your depression: **  Rate your anxiety:  **   Signs and Symptoms of Depression Present? [] Yes      [x] No   Signs and Symptoms of Depression Present? [] Yes      [x] No   Signs and Symptoms of Depression Present? [] Yes      [] No  If yes, please explain:  ** Signs and Symptoms of Depression Present? [] Yes      [] No  If yes, please explain:  ** Signs and Symptoms of Depression Present? [] Yes      [] No  If yes, please explain:  **   Signs and Symptoms of Anxiety Present?     [] Yes      [x] No   Signs and Symptoms of Anxiety Present? [] Yes      [x] No   Signs and Symptoms of Anxiety Present? [] Yes      [] No  If yes, please explain:  ** Signs and Symptoms of Anxiety Present? [] Yes      [] No  If yes, please explain:  ** Signs and Symptoms of Anxiety Present? [] Yes      [] No  If yes, please explain:  **   [] Patient has poor eye contact   [] Flat affect present. [] Signs of anxiety, anger or hostility    [] Signs social isolation present. []  Signs of alcohol or substance abuse       PSYCHOSOCIAL PLAN PSYCHOSOCIAL PLAN PSYCHOSOCIAL PLAN PSYCHOSOCIAL PLAN PSYCHOSOCIAL PLAN   *Interventions* *Interventions* *Interventions* *Interventions* *Interventions*   *Please check if needed  [] Psych Consult  [] Physician Referral  [x] Stress Management Skills *Please check if needed  [] Psych Consult  [] Physician Referral  [x] Stress Management Skills *Please check if needed  [] Psych Consult  [] Physician Referral  [] Stress Management Skills *Please check if needed  [] Psych Consult  [] Physician Referral  [] Stress Management Skills *Please check if needed  [] Psych Consult  [] Physician Referral  [] Stress Management Skills   Is patient currently taking anti-depressant or anti-anxiety medications? [x] Yes      [] No  If yes, please list medications: cymbalta Change in anti-depressant or anti-anxiety medications? [] Yes      [x] No   Change in anti-depressant or anti-anxiety medications? [] Yes      [] No  If yes, please list medications:  ** Change in anti-depressant or anti-anxiety medications? [] Yes      [] No  If yes, please list medications:  ** Change in anti-depressant or anti-anxiety medications?   [] Yes      [] No  If yes, please list medications:  **   *Education* *Education* *Education* *Education* *Education*   Healthy Mind-Set Workshops Recommended  [x] Stress & Health  [x] Taking Charge of Stress  [x] New Thoughts, New Behaviors  [x] Managing Moods & Relationships Healthy Mind-Set Workshops Attended/Date    New thoughts - 9/16/2020 Healthy Mind-Set Workshops Attended/Date Healthy Mind-Set Workshops Attended/Date Healthy Mind-Set Workshops  Completed  [] Yes      [] No   *Goals* *Goals* *Goals* *Goals* *Goals*   Cresencio's psychosocial goals are as follows:  Complete HADS & Rolan Fraser Quality of Life Index, Cardiac Version IV Cresencio's psychosocial goals are as follows:  [x] Attend Healthy Mind-Set Workshops  [x] Reduce depression symptom severity by 1 level   Cresencio's psychosocial goals are as follows:  [] Attend Healthy Mind-Set Workshops  [] Reduce depression symptom severity by 1 level  [] ** Cresencio's psychosocial goals are as follows:  [] Attend Healthy Mind-Set Workshops  [] Reduce depression symptom severity by 1 level  [] ** Cresencio achieved psychosocial goals?   [] Yes    [] No  If no, why?  **  [] Use methods learned to continue to reduce depression symptom severity by 1 level  [] **     Individual Cardiac Treatment Plan - Other:  Risk Factor/Education  RISK FACTOR  ASSESSMENT/PLAN RISK FACTOR  REASSESSMENT  RISK FACTOR  REASSESSMENT RISK FACTOR  REASSESSMENT RISK FACTOR   DISCHARGE/FOLLOW-UP   Stages of Change Stages of Change Stages of Change Stages of Change Stages of Change   [] Pre Contemplation  [] Contemplation  [x] Preparation  [] Action  [] Maintenance  [] Relapse [] Pre Contemplation  [] Contemplation  [x] Preparation  [] Action  [] Maintenance  [] Relapse [] Pre Contemplation  [] Contemplation  [] Preparation  [] Action  [] Maintenance  [] Relapse [] Pre Contemplation  [] Contemplation  [] Preparation  [] Action  [] Maintenance  [] Relapse [] Pre Contemplation  [] Contemplation  [] Preparation  [] Action  [] Maintenance  [] Relapse   RISK FACTOR/EDUCATION ASSESSMENT RISK FACTOR/EDUCATION ASSESSMENT RISK FACTOR/EDUCATION ASSESSMENT RISK FACTOR/EDUCATION ASSESSMENT RISK FACTOR /EDUCATION ASSESSMENT   Hypertension  [x] Yes      [] No    Resting BP: 108/60  Peak Ex PLAN RISK FACTOR/EDUCATION PLAN RISK FACTOR/EDUCATION PLAN   *Interventions* *Interventions* *Interventions* *Interventions* *Interventions*   Recommended Educational Videos    [x] Overview of The Pritikin Eating Plan  [x] Becoming A Pritikin   [x] Diseases of Our Time-Part 1  [x] Calorie Density  [x] Label Reading-Part 1  [x] Move It! [x] Healthy Minds, Bodies, Hearts  [x] Dining Out-Part 1  [x] Heart Disease Risk Reduction  [x] Metabolic Syndrome & Belly Fat  [x] Facts on Fat  [x] Diseases of Our Times-Part 2  [x] Biology of Weight Control  [x] Biomechanical Limitations  [x] Nurtition Action Plan   Completed Videos/Date      9/1/2020  Overview of The Pritikin Eating Plan    Disease part 1 - 9/21/2020    Calorie density - 9/28/2020    Heart disease risk reduction - 9/14/2020 Completed Videos/Date Completed Videos/Date Recommended Educational Videos Completed    [] Yes      [] No    **If not completed, Why? **          Smoking Cessation/Relaspe Prevention Intervention needed? [] Yes      [x] No  *Pt verbalizes and agrees to attend intervention Smoking Cessation/Relapse Prevention Scheduled? NOT NEEDED Smoking Cessation/Relapse Prevention completed? [] Yes      [] No  Date: **    Smoking Cessation Counseling attended  [] Yes      [] No  **If not completed, Why? **   Professional Referrals:  *Please check if needed  [] Diabetes Clinic  [] Lipid Clinic   [] Other:     Professional Referrals:  *Please check if needed  [] Diabetes Clinic  [] Lipid Clinic   [] Other:   Preventative Medication Preventative Medication Preventative Medication Preventative Medication Preventative Medication   Aspirin  [x] Yes    [] No  Blood Thinner: Clopidogrel/Effient/Brillinta  [] Yes    [x] No  Beta Blocker  [x] Yes    [] No  Ace Inhibitor  [x] Yes    [] No  Statin/Lipid Lowering  [x] Yes    [] No Medication Changes? [] Yes    [x] No Medication Changes? [] Yes    [] No Medication Changes?   [] Yes    [] No Medication Optimal BP <140/90  [] Blood Sugar <120  [x] Attend recommended video education sessions  [x] Takes medications as prescribed 100% of the time   [] ** Cresencio achieved risk factor goals?   [] Yes    [] No  If no, why?  **     Monitored telemetry has revealed NSR   Monitored telemetry has revealed NSR  [] documented arrhythmia at increasing workloads  [] associated symptoms ** Monitored telemetry has revealed  [] documented arrhythmia at increasing workloads  [] associated symptoms ** Monitored telemetry has revealed **  [] documented arrhythmia at increasing workloads  [] associated symptoms ** Monitored telemetry has revealed **  [] documented arrhythmia at increasing workloads  [] associated symptoms **   Physician Response    [x] Cardiac rehab is reasonably and medically necessary for continuous cardiac monitoring surveillance  of patient's cardiac activity  [x] Initiate continuous telemerty monitoring and notify me with any concerns  [] Other   Physician Response    [x] Cardiac rehab is reasonably and medically necessary for continuous cardiac monitoring surveillance  of patient's cardiac activity  [x] Continue continuous telemerty monitoring and notify me with any concerns  [] Other     Physician Response    [x] Cardiac rehab is reasonably and medically necessary for continuous cardiac monitoring surveillance  of patient's cardiac activity  [x] Continue continuous telemerty monitoring and notify me with any concerns   [] Other     Physician Response    [x] Cardiac rehab is reasonably and medically necessary for continuous cardiac monitoring surveillance  of patient's cardiac activity  [x] Continue continuous telemerty monitoring and notify me with any concerns   [] Other          Cosigned by:  German Mandujano MD at 9/2/2020 12:59 PM    Revision History     Date/Time  User  Provider Type  Action    9/2/2020 12:59 PM  German Mandujano MD  Physician  Cosign    9/1/2020 12:01 PM  Maggi Roldan Physiologist  [facility]

## 2020-09-30 ENCOUNTER — HOSPITAL ENCOUNTER (OUTPATIENT)
Dept: CARDIAC REHAB | Age: 60
Setting detail: THERAPIES SERIES
Discharge: HOME | End: 2020-09-30
Payer: MEDICARE

## 2020-09-30 PROCEDURE — G0422 INTENS CARDIAC REHAB W/EXERC: HCPCS

## 2020-09-30 PROCEDURE — G0423 INTENS CARDIAC REHAB NO EXER: HCPCS

## 2020-09-30 NOTE — PROGRESS NOTES
Lela CRANDALL.:  1960    Acct Number: [[id number]]   MRN:  [id number]                             [facility] NUTRITION WORKSHOP             Date: 2020        Session # _______    Renee Stairs class covered:    ()  Label Reading  ()  Menus  ()  Targeting Nutrition Priorities  (X)  Fueling a Healthy Body    Readiness to change:    ( ) Pre-contemplative   ( ) Contemplative - ambivalent about change    ( ) Action - ready to set action plan and implement   (X ) Maintenance - has made change and is trying, and or practicing different alternative behaviors     Cresencio was in the Workshop with the Dietitian for 45 minutes. The content was presented via Powerpoint, lecture, and patient participation based format. Motivational interviewing was utilized when needed, to promote change. Patient voiced understanding.     Electronically signed by Percious Berry RD LD [address]  on 2020 at 10:45 AM

## 2020-10-02 ENCOUNTER — HOSPITAL ENCOUNTER (OUTPATIENT)
Dept: CARDIAC REHAB | Age: 60
Setting detail: THERAPIES SERIES
Discharge: HOME | End: 2020-10-02
Payer: MEDICARE

## 2020-10-05 ENCOUNTER — HOSPITAL ENCOUNTER (OUTPATIENT)
Dept: CARDIAC REHAB | Age: 60
Setting detail: THERAPIES SERIES
Discharge: HOME | End: 2020-10-05
Payer: MEDICARE

## 2020-10-05 ENCOUNTER — APPOINTMENT (OUTPATIENT)
Dept: CARDIAC REHAB | Age: 60
End: 2020-10-05
Payer: MEDICARE

## 2020-10-05 NOTE — PROGRESS NOTES
[facility] Program  Phase 2 Cardiac Rehab Weekly Progress Report      Patient prescribed exercise:  9:00 class. 3 times per week in rehab, 1-4 times per week at home for the amount of sessions/weeks specified by insurance. Current Levels:  NuStep:  52 Brizuela for 15 minutes x 2, UBE: 50watts for 5 minutes. Progression Discussion: Maintain/Increase Aerobic exercise 35 minutes to work on endurance. Attempt to increase intensity by 5-20% for each modality this week. Try to increase intensities until Cresencio rates the exercises a 13-17 on Chong RPE.

## 2020-10-07 ENCOUNTER — HOSPITAL ENCOUNTER (OUTPATIENT)
Dept: CARDIAC REHAB | Age: 60
Setting detail: THERAPIES SERIES
Discharge: HOME | End: 2020-10-07
Payer: MEDICARE

## 2020-10-07 PROCEDURE — G0422 INTENS CARDIAC REHAB W/EXERC: HCPCS

## 2020-10-07 PROCEDURE — G0423 INTENS CARDIAC REHAB NO EXER: HCPCS

## 2020-10-07 NOTE — PROGRESS NOTES
Emily CRANDALL.:  1960    Acct Number: [de-identified]   MRN:  [id number]                             [facility] HEALTHY MIND-SET WORKSHOP             Date: 10/7/2020        Session #________    Todays class covered:    ( )  Stress and Health Workshop:  [facility] patient will learn about the body's adaptive response that is triggered by a variety of stressors. Patient will gain insight into the toll that chronic stress takes on their health, both emotionally and physically. ( ) Taking Charge of Stress Workshop:  [facility] patient will learn and practice a variety of stress management techniques. Patient will be able to effectively apply coping mechanisms in perceived stressful situations. ( ) New Thoughts New Behaviors Workshop: [facility] patient will learn and practice techniques for developing effective health and lifestyle goals. Patient will be able to effectively apply the goal setting process learned to develop at least one new personal goal.     (X ) Managing Moods & Relationships Workshop:  [facility] patient will learn how emotional and chronic stress factors can impact their hearts. They will learn healthy ways to handle stress and utilize positive coping mechanisms. In addition, [facility] patient will learn ways to improve communication skills. Cresencio actively participated and verbalized understanding. Total time in the Healthy Mind-Set class was 60 minutes.     Electronically signed by JENARO Pace on 10/7/2020 at 4:40 PM

## 2020-10-09 ENCOUNTER — HOSPITAL ENCOUNTER (OUTPATIENT)
Dept: CARDIAC REHAB | Age: 60
Setting detail: THERAPIES SERIES
Discharge: HOME | End: 2020-10-09
Payer: MEDICARE

## 2020-10-09 PROCEDURE — G0422 INTENS CARDIAC REHAB W/EXERC: HCPCS

## 2020-10-12 ENCOUNTER — HOSPITAL ENCOUNTER (OUTPATIENT)
Dept: CARDIAC REHAB | Age: 60
Setting detail: THERAPIES SERIES
End: 2020-10-12
Payer: MEDICARE

## 2020-10-12 ENCOUNTER — HOSPITAL ENCOUNTER (OUTPATIENT)
Dept: CARDIAC REHAB | Age: 60
Setting detail: THERAPIES SERIES
Discharge: HOME | End: 2020-10-12
Payer: MEDICARE

## 2020-10-12 PROCEDURE — G0422 INTENS CARDIAC REHAB W/EXERC: HCPCS

## 2020-10-14 ENCOUNTER — HOSPITAL ENCOUNTER (OUTPATIENT)
Dept: CARDIAC REHAB | Age: 60
Setting detail: THERAPIES SERIES
Discharge: HOME | End: 2020-10-14
Payer: MEDICARE

## 2020-10-14 ENCOUNTER — HOSPITAL ENCOUNTER (OUTPATIENT)
Dept: CARDIAC REHAB | Age: 60
Setting detail: THERAPIES SERIES
End: 2020-10-14
Payer: MEDICARE

## 2020-10-14 PROCEDURE — G0422 INTENS CARDIAC REHAB W/EXERC: HCPCS

## 2020-10-16 ENCOUNTER — HOSPITAL ENCOUNTER (OUTPATIENT)
Dept: CARDIAC REHAB | Age: 60
Setting detail: THERAPIES SERIES
Discharge: HOME | End: 2020-10-16
Payer: MEDICARE

## 2020-10-16 PROCEDURE — G0422 INTENS CARDIAC REHAB W/EXERC: HCPCS

## 2020-10-19 ENCOUNTER — HOSPITAL ENCOUNTER (OUTPATIENT)
Dept: CARDIAC REHAB | Age: 60
Setting detail: THERAPIES SERIES
Discharge: HOME | End: 2020-10-19
Payer: MEDICARE

## 2020-10-19 ENCOUNTER — HOSPITAL ENCOUNTER (OUTPATIENT)
Dept: CARDIAC REHAB | Age: 60
Setting detail: THERAPIES SERIES
End: 2020-10-19
Payer: MEDICARE

## 2020-10-19 PROCEDURE — G0422 INTENS CARDIAC REHAB W/EXERC: HCPCS

## 2020-10-19 NOTE — PROGRESS NOTES
Hospital Facility-Based Program  Phase 2 Cardiac Rehab Weekly Progress Report      Patient prescribed exercise:  9:00 class. 3 times per week in rehab, 1-4 times per week at home for the amount of sessions/weeks specified by insurance. Current Levels:  NuStep:  47 Brizuela for 15 minutes x 2, UBE: Level 50watts for 5 minutes. Progression Discussion: Maintain/Increase Aerobic exercise 35 minutes to work on endurance. Attempt to increase intensity by 5-20% for each modality this week. Try to increase intensities until Cresencio rates the exercises a 13-17 on Chong RPE.

## 2020-10-21 ENCOUNTER — HOSPITAL ENCOUNTER (OUTPATIENT)
Dept: CARDIAC REHAB | Age: 60
Setting detail: THERAPIES SERIES
Discharge: HOME | End: 2020-10-21
Payer: MEDICARE

## 2020-10-21 ENCOUNTER — HOSPITAL ENCOUNTER (OUTPATIENT)
Dept: CARDIAC REHAB | Age: 60
Setting detail: THERAPIES SERIES
End: 2020-10-21
Payer: MEDICARE

## 2020-10-21 PROCEDURE — G0422 INTENS CARDIAC REHAB W/EXERC: HCPCS

## 2020-10-21 PROCEDURE — G0423 INTENS CARDIAC REHAB NO EXER: HCPCS

## 2020-10-23 ENCOUNTER — HOSPITAL ENCOUNTER (OUTPATIENT)
Dept: CARDIAC REHAB | Age: 60
Setting detail: THERAPIES SERIES
Discharge: HOME | End: 2020-10-23
Payer: MEDICARE

## 2020-10-23 ENCOUNTER — HOSPITAL ENCOUNTER (OUTPATIENT)
Dept: CARDIAC REHAB | Age: 60
Setting detail: THERAPIES SERIES
End: 2020-10-23
Payer: MEDICARE

## 2020-10-23 PROCEDURE — G0422 INTENS CARDIAC REHAB W/EXERC: HCPCS

## 2020-10-26 ENCOUNTER — APPOINTMENT (OUTPATIENT)
Dept: CARDIAC REHAB | Age: 60
End: 2020-10-26
Payer: MEDICARE

## 2020-10-26 ENCOUNTER — HOSPITAL ENCOUNTER (OUTPATIENT)
Dept: CARDIAC REHAB | Age: 60
Setting detail: THERAPIES SERIES
Discharge: HOME | End: 2020-10-26
Payer: MEDICARE

## 2020-10-26 PROCEDURE — G0422 INTENS CARDIAC REHAB W/EXERC: HCPCS

## 2020-10-26 NOTE — PLAN OF CARE
[address] [place] [address] Facility-Based Program  Individualized Cardiac Treatment Plan    Patient Name:  Angelique Mendez  :  1960  Age:  56 y.o. MRN:  [id number]  Diagnosis: CHF  Date of Event: 8/3/2020   Physician:  Cody Wagner MD [facility])  Next Office Visit:  2020  Date Entered Program: 2020  Risk Stratifications: [] Low [] Intermediate [x] High  Allergies: No Known Allergies    COVID -19 Screen  Do you have any of the following symptoms:  [] Fever [] Cough [] SOB [] Muscle/Body Ache [] Loss of taste/smell [x] None    Have you traveled outside of the [place]? [] Yes      [x] No    Have you been around anyone who has tested Positive for COVID 19?  [] Yes      [x] No    The following Education has been completed with patient. [x] Wait in the lobby until we call you back to rehab. [x] You must wear a mask while in the medical center, and in cardiac rehab. Please bring your own. [x] Bring your own bottle of water with you. [x] You can not bring anyone with you into the medical center at this time. They are welcome to sit in their car and wait for you.     Individual Cardiac Treatment Plan -EXERCISE  INITIAL 30 DAY 60 DAY 90 DAY FINAL DAY   EXERCISE  ASSESSMENT/PLAN EXERCISE  REASSESSMENT EXERCISE   REASSESSMENT EXERCISE   REASSESSMENT EXERCISE  DISCHARGE/FOLLOW-UP   Date: 2020 Date: 2020 Date: 10/26/20 Date: Date:   Session #1 Session # 12 Session # 45 Session #  Session # **  Last session completed on **   Stages of Change Stages of Change Stages of Change Stages of Change Stages of Change   [] Pre Contemplation  [] Contemplation  [x] Preparation  [] Action  [] Maintenance  [] Relapse [] Pre Contemplation  [] Contemplation  [x] Preparation  [] Action  [] Maintenance  [] Relapse [] Pre Contemplation  [] Contemplation  [x] Preparation  [] Action  [] Maintenance  [] Relapse [] Pre Contemplation  [] Contemplation  [] Preparation  [] Action  [] Maintenance  [] Relapse [] Pre Contemplation  [] Contemplation  [] Preparation  [] Action  [] Maintenance  [] Relapse   EXERCISE ASSESSMENT EXERCISE ASSESSMENT EXERCISE ASSESSMENT EXERCISE ASSESSMENT EXERCISE ASSESSMENT   6 Min Walk Test  Distance walked: on NS  0.19 miles  1003 ft.  1.6 METs  Max HR:75 BPM      RPE:  13    THR:  101-120  Rhythm:  NSR    6 Min Walk Test  Distance walked:   ** miles  ** ft  ** METs  Max HR:** BPM      RPE:  **  %Change ft= **    Rhythm:  **   DASI: 4.7 METs DASI: 5.3 METs DASI: 5.29 METs DASI: ** METs DASI: ** METs   Return to Work  [employer] on returning to work? [] Yes              [] No   [x] Disabled-since 2013    [] Retired   Return to work:  Has Cresencio returned to work? [] Yes    [x] No - disabled     Return to work:  Has Fernanda Hence returned to work? [] Yes    [x] No      Return to work:  Has Fernanda Hence returned to work? [] Yes    [] No    Return to work date set? [] Yes, **    [] No    Cresencio is doing ** at work. Return to work:  Has Fernanda Hence returned to work? [] Yes    [] No    Return to work? [] Yes, **    [] No    *Required MET Level achieved for job duties? [] Yes    [] No   Orthopedic Limitations/  [x] Yes    [] No  If yes please list:  Right leg goes numb, multiple back sugeries     Orthopedic Limitations  *If patient has orthopedic issue:   Actions/  accomodations needed to make Cresencio successful : NUSTEP Orthopedic Limitations  NuStep Orthopedic Limitations   Orthopedic Limitations     Fall Risk  Fall risk assessed? [x] Yes      [] No    Balance Issues? [x] Yes      [] No     [] Walker [] Cane    [x] Safety issues reviewed      Fall Risk  *If patient is a fall risk, action needed to accommodate: [address] Exercise  [] Yes    [x] No   Home Exercise  [] Yes    [x] No Home Exercise  [] Yes    [x] No  Getting a NuStep like machine at home to use.   Home Exercise  [] Yes    [] No  Type: **  Frequency: **  Duration: ** Home at  5-7 days/week   Duration:   Total aerobic exercise = 30-45 min    5-8 min/mode Duration:  Total aerobic exercises =35 min     15min/mode Duration:  Total aerobic exercises = 35-45 min     15 min/mode Duration:  Total aerobic exercises = ** min     **min/mode Duration:  Total erobic exercise =  60-90 min   Intensity:   MET Level = 1.9  RPE = 12-15 Intensity:  Max MET Level = 2.3  RPE = 12-15 Intensity:  Max MET Level = 2.4  RPE = 12-15 Intensity:  Max MET Level = **  RPE = 12-15 Intensity:  Max MET Level = ** RPE = 12-15   Progression: increase aerobic activity up to 15 min over next 4 weeks by increasing time 2-3 min/week. Progression: increase aerobic intensity as tolerated by pt   Progression:  increase aerobic intensity as tolerated by pt Progression: Progression:  Increase time/intensity when RPE <13, and HR is in [facility]   [x] Yes      [] No  Upper and Lower body strength training 2x/wk    Wt: 2#       Reps:  8-15    *Increase wt. after completing 15 reps with an RPE of <12/13. [x] Yes      [] No  Upper and Lower body strength training 2x/wk    Wt: 3#       Reps:  8-15    *Increase wt. after completing 15 reps with an RPE of <12/13. [x] Yes      [] No  Upper and Lower body strength training 2x/wk    Wt: 4#       Reps:  8-15    *Increase wt. after completing 15 reps with an RPE of <12/13. [] Yes      [] No  Upper and Lower body strength training 2x/wk    Wt: **#       Reps:  8-15    *Increase wt. after completing 15 reps with an RPE of <12/13. Continue Strength Training at home   [] Exercise Log & Strength training handout given     Wt: **#       Reps:  8-15    *Increase wt. after completing 15 reps with an RPE of <12/13.    Flexibility Flexibility Flexibility Flexibility Flexibility   [x] Yes      [] No  25 min session of Core Strength & Flexibility 1x/per week  Attends Core Strength & Flexibility   [x] Yes      [] No why?  **  [] Increased 6 min walk distance by 10%  [] Currently exercising 30-60 min/day, 5-7days/wk   [] Plans to continue exercise on own  [] Plans to join a local fitness center to continue exercise  [] Does not plan to continue to exercise after rehab   Return to ADL or Hobbies:  Willy Lamb would like to improve strength and endurance so he is able to return to doing everything. Return to ADL or Hobbies:  Willy Lamb would like to improve strength and endurance so he is able to return to doing everything he wants to do  Return to ADL or Hobbies:  Willy Lamb would like to improve strength and endurance so he is able to return to doing everything he wants to do. Return to ADL or Hobbies:  Willy Lamb would like to improve strength and endurance so he/she is able to return to ** Return to ADL or Hobbies:  Willy Lamb would like to improve strength and endurance so he/she is able to return to **    *MET level required for above goal:  4.0 METs MET level Achieved:  2. 3METs MET level Achieved: 2.4 METs MET level Achieved:  **METs MET level Achieved:  **METs     Individual Cardiac Treatment Plan - Nutrition  NUTRITION  ASSESSMENT/PLAN NUTRITION  REASSESSMENT NUTRITION   REASSESSMENT NUTRITION   REASSESSMENT NUTRITION  DISCHARGE/FOLLOW-UP   Stages of Change Stages of Change Stages of Change Stages of Change Stages of Change   [] Pre Contemplation  [] Contemplation  [x] Preparation  [] Action  [] Maintenance  [] Relapse [] Pre Contemplation  [] Contemplation  [x] Preparation  [] Action  [] Maintenance  [] Relapse [] Pre Contemplation  [] Contemplation  [x] Preparation  [] Action  [] Maintenance  [] Relapse [] Pre Contemplation  [] Contemplation  [] Preparation  [] Action  [] Maintenance  [] Relapse [] Pre Contemplation  [] Contemplation  [] Preparation  [] Action  [] Maintenance  [] Relapse   NUTRITION ASSESSMENT NUTRITION ASSESSMENT NUTRITION ASSESSMENT NUTRITION ASSESSMENT NUTRITION ASSESSMENT   Weight Management  Weight: 251.8lbs   Height: 69in  BMI: 37.3 Weight Management  Weight: 254.8                  Weight Management  Weight: 248                  Weight Management  Weight: ** Weight Management  Weight: **                    BMI: **   Eating Plan  Current eating habits: none Eating Plan  Changes:no salt Eating Plan  Changes: none Eating Plan  Changes: Eating Plan Improvements:   Alcohol Use  [] none          [] daily  [x] weekly      [x] special   Type: beer/wine  Amount: 6       Diet Assessment Tool:  RATE YOUR PLATE  *Given to patient to complete and return. Diet Assessment Tool:    Score: 45/69       Diet Assessment Tool: RATE YOUR PLATE  Score: **/80   NUTRITION PLAN NUTRITION PLAN NUTRITION PLAN NUTRITION PLAN NUTRITION PLAN   *Interventions* *Interventions* *Interventions* *Interventions* *Interventions*   Initial Survey given Goal Setting Discussion:   [x] Yes      [] No       Follow Up Survey Reviewed & Goals Updated:     Professional Referral  Please check if needed. [] Dietitian Consult   [] Wt. Management Referral  [] Other:  Professional Referral  Please check if needed. [] Dietitian Consult   [] Wt. Management Referral  [] Other: Professional Referral  Please check if needed. [] Dietitian Consult   [] Wt. Management Referral  [] Other: Professional Referral  Please check if needed. [] Dietitian Consult   [] Wt. Management Referral  [] Other: Professional Referral  Please check if needed. [] Dietitian Consult   [] Wt. Management Referral  [] Other:   *Education* *Education* *Education* *Education* *Education*   Nutritional Education Recommended    [x] 1:1 Registered Dietitian    Workshops  [x] Label Reading   [x] Menu  [x] Targeting Nutrition Priorities  [x] Fueling a Healthy Body   Nutritional Education Attended/Date Nutritional Education Attended/Date    Declined education. Nutritional Education Attended/Date All Sessions Completed?     [] Yes  [] No   Cooking School  Recommended     [x] Adding Flavor  [x] Fast & Healthy Breakfasts  [x] Salads & Dressings  [x] Soups & Simple     Sauces  [x] Simple Sides  [x] Appetizers &     Snacks  [x] Delicious Desserts  [x] Plant Proteins  [x] Fast Evening Meals  [x] Weekend Breakfasts  [x] Cook once, Eat       twice  [x] Meat Alternatives Cooking School  Sessions Completed    Fast evening meals - 9/18/2020    Weekend breakfast - 9/25/2020   Cooking School  Sessions Completed    Declined education. Cooking School  Sessions Completed     Cooking School    # of sessions completed:  **   *Goals* *Goals* *Goals* *Goals* *Goals*   Cresencio's nutritional goals are as follows:  Complete and return diet survey Cresencio's nutritional goals are as follows:  [x] Attend Nutrition Workshops  [x] Attend 1:1   [x] Attend Cooking Classes   Cresencio's nutritional goals are as follows:    [x] Complete and return diet survey   Cresencio's nutritional goals are as follows:  [] Attend Nutrition Workshops  [] Attend 1:1   [] Attend Cooking Classes  [] ** Cresencio achieved nutritional goals   [] Yes    [] No  If no, why?   Use knowledge gained to continue Pritikin eating plan at home       Individual Cardiac Treatment Plan - Psychosocial  PSYCHOSOCIAL  ASSESSMENT/PLAN PSYCHOSOCIAL  REASSESSMENT PSYCHOSOCIAL   REASSESSMENT PSYCHOSOCIAL   REASSESSMENT PSYCHOSOCIAL  DISCHARGE/FOLLOW-UP   Stages of Change Stages of Change Stages of Change Stages of Change Stages of Change   [] Pre Contemplation  [] Contemplation  [] Preparation  [] Action  [] Maintenance  [] Relapse [] Pre Contemplation  [] Contemplation  [x] Preparation  [] Action  [] Maintenance  [] Relapse [] Pre Contemplation  [] Contemplation  [x] Preparation  [] Action  [] Maintenance  [] Relapse [] Pre Contemplation  [] Contemplation  [] Preparation  [] Action  [] Maintenance  [] Relapse [] Pre Contemplation  [] Contemplation  [] Preparation  [] Action  [] Maintenance  [] Relapse   PSYCHOSOCIAL ASSESSMENT PSYCHOSOCIAL ASSESSMENT PSYCHOSOCIAL ASSESSMENT PSYCHOSOCIAL ASSESSMENT [] Yes      [x] No   Signs and Symptoms of Anxiety Present? [] Yes      [x] No   Signs and Symptoms of Anxiety Present? [] Yes      [] No  If yes, please explain:  ** Signs and Symptoms of Anxiety Present? [] Yes      [] No  If yes, please explain:  **   [] Patient has poor eye contact   [] Flat affect present. [] Signs of anxiety, anger or hostility    [] Signs social isolation present. []  Signs of alcohol or substance abuse       PSYCHOSOCIAL PLAN PSYCHOSOCIAL PLAN PSYCHOSOCIAL PLAN PSYCHOSOCIAL PLAN PSYCHOSOCIAL PLAN   *Interventions* *Interventions* *Interventions* *Interventions* *Interventions*   *Please check if needed  [] Psych Consult  [] Physician Referral  [x] Stress Management Skills *Please check if needed  [] Psych Consult  [] Physician Referral  [x] Stress Management Skills *Please check if needed  [] Psych Consult  [] Physician Referral  [] Stress Management Skills *Please check if needed  [] Psych Consult  [] Physician Referral  [] Stress Management Skills *Please check if needed  [] Psych Consult  [] Physician Referral  [] Stress Management Skills   Is patient currently taking anti-depressant or anti-anxiety medications? [x] Yes      [] No  If yes, please list medications: cymbalta Change in anti-depressant or anti-anxiety medications? [] Yes      [x] No   Change in anti-depressant or anti-anxiety medications? [] Yes      [x] No   Change in anti-depressant or anti-anxiety medications? [] Yes      [] No  If yes, please list medications:  ** Change in anti-depressant or anti-anxiety medications?   [] Yes      [] No  If yes, please list medications:  **   *Education* *Education* *Education* *Education* *Education*   Healthy Mind-Set Workshops Recommended  [x] Stress & Health  [x] Taking Charge of Stress  [x] New Thoughts, New Behaviors  [x] Managing Moods & Relationships Healthy Mind-Set Workshops Attended/Date    New thoughts - 9/16/2020 Healthy Mind-Set Workshops Attended/Date    10/7  Manage Moods Healthy Mind-Set Workshops Attended/Date Healthy Mind-Set Workshops  Completed  [] Yes      [] No   *Goals* *Goals* *Goals* *Goals* *Goals*   Cresencio's psychosocial goals are as follows:  Complete HADS & Rolan & Evelio, Quality of Life Index, Cardiac Version IV Cresencio's psychosocial goals are as follows:  [x] Attend Healthy Mind-Set Workshops  [x] Reduce depression symptom severity by 1 level   Cresencio's psychosocial goals are as follows:    [x] Maintain mental health status Cresencio's psychosocial goals are as follows:  [] Attend Healthy Mind-Set Workshops  [] Reduce depression symptom severity by 1 level  [] ** Cresencio achieved psychosocial goals?   [] Yes    [] No  If no, why?  **  [] Use methods learned to continue to reduce depression symptom severity by 1 level  [] **     Individual Cardiac Treatment Plan - Other:  Risk Factor/Education  RISK FACTOR  ASSESSMENT/PLAN RISK FACTOR  REASSESSMENT  RISK FACTOR  REASSESSMENT RISK FACTOR  REASSESSMENT RISK FACTOR   DISCHARGE/FOLLOW-UP   Stages of Change Stages of Change Stages of Change Stages of Change Stages of Change   [] Pre Contemplation  [] Contemplation  [x] Preparation  [] Action  [] Maintenance  [] Relapse [] Pre Contemplation  [] Contemplation  [x] Preparation  [] Action  [] Maintenance  [] Relapse [] Pre Contemplation  [] Contemplation  [x] Preparation  [] Action  [] Maintenance  [] Relapse [] Pre Contemplation  [] Contemplation  [] Preparation  [] Action  [] Maintenance  [] Relapse [] Pre Contemplation  [] Contemplation  [] Preparation  [] Action  [] Maintenance  [] Relapse   RISK FACTOR/EDUCATION ASSESSMENT RISK FACTOR/EDUCATION ASSESSMENT RISK FACTOR/EDUCATION ASSESSMENT RISK FACTOR/EDUCATION ASSESSMENT RISK FACTOR /EDUCATION ASSESSMENT   Hypertension  [x] Yes      [] No    Resting BP: 108/60  Peak Ex BP:120/60  Medication: diovan   Hypertension  Resting BP: 102/60  Peak Ex BP:120/80  Medication Changes:  [] Yes      [x] No Hypertension  Resting BP: 88/52  Peak Ex BP: 144/68  Medication Changes:  [] Yes      [x] No Hypertension  Resting BP: **  Peak Ex BP:**  Medication Changes:  [] Yes      [] No Hypertension  Resting BP: **  Peak Ex BP:**  Medication Changes:  [] Yes      [] No   Lipids  HLD/DLD  [x] Yes      [] No  TOTAL CHOL: 222  HDL:  67  LDL:  127  TRI  Medication: lipitor Lipids  Medication Changes:  [] Yes      [x] No     Lipids  Medication Changes:  [] Yes      [x] No     Lipids  Medication Changes:  [] Yes      [] No     Lipids    TOTAL CHOL: **  HDL:  **  LDL:  **  TRIG:  **  Medication Changes:  [] Yes      [] No   Diabetes  [] Yes      [x] No  FBS: 94           HbA1C:        Monitor BS @ home:   [] Yes      [x] No Diabetes  Not diabetic    Diabetes    [x] No     Diabetes  Most Recent BS:  BS have been in range  [] Yes      [] No  Medication Changes  [] Yes      [] No     Diabetes  Most Recent BS:  BS have been in range  [] Yes      [] No  Medication Changes  [] Yes      [] No       Tobacco Use  [] Current  [] Former  [x] Never Tobacco Use  Change in smoking status   [] Yes      [x] No     Tobacco Use  Change in smoking status   [] Yes      [x] No       Tobacco Use  Change in smoking status   [] Yes      [] No    Quit date: ** Tobacco Use  Change in smoking status   [] Yes      [] No    Quit date: **            Learning Barriers  Please select one:  [] Speech  [] Literacy  [] Hearing  [] Cognitive  [] Vision  [x] Ready to Learn Learning Barriers Addressed:   [x] Yes      [] No   Learning Barriers Addressed:   [x] Yes      [] No   Learning Barriers Addressed:  [] Yes      [] No Learning Barriers Addressed:  [] Yes      [] No     RISK FACTOR/EDUCATION PLAN RISK FACTOR/EDUCATION PLAN RISK FACTOR/EDUCATION PLAN RISK FACTOR/EDUCATION PLAN RISK FACTOR/EDUCATION PLAN   *Interventions* *Interventions* *Interventions* *Interventions* *Interventions*   Recommended Educational Videos    [x] Overview of The Pritikin Eating Plan  [x] Becoming A Pritikin   [x] Diseases of Our Time-Part 1  [x] Calorie Density  [x] Label Reading-Part 1  [x] Move It! [x] Healthy Minds, Bodies, Hearts  [x] Dining Out-Part 1  [x] Heart Disease Risk Reduction  [x] Metabolic Syndrome & Belly Fat  [x] Facts on Fat  [x] Diseases of Our Times-Part 2  [x] Biology of Weight Control  [x] Biomechanical Limitations  [x] Nurtition Action Plan   Completed Videos/Date      9/1/2020  Overview of The Pritikin Eating Plan    Disease part 1 - 9/21/2020    Calorie density - 9/28/2020    Heart disease risk reduction - 9/14/2020 Completed Videos/Date      Declined all education. Completed Videos/Date Recommended Educational Videos Completed    [] Yes      [] No    **If not completed, Why? **          Smoking Cessation/Relaspe Prevention Intervention needed? [] Yes      [x] No  *Pt verbalizes and agrees to attend intervention Smoking Cessation/Relapse Prevention Scheduled? NOT NEEDED Smoking Cessation/Relapse Prevention completed? Not needed. Smoking Cessation Counseling attended  [] Yes      [] No  **If not completed, Why? **   Professional Referrals:  *Please check if needed  [] Diabetes Clinic  [] Lipid Clinic   [] Other:     Professional Referrals:  *Please check if needed  [] Diabetes Clinic  [] Lipid Clinic   [] Other:   Preventative Medication Preventative Medication Preventative Medication Preventative Medication Preventative Medication   Aspirin  [x] Yes    [] No  Blood Thinner: Clopidogrel/Effient/Brillinta  [] Yes    [x] No  Beta Blocker  [x] Yes    [] No  Ace Inhibitor  [x] Yes    [] No  Statin/Lipid Lowering  [x] Yes    [] No Medication Changes? [] Yes    [x] No Medication Changes? [] Yes    [x] No Medication Changes? [] Yes    [] No Medication Changes? [] Yes    [] No   *Education* *Education* *Education* *Education* *Education*   Does Cresencio require any additional education? [] Yes    [x] No   Does Cresencio require any additional education?   [] Yes [x] No Does Cresencio require any additional education? [] Yes    [x] No Does Cresencio require any additional education? [] Yes    [] No Does Cresencio require any additional education? [] Yes    [] No   Recommended Additional Educational Videos    Medical  [] Hypertension & Heart Disease  [] Decoding Lab Results  [] Aging Enhancing Your QoL  [] Sleep Disorders  Exercise  [] Body Composition  [] Improve Performance  [] Exercise Action Plan  [] Intro to Yoga  Behavioral  [] How Our Thoughts Can Heal Our Hearts  [] Smoking Cessation  Nutrition  [] Planning Your Eating Strategy  [] Lable Reading- Part 2  [] Dining Out - Part 2  [] Targeting Your Nutrition Priorities  [] Fueling a Healthy Body  [] Menu Workshop  [employer] [] Breakfast & Snacks  [] [employer] Salads & Dressing  [] [address] [place] [address]  [] Soups & Desserts   Additional Educational Videos Completed Additional Educational Videos Completed Additional Educational Videos Completed Additional Educational Videos Completed    [] Yes    [] No   *Goals* *Goals* *Goals* *Goals* *Goals*   Cresencio's risk factor/education goals are as follows:    [x] Optimal BP <140/90  [] Blood Sugar <120  [x] Attend recommended video education sessions  [x] Takes medications as prescribed 100% of the time   [] ** Cresencio's risk factor/education goals are as follows:    [x] Optimal BP <140/90  [] Blood Sugar <120  [x] Attend recommended video education sessions  [x] Takes medications as prescribed 100% of the time    Cresencio's risk factor/education goals are as follows:    [x] Optimal BP <140/90  [] Blood Sugar <120  [x] Attend recommended video education sessions  [x] Takes medications as prescribed 100% of the time   [] ** Cresencio's risk factor/education goals are as follows:    [x] Optimal BP <140/90  [] Blood Sugar <120  [x] Attend recommended video education sessions  [x] Takes medications as prescribed 100% of the time   [] ** Cresencio achieved risk factor goals?   [] Yes    [] No  If no,

## 2020-10-26 NOTE — PROGRESS NOTES
[facility]ospital Facility-Based Program  Phase 2 Cardiac Rehab Weekly Progress Report      Patient prescribed exercise:  9:00 class. 3 times per week in rehab, 1-4 times per week at home for the amount of sessions/weeks specified by insurance. Current Levels:  NuStep:  54 Brizuela for 15 minutes x 2, UBE: 50watts for 5 minutes. Progression Discussion: Maintain/Increase Aerobic exercise 35 minutes to work on endurance. Attempt to increase intensity by 5-20% for each modality this week. Try to increase intensities until Cresencio rates the exercises a 13-17 on Chong RPE.

## 2020-10-28 ENCOUNTER — APPOINTMENT (OUTPATIENT)
Dept: CARDIAC REHAB | Age: 60
End: 2020-10-28
Payer: MEDICARE

## 2020-10-28 ENCOUNTER — HOSPITAL ENCOUNTER (OUTPATIENT)
Dept: CARDIAC REHAB | Age: 60
Setting detail: THERAPIES SERIES
Discharge: HOME | End: 2020-10-28
Payer: MEDICARE

## 2020-10-28 ENCOUNTER — PROCEDURE VISIT (OUTPATIENT)
Dept: CARDIOLOGY CLINIC | Age: 60
End: 2020-10-28
Payer: MEDICARE

## 2020-10-28 PROCEDURE — 93296 REM INTERROG EVL PM/IDS: CPT | Performed by: NUCLEAR MEDICINE

## 2020-10-28 PROCEDURE — 93295 DEV INTERROG REMOTE 1/2/MLT: CPT | Performed by: NUCLEAR MEDICINE

## 2020-10-28 PROCEDURE — G0422 INTENS CARDIAC REHAB W/EXERC: HCPCS

## 2020-10-29 ENCOUNTER — NURSE ONLY (OUTPATIENT)
Dept: LAB | Age: 60
End: 2020-10-29

## 2020-10-29 LAB
ANION GAP SERPL CALCULATED.3IONS-SCNC: 12 MEQ/L (ref 8–16)
BUN BLDV-MCNC: 22 MG/DL (ref 7–22)
CALCIUM SERPL-MCNC: 10.6 MG/DL (ref 8.5–10.5)
CHLORIDE BLD-SCNC: 91 MEQ/L (ref 98–111)
CO2: 33 MEQ/L (ref 23–33)
CREAT SERPL-MCNC: 1.5 MG/DL (ref 0.4–1.2)
GFR SERPL CREATININE-BSD FRML MDRD: 47 ML/MIN/1.73M2
GLUCOSE BLD-MCNC: 110 MG/DL (ref 70–108)
POTASSIUM SERPL-SCNC: 3.8 MEQ/L (ref 3.5–5.2)
SODIUM BLD-SCNC: 136 MEQ/L (ref 135–145)

## 2020-10-30 ENCOUNTER — HOSPITAL ENCOUNTER (OUTPATIENT)
Dept: CARDIAC REHAB | Age: 60
Setting detail: THERAPIES SERIES
Discharge: HOME | End: 2020-10-30
Payer: MEDICARE

## 2020-10-30 ENCOUNTER — APPOINTMENT (OUTPATIENT)
Dept: CARDIAC REHAB | Age: 60
End: 2020-10-30
Payer: MEDICARE

## 2020-10-30 PROCEDURE — G0422 INTENS CARDIAC REHAB W/EXERC: HCPCS

## 2020-11-02 ENCOUNTER — APPOINTMENT (OUTPATIENT)
Dept: CARDIAC REHAB | Age: 60
End: 2020-11-02
Payer: MEDICARE

## 2020-11-02 ENCOUNTER — HOSPITAL ENCOUNTER (OUTPATIENT)
Dept: CARDIAC REHAB | Age: 60
Setting detail: THERAPIES SERIES
End: 2020-11-02
Payer: MEDICARE

## 2020-11-02 NOTE — PROGRESS NOTES
[facility]ospital Facility-Based Program  Phase 2 Cardiac Rehab Weekly Progress Report      Patient prescribed exercise:  9:00 class. 3 times per week in rehab, 1-4 times per week at home for the amount of sessions/weeks specified by insurance. Current Levels:  NuStep:  60 Brizuela for 15 minutes, UBE: 50W for 5 minutes. Progression Discussion: Maintain/Increase Aerobic exercise 15 minutes to work on endurance. Attempt to increase intensity by 5-20% for each modality this week. Try to increase intensities until Cresencio rates the exercises a 13-17 on Chong RPE.

## 2020-11-04 ENCOUNTER — APPOINTMENT (OUTPATIENT)
Dept: CARDIAC REHAB | Age: 60
End: 2020-11-04
Payer: MEDICARE

## 2020-11-04 ENCOUNTER — HOSPITAL ENCOUNTER (OUTPATIENT)
Dept: CARDIAC REHAB | Age: 60
Setting detail: THERAPIES SERIES
Discharge: HOME | End: 2020-11-04
Payer: MEDICARE

## 2020-11-04 PROCEDURE — G0422 INTENS CARDIAC REHAB W/EXERC: HCPCS

## 2020-11-05 ENCOUNTER — TELEPHONE (OUTPATIENT)
Dept: PHYSICAL MEDICINE AND REHAB | Age: 60
End: 2020-11-05

## 2020-11-05 NOTE — TELEPHONE ENCOUNTER
Patient called apologizing for missing appointment on Wednesday. He wanted to inform that he is having issues with his BP again. The cardiologist is trying to rule out medications that may be the cause of his increased dizziness and low BP. State BP systolic is in the 53/66C at times. They are weaning him off of the cymbalta as they think this may be the issue. He started this on Sunday and states \"starting to feel better\". Wanting to know your opinion on all of this. Please advise.

## 2020-11-06 ENCOUNTER — APPOINTMENT (OUTPATIENT)
Dept: CARDIAC REHAB | Age: 60
End: 2020-11-06
Payer: MEDICARE

## 2020-11-06 ENCOUNTER — HOSPITAL ENCOUNTER (OUTPATIENT)
Dept: CARDIAC REHAB | Age: 60
Setting detail: THERAPIES SERIES
Discharge: HOME | End: 2020-11-06
Payer: MEDICARE

## 2020-11-06 PROCEDURE — G0422 INTENS CARDIAC REHAB W/EXERC: HCPCS

## 2020-11-06 PROCEDURE — G0423 INTENS CARDIAC REHAB NO EXER: HCPCS

## 2020-11-06 NOTE — TELEPHONE ENCOUNTER
Patient is going to wait until December to discuss after other appointments. Will call back if needed.

## 2020-11-09 ENCOUNTER — HOSPITAL ENCOUNTER (OUTPATIENT)
Dept: CARDIAC REHAB | Age: 60
Setting detail: THERAPIES SERIES
Discharge: HOME | End: 2020-11-09
Payer: MEDICARE

## 2020-11-09 ENCOUNTER — APPOINTMENT (OUTPATIENT)
Dept: CARDIAC REHAB | Age: 60
End: 2020-11-09
Payer: MEDICARE

## 2020-11-09 PROCEDURE — G0422 INTENS CARDIAC REHAB W/EXERC: HCPCS

## 2020-11-09 NOTE — PROGRESS NOTES
Hospital Facility-Based Program  Phase 2 Cardiac Rehab Weekly Progress Report      Patient prescribed exercise:  9:00 class. 3 times per week in rehab, 1-4 times per week at home for the amount of sessions/weeks specified by insurance. Current Levels: NuStep:  60 [place] for 15 minutes x 2, UBE: 50 pollack for 5 minutes. Progression Discussion: Maintain/Increase Aerobic exercise 15 minutes to work on endurance. Attempt to increase intensity by 5-20% for each modality this week. Try to increase intensities until Cresencio rates the exercises a 13-17 on Chong RPE.

## 2020-11-11 ENCOUNTER — HOSPITAL ENCOUNTER (OUTPATIENT)
Dept: CARDIAC REHAB | Age: 60
Setting detail: THERAPIES SERIES
Discharge: HOME | End: 2020-11-11
Payer: MEDICARE

## 2020-11-11 ENCOUNTER — APPOINTMENT (OUTPATIENT)
Dept: CARDIAC REHAB | Age: 60
End: 2020-11-11
Payer: MEDICARE

## 2020-11-11 PROCEDURE — G0422 INTENS CARDIAC REHAB W/EXERC: HCPCS

## 2020-11-13 ENCOUNTER — HOSPITAL ENCOUNTER (OUTPATIENT)
Dept: CARDIAC REHAB | Age: 60
Setting detail: THERAPIES SERIES
Discharge: HOME | End: 2020-11-13
Payer: MEDICARE

## 2020-11-13 ENCOUNTER — APPOINTMENT (OUTPATIENT)
Dept: CARDIAC REHAB | Age: 60
End: 2020-11-13
Payer: MEDICARE

## 2020-11-13 PROCEDURE — G0422 INTENS CARDIAC REHAB W/EXERC: HCPCS

## 2020-11-16 ENCOUNTER — HOSPITAL ENCOUNTER (OUTPATIENT)
Dept: CARDIAC REHAB | Age: 60
Setting detail: THERAPIES SERIES
Discharge: HOME | End: 2020-11-16
Payer: MEDICARE

## 2020-11-16 ENCOUNTER — NURSE ONLY (OUTPATIENT)
Dept: LAB | Age: 60
End: 2020-11-16

## 2020-11-16 ENCOUNTER — APPOINTMENT (OUTPATIENT)
Dept: CARDIAC REHAB | Age: 60
End: 2020-11-16
Payer: MEDICARE

## 2020-11-16 PROCEDURE — G0423 INTENS CARDIAC REHAB NO EXER: HCPCS

## 2020-11-16 PROCEDURE — G0422 INTENS CARDIAC REHAB W/EXERC: HCPCS

## 2020-11-16 NOTE — PROGRESS NOTES
Hospital Facility-Based Program  Phase 2 Cardiac Rehab Weekly Progress Report      Patient prescribed exercise:  9:00 class. 3 times per week in rehab, 1-4 times per week at home for the amount of sessions/weeks specified by insurance. Current Levels: NuStep:  [address] for 15 minutes x 2, UBE: Level 60W for 5 minutes. Progression Discussion: Maintain/Increase Aerobic exercise 15 minutes to work on endurance. Attempt to increase intensity by 5-20% for each modality this week. Try to increase intensities until Cresencio rates the exercises a 13-17 on Chong RPE.

## 2020-11-18 ENCOUNTER — HOSPITAL ENCOUNTER (OUTPATIENT)
Dept: CARDIAC REHAB | Age: 60
Setting detail: THERAPIES SERIES
Discharge: HOME | End: 2020-11-18
Payer: MEDICARE

## 2020-11-18 ENCOUNTER — APPOINTMENT (OUTPATIENT)
Dept: CARDIAC REHAB | Age: 60
End: 2020-11-18
Payer: MEDICARE

## 2020-11-18 PROCEDURE — G0422 INTENS CARDIAC REHAB W/EXERC: HCPCS

## 2020-11-20 ENCOUNTER — HOSPITAL ENCOUNTER (OUTPATIENT)
Dept: CARDIAC REHAB | Age: 60
Setting detail: THERAPIES SERIES
Discharge: HOME | End: 2020-11-20
Payer: MEDICARE

## 2020-11-20 ENCOUNTER — APPOINTMENT (OUTPATIENT)
Dept: CARDIAC REHAB | Age: 60
End: 2020-11-20
Payer: MEDICARE

## 2020-11-20 ENCOUNTER — OFFICE VISIT (OUTPATIENT)
Dept: CARDIOLOGY CLINIC | Age: 60
End: 2020-11-20
Payer: MEDICARE

## 2020-11-20 VITALS
BODY MASS INDEX: 36.88 KG/M2 | HEART RATE: 64 BPM | HEIGHT: 69 IN | WEIGHT: 249 LBS | DIASTOLIC BLOOD PRESSURE: 70 MMHG | SYSTOLIC BLOOD PRESSURE: 124 MMHG

## 2020-11-20 PROCEDURE — 1036F TOBACCO NON-USER: CPT | Performed by: NUCLEAR MEDICINE

## 2020-11-20 PROCEDURE — G8417 CALC BMI ABV UP PARAM F/U: HCPCS | Performed by: NUCLEAR MEDICINE

## 2020-11-20 PROCEDURE — G8427 DOCREV CUR MEDS BY ELIG CLIN: HCPCS | Performed by: NUCLEAR MEDICINE

## 2020-11-20 PROCEDURE — 3017F COLORECTAL CA SCREEN DOC REV: CPT | Performed by: NUCLEAR MEDICINE

## 2020-11-20 PROCEDURE — G0422 INTENS CARDIAC REHAB W/EXERC: HCPCS

## 2020-11-20 PROCEDURE — 99214 OFFICE O/P EST MOD 30 MIN: CPT | Performed by: NUCLEAR MEDICINE

## 2020-11-20 PROCEDURE — G8484 FLU IMMUNIZE NO ADMIN: HCPCS | Performed by: NUCLEAR MEDICINE

## 2020-11-20 NOTE — PROGRESS NOTES
[facility],[address]  [address] [place] [id number]  Dept: [phone]  Dept Fax: [phone]  Loc: [phone]    Visit Date: 11/20/2020    Dixie Brooks is a 56 y.o. male who presents todayfor:  Chief Complaint   Patient presents with    6 Month Follow-Up    Cardiomyopathy    Hypertension    Shortness of Breath   does have CMP and going to [place] for transplant evaluation   Followed by CHF clinic there  Does have  Vtach and on amiodarone  Does have  hyperlipidemia  Lately some falling events  No syncope  Lower BP   Fluctuating CHF   Bp is also up and down   No chest pain   Chronic dyspnea  Dyspnea on exertion   No ICD shocks  On statins for hyperlipidemia  Renal status is not the best  GFR 44  Creatinine 1.6       HPI:  HPI  Past Medical History:   Diagnosis Date    Acute kidney injury ([address]     Cardiomyopathy, dilated ([address]     VIRAL    Claustrophobia     Congestive heart failure (CHF) ([address]     Depression 12/26/2013    HTN (hypertension)     Hyperlipidemia     Medtronic dual ICD 2/14/2014    Osteoarthritis     Osteoarthritis of spine with radiculopathy, lumbar region 12/28/2015    Spinal stenosis     Unspecified sleep apnea     unable to wear CPAP      Past Surgical History:   Procedure Laterality Date    BACK SURGERY  8/26/2014    L4- S1 decompression, L4-5 PSF and TLIF    BACK SURGERY  2013    CARDIAC CATHETERIZATION  10/2013    [address]  2013    [employer]    CARDIOVASCULAR STRESS TEST  09/2013    CARPAL TUNNEL RELEASE Right     CERVICAL FUSION  05/17/2017    [address]    COLONOSCOPY      DIAGNOSTIC CARDIAC CATH LAB PROCEDURE  07/2020    [facility]    DOPPLER ECHOCARDIOGRAPHY  09/2013    DOPPLER ECHOCARDIOGRAPHY  09/2013    [facility]    ENDOSCOPY, COLON, DIAGNOSTIC      FACET JOINT INJECTION Right 5/26/2020    L-facet RFA @ L2-3,L3-4,L4-5  RIGHT performed by Meggan Fisher MD at [address] [facility] [address] JOINT Left 7/2/2019    Left SI injection performed by Meggan Fisher MD at [facility] Left 8/26/2019    Left SI injection # 2 performed by Meggan Fisher MD at [address]  ? ??    umbilical    LUMBAR SPINE SURGERY Left 9/11/2017    LUMBAR RFA L2-3, L3-4, L4-5, L5-S1 LEFT SIDE performed by Meggan Fisher MD at [address]"" Right 10/31/2017    LUMBAR FACET RFA @ L2-3, L3-4, L4-5 AND L5-S1 RIGHT SIDE performed by Meggan Fisher MD at [address]\"" Left 4/11/2019    L-RFA @ L2-3, L3-4, L4-5, L5-S1 LEFT SIDE FIRST. performed by Meggan Fisher MD at [address]\"" Left 10/3/2019    LEFT SI RFA performed by Meggan Fisher MD at [facility]  2015    OTHER SURGICAL HISTORY  10/9/2015    C3-6 ACDF    OTHER SURGICAL HISTORY  01/18/2016    FACET INJECTIONS L3-L4 L4-L5 L5 S1    OTHER SURGICAL HISTORY  2-8-2016    RFA - L3-S1    OTHER SURGICAL HISTORY N/A 03-21-16    cervical epidural block C7    OTHER SURGICAL HISTORY Bilateral 05-16-16    cervical facet block C7-T1    OTHER SURGICAL HISTORY  08/01/2016    CERVICAL ABLATION    OTHER SURGICAL HISTORY Left 09/11/2017    Lumbar RFA at L2-3, L3-4, L4-5, L5-S1    OTHER SURGICAL HISTORY Right 10/31/2017    Lumbar RFA at L2-3, L3-4, L4-5, L5-S1    OTHER SURGICAL HISTORY Right 05/09/2018    Excision scalp mass right forehead    NV EXC SKIN BENIG <5MM REMAINDR BODY Right 5/9/2018    EXCISION RIGHT FOREHEAD CYST performed by Checo Hayward MD at [address] Left 6/19/2018    L-RFA @ L2-3, L3-4, L4-5, L5-S1 LEFT SIDE FIRST.  performed by Meggan Fisher MD at [facility] OR    SINUS SURGERY  1980's???    TONSILLECTOMY  1980's??    VASECTOMY  ? ??     Family History   Problem Relation Age of Onset    Heart Disease Father     Coronary Art Dis Father     Heart Attack Father     High Blood Pressure Father     Parkinsonism Father      Social History     Tobacco Use    Smoking status: Never Smoker    Smokeless tobacco: Never Used   Substance Use Topics    Alcohol use: Yes     Alcohol/week: 6.0 standard drinks     Types: 6 Standard drinks or equivalent per week     Comment: social      Current Outpatient Medications   Medication Sig Dispense Refill    atorvastatin (LIPITOR) 80 MG tablet Take 80 mg by mouth nightly      digoxin (LANOXIN) 125 MCG tablet Take 0.125 mg by mouth every other day       valsartan (DIOVAN) 40 MG tablet Take 40 mg by mouth 2 times daily       Magnesium 500 MG CAPS Take by mouth daily      DULoxetine (CYMBALTA) 30 MG extended release capsule 60 mg       spironolactone (ALDACTONE) 25 MG tablet Take 12.5 mg by mouth 2 times daily       metoprolol succinate (TOPROL XL) 25 MG extended release tablet Take 1 tablet by mouth daily (Patient taking differently: Take 12.5 mg by mouth daily ) 30 tablet 5    amiodarone (CORDARONE) 200 MG tablet Take 1 tablet twice a day for 7 days, then decrease to 1 tablet by mouth once a day 104 tablet 1    zinc sulfate (ZINCATE) 220 (50 Zn) MG capsule Take 50 mg by mouth daily      torsemide (DEMADEX) 20 MG tablet Take 1 tablet by mouth 2 times daily (Patient taking differently: Take 20 mg by mouth daily ) 180 tablet 3    allopurinol (ZYLOPRIM) 300 MG tablet Take 300 mg by mouth daily      Acetaminophen (TYLENOL ARTHRITIS PAIN PO) Take by mouth 3 times daily as needed       No current facility-administered medications for this visit.       No Known Allergies  Health Maintenance   Topic Date Due    TSH testing  1960    Hepatitis C screen  1960    Pneumococcal 0-64 years Vaccine (1 of 1 - PPSV23) 05/26/1964    HIV screen  05/26/1973    Diabetes screen  05/26/1998    Colon cancer screen colonoscopy  05/26/2008    Annual Wellness Visit (AWV)  06/19/2019    Shingles Vaccine (2 of 2) 11/06/2020    Lipid screen  05/02/2021    Potassium monitoring  11/16/2021    Creatinine monitoring  11/16/2021    DTaP/Tdap/Td vaccine (2 - Td) 07/18/2028    Flu vaccine  Completed    Hepatitis A vaccine  Aged Out    Hepatitis B vaccine  Aged Out    Hib vaccine  Aged Out    Meningococcal (ACWY) vaccine  Aged Out       Subjective:  Review of Systems  General:   No fever, no chills, some fatigue or weight loss  Pulmonary:    some dyspnea, no wheezing  Cardiac:    Denies recent chest pain,   GI:     No nausea or vomiting, no abdominal pain  Neuro:     No dizziness or light headedness,   Musculoskeletal:  No recent active issues  Extremities:   No edema, no obvious claudication       Objective:  Physical Exam  /70   Pulse 64   Ht 5' 9\" (1.753 m)   Wt 249 lb (112.9 kg)   BMI 36.77 kg/m²   General:   Well developed, well nourished  Lungs:    Clear to auscultation  Heart:    Normal S1 S2, Slight murmur. no rubs, no gallops  Abdomen:   Soft, non tender, no organomegalies, positive bowel sounds  Extremities:   No edema, no cyanosis, good peripheral pulses  Neurological:   Awake, alert, oriented. No obvious focal deficits  Musculoskelatal:  No obvious deformities    Assessment:      Diagnosis Orders   1. Dilated cardiomyopathy ([address]     2. Essential hypertension     3. ICD (implantable cardioverter-defibrillator) in place     4. V tach ([address]       As above  Up and down on fluid  Unstable CHF symptoms    Plan:  No follow-ups on file. As above  Being evaluated for cardiomem  Consider external monitoring device as well   Continue risk factor modification and medical management    Thank you for allowing me to participate in the care of your patient.  Please don't hesitate to contact me regarding any further issues related to the patient care    Orders Placed:  No orders of the defined types were placed in this encounter. Medications Prescribed:  No orders of the defined types were placed in this encounter. Discussed use, benefit, and side effects of prescribed medications. All patient questions answered. Pt voicedunderstanding. Instructed to continue current medications, diet and exercise. Continue risk factor modification and medical management. Patient agreed with treatment plan. Follow up as directed.     Electronically signedby Toni Zarco MD on 11/20/2020 at 8:20 AM

## 2020-11-23 ENCOUNTER — APPOINTMENT (OUTPATIENT)
Dept: CARDIAC REHAB | Age: 60
End: 2020-11-23
Payer: MEDICARE

## 2020-11-23 ENCOUNTER — HOSPITAL ENCOUNTER (OUTPATIENT)
Dept: CARDIAC REHAB | Age: 60
Setting detail: THERAPIES SERIES
Discharge: HOME | End: 2020-11-23
Payer: MEDICARE

## 2020-11-23 PROCEDURE — G0422 INTENS CARDIAC REHAB W/EXERC: HCPCS

## 2020-11-23 NOTE — PROGRESS NOTES
Hospital Facility-Based Program  Phase 2 Cardiac Rehab Weekly Progress Report      Patient prescribed exercise:  9:00 class. 3 times per week in rehab, 1-4 times per week at home for the amount of sessions/weeks specified by insurance. Current Levels: NuStep: 78 [address] for 30 minutes, UBE: 60 [address] for 5 minutes. Progression Discussion: Maintain/Increase Aerobic exercise 35-45 minutes to work on endurance. Attempt to increase intensity by 5-20% for each modality this week. Try to increase intensities until Cresencio rates the exercises a 13-17 on Chong RPE.

## 2020-11-23 NOTE — PLAN OF CARE
[address] [place] [address] Facility-Based Program  Individualized Cardiac Treatment Plan    Patient Name:  Kristen Carolina  :  1960  Age:  56 y.o. MRN:  [id number]  Diagnosis: CHF  Date of Event: 8/3/2020   Physician:  Jessy Perrin MD [facility])  Next Office Visit:  2020  Date Entered Program: 2020  Risk Stratifications: [] Low [] Intermediate [x] High  Allergies: No Known Allergies    COVID -19 Screen  Do you have any of the following symptoms:  [] Fever [] Cough [] SOB [] Muscle/Body Ache [] Loss of taste/smell [x] None    Have you traveled outside of the [place]? [] Yes      [x] No    Have you been around anyone who has tested Positive for COVID 19?  [] Yes      [x] No    The following Education has been completed with patient. [x] Wait in the lobby until we call you back to rehab. [x] You must wear a mask while in the medical center, and in cardiac rehab. Please bring your own. [x] Bring your own bottle of water with you. [x] You can not bring anyone with you into the medical center at this time. They are welcome to sit in their car and wait for you.     Individual Cardiac Treatment Plan -EXERCISE  INITIAL 30 DAY 60 DAY 90 DAY FINAL DAY   EXERCISE  ASSESSMENT/PLAN EXERCISE  REASSESSMENT EXERCISE   REASSESSMENT EXERCISE   REASSESSMENT EXERCISE  DISCHARGE/FOLLOW-UP   Date: 2020 Date: 2020 Date: 10/26/20 Date: 20 Date:   Session #1 Session # 12 Session # 45 Session # 61 Session # **  Last session completed on **   Stages of Change Stages of Change Stages of Change Stages of Change Stages of Change   [] Pre Contemplation  [] Contemplation  [x] Preparation  [] Action  [] Maintenance  [] Relapse [] Pre Contemplation  [] Contemplation  [x] Preparation  [] Action  [] Maintenance  [] Relapse [] Pre Contemplation  [] Contemplation  [x] Preparation  [] Action  [] Maintenance  [] Relapse [] Pre Contemplation  [] Contemplation  [] Preparation  [x] Action  [] Maintenance  [] Relapse [] Pre Contemplation  [] Contemplation  [] Preparation  [] Action  [] Maintenance  [] Relapse   EXERCISE ASSESSMENT EXERCISE ASSESSMENT EXERCISE ASSESSMENT EXERCISE ASSESSMENT EXERCISE ASSESSMENT   6 Min Walk Test  Distance walked: on NS  0.19 miles  1003 ft.  1.6 METs  Max HR:75 BPM      RPE:  13    THR:  101-120  Rhythm:  NSR    6 Min Walk Test  Distance walked:   ** miles  ** ft  ** METs  Max HR:** BPM      RPE:  **  %Change ft= **    Rhythm:  **   DASI: 4.7 METs DASI: 5.3 METs DASI: 5.29 METs DASI: 6.6 METs DASI: ** METs   Return to Work  [employer] on returning to work? [] Yes              [] No   [x] Disabled-since 2013    [] Retired   Return to work:  Has Cresencio returned to work? [] Yes    [x] No - disabled     Return to work:  Has Casandra Lebron returned to work? [] Yes    [x] No      Return to work:  Has Casandragabriela Lebron returned to work? [] Yes    [x] No     Return to work:  Has Casandragabriela Lebron returned to work? [] Yes    [] No    Return to work? [] Yes, **    [] No    *Required MET Level achieved for job duties? [] Yes    [] No   Orthopedic Limitations/  [x] Yes    [] No  If yes please list:  Right leg goes numb, multiple back sugeries     Orthopedic Limitations  *If patient has orthopedic issue:   Actions/  accomodations needed to make Cresencio successful : NUSTEP Orthopedic Limitations  NuStep Orthopedic Limitations  NuStep Orthopedic Limitations     Fall Risk  Fall risk assessed? [x] Yes      [] No    Balance Issues? [x] Yes      [] No     [] Walker [] Cane    [x] Safety issues reviewed      Fall Risk  *If patient is a fall risk, action needed to accommodate: [address] Exercise  [] Yes    [x] No   Home Exercise  [] Yes    [x] No Home Exercise  [] Yes    [x] No  Getting a NuStep like machine at home to use.   Home Exercise  [x] Yes    [] No  Type: NuStep / Elliptical  Frequency: Daily  Duration: 30 min Home Exercise  [] Yes [] No  Type: **  Frequency: **  Duration: **   Angina with Activity? [] Yes    [x] No  Angina Management: na Angina with Activity? [] Yes    [x] No   Angina with Activity? [] Yes    [x] No  Angina Management: n/a Angina with Activity? [] Yes    [x] No  Angina Management: n/a Angina with Activity?   [] Yes    [] No  Angina Management: **   EXERCISE PLAN EXERCISE PLAN EXERCISE PLAN EXERCISE PLAN EXERCISE PLAN   *Interventions* *Interventions* *Interventions* *Interventions* *Interventions*   Exercise Prescription  (per physician & CR staff) Exercise Prescription  (per physician & CR staff) Exercise Prescription  (per physician & CR staff) Exercise Prescription  (per physician & CR staff) Exercise Prescription  (per physician & CR staff)   Cardiovascular Cardiovascular Cardiovascular Cardiovascular Cardiovascular   Mode:    [] Treadmill (TM)  [x] Schwinn Airdyne (AD)  [x] Arms Ergometer (AE)  [x] NuStep  [] Elliptical (E) MODE:    [x] Arms Ergometer (AE)  [x] NuStep   MODE:    [] Treadmill (TM)  [] Schwinn Airdyne (AD)  [x] Arms Ergometer (AE)  [x] NuStep  [] Elliptical (E) MODE:    [] Treadmill (TM)  [] Schwinn Airdyne (AD)  [x] Arms Ergometer (AE)  [x] NuStep  [] Elliptical (E) MODE:    [] Treadmill (TM)  [] Schwinn Airdyne (AD)  [] Arms Ergometer (AE)  [] NuStep  [] Elliptical (E)   Initial Workloads    AD: 0.5 level = 1.6 METs  NS: 28  [place]= 1.9 METs  AE: 0.2 level = 1.3 METs Current Workloads    NS:  48 [place]= 2.3 METs  AE: 26w  =  1.9METs Current Workloads  NS:  54 [place]= 2.4 METs  AE: 50 pollack = 2.3 METs Current Workloads  NS: 78 [place]= 2.8 METs  AE: 60 pollack = 2.5 METs Current Workloads  TM:  @ %=  METs  AD:  level =  METs  NS:   [place]=  METs  AE:  level =  METs     Frequency:    ICR: 3x/week  Home: 2-3x/wk Frequency:   ICR: 3x/week  Home: 3x/wk Frequency:  ICR: 3x/week  Home: 3-4x/wk Frequency:  ICR: 3x/week  Home: 3-4x/wk Frequency:  Cresencio will continue exercise at  5-7 days/week   Duration:   Total aerobic exercise = 30-45 min    5-8 min/mode Duration:  Total aerobic exercises =35 min     15min/mode Duration:  Total aerobic exercises = 35-45 min     15 min/mode Duration:  Total aerobic exercises = 35-45 min     15 min/mode Duration:  Total erobic exercise =  60-90 min   Intensity:   MET Level = 1.9  RPE = 12-15 Intensity:  Max MET Level = 2.3  RPE = 12-15 Intensity:  Max MET Level = 2.4  RPE = 12-15 Intensity:  Max MET Level =2.8  RPE = 12-15 Intensity:  Max MET Level = ** RPE = 12-15   Progression: increase aerobic activity up to 15 min over next 4 weeks by increasing time 2-3 min/week. Progression: increase aerobic intensity as tolerated by pt   Progression:  increase aerobic intensity as tolerated by pt Progression:  increase aerobic intensity as tolerated by pt Progression:  Increase time/intensity when RPE <13, and HR is in [facility]   [x] Yes      [] No  Upper and Lower body strength training 2x/wk    Wt: 2#       Reps:  8-15    *Increase wt. after completing 15 reps with an RPE of <12/13. [x] Yes      [] No  Upper and Lower body strength training 2x/wk    Wt: 3#       Reps:  8-15    *Increase wt. after completing 15 reps with an RPE of <12/13. [x] Yes      [] No  Upper and Lower body strength training 2x/wk    Wt: 4#       Reps:  8-15    *Increase wt. after completing 15 reps with an RPE of <12/13. [x] Yes      [] No  Upper and Lower body strength training 2x/wk    Wt: 4#       Reps:  8-15    *Increase wt. after completing 15 reps with an RPE of <12/13. Continue Strength Training at home   [] Exercise Log & Strength training handout given     Wt: **#       Reps:  8-15    *Increase wt. after completing 15 reps with an RPE of <12/13.    Flexibility Flexibility Flexibility Flexibility Flexibility   [x] Yes      [] No  25 min session of Core Strength & Flexibility 1x/per week  Attends Core Strength & Flexibility   [x] Yes      [] No Attends Core Strength & Flexibility   [x] Yes      [] No Attends Core Strength & Flexibility   [x] Yes      [] No Continue Core Strength & Flexibility at home   Home Exercise  *Cresencio verbalizes planning to initiate home exercise. Home Exercise  *Cresencio verbalizes planning to initiate home exercise Home Exercise  *Cresencio verbalizes planning to initiate home exercise soon. Home Exercise  *Cresencio verbalizes planning to use the ellipical/Nustep 3-4 days/week for 30-45 min on days not in rehab. Home Exercise  *Cresencio verbalizes his/her plan to ** ** days/week for ** min @ **   *Education* *Education* *Education* *Education* *Education*   RPE Scale  [x] Yes      [] No  Exercise Safety  [x] Yes      [] No  Equipment Orientation  [x] Yes      [] No  S/S to Report  [x] Yes      [] No  Warm Up/Cool Down  [x] Yes      [] No  Home Exercise  [x] Yes      [] No    All Exercise Education Completed  [] Yes      [] No   Exercise Education Recommended    Workshops  [x] Improving Performance  [x] Balance Training & Fall Prevention  [x] Exercise Biomechanics  [x] Resistance Training & Core Strength Exercise Education Attended/Date    Yoga video - 9/16/2020 Exercise Education Attended/Date   Exercise Education Attended/Date       All Sessions Completed?     [] Yes  [] No   *Goals* *Goals* *Goals* *Goals* *Goals*   Initial Exercise Goals Exercise Goals  Exercise Goals   Exercise Goals  Exercise Goals   Cresencio plans to:  [x] Attend exercise sessions 3x/wk  [x] initiate home exercise 2-3x/wk for 10-20 min  [x] Increase 6 min walk distance by 10%  [x] Attend Exercise workshops Cresencio plans to:  [x] Attend exercise sessions 3x/wk  [x] continue home exercise 2-3x/wk for 20-30 min  [x] Attend Exercise workshops Cresencio's plans to:  [x] Attend exercise sessions 3x/wk  [x] continue home exercise 3-4x/wk for 30-45 min  [x] Determine plan of exercise following rehab  [x] Attend Exercise workshops Cresencio's plans to:      Purchase a NuStep for home use. Cresencio achieved exercise goals? []  Yes    [] No  If no, why?  **  [] Increased 6 min walk distance by 10%  [] Currently exercising 30-60 min/day, 5-7days/wk   [] Plans to continue exercise on own  [] Plans to join a local fitness center to continue exercise  [] Does not plan to continue to exercise after rehab   Return to ADL or Hobbies:  August Ruelas would like to improve strength and endurance so he is able to return to doing everything. Return to ADL or Hobbies:  August Ruelas would like to improve strength and endurance so he is able to return to doing everything he wants to do  Return to ADL or Hobbies:  August Ruelas would like to improve strength and endurance so he is able to return to doing everything he wants to do. Return to ADL or Hobbies:  August Ruelas would like to improve strength and endurance so he is able to return to everything he wants to do. Return to ADL or Hobbies:  August Ruelas would like to improve strength and endurance so he/she is able to return to **    *MET level required for above goal:  4.0 METs MET level Achieved:  2. 3METs MET level Achieved: 2.4 METs MET level Achieved: 2.8 METs MET level Achieved:  **METs     Individual Cardiac Treatment Plan - Nutrition  NUTRITION  ASSESSMENT/PLAN NUTRITION  REASSESSMENT NUTRITION   REASSESSMENT NUTRITION   REASSESSMENT NUTRITION  DISCHARGE/FOLLOW-UP   Stages of Change Stages of Change Stages of Change Stages of Change Stages of Change   [] Pre Contemplation  [] Contemplation  [x] Preparation  [] Action  [] Maintenance  [] Relapse [] Pre Contemplation  [] Contemplation  [x] Preparation  [] Action  [] Maintenance  [] Relapse [] Pre Contemplation  [] Contemplation  [x] Preparation  [] Action  [] Maintenance  [] Relapse [] Pre Contemplation  [] Contemplation  [x] Preparation  [] Action  [] Maintenance  [] Relapse [] Pre Contemplation  [] Contemplation  [] Preparation  [] Action  [] Maintenance  [] Relapse   NUTRITION ASSESSMENT NUTRITION ASSESSMENT NUTRITION ASSESSMENT NUTRITION ASSESSMENT NUTRITION ASSESSMENT   Weight Management  Weight: 251.8lbs   Height: 69in  BMI: 37.3 Weight Management  Weight: 254.8                  Weight Management  Weight: 248                  Weight Management  Weight: 243 Weight Management  Weight: **                    BMI: **   Eating Plan  Current eating habits: none Eating Plan  Changes:no salt Eating Plan  Changes: none Eating Plan  Changes: none Eating Plan Improvements:   Alcohol Use  [] none          [] daily  [x] weekly      [x] special   Type: beer/wine  Amount: 6       Diet Assessment Tool:  RATE YOUR PLATE  *Given to patient to complete and return. Diet Assessment Tool:    Score: 45/69       Diet Assessment Tool: RATE YOUR PLATE  Score: **/00   NUTRITION PLAN NUTRITION PLAN NUTRITION PLAN NUTRITION PLAN NUTRITION PLAN   *Interventions* *Interventions* *Interventions* *Interventions* *Interventions*   Initial Survey given Goal Setting Discussion:   [x] Yes      [] No       Follow Up Survey Reviewed & Goals Updated:     Professional Referral  Please check if needed. [] Dietitian Consult   [] Wt. Management Referral  [] Other:  Professional Referral  Please check if needed. [] Dietitian Consult   [] Wt. Management Referral  [] Other: Professional Referral  Please check if needed. [] Dietitian Consult   [] Wt. Management Referral  [] Other: Professional Referral  Please check if needed. [] Dietitian Consult   [] Wt. Management Referral  [] Other: Professional Referral  Please check if needed. [] Dietitian Consult   [] Wt. Management Referral  [] Other:   *Education* *Education* *Education* *Education* *Education*   Nutritional Education Recommended    [x] 1:1 Registered Dietitian    Workshops  [x] Label Reading   [x] Menu  [x] Targeting Nutrition Priorities  [x] Fueling a Healthy Body   Nutritional Education Attended/Date Nutritional Education Attended/Date    Declined education.       Nutritional Education Attended/Date    Declined Education All Sessions Completed? [] Yes  [] No   Cooking School  Recommended     [x] Adding Flavor  [x] Fast & Healthy     Breakfasts  [x] Salads & Dressings  [x] Soups & Simple     Sauces  [x] Simple Sides  [x] Appetizers &     Snacks  [x] Delicious Desserts  [x] Plant Proteins  [x] Fast Evening Meals  [x] Weekend Breakfasts  [x] Cook once, Eat       twice  [x] Meat Alternatives Cooking School  Sessions Completed    Fast evening meals - 9/18/2020    Weekend breakfast - 9/25/2020   Cooking School  Sessions Completed    Declined education. Cooking School  Sessions Completed    [address] [place] [address]    # of sessions completed:  **   *Goals* *Goals* *Goals* *Goals* *Goals*   Cresencio's nutritional goals are as follows:  Complete and return diet survey Cresencio's nutritional goals are as follows:  [x] Attend Nutrition Workshops  [x] Attend 1:1   [x] Attend Cooking Classes   Cresencio's nutritional goals are as follows:    [x] Complete and return diet survey   Cresencio's nutritional goals are as follows:    [x] Complete and return diet survey Cresencio achieved nutritional goals   [] Yes    [] No  If no, why?   Use knowledge gained to continue Pritikin eating plan at home       Individual Cardiac Treatment Plan - Psychosocial  PSYCHOSOCIAL  ASSESSMENT/PLAN PSYCHOSOCIAL  REASSESSMENT PSYCHOSOCIAL   REASSESSMENT PSYCHOSOCIAL   REASSESSMENT PSYCHOSOCIAL  DISCHARGE/FOLLOW-UP   Stages of Change Stages of Change Stages of Change Stages of Change Stages of Change   [] Pre Contemplation  [] Contemplation  [] Preparation  [] Action  [] Maintenance  [] Relapse [] Pre Contemplation  [] Contemplation  [x] Preparation  [] Action  [] Maintenance  [] Relapse [] Pre Contemplation  [] Contemplation  [x] Preparation  [] Action  [] Maintenance  [] Relapse [] Pre Contemplation  [] Contemplation  [x] Preparation  [] Action  [] Maintenance  [] Relapse [] Pre Contemplation  [] Contemplation  [] Preparation  [] Action  [] Maintenance  [] Relapse   PSYCHOSOCIAL ASSESSMENT PSYCHOSOCIAL ASSESSMENT PSYCHOSOCIAL ASSESSMENT PSYCHOSOCIAL ASSESSMENT PSYCHOSOCIAL ASSESSMENT   Behavioral Outcomes Behavioral Outcomes Behavioral Outcomes Behavioral Outcomes Behavioral Outcomes   Tool Used:  Rolan Fraser, Quality of Life Index, Cardiac Version IV  *Given to patient to complete. Tool Used:    Rolan Fraser, Quality of Life Index, Cardiac Version IV     QOL Index Score: 21.52  HF:16.86  S&E:25.25  P&S: 25  Family: 27   Tool Used:     Rolan Fraser, Quality of Life Index, Cardiac Version IV    QOL Index Score: **  HF:**  S&E:**  P&S: **  Family: **   PHQ-9 score 7  Depression Severity  []Minimal  [x]Mild   []Moderate  []Moderately Severe  []Severe    PHQ-9 score **  Depression Severity  []Minimal  []Mild   []Moderate  []Moderately Severe []Severe   Does patient have Family Support? [x] Yes      [] No  No signs of marital/family distress       Within the Past Month:  *Have you wished you were dead or wished you could go to sleep and not wake up? [] Yes      [x] No  *Have you had any thoughts of killing yourself? [] Yes      [x] No         Using a scale of 0-10, 0=none, 10=very:   Rate your depression: 0  Rate your anxiety:  0  Using a scale of 0-10, 0=none, 10=very:   Rate your depression: 1  Rate your anxiety:  1 Using a scale of 0-10, 0=none, 10=very:   Rate your depression: 0  Rate your anxiety: 0 Using a scale of 0-10, 0=none, 10=very:   Rate your depression: 0  Rate your anxiety: 0 Using a scale of 0-10, 0=none, 10=very:   Rate your depression: **  Rate your anxiety:  **   Signs and Symptoms of Depression Present? [] Yes      [x] No   Signs and Symptoms of Depression Present? [] Yes      [x] No   Signs and Symptoms of Depression Present? [] Yes      [x] No   Signs and Symptoms of Depression Present? [] Yes      [x] No   Signs and Symptoms of Depression Present?     [] Yes      [] No  If yes, please explain:  ** Signs and Symptoms of Anxiety Present? [] Yes      [x] No   Signs and Symptoms of Anxiety Present? [] Yes      [x] No   Signs and Symptoms of Anxiety Present? [] Yes      [x] No   Signs and Symptoms of Anxiety Present? [] Yes      [x] No   Signs and Symptoms of Anxiety Present? [] Yes      [] No  If yes, please explain:  **   [] Patient has poor eye contact   [] Flat affect present. [] Signs of anxiety, anger or hostility    [] Signs social isolation present. []  Signs of alcohol or substance abuse       PSYCHOSOCIAL PLAN PSYCHOSOCIAL PLAN PSYCHOSOCIAL PLAN PSYCHOSOCIAL PLAN PSYCHOSOCIAL PLAN   *Interventions* *Interventions* *Interventions* *Interventions* *Interventions*   *Please check if needed  [] Psych Consult  [] Physician Referral  [x] Stress Management Skills *Please check if needed  [] Psych Consult  [] Physician Referral  [x] Stress Management Skills *Please check if needed  [] Psych Consult  [] Physician Referral  [] Stress Management Skills *Please check if needed  [] Psych Consult  [] Physician Referral  [] Stress Management Skills *Please check if needed  [] Psych Consult  [] Physician Referral  [] Stress Management Skills   Is patient currently taking anti-depressant or anti-anxiety medications? [x] Yes      [] No  If yes, please list medications: cymbalta Change in anti-depressant or anti-anxiety medications? [] Yes      [x] No   Change in anti-depressant or anti-anxiety medications? [] Yes      [x] No   Change in anti-depressant or anti-anxiety medications? [] Yes      [x] No   Change in anti-depressant or anti-anxiety medications?   [] Yes      [] No  If yes, please list medications:  **   *Education* *Education* *Education* *Education* *Education*   Healthy Mind-Set Workshops Recommended  [x] Stress & Health  [x] Taking Charge of Stress  [x] New Thoughts, New Behaviors  [x] Managing Moods & Relationships Healthy Mind-Set Workshops Attended/Date    New thoughts - 9/16/2020 Healthy Mind-Set Workshops Attended/Date    10/7  Manage Moods Healthy Mind-Set Workshops Attended/Date     Healthy Mind-Set Workshops  Completed  [] Yes      [] No   *Goals* *Goals* *Goals* *Goals* *Goals*   Cresencio's psychosocial goals are as follows:  Complete HADS & Rolan & Evelio, Quality of Life Index, Cardiac Version IV Cresencio's psychosocial goals are as follows:  [x] Attend Healthy Mind-Set Workshops  [x] Reduce depression symptom severity by 1 level   Cresencio's psychosocial goals are as follows:    [x] Maintain mental health status Cresencio's psychosocial goals are as follows:    Maintain mental health status Cresencio achieved psychosocial goals?   [] Yes    [] No  If no, why?  **  [] Use methods learned to continue to reduce depression symptom severity by 1 level  [] **     Individual Cardiac Treatment Plan - Other:  Risk Factor/Education  RISK FACTOR  ASSESSMENT/PLAN RISK FACTOR  REASSESSMENT  RISK FACTOR  REASSESSMENT RISK FACTOR  REASSESSMENT RISK FACTOR   DISCHARGE/FOLLOW-UP   Stages of Change Stages of Change Stages of Change Stages of Change Stages of Change   [] Pre Contemplation  [] Contemplation  [x] Preparation  [] Action  [] Maintenance  [] Relapse [] Pre Contemplation  [] Contemplation  [x] Preparation  [] Action  [] Maintenance  [] Relapse [] Pre Contemplation  [] Contemplation  [x] Preparation  [] Action  [] Maintenance  [] Relapse [] Pre Contemplation  [] Contemplation  [x] Preparation  [] Action  [] Maintenance  [] Relapse [] Pre Contemplation  [] Contemplation  [] Preparation  [] Action  [] Maintenance  [] Relapse   RISK FACTOR/EDUCATION ASSESSMENT RISK FACTOR/EDUCATION ASSESSMENT RISK FACTOR/EDUCATION ASSESSMENT RISK FACTOR/EDUCATION ASSESSMENT RISK FACTOR /EDUCATION ASSESSMENT   Hypertension  [x] Yes      [] No    Resting BP: 108/60  Peak Ex BP:120/60  Medication: diovan   Hypertension  Resting BP: 102/60  Peak Ex BP:120/80  Medication Changes:  [] Yes      [x] No Hypertension  Resting BP: 88/52  Peak Ex BP: 144/68  Medication Changes:  [] Yes      [x] No Hypertension  Resting BP: 106/60  Peak Ex BP:110/78  Medication Changes:  [] Yes      [x] No Hypertension  Resting BP: **  Peak Ex BP:**  Medication Changes:  [] Yes      [] No   Lipids  HLD/DLD  [x] Yes      [] No  TOTAL CHOL: 222  HDL:  67  LDL:  127  TRI  Medication: lipitor Lipids  Medication Changes:  [] Yes      [x] No     Lipids  Medication Changes:  [] Yes      [x] No     Lipids  Medication Changes:  [] Yes      [x] No     Lipids    TOTAL CHOL: **  HDL:  **  LDL:  **  TRIG:  **  Medication Changes:  [] Yes      [] No   Diabetes  [] Yes      [x] No  FBS: 94           HbA1C:        Monitor BS @ home:   [] Yes      [x] No Diabetes  Not diabetic    Diabetes    [x] No     Diabetes    [x] No     Diabetes  Most Recent BS:  BS have been in range  [] Yes      [] No  Medication Changes  [] Yes      [] No       Tobacco Use  [] Current  [] Former  [x] Never Tobacco Use  Change in smoking status   [] Yes      [x] No     Tobacco Use  Change in smoking status   [] Yes      [x] No       Tobacco Use  Change in smoking status   [] Yes      [x] No     Tobacco Use  Change in smoking status   [] Yes      [] No    Quit date: **            Learning Barriers  Please select one:  [] Speech  [] Literacy  [] Hearing  [] Cognitive  [] Vision  [x] Ready to Learn Learning Barriers Addressed:   [x] Yes      [] No   Learning Barriers Addressed:   [x] Yes      [] No   Learning Barriers Addressed:  [x] Yes      [] No Learning Barriers Addressed:  [] Yes      [] No     RISK FACTOR/EDUCATION PLAN RISK FACTOR/EDUCATION PLAN RISK FACTOR/EDUCATION PLAN RISK FACTOR/EDUCATION PLAN RISK FACTOR/EDUCATION PLAN   *Interventions* *Interventions* *Interventions* *Interventions* *Interventions*   Recommended Educational Videos    [x] Overview of The Pritikin Eating Plan  [x] Becoming A Pritikin   [x] Diseases of Our Time-Part 1  [x] Calorie Density  [x] Label Reading-Part 1  [x] Move It! [x] Healthy Minds, Bodies, Hearts  [x] Dining Out-Part 1  [x] Heart Disease Risk Reduction  [x] Metabolic Syndrome & Belly Fat  [x] Facts on Fat  [x] Diseases of Our Times-Part 2  [x] Biology of Weight Control  [x] Biomechanical Limitations  [x] Nurtition Action Plan   Completed Videos/Date      9/1/2020  Overview of The Sakina Eating Plan    Disease part 1 - 9/21/2020    Calorie density - 9/28/2020    Heart disease risk reduction - 9/14/2020 Completed Videos/Date      Declined all education. Completed Videos/Date    Declined all education. Recommended Educational Videos Completed    [] Yes      [] No    **If not completed, Why? **          Smoking Cessation/Relaspe Prevention Intervention needed? [] Yes      [x] No  *Pt verbalizes and agrees to attend intervention Smoking Cessation/Relapse Prevention Scheduled? NOT NEEDED Smoking Cessation/Relapse Prevention completed? Not needed. Smoking Cessation Counseling attended  [] Yes      [] No  **If not completed, Why? **   Professional Referrals:  *Please check if needed  [] Diabetes Clinic  [] Lipid Clinic   [] Other:     Professional Referrals:  *Please check if needed  [] Diabetes Clinic  [] Lipid Clinic   [] Other:   Preventative Medication Preventative Medication Preventative Medication Preventative Medication Preventative Medication   Aspirin  [x] Yes    [] No  Blood Thinner: Clopidogrel/Effient/Brillinta  [] Yes    [x] No  Beta Blocker  [x] Yes    [] No  Ace Inhibitor  [x] Yes    [] No  Statin/Lipid Lowering  [x] Yes    [] No Medication Changes? [] Yes    [x] No Medication Changes? [] Yes    [x] No Medication Changes? [] Yes    [x] No Medication Changes? [] Yes    [] No   *Education* *Education* *Education* *Education* *Education*   Does Cresencio require any additional education? [] Yes    [x] No   Does Cresencio require any additional education? [] Yes    [x] No Does Cresencio require any additional education?   [] Yes    [x] No Does Cresencio require any additional education? [] Yes    [x] No Does Cresencio require any additional education? [] Yes    [] No   Recommended Additional Educational Videos    Medical  [] Hypertension & Heart Disease  [] Decoding Lab Results  [] Aging Enhancing Your QoL  [] Sleep Disorders  Exercise  [] Body Composition  [] Improve Performance  [] Exercise Action Plan  [] Intro to Yoga  Behavioral  [] How Our Thoughts Can Heal Our Hearts  [] Smoking Cessation  Nutrition  [] Planning Your Eating Strategy  [] Lable Reading- Part 2  [] Dining Out - Part 2  [] Targeting Your Nutrition Priorities  [] Fueling a Healthy Body  [] Menu Workshop  [employer] [] Breakfast & Snacks  [] [employer] Salads & Dressing  [] [address] [place] [address]  [] Soups & Desserts   Additional Educational Videos Completed Additional Educational Videos Completed Additional Educational Videos Completed Additional Educational Videos Completed    [] Yes    [] No   *Goals* *Goals* *Goals* *Goals* *Goals*   Cresencio's risk factor/education goals are as follows:    [x] Optimal BP <140/90  [] Blood Sugar <120  [x] Attend recommended video education sessions  [x] Takes medications as prescribed 100% of the time   [] ** Cresencio's risk factor/education goals are as follows:    [x] Optimal BP <140/90  [] Blood Sugar <120  [x] Attend recommended video education sessions  [x] Takes medications as prescribed 100% of the time    Cresencio's risk factor/education goals are as follows:    [x] Optimal BP <140/90  [] Blood Sugar <120  [x] Attend recommended video education sessions  [x] Takes medications as prescribed 100% of the time   [] ** Cresencio's risk factor/education goals are as follows:    [x] Optimal BP <140/90  [] Blood Sugar <120  [x] Attend recommended video education sessions  [x] Takes medications as prescribed 100% of the time   [] ** Cresencio achieved risk factor goals?   [] Yes    [] No  If no, why?  **     Monitored telemetry has revealed NSR   Monitored telemetry has revealed NSR  [] documented arrhythmia at increasing workloads  [] associated symptoms ** Monitored telemetry has revealed NSR   Monitored telemetry has revealed NSR w/ PVCs - No associated symptoms.     Monitored telemetry has revealed **  [] documented arrhythmia at increasing workloads  [] associated symptoms **   Physician Response    [x] Cardiac rehab is reasonably and medically necessary for continuous cardiac monitoring surveillance  of patient's cardiac activity  [x] Initiate continuous telemerty monitoring and notify me with any concerns  [] Other   Physician Response    [x] Cardiac rehab is reasonably and medically necessary for continuous cardiac monitoring surveillance  of patient's cardiac activity  [x] Continue continuous telemerty monitoring and notify me with any concerns  [] Other     Physician Response    [x] Cardiac rehab is reasonably and medically necessary for continuous cardiac monitoring surveillance  of patient's cardiac activity  [x] Continue continuous telemerty monitoring and notify me with any concerns   [] Other     Physician Response    [x] Cardiac rehab is reasonably and medically necessary for continuous cardiac monitoring surveillance  of patient's cardiac activity  [x] Continue continuous telemerty monitoring and notify me with any concerns   [] Other          Cosigned by:  Cecelia Gordon MD at 9/2/2020 12:59 PM    Revision History     Date/Time  User  Provider Type  Action    9/2/2020 12:59 PM  Cecelia Gordon MD  Physician  Cosign    9/1/2020 12:01 PM  Maggi Brown  Exercise Physiologist  Sign     Cosigned by:  Cecelia Gordon MD at 9/30/2020 12:43 PM    Revision History     Date/Time  User  Provider Type  Action    9/30/2020 12:43 PM  Cecelia Gordon MD  Physician  Cosign    9/28/2020 12:27 PM  Swapna Mejias  Exercise Physiologist      Cosigned by:  Cecelia Gordon MD at 10/27/2020  8:16 PM    Revision History     Date/Time  User  Provider Type  Action    10/27/2020  8:16 PM

## 2020-11-25 ENCOUNTER — HOSPITAL ENCOUNTER (OUTPATIENT)
Dept: CARDIAC REHAB | Age: 60
Setting detail: THERAPIES SERIES
Discharge: HOME | End: 2020-11-25
Payer: MEDICARE

## 2020-11-25 PROCEDURE — G0422 INTENS CARDIAC REHAB W/EXERC: HCPCS

## 2020-11-27 ENCOUNTER — HOSPITAL ENCOUNTER (OUTPATIENT)
Dept: CARDIAC REHAB | Age: 60
Setting detail: THERAPIES SERIES
End: 2020-11-27
Payer: MEDICARE

## 2020-11-27 ENCOUNTER — APPOINTMENT (OUTPATIENT)
Dept: CARDIAC REHAB | Age: 60
End: 2020-11-27
Payer: MEDICARE

## 2020-11-30 ENCOUNTER — TELEPHONE (OUTPATIENT)
Dept: PHYSICAL MEDICINE AND REHAB | Age: 60
End: 2020-11-30

## 2020-11-30 ENCOUNTER — HOSPITAL ENCOUNTER (OUTPATIENT)
Dept: CARDIAC REHAB | Age: 60
Setting detail: THERAPIES SERIES
Discharge: HOME | End: 2020-11-30
Payer: MEDICARE

## 2020-11-30 PROCEDURE — G0422 INTENS CARDIAC REHAB W/EXERC: HCPCS

## 2020-11-30 NOTE — TELEPHONE ENCOUNTER
Patient having trouble with neck/ cervical and would like a ct mylogram ordered before he comes back in.   He will be seeing mariana but wanted this done now before

## 2020-11-30 NOTE — PROGRESS NOTES
Hospital Facility-Based Program  Phase 2 Cardiac Rehab Weekly Progress Report      Patient prescribed exercise:  9:00 class. 3 times per week in rehab, 1-4 times per week at home for the amount of sessions/weeks specified by insurance. Current Levels:  NuStep:  78 [address]riz[address] for 15 minutes x 2, UBE: 60w for 5 minutes. Progression Discussion: Maintain/Increase Aerobic exercise 35 minutes to work on endurance. Attempt to increase intensity by 5-20% for each modality this week. Try to increase intensities until Cresencio rates the exercises a 13-17 on Chong RPE.

## 2020-12-02 ENCOUNTER — HOSPITAL ENCOUNTER (OUTPATIENT)
Dept: CARDIAC REHAB | Age: 60
Setting detail: THERAPIES SERIES
Discharge: HOME | End: 2020-12-02
Payer: MEDICARE

## 2020-12-02 PROCEDURE — G0422 INTENS CARDIAC REHAB W/EXERC: HCPCS

## 2020-12-04 ENCOUNTER — TELEPHONE (OUTPATIENT)
Dept: CARDIOLOGY CLINIC | Age: 60
End: 2020-12-04

## 2020-12-04 ENCOUNTER — HOSPITAL ENCOUNTER (OUTPATIENT)
Dept: CARDIAC REHAB | Age: 60
Setting detail: THERAPIES SERIES
Discharge: HOME | End: 2020-12-04
Payer: MEDICARE

## 2020-12-04 PROCEDURE — G0422 INTENS CARDIAC REHAB W/EXERC: HCPCS

## 2020-12-04 NOTE — TELEPHONE ENCOUNTER
Patient called in with c/o low bp off and on states was 82/51 at cardiac rehab. He has vision changes at times. He stated that [facility] has been adjusting his medications. Advised patient to contact [facility], [facility] clinic regarding symptoms since we have not seen patient since August and they have already been adjusting his meds regarding this issue. Patient verbalized understanding.

## 2020-12-07 ENCOUNTER — HOSPITAL ENCOUNTER (OUTPATIENT)
Dept: CARDIAC REHAB | Age: 60
Setting detail: THERAPIES SERIES
Discharge: HOME | End: 2020-12-07
Payer: MEDICARE

## 2020-12-07 PROCEDURE — G0423 INTENS CARDIAC REHAB NO EXER: HCPCS

## 2020-12-07 PROCEDURE — G0422 INTENS CARDIAC REHAB W/EXERC: HCPCS

## 2020-12-07 RX ORDER — AMIODARONE HYDROCHLORIDE 200 MG/1
TABLET ORAL
Qty: 90 TABLET | Refills: 1 | Status: SHIPPED | OUTPATIENT
Start: 2020-12-07

## 2020-12-07 NOTE — PROGRESS NOTES
Hospital Facility-Based Program  Phase 2 Cardiac Rehab Weekly Progress Report      Patient prescribed exercise:  9:00 class. 3 times per week in rehab, 1-4 times per week at home for the amount of sessions/weeks specified by insurance. Current Levels: NuStep:  78 Brizuela for 15 minutes, UBE: Level 50W for 5 minutes. Progression Discussion: Maintain/Increase Aerobic exercise 15 minutes to work on endurance. Attempt to increase intensity by 5-20% for each modality this week. Try to increase intensities until Cresencio rates the exercises a 13-17 on Chong RPE.

## 2020-12-11 ENCOUNTER — HOSPITAL ENCOUNTER (OUTPATIENT)
Dept: CARDIAC REHAB | Age: 60
Setting detail: THERAPIES SERIES
Discharge: HOME | End: 2020-12-11
Payer: MEDICARE

## 2020-12-11 VITALS — HEIGHT: 69 IN | WEIGHT: 249 LBS | BODY MASS INDEX: 36.88 KG/M2

## 2020-12-11 PROCEDURE — G0422 INTENS CARDIAC REHAB W/EXERC: HCPCS

## 2020-12-11 NOTE — PLAN OF CARE
[address] [place] [address] Facility-Based Program  Individualized Cardiac Treatment Plan    Patient Name:  Sofia Garcia  :  1960  Age:  56 y.o. MRN:  [id number]  Diagnosis: CHF  Date of Event: 8/3/2020   Physician:  Dimitri Rodriguez MD [facility])  Next Office Visit:  2020  Date Entered Program: 2020  Risk Stratifications: [] Low [] Intermediate [x] High  Allergies: No Known Allergies    COVID -19 Screen  Do you have any of the following symptoms:  [] Fever [] Cough [] SOB [] Muscle/Body Ache [] Loss of taste/smell [x] None    Have you traveled outside of the [place]? [] Yes      [x] No    Have you been around anyone who has tested Positive for COVID 19?  [] Yes      [x] No    The following Education has been completed with patient. [x] Wait in the lobby until we call you back to rehab. [x] You must wear a mask while in the medical center, and in cardiac rehab. Please bring your own. [x] Bring your own bottle of water with you. [x] You can not bring anyone with you into the medical center at this time. They are welcome to sit in their car and wait for you.     Individual Cardiac Treatment Plan -EXERCISE  INITIAL 30 DAY 60 DAY 90 DAY FINAL DAY   EXERCISE  ASSESSMENT/PLAN EXERCISE  REASSESSMENT EXERCISE   REASSESSMENT EXERCISE   REASSESSMENT EXERCISE  DISCHARGE/FOLLOW-UP   Date: 2020 Date: 2020 Date: 10/26/20 Date: 20 Date: 2020   Session #1 Session # 12 Session # 45 Session # 61 Session # 39  Last session completed on 20   Stages of Change Stages of Change Stages of Change Stages of Change Stages of Change   [] Pre Contemplation  [] Contemplation  [x] Preparation  [] Action  [] Maintenance  [] Relapse [] Pre Contemplation  [] Contemplation  [x] Preparation  [] Action  [] Maintenance  [] Relapse [] Pre Contemplation  [] Contemplation  [x] Preparation  [] Action  [] Maintenance  [] Relapse [] Pre Contemplation  [] Angina with Activity? [] Yes    [x] No   Angina with Activity? [] Yes    [x] No  Angina Management: n/a Angina with Activity? [] Yes    [x] No  Angina Management: n/a Angina with Activity?   [] Yes    [x] No  Angina Management: na   EXERCISE PLAN EXERCISE PLAN EXERCISE PLAN EXERCISE PLAN EXERCISE PLAN   *Interventions* *Interventions* *Interventions* *Interventions* *Interventions*   Exercise Prescription  (per physician & CR staff) Exercise Prescription  (per physician & CR staff) Exercise Prescription  (per physician & CR staff) Exercise Prescription  (per physician & CR staff) Exercise Prescription  (per physician & CR staff)   Cardiovascular Cardiovascular Cardiovascular Cardiovascular Cardiovascular   Mode:    [] Treadmill (TM)  [x] Schwinn Airdyne (AD)  [x] Arms Ergometer (AE)  [x] NuStep  [] Elliptical (E) MODE:    [x] Arms Ergometer (AE)  [x] NuStep   MODE:    [] Treadmill (TM)  [] Schwinn Airdyne (AD)  [x] Arms Ergometer (AE)  [x] NuStep  [] Elliptical (E) MODE:    [] Treadmill (TM)  [] Schwinn Airdyne (AD)  [x] Arms Ergometer (AE)  [x] NuStep  [] Elliptical (E) MODE:    [] Treadmill (TM)  [] Schwinn Airdyne (AD)  [x] Arms Ergometer (AE)  [x] NuStep  [] Elliptical (E)   Initial Workloads    AD: 0.5 level = 1.6 METs  NS: 28  Poll[place]= 1.9 METs  AE: 0.2 level = 1.3 METs Current Workloads    NS:  48 Poll[place]= 2.3 METs  AE: 26w  =  1.9METs Current Workloads  NS:  54 Poll[place]= 2.4 METs  AE: 50 polalck = 2.3 METs Current Workloads  NS: 78 Pollack= 2.8 METs  AE: 60 pollack = 2.5 METs Current Workloads  NS: 78 Pollack= 2.8 METs  AE: 60 pollack = 2.5 METs     Frequency:    ICR: 3x/week  Home: 2-3x/wk Frequency:   ICR: 3x/week  Home: 3x/wk Frequency:  ICR: 3x/week  Home: 3-4x/wk Frequency:  ICR: 3x/week  Home: 3-4x/wk Frequency:  Cresencio will continue exercise at  5-7 days/week   Duration:   Total aerobic exercise = 30-45 min    5-8 min/mode Duration:  Total aerobic exercises =35 min     15min/mode Duration:  Total aerobic exercises = 35-45 min     15 min/mode Duration:  Total aerobic exercises = 35-45 min     15 min/mode Duration:  Total erobic exercise =  60-90 min   Intensity:   MET Level = 1.9  RPE = 12-15 Intensity:  Max MET Level = 2.3  RPE = 12-15 Intensity:  Max MET Level = 2.4  RPE = 12-15 Intensity:  Max MET Level =2.8  RPE = 12-15 Intensity:  Max MET Level = 2.8   RPE = 12-15   Progression: increase aerobic activity up to 15 min over next 4 weeks by increasing time 2-3 min/week. Progression: increase aerobic intensity as tolerated by pt   Progression:  increase aerobic intensity as tolerated by pt Progression:  increase aerobic intensity as tolerated by pt Progression:  Increase time/intensity when RPE <13, and HR is in [facility]   [x] Yes      [] No  Upper and Lower body strength training 2x/wk    Wt: 2#       Reps:  8-15    *Increase wt. after completing 15 reps with an RPE of <12/13. [x] Yes      [] No  Upper and Lower body strength training 2x/wk    Wt: 3#       Reps:  8-15    *Increase wt. after completing 15 reps with an RPE of <12/13. [x] Yes      [] No  Upper and Lower body strength training 2x/wk    Wt: 4#       Reps:  8-15    *Increase wt. after completing 15 reps with an RPE of <12/13. [x] Yes      [] No  Upper and Lower body strength training 2x/wk    Wt: 4#       Reps:  8-15    *Increase wt. after completing 15 reps with an RPE of <12/13. Continue Strength Training at home   [x] Exercise Log & Strength training handout given     Wt: 4#       Reps:  8-15    *Increase wt. after completing 15 reps with an RPE of <12/13.    Flexibility Flexibility Flexibility Flexibility Flexibility   [x] Yes      [] No  25 min session of Core Strength & Flexibility 1x/per week  Attends Core Strength & Flexibility   [x] Yes      [] No Attends Core Strength & Flexibility   [x] Yes      [] No Attends Core Strength & Flexibility   [x] Yes      [] No Continue Core Strength & Flexibility at home   Home Exercise  *Cresencio verbalizes planning to initiate home exercise. Home Exercise  *Cresencio verbalizes planning to initiate home exercise Home Exercise  *Cresencio verbalizes planning to initiate home exercise soon. Home Exercise  *Cresencio verbalizes planning to use the ellipical/Nustep 3-4 days/week for 30-45 min on days not in rehab. Home Exercise  *Cresencio verbalizes his plan to exercise at home 4-5days/ wk for 30+ mins   *Education* *Education* *Education* *Education* *Education*   RPE Scale  [x] Yes      [] No  Exercise Safety  [x] Yes      [] No  Equipment Orientation  [x] Yes      [] No  S/S to Report  [x] Yes      [] No  Warm Up/Cool Down  [x] Yes      [] No  Home Exercise  [x] Yes      [] No    All Exercise Education Completed  [x] Yes      [] No   Exercise Education Recommended    Workshops  [x] Improving Performance  [x] Balance Training & Fall Prevention  [x] Exercise Biomechanics  [x] Resistance Training & Core Strength Exercise Education Attended/Date    Yoga video - 9/16/2020 Exercise Education Attended/Date   Exercise Education Attended/Date       All Sessions Completed? [] Yes  [x] No  Patient declined remaining exercise education   *Goals* *Goals* *Goals* *Goals* *Goals*   Initial Exercise Goals Exercise Goals  Exercise Goals   Exercise Goals  Exercise Goals   Cresencio plans to:  [x] Attend exercise sessions 3x/wk  [x] initiate home exercise 2-3x/wk for 10-20 min  [x] Increase 6 min walk distance by 10%  [x] Attend Exercise workshops Cresencio plans to:  [x] Attend exercise sessions 3x/wk  [x] continue home exercise 2-3x/wk for 20-30 min  [x] Attend Exercise workshops Cresencio's plans to:  [x] Attend exercise sessions 3x/wk  [x] continue home exercise 3-4x/wk for 30-45 min  [x] Determine plan of exercise following rehab  [x] Attend Exercise workshops Cresencio's plans to:      Purchase a NuStep for home use. Cresencio achieved exercise goals?     [x]  Yes    [] No    [] Increased 6 min walk distance by 10%  [x] Currently exercising 30-60 min/day, 5-7days/wk   [x] Plans to continue exercise on own  [] Plans to join a local fitness center to continue exercise  [] Does not plan to continue to exercise after rehab   Return to ADL or Hobbies:  Jazzmine Paredes would like to improve strength and endurance so he is able to return to doing everything. Return to ADL or Hobbies:  Jazzmine Paredes would like to improve strength and endurance so he is able to return to doing everything he wants to do  Return to ADL or Hobbies:  Jazzmine Paredes would like to improve strength and endurance so he is able to return to doing everything he wants to do. Return to ADL or Hobbies:  Jazzmine Paredes would like to improve strength and endurance so he is able to return to everything he wants to do. Return to ADL or Hobbies:  Jazzmine Paredes would like to improve strength and endurance so he is able to return to everything he would like to do   *MET level required for above goal:  4.0 METs MET level Achieved:  2. 3METs MET level Achieved: 2.4 METs MET level Achieved: 2.8 METs MET level Achieved:  *2. 8METs     Individual Cardiac Treatment Plan - Nutrition  NUTRITION  ASSESSMENT/PLAN NUTRITION  REASSESSMENT NUTRITION   REASSESSMENT NUTRITION   REASSESSMENT NUTRITION  DISCHARGE/FOLLOW-UP   Stages of Change Stages of Change Stages of Change Stages of Change Stages of Change   [] Pre Contemplation  [] Contemplation  [x] Preparation  [] Action  [] Maintenance  [] Relapse [] Pre Contemplation  [] Contemplation  [x] Preparation  [] Action  [] Maintenance  [] Relapse [] Pre Contemplation  [] Contemplation  [x] Preparation  [] Action  [] Maintenance  [] Relapse [] Pre Contemplation  [] Contemplation  [x] Preparation  [] Action  [] Maintenance  [] Relapse [] Pre Contemplation  [] Contemplation  [x] Preparation  [] Action  [] Maintenance  [] Relapse   NUTRITION ASSESSMENT NUTRITION ASSESSMENT NUTRITION ASSESSMENT NUTRITION ASSESSMENT NUTRITION ASSESSMENT   Weight Management  Weight: 251.8lbs   Height: 69in  BMI: 37.3 Weight Management  Weight: 254.8                  Weight Management  Weight: 248                  Weight Management  Weight: 243 Weight Management  Weight: 249                    BMI: 36.8   Eating Plan  Current eating habits: none Eating Plan  Changes:no salt Eating Plan  Changes: none Eating Plan  Changes: none Eating Plan Improvements: none   Alcohol Use  [] none          [] daily  [x] weekly      [x] special   Type: beer/wine  Amount: 6       Diet Assessment Tool:  RATE YOUR PLATE  *Given to patient to complete and return. Diet Assessment Tool:    Score: 45/69       Diet Assessment Tool: RATE YOUR PLATE  Score: Patient did not return    NUTRITION PLAN NUTRITION PLAN NUTRITION PLAN NUTRITION PLAN NUTRITION PLAN   *Interventions* *Interventions* *Interventions* *Interventions* *Interventions*   Initial Survey given Goal Setting Discussion:   [x] Yes      [] No       Follow Up Survey Reviewed & Goals Updated:     Professional Referral  Please check if needed. [] Dietitian Consult   [] Wt. Management Referral  [] Other:  Professional Referral  Please check if needed. [] Dietitian Consult   [] Wt. Management Referral  [] Other: Professional Referral  Please check if needed. [] Dietitian Consult   [] Wt. Management Referral  [] Other: Professional Referral  Please check if needed. [] Dietitian Consult   [] Wt. Management Referral  [] Other: Professional Referral  Please check if needed. [] Dietitian Consult   [] Wt. Management Referral  [] Other:   *Education* *Education* *Education* *Education* *Education*   Nutritional Education Recommended    [x] 1:1 Registered Dietitian    Workshops  [x] Label Reading   [x] Menu  [x] Targeting Nutrition Priorities  [x] Fueling a Healthy Body   Nutritional Education Attended/Date Nutritional Education Attended/Date    Declined education.       Nutritional Education Attended/Date    [employer] Education All Sessions Completed? [] Yes  [x] No- patient declined remaining education   Cooking School  Recommended     [x] Adding Flavor  [x] Fast & Healthy     Breakfasts  [x] Salads & Dressings  [x] Soups & Simple     Sauces  [x] Simple Sides  [x] Appetizers &     Snacks  [x] Delicious Desserts  [x] Plant Proteins  [x] Fast Evening Meals  [x] Weekend Breakfasts  [x] Cook once, Eat       twice  [x] [employer] Cooking School  Sessions Completed    Fast evening meals - 9/18/2020    Weekend breakfast - 9/25/2020   Cooking School  Sessions Completed    Declined education. Cooking School  Sessions Completed    [address] [place] [address]    # of sessions completed: pt declined remaining education   *Goals* *Goals* *Goals* *Goals* *Goals*   Cresencio's nutritional goals are as follows:  Complete and return diet survey Cresencio's nutritional goals are as follows:  [x] Attend Nutrition Workshops  [x] Attend 1:1   [x] Attend Cooking Classes   Cresencio's nutritional goals are as follows:    [x] Complete and return diet survey   Cresencio's nutritional goals are as follows:    [x] Complete and return diet survey Cresencio achieved nutritional goals   [] Yes    [x] No  If no, why?  Pt did not attend classes  Use knowledge gained to continue Pritikin eating plan at home       Individual Cardiac Treatment Plan - Psychosocial  PSYCHOSOCIAL  ASSESSMENT/PLAN PSYCHOSOCIAL  REASSESSMENT PSYCHOSOCIAL   REASSESSMENT PSYCHOSOCIAL   REASSESSMENT PSYCHOSOCIAL  DISCHARGE/FOLLOW-UP   Stages of Change Stages of Change Stages of Change Stages of Change Stages of Change   [] Pre Contemplation  [] Contemplation  [] Preparation  [] Action  [] Maintenance  [] Relapse [] Pre Contemplation  [] Contemplation  [x] Preparation  [] Action  [] Maintenance  [] Relapse [] Pre Contemplation  [] Contemplation  [x] Preparation  [] Action  [] Maintenance  [] Relapse [] Pre Contemplation  [] Contemplation  [x] Preparation  [] Action  [] Maintenance  [] Relapse [] Pre Contemplation  [] Contemplation  [x] Preparation  [] Action  [] Maintenance  [] Relapse   PSYCHOSOCIAL ASSESSMENT PSYCHOSOCIAL ASSESSMENT PSYCHOSOCIAL ASSESSMENT PSYCHOSOCIAL ASSESSMENT PSYCHOSOCIAL ASSESSMENT   Behavioral Outcomes Behavioral Outcomes Behavioral Outcomes Behavioral Outcomes Behavioral Outcomes   Tool Used:  Rolan Farser, Quality of Life Index, Cardiac Version IV  *Given to patient to complete. Tool Used:    Rolan Fraser, Quality of Life Index, Cardiac Version IV     QOL Index Score: 21.52  HF:16.86  S&E:25.25  P&S: 25  Family: 27   Tool Used:     Rolan Fraser, Quality of Life Index, Cardiac Version IV   Patient did not return   PHQ-9 score 7  Depression Severity  []Minimal  [x]Mild   []Moderate  []Moderately Severe  []Severe    PHQ-9 score   Patient did not return     Does patient have Family Support? [x] Yes      [] No  No signs of marital/family distress       Within the Past Month:  *Have you wished you were dead or wished you could go to sleep and not wake up? [] Yes      [x] No  *Have you had any thoughts of killing yourself? [] Yes      [x] No         Using a scale of 0-10, 0=none, 10=very:   Rate your depression: 0  Rate your anxiety:  0  Using a scale of 0-10, 0=none, 10=very:   Rate your depression: 1  Rate your anxiety:  1 Using a scale of 0-10, 0=none, 10=very:   Rate your depression: 0  Rate your anxiety: 0 Using a scale of 0-10, 0=none, 10=very:   Rate your depression: 0  Rate your anxiety: 0 Using a scale of 0-10, 0=none, 10=very:   Rate your depression: 0  Rate your anxiety:  0   Signs and Symptoms of Depression Present? [] Yes      [x] No   Signs and Symptoms of Depression Present? [] Yes      [x] No   Signs and Symptoms of Depression Present? [] Yes      [x] No   Signs and Symptoms of Depression Present? [] Yes      [x] No   Signs and Symptoms of Depression Present? [] Yes      [x] No     Signs and Symptoms of Anxiety Present?     [] Yes      [x] No Workshops  Completed  [] Yes      [x] No- pt declined remaining education workshops   *Goals* *Goals* *Goals* *Goals* *Goals*   Cresencio's psychosocial goals are as follows:  Complete HADS & Rolan & Evelio, Quality of Life Index, Cardiac Version IV Cresencio's psychosocial goals are as follows:  [x] Attend Healthy Mind-Set Workshops  [x] Reduce depression symptom severity by 1 level   Cresencio's psychosocial goals are as follows:    [x] Maintain mental health status Cresencio's psychosocial goals are as follows:    Maintain mental health status Cresencio achieved psychosocial goals?   [x] Yes    [] No    [] Use methods learned to continue to reduce depression symptom severity by 1 level  [] **     Individual Cardiac Treatment Plan - Other:  Risk Factor/Education  RISK FACTOR  ASSESSMENT/PLAN RISK FACTOR  REASSESSMENT  RISK FACTOR  REASSESSMENT RISK FACTOR  REASSESSMENT RISK FACTOR   DISCHARGE/FOLLOW-UP   Stages of Change Stages of Change Stages of Change Stages of Change Stages of Change   [] Pre Contemplation  [] Contemplation  [x] Preparation  [] Action  [] Maintenance  [] Relapse [] Pre Contemplation  [] Contemplation  [x] Preparation  [] Action  [] Maintenance  [] Relapse [] Pre Contemplation  [] Contemplation  [x] Preparation  [] Action  [] Maintenance  [] Relapse [] Pre Contemplation  [] Contemplation  [x] Preparation  [] Action  [] Maintenance  [] Relapse [] Pre Contemplation  [] Contemplation  [x] Preparation  [] Action  [] Maintenance  [] Relapse   RISK FACTOR/EDUCATION ASSESSMENT RISK FACTOR/EDUCATION ASSESSMENT RISK FACTOR/EDUCATION ASSESSMENT RISK FACTOR/EDUCATION ASSESSMENT RISK FACTOR /EDUCATION ASSESSMENT   Hypertension  [x] Yes      [] No    Resting BP: 108/60  Peak Ex BP:120/60  Medication: diovan   Hypertension  Resting BP: 102/60  Peak Ex BP:120/80  Medication Changes:  [] Yes      [x] No Hypertension  Resting BP: 88/52  Peak Ex BP: 144/68  Medication Changes:  [] Yes      [x] No Hypertension  Resting BP: 106/60  Peak Ex BP:110/78  Medication Changes:  [] Yes      [x] No Hypertension  Resting BP: 96/58  Peak Ex BP:120/70  Medication Changes:  [] Yes      [x] No   Lipids  HLD/DLD  [x] Yes      [] No  TOTAL CHOL: 222  HDL:  67  LDL:  127  TRI  Medication: lipitor Lipids  Medication Changes:  [] Yes      [x] No     Lipids  Medication Changes:  [] Yes      [x] No     Lipids  Medication Changes:  [] Yes      [x] No     Lipids    Not redrawn    Medication Changes:  [] Yes      [x] No   Diabetes  [] Yes      [x] No  FBS: 94           HbA1C:        Monitor BS @ home:   [] Yes      [x] No Diabetes  Not diabetic    Diabetes    [x] No     Diabetes    [x] No     Diabetes  NA       Tobacco Use  [] Current  [] Former  [x] Never Tobacco Use  Change in smoking status   [] Yes      [x] No     Tobacco Use  Change in smoking status   [] Yes      [x] No       Tobacco Use  Change in smoking status   [] Yes      [x] No     Tobacco Use  Change in smoking status   [] Yes      [x] No                Learning Barriers  Please select one:  [] Speech  [] Literacy  [] Hearing  [] Cognitive  [] Vision  [x] Ready to Learn Learning Barriers Addressed:   [x] Yes      [] No   Learning Barriers Addressed:   [x] Yes      [] No   Learning Barriers Addressed:  [x] Yes      [] No Learning Barriers Addressed:  [x] Yes      [] No     RISK FACTOR/EDUCATION PLAN RISK FACTOR/EDUCATION PLAN RISK FACTOR/EDUCATION PLAN RISK FACTOR/EDUCATION PLAN RISK FACTOR/EDUCATION PLAN   *Interventions* *Interventions* *Interventions* *Interventions* *Interventions*   Recommended Educational Videos    [x] Overview of The Pritikin Eating Plan  [x] Becoming A Pritikin   [x] Diseases of Our Time-Part 1  [x] Calorie Density  [x] Label Reading-Part 1  [x] Move It!   [x] Healthy Minds, Bodies, Hearts  [x] Dining Out-Part 1  [x] Heart Disease Risk Reduction  [x] Metabolic Syndrome & Belly Fat  [x] Facts on Fat  [x] Diseases of Our Times-Part 2  [x] Biology of Weight Control  [x] Biomechanical Limitations  [x] Nurtition Action Plan   Completed Videos/Date      9/1/2020  Overview of The Pritikin Eating Plan    Disease part 1 - 9/21/2020    Calorie density - 9/28/2020    Heart disease risk reduction - 9/14/2020 Completed Videos/Date      Declined all education. Completed Videos/Date    Declined all education. Recommended Educational Videos Completed    [] Yes      [x] No- patient declined remaining education              Smoking Cessation/Relaspe Prevention Intervention needed? [] Yes      [x] No  *Pt verbalizes and agrees to attend intervention Smoking Cessation/Relapse Prevention Scheduled? NOT NEEDED Smoking Cessation/Relapse Prevention completed? Not needed. Smoking Cessation Counseling attended  NA   Professional Referrals:  *Please check if needed  [] Diabetes Clinic  [] Lipid Clinic   [] Other:     Professional Referrals:  *Please check if needed  [] Diabetes Clinic  [] Lipid Clinic   [] Other:   Preventative Medication Preventative Medication Preventative Medication Preventative Medication Preventative Medication   Aspirin  [x] Yes    [] No  Blood Thinner: Clopidogrel/Effient/Brillinta  [] Yes    [x] No  Beta Blocker  [x] Yes    [] No  Ace Inhibitor  [x] Yes    [] No  Statin/Lipid Lowering  [x] Yes    [] No Medication Changes? [] Yes    [x] No Medication Changes? [] Yes    [x] No Medication Changes? [] Yes    [x] No Medication Changes? [] Yes    [x] No   *Education* *Education* *Education* *Education* *Education*   Does Cresencio require any additional education? [] Yes    [x] No   Does Cresencio require any additional education? [] Yes    [x] No Does Cresencio require any additional education? [] Yes    [x] No Does Cresencio require any additional education? [] Yes    [x] No Does Cresencio require any additional education?   [] Yes    [x] No   Recommended Additional Educational Videos    Medical  [] Hypertension & Heart Disease  [] Decoding Lab Results  [] Aging Enhancing Your QoL  [] Sleep Disorders  Exercise  [] Body Composition  [] Improve Performance  [] Exercise Action Plan  [] Intro to Yoga  Behavioral  [] How Our Thoughts Can Heal Our Hearts  [] Smoking Cessation  Nutrition  [] Planning Your Eating Strategy  [] Lable Reading- Part 2  [] Dining Out - Part 2  [] Targeting Your Nutrition Priorities  [] Fueling a Healthy Body  [] Menu Workshop  [employer] [] Breakfast & Snacks  [] [employer] Salads & Dressing  [] Dinner & Sides  [] Soups & Desserts   Additional Educational Videos Completed Additional Educational Videos Completed Additional Educational Videos Completed Additional Educational Videos Completed    [] Yes    [x] No   *Goals* *Goals* *Goals* *Goals* *Goals*   Cresencio's risk factor/education goals are as follows:    [x] Optimal BP <140/90  [] Blood Sugar <120  [x] Attend recommended video education sessions  [x] Takes medications as prescribed 100% of the time   [] ** Cresencio's risk factor/education goals are as follows:    [x] Optimal BP <140/90  [] Blood Sugar <120  [x] Attend recommended video education sessions  [x] Takes medications as prescribed 100% of the time    Cresencio's risk factor/education goals are as follows:    [x] Optimal BP <140/90  [] Blood Sugar <120  [x] Attend recommended video education sessions  [x] Takes medications as prescribed 100% of the time   [] ** Cresencio's risk factor/education goals are as follows:    [x] Optimal BP <140/90  [] Blood Sugar <120  [x] Attend recommended video education sessions  [x] Takes medications as prescribed 100% of the time   [] ** Cresencio achieved risk factor goals? [] Yes    [x] No  If no, why? Did not attend recommended education sessions     Monitored telemetry has revealed NSR   Monitored telemetry has revealed NSR  [] documented arrhythmia at increasing workloads  [] associated symptoms ** Monitored telemetry has revealed NSR   Monitored telemetry has revealed NSR w/ PVCs - No associated symptoms. Monitored telemetry has revealed  NSR w/ PVCs     Physician Response    [x] Cardiac rehab is reasonably and medically necessary for continuous cardiac monitoring surveillance  of patient's cardiac activity  [x] Initiate continuous telemerty monitoring and notify me with any concerns  [] Other   Physician Response    [x] Cardiac rehab is reasonably and medically necessary for continuous cardiac monitoring surveillance  of patient's cardiac activity  [x] Continue continuous telemerty monitoring and notify me with any concerns  [] Other     Physician Response    [x] Cardiac rehab is reasonably and medically necessary for continuous cardiac monitoring surveillance  of patient's cardiac activity  [x] Continue continuous telemerty monitoring and notify me with any concerns   [] Other     Physician Response    [x] Cardiac rehab is reasonably and medically necessary for continuous cardiac monitoring surveillance  of patient's cardiac activity  [x] Continue continuous telemerty monitoring and notify me with any concerns   [] Other          Cosigned by:  Anna Marie Majano MD at 9/2/2020 12:59 PM    Revision History     Date/Time  User  Provider Type  Action    9/2/2020 12:59 PM  Anna Marie Majano MD  Physician  Cosign    9/1/2020 12:01 PM  Maggi Brown  Exercise Physiologist  Sign     Cosigned by:  Anna Marie Majano MD at 9/30/2020 12:43 PM    Revision History     Date/Time  User  Provider Type  Action    9/30/2020 12:43 PM  Anna Marie Majano MD  Physician  Cosign    9/28/2020 12:27 PM  Sherlyn Burnham  Exercise Physiologist      Cosigned by:  Anna Marie Majano MD at 10/27/2020  8:16 PM    Revision History     Date/Time  User  Provider Type  Action    10/27/2020  8:16 PM  Anna Marie Majano MD  Physician  [address] [place] [address]    10/26/2020  9:24 AM  Filomena Carbajal  Exercise Physiologist      Cosigned by:  Anna Marie Majano MD at 11/23/2020  2:22 PM    Revision History     Date/Time  User  Provider Type  Action    11/23/2020  2:22 PM  Anna Marie Majano MD Physician  Cosign    11/23/2020 11:52 AM  Cait Lopez  Exercise Physiologist  Sign

## 2020-12-17 ENCOUNTER — OFFICE VISIT (OUTPATIENT)
Dept: CARDIOLOGY CLINIC | Age: 60
End: 2020-12-17
Payer: MEDICARE

## 2020-12-17 VITALS
OXYGEN SATURATION: 97 % | HEART RATE: 70 BPM | HEIGHT: 69 IN | SYSTOLIC BLOOD PRESSURE: 120 MMHG | BODY MASS INDEX: 38.45 KG/M2 | WEIGHT: 259.6 LBS | DIASTOLIC BLOOD PRESSURE: 78 MMHG

## 2020-12-17 PROBLEM — Z01.818 ENCOUNTER FOR PRE-TRANSPLANT EVALUATION FOR HEART TRANSPLANT: Status: ACTIVE | Noted: 2020-09-10

## 2020-12-17 PROBLEM — I50.22 CHF (CONGESTIVE HEART FAILURE), NYHA CLASS III, CHRONIC, SYSTOLIC (HCC): Status: ACTIVE | Noted: 2020-12-17

## 2020-12-17 PROCEDURE — 1036F TOBACCO NON-USER: CPT | Performed by: NURSE PRACTITIONER

## 2020-12-17 PROCEDURE — G8417 CALC BMI ABV UP PARAM F/U: HCPCS | Performed by: NURSE PRACTITIONER

## 2020-12-17 PROCEDURE — 3017F COLORECTAL CA SCREEN DOC REV: CPT | Performed by: NURSE PRACTITIONER

## 2020-12-17 PROCEDURE — G8484 FLU IMMUNIZE NO ADMIN: HCPCS | Performed by: NURSE PRACTITIONER

## 2020-12-17 PROCEDURE — 99214 OFFICE O/P EST MOD 30 MIN: CPT | Performed by: NURSE PRACTITIONER

## 2020-12-17 PROCEDURE — G8427 DOCREV CUR MEDS BY ELIG CLIN: HCPCS | Performed by: NURSE PRACTITIONER

## 2020-12-17 RX ORDER — AMITRIPTYLINE HYDROCHLORIDE 10 MG/1
10 TABLET, FILM COATED ORAL NIGHTLY
COMMUNITY
End: 2021-09-14 | Stop reason: ALTCHOICE

## 2020-12-17 RX ORDER — CYCLOBENZAPRINE HCL 5 MG
5 TABLET ORAL DAILY PRN
COMMUNITY

## 2020-12-17 RX ORDER — TORSEMIDE 20 MG/1
20 TABLET ORAL DAILY
Qty: 90 TABLET | Refills: 1 | Status: SHIPPED | OUTPATIENT
Start: 2020-12-17 | End: 2021-09-14

## 2020-12-17 RX ORDER — METOPROLOL SUCCINATE 25 MG/1
12.5 TABLET, EXTENDED RELEASE ORAL DAILY
COMMUNITY
Start: 2020-12-17

## 2020-12-17 RX ORDER — SPIRONOLACTONE 25 MG/1
12.5 TABLET ORAL DAILY
COMMUNITY
End: 2021-02-23

## 2020-12-17 RX ORDER — TORSEMIDE 20 MG/1
20 TABLET ORAL DAILY
COMMUNITY
End: 2020-12-17 | Stop reason: SDUPTHER

## 2020-12-17 RX ORDER — METOPROLOL SUCCINATE 25 MG/1
25 TABLET, EXTENDED RELEASE ORAL DAILY
Qty: 30 TABLET | Refills: 5 | Status: CANCELLED | OUTPATIENT
Start: 2020-12-17

## 2020-12-17 RX ORDER — POTASSIUM CHLORIDE 750 MG/1
10 TABLET, FILM COATED, EXTENDED RELEASE ORAL DAILY
COMMUNITY
End: 2021-04-06

## 2020-12-17 ASSESSMENT — ENCOUNTER SYMPTOMS
SHORTNESS OF BREATH: 1
COUGH: 0
ABDOMINAL DISTENTION: 1

## 2020-12-17 NOTE — PROGRESS NOTES
Heart Failure Clinic       Visit Date: 12/17/2020  Cardiologist:  Dr. Prajapati Colorado  Primary Care Physician: Dr. Talib Mccloud MD    Fritz Perkins is a 56 y.o. male who presents today for:  Chief Complaint   Patient presents with    Congestive Heart Failure       HPI:   Fritz Perkins is a 56 y.o. male who presents to the office for a follow up patient visit in the heart failure clinic. Accompanied by no one    TYPE HF: NICM (EF 30%) (viral illness years ago)   Device: ICD (2013)  HX: SYED, HLD, VT (Amiodarone)  [place] 12/6/18 - Negative  Following w/ [facility] Advanced HF clinic = Dr Candelaria Porter    Dry Wt: Aaliyah Pair     Hospitalization for [address] 7/2020  Admitted 7/23-7/27 CC = Hospital Course: He presented at least 6 lb above his dry weight despite escalating diuretic doses. He underwent elective RHC which revealed elevated left sided pressures RA 15, PA 80/40, PCWP 45, CO 3.38, CI 1.48. He was transferred to CICU for swan guided management. He was given IV diuresis and afterload reduction with improvement. Once optimized, he was transitioned to oral regimen as below. He was transitioned to ARB as he did report intolerance to ARNI. By 7/27, he was felt stable for discharge at a weight of 251lb. VV 8/3 w/ Rigo Richardson [facility] - resumed Toprol 12.5/day  OV 12/9 Kaur = adjusted Torsemide. Consider dapagliflozin 5/day. Start CardioMems process. 1-Monitor weight every day  2-Torsemide  Wednesday 1 pill  Thursday 2 pills in am and 1 pill in pm- Take Potassium 10mEq 1 pill. Friday am Bloodwork. Friday 2 pills in am and 1 pill in pm- Consider-Review Labs-Take Potassium 10mEq 1 pill. Saturday 2 pills  Sunday 1 pill. Monday Bloodwork  Will consider dapagliflozin 5mg per day once volume status is optmized on top of torsemide 20 on in place of. 3-Will start process for Cardiomems   4-Follow up in 1 month. Phone encounter or sooner.   5-Continue evaluation for Heart Transplantation Heart Failure specific medications (list current, note updates or changes, note prior intolerance):  BB: Metoprolol 12.5mg per day   ACEI/ARB/ARNI: Valsartan 20mg bid  MRA: Spironolactone 12.5mg per day   Diuretic: Torsemide see above  Digoxin: digoxin   Vasodilators: None   Anti-arrhythmics: amiodarone   Ivabradine: NOne   Other anti-HTN: None   12/11 - started Jardiance 10/day  Issue w/ RYAN d/t low BP - creat bumped 2.6    Concerns today: Wt up 3#, feels bloated in abd, puffy in hands - plans to call Dr Mark Anthony Parnell today. Diuretics have been adjusted recently  C/o fatigue, BREWER. Tires easily. Getting BMP tomorrow  Activity: walked from entrance - can complete ADLs.    Diet: watches salt and fluid    Patient has:  Chest Pain: no  SOB: BREWER  Orthopnea/PND: no  SYED: not tolerated  Edema: no  Fatigue: yes  Abdominal bloating: yes  Cough: no  Appetite: good  Any extra diuretic use: no  Weight gain: yes   Home weight: yes  Home blood pressure: 100-120 - HR 70-80    Past Medical History:   Diagnosis Date    Acute kidney injury ([address]     Cardiomyopathy, dilated ([address]     VIRAL    Claustrophobia     Congestive heart failure (CHF) ([address]     Depression 12/26/2013    HTN (hypertension)     Hyperlipidemia     Medtronic dual ICD 2/14/2014    Osteoarthritis     Osteoarthritis of spine with radiculopathy, lumbar region 12/28/2015    Spinal stenosis     Unspecified sleep apnea     unable to wear CPAP     Past Surgical History:   Procedure Laterality Date    BACK SURGERY  8/26/2014    L4- S1 decompression, L4-5 PSF and TLIF    BACK SURGERY  2013   Lilly Her CARDIAC CATHETERIZATION  10/2013    [address]  2013    [employer]    CARDIOVASCULAR STRESS TEST  09/2013    CARPAL TUNNEL RELEASE Right     CERVICAL FUSION  05/17/2017    [address]    COLONOSCOPY      DIAGNOSTIC CARDIAC CATH LAB PROCEDURE  07/2020    [facility]    DOPPLER ECHOCARDIOGRAPHY  09/2013  DOPPLER ECHOCARDIOGRAPHY  09/2013    [facility]    ENDOSCOPY, COLON, DIAGNOSTIC      FACET JOINT INJECTION Right 5/26/2020    L-facet RFA @ L2-3,L3-4,L4-5  RIGHT performed by Mayra Hinojosa MD at [address] [facility] [address] [facility] Left 7/2/2019    Left SI injection performed by Mayra Hinojosa MD at [address] [place] [address] Left 8/26/2019    Left SI injection # 2 performed by Mayra Hinojosa MD at [address] [address] [facility]   ? ??    umbilical    LUMBAR SPINE SURGERY Left 9/11/2017    LUMBAR RFA L2-3, L3-4, L4-5, L5-S1 LEFT SIDE performed by Mayra Hinojosa MD at [address] [facility] Right 10/31/2017    LUMBAR FACET RFA @ L2-3, L3-4, L4-5 AND L5-S1 RIGHT SIDE performed by Mayra Hinojosa MD at [address] [facility] Left 4/11/2019    L-RFA @ L2-3, L3-4, L4-5, L5-S1 LEFT SIDE FIRST.  performed by Mayra Hinojosa MD at [address] [facility] Left 10/3/2019    LEFT SI RFA performed by Mayra Hinojosa MD at [address] [facility]  2015    OTHER SURGICAL HISTORY  10/9/2015    C3-6 ACDF    OTHER SURGICAL HISTORY  01/18/2016    FACET INJECTIONS L3-L4 L4-L5 L5 S1    OTHER SURGICAL HISTORY  2-8-2016    RFA - L3-S1    OTHER SURGICAL HISTORY N/A 03-21-16    cervical epidural block C7    OTHER SURGICAL HISTORY Bilateral 05-16-16    cervical facet block C7-T1    OTHER SURGICAL HISTORY  08/01/2016    CERVICAL ABLATION    OTHER SURGICAL HISTORY Left 09/11/2017    Lumbar RFA at L2-3, L3-4, L4-5, L5-S1    OTHER SURGICAL HISTORY Right 10/31/2017    Lumbar RFA at L2-3, L3-4, L4-5, L5-S1    OTHER SURGICAL HISTORY Right 05/09/2018    Excision scalp mass right forehead    OK EXC SKIN BENIG <5MM REMAINDR BODY Right 5/9/2018  DULoxetine (CYMBALTA) 30 MG extended release capsule 60 mg       zinc sulfate (ZINCATE) 220 (50 Zn) MG capsule Take 50 mg by mouth daily       No current facility-administered medications for this visit. No Known Allergies    SUBJECTIVE:   Review of Systems   Constitutional: Positive for fatigue. Negative for activity change and appetite change. Respiratory: Positive for shortness of breath (BREWER). Negative for cough. Cardiovascular: Negative for chest pain, palpitations and leg swelling. Gastrointestinal: Positive for abdominal distention. Neurological: Negative for weakness, light-headedness and headaches. Hematological: Negative for adenopathy. Psychiatric/Behavioral: Negative for sleep disturbance. OBJECTIVE:   Today's Vitals:  /78   Pulse 70   Ht 5' 9\" (1.753 m)   Wt 259 lb 9.6 oz (117.8 kg)   SpO2 97%   BMI 38.34 kg/m²     Physical Exam  Vitals signs reviewed. Constitutional:       General: He is not in acute distress. Appearance: Normal appearance. He is well-developed. He is not diaphoretic. HENT:      Head: Normocephalic and atraumatic. Eyes:      Conjunctiva/sclera: Conjunctivae normal.   Neck:      Musculoskeletal: Normal range of motion and neck supple. Comments: No JVD  Cardiovascular:      Rate and Rhythm: Normal rate and regular rhythm. Heart sounds: Normal heart sounds. No murmur. Pulmonary:      Effort: Pulmonary effort is normal. No respiratory distress. Breath sounds: Normal breath sounds. No wheezing or rales. Abdominal:      General: Bowel sounds are normal. There is no distension. Palpations: Abdomen is soft. Tenderness: There is no abdominal tenderness. Musculoskeletal: Normal range of motion. Right lower leg: No edema. Left lower leg: No edema. Skin:     General: Skin is warm and dry. Capillary Refill: Capillary refill takes less than 2 seconds.    Neurological: Mental Status: He is alert and oriented to person, place, and time. Coordination: Coordination normal.   Psychiatric:         Behavior: Behavior normal.         Wt Readings from Last 3 Encounters:   12/17/20 259 lb 9.6 oz (117.8 kg)   12/11/20 249 lb (112.9 kg)   11/20/20 249 lb (112.9 kg)     BP Readings from Last 3 Encounters:   12/17/20 120/78   11/20/20 124/70   09/16/20 116/60     Pulse Readings from Last 3 Encounters:   12/17/20 70   11/20/20 64   09/16/20 70     Body mass index is 38.34 kg/m². ECHO:   CONCLUSIONS:  - Exam indication: Initial evaluation of Heart Failure  - The left ventricle is moderately dilated. Left ventricular systolic function is   severely decreased. EF = 30 ± 5% (2D biplane) Definity contrast used for   endocardial border detection. Indeterminate left ventricular diastolic dysfunction   due to tachycardia. Frequent ectopy throughout the exam.  - The right ventricle is normal in size. Right ventricular systolic function is   normal.  - The left atrial cavity is moderately dilated. - The right atrial cavity is dilated. - There is moderate (2+) holosystolic mitral valve regurgitation.  - Estimated right ventricular systolic pressure is 57 mmHg consistent with   moderate pulmonary hypertension. Estimated right atrial pressure is 8 mmHg based   on IVC assessment. - Exam was compared with the prior  echocardiographic exam performed on   12/10/01, LV function has decreased compared to the prior. Electronically signed by Nohemy Smith MD on 7/23/2020 at 6:27:07 PM    CATH/STRESS:   Imaging:Results:Calculated gated LVEF 25 %.   technically difficult study  patchy uptake  no clear ischemia pattern  abnormal dilated cardiomyopathy  EF 25%     Conclusions      Summary   This Nuclear Medicine study was negative for ischemia .   dilated cardiomyopathy   difficult scan      Recommendation   Clinical correlation is recommended.      Signatures     ----------------------------------------------------------------   Electronically signed by Rosendo Silva MD (Interpreting   Cardiologist) on 12/06/2018 at 17:22   ---------------------------------------------------------------      Results reviewed:  BNP: No results found for: BNP  CBC:   Lab Results   Component Value Date    WBC 7.7 09/12/2020    RBC 4.00 09/12/2020    RBC 4.49 07/09/2020    HGB 12.4 09/12/2020    HCT 37.9 09/12/2020     09/12/2020     CMP:    Lab Results   Component Value Date     12/11/2020    K 3.7 12/11/2020    K 3.8 05/02/2020    CL 96 12/11/2020    CO2 31 12/11/2020    BUN 23 12/11/2020    CREATININE 1.4 12/11/2020    LABGLOM 51 12/11/2020    GLUCOSE 101 12/11/2020    GLUCOSE 100 04/21/2020    CALCIUM 9.5 12/11/2020     Hepatic Function Panel:    Lab Results   Component Value Date    ALKPHOS 112 09/12/2020    ALT 17 09/12/2020    AST 17 09/12/2020    PROT 5.8 09/12/2020    BILITOT 1.2 09/12/2020    BILIDIR 0.3 09/12/2020    LABALBU 3.8 09/12/2020     Magnesium:    Lab Results   Component Value Date    MG 2.5 07/30/2020     PT/INR:    Lab Results   Component Value Date    INR 1.03 05/02/2020     Lipids:    Lab Results   Component Value Date    TRIG 139 05/02/2020    HDL 67 05/02/2020    [address] 127 05/02/2020       ASSESSMENT AND PLAN:   The patient's condition/symptoms are Stable      Diagnosis Orders   1. CHF (congestive heart failure), NYHA class II/III, chronic, systolic ([address]     2. Dilated cardiomyopathy (HCC)       Continue:  GDMT:   ACE/ARB/ARNI - Valsartan 20 BID   BB - Toprol 12.5/day   Diuretic - Torsemide 20/day, Kcl 10/day  AA - Aldactone 12.5/day  SGLT2 -  Jardiance 10/day (just started last week)  Vasodilator - None  Continue Current medications:  Digoxin, Amiodarone  Stable = slightly hypervolemic  Plans to call Dr Jonah Jo today  Recommended take extra Torsemide 20 x 2 days if unable get [facility].   Getting work up for [facility] Refused LVAD (d/t required lifestyle changes)  [facility] recommended CardioMEMs = they are starting process. Discussed more today. He's concerned w/ $$  BMP tomorrow/Sat. F/U in 3 months     · Daily weights  · Fluid restriction of 2 Liters per day  · Limit sodium in diet to around 3724-9292 mg/day  · Monitor BP  · Activity as tolerated     Patient was instructed to call the [address] for any changes in symptoms as noted in AVS.      Return in about 3 months (around 3/17/2021). or sooner if needed     Patient given educational materials - see patient instructions. We discussed the importance of weighing oneself and recording daily. We also discussed the importance of a low sodium diet, higher sodium foods to avoid and better low sodium food options. Patient verbalizes understanding of plan of care using teach back method, and is agreeable to the treatment plan.        Electronically signed by KRIS Perez CNP on 12/17/2020 at 4:26 PM

## 2020-12-17 NOTE — PATIENT INSTRUCTIONS
You may receive a survey regarding the care you received during your visit. Your input is valuable to us. We encourage you to complete and return your survey. We hope you will choose us in the future for your healthcare needs. Continue:  · Continue current medications  · Daily weights and record  · Fluid restriction of 2 Liters per day  · Limit sodium in diet to around 0083-9023 mg/day  · Monitor BP  · Activity as tolerated     Call the Heart Failure Clinic for any of the following symptoms: [phone]  ? Weight gain of 2-3 pounds in 1 day or 5 pounds in 1 week  ? Increased shortness of breath  ? Shortness of breath while laying down  ? Cough  ? Chest pain  ? Swelling in feet, ankles or legs  ? Tenderness or bloating in the abdomen  ? Fatigue   ? Decreased appetite or nausea   ?  Confusion

## 2021-01-06 ENCOUNTER — NURSE ONLY (OUTPATIENT)
Dept: CARDIOLOGY CLINIC | Age: 61
End: 2021-01-06
Payer: MEDICARE

## 2021-01-06 DIAGNOSIS — Z95.810 ICD (IMPLANTABLE CARDIOVERTER-DEFIBRILLATOR) IN PLACE: Primary | ICD-10-CM

## 2021-01-06 PROCEDURE — 93283 PRGRMG EVAL IMPLANTABLE DFB: CPT | Performed by: NUCLEAR MEDICINE

## 2021-01-06 NOTE — PROGRESS NOTES
DR Annamaria Reddy PT  PT SEES DR Chaparro Brito AT THE [facility] AND HE IS ON THE HEART TRANSPLANT LIST   HIS OPTIVOL IS ELEVATED AND HE ASKED ME TO FAX THIS TO DR Chaparro Brito AT [address] [place] [address]. SAYS DR Chaparro Brito HAS BEEN ADJUSTING HIS WATER PILLS FOR QUITE SOME TIME AND HE IS WELL AWARE OF THE SWELLING        [employer] DUAL ICD CHECK IN OFFICE   PRESENTS IN ASVS 80  A PACED 0.3%  V PACED <0.1%  BATTERY 2.6 YRS REMAINING  ATRIAL IMPEDENCE 494  VENT IMPEDENCE 399  RV 72  AAI<=>DDD   P WAVES 3  RV WAVES 7  ATRIAL THRESHOLD 0.75 @ 0.4  VENT THRESHOLD 1 @ 0.4  ATRIAL AND VENT AMPLITUDES PROGRAMMED ADAPTIVE    PT DID HAVE 6 NS VT EPISODES/ LONGEST 5 SECONDS.  THIS REPORT FAXED TO [facility]

## 2021-01-11 ENCOUNTER — HOSPITAL ENCOUNTER (OUTPATIENT)
Age: 61
Discharge: HOME | End: 2021-01-11
Payer: MEDICARE

## 2021-01-11 PROCEDURE — U0002 COVID-19 LAB TEST NON-CDC: HCPCS

## 2021-01-11 PROCEDURE — U0003 INFECTIOUS AGENT DETECTION BY NUCLEIC ACID (DNA OR RNA); SEVERE ACUTE RESPIRATORY SYNDROME CORONAVIRUS 2 (SARS-COV-2) (CORONAVIRUS DISEASE [COVID-19]), AMPLIFIED PROBE TECHNIQUE, MAKING USE OF HIGH THROUGHPUT TECHNOLOGIES AS DESCRIBED BY CMS-2020-01-R: HCPCS

## 2021-01-12 ENCOUNTER — TELEPHONE (OUTPATIENT)
Dept: CARDIOLOGY CLINIC | Age: 61
End: 2021-01-12

## 2021-01-12 LAB
PERFORMING LAB: NORMAL
REPORT: NORMAL
SARS-COV-2: NOT DETECTED

## 2021-01-12 NOTE — TELEPHONE ENCOUNTER
Patient wanted to just call in and let Amy Dunn know that he is having a heart cath tomorrow 1/13 at [facility]. He is also going to have CT abdomen with contrast soon too.

## 2021-01-16 PROBLEM — Z01.818 ENCOUNTER FOR PRE-TRANSPLANT EVALUATION FOR HEART TRANSPLANT: Status: RESOLVED | Noted: 2020-09-10 | Resolved: 2021-01-16

## 2021-02-23 ENCOUNTER — TELEPHONE (OUTPATIENT)
Dept: CARDIOLOGY CLINIC | Age: 61
End: 2021-02-23

## 2021-02-23 ENCOUNTER — OFFICE VISIT (OUTPATIENT)
Dept: CARDIOLOGY CLINIC | Age: 61
End: 2021-02-23
Payer: MEDICARE

## 2021-02-23 VITALS
WEIGHT: 250.13 LBS | OXYGEN SATURATION: 95 % | BODY MASS INDEX: 37.05 KG/M2 | DIASTOLIC BLOOD PRESSURE: 48 MMHG | HEART RATE: 67 BPM | HEIGHT: 69 IN | SYSTOLIC BLOOD PRESSURE: 74 MMHG

## 2021-02-23 DIAGNOSIS — E86.1 HYPOTENSION DUE TO HYPOVOLEMIA: Primary | ICD-10-CM

## 2021-02-23 DIAGNOSIS — I95.89 HYPOTENSION DUE TO HYPOVOLEMIA: Primary | ICD-10-CM

## 2021-02-23 DIAGNOSIS — I50.22 CHF (CONGESTIVE HEART FAILURE), NYHA CLASS III, CHRONIC, SYSTOLIC (HCC): ICD-10-CM

## 2021-02-23 LAB
ANION GAP SERPL CALCULATED.3IONS-SCNC: 16 MEQ/L (ref 8–16)
BUN BLDV-MCNC: 25 MG/DL (ref 7–22)
CALCIUM SERPL-MCNC: 9.6 MG/DL (ref 8.5–10.5)
CHLORIDE BLD-SCNC: 89 MEQ/L (ref 98–111)
CO2: 29 MEQ/L (ref 23–33)
CREAT SERPL-MCNC: 2.9 MG/DL (ref 0.4–1.2)
GFR SERPL CREATININE-BSD FRML MDRD: 22 ML/MIN/1.73M2
GLUCOSE BLD-MCNC: 97 MG/DL (ref 70–108)
MAGNESIUM: 2.2 MG/DL (ref 1.6–2.4)
POTASSIUM SERPL-SCNC: 3.1 MEQ/L (ref 3.5–5.2)
SODIUM BLD-SCNC: 134 MEQ/L (ref 135–145)

## 2021-02-23 PROCEDURE — G8484 FLU IMMUNIZE NO ADMIN: HCPCS | Performed by: NURSE PRACTITIONER

## 2021-02-23 PROCEDURE — G8427 DOCREV CUR MEDS BY ELIG CLIN: HCPCS | Performed by: NURSE PRACTITIONER

## 2021-02-23 PROCEDURE — 3017F COLORECTAL CA SCREEN DOC REV: CPT | Performed by: NURSE PRACTITIONER

## 2021-02-23 PROCEDURE — 1036F TOBACCO NON-USER: CPT | Performed by: NURSE PRACTITIONER

## 2021-02-23 PROCEDURE — 36415 COLL VENOUS BLD VENIPUNCTURE: CPT | Performed by: NURSE PRACTITIONER

## 2021-02-23 PROCEDURE — 99214 OFFICE O/P EST MOD 30 MIN: CPT | Performed by: NURSE PRACTITIONER

## 2021-02-23 PROCEDURE — G8417 CALC BMI ABV UP PARAM F/U: HCPCS | Performed by: NURSE PRACTITIONER

## 2021-02-23 RX ORDER — FINASTERIDE 5 MG/1
5 TABLET, FILM COATED ORAL DAILY
COMMUNITY
Start: 2021-02-19 | End: 2022-09-01

## 2021-02-23 RX ORDER — EMPAGLIFLOZIN 10 MG/1
10 TABLET, FILM COATED ORAL
COMMUNITY
Start: 2021-01-23

## 2021-02-23 ASSESSMENT — ENCOUNTER SYMPTOMS
COUGH: 0
SHORTNESS OF BREATH: 0
ABDOMINAL DISTENTION: 0

## 2021-02-23 NOTE — PROGRESS NOTES
Pt here for b/p check nurses visit  Pt b/p running low, feeling increased dizziness today, headache, vision blurry and light sensative.

## 2021-02-23 NOTE — TELEPHONE ENCOUNTER
Patient called in to see if he could come into clinic for BP check. He said this AM his BP was good and now it is reading 64/40 and UC told him to go see a nurse to get an accurate reading as he \"may need fluid\". C/o sensitivity to light at times. Nurse visit for BP check made for today. Pavan Reis CNP notified.

## 2021-02-23 NOTE — PATIENT INSTRUCTIONS
You may receive a survey regarding the care you received during your visit. Your input is valuable to us. We encourage you to complete and return your survey. We hope you will choose us in the future for your healthcare needs.     Continue:  · Continue current medications  · Daily weights and record  · Fluid restriction of 2 Liters per day  · Limit sodium in diet to around 3036-9847 mg/day  · Monitor BP  · Activity as tolerated     Call the Heart Failure Clinic for any of the following symptoms: [phone]   Weight gain of 2-3 pounds in 1 day or 5 pounds in 1 week   Increased shortness of breath   Shortness of breath while laying down   Cough   Chest pain   Swelling in feet, ankles or legs   Tenderness or bloating in the abdomen   Fatigue    Decreased appetite or nausea    Confusion

## 2021-02-25 ENCOUNTER — PATIENT MESSAGE (OUTPATIENT)
Dept: CARDIOLOGY CLINIC | Age: 61
End: 2021-02-25

## 2021-02-25 NOTE — TELEPHONE ENCOUNTER
Spoke w/ pt wife - updated on labs from yesterday. Cresencio is feeling a lot better. BP . Instructed to hold Torsemide today and tomorrow. Slowly reintroduce BP meds as BP allows. Repeat BMP tomorrow.

## 2021-02-26 ENCOUNTER — OFFICE VISIT (OUTPATIENT)
Dept: SURGERY | Age: 61
End: 2021-02-26

## 2021-02-26 VITALS
RESPIRATION RATE: 14 BRPM | SYSTOLIC BLOOD PRESSURE: 126 MMHG | HEART RATE: 73 BPM | OXYGEN SATURATION: 98 % | TEMPERATURE: 98.2 F | DIASTOLIC BLOOD PRESSURE: 80 MMHG | WEIGHT: 257 LBS | BODY MASS INDEX: 38.06 KG/M2 | HEIGHT: 69 IN

## 2021-02-26 DIAGNOSIS — Z12.12 SCREENING FOR COLORECTAL CANCER: Primary | ICD-10-CM

## 2021-02-26 DIAGNOSIS — Z12.11 SCREENING FOR COLORECTAL CANCER: Primary | ICD-10-CM

## 2021-02-26 DIAGNOSIS — Z01.818 PRE-OP TESTING: ICD-10-CM

## 2021-02-26 PROCEDURE — 99999 PR OFFICE/OUTPT VISIT,PROCEDURE ONLY: CPT | Performed by: SURGERY

## 2021-02-26 ASSESSMENT — ENCOUNTER SYMPTOMS
COLOR CHANGE: 0
VOICE CHANGE: 0
EYE REDNESS: 0
COUGH: 0
TROUBLE SWALLOWING: 0
VOMITING: 0
SINUS PRESSURE: 0
RECTAL PAIN: 0
STRIDOR: 0
ABDOMINAL PAIN: 0
EYE ITCHING: 0
NAUSEA: 0
APNEA: 0
EYE PAIN: 0
CONSTIPATION: 0
SHORTNESS OF BREATH: 0
EYE DISCHARGE: 0
ANAL BLEEDING: 0
BACK PAIN: 0
WHEEZING: 0
ABDOMINAL DISTENTION: 0
FACIAL SWELLING: 0
CHOKING: 0
PHOTOPHOBIA: 0
BLOOD IN STOOL: 0
DIARRHEA: 0
CHEST TIGHTNESS: 0
SORE THROAT: 0
RHINORRHEA: 0

## 2021-02-26 NOTE — LETTER
Graciela Zimmerman DO  [address] [facility]. [address]  [place] [address] [phone]     Pt Name: Anum Guevara  Medical Record Number: [id number]  Date of Birth 1960   Today's Date: 2/26/2021    Jose Antonio Hummel was seen in consultation in the office today. My assessment and plans are listed below. ASSESSMENT      Diagnosis Orders   1. Screening for colorectal cancer  COLONOSCOPY W/ OR W/O BIOPSY    Covid-19 Ambulatory   2. Pre-op testing  Covid-19 Ambulatory     Past Medical History:   Diagnosis Date    Acute kidney injury ([address]     Cardiomyopathy, dilated ([address]     VIRAL    Claustrophobia     Congestive heart failure (CHF) (HCC)     Depression 12/26/2013    HTN (hypertension)     Hyperlipidemia     Medtronic dual ICD 2/14/2014    Osteoarthritis     Osteoarthritis of spine with radiculopathy, lumbar region 12/28/2015    Spinal stenosis     Unspecified sleep apnea     unable to wear CPAP         PLANS:   1. Schedule Cresencio for colonoscopy with possible biopsy/polypectomy  2. Potential diagnostic/therapeutic options and alternatives were discussed with the patient in the office. Potential risks of bleeding, infection, perforation and missed lesions was reviewed. Potential for biopsy and/or polypectomy was discussed. We also discussed the use of IV sedation and colon preparation instructions. The patient was given an opportunity to ask questions. Once answered, the patient was agreeable to proceed with the procedure. 3. Status: Outpatient in endoscopy  4. Planned anesthesia: IV sedation provided by myself  5. Perioperative bowel preparation with Miralax and Dulcolax. Instructions given and questions answered. If I can provide any additional assistance or you have any concerns, please feel free to contact me. Thank you for allowing to participate in the care of your patients. Sincerely,      Mary Torres.  Gadiel Sherman

## 2021-02-26 NOTE — PROGRESS NOTES
Richie Parker. Neelimamarquita, [address] [place] [address]  [phone]  New Patient Evaluation in Office    Pt Name: Jed Cota  Date of Birth 1960   Today's Date: 2/26/2021  Medical Record Number: [id number]  Referring Provider: No ref. provider found  Primary Care Provider: Najma Trevino MD  Chief Complaint   Patient presents with   [facility] Surgical Consult     New patient-Needs colonoscopy-last one done by Dr Kaycee Vega in 2010     ASSESSMENT       Diagnosis Orders   1. Screening for colorectal cancer  COLONOSCOPY W/ OR W/O BIOPSY    Covid-19 Ambulatory   2. Pre-op testing  Covid-19 Ambulatory     Past Medical History:   Diagnosis Date    Acute kidney injury ([address]     Cardiomyopathy, dilated ([address]     VIRAL    Claustrophobia     Congestive heart failure (CHF) (HCC)     Depression 12/26/2013    HTN (hypertension)     Hyperlipidemia     Medtronic dual ICD 2/14/2014    Osteoarthritis     Osteoarthritis of spine with radiculopathy, lumbar region 12/28/2015    Spinal stenosis     Unspecified sleep apnea     unable to wear CPAP          PLANS      1. Schedule Cresencio for colonoscopy with possible biopsy/polypectomy  2. Potential diagnostic/therapeutic options and alternatives were discussed with the patient in the office. Potential risks of bleeding, infection, perforation and missed lesions was reviewed. Potential for biopsy and/or polypectomy was discussed. We also discussed the use of IV sedation and colon preparation instructions. The patient was given an opportunity to ask questions. Once answered, the patient was agreeable to proceed with the procedure. 3. Status: Outpatient in endoscopy  4. Planned anesthesia: IV sedation provided by myself  5. Perioperative bowel preparation with Miralax and Dulcolax. Instructions given and questions answered.      Adithya Jurado is a 56 y.o. male seen in the consultation for evaluation regarding colonoscopy. He had a colonoscopy in 2010 which was unremarkable. He denies any history of previous adenomatous polyp, previous colon cancer, family history of colon cancer, rectal bleeding, melena, iron deficiency anemia, chronic abdominal pain, change in bowel habits, unexplained weight loss. Past Medical History  Past Medical History:   Diagnosis Date    [facility]     Cardiomyopathy, dilated ([address]     VIRAL    Claustrophobia     Congestive heart failure (CHF) (HCC)     Depression 12/26/2013    HTN (hypertension)     Hyperlipidemia     Medtronic dual ICD 2/14/2014    Osteoarthritis     Osteoarthritis of spine with radiculopathy, lumbar region 12/28/2015    Spinal stenosis     Unspecified sleep apnea     unable to wear CPAP     Past Surgical History  Past Surgical History:   Procedure Laterality Date    BACK SURGERY  8/26/2014    L4- S1 decompression, L4-5 PSF and TLIF    BACK SURGERY  2013    CARDIAC CATHETERIZATION  10/2013    [address]  2013    [employer]    CARDIOVASCULAR STRESS TEST  09/2013    CARPAL TUNNEL RELEASE Right     CERVICAL FUSION  05/17/2017    [address]    COLONOSCOPY      DIAGNOSTIC CARDIAC CATH LAB PROCEDURE  07/2020    [facility]    DOPPLER ECHOCARDIOGRAPHY  09/2013    DOPPLER ECHOCARDIOGRAPHY  09/2013    [facility]    ENDOSCOPY, COLON, DIAGNOSTIC      FACET JOINT INJECTION Right 5/26/2020    L-facet RFA @ L2-3,L3-4,L4-5  RIGHT performed by Edwige Jones MD at [address] [place] [address] Drive [facility] Left 7/2/2019    Left SI injection performed by Edwige Jones MD at [address] [place] [address] Left 8/26/2019    Left SI injection # 2 performed by Edwige Jones MD at [address]  ? ??    umbilical    LUMBAR SPINE SURGERY Left 9/11/2017    LUMBAR RFA L2-3, L3-4, L4-5, L5-S1 LEFT SIDE performed by Deysi Farnsworth MD at [address] [facility] Right 10/31/2017    LUMBAR FACET RFA @ L2-3, L3-4, L4-5 AND L5-S1 RIGHT SIDE performed by Deysi Farnsworth MD at [address] [facility] Left 4/11/2019    L-RFA @ L2-3, L3-4, L4-5, L5-S1 LEFT SIDE FIRST. performed by Deysi Farnsworth MD at [address] [facility] Left 10/3/2019    LEFT SI RFA performed by Deysi Farnsworth MD at [address] [place] [address]  2015    OTHER SURGICAL HISTORY  10/9/2015    C3-6 ACDF    OTHER SURGICAL HISTORY  01/18/2016    FACET INJECTIONS L3-L4 L4-L5 L5 S1    OTHER SURGICAL HISTORY  2-8-2016    RFA - L3-S1    OTHER SURGICAL HISTORY N/A 03-21-16    cervical epidural block C7    OTHER SURGICAL HISTORY Bilateral 05-16-16    cervical facet block C7-T1    OTHER SURGICAL HISTORY  08/01/2016    CERVICAL ABLATION    OTHER SURGICAL HISTORY Left 09/11/2017    Lumbar RFA at L2-3, L3-4, L4-5, L5-S1    OTHER SURGICAL HISTORY Right 10/31/2017    Lumbar RFA at L2-3, L3-4, L4-5, L5-S1    OTHER SURGICAL HISTORY Right 05/09/2018    Excision scalp mass right forehead    WI EXC SKIN BENIG <5MM REMAINDR BODY Right 5/9/2018    EXCISION RIGHT FOREHEAD CYST performed by Carlos Suresh MD at [address] [facility] Left 6/19/2018    L-RFA @ L2-3, L3-4, L4-5, L5-S1 LEFT SIDE FIRST. performed by Deysi Farnsworth MD at [address] [facility]  1980's???    TONSILLECTOMY  1980's??    VASECTOMY  ? ??     Medications  Current Outpatient Medications   Medication Sig Dispense Refill    finasteride (PROSCAR) 5 MG tablet Take 5 mg by mouth daily      JARDIANCE 10 MG tablet 10 mg       amitriptyline (ELAVIL) 10 MG tablet Take 10 mg by mouth nightly      potassium chloride (KLOR-CON) 10 MEQ extended release tablet Take 10 mEq by mouth daily      cyclobenzaprine (FLEXERIL) 5 MG tablet Take 5 mg by mouth daily as needed for Muscle spasms      torsemide (DEMADEX) 20 MG tablet Take 1 tablet by mouth daily 90 tablet 1    metoprolol succinate (TOPROL XL) 25 MG extended release tablet Take 0.5 tablets by mouth daily      amiodarone (CORDARONE) 200 MG tablet Take 1 tablet twice a day for 7 days, then decrease to 1 tablet by mouth once a day 90 tablet 1    atorvastatin (LIPITOR) 80 MG tablet Take 80 mg by mouth nightly      digoxin (LANOXIN) 125 MCG tablet Take 0.125 mg by mouth every other day       valsartan (DIOVAN) 40 MG tablet Take 20 mg by mouth 2 times daily       Magnesium 500 MG CAPS Take by mouth daily      zinc sulfate (ZINCATE) 220 (50 Zn) MG capsule Take 50 mg by mouth daily      allopurinol (ZYLOPRIM) 300 MG tablet Take 300 mg by mouth daily      Acetaminophen (TYLENOL ARTHRITIS PAIN PO) Take by mouth 3 times daily as needed       No current facility-administered medications for this visit. Allergies  has No Known Allergies. Family History  family history includes Coronary Art Dis in his father; Heart Attack in his father; Heart Disease in his father; High Blood Pressure in his father; Parkinsonism in his father. Social History   reports that he has never smoked. He has never used smokeless tobacco. He reports current alcohol use of about 6.0 standard drinks of alcohol per week. He reports that he does not use drugs.   Health Screening Exams  Health Maintenance   Topic Date Due    TSH testing  1960    Hepatitis C screen  1960    HIV screen  05/26/1973    COVID-19 Vaccine (1 of 2) 05/26/1974    Diabetes screen  05/26/1998    Colon cancer screen colonoscopy  05/26/2008    Annual Wellness Visit (AWV)  06/19/2019    Pneumococcal 0-64 years Vaccine (1 of 1 - PPSV23) 11/04/2020    Shingles Vaccine (2 of 2) 11/06/2020    Lipid screen  05/02/2021    Potassium monitoring  02/24/2022    Creatinine monitoring  02/24/2022    DTaP/Tdap/Td vaccine (2 - Td) 07/18/2028    Flu vaccine  Completed    Hepatitis A vaccine  Aged Out    Hepatitis B vaccine  Aged Out    Hib vaccine  Aged Out    Meningococcal (ACWY) vaccine  Aged Out     Review of Systems  Constitutional: Negative for activity change, appetite change, chills, diaphoresis, fatigue, fever and unexpected weight change. HENT: Negative for congestion, dental problem, drooling, ear discharge, ear pain, facial swelling, hearing loss, mouth sores, nosebleeds, postnasal drip, rhinorrhea, sinus pressure, sneezing, sore throat, tinnitus, trouble swallowing and voice change. Eyes: Negative for photophobia, pain, discharge, redness, itching and visual disturbance. Respiratory: Negative for apnea, cough, choking, chest tightness, shortness of breath, wheezing and stridor. Cardiovascular: Negative for chest pain, palpitations and leg swelling. Gastrointestinal: Negative for abdominal distention, abdominal pain, anal bleeding, blood in stool, constipation, diarrhea, nausea, rectal pain and vomiting. Genitourinary: Negative for decreased urine volume, difficulty urinating, discharge, dysuria, enuresis, flank pain, frequency, genital sores, hematuria, penile pain, penile swelling, scrotal swelling, testicular pain and urgency. Musculoskeletal: Negative for arthralgias, back pain, gait problem, joint swelling, myalgias, neck pain and neck stiffness. Skin: Negative for color change, pallor, rash and wound. Neurological: Negative for dizziness, tremors, seizures, syncope, facial asymmetry, speech difficulty, weakness, light-headedness, numbness and headaches. Hematological: Negative for adenopathy. Does not bruise/bleed easily. Psychiatric/Behavioral: Negative for agitation, behavioral problems, confusion, decreased concentration, dysphoric mood, hallucinations, self-injury, sleep disturbance and suicidal ideas. The patient is not nervous/anxious and is not hyperactive.       OBJECTIVE    VITALS:  height is 5' 9\" (1.753 m) and weight is 257 lb (116.6 kg). His temporal temperature is 98.2 °F (36.8 °C). His blood pressure is 126/80 and his pulse is 73. His respiration is 14 and oxygen saturation is 98%. Body mass index is 37.95 kg/m². CONSTITUTIONAL: Alert and oriented times 3, no acute distress and cooperative to examination with proper mood and affect. SKIN: Skin color, texture, turgor normal. No rashes or lesions. LYMPH: no cervical nodes, no inguinal nodes  HEENT: Head is normocephalic, atraumatic. EOMI, PERRLA. NECK: Supple, symmetrical, trachea midline, no adenopathy, thyroid symmetric, not enlarged and no tenderness, skin normal.  CHEST/LUNGS: chest symmetric with normal A/P diameter, normal respiratory rate and rhythm, lungs clear to auscultation without wheezes, rales or rhonchi. No accessory muscle use. Scars defibrillator  CARDIOVASCULAR: Heart sounds are normal.  Regular rate and rhythm without murmur, gallop or rub. Normal S1 and S2. Carotid and femoral pulses 2+/4 and equal bilaterally. ABDOMEN: obese umbilical scar(s) present. Normal bowel sounds. No bruits. soft, nontender, nondistended, no masses or organomegaly. no evidence of hernia. Percussion: Normal without hepatosplenomegally. RECTAL: deferred, not clinically indicated  NEUROLOGIC: There are no focalizing motor or sensory deficits. CN II-XII are grossly intact. Mo Angry EXTREMITIES: no cyanosis, no clubbing and no edema. Thank you for the interesting evaluation. Further recommendations as listed above.        Electronically signed by Mary Portlilo DO on 2/26/2021 at 9:51 AM

## 2021-02-26 NOTE — PROGRESS NOTES
Subjective:      Patient ID: Jacqueline Underwood is a 56 y.o. male. Chief Complaint   Patient presents with    Surgical Consult     New patient-Needs colonoscopy-last one done by Dr Soledad Sierra in 2010       HPI    Review of Systems   Constitutional: Negative for activity change, appetite change, chills, diaphoresis, fatigue, fever and unexpected weight change. HENT: Negative for congestion, dental problem, drooling, ear discharge, ear pain, facial swelling, hearing loss, mouth sores, nosebleeds, postnasal drip, rhinorrhea, sinus pressure, sneezing, sore throat, tinnitus, trouble swallowing and voice change. Eyes: Negative for photophobia, pain, discharge, redness, itching and visual disturbance. Respiratory: Negative for apnea, cough, choking, chest tightness, shortness of breath, wheezing and stridor. Cardiovascular: Negative for chest pain, palpitations and leg swelling. Gastrointestinal: Negative for abdominal distention, abdominal pain, anal bleeding, blood in stool, constipation, diarrhea, nausea, rectal pain and vomiting. Genitourinary: Negative for decreased urine volume, difficulty urinating, discharge, dysuria, enuresis, flank pain, frequency, genital sores, hematuria, penile pain, penile swelling, scrotal swelling, testicular pain and urgency. Musculoskeletal: Negative for arthralgias, back pain, gait problem, joint swelling, myalgias, neck pain and neck stiffness. Skin: Negative for color change, pallor, rash and wound. Neurological: Negative for dizziness, tremors, seizures, syncope, facial asymmetry, speech difficulty, weakness, light-headedness, numbness and headaches. Hematological: Negative for adenopathy. Does not bruise/bleed easily. Psychiatric/Behavioral: Negative for agitation, behavioral problems, confusion, decreased concentration, dysphoric mood, hallucinations, self-injury, sleep disturbance and suicidal ideas. The patient is not nervous/anxious and is not hyperactive.       /80 (Site: Left Upper Arm, Position: Sitting, Cuff Size: Medium Adult)   Pulse 73   Temp 98.2 °F (36.8 °C) (Temporal)   Resp 14   Ht 5' 9\" (1.753 m)   Wt 257 lb (116.6 kg)   SpO2 98%   BMI 37.95 kg/m²     Objective:   Physical Exam    Assessment:            Plan:              [employer], LPN

## 2021-02-27 ENCOUNTER — NURSE ONLY (OUTPATIENT)
Dept: LAB | Age: 61
End: 2021-02-27

## 2021-02-27 LAB
ANION GAP SERPL CALCULATED.3IONS-SCNC: 10 MEQ/L (ref 8–16)
BUN BLDV-MCNC: 16 MG/DL (ref 7–22)
CALCIUM SERPL-MCNC: 9.4 MG/DL (ref 8.5–10.5)
CHLORIDE BLD-SCNC: 100 MEQ/L (ref 98–111)
CO2: 30 MEQ/L (ref 23–33)
CREAT SERPL-MCNC: 1.2 MG/DL (ref 0.4–1.2)
GFR SERPL CREATININE-BSD FRML MDRD: 61 ML/MIN/1.73M2
GLUCOSE BLD-MCNC: 103 MG/DL (ref 70–108)
POTASSIUM SERPL-SCNC: 3.6 MEQ/L (ref 3.5–5.2)
SODIUM BLD-SCNC: 140 MEQ/L (ref 135–145)

## 2021-03-01 ENCOUNTER — PATIENT MESSAGE (OUTPATIENT)
Dept: CARDIOLOGY CLINIC | Age: 61
End: 2021-03-01

## 2021-03-01 NOTE — TELEPHONE ENCOUNTER
From: Ha Mcfadden  To: KRIS Hanna - CNP  Sent: 3/1/2021 7:44 AM EST  Subject: Test Results Question    I had blood work done on Saturday at [facility], under orders from Dr. Jose Ramon Peñaloza. Labs looked better than they ever have. However, I had to take my Torsemide because I was having breathing issues. My weight is up again because I am retaining fluid. I felt better yesterday and was not having the nighttime breathing issues. I will get blood work again and let you know when it is done.   Thanks

## 2021-03-09 ENCOUNTER — HOSPITAL ENCOUNTER (OUTPATIENT)
Age: 61
Setting detail: SPECIMEN
Discharge: HOME | End: 2021-03-09
Payer: MEDICARE

## 2021-03-09 PROCEDURE — U0003 INFECTIOUS AGENT DETECTION BY NUCLEIC ACID (DNA OR RNA); SEVERE ACUTE RESPIRATORY SYNDROME CORONAVIRUS 2 (SARS-COV-2) (CORONAVIRUS DISEASE [COVID-19]), AMPLIFIED PROBE TECHNIQUE, MAKING USE OF HIGH THROUGHPUT TECHNOLOGIES AS DESCRIBED BY CMS-2020-01-R: HCPCS

## 2021-03-11 ENCOUNTER — IMMUNIZATION (OUTPATIENT)
Dept: PRIMARY CARE CLINIC | Age: 61
End: 2021-03-11
Payer: MEDICARE

## 2021-03-11 LAB — SARS-COV-2: NOT DETECTED

## 2021-03-11 PROCEDURE — 0001A COVID-19, PFIZER VACCINE 30MCG/0.3ML DOSE: CPT | Performed by: PHARMACIST

## 2021-03-11 PROCEDURE — 91300 COVID-19, PFIZER VACCINE 30MCG/0.3ML DOSE: CPT | Performed by: PHARMACIST

## 2021-03-15 ENCOUNTER — HOSPITAL ENCOUNTER (OUTPATIENT)
Age: 61
Setting detail: OUTPATIENT SURGERY
Discharge: HOME | End: 2021-03-15
Attending: SURGERY | Admitting: SURGERY
Payer: MEDICARE

## 2021-03-15 VITALS
OXYGEN SATURATION: 93 % | HEIGHT: 69 IN | RESPIRATION RATE: 18 BRPM | HEART RATE: 92 BPM | TEMPERATURE: 97.2 F | BODY MASS INDEX: 38.06 KG/M2 | WEIGHT: 257 LBS | SYSTOLIC BLOOD PRESSURE: 106 MMHG | DIASTOLIC BLOOD PRESSURE: 70 MMHG

## 2021-03-15 PROCEDURE — 88305 TISSUE EXAM BY PATHOLOGIST: CPT

## 2021-03-15 PROCEDURE — 7100000011 HC PHASE II RECOVERY - ADDTL 15 MIN: Performed by: SURGERY

## 2021-03-15 PROCEDURE — 2580000003 HC RX 258: Performed by: SURGERY

## 2021-03-15 PROCEDURE — 3609010600 HC COLONOSCOPY POLYPECTOMY SNARE/COLD BIOPSY: Performed by: SURGERY

## 2021-03-15 PROCEDURE — 45380 COLONOSCOPY AND BIOPSY: CPT | Performed by: SURGERY

## 2021-03-15 PROCEDURE — 7100000010 HC PHASE II RECOVERY - FIRST 15 MIN: Performed by: SURGERY

## 2021-03-15 PROCEDURE — 6360000002 HC RX W HCPCS: Performed by: SURGERY

## 2021-03-15 RX ORDER — MIDAZOLAM HYDROCHLORIDE 1 MG/ML
INJECTION INTRAMUSCULAR; INTRAVENOUS PRN
Status: DISCONTINUED | OUTPATIENT
Start: 2021-03-15 | End: 2021-03-15 | Stop reason: ALTCHOICE

## 2021-03-15 RX ORDER — FENTANYL CITRATE 50 UG/ML
INJECTION, SOLUTION INTRAMUSCULAR; INTRAVENOUS PRN
Status: DISCONTINUED | OUTPATIENT
Start: 2021-03-15 | End: 2021-03-15 | Stop reason: ALTCHOICE

## 2021-03-15 RX ORDER — SODIUM CHLORIDE 450 MG/100ML
INJECTION, SOLUTION INTRAVENOUS CONTINUOUS
Status: DISCONTINUED | OUTPATIENT
Start: 2021-03-15 | End: 2021-03-15 | Stop reason: HOSPADM

## 2021-03-15 RX ORDER — FENTANYL CITRATE 50 UG/ML
INJECTION, SOLUTION INTRAMUSCULAR; INTRAVENOUS
Status: DISCONTINUED
Start: 2021-03-15 | End: 2021-03-15 | Stop reason: HOSPADM

## 2021-03-15 RX ORDER — SODIUM CHLORIDE 0.9 % (FLUSH) 0.9 %
10 SYRINGE (ML) INJECTION EVERY 12 HOURS SCHEDULED
Status: DISCONTINUED | OUTPATIENT
Start: 2021-03-15 | End: 2021-03-15 | Stop reason: HOSPADM

## 2021-03-15 RX ORDER — SODIUM CHLORIDE 0.9 % (FLUSH) 0.9 %
10 SYRINGE (ML) INJECTION PRN
Status: DISCONTINUED | OUTPATIENT
Start: 2021-03-15 | End: 2021-03-15 | Stop reason: HOSPADM

## 2021-03-15 RX ORDER — MIDAZOLAM HYDROCHLORIDE 1 MG/ML
INJECTION INTRAMUSCULAR; INTRAVENOUS
Status: DISCONTINUED
Start: 2021-03-15 | End: 2021-03-15 | Stop reason: HOSPADM

## 2021-03-15 RX ADMIN — SODIUM CHLORIDE: 4.5 INJECTION, SOLUTION INTRAVENOUS at 07:45

## 2021-03-15 ASSESSMENT — PAIN SCALES - GENERAL: PAINLEVEL_OUTOF10: 0

## 2021-03-15 NOTE — PROGRESS NOTES
Recovery mode, denies discomfort. Passing gas, taking fluids.  discussed findings with pt and . Discharge instructions provided and understanding verbalized.

## 2021-03-15 NOTE — PROGRESS NOTES
Pt admitted to [facility] for colonoscopy with Dr Jessica Osman. Signed consent on chart. Scope [id number] used for procedure. One polyp removed with cold forceps, labeled and sent to lab. Pictures taken. Pt tolerated well.

## 2021-03-15 NOTE — OP NOTE
Nathalie Galvez 58  RECORD OF OPERATION  PATIENT NAME: Osiris Fernandes  MEDICAL RECORD NO. [id number]  SURGEON: VALENCIA Davidson [facility]  PRIMARY CARE PHYSICIAN: Ria Brooks MD     PROCEDURE PERFORMED:  3/15/2021   PREOPERATIVE DIAGNOSIS:  SCREENING  POSTOPERATIVE DIAGNOSIS: Single small polyp of transverse colon  PROCEDURE PERFORMED:  Colonoscopy polypectomy (cold biopsy)  ANESTHESIA:  IV sedation with 100 mcg Fentanyl and 7.5 mg Versed  ESTIMATED BLOOD LOSS:  None. SPECIMENS: colon polyp sent to pathology for analysis  COMPLICATIONS:  None immediately appreciated. DISCUSSION:  Norm Lam is a 56y.o.-year-old male who presented to my office and seen in evaluation at request of Ria Brooks MD. After history and physical examination was performed, potential diagnostic and therapeutic modalities discussed with the patient. Radiographic and endoscopic studies were discussed, risks, complications, benefits reviewed. He was given opportunity to ask questions, once answeredinformed consent was obtained. The patient was the Endoscopy Suite on 3/15/2021 for procedure. OPERATIVE FINDINGS:  At the time of endoscopy good prep appreciated. Scope was able to be advanced to level of cecum. There was a small polyp of the transverse colon removed by cold biopsy forceps otherwise unremarkable. PROCEDURE:  The patient was brought to endoscopy suite, placed in left lateral Wiley position, placed under continuous cardiac telemetry, blood pressure, pulse oximetry monitoring, placed under IV sedation with medications noted above, done in incremental fashion to achieve sedation. Digital rectal exam performed revealing adequate rectal tone, no masses palpable. Colonoscope advanced to rectum, rectum gently insufflated. Under direct visualization colonoscope was advanced entirety of colon to level of cecum, confirmed by ileocecal valve, triangle folds, appendiceal orifice.  The scope was slowly retracted, intraluminal structures inspected. The scope was retracted through the cecum, ascending colon, transverse colon, descending colon, sigmoid colon and rectum which were without pathology with the exception of a small polyp of the transverse colon removed by cold biopsy forceps. The colonoscope was then removed after the colon was decompressed. The patient tolerated procedure well without immediate complication evident. Postoperative findings were discussed with the patient's family. He was given discharge instructions and will follow up colonoscopy in 5 years for reevaluation.       Electronically signed by Darrick Jordan DO on 3/15/2021 at 8:29 AM

## 2021-03-15 NOTE — H&P
DO CELESTINA Calloway DR GENERAL SURGERY  PRE SEDATION HISTORY AND PHYSICAL      Pt Name: Jorge Luis Craig  MRN: [id number]  YOB: 1960  Provider Performing Procedure: Frances COOL  Primary Care Physician: Sandra Salas MD  PRE-PROCEDURE   DNR-CCA/DNR-CC - No  Brief History/Pre-Procedure Diagnosis: SCREENING    MEDICAL HISTORY       has a past medical history of Acute kidney injury ([address] Cardiomyopathy, dilated ([address] Claustrophobia, Congestive heart failure (CHF) ([address] Depression, HTN (hypertension), Hyperlipidemia, Medtronic dual ICD, Osteoarthritis, Osteoarthritis of spine with radiculopathy, lumbar region, Spinal stenosis, and Unspecified sleep apnea. SURGICAL HISTORY   has a past surgical history that includes cardiovascular stress test (09/2013); doppler echocardiography (09/2013); doppler echocardiography (09/2013); Nasal sinus surgery; Carpal tunnel release (Right); Colonoscopy; back surgery (8/26/2014); Endoscopy, colon, diagnostic; Vasectomy (???); sinus surgery (1980's???); other surgical history (10/9/2015); Neck surgery (2015); back surgery (2013); Tonsillectomy (1980's??); hernia repair (???); other surgical history (01/18/2016); other surgical history (2-8-2016); other surgical history (N/A, 03-21-16); other surgical history (Bilateral, 05-16-16); other surgical history (08/01/2016); cervical fusion (05/17/2017); other surgical history (Left, 09/11/2017); Lumbar spine surgery (Left, 9/11/2017); other surgical history (Right, 10/31/2017); Lumbar spine surgery (Right, 10/31/2017); other surgical history (Right, 05/09/2018); pr exc skin benig <5mm remaindr body (Right, 5/9/2018); pr inject rx other periph nerve (Left, 6/19/2018); Lumbar spine surgery (Left, 4/11/2019); Cardiac catheterization (10/2013); Cardiac defibrillator placement (2013); Injection Procedure For Sacroiliac Joint (Left, 7/2/2019); Injection Procedure For Sacroiliac Joint (Left, 8/26/2019);  Lumbar spine surgery (Left, 10/3/2019); Facet joint injection (Right, 5/26/2020); Diagnostic Cardiac Cath Lab Procedure (07/2020); and pacemaker placement. ALLERGIES   Allergies as of 02/26/2021    (No Known Allergies)     MEDICATIONS   Coumadin Use Last 7 Days: No  Antiplatelet drug therapy use last 7 days: No  Other anticoagulant use last 7 days: No  Prior to Admission medications    Medication Sig Start Date End Date Taking?  Authorizing Provider   finasteride (PROSCAR) 5 MG tablet Take 5 mg by mouth daily 2/19/21 2/14/22 Yes Historical Provider, MD   JARDIANCE 10 MG tablet 10 mg  1/23/21  Yes Historical Provider, MD   amitriptyline (ELAVIL) 10 MG tablet Take 10 mg by mouth nightly   Yes Historical Provider, MD   potassium chloride (KLOR-CON) 10 MEQ extended release tablet Take 10 mEq by mouth daily   Yes Historical Provider, MD   cyclobenzaprine (FLEXERIL) 5 MG tablet Take 5 mg by mouth daily as needed for Muscle spasms   Yes Historical Provider, MD   torsemide (DEMADEX) 20 MG tablet Take 1 tablet by mouth daily 12/17/20  Yes KRIS Wilhelm CNP   metoprolol succinate (TOPROL XL) 25 MG extended release tablet Take 0.5 tablets by mouth daily 12/17/20  Yes KRIS Wilhelm CNP   amiodarone (CORDARONE) 200 MG tablet Take 1 tablet twice a day for 7 days, then decrease to 1 tablet by mouth once a day 12/7/20  Yes Sebastián Leung MD   atorvastatin (LIPITOR) 80 MG tablet Take 80 mg by mouth nightly 7/27/20  Yes Historical Provider, MD   digoxin (LANOXIN) 125 MCG tablet Take 0.125 mg by mouth every other day  7/27/20  Yes Historical Provider, MD   valsartan (DIOVAN) 40 MG tablet Take 20 mg by mouth 2 times daily  7/28/20  Yes Historical Provider, MD   Magnesium 500 MG CAPS Take by mouth daily   Yes Historical Provider, MD   zinc sulfate (ZINCATE) 220 (50 Zn) MG capsule Take 50 mg by mouth daily   Yes Historical Provider, MD   allopurinol (ZYLOPRIM) 300 MG tablet Take 300 mg by mouth daily 9/5/19  Yes Historical Provider, MD   Acetaminophen (TYLENOL ARTHRITIS PAIN PO) Take by mouth 3 times daily as needed    Historical Provider, MD     PHYSICAL EXAMINATION   Vitals:  height is 5' 9\" (1.753 m) and weight is 257 lb (116.6 kg). His temporal temperature is 96.4 °F (35.8 °C). His blood pressure is 134/83 and his pulse is 93. His respiration is 18 and oxygen saturation is 96%. Mental Status: alert, cooperative  Heart:   Heart regular rate and rhythm     Lungs:  Clear      Abdomen:  Soft, nontender       PLANNED PROCEDURE   Colonoscopy  SEDATION   Planned agent for conscious sedation: Fentanyl, Versed  ASA Classification: 4  Class 1: A normal healthy patient  Class 2: Pt with mild to moderate systemic disease  Class 3: Severe systemic disease or disturbance  Class 4: Severe systemic disorders that are already life threatening. Class 5: Moribund pt with little chances of survival, for more than 24 hours. Mallampati I Airway Classification: 2    1. Pre-procedure diagnostic studies complete and results available. Comment:    2. Previous sedation/anesthesia experiences assessed. Comment:    3. The patient is an appropriate candidate to undergo the planned procedure sedation and anesthesia. (Refer to nursing sedation/analgesia documentation record)  4. Formulation and discussion of sedation/procedure plan, risks, and expectations with patient and/or responsible adult completed. 5. Patient examined immediately prior to the procedure.  (Refer to nursing sedation/analgesia documentation record)      Electronically signed by Italia Oropeza DO on 3/15/2021 at 7:49 AM

## 2021-03-31 ENCOUNTER — PROCEDURE VISIT (OUTPATIENT)
Dept: CARDIOLOGY CLINIC | Age: 61
End: 2021-03-31
Payer: MEDICARE

## 2021-03-31 ENCOUNTER — TELEPHONE (OUTPATIENT)
Dept: CARDIOLOGY CLINIC | Age: 61
End: 2021-03-31

## 2021-03-31 DIAGNOSIS — Z95.810 ICD (IMPLANTABLE CARDIOVERTER-DEFIBRILLATOR) IN PLACE: Primary | ICD-10-CM

## 2021-03-31 PROCEDURE — 93289 INTERROG DEVICE EVAL HEART: CPT | Performed by: NUCLEAR MEDICINE

## 2021-03-31 NOTE — PROGRESS NOTES
DR Robbie Mendez PT   [employer]TR[employer] DUAL ICD REMOTE   BATTERY 2.5 YRS REMAINING    ATRIAL IMPEDENCE 456  RV IMEPEDENCE 399  RV 68    P WAVES 0.8  RV WAVES 6  ATRIAL THRESHOLD PER THE DEVICE 0.75 @ 0.4  VENT THRESHOLD PER THE DEVICE 1 @ 0.4  ATRIAL AMPLITUDE 1.5 @ 0.4  VENT AMPLITUDE 2.5 @ 0.4  AAI<=>DDD   A PACED <0.1%  V PACED <0.1%  1 NS VT EPISODE 3/4/21 FOR 2 SECONDS   HIS OPTIVOL IS OFF THE CHART / THIS PT SEES [address] [place] [address] IN THE CHF CLINIC.  THIS MESSAGE WAS ROUTED TO THE CHF CLINIC IN A PHONE ENCOUNTER

## 2021-04-01 ENCOUNTER — IMMUNIZATION (OUTPATIENT)
Dept: PRIMARY CARE CLINIC | Age: 61
End: 2021-04-01
Payer: MEDICARE

## 2021-04-01 PROCEDURE — 0002A COVID-19, PFIZER VACCINE 30MCG/0.3ML DOSE: CPT | Performed by: FAMILY MEDICINE

## 2021-04-01 PROCEDURE — 91300 COVID-19, PFIZER VACCINE 30MCG/0.3ML DOSE: CPT | Performed by: FAMILY MEDICINE

## 2021-04-06 ENCOUNTER — OFFICE VISIT (OUTPATIENT)
Dept: CARDIOLOGY CLINIC | Age: 61
End: 2021-04-06
Payer: MEDICARE

## 2021-04-06 VITALS
HEART RATE: 81 BPM | HEIGHT: 69 IN | DIASTOLIC BLOOD PRESSURE: 84 MMHG | BODY MASS INDEX: 38.18 KG/M2 | WEIGHT: 257.8 LBS | OXYGEN SATURATION: 96 % | SYSTOLIC BLOOD PRESSURE: 132 MMHG

## 2021-04-06 DIAGNOSIS — I42.8 NONISCHEMIC CARDIOMYOPATHY (HCC): ICD-10-CM

## 2021-04-06 DIAGNOSIS — I10 ESSENTIAL HYPERTENSION: ICD-10-CM

## 2021-04-06 DIAGNOSIS — Z95.810 ICD (IMPLANTABLE CARDIOVERTER-DEFIBRILLATOR) IN PLACE: ICD-10-CM

## 2021-04-06 DIAGNOSIS — I50.22 CHF (CONGESTIVE HEART FAILURE), NYHA CLASS III, CHRONIC, SYSTOLIC (HCC): Primary | ICD-10-CM

## 2021-04-06 PROCEDURE — G8427 DOCREV CUR MEDS BY ELIG CLIN: HCPCS | Performed by: NURSE PRACTITIONER

## 2021-04-06 PROCEDURE — 3017F COLORECTAL CA SCREEN DOC REV: CPT | Performed by: NURSE PRACTITIONER

## 2021-04-06 PROCEDURE — 99215 OFFICE O/P EST HI 40 MIN: CPT | Performed by: NURSE PRACTITIONER

## 2021-04-06 PROCEDURE — G8417 CALC BMI ABV UP PARAM F/U: HCPCS | Performed by: NURSE PRACTITIONER

## 2021-04-06 PROCEDURE — 1036F TOBACCO NON-USER: CPT | Performed by: NURSE PRACTITIONER

## 2021-04-06 RX ORDER — POTASSIUM CHLORIDE 20 MEQ/1
TABLET, EXTENDED RELEASE ORAL
COMMUNITY
Start: 2021-03-13

## 2021-04-06 RX ORDER — HYDRALAZINE HYDROCHLORIDE 25 MG/1
100 TABLET, FILM COATED ORAL 3 TIMES DAILY
COMMUNITY
Start: 2021-03-25

## 2021-04-06 RX ORDER — ISOSORBIDE DINITRATE 5 MG/1
10 TABLET ORAL 3 TIMES DAILY
COMMUNITY
Start: 2021-03-25

## 2021-04-06 ASSESSMENT — ENCOUNTER SYMPTOMS
ABDOMINAL DISTENTION: 1
SHORTNESS OF BREATH: 1
COUGH: 0

## 2021-04-06 NOTE — PROGRESS NOTES
Heart Failure Clinic       Visit Date: 4/6/2021  Cardiologist:  Dr. Pastor Whiteside, Dr Obey Prince ([facility])  Primary Care Physician: Dr. Sussy Matthews MD    Norbert Johnston is a 56 y.o. male who presents today for:  Chief Complaint   Patient presents with    Congestive Heart Failure       HPI:   Norbert Johnston is a 56 y.o. male who presents to the office for a follow up patient visit in the heart failure clinic. Accompanied by no one    HF: Systolic (EF 20-25%)   Type: NICM (viral illness years ago)   Device: ICD (2013)  HX: HTN, HLD, VT (Amiodarone), SYED (not tolerate)  Following w/ [facility] Advanced HF clinic = Dr Draper Cuff 6/2020)     Dry Wt:  250    Hospitalization:   [facility] July 2020 x 4 days - CHF - diuresed. [facility] Sept 2020 = admitted for Transplant workup - pt declined LVAD  [facility] Rosalio 2021 x 10 days = Hospital Course: Patient underwent RHC prior OPD visit which showed elevated left and right sided filling pressures and borderline CO/CI by Porsha but normal CO/CI by Thermodilution. Given this, he was admitted for further management. He was started on IV lasix 80 followed by gtt @ 10 mg/hr. Metoprolol was held due to concern for questionable output. Patient developed RYAN on CKD3. CT showed hydronephrosis with horseshoe kidney. Urology was consulted with no surgical intervention recommended. Flomax and finasteride was started. Flomax was stopped because of hypotension. MURTAZA was performed which was unchanged from prior. Metoprolol was restarted at 12.5 mg daily. Patient had a bump in creatinine and diuresis stopped. A RHC was repeated which showed normal filling pressures with a wedge of 16 and low normal CO/CI. He underwent an ascites survey which did not show any ascites. His creatinine was trending down and oral torsemide was initiated. By 1/24, it was felt that he was ready for discharge at a weight of 254 lbs. OV here Feb - BP in 70s. BMP done - creat bumped to 2.9  Issues w/ diuretics - CKD since then.   [facility] adjusting meds   He converses w/ [facility] weekly = getting bloodwork very frequent. Creatinine avg around 1.6 past month  Concerns today: feels like he's worsening = increase SOB, cough - jesika at night. Increased BREWER - more fatigued. Activity: walked from parking lot - tolerated. Tries use elliptical several times week.   Can complete ADLs  Diet: watches salt and fluid - generally keeps within restriction    Patient has:  Chest Pain: no  SOB: yes  Orthopnea/PND: yes  SYED: no  Edema: no  Fatigue: yes  Abdominal bloating: yes  Cough: yes  Appetite: good  Any extra diuretic use: see HPI = per [facility]  Weight gain: slight  Home weight: stable  Home blood pressure: improved    Past Medical History:   Diagnosis Date    Acute kidney injury ([address]     Cardiomyopathy, dilated ([address]     VIRAL    Claustrophobia     Congestive heart failure (CHF) ([address]     Depression 12/26/2013    HTN (hypertension)     Hyperlipidemia     Medtronic dual ICD 2/14/2014    Osteoarthritis     Osteoarthritis of spine with radiculopathy, lumbar region 12/28/2015    Spinal stenosis     Unspecified sleep apnea     unable to wear CPAP     Past Surgical History:   Procedure Laterality Date    BACK SURGERY  8/26/2014    L4- S1 decompression, L4-5 PSF and TLIF    BACK SURGERY  2013   [facility] CARDIAC CATHETERIZATION  10/2013    [address]  2013    Medtronic    CARDIOVASCULAR STRESS TEST  09/2013    CARPAL TUNNEL RELEASE Right     CERVICAL FUSION  05/17/2017    [address]    COLONOSCOPY      COLONOSCOPY Left 3/15/2021    COLONOSCOPY POLYPECTOMY SNARE/COLD BIOPSY performed by Jesu Narayan DO at [address] CATH LAB PROCEDURE  07/2020    [facility]    DOPPLER ECHOCARDIOGRAPHY  09/2013    DOPPLER ECHOCARDIOGRAPHY  09/2013    [facility]    ENDOSCOPY, COLON, DIAGNOSTIC      FACET JOINT INJECTION Right 5/26/2020    L-facet RFA @ L2-3,L3-4,L4-5  RIGHT performed by Anusha Rodriguez MD at [address] [facility] [address] JOINT Left 7/2/2019    Left SI injection performed by Anusha Rodriguez MD at [facility] Left 8/26/2019    Left SI injection # 2 performed by Anusha Rodriguez MD at [address]  ? ??    umbilical    LUMBAR SPINE SURGERY Left 9/11/2017    LUMBAR RFA L2-3, L3-4, L4-5, L5-S1 LEFT SIDE performed by Anusha Rodriguez MD at [address] Right 10/31/2017    LUMBAR FACET RFA @ L2-3, L3-4, L4-5 AND L5-S1 RIGHT SIDE performed by Anusha Rodriguez MD at [address] Left 4/11/2019    L-RFA @ L2-3, L3-4, L4-5, L5-S1 LEFT SIDE FIRST. performed by Anusha Rodriguez MD at [address] Left 10/3/2019    LEFT SI RFA performed by Anusha Rodriguez MD at [address] [facility]  2015    OTHER SURGICAL HISTORY  10/9/2015    C3-6 ACDF    OTHER SURGICAL HISTORY  01/18/2016    FACET INJECTIONS L3-L4 L4-L5 L5 S1    OTHER SURGICAL HISTORY  2-8-2016    RFA - L3-S1    OTHER SURGICAL HISTORY N/A 03-21-16    cervical epidural block C7    OTHER SURGICAL HISTORY Bilateral 05-16-16    cervical facet block C7-T1    OTHER SURGICAL HISTORY  08/01/2016    CERVICAL ABLATION    OTHER SURGICAL HISTORY Left 09/11/2017    Lumbar RFA at L2-3, L3-4, L4-5, L5-S1    OTHER SURGICAL HISTORY Right 10/31/2017    Lumbar RFA at L2-3, L3-4, L4-5, L5-S1    OTHER SURGICAL HISTORY Right 05/09/2018    Excision scalp mass right forehead    PACEMAKER PLACEMENT      AL EXC SKIN BENIG <5MM REMAINDR BODY Right 5/9/2018    EXCISION RIGHT FOREHEAD CYST performed by Bi Alfaro MD at [address] Left 6/19/2018    L-RFA @ L2-3, L3-4, L4-5, L5-S1 LEFT SIDE FIRST.  performed by [facility]:         Behavior: Behavior normal.         Wt Readings from Last 3 Encounters:   04/06/21 257 lb 12.8 oz (116.9 kg)   03/15/21 257 lb (116.6 kg)   02/26/21 257 lb (116.6 kg)     BP Readings from Last 3 Encounters:   04/06/21 132/84   03/15/21 106/70   02/26/21 126/80     Pulse Readings from Last 3 Encounters:   04/06/21 81   03/15/21 92   02/26/21 73     Body mass index is 38.07 kg/m². ECHO:   CONCLUSIONS:  - Technically difficult exam due to suboptimal positioning and body habitus. - Exam indication: Cardiomyopathy  - The left ventricle is severely dilated. Left ventricular systolic function is   severely decreased. EF = 24 ± 5% (2D biplane) Left ventricular diastolic function   was not evaluated due to inconsistent or technically suboptimal data. - The right ventricle is normal in size. Right ventricular systolic function is   normal.  - The left atrial cavity is mildly dilated. - Exam was compared with the prior  echocardiographic exam performed on   7/23/2020. There are similar findings. Electronically signed by Irma Paz MD on 1/15/2021 at 2:15:04 PM     ECHO  CONCLUSIONS:  - Exam indication: Initial evaluation of Heart Failure  - The left ventricle is moderately dilated. Left ventricular systolic function is   severely decreased. EF = 30 ± 5% (2D biplane) Definity contrast used for   endocardial border detection. Indeterminate left ventricular diastolic dysfunction   due to tachycardia. Frequent ectopy throughout the exam.  - The right ventricle is normal in size. Right ventricular systolic function is   normal.  - The left atrial cavity is moderately dilated. - The right atrial cavity is dilated. - There is moderate (2+) holosystolic mitral valve regurgitation.  - Estimated right ventricular systolic pressure is 57 mmHg consistent with   moderate pulmonary hypertension. Estimated right atrial pressure is 8 mmHg based   on IVC assessment.   - Exam was compared with the prior failure), NYHA class III, chronic, systolic (HCC)  Brain Natriuretic Peptide   2. Nonischemic cardiomyopathy ([facility]     3. Essential hypertension     4. ICD (implantable cardioverter-defibrillator) in place       Continue:  GDMT:   ACE/ARB/ARNI - Valsartan 20 BID - not tolerate Entresto   BB - Toprol 12.5/day   Diuretic - Torsemide 20/day  AA - no  SGLT2 -  Jardiance 10/day  Vasodilator - Isordil 10 TID, Hydralazine 25 HALF tab TID  Continue Current medications:  Dig   Stable - appears fairly Euvolemic on exam - some bloating noted. C/o worsening SOB, cough  [facility] managing diuretics/meds   Discussed talking w/ them about changing torsemide- seems to be losing some effectiveness (has been on since 3/2019)  Also question some BPH causing diuresis issues - was seen by urologist in [place] appears started on Proscar and Flomax but Flomax stopped d/t low BP = BPs much improved at recent  [address]  [employer] has telephone visit w/ NP tomorrow - will discuss above concerns. Discussed CardioMEMs again - feel this would be very beneficial in managing fluid - he states he cant afford. Will add BNP to BMP for [facility] tomorrow. F/U w/ [facility]  F/U w/ Radha Greer in May   F/U in clinic in Sept - call if any needs    Total visit time of 30 minutes has been spent with patient on education of symptoms, management, medication, and plan of care; as well as review of chart: labs, ECHO, radiology reports, etc.   I personally spent more then 50% of the appt time face to face with the patient. · Daily weights  · Fluid restriction of 2 Liters per day  · Limit sodium in diet to around 5766-3671 mg/day  · Monitor BP  · Activity as tolerated     Patient was instructed to call the [address] for any changes in symptoms as noted in AVS.      Return in about 6 months (around 10/6/2021). or sooner if needed     Patient given educational materials - see patient instructions. We discussed the importance of weighing oneself and recording daily.  We also discussed the importance of a low sodium diet, higher sodium foods to avoid and better low sodium food options. Patient verbalizes understanding of plan of care using teach back method, and is agreeable to the treatment plan.        Electronically signed by KRIS Wayne CNP on 4/6/2021 at 4:05 PM

## 2021-04-06 NOTE — PATIENT INSTRUCTIONS
You may receive a survey regarding the care you received during your visit. Your input is valuable to us. We encourage you to complete and return your survey. We hope you will choose us in the future for your healthcare needs.     Continue:  · Continue current medications  · Daily weights and record  · Fluid restriction of 2 Liters per day  · Limit sodium in diet to around 4729-2694 mg/day  · Monitor BP  · Activity as tolerated     Call the Heart Failure Clinic for any of the following symptoms: [phone]   Weight gain of 2-3 pounds in 1 day or 5 pounds in 1 week   Increased shortness of breath   Shortness of breath while laying down   Cough   Chest pain   Swelling in feet, ankles or legs   Tenderness or bloating in the abdomen   Fatigue    Decreased appetite or nausea    Confusion   

## 2021-04-08 ENCOUNTER — NURSE ONLY (OUTPATIENT)
Dept: LAB | Age: 61
End: 2021-04-08

## 2021-04-08 DIAGNOSIS — I50.22 CHF (CONGESTIVE HEART FAILURE), NYHA CLASS III, CHRONIC, SYSTOLIC (HCC): ICD-10-CM

## 2021-04-08 LAB — PRO-BNP: 3397 PG/ML (ref 0–900)

## 2021-04-13 DIAGNOSIS — Z12.11 SCREENING FOR COLORECTAL CANCER: ICD-10-CM

## 2021-04-13 DIAGNOSIS — Z12.12 SCREENING FOR COLORECTAL CANCER: ICD-10-CM

## 2021-04-26 ENCOUNTER — HOSPITAL ENCOUNTER (OUTPATIENT)
Dept: ULTRASOUND IMAGING | Age: 61
Discharge: HOME | End: 2021-04-26
Payer: MEDICARE

## 2021-04-26 DIAGNOSIS — R10.12 LUQ ABDOMINAL PAIN: ICD-10-CM

## 2021-04-26 PROCEDURE — 76705 ECHO EXAM OF ABDOMEN: CPT

## 2021-05-01 ENCOUNTER — NURSE ONLY (OUTPATIENT)
Dept: LAB | Age: 61
End: 2021-05-01

## 2021-05-01 LAB
ANION GAP SERPL CALCULATED.3IONS-SCNC: 13 MEQ/L (ref 8–16)
BUN BLDV-MCNC: 22 MG/DL (ref 7–22)
CALCIUM SERPL-MCNC: 9.4 MG/DL (ref 8.5–10.5)
CHLORIDE BLD-SCNC: 99 MEQ/L (ref 98–111)
CO2: 28 MEQ/L (ref 23–33)
CREAT SERPL-MCNC: 1.5 MG/DL (ref 0.4–1.2)
GFR SERPL CREATININE-BSD FRML MDRD: 47 ML/MIN/1.73M2
GLUCOSE BLD-MCNC: 104 MG/DL (ref 70–108)
POTASSIUM SERPL-SCNC: 3.3 MEQ/L (ref 3.5–5.2)
SODIUM BLD-SCNC: 140 MEQ/L (ref 135–145)

## 2021-05-05 ENCOUNTER — TELEPHONE (OUTPATIENT)
Dept: CARDIOLOGY CLINIC | Age: 61
End: 2021-05-05

## 2021-05-05 ENCOUNTER — PROCEDURE VISIT (OUTPATIENT)
Dept: CARDIOLOGY CLINIC | Age: 61
End: 2021-05-05
Payer: MEDICARE

## 2021-05-05 DIAGNOSIS — I50.22 CHF (CONGESTIVE HEART FAILURE), NYHA CLASS III, CHRONIC, SYSTOLIC (HCC): Primary | ICD-10-CM

## 2021-05-05 PROCEDURE — 93297 REM INTERROG DEV EVAL ICPMS: CPT | Performed by: NUCLEAR MEDICINE

## 2021-05-05 PROCEDURE — G2066 INTER DEVC REMOTE 30D: HCPCS | Performed by: NUCLEAR MEDICINE

## 2021-05-05 NOTE — TELEPHONE ENCOUNTER
[facility] Medtronic ICD Optivol  Pt of [place]     Battery 2.6 years     Episode: 13 beats VT lasting 3 seconds     Optivol off the chart elevated.  Pt sees Jame Leach in telephone encounter.

## 2021-05-05 NOTE — PROGRESS NOTES
[employer] Medtronic ICD Optivol  Pt of [place]    Battery 2.6 years    Episode: 13 beats VT lasting 3 seconds    Optivol off the chart elevated. Pt sees CHF Clinic. Sent in telephone encounter.

## 2021-05-05 NOTE — TELEPHONE ENCOUNTER
Spoke with patient   [facility] is adjusting his water pills   He is going to be getting a CardioMEMS hopefully soon

## 2021-05-06 NOTE — TELEPHONE ENCOUNTER
Two tabs of torsemide for the past 3 days   He had labs done today  Still feels like he is holding on to water  He would like to know what your recommendations are?

## 2021-05-06 NOTE — TELEPHONE ENCOUNTER
Labs reviewed - stable but continued renal insufficiency.   Wouldn't do more than another day or 2 of an extra Torsemide

## 2021-05-10 ENCOUNTER — OFFICE VISIT (OUTPATIENT)
Dept: CARDIOLOGY CLINIC | Age: 61
End: 2021-05-10
Payer: MEDICARE

## 2021-05-10 ENCOUNTER — HOSPITAL ENCOUNTER (OUTPATIENT)
Dept: ULTRASOUND IMAGING | Age: 61
Discharge: HOME | End: 2021-05-10
Payer: MEDICARE

## 2021-05-10 VITALS
OXYGEN SATURATION: 96 % | BODY MASS INDEX: 39.55 KG/M2 | HEART RATE: 65 BPM | HEIGHT: 69 IN | SYSTOLIC BLOOD PRESSURE: 122 MMHG | DIASTOLIC BLOOD PRESSURE: 90 MMHG | WEIGHT: 267 LBS

## 2021-05-10 DIAGNOSIS — R35.0 FREQUENCY OF URINATION: ICD-10-CM

## 2021-05-10 DIAGNOSIS — I10 ESSENTIAL HYPERTENSION: ICD-10-CM

## 2021-05-10 DIAGNOSIS — Z95.810 ICD (IMPLANTABLE CARDIOVERTER-DEFIBRILLATOR) IN PLACE: ICD-10-CM

## 2021-05-10 DIAGNOSIS — I42.8 NONISCHEMIC CARDIOMYOPATHY (HCC): ICD-10-CM

## 2021-05-10 DIAGNOSIS — I50.23 CHF (CONGESTIVE HEART FAILURE), NYHA CLASS III, ACUTE ON CHRONIC, SYSTOLIC (HCC): Primary | ICD-10-CM

## 2021-05-10 DIAGNOSIS — R39.89 SENSATION OF PRESSURE IN BLADDER AREA: ICD-10-CM

## 2021-05-10 LAB — PRO-BNP: 3191 PG/ML (ref 0–900)

## 2021-05-10 PROCEDURE — G8417 CALC BMI ABV UP PARAM F/U: HCPCS | Performed by: NURSE PRACTITIONER

## 2021-05-10 PROCEDURE — 3017F COLORECTAL CA SCREEN DOC REV: CPT | Performed by: NURSE PRACTITIONER

## 2021-05-10 PROCEDURE — 99214 OFFICE O/P EST MOD 30 MIN: CPT | Performed by: NURSE PRACTITIONER

## 2021-05-10 PROCEDURE — G8427 DOCREV CUR MEDS BY ELIG CLIN: HCPCS | Performed by: NURSE PRACTITIONER

## 2021-05-10 PROCEDURE — 96374 THER/PROPH/DIAG INJ IV PUSH: CPT | Performed by: NURSE PRACTITIONER

## 2021-05-10 PROCEDURE — 76775 US EXAM ABDO BACK WALL LIM: CPT

## 2021-05-10 PROCEDURE — 1036F TOBACCO NON-USER: CPT | Performed by: NURSE PRACTITIONER

## 2021-05-10 RX ORDER — TAMSULOSIN HYDROCHLORIDE 0.4 MG/1
1 CAPSULE ORAL NIGHTLY
COMMUNITY
Start: 2021-04-12

## 2021-05-10 RX ORDER — POTASSIUM CHLORIDE 20 MEQ/1
40 TABLET, EXTENDED RELEASE ORAL ONCE
Status: DISCONTINUED | OUTPATIENT
Start: 2021-05-10 | End: 2021-05-10

## 2021-05-10 RX ORDER — METOLAZONE 5 MG/1
5 TABLET ORAL DAILY PRN
Qty: 30 TABLET | Refills: 0 | Status: SHIPPED | OUTPATIENT
Start: 2021-05-10

## 2021-05-10 RX ORDER — METOLAZONE 5 MG/1
5 TABLET ORAL DAILY
Qty: 30 TABLET | Refills: 0 | Status: SHIPPED | OUTPATIENT
Start: 2021-05-10 | End: 2021-05-10

## 2021-05-10 RX ORDER — FUROSEMIDE 10 MG/ML
80 INJECTION INTRAMUSCULAR; INTRAVENOUS ONCE
Status: COMPLETED | OUTPATIENT
Start: 2021-05-10 | End: 2021-05-10

## 2021-05-10 RX ADMIN — FUROSEMIDE 80 MG: 10 INJECTION INTRAMUSCULAR; INTRAVENOUS at 12:08

## 2021-05-10 ASSESSMENT — ENCOUNTER SYMPTOMS
SHORTNESS OF BREATH: 1
COUGH: 1
ABDOMINAL DISTENTION: 1

## 2021-05-10 NOTE — PATIENT INSTRUCTIONS
You may receive a survey regarding the care you received during your visit. Your input is valuable to us. We encourage you to complete and return your survey. We hope you will choose us in the future for your healthcare needs.     Continue:  · Continue current medications  · Daily weights and record  · Fluid restriction of 2 Liters per day  · Limit sodium in diet to around 9501-5469 mg/day  · Monitor BP  · Activity as tolerated     Call the Heart Failure Clinic for any of the following symptoms: [phone]   Weight gain of 2-3 pounds in 1 day or 5 pounds in 1 week   Increased shortness of breath   Shortness of breath while laying down   Cough   Chest pain   Swelling in feet, ankles or legs   Tenderness or bloating in the abdomen   Fatigue    Decreased appetite or nausea    Confusion       Take extra Potassium 40 meq (2 tabs) today

## 2021-05-10 NOTE — PROGRESS NOTES
Venipuncture obtained from right AC. IV Lasix 80 mg given. Patient tolerated procedure without complications or complaints.

## 2021-05-10 NOTE — PROGRESS NOTES
bloodwork very frequent. Creatinine avg around 1.6-2.0 past month    Concerns today: Called clinic requesting appt - feeling \"awful\", SOB, \"belly swollen\", +orthopnea. Requesting IV Lasix. Has felt like this x 1 week. Suppose to be getting CardioMEMs. (Not scheduled yet)    Wt 267# today - up 10# from OV 4/6  BMP today - stable - creat 1.8  Took Torsemide 40 mg this AM  Friday he called Dr Hector Hernandez - increased Torsemide 60/day x 2 days.   No effectiveness  Activity: walked from entrance, fatigued/SOB  Diet: watching but does admit had ham on Friday    Patient has:  Chest Pain: no  SOB: yes  Orthopnea/PND: yes  SYED: no  Edema: no  Fatigue: yes   Abdominal bloating: yes   Cough: yes   Appetite: fair  Home weight: up  Home blood pressure: stable - 120s    Past Medical History:   Diagnosis Date    Acute kidney injury ([address]     Cardiomyopathy, dilated ([address]     VIRAL    Claustrophobia     Congestive heart failure (CHF) ([address]     Depression 12/26/2013    HTN (hypertension)     Hyperlipidemia     Medtronic dual ICD 2/14/2014    Osteoarthritis     Osteoarthritis of spine with radiculopathy, lumbar region 12/28/2015    Spinal stenosis     Unspecified sleep apnea     unable to wear CPAP     Past Surgical History:   Procedure Laterality Date    BACK SURGERY  8/26/2014    L4- S1 decompression, L4-5 PSF and TLIF    BACK SURGERY  2013   Anders [facility] CARDIAC CATHETERIZATION  10/2013    [address]  2013    [employer]    CARDIOVASCULAR STRESS TEST  09/2013    CARPAL TUNNEL RELEASE Right     CERVICAL FUSION  05/17/2017    [facility] COLONOSCOPY      COLONOSCOPY Left 3/15/2021    COLONOSCOPY POLYPECTOMY SNARE/COLD BIOPSY performed by Vikram Meyer DO at [place] [id number] CATH LAB PROCEDURE  07/2020    [facility]    DOPPLER ECHOCARDIOGRAPHY  09/2013    DOPPLER ECHOCARDIOGRAPHY  09/2013    [facility]    ENDOSCOPY, COLON, DIAGNOSTIC      FACET JOINT INJECTION Right 5/26/2020    L-facet RFA @ L2-3,L3-4,L4-5  RIGHT [facility] [address] [facility] Left 7/2/2019    Left SI injection performed by Lashonda De Los Santos MD at [address] [place] [address] Left 8/26/2019    Left SI injection # 2 performed by Lashonda De Los Santos MD at [address]  ? ??    umbilical    LUMBAR SPINE SURGERY Left 9/11/2017    LUMBAR RFA L2-3, L3-4, L4-5, L5-S1 LEFT SIDE performed by Lashonda De Los Santos MD at [address] [facility] Right 10/31/2017    LUMBAR FACET RFA @ L2-3, L3-4, L4-5 AND L5-S1 RIGHT SIDE performed by Lashonda De Los Santos MD at [address] [facility] Left 4/11/2019    L-RFA @ L2-3, L3-4, L4-5, L5-S1 LEFT SIDE FIRST.  performed by Lashonda De Los Santos MD at [address] [facility] Left 10/3/2019    LEFT SI RFA performed by Lashonda De Los Santos MD at [address] [place] [address]  2015    OTHER SURGICAL HISTORY  10/9/2015    C3-6 ACDF    OTHER SURGICAL HISTORY  01/18/2016    FACET INJECTIONS L3-L4 L4-L5 L5 S1    OTHER SURGICAL HISTORY  2-8-2016    RFA - L3-S1    OTHER SURGICAL HISTORY N/A 03-21-16    cervical epidural block C7    OTHER SURGICAL HISTORY Bilateral 05-16-16    cervical facet block C7-T1    OTHER SURGICAL HISTORY  08/01/2016    CERVICAL ABLATION    OTHER SURGICAL HISTORY Left 09/11/2017    Lumbar RFA at L2-3, L3-4, L4-5, L5-S1    OTHER SURGICAL HISTORY Right 10/31/2017    Lumbar RFA at L2-3, L3-4, L4-5, L5-S1    OTHER SURGICAL HISTORY Right 05/09/2018    Excision scalp mass right forehead    PACEMAKER PLACEMENT      OK EXC SKIN BENIG <5MM REMAINDR BODY Right 5/9/2018    EXCISION RIGHT FOREHEAD CYST performed by Freddy Boyer MD at [address] [facility] 6/19/2018    L-RFA @ L2-3, L3-4, L4-5, L5-S1 LEFT SIDE FIRST. performed by Kee Monique MD at [address]  1980's???    TONSILLECTOMY  1980's??    VASECTOMY  ? ??     Family History   Problem Relation Age of Onset    Heart Disease Father     Coronary Art Dis Father     Heart Attack Father     High Blood Pressure Father     Parkinsonism Father      Social History     Tobacco Use    Smoking status: Never Smoker    Smokeless tobacco: Never Used   Substance Use Topics    Alcohol use:  Yes     Alcohol/week: 6.0 standard drinks     Types: 6 Standard drinks or equivalent per week     Comment: social     Current Outpatient Medications   Medication Sig Dispense Refill    tamsulosin (FLOMAX) 0.4 MG capsule Take 1 capsule by mouth nightly      metOLazone (ZAROXOLYN) 5 MG tablet Take 1 tablet by mouth daily as needed (AS directed by CHF clinic) 30 tablet 0    isosorbide dinitrate (ISORDIL) 5 MG tablet Take 10 mg by mouth 3 times daily       potassium chloride (KLOR-CON M) 20 MEQ extended release tablet TAKE 1 TABLET BY MOUTH ONCE DAILY      hydrALAZINE (APRESOLINE) 25 MG tablet TAKE 1 2 (ONE HALF) TABLET BY MOUTH THREE TIMES DAILY      finasteride (PROSCAR) 5 MG tablet Take 5 mg by mouth daily      JARDIANCE 10 MG tablet 10 mg       amitriptyline (ELAVIL) 10 MG tablet Take 10 mg by mouth nightly      cyclobenzaprine (FLEXERIL) 5 MG tablet Take 5 mg by mouth daily as needed for Muscle spasms      torsemide (DEMADEX) 20 MG tablet Take 1 tablet by mouth daily 90 tablet 1    metoprolol succinate (TOPROL XL) 25 MG extended release tablet Take 0.5 tablets by mouth daily      amiodarone (CORDARONE) 200 MG tablet Take 1 tablet twice a day for 7 days, then decrease to 1 tablet by mouth once a day 90 tablet 1    atorvastatin (LIPITOR) 80 MG tablet Take 80 mg by mouth nightly      digoxin (LANOXIN) 125 MCG tablet Take 0.125 mg by mouth every other day       valsartan (DIOVAN) 40 MG tablet Take 20 mg by mouth 2 times daily       Magnesium 500 MG CAPS Take by mouth daily      zinc sulfate (ZINCATE) 220 (50 Zn) MG capsule Take 50 mg by mouth daily      allopurinol (ZYLOPRIM) 300 MG tablet Take 300 mg by mouth daily      Acetaminophen (TYLENOL ARTHRITIS PAIN PO) Take by mouth 3 times daily as needed       No current facility-administered medications for this visit. No Known Allergies    SUBJECTIVE:   Review of Systems   Constitutional: Positive for fatigue. Negative for activity change and appetite change. Respiratory: Positive for cough and shortness of breath. Cardiovascular: Negative for chest pain, palpitations and leg swelling. Gastrointestinal: Positive for abdominal distention. Neurological: Negative for weakness, light-headedness and headaches. Hematological: Negative for adenopathy. Psychiatric/Behavioral: Negative for sleep disturbance. OBJECTIVE:   Today's Vitals:  BP (!) 122/90   Pulse 65   Ht 5' 9\" (1.753 m)   Wt 267 lb (121.1 kg)   SpO2 96%   BMI 39.43 kg/m²     Physical Exam  Vitals signs reviewed. Constitutional:       General: He is not in acute distress. Appearance: Normal appearance. He is well-developed. He is not diaphoretic. HENT:      Head: Normocephalic and atraumatic. Eyes:      Conjunctiva/sclera: Conjunctivae normal.   Neck:      Musculoskeletal: Normal range of motion and neck supple. Comments: No JVD  Cardiovascular:      Rate and Rhythm: Normal rate and regular rhythm. Heart sounds: Normal heart sounds. No murmur. Pulmonary:      Effort: Pulmonary effort is normal. No respiratory distress. Breath sounds: Normal breath sounds. No wheezing or rales. Abdominal:      General: Bowel sounds are normal. There is distension. Palpations: Abdomen is soft. Tenderness: There is no abdominal tenderness. Musculoskeletal: Normal range of motion. Right lower leg: No edema. Left lower leg: No edema. Skin:     General: Skin is warm and dry. Capillary Refill: Capillary refill takes less than 2 seconds. Neurological:      Mental Status: He is alert and oriented to person, place, and time. Coordination: Coordination normal.   Psychiatric:         Behavior: Behavior normal.         Wt Readings from Last 3 Encounters:   05/10/21 267 lb (121.1 kg)   04/06/21 257 lb 12.8 oz (116.9 kg)   03/15/21 257 lb (116.6 kg)     BP Readings from Last 3 Encounters:   05/10/21 (!) 122/90   04/06/21 132/84   03/15/21 106/70     Pulse Readings from Last 3 Encounters:   05/10/21 65   04/06/21 81   03/15/21 92     Body mass index is 39.43 kg/m². ECHO:   CONCLUSIONS:  - Technically difficult exam due to suboptimal positioning and body habitus. - Exam indication: Cardiomyopathy  - The left ventricle is severely dilated. Left ventricular systolic function is   severely decreased. EF = 24 ± 5% (2D biplane) Left ventricular diastolic function   was not evaluated due to inconsistent or technically suboptimal data. - The right ventricle is normal in size. Right ventricular systolic function is   normal.  - The left atrial cavity is mildly dilated. - Exam was compared with the prior  echocardiographic exam performed on   7/23/2020. There are similar findings. Electronically signed by Mu Faith MD on 1/15/2021 at 2:15:04 PM     ECHO  CONCLUSIONS:  - Exam indication: Initial evaluation of Heart Failure  - The left ventricle is moderately dilated. Left ventricular systolic function is   severely decreased. EF = 30 ± 5% (2D biplane) Definity contrast used for   endocardial border detection. Indeterminate left ventricular diastolic dysfunction   due to tachycardia. Frequent ectopy throughout the exam.  - The right ventricle is normal in size. Right ventricular systolic function is   normal.  - The left atrial cavity is moderately dilated. - The right atrial cavity is dilated.   - There is moderate (2+) holosystolic mitral valve regurgitation.  - Estimated right ventricular systolic pressure is 57 mmHg consistent with   moderate pulmonary hypertension. Estimated right atrial pressure is 8 mmHg based   on IVC assessment. - Exam was compared with the prior CC echocardiographic exam performed on   12/10/01, LV function has decreased compared to the prior. Electronically signed by Isra Nunez MD on 7/23/2020 at 6:27:07 PM       CATH/STRESS:   Imaging:Results:Calculated gated LVEF 25 %.   technically difficult study  patchy uptake  no clear ischemia pattern  abnormal dilated cardiomyopathy  EF 25%     Conclusions      Summary   This Nuclear Medicine study was negative for ischemia .   dilated cardiomyopathy   difficult scan      Recommendation   Clinical correlation is recommended.      Signatures      ----------------------------------------------------------------   Electronically signed by Fabio Araujo MD (Interpreting   Cardiologist) on 12/06/2018 at 17:22   ---------------------------------------------------------------     Results reviewed:  BNP: No results found for: BNP  CBC:   Lab Results   Component Value Date    WBC 7.7 09/12/2020    RBC 4.00 09/12/2020    RBC 4.49 07/09/2020    HGB 12.4 09/12/2020    HCT 37.9 09/12/2020     09/12/2020     CMP:    Lab Results   Component Value Date     05/10/2021    K 3.6 05/10/2021    K 3.8 05/02/2020     05/10/2021    CO2 27 05/10/2021    BUN 24 05/10/2021    CREATININE 1.8 05/10/2021    LABGLOM 38 05/10/2021    GLUCOSE 112 05/10/2021    GLUCOSE 100 04/21/2020    CALCIUM 9.1 05/10/2021     Hepatic Function Panel:    Lab Results   Component Value Date    ALKPHOS 112 09/12/2020    ALT 17 09/12/2020    AST 17 09/12/2020    PROT 5.8 09/12/2020    BILITOT 1.2 09/12/2020    BILIDIR 0.3 09/12/2020    LABALBU 3.8 09/12/2020     Magnesium:    Lab Results   Component Value Date    MG 2.2 02/23/2021     PT/INR:    Lab Results   Component Value Date    INR 1.03 05/02/2020     Lipids:    Lab Results   Component Value Date    TRIG 139 05/02/2020    HDL 67 05/02/2020    LDLCALC 127 05/02/2020       ASSESSMENT AND PLAN:   The patient's condition/symptoms are Stable      Diagnosis Orders   1. CHF (congestive heart failure), NYHA class III, acute on chronic, systolic (HCC)  Brain Natriuretic Peptide    Brain Natriuretic Peptide   2. Nonischemic cardiomyopathy ([address]     3. ICD (implantable cardioverter-defibrillator) in place     4. Essential hypertension       Continue:  GDMT:   ACE/ARB/ARNI - Valsartan 20 BID   BB - Toprol 12.5/day   Diuretic - Torsemide 40/day  AA - none  SGLT2 -  Jardiance  Vasodilator - Isordil 10 TID, Hydralazine 12.5 TID  Continue Current medications:  Stable - appears Euvolemic on exam however c/o SOB, orthopnea, bloating - up 10# in past 4 weeks  Lasix 80 IV today   Kcl 40 po when returns home today  Call tomorrow AM w/ update  RX for Metolazone 5 mg sent - will utilize dose or two if IV not effective enough. Called and updated Dr Loreta Klein office  F/U as scheduled or sooner if no improvement. Recommend CardioMEMs = is suppose to get scheduled at [facility]. Tolerating above noted HF meds, no ill side effects noted. Will continue to monitor kidney function and electrolytes. Will optimize as tolerated. Pt is compliant w/ medications. Total visit time of 30 minutes has been spent with patient on education of symptoms, management, medication, and plan of care; as well as review of chart: labs, ECHO, radiology reports, etc.   I personally spent more then 50% of the appt time face to face with the patient. · Daily weights  · Fluid restriction of 2 Liters per day  · Limit sodium in diet to around 4135-2744 mg/day  · Monitor BP  · Activity as tolerated     Patient was instructed to call the [facility] for any changes in symptoms as noted in AVS.      No follow-ups on file.  or sooner if needed     Patient given educational materials - see patient instructions. We discussed the importance of weighing oneself and recording daily. We also discussed the importance of a low sodium diet, higher sodium foods to avoid and better low sodium food options. Patient verbalizes understanding of plan of care using teach back method, and is agreeable to the treatment plan.        Electronically signed by KRIS Wayne CNP on 5/10/2021 at 4:27 PM

## 2021-05-11 ENCOUNTER — TELEPHONE (OUTPATIENT)
Dept: CARDIOLOGY CLINIC | Age: 61
End: 2021-05-11

## 2021-05-11 NOTE — TELEPHONE ENCOUNTER
Lets do HALF tab of Metolazone tomorrow AM w/ extra potassium pill.     Repeat bloodwork on Thur or Friday

## 2021-05-15 ENCOUNTER — HOSPITAL ENCOUNTER (OUTPATIENT)
Age: 61
Discharge: HOME | End: 2021-05-15
Payer: MEDICARE

## 2021-05-15 LAB
INFLUENZA A: NOT DETECTED
INFLUENZA B: NOT DETECTED
SARS-COV-2 RNA, RT PCR: NOT DETECTED

## 2021-05-15 PROCEDURE — 87636 SARSCOV2 & INF A&B AMP PRB: CPT

## 2021-05-24 ENCOUNTER — NURSE ONLY (OUTPATIENT)
Dept: LAB | Age: 61
End: 2021-05-24

## 2021-05-24 LAB
ANION GAP SERPL CALCULATED.3IONS-SCNC: 10 MEQ/L (ref 8–16)
BUN BLDV-MCNC: 23 MG/DL (ref 7–22)
CALCIUM SERPL-MCNC: 9.8 MG/DL (ref 8.5–10.5)
CHLORIDE BLD-SCNC: 101 MEQ/L (ref 98–111)
CO2: 29 MEQ/L (ref 23–33)
CREAT SERPL-MCNC: 1.8 MG/DL (ref 0.4–1.2)
GFR SERPL CREATININE-BSD FRML MDRD: 38 ML/MIN/1.73M2
GLUCOSE BLD-MCNC: 109 MG/DL (ref 70–108)
POTASSIUM SERPL-SCNC: 4.4 MEQ/L (ref 3.5–5.2)
SODIUM BLD-SCNC: 140 MEQ/L (ref 135–145)

## 2021-06-03 ENCOUNTER — NURSE ONLY (OUTPATIENT)
Dept: LAB | Age: 61
End: 2021-06-03

## 2021-06-03 LAB
ANION GAP SERPL CALCULATED.3IONS-SCNC: 15 MEQ/L (ref 8–16)
BUN BLDV-MCNC: 17 MG/DL (ref 7–22)
CALCIUM SERPL-MCNC: 9.7 MG/DL (ref 8.5–10.5)
CHLORIDE BLD-SCNC: 91 MEQ/L (ref 98–111)
CO2: 27 MEQ/L (ref 23–33)
CREAT SERPL-MCNC: 1.7 MG/DL (ref 0.4–1.2)
GFR SERPL CREATININE-BSD FRML MDRD: 41 ML/MIN/1.73M2
GLUCOSE BLD-MCNC: 93 MG/DL (ref 70–108)
POTASSIUM SERPL-SCNC: 3.4 MEQ/L (ref 3.5–5.2)
SODIUM BLD-SCNC: 133 MEQ/L (ref 135–145)

## 2021-06-09 ENCOUNTER — PROCEDURE VISIT (OUTPATIENT)
Dept: CARDIOLOGY CLINIC | Age: 61
End: 2021-06-09
Payer: MEDICARE

## 2021-06-09 DIAGNOSIS — I50.23 CHF (CONGESTIVE HEART FAILURE), NYHA CLASS III, ACUTE ON CHRONIC, SYSTOLIC (HCC): Primary | ICD-10-CM

## 2021-06-09 PROCEDURE — 93297 REM INTERROG DEV EVAL ICPMS: CPT | Performed by: NUCLEAR MEDICINE

## 2021-06-09 PROCEDURE — G2066 INTER DEVC REMOTE 30D: HCPCS | Performed by: NUCLEAR MEDICINE

## 2021-06-09 NOTE — PROGRESS NOTES
[facility] Medtronic Dual ICD Optivol  Pt of [place]     Battery 2.4 years    Optivol was elevated but now WNL

## 2021-07-14 ENCOUNTER — PROCEDURE VISIT (OUTPATIENT)
Dept: CARDIOLOGY CLINIC | Age: 61
End: 2021-07-14
Payer: MEDICARE

## 2021-07-14 DIAGNOSIS — Z95.810 ICD (IMPLANTABLE CARDIOVERTER-DEFIBRILLATOR) IN PLACE: Primary | ICD-10-CM

## 2021-07-14 PROCEDURE — 93296 REM INTERROG EVL PM/IDS: CPT | Performed by: NUCLEAR MEDICINE

## 2021-07-14 PROCEDURE — 93295 DEV INTERROG REMOTE 1/2/MLT: CPT | Performed by: NUCLEAR MEDICINE

## 2021-07-14 NOTE — PROGRESS NOTES
[employer] Medtronic Dual ICD --- [facility] every month  Pt of [place]    Battery 2.4 years     Presenting rhythm AS VS    A Impedance 494  RV Impedance 399    RV shock 81    P wave sensing 2.3  R wave sensing 6.3    A Threshold 0.750 @ 0.40  A Amplitude 1.50 @ 0.40  RV Thresholds 0.875 @ 0.40  RV Amplitude 2.0 @ 0.40    A Paced <0.1%  V Paced <0.1%    Programmed Mode AAIR <=> DDDR     Afib Elizabeth 0%    Episodes:   6/17/21 -- 10 beats VT rate 182    Optivol WNL

## 2021-08-18 ENCOUNTER — PROCEDURE VISIT (OUTPATIENT)
Dept: CARDIOLOGY CLINIC | Age: 61
End: 2021-08-18
Payer: MEDICARE

## 2021-08-18 DIAGNOSIS — I50.23 CHF (CONGESTIVE HEART FAILURE), NYHA CLASS III, ACUTE ON CHRONIC, SYSTOLIC (HCC): Primary | ICD-10-CM

## 2021-08-18 PROCEDURE — 93297 REM INTERROG DEV EVAL ICPMS: CPT | Performed by: NUCLEAR MEDICINE

## 2021-08-18 PROCEDURE — G2066 INTER DEVC REMOTE 30D: HCPCS | Performed by: NUCLEAR MEDICINE

## 2021-09-07 ENCOUNTER — NURSE ONLY (OUTPATIENT)
Dept: LAB | Age: 61
End: 2021-09-07

## 2021-09-07 DIAGNOSIS — E86.1 HYPOTENSION DUE TO HYPOVOLEMIA: ICD-10-CM

## 2021-09-07 DIAGNOSIS — I95.89 HYPOTENSION DUE TO HYPOVOLEMIA: ICD-10-CM

## 2021-09-07 LAB
ANION GAP SERPL CALCULATED.3IONS-SCNC: 11 MEQ/L (ref 8–16)
BUN BLDV-MCNC: 21 MG/DL (ref 7–22)
CALCIUM SERPL-MCNC: 9.3 MG/DL (ref 8.5–10.5)
CHLORIDE BLD-SCNC: 97 MEQ/L (ref 98–111)
CO2: 30 MEQ/L (ref 23–33)
CREAT SERPL-MCNC: 1.4 MG/DL (ref 0.4–1.2)
GFR SERPL CREATININE-BSD FRML MDRD: 51 ML/MIN/1.73M2
GLUCOSE BLD-MCNC: 105 MG/DL (ref 70–108)
POTASSIUM SERPL-SCNC: 3.5 MEQ/L (ref 3.5–5.2)
SODIUM BLD-SCNC: 138 MEQ/L (ref 135–145)

## 2021-09-14 ENCOUNTER — OFFICE VISIT (OUTPATIENT)
Dept: CARDIOLOGY CLINIC | Age: 61
End: 2021-09-14
Payer: MEDICARE

## 2021-09-14 VITALS
HEIGHT: 69 IN | DIASTOLIC BLOOD PRESSURE: 66 MMHG | SYSTOLIC BLOOD PRESSURE: 102 MMHG | HEART RATE: 72 BPM | BODY MASS INDEX: 37.77 KG/M2 | OXYGEN SATURATION: 94 % | WEIGHT: 255 LBS

## 2021-09-14 DIAGNOSIS — I42.8 NONISCHEMIC CARDIOMYOPATHY (HCC): ICD-10-CM

## 2021-09-14 DIAGNOSIS — N18.32 STAGE 3B CHRONIC KIDNEY DISEASE (HCC): ICD-10-CM

## 2021-09-14 DIAGNOSIS — I50.22 CHF (CONGESTIVE HEART FAILURE), NYHA CLASS III, CHRONIC, SYSTOLIC (HCC): Primary | ICD-10-CM

## 2021-09-14 DIAGNOSIS — I10 ESSENTIAL HYPERTENSION: ICD-10-CM

## 2021-09-14 DIAGNOSIS — Z95.810 ICD (IMPLANTABLE CARDIOVERTER-DEFIBRILLATOR) IN PLACE: ICD-10-CM

## 2021-09-14 PROCEDURE — 1036F TOBACCO NON-USER: CPT | Performed by: NURSE PRACTITIONER

## 2021-09-14 PROCEDURE — 99214 OFFICE O/P EST MOD 30 MIN: CPT | Performed by: NURSE PRACTITIONER

## 2021-09-14 PROCEDURE — G8427 DOCREV CUR MEDS BY ELIG CLIN: HCPCS | Performed by: NURSE PRACTITIONER

## 2021-09-14 PROCEDURE — 3017F COLORECTAL CA SCREEN DOC REV: CPT | Performed by: NURSE PRACTITIONER

## 2021-09-14 PROCEDURE — G8417 CALC BMI ABV UP PARAM F/U: HCPCS | Performed by: NURSE PRACTITIONER

## 2021-09-14 RX ORDER — TORSEMIDE 20 MG/1
TABLET ORAL
Qty: 90 TABLET | Refills: 1 | COMMUNITY
Start: 2021-09-14

## 2021-09-14 ASSESSMENT — ENCOUNTER SYMPTOMS
SHORTNESS OF BREATH: 1
COUGH: 0
ABDOMINAL DISTENTION: 1

## 2021-09-14 NOTE — PROGRESS NOTES
Heart Failure Clinic       Visit Date: 9/14/2021  Cardiologist:  Dr. Quita Levi  Primary Care Physician: Dr. Robert Hough MD    John Paul Yeung is a 59 y.o. male who presents today for:  Chief Complaint   Patient presents with    Follow-up    Congestive Heart Failure       HPI:   John Paul Yeung is a 59 y.o. male who presents to the office for a follow up patient visit in the heart failure clinic. Accompanied by no one    HF: Systolic (EF 20-25%)   Type: NICM (viral vs genetic)  Device: ICD (2013)  HX: HTN, HLD, VT (Amiodarone), SYED (not tolerate), CKD (1.5-1.9), Spinal stenosis, CardioMEMs (5/2021)  Following w/ [facility] Advanced HF clinic = Dr Fabio Thakkar 6/2020)     Dry Wt:  [phone]     Hospitalization:   [facility] July 2020 x 4 days - CHF - diuresed. [facility] Sept 2020 = admitted for Transplant workup - pt declined LVAD  [facility] Rosalio 2021 x 10 days  [facility] May - CardioMEMS implanted  [facility] June 2021 - CHF     Issues w/ diuretics - worsening CKD.  [facility] adjusting meds   He converses w/ [facility] weekly = getting bloodwork very frequent. Creatinine baseline 1.6-2.0   CardioMems implanted 5/2021 - states his PAD ranges from 23-40. [facility] managing. Has had several adjustments in diuretics  Continues on waiting list for transplant. 8/30 OV Dr Ariel Douglas   Concerns today: wts have ranged from Ef[address] 5334 over past month. Felt pretty \"steady\" past week. Overall feels stable. Trying to maintain/manage while waiting for Heart. Activity: walked from entrance, fairly active. Been don lately.   Back pain most limiting  Diet: watching    Patient has:  Chest Pain: no  SOB: BREWER  Orthopnea/PND: no  SYED: no  Edema: no  Fatigue: no  Abdominal bloating: on/off  Cough: no  Appetite: good  Home weight: w/in 10# - varies  Home blood pressure: averages around 114/60    Past Medical History:   Diagnosis Date    [facility] kidney injury ([facility]     Cardiomyopathy, dilated (HCC)     VIRAL    Claustrophobia     Congestive heart failure ([facility]F) ([facility]     Depression 12/26/2013    HTN (hypertension)     Hyperlipidemia     Medtronic dual ICD 2/14/2014    Osteoarthritis     Osteoarthritis of spine with radiculopathy, lumbar region 12/28/2015    Spinal stenosis     Unspecified sleep apnea     unable to wear CPAP     Past Surgical History:   Procedure Laterality Date    BACK SURGERY  8/26/2014    L4- S1 decompression, L4-5 PSF and TLIF    BACK SURGERY  2013   [facility] CARDIAC CATHETERIZATION  10/2013    [address]  2013    Medtronic    CARDIOVASCULAR STRESS TEST  09/2013    CARPAL TUNNEL RELEASE Right     CERVICAL FUSION  05/17/2017    [address]    COLONOSCOPY      COLONOSCOPY Left 3/15/2021    COLONOSCOPY POLYPECTOMY SNARE/COLD BIOPSY performed by Macarena Carter DO at [address] [facility]    DIAGNOSTIC CARDIAC CATH LAB PROCEDURE  07/2020    [facility]    DOPPLER ECHOCARDIOGRAPHY  09/2013    DOPPLER ECHOCARDIOGRAPHY  09/2013    [facility]    ENDOSCOPY, COLON, DIAGNOSTIC      FACET JOINT INJECTION Right 5/26/2020    L-facet RFA @ L2-3,L3-4,L4-5  RIGHT performed by Maria Luz Blankenship MD at [address] [facility] Left 7/2/2019    Left SI injection performed by Maria Luz Blankenship MD at [address] [facility] Left 8/26/2019    Left SI injection # 2 performed by Maria Luz Blankenship MD at [address]  ? ??    umbilical    LUMBAR SPINE SURGERY Left 9/11/2017    LUMBAR RFA L2-3, L3-4, L4-5, L5-S1 LEFT SIDE performed by Maria Luz Blankenship MD at [address] [facility] Right 10/31/2017    LUMBAR FACET RFA @ L2-3, L3-4, L4-5 AND L5-S1 RIGHT SIDE performed by Maria Luz Blankenship MD at [address] [facility] Left 4/11/2019    L-RFA @ L2-3, L3-4, L4-5, L5-S1 LEFT SIDE FIRST.  performed by Maria Luz Blankenship MD at [address] [facility] SURGERY Left 10/3/2019    LEFT SI RFA performed by Derik Mccord MD at [address]  2015    OTHER SURGICAL HISTORY  10/9/2015    C3-6 ACDF    OTHER SURGICAL HISTORY  01/18/2016    FACET INJECTIONS L3-L4 L4-L5 L5 S1    OTHER SURGICAL HISTORY  2-8-2016    RFA - L3-S1    OTHER SURGICAL HISTORY N/A 03-21-16    cervical epidural block C7    OTHER SURGICAL HISTORY Bilateral 05-16-16    cervical facet block C7-T1    OTHER SURGICAL HISTORY  08/01/2016    CERVICAL ABLATION    OTHER SURGICAL HISTORY Left 09/11/2017    Lumbar RFA at L2-3, L3-4, L4-5, L5-S1    OTHER SURGICAL HISTORY Right 10/31/2017    Lumbar RFA at L2-3, L3-4, L4-5, L5-S1    OTHER SURGICAL HISTORY Right 05/09/2018    Excision scalp mass right forehead    PACEMAKER PLACEMENT      WA EXC SKIN BENIG <5MM REMAINDR BODY Right 5/9/2018    EXCISION RIGHT FOREHEAD CYST performed by Esther oTrres MD at [address] Left 6/19/2018    L-RFA @ L2-3, L3-4, L4-5, L5-S1 LEFT SIDE FIRST. performed by Derik Mccord MD at [address]  1980's???    TONSILLECTOMY  1980's??    VASECTOMY  ? ??     Family History   Problem Relation Age of Onset    Heart Disease Father     Coronary Art Dis Father     Heart Attack Father     High Blood Pressure Father     Parkinsonism Father      Social History     Tobacco Use    Smoking status: Never Smoker    Smokeless tobacco: Never Used   Substance Use Topics    Alcohol use:  Yes     Alcohol/week: 6.0 standard drinks     Types: 6 Standard drinks or equivalent per week     Comment: social     Current Outpatient Medications   Medication Sig Dispense Refill    torsemide (DEMADEX) 20 MG tablet 4 tabs AM, 3 tabs PM 90 tablet 1    tamsulosin (FLOMAX) 0.4 MG capsule Take 1 capsule by mouth nightly      metOLazone (ZAROXOLYN) 5 MG tablet Take 1 tablet by mouth daily as needed (AS directed by CHF clinic) 30 tablet 0    isosorbide dinitrate (ISORDIL) 5 MG tablet Take 10 mg by mouth 3 times daily       potassium chloride (KLOR-CON M) 20 MEQ extended release tablet TAKE 1 TABLET BY MOUTH ONCE DAILY      finasteride (PROSCAR) 5 MG tablet Take 5 mg by mouth daily      JARDIANCE 10 MG tablet 10 mg       cyclobenzaprine (FLEXERIL) 5 MG tablet Take 5 mg by mouth daily as needed for Muscle spasms      metoprolol succinate (TOPROL XL) 25 MG extended release tablet Take 0.5 tablets by mouth daily      amiodarone (CORDARONE) 200 MG tablet Take 1 tablet twice a day for 7 days, then decrease to 1 tablet by mouth once a day 90 tablet 1    atorvastatin (LIPITOR) 80 MG tablet Take 80 mg by mouth nightly      digoxin (LANOXIN) 125 MCG tablet Take 0.125 mg by mouth every other day       valsartan (DIOVAN) 40 MG tablet Take 20 mg by mouth 2 times daily       Magnesium 500 MG CAPS Take by mouth daily      zinc sulfate (ZINCATE) 220 (50 Zn) MG capsule Take 50 mg by mouth daily      allopurinol (ZYLOPRIM) 300 MG tablet Take 300 mg by mouth daily      Acetaminophen (TYLENOL ARTHRITIS PAIN PO) Take by mouth 3 times daily as needed      hydrALAZINE (APRESOLINE) 25 MG tablet TAKE 1 2 (ONE HALF) TABLET BY MOUTH THREE TIMES DAILY       No current facility-administered medications for this visit. No Known Allergies    SUBJECTIVE:   Review of Systems   Constitutional: Positive for fatigue. Negative for activity change and appetite change. Respiratory: Positive for shortness of breath. Negative for cough. Cardiovascular: Negative for chest pain, palpitations and leg swelling. Gastrointestinal: Positive for abdominal distention. Neurological: Negative for weakness, light-headedness and headaches. Hematological: Negative for adenopathy. Psychiatric/Behavioral: Negative for sleep disturbance.        OBJECTIVE:   Today's Vitals:  /66   Pulse 72   Ht 5' 9\" (1.753 m)   Wt 255 lb (115.7 kg)   SpO2 94% BMI 37.66 kg/m²     Physical Exam  Vitals reviewed. Constitutional:       General: He is not in acute distress. Appearance: Normal appearance. He is well-developed. He is not diaphoretic. HENT:      Head: Normocephalic and atraumatic. Eyes:      Conjunctiva/sclera: Conjunctivae normal.   Neck:      Comments: No JVD  Cardiovascular:      Rate and Rhythm: Normal rate and regular rhythm. Heart sounds: Normal heart sounds. No murmur heard. Pulmonary:      Effort: Pulmonary effort is normal. No respiratory distress. Breath sounds: Normal breath sounds. No wheezing or rales. Abdominal:      General: Bowel sounds are normal. There is no distension. Palpations: Abdomen is soft. Tenderness: There is no abdominal tenderness. Musculoskeletal:         General: Normal range of motion. Cervical back: Normal range of motion and neck supple. Right lower leg: No edema. Left lower leg: No edema. Skin:     General: Skin is warm and dry. Capillary Refill: Capillary refill takes less than 2 seconds. Neurological:      Mental Status: He is alert and oriented to person, place, and time. Coordination: Coordination normal.   Psychiatric:         Behavior: Behavior normal.       Wt Readings from Last 3 Encounters:   09/14/21 255 lb (115.7 kg)   05/10/21 267 lb (121.1 kg)   04/06/21 257 lb 12.8 oz (116.9 kg)     BP Readings from Last 3 Encounters:   09/14/21 102/66   05/10/21 (!) 122/90   04/06/21 132/84     Pulse Readings from Last 3 Encounters:   09/14/21 72   05/10/21 65   04/06/21 81     Body mass index is 37.66 kg/m². ECHO:   CONCLUSIONS:  - Technically difficult exam due to suboptimal positioning and body habitus. - Exam indication: Cardiomyopathy  - The left ventricle is severely dilated. Left ventricular systolic function is   severely decreased.  EF = 24 ± 5% (2D biplane) Left ventricular diastolic function   was not evaluated due to inconsistent or technically suboptimal data. - The right ventricle is normal in size. Right ventricular systolic function is   normal.  - The left atrial cavity is mildly dilated. - Exam was compared with the prior [facility] echocardiographic exam performed on   7/23/2020. There are similar findings. Electronically signed by Robin Quinn MD on 1/15/2021 at 2:15:04 PM     ECHO  CONCLUSIONS:  - Exam indication: Initial evaluation of Heart Failure  - The left ventricle is moderately dilated. Left ventricular systolic function is   severely decreased. EF = 30 ± 5% (2D biplane) Definity contrast used for   endocardial border detection. Indeterminate left ventricular diastolic dysfunction   due to tachycardia. Frequent ectopy throughout the exam.  - The right ventricle is normal in size. Right ventricular systolic function is   normal.  - The left atrial cavity is moderately dilated. - The right atrial cavity is dilated. - There is moderate (2+) holosystolic mitral valve regurgitation.  - Estimated right ventricular systolic pressure is 57 mmHg consistent with   moderate pulmonary hypertension. Estimated right atrial pressure is 8 mmHg based   on IVC assessment. - Exam was compared with the prior [facility] echocardiographic exam performed on   12/10/01, LV function has decreased compared to the prior. Electronically signed by Robin Quinn MD on 7/23/2020 at 6:27:07 PM        CATH/STRESS:   Imaging:Results:Calculated gated LVEF 25 %.   technically difficult study  patchy uptake  no clear ischemia pattern  abnormal dilated cardiomyopathy  EF 25%     Conclusions      Summary   This Nuclear Medicine study was negative for ischemia .   dilated cardiomyopathy   difficult scan      Recommendation   Clinical correlation is recommended.      Signatures      ----------------------------------------------------------------   Electronically signed by Brynn Knutson MD (Interpreting   Cardiologist) on 12/06/2018 at 17:22   ---------------------------------------------------------------      Results reviewed:  BNP: No results found for: BNP  CBC:   Lab Results   Component Value Date    WBC 7.7 09/12/2020    RBC 4.00 09/12/2020    RBC 4.49 07/09/2020    HGB 12.4 09/12/2020    HCT 37.9 09/12/2020     09/12/2020     CMP:    Lab Results   Component Value Date     09/14/2021    K 3.4 09/14/2021    K 3.8 05/02/2020    CL 99 09/14/2021    CO2 27 09/14/2021    BUN 23 09/14/2021    CREATININE 1.5 09/14/2021    LABGLOM 47 09/14/2021    GLUCOSE 108 09/14/2021    GLUCOSE 100 04/21/2020    CALCIUM 9.6 09/14/2021     Hepatic Function Panel:    Lab Results   Component Value Date    ALKPHOS 112 09/12/2020    ALT 17 09/12/2020    AST 17 09/12/2020    PROT 5.8 09/12/2020    BILITOT 1.2 09/12/2020    BILIDIR 0.3 09/12/2020    LABALBU 3.8 09/12/2020     Magnesium:    Lab Results   Component Value Date    MG 2.2 02/23/2021     PT/INR:    Lab Results   Component Value Date    INR 1.03 05/02/2020     Lipids:    Lab Results   Component Value Date    TRIG 139 05/02/2020    HDL 67 05/02/2020    LDLCALC 127 05/02/2020       ASSESSMENT AND PLAN:   The patient's condition/symptoms are Stable: No clinical evidence of fluid overload today. Continue current medical regimen without changes at present time. Diagnosis Orders   1. CHF (congestive heart failure), NYHA class III, chronic, systolic ([address]     2. ICD (implantable cardioverter-defibrillator) in place     3. Nonischemic cardiomyopathy ([address]     4. Essential hypertension     5.  Stage 3b chronic kidney disease (HCC)       Continue:  GDMT:   ACE/ARB/ARNI - Not tolerate = Valsartan 20 BID (only taking daily)   BB - Toprol 12.5/day   Diuretic - Torsemide 80 AM, 60 PM  AA - Aldactone 12.5/day  SGLT2 -  Jardiance  Vasodilator - Isordil 10 TID (taking daily), Hydralazine (not taking)   Other - Digoxin q 48hr, Amiodarone  Stable, appears Euvolemic  Lab reviewed - stable - gets done weekly Repeat blood work per [facility]  BP/HR stable   Adjusting meds himself based on BP - stable recently. Continue CardioMEMs transmissions to [facility]  Continue diet/fluid adherence  Continue daily wts. [facility]/ [facility] in Oct  F/U w/ Dr Rigo Richardson [facility] in Nov F/U in clinic in 6 months    Tolerating above noted HF meds, no ill side effects noted. Will continue to monitor kidney function and electrolytes. Will optimize as tolerated. Pt is compliant w/ medications. Total visit time of 30 minutes has been spent with patient on education of symptoms, management, medication, and plan of care; as well as review of chart: labs, ECHO, radiology reports, etc.   I personally spent more then 50% of the appt time face to face with the patient. · Daily weights  · Fluid restriction of 2 Liters per day  · Limit sodium in diet to around 4292-1110 mg/day  · Monitor BP  · Activity as tolerated     Patient was instructed to call the [facility] [facility] for any changes in symptoms as noted in AVS.      Return in about 6 months (around 3/14/2022). or sooner if needed     Patient given educational materials - see patient instructions. We discussed the importance of weighing oneself and recording daily. We also discussed the importance of a low sodium diet, higher sodium foods to avoid and better low sodium food options. Patient verbalizes understanding of plan of care using teach back method, and is agreeable to the treatment plan.        Electronically signed by KRIS Luo CNP on 9/14/2021 at 5:14 PM

## 2021-09-14 NOTE — PATIENT INSTRUCTIONS
You may receive a survey regarding the care you received during your visit. Your input is valuable to us. We encourage you to complete and return your survey. We hope you will choose us in the future for your healthcare needs.     Continue:  · Continue current medications  · Daily weights and record  · Fluid restriction of 2 Liters per day  · Limit sodium in diet to around 1565-6748 mg/day  · Monitor BP  · Activity as tolerated     Call the Heart Failure Clinic for any of the following symptoms: [phone]   Weight gain of 2-3 pounds in 1 day or 5 pounds in 1 week   Increased shortness of breath   Shortness of breath while laying down   Cough   Chest pain   Swelling in feet, ankles or legs   Tenderness or bloating in the abdomen   Fatigue    Decreased appetite or nausea    Confusion   

## 2021-09-22 ENCOUNTER — PROCEDURE VISIT (OUTPATIENT)
Dept: CARDIOLOGY CLINIC | Age: 61
End: 2021-09-22
Payer: MEDICARE

## 2021-09-22 DIAGNOSIS — I50.22 CHF (CONGESTIVE HEART FAILURE), NYHA CLASS III, CHRONIC, SYSTOLIC (HCC): Primary | ICD-10-CM

## 2021-09-22 PROCEDURE — 93297 REM INTERROG DEV EVAL ICPMS: CPT | Performed by: NUCLEAR MEDICINE

## 2021-09-22 PROCEDURE — G2066 INTER DEVC REMOTE 30D: HCPCS | Performed by: NUCLEAR MEDICINE

## 2021-09-22 NOTE — PROGRESS NOTES
[facility] [employer] Dual ICD Optivol  Pt of [place]    Battery 2.1 years    Optivol WNL    Episodes  8/23/21- 8 beats NS VT rate 162

## 2021-10-04 ENCOUNTER — OFFICE VISIT (OUTPATIENT)
Dept: CARDIOLOGY CLINIC | Age: 61
End: 2021-10-04
Payer: MEDICARE

## 2021-10-04 VITALS
SYSTOLIC BLOOD PRESSURE: 107 MMHG | HEART RATE: 82 BPM | DIASTOLIC BLOOD PRESSURE: 64 MMHG | WEIGHT: 258 LBS | BODY MASS INDEX: 38.21 KG/M2 | HEIGHT: 69 IN

## 2021-10-04 DIAGNOSIS — I50.22 CHF (CONGESTIVE HEART FAILURE), NYHA CLASS III, CHRONIC, SYSTOLIC (HCC): Primary | ICD-10-CM

## 2021-10-04 DIAGNOSIS — I42.8 NONISCHEMIC CARDIOMYOPATHY (HCC): ICD-10-CM

## 2021-10-04 DIAGNOSIS — I42.0 DILATED CARDIOMYOPATHY (HCC): ICD-10-CM

## 2021-10-04 DIAGNOSIS — I50.20 SYSTOLIC CONGESTIVE HEART FAILURE, UNSPECIFIED HF CHRONICITY (HCC): ICD-10-CM

## 2021-10-04 DIAGNOSIS — I50.23 CHF (CONGESTIVE HEART FAILURE), NYHA CLASS III, ACUTE ON CHRONIC, SYSTOLIC (HCC): ICD-10-CM

## 2021-10-04 PROCEDURE — G8417 CALC BMI ABV UP PARAM F/U: HCPCS | Performed by: NURSE PRACTITIONER

## 2021-10-04 PROCEDURE — 99213 OFFICE O/P EST LOW 20 MIN: CPT | Performed by: NURSE PRACTITIONER

## 2021-10-04 PROCEDURE — G8427 DOCREV CUR MEDS BY ELIG CLIN: HCPCS | Performed by: NURSE PRACTITIONER

## 2021-10-04 PROCEDURE — G8484 FLU IMMUNIZE NO ADMIN: HCPCS | Performed by: NURSE PRACTITIONER

## 2021-10-04 PROCEDURE — 3017F COLORECTAL CA SCREEN DOC REV: CPT | Performed by: NURSE PRACTITIONER

## 2021-10-04 PROCEDURE — 1036F TOBACCO NON-USER: CPT | Performed by: NURSE PRACTITIONER

## 2021-10-04 NOTE — PROGRESS NOTES
Follow-up. He denies having any chest pain,   He has shortness of breath and states it hasn't improved nor has it worsened. He denies having any dizziness or palpitations. He states his medication list in the computer is up to date.

## 2021-10-04 NOTE — PROGRESS NOTES
[facility] PROFESSIONAL SERVICES  [facility]   [address] [facility] [address] [facility] [place] [address]   Dept: [phone]   Dept Fax: [phone]   Loc: [phone]      Chief Complaint   Patient presents with    Follow-up    Congestive Heart Failure     Chief complaint: \"Feel good today - still always challenged by the water but doing good today\". Cardiologist:  Dr. Neyda Guillen  Primary Care Physician: Dr. Summer Lan MD    Patient presents to the office today for routine follow-up with Dr. Neyda Guillen. Patient last seen on 9/14/2021 in the [facility] CHF clinic. He was seen as part of his routine follow-up. Review of that visit notes:  Teodoro Client is a 59 y.o. male who presents to the office for a follow up patient visit in the heart failure clinic. Accompanied by no one     HF: Systolic (EF 20-25%)   Type: NICM (viral vs genetic)  Device: ICD (2013)  HX: HTN, HLD, VT (Amiodarone), SYED (not tolerate), CKD (1.5-1.9), Spinal stenosis, CardioMEMs (5/2021)  Following w/ [facility] Advanced HF clinic = Dr Rickey Byrnes 6/2020)     Dry Wt:  [phone]     Hospitalization:   [facility] July 2020 x 4 days - CHF - diuresed. [facility] Sept 2020 = admitted for Transplant workup - pt declined LVAD  [facility] Rosalio 2021 x 10 days  [facility] May - CardioMEMS implanted  [facility] June 2021 - CHF     Issues w/ diuretics when seen at that visit -noting worsening CKD.    [facility] adjusting meds   He converses w/ [facility] weekly = getting bloodwork very frequent. Creatinine baseline 1.6-2.0   CardioMems implanted 5/2021 - states his PAD ranges from 23-40. [facility] managing. Has had several adjustments in diuretics  Continues on waiting list for transplant. Monthly OptiVol review and quarterly ICD remote interrogations monitored through [facility] device clinic by Dr. Neyda Guillen. One episode NSVT of 4 beats in July. One episode of NSVT of 8 beats in August; no noted ICD firings. Today's visit:   Subjective:   Following with [facility] and [facility] CHf clinic  Walk up 1 flight stairs - dyspneic - consistent with his baseline  No recent dizziness or lightheadedness  No chest pain  Some mild bloating in his abd off and on - usual for him as sx of water retention  Cardiac MEMS; [facility] follows his PAD weekly - has been running 30's - 40's (goal 20); diuretics adjusted accordingly per [facility]. Leaving for cruise on Friday - has been in touch with CHF clinic at [facility] - reflection of discussion and education regarding travel evident in EMR. States that he needs referral to Dr. Rajesh Dutta at [facility] (currently seeing) - changing insurances - will need to have complete so no gap in insurance when takes effect 1/1/22.        General:   No fever, no chills, No fatigue or weight loss  Pulmonary:    No dyspnea beyond his baseline  Cardiac:    Denies recent chest pain or palpitations  GI:     No nausea or vomiting, no abdominal pain  Neuro:      No dizziness or light headedness  Musculoskeletal:  No recent active issues  Extremities:   No edema, good peripheral pulses      Past Medical History:   Diagnosis Date    Acute kidney injury ([address]     Cardiomyopathy, dilated ([address]     VIRAL    Claustrophobia     Congestive heart failure (CHF) ([address]     Depression 12/26/2013    HTN (hypertension)     Hyperlipidemia     Medtronic dual ICD 2/14/2014    Osteoarthritis     Osteoarthritis of spine with radiculopathy, lumbar region 12/28/2015    Spinal stenosis     Unspecified sleep apnea     unable to wear CPAP       No Known Allergies    Current Outpatient Medications   Medication Sig Dispense Refill    torsemide (DEMADEX) 20 MG tablet 4 tabs AM, 3 tabs PM 90 tablet 1    tamsulosin (FLOMAX) 0.4 MG capsule Take 1 capsule by mouth nightly      metOLazone (ZAROXOLYN) 5 MG tablet Take 1 tablet by mouth daily as needed (AS directed by CHF clinic) 30 tablet 0    isosorbide dinitrate (ISORDIL) 5 MG tablet Take 10 mg by mouth 3 times daily       potassium chloride (KLOR-CON M) 20 MEQ extended release tablet TAKE 1 TABLET BY MOUTH ONCE DAILY      hydrALAZINE (APRESOLINE) 25 MG tablet TAKE 1 2 (ONE HALF) TABLET BY MOUTH THREE TIMES DAILY      finasteride (PROSCAR) 5 MG tablet Take 5 mg by mouth daily      JARDIANCE 10 MG tablet 10 mg       cyclobenzaprine (FLEXERIL) 5 MG tablet Take 5 mg by mouth daily as needed for Muscle spasms      metoprolol succinate (TOPROL XL) 25 MG extended release tablet Take 0.5 tablets by mouth daily      amiodarone (CORDARONE) 200 MG tablet Take 1 tablet twice a day for 7 days, then decrease to 1 tablet by mouth once a day 90 tablet 1    atorvastatin (LIPITOR) 80 MG tablet Take 80 mg by mouth nightly      digoxin (LANOXIN) 125 MCG tablet Take 0.125 mg by mouth every other day       valsartan (DIOVAN) 40 MG tablet Take 20 mg by mouth 2 times daily       Magnesium 500 MG CAPS Take by mouth daily      zinc sulfate (ZINCATE) 220 (50 Zn) MG capsule Take 50 mg by mouth daily      allopurinol (ZYLOPRIM) 300 MG tablet Take 300 mg by mouth daily      Acetaminophen (TYLENOL ARTHRITIS PAIN PO) Take by mouth 3 times daily as needed       No current facility-administered medications for this visit. Social History     Socioeconomic History    Marital status:      Spouse name: None    Number of children: 1    Years of education: None    Highest education level: None   Occupational History    None   Tobacco Use    Smoking status: Never Smoker    Smokeless tobacco: Never Used   Vaping Use    Vaping Use: Never used   Substance and Sexual Activity    Alcohol use:  Yes     Alcohol/week: 6.0 standard drinks     Types: 6 Standard drinks or equivalent per week     Comment: social    Drug use: No    Sexual activity: Yes     Partners: Female   Other Topics Concern    None   Social History Narrative    None     Social Determinants of Health     Financial Resource Strain:     Difficulty of Paying Living Expenses:    Food Insecurity:     Worried About Running Out of Food in the Last Year:     Ran Out of Food in the Last Year:    Transportation Needs:     Lack of Transportation (Medical):  Lack of Transportation (Non-Medical):    Physical Activity:     Days of Exercise per Week:     Minutes of Exercise per Session:    Stress:     Feeling of Stress :    Social Connections:     Frequency of Communication with Friends and Family:     Frequency of Social Gatherings with Friends and Family:     Attends Zoroastrianism Services:     Active Member of Clubs or Organizations:     Attends Club or Organization Meetings:     Marital Status:    Intimate Partner Violence:     Fear of Current or Ex-Partner:     Emotionally Abused:     Physically Abused:     Sexually Abused:        Family History   Problem Relation Age of Onset    Heart Disease Father     Coronary Art Dis Father     Heart Attack Father     High Blood Pressure Father     Parkinsonism Father        Blood pressure 107/64, pulse 82, height 5' 9\" (1.753 m), weight 258 lb (117 kg). General:   Well developed, well nourished, appears well  Lungs:   Clear to auscultation with good aeration  Heart:    Normal S1 S2, No murmur, rubs, or gallops  Abdomen:   Soft, non tender, no organomegalies, positive bowel    sounds  Extremities:   No edema, no cyanosis, good peripheral pulses  Neurological:   Awake, alert, oriented. No obvious focal deficits  Musculoskeletal:  No obvious deformities    EK2020  EKG 12 Lead  Order: [id number]  Status:  Final result   Visible to patient:  Yes (Ethan) Next appt:   Today at 08:00 AM in Cardiology ([address], [facility] - [facility])   0 Result Notes   Ref Range & Units 20 1444   Ventricular Rate BPM 82    Atrial Rate BPM 82    P-R Interval ms 276    QRS Duration ms 116    Q-T Interval ms 418    QTc Calculation (Bazett) ms 488    P Axis degrees 58    R Axis degrees -63    T Axis degrees 98    Resulting Agency  [facility]      Narrative & Impression    Sinus rhythm with 1st degree A-V block with occasional Premature ventricular complexes  Low voltage QRS, consider pulmonary disease, pericardial effusion, or normal variant  Left anterior fascicular block  Cannot rule out Inferior infarct , age undetermined  Cannot rule out Anterior infarct (cited on or before 12-SEP-2020)  Abnormal ECG  When compared with ECG of 02-MAY-2020 08:02,  Premature ventricular complexes are now Present  OR interval has increased  Left anterior fascicular block is now Present  Questionable change in initial forces of Lateral leads  Confirmed by Ewa Hamm ([id number]) on 9/13/2020 12:18:12 PM             Echo: 12/6/2018          Transthoracic Echocardiography Report (TTE)      Demographics      Patient Name   Jared Powell  Gender              Male                  A      MR #           [id number]       Race                                                     Ethnicity      Account #      [de-identified]       Room Number      Accession      [id number]       Date of Study       12/06/2018   Number      Date of Birth  1960      Referring Physician Christopher Santoyo MD      Age            59 year(s)      Ion Recinos,                                                      RDCS                                     Interpreting        Christopher Harris MD                                  Physician     Procedure     Type of Study      TTE procedure:ECHOCARDIOGRAM COMPLETE 2D W DOPPLER W COLOR.      Procedure Date  Date: 12/06/2018 Start: 10:16 AM     Study Location: Echo Lab  Technical Quality: Limited visualization due to body habitus.     Indications:Dyspnea on exertion and Cardiomyopathy.     Additional Medical History:Sleep apnea, congestive heart failure,  hyperlipidemia, dilated cardiomyopathy, dual chamber ICD     Patient Status: Routine     Height: 69 inches Weight: 271.32 pounds BSA: 2.35 m^2 BMI: 40.07 kg/m^2     BP: 108/64 mmHg      Conclusions      Summary   Ejection fraction is visually estimated at 25%. There was severe global hypokinesis of the left ventricle. Left Ventricular size is Moderately increased . Signature      ----------------------------------------------------------------   Electronically signed by Marilu Triplett MD (Interpreting   physician) on 12/06/2018 at 05:13 PM   ----------------------------------------------------------------      Findings      Mitral Valve   The mitral valve structure was normal with normal leaflet separation. DOPPLER: The transmitral velocity was within the normal range with no   evidence for mitral stenosis. There was no evidence of mitral   regurgitation. Aortic Valve   The aortic valve was trileaflet with normal thickness and cuspal   separation. DOPPLER: Transaortic velocity was within the normal range with   no evidence of aortic stenosis. There was no evidence of aortic   regurgitation. Tricuspid Valve   Trivial tricuspid regurgitation visualized. Pulmonic Valve   The pulmonic valve was not well visualized . Trivial pulmonic regurgitation visualized. Left Atrium   Left atrial size was normal.      Left Ventricle   Ejection fraction is visually estimated at 25%. There was severe global hypokinesis of the left ventricle. Left Ventricular size is Moderately increased . Right Atrium   Right atrial size was normal.      Right Ventricle   The right ventricular size was normal with normal systolic function and   wall thickness. Pericardial Effusion   The pericardium was normal in appearance with no evidence of a pericardial   effusion. Pleural Effusion   No evidence of pleural effusion. Aorta / Great Vessels   -Aortic root dimension within normal limits.   -The Pulmonary artery is within normal limits. -IVC size is within normal limits with normal respiratory phasic changes.      M-Mode/2D Measurements & Calculations      LV Diastolic    LV Systolic Dimension:    AV Cusp Separation: 2 cmLA   Dimension: 6.5  6.1 cm                    Dimension: 4.9 cmAO Root   cm              LV Volume Diastolic: 806  Dimension: 3 cmLA Area: 20.7   LV FS:6.2 %     ml                        cm^2   LV PW           LV Volume Systolic: 979   Diastolic: 1 cm ml   Septum          LV EDV/LV EDV Index: 220   Diastolic: 0.9  HC/20 U^1FO ESV/LV ESV    RV Diastolic Dimension: 3.9 cm   cm              Index: 187 ml/80 m^2                   EF Calculated: 13.4 %     LA/Aorta: 1.63                      LV Length: 8.9 cm         LA volume/Index: 53.5 ml /23m^2   LV Area   Diastolic: 22.5   cm^2   LV Area   Systolic: 31.9   cm^2     Doppler Measurements & Calculations      MV Peak E-Wave: 94.6  AV Peak Velocity: 101  LVOT Peak Velocity: 56.1 cm/s   cm/s                  cm/s                   LVOT Mean Velocity: 38.8 cm/s   MV Peak A-Wave: 42.7  AV Peak Gradient: 4.08 LVOT Peak Gradient: 1   cm/s                  mmHg                   mmHgLVOT Mean Gradient: 1   MV E/A Ratio: 2.22    AV Mean Velocity: 71.8 mmHg   MV Peak Gradient:     cm/s   3.58 mmHg             AV Mean Gradient: 2    TV Peak E-Wave: 56.8 cm/s                         mmHg                   TV Peak A-Wave: 29.6 cm/s   MV Deceleration Time: AV VTI: 21.2 cm   197 msec                                     TV Peak Gradient: 1.29 mmHg   MV P1/2t: 58 msec   MVA by PHT:3.79 cm^2  LVOT VTI: 11 cm        PV Peak Velocity: 64 cm/s                         IVRT: 92 msec          PV Peak Gradient: 1.64 mmHg   MV E' Septal   Velocity: 3.5 cm/s   MV A' Septal          AV DVI (VTI): 0.52AV   Velocity: 8.8 cm/s    DVI (Vmax):0.56   MV E' Lateral   Velocity: 3.5 cm/s   MV A' Lateral   Velocity: 5.8 cm/s   E/E' septal: 27.03   E/E' lateral: 27.03   MR Velocity: 352 cm/s     [email or web address] [id number] Diagnosis Orders   1. CHF (congestive heart failure), NYHA class III, chronic, systolic [facility])  External Referral To Cardiology   2. Nonischemic cardiomyopathy [facility])  External Referral To Cardiology   3. CHF (congestive heart failure), NYHA class III, acute on chronic, systolic [facility])  External Referral To Cardiology   4. Dilated cardiomyopathy [facility])  External Referral To Cardiology   5. Systolic congestive heart failure, unspecified HF chronicity [facility])  External Referral To Cardiology       Orders Placed This Encounter   Procedures    External Referral To Cardiology     Referral Priority:   Routine     Referral Type:   Eval and Treat     Referral Reason:   Specialty Services Required     Requested Specialty:   Cardiology     Number of Visits Requested:   1     Continue Dr Solo Watkins current treatment plan:  Continue to follow with Ibis Faye in CHF clinic. Continue follow-up with [facility] CHF clinic - referral will be made to assure new insurance information complete. Continue current medications and device follow-up. Continue CHF management as per current recommendations.      Follow up with Dr Porsha Gomez in one year as scheduled or sooner if needed

## 2021-10-04 NOTE — PATIENT INSTRUCTIONS
Continue to follow with Consuelo Najera in CHF clinic. Continue follow-up with [facility] CHF clinic - referral will be made to assure new insurance information complete. Continue current medications and device follow-up. Continue CHF management as per current recommendations. You may receive a survey regarding the care you received during your visit. Your input is valuable to us. We encourage you to complete and return your survey. We hope you will choose us in the future for your healthcare needs.

## 2021-10-27 ENCOUNTER — TELEPHONE (OUTPATIENT)
Dept: CARDIOLOGY CLINIC | Age: 61
End: 2021-10-27

## 2021-10-27 ENCOUNTER — PROCEDURE VISIT (OUTPATIENT)
Dept: CARDIOLOGY CLINIC | Age: 61
End: 2021-10-27
Payer: MEDICARE

## 2021-10-27 DIAGNOSIS — Z95.810 ICD (IMPLANTABLE CARDIOVERTER-DEFIBRILLATOR) IN PLACE: Primary | ICD-10-CM

## 2021-10-27 PROCEDURE — 93295 DEV INTERROG REMOTE 1/2/MLT: CPT | Performed by: NUCLEAR MEDICINE

## 2021-10-27 PROCEDURE — 93296 REM INTERROG EVL PM/IDS: CPT | Performed by: NUCLEAR MEDICINE

## 2021-10-27 NOTE — TELEPHONE ENCOUNTER
[facility] Medtronic Dual ICD ---  Pt of Emely See    Optivol elevated. Pt called. Weight up since he got home from cruise about two weeks ago. Has lost most but still feels he is holding onto some and having a hard time getting it off. Bloating in abdomen  SOB. Taking metolazone 2.5mg daily. Pt going to [facility]. Stated he has been seeing them every other month or monthly for a while now. Stated he is on the list for a heart transplant.

## 2021-10-28 NOTE — TELEPHONE ENCOUNTER
Noted.  Pt has CardioMEMs - follows closely w/ [facility]

## 2021-11-01 ENCOUNTER — TELEPHONE (OUTPATIENT)
Dept: CARDIOLOGY CLINIC | Age: 61
End: 2021-11-01

## 2021-11-01 DIAGNOSIS — I50.22 CHF NYHA CLASS III, CHRONIC, SYSTOLIC (HCC): Primary | ICD-10-CM

## 2021-11-01 DIAGNOSIS — E66.9 OBESITY (BMI 30-39.9): ICD-10-CM

## 2021-11-01 NOTE — TELEPHONE ENCOUNTER
Pt called asking for referral to dietician. Pt is currently getting workup for Heart transplant at [facility] but needs to lower BMI. Prefers to see dietician here in town. Referral placed.

## 2021-11-10 ENCOUNTER — OFFICE VISIT (OUTPATIENT)
Dept: INTERNAL MEDICINE CLINIC | Age: 61
End: 2021-11-10
Payer: MEDICARE

## 2021-11-10 VITALS — WEIGHT: 251.6 LBS | BODY MASS INDEX: 37.26 KG/M2 | TEMPERATURE: 98.3 F | HEIGHT: 69 IN

## 2021-11-10 DIAGNOSIS — I50.22 CHF (CONGESTIVE HEART FAILURE), NYHA CLASS III, CHRONIC, SYSTOLIC (HCC): ICD-10-CM

## 2021-11-10 DIAGNOSIS — E66.9 OBESITY (BMI 30-39.9): ICD-10-CM

## 2021-11-10 PROCEDURE — 97802 MEDICAL NUTRITION INDIV IN: CPT | Performed by: DIETITIAN, REGISTERED

## 2021-11-10 NOTE — PROGRESS NOTES
does mow and does gardening. Many times after doing these activities he is in a lot of pain d/t back disorder. Recently went on a [address] - and weight when home was at its highest 266#    Eat out or take out - maybe once a month. -Main Beverages: unsw tea. Water.   Does not generally drink juice or coffee very often  No pop.    -Impression of Dietary Intake: on average, 1 meals per day, low salt, inadequate energy intake overall based on his food recall and missing out on good nutritional practices    Current Outpatient Medications on File Prior to Visit   Medication Sig Dispense Refill    torsemide (DEMADEX) 20 MG tablet 4 tabs AM, 3 tabs PM 90 tablet 1    tamsulosin (FLOMAX) 0.4 MG capsule Take 1 capsule by mouth nightly      metOLazone (ZAROXOLYN) 5 MG tablet Take 1 tablet by mouth daily as needed (AS directed by CHF clinic) 30 tablet 0    isosorbide dinitrate (ISORDIL) 5 MG tablet Take 10 mg by mouth 3 times daily       potassium chloride (KLOR-CON M) 20 MEQ extended release tablet TAKE 1 TABLET BY MOUTH ONCE DAILY      hydrALAZINE (APRESOLINE) 25 MG tablet TAKE 1 2 (ONE HALF) TABLET BY MOUTH THREE TIMES DAILY      finasteride (PROSCAR) 5 MG tablet Take 5 mg by mouth daily      JARDIANCE 10 MG tablet 10 mg       cyclobenzaprine (FLEXERIL) 5 MG tablet Take 5 mg by mouth daily as needed for Muscle spasms      metoprolol succinate (TOPROL XL) 25 MG extended release tablet Take 0.5 tablets by mouth daily      amiodarone (CORDARONE) 200 MG tablet Take 1 tablet twice a day for 7 days, then decrease to 1 tablet by mouth once a day 90 tablet 1    atorvastatin (LIPITOR) 80 MG tablet Take 80 mg by mouth nightly      digoxin (LANOXIN) 125 MCG tablet Take 0.125 mg by mouth every other day       valsartan (DIOVAN) 40 MG tablet Take 20 mg by mouth 2 times daily       Magnesium 500 MG CAPS Take by mouth daily      zinc sulfate (ZINCATE) 220 (50 Zn) MG capsule Take 50 mg by mouth daily      allopurinol (ZYLOPRIM) 300 MG tablet Take 300 mg by mouth daily      Acetaminophen (TYLENOL ARTHRITIS PAIN PO) Take by mouth 3 times daily as needed       No current facility-administered medications on file prior to visit. Vitals from current and previous visits:  Weighed in shorts and tshirt and he emptied his pockets. Temp 98.3 °F (36.8 °C)   Ht 5' 9\" (1.753 m)   Wt 251 lb 9.6 oz (114.1 kg)   BMI 37.15 kg/m²     -Body mass index is 37.15 kg/m². 35-39.9 - Obesity Grade II.   -Weight goal: lose weight. Nutrition Diagnosis:   Obesity related to currently undergoing MNT and limited on physical activity as evidenced by Patient report of unable to exercise much d/t back disorder and food recall eating on 1 meal/day and BMI of 37. Intervention:  -Instructed the patient on: Meal Planning for Regular, Balanced Meals & Snacks and weight loss tips and mindful eating, low sodium guidelines. -Handouts given for: weight mgmt guide booklet, weight loss tips, low sodium shopping list, low sodium nutrition therapy. Patient Instructions   Bring food logging to next dietitian appt. Eat 3 meals/day to help stimulate metabolism.    -Nutrition prescription: 1300 - 1400 calories/day, 100 - 135 g carbs/day. <40 gms fat/day, <2000 mg sodium/day, 2 liter fluid restriction  Adj wt. 184# (84 kg)    Comprehension verified using teachback method. Monitoring/Evaluation:   -Followup visit: 6 weeks with dietitian.   -Receptiveness to education/goals: Agreeable.  -Evaluation of education: Indicates understanding.  -Readiness to change: contemplation - ambivalent about change eating 3x/day. -Expected compliance: good. Thank you for your referral of this patient. Total time involved in direct patient education: 45 minutes for initial MNT visit.

## 2021-12-01 ENCOUNTER — PROCEDURE VISIT (OUTPATIENT)
Dept: CARDIOLOGY CLINIC | Age: 61
End: 2021-12-01
Payer: MEDICARE

## 2021-12-01 DIAGNOSIS — I50.22 CHF NYHA CLASS III, CHRONIC, SYSTOLIC (HCC): Primary | ICD-10-CM

## 2021-12-01 PROCEDURE — 93297 REM INTERROG DEV EVAL ICPMS: CPT | Performed by: NUCLEAR MEDICINE

## 2021-12-01 PROCEDURE — G2066 INTER DEVC REMOTE 30D: HCPCS | Performed by: NUCLEAR MEDICINE

## 2021-12-01 NOTE — PROGRESS NOTES
[facility] Medtronic Dual ICD Optivol  Pt of [place]    Battery 2.2 years    Optivol WNL        =

## 2021-12-21 ENCOUNTER — OFFICE VISIT (OUTPATIENT)
Dept: INTERNAL MEDICINE CLINIC | Age: 61
End: 2021-12-21
Payer: MEDICARE

## 2021-12-21 VITALS — WEIGHT: 251.4 LBS | TEMPERATURE: 98.3 F | HEIGHT: 69 IN | BODY MASS INDEX: 37.23 KG/M2

## 2021-12-21 DIAGNOSIS — G47.33 OSA (OBSTRUCTIVE SLEEP APNEA): ICD-10-CM

## 2021-12-21 DIAGNOSIS — I50.22 CHRONIC SYSTOLIC HEART FAILURE (HCC): ICD-10-CM

## 2021-12-21 PROCEDURE — 97803 MED NUTRITION INDIV SUBSEQ: CPT | Performed by: DIETITIAN, REGISTERED

## 2021-12-21 NOTE — PATIENT INSTRUCTIONS
Keep up you nutrition. Aim for 3 food groups at each meal.    Great job making your foods from scratch and avoiding high sodium ingredients    Monitor your steps with your fitbit and progress your physical activity. - do additional laps around the store before shopping   - take a short brisk walk outside in the morning or afternoon - even in winter when not too cold.

## 2021-12-21 NOTE — PROGRESS NOTES
[address] [place] [address]. [address] [place] [address] Luis Enrique., [facility], [address]  [phone] (phone)  [phone] (fax)    Patient Name: Albaro Rossi. Date of Birth: [id number]. MRN: [id number]      Assessment: Patient is a 59 y.o. male seen for follow-up MNT visit for CHF 3, Obesity.     -Nutritionally relevant labs:   Lab Results   Component Value Date/Time    GLUCOSE 90 12/21/2021 09:52 AM    GLUCOSE 107 12/14/2021 10:43 AM    GLUCOSE 100 (H) 04/21/2020 08:36 AM    GLUCOSE 95 01/31/2020 09:28 AM    CHOL 222 (H) 05/02/2020 04:27 AM    HDL 67 05/02/2020 04:27 AM    LDLCALC 127 05/02/2020 04:27 AM    TRIG 139 05/02/2020 04:27 AM   Results for Aby Weinstein (MRN [id number]) as of 12/21/2021 14:21   Ref. Range 12/21/2021 09:52   Sodium Latest Ref Range: 135 - 145 meq/L 136   Potassium Latest Ref Range: 3.5 - 5.2 meq/L 5.2   BUN Latest Ref Range: 7 - 22 mg/dL 28 (H)   Creatinine Latest Ref Range: 0.4 - 1.2 mg/dL 2.3 (H)   Est, Glom Filt Rate Latest Units: ml/min/1.73m2 29 (A)   Glucose Latest Ref Range: 70 - 108 mg/dL 90     11/10/21 RD initial Visit  [facility] wants pt to lower his BMI to get on the heart transplant list.  Pt use to be an  at [employer]. [employer]) and he also worked at [employer]. Pt has been on Medical leave since Jan.2013  Pt states of Med Hx:  3 back surgeries. And another back disorder. Which hinders is ability to exercise. Has done stationary bike and then the next day in a lot of pain d/t this back disorder. Lives with wife - who works at [employer] ([employer]). Today's Visit:  Pt c/o no appetite especially when experiencing pain.    -Food recall/food log:  Breakfast: fruit - ex. pineapple or apple and sometimes with toast OR may skip sometimes OR sometimes egg or pickled eggs.    Lunch: Veggie plate - celery, carrots no dip OR pickled egg and fresh pineapple OR P. eggs and beets OR bumbo OR 1/2 turkey wing OR egg casserole OR sometimes skips  Dinner: 2 cups chili (no salt is added and he uses fresh ingredients)  Pt states he does not buy packaged or canned foods unless it is no salt added are low sodium   Other meals - 3 meatballs made with ground turkey breast and no salt homemade marinara sauce and 1 cup potatoes OR 3 oz salmon, 1 cup grits made with low sodium chicken broth OR 3 oz pork tenderloin (homemade made with panko) OR 3 oz chicken parmesan not breaded with his homemade marinar sauce. Had 1/2 cup fresh green beans with this. Snacks: Afternoon snack sometimes or evening snack:  Usually fruit    -Main Beverages: Unsw tea, water. No pop. Does not drink juic or coffee. Fluid restriction - 2 liters/day    No formal exercise but his back pain does not stop him from doing outside work - winterizing for the winter. Pt states he is in a lot of pain d/t his back problem that he has (nerve endings wrapped up into knot at the bottom of his spine d/t scar tissue from prior 2 back surgeries).     -Impression of Dietary Intake: on average, 2-3 meals per day, low salt, inadequate energy intake d/t skipped meals and small portions    Current Outpatient Medications on File Prior to Visit   Medication Sig Dispense Refill    torsemide (DEMADEX) 20 MG tablet 4 tabs AM, 3 tabs PM 90 tablet 1    tamsulosin (FLOMAX) 0.4 MG capsule Take 1 capsule by mouth nightly      metOLazone (ZAROXOLYN) 5 MG tablet Take 1 tablet by mouth daily as needed (AS directed by CHF clinic) 30 tablet 0    isosorbide dinitrate (ISORDIL) 5 MG tablet Take 10 mg by mouth 3 times daily       potassium chloride (KLOR-CON M) 20 MEQ extended release tablet TAKE 1 TABLET BY MOUTH ONCE DAILY      hydrALAZINE (APRESOLINE) 25 MG tablet TAKE 1 2 (ONE HALF) TABLET BY MOUTH THREE TIMES DAILY      finasteride (PROSCAR) 5 MG tablet Take 5 mg by mouth daily      JARDIANCE 10 MG tablet 10 mg       cyclobenzaprine (FLEXERIL) 5 MG tablet Take 5 mg by mouth daily as needed for Muscle spasms      metoprolol succinate (TOPROL XL) 25 MG extended release tablet Take 0.5 tablets by mouth daily      amiodarone (CORDARONE) 200 MG tablet Take 1 tablet twice a day for 7 days, then decrease to 1 tablet by mouth once a day 90 tablet 1    atorvastatin (LIPITOR) 80 MG tablet Take 80 mg by mouth nightly      digoxin (LANOXIN) 125 MCG tablet Take 0.125 mg by mouth every other day       valsartan (DIOVAN) 40 MG tablet Take 20 mg by mouth 2 times daily       Magnesium 500 MG CAPS Take by mouth daily      zinc sulfate (ZINCATE) 220 (50 Zn) MG capsule Take 50 mg by mouth daily      allopurinol (ZYLOPRIM) 300 MG tablet Take 300 mg by mouth daily      Acetaminophen (TYLENOL ARTHRITIS PAIN PO) Take by mouth 3 times daily as needed       No current facility-administered medications on file prior to visit. Vitals from current and previous visits:  Pt monitors his weight daily d/t his CHF requirements and has gained fluid weight recently - weight was down to 244# then fluctuates back up to 251. Temp 98.3 °F (36.8 °C)   Ht 5' 9\" (1.753 m)   Wt 251 lb 6.4 oz (114 kg)   BMI 37.13 kg/m²     -Body mass index is 37.13 kg/m². 35-39.9 - Obesity Grade II.   - Patient maintained.  -Weight goal: lose weight. Nutrition Diagnosis:   Obesity related to Organ/system dysfunction: diagnosis of CHFand CKD and inadequate energy intake as evidenced by Patient report of no appetite and not able to exercise d/t pain and pt following low sodium meal plan. Intervention:  -Impression:  Pt expressed discouragement that he is making healthy food choices and making foods from scratch at home as to avoid sodium but still not able to lose weight and experiencing water weight gain. Renal labs - increased Bun/Cr and GFR @ 29 - borderline CKD 4 which is a result of poor EF of CHF 20 -22% (per pt).     -Instructed the patient on: Meal Planning for Regular, Balanced Meals & Snacks and The Importance of Regular Physical Activity (PA). Trying to represent at least 3 food groups at each meal.    Patient Instructions   Keep up you nutrition. Aim for 3 food groups at each meal.    Great job making your foods from scratch and avoiding high sodium ingredients    Monitor your steps with your fitbit and progress your physical activity. - do additional laps around the store before shopping   - take a short brisk walk outside in the morning or afternoon - even in winter when not too cold. -Nutrition prescription: 1300 - 1400 calories/day, 100 - 135 g carbs/day. <40 gms fat/day, <2000 mg sodium/day, 2 liter fluid restriction    Comprehension verified using teachback method. Monitoring/Evaluation:   -Followup visit: end of treatment with dietitian.   -Receptiveness to education/goals: Agreeable.  -Evaluation of education: Indicates understanding.  -Readiness to change: contemplation - ambivalent about change not skipping meals and representing 3 food groups at each meal and adding short walk each day as able and progress PA as able. -Expected compliance: good. Thank you for your referral of this patient. Total time involved in direct patient education: 30 minutes for follow-up MNT visit.

## 2022-01-12 ENCOUNTER — CARE COORDINATION (OUTPATIENT)
Dept: OTHER | Facility: CLINIC | Age: 62
End: 2022-01-12

## 2022-01-12 NOTE — CARE COORDINATION
Ambulatory Care Coordination Note  1/12/2022  CM Risk Score: 5  Charlson 10 Year Mortality Risk Score: 47%     ACC: Dulce Wright, RN    Summary Note: ACM attempted to reach patient's wife for introduction to Associate Care Management related to pt current inpatient at [facility]. HIPAA compliant message left requesting a return phone call. Will attempt to outreach patient/patient wife again. Future Appointments   Date Time Provider Jhonathan Beatty   4/25/2022 11:30 AM KRIS Amado - CNP N [facility] CHF [facility] - [place]   10/5/2022  8:00 AM Hudson Mckeon MD N SRPX Heart [facility] - [facility] MSN, RN   Ambulatory Care Manager  Associate Care Management  Cell [phone]  [email or web address]

## 2022-01-13 ENCOUNTER — CARE COORDINATION (OUTPATIENT)
Dept: OTHER | Facility: CLINIC | Age: 62
End: 2022-01-13

## 2022-01-13 NOTE — CARE COORDINATION
Ambulatory Care Coordination Note  2022  CM Risk Score: 5  Charlson 10 Year Mortality Risk Score: 47%     ACC: Nila Bhandari RN    Summary Note:  ACM contacted patient's wife, Azalea Law,  for introduction to Associate Care Management related to Pts hospitalization at [facility] on 22. Verified with wife the name and  of patient as identifiers. Wife states that  was admitted to [facility] on 22 as his liver and kidney functions were not good. Wife reports that pt was temporarily taken off transplant list until his labs improve, stating as soon as they improve, he will be put back on the list and prioritized at a Level 2. Wife states that pt is expected to stay in [address] [place] [address] until he gets the heart transplant. ACM explained that once pt gets transplant, he will be followed by the transplant care manager. Wife denies any other questions, concerns or needs at this time. Wife declines care management at this time, stating I think we have everything we need right now. ACM provided contact information for self referral if wife/patient would need care management in the future. ACM will sign off at this time. Future Appointments   Date Time Provider Jhonathan Beatty   2022 11:30 AM KRIS Doherty - CNP N SRPX CHF P - [place]   10/5/2022  8:00 AM Ferdinand Hooker MD N [facility] Heart [facility] - [address] [place] [address] MSN, RN   Ambulatory Care Manager  Associate Care Management  Cell [phone]  [email or web address]

## 2022-02-23 ENCOUNTER — TELEPHONE (OUTPATIENT)
Dept: CARDIOLOGY CLINIC | Age: 62
End: 2022-02-23

## 2022-02-23 NOTE — TELEPHONE ENCOUNTER
Annemarie Sawyer, Dr Cem Carrillo    Missed download. Call to patient. Wife answered. Stated patient received heart transplant on 2/7/22. ICD was removed. Is currently still in [facility].     Pt removed from [facility].

## 2022-02-23 NOTE — TELEPHONE ENCOUNTER
Clinic hours for Dr. Aceves:  Monday    In Surgery  Tuesday 7:30am - 4:30pm  Wednesday 7:30am - 4:30pm  Thursday       7:30am - 4:30pm  Friday  7:30 - 11am    If you need a refill on your prescription, please call your pharmacy and let them know. Please be proactive and call before your medication runs out. The pharmacy will then contact us for the refill. Please allow 24-48 hours for the refill to be processed.     If your Specialty Provider has ordered additional laboratory or radiology testing the results will be discussed at your next visit. This will allow you the opportunity to go over the results in person with your provider. If your results require immediate intervention, you will be contacted sooner by phone call.    You may be receiving a patient satisfaction survey in the mail or in your email.  If you receive an email survey, please look for the subject line of:   \" Your provider name\" would like your feedback\".   Please take the time to complete your survey either via the mail or email, as your feedback is very important to us.  We strive to make your experience exceptional and your comments help us with that goal.  We look forward to hearing from you.            Yaneth.

## 2022-03-30 ENCOUNTER — HOSPITAL ENCOUNTER (OUTPATIENT)
Dept: OCCUPATIONAL THERAPY | Age: 62
Setting detail: THERAPIES SERIES
Discharge: HOME | End: 2022-03-30
Payer: COMMERCIAL

## 2022-03-30 ENCOUNTER — HOSPITAL ENCOUNTER (OUTPATIENT)
Dept: PHYSICAL THERAPY | Age: 62
Setting detail: THERAPIES SERIES
Discharge: HOME | End: 2022-03-30
Payer: COMMERCIAL

## 2022-03-30 PROCEDURE — 97165 OT EVAL LOW COMPLEX 30 MIN: CPT

## 2022-03-30 PROCEDURE — 97162 PT EVAL MOD COMPLEX 30 MIN: CPT

## 2022-03-30 NOTE — PROGRESS NOTES
** PLEASE SIGN, DATE AND TIME CERTIFICATION BELOW AND RETURN TO [facility] OUTPATIENT REHABILITATION (FAX #: [phone]). ATTEST/CO-SIGN IF ACCESSING VIA [employer]. THANK YOU.**    I certify that I have examined the patient below and determined that Physical Medicine and Rehabilitation service is necessary and that I approve the established plan of care for up to 90 days or as specifically noted. Attestation, signature or co-signature of physician indicates approval of certification requirements.    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        Functional Outcome Measure Used Walking test for 6 minutes, Cordova Balance   Functional Outcome Score walking test: 614 feet in 6 minutes, perceived exertion 5,  Cordova balance: 41/56 (3/30/22)       Insurance: Primary: Payor: Arsenio Yao 150 /  /  / ,   Secondary: MEDICARE   Authorization Information: PRE CERTIFICATION REQUIRED: NO  INSURANCE THERAPY BENEFIT:  ALLOWED 30 PT, AND 30 OT VISITS PER CALENDAR YEAR, NONE OF THESE VISITS HAVE BEEN USED  AQUATIC THERAPY COVERED: YES  MODALITIES COVERED:  YES, YES MASSAGE  TELEHEALTH COVERED: NO  REFERENCE NUMBER: [id number]   Visit # 1, 1/10 for progress note   Visits Allowed: 30   Recertification Date: 5/32/81   Physician Follow-Up: April 5, 6, 2022. Physician Orders: PT evaluate and treat 3x per week per 6 weeks, OT evaluate and treat, 3x per week per 6 weeks   History of Present Illness: Patient reports of heart transplant on 2/7/2022 and was in [place] for rehab for 17 days. He takes regular BP and today /70. Since he has had his new heart notes less shortness of breath. 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 Notes worse problem right now is weakness in his legs (left >right)  and negotiating stair steps in trilevel home and completing squats. Has to go up stair steps one at a time. Patient uses straight cane at his home for household walking. Uses walker for community ambulation and when outside home. Notes decreased balance. Does have some chest pain and right posterior shoulder and upper back discomfort with pulling up with right arm or supporting through the arm. Patient not to push or pull, no lifting more than 5#s. No pressure on his chest with use of his arms. Patient's endurance is limited with ability to walk 15 min using his walker and use of Elliptical bike at home for 15 minutes. Patient plans to complete cardiac rehab after completing OT/PT. SUBJECTIVE:   Patient reports of heart transplant on 2/7/2022 and was in [place] for rehab for 17 days. He takes regular BP and today /70. Since he has had his new heart notes less shortness of breath. 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 Notes worse problem right now is weakness in his legs (left >right)  and negotiating stair steps in trilevel home and completing squats.  Has to go up stair steps one at a time. Patient uses straight cane at his home for household walking. Uses walker for community ambulation and when outside home. Notes decreased balance. Does have some chest pain and right posterior shoulder and upper back discomfort with pulling up with right arm or supporting through the arm. Patient not to push or pull, no lifting more than 5#s. No pressure on his chest with use of his arms. Patient's endurance is limited with ability to walk 15 min using his walker and use of Elliptical bike at home for 15 minutes. Patient plans to complete cardiac rehab after completing OT/PT. Social/Functional History and Current Status:  Medications and Allergies have been reviewed and are listed on Medical History Questionnaire. Joel Moarles lives with spouse in a multiple floor home with ability to complete ADL's on main floor     Task Previous Current   ADLs  Independent Independent   IADL's Independent Modified Independent not lifting more than 5#, not pushing or pulling   Ambulation Modified Independent limited endurance with shortntess of breath and limited walking distance Modified Independent 15 min tolerance for walking with roller walker. Limited distances due to LE strength,    Transfers Modified Independent Modified Independent no pushing or pulling with UEs   Recreation Dependent/Unable light cooking has completed at home, Not able to complete gardening due to heart transplant . Community Integration Modified Independent Modified Independent   Driving Active  Active    Work On Disability since 2013 On Disability     OBJECTIVE:    Pain: At present 2/10, Following walking and with activity chest pain up to 5/10. Observation Note tremoring of UEs and LEs while sitting in chair and with activity. (patient relates that this is due to medication. Posture Moderate forward neck and shoulder posture.     Range of Motion ROM 5-10 degrees AROM right ankle dorsiflexion, LE ROM WNLS at hips and knees, left ankle. Strength Noted weakness at right hip flexors 4-/5 to 3+/5, knee extensors 4-/5, ankle dorsiflexion 3+/5. LLE hip 4/5, knee 5/5, ankle 5/5. Coordination Resting and active tremors at UE/lLEs    Sensation Dorsum of left foot. numbness   Bed Mobility Independent with log roll technique,    Transfers Without use of UEs on armrest of chair or seat (independent)    Ambulation Patient able to ambulate limited distances of 30-40 feet without his walker, With roller walker for distances>40 feet. Gait deviations noted with left drop foot, high steppage gait with increased left hip flexion through swing phase. Good ahmet with cues at times to slow ahmet. Mild forward shoulder posture. Stairs Negotiated 8 steps reciprocally with ascending and descending non reciprocally. Use of single handrail for light support. LLE weakness with left ankle, hip flexion, extensors of hip. Balance Cordova Balance 41/56, Tandem stance r/l balance loss with self correction with use of handrail, tandem stance l/r 8 seconds. ,    Special Tests 6 minute walk test: 614 feet with use of roller walker, ( /72, pulse 96, O2 sats 98% prior to test, /78, pulse 91, O2 sats 95 following 6 min walk test. )    BP  Seated left UE: 138/72, pulse 96, O2 sat 97%      TREATMENT   Precautions: No lifting >5#, no pushing or pulling. HEART TRANSPLANT 2/7/2022. History of back and neck surgery. Pain: 2/10 anterior chest incision/sternal region.      X in shaded column indicates activity completed today   Modalities Parameters/  Location  Notes                     Manual Therapy Time/Technique  Notes                     Exercise/Intervention   Notes   6 min walk test  614 feet O2sat 95%, pulse96 x    Cordova Balance test 41/56  x                                                                     Specific Interventions Next Treatment: balance retraining, strengthening core and LEs left drop foot, LLE weakness of least restrictive assistive device from roller walker to straight cane when ambulating household and community distances > 40 feet, ability to walk 100 feet or in clinic distances without roller walker to straight cane. 3. Cordova Balance test from 41/56 to 48/56  4. Patient to negotiate 8 stair steps from nonreciprocal technique to reciprocal technique using single handrail. SBA +1 person. 5. 6 minute walk test with stable vitals with ability to complete distance from 614 feet to 700 feet and perceived exertion from 5 to 3. Long Term Goals:  Time Frame: 8 weeks  1. Cordova Balance test from 48/56 to 52/56  2. 6 minute walk test from 700 feet to 800 feet with perceived exertion <3.   3. Patient to demonstrate independence in HEP with progression in endurance, increased core and LE strength, improved ambulation status to no assistive device with improved walking pattern and balance. Patient Education:   [x]  HEP/Education Completed: Plan of Care, Goals,    Medbridge Access Code:  []  No new Education completed  []  Reviewed Prior HEP      []  Patient verbalized and/or demonstrated understanding of education provided. []  Patient unable to verbalize and/or demonstrate understanding of education provided. Will continue education. [x]  Barriers to learning: none    PLAN:  Treatment Recommendations: Strengthening, Balance Training, Functional Mobility Training, Transfer Training, Endurance Training, Gait Training, Stair Training, Neuromuscular Re-education, Home Exercise Program and Patient Education    [x]  Plan of care initiated. Plan to see patient 3 times per week for 8 weeks to address the treatment planned outlined above.   []  Continue with current plan of care  []  Modify plan of care as follows:    []  Hold pending physician visit  []  Discharge    Time In 1400   Time Out 1500   Timed Code Minutes: 0 min   Total Treatment Time: 60 min       Electronically Signed by: Any Salas PT

## 2022-03-30 NOTE — PROGRESS NOTES
** PLEASE SIGN, DATE AND TIME CERTIFICATION BELOW AND RETURN TO [facility] OUTPATIENT REHABILITATION (FAX #: [phone]). ATTEST/CO-SIGN IF ACCESSING VIA [employer]. THANK YOU.**    I certify that I have examined the patient below and determined that Physical Medicine and Rehabilitation service is necessary and that I approve the established plan of care for up to 90 days or as specifically noted. Attestation, signature or co-signature of physician indicates approval of certification requirements.    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]    Treatment Diagnosis Decreased ADLs, decreased endurance,    Date of Evaluation 3/30/22      Functional Outcome Measure Used FIQ   Functional Outcome Score 94/104 (3/30/22)       Insurance: Primary: Payor: Alvaro Foster /  /  / ,   Secondary: [facility]   Authorization Information: PRE CERTIFICATION REQUIRED: NO  INSURANCE THERAPY BENEFIT:  ALLOWED 30 PT, AND 30 OT VISITS PER CALENDAR YEAR, NONE OF THESE VISITS HAVE BEEN USED  AQUATIC THERAPY COVERED: YES  MODALITIES COVERED:  YES, YES MASSAGE  TELEHEALTH COVERED: NO   Visit # 1, 1/10 for progress note   Visits Allowed: 30 visits   Recertification Date:    Physician Follow-Up: 22 to cardiologist   Physician Orders: OT eval and treat   Pertinent History:  Pt was in [facility] from  .  In bed x 10 days, then Patient reports of heart transplant on 2/7/2022 and was in [facility] for rehab for 17 days. He takes regular BP and today /70. Since he has had his new heart notes less shortness of breath. 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 Notes worse problem right now is weakness in his legs (left >right)  and negotiating stair steps in trilevel home and completing squats. Has to go up stair steps one at a time. Patient uses straight cane at his home for household walking. Uses walker for community ambulation and when outside home. Notes decreased balance. Does have some chest pain and right posterior shoulder and upper back discomfort with pulling up with right arm or supporting through the arm. Patient not to push or pull, no lifting more than 5#s. No pressure on his chest with use of his arms. Patient's endurance is limited with ability to walk 15 min using his walker and use of Elliptical bike at home for 15 minutes. Patient plans to complete cardiac rehab after completing OT/PT. SUBJECTIVE: pleasant, cooperative, motivated    Social/Functional History:  Medications and Allergies have been reviewed and are listed on the [facility] lives with spouse in a multiple floor home with stairs and a handrail to enter. .    Task Prior Level of Function  (current level of function addressed below)   ADLs  Modified Independent- SOB with exertion   Ambulation Independent   Transfers [address] [facility]. work    Driving Active    Work On Disability since 2013     SUBJECTIVE: pleasant, cooperative,     OBJECTIVE:    VISION:WFL- reports 1 week ago having a blurred spot while out to eat. Family physician stated it is from a blood vessel bleed. HEARING:  WNL    COGNITION: WNL    RANGE OF MOTION:  Bilateral Upper Extremity:  min intention tremoring noted .     STRENGTH:  NT due to cardiac precautions    SENSATION:  WFL    COORDINATION:  WFL    MOVEMENT DESCRIPTORS:    Intention Tremors- from medications. TONE:   Normal    ADL:   Feeding: modified Independent. Uses weighted silverware  . Grooming, MI  UE dressing MI  LE dressing MI - sits to don pants due to weakness and fair balance for donning pants. Min dizziness with bending down to floor to pull pants up. Toileting : independent  Toilet transfer: MI  Showering: Standing to shower. 10 min, no rest breaks required    Info gathered by pt report. BALANCE:  Sitting Balance:  Independent. Standing balance MI with 1 UE support. Min dizziness with bending quickly towards the floor. BED MOBILITY:  Not Tested    TRANSFERS:  Sit to Stand:  Modified Independent. 1  UE support minor push up from a chair. Stand to Sit: Modified Independent. FUNCTIONAL MOBILITY:  Assistive Device: Rolling Walker  Assist Level:  Modified Independent. Distance: To and from bathroom and To and from therapy gym  MI for mobility with RW. TREATMENT   Precautions: 5# wt limit due to cardiac  Precautions. Pain:  2/10 left sided chest pain. X in shaded column indicates Activity Completed Today   Modalities Parameters/  Location  Notes/Comments                     Manual Therapy Time/  Technique  Notes/Comments                     Exercises   Sets/  Sec Reps  Notes/Comments   PT reports completing a black theraband for UE HEP. ALso reports pain in left side of chest with ex.    x Discussed pt decreasing resistence to green theraband within chest precautions. Pt in agreement. Activities Time    Notes/Comments   Completed dynavision tasks  2 X 60 sec. Mode A  x 43 then 47 hits. Mod SOB during tasks with pt holding breath at times.                   Specific Interventions Next Treatment: Strengthening, endurance, dynamic standing balance for ADL and IADL tasks, breathing tech during IADL tasks     Activity/Treatment Tolerance:  [x]  Patient tolerated treatment well  []  Patient limited by fatigue  []  Patient limited by pain   []  Patient limited by other medical complications  []  Other:     Assessment: PT presents with a decline in endurance and SOB upon exertion. He is recent heart transplantation. He requires minimal cues for monitoring 5 # wt restriction and uses UEs for support due to LE weakness and fair balance. He is SOB upon exertion with dynamic mobility tasks. He would benefit from additional skilled OT services to address above concerns to assist pt to returning to independent functioning without a RW and without cues for proper breathing tech. Areas for Improvement: impaired activity tolerance, impaired balance, impaired endurance, impaired safety awareness, impaired strength and abdominal- diafragmatic breathing, purse lip breathing tech with IADL tasks. Prognosis: good    Patient Education:   [x]  HEP/Education Completed: Plan of Care, Goals,  Reviewed using a lighter theraband for HEP as provided to the pt from prior facility  Zahraaramiroreece Pena for HEP:   []  No new Education completed  []  Reviewed Prior HEP      [x]  Patient verbalized and/or demonstrated understanding of education provided. []  Patient unable to verbalize and/or demonstrate understanding of education provided. Will continue education. [x]  Barriers to learning: none    GOALS:  Patient Goal: I want better balance to be able to do daily tasks without my RW    Short Term Goals:  Time Frame: 10 sessions. *  Patient will demonstrate I knowledge of HEP for improved flexibility and strength for lifting.   *  Pt will tolerate an OT session in standing x 15 min with MI and no LOB or rest breaks to improve endurance for homemaking tasks  PT will complete simulated homemaking tasks with no > min cues for proper breathing tech, abdominal- diaphoretic breathing   Pt will complete B UE endurance tasks to complete strengthening ex x 15 min without rest breaks. Long Term Goals:  Time Frame: 8 weeks  *  Patient will improve UEFS to at least 102/104  *  Pt will report completing LE dressing with no UE support for balance and no LOB. .  Pt will ambulate throughout the clinic without a device to carry items for homemaking and cooking tasks   PLAN:  Treatment Recommendations: Strengthening, Balance Training, Endurance Training, Neuromuscular Re-education, Home Exercise Program, Patient Education, Safety Education and Training, and Self-Care Education and Training    [x]  Plan of care initiated. Plan to see patient 3 times per week for 6 weeks to address the treatment planned outlined above.   []  Continue with current plan of care  []  Modify plan of care as follows:    []  Hold pending physician visit  []  Discharge    Time In 1500   Time Out 1545   Timed Code Minutes: 0 min   Total Treatment Time: 45 min       Electronically Signed by: SHIMON Cullen/ OTR/L [id number]

## 2022-04-01 ENCOUNTER — HOSPITAL ENCOUNTER (OUTPATIENT)
Age: 62
Discharge: HOME | End: 2022-04-01
Payer: COMMERCIAL

## 2022-04-01 ENCOUNTER — HOSPITAL ENCOUNTER (OUTPATIENT)
Dept: GENERAL RADIOLOGY | Age: 62
Discharge: HOME | End: 2022-04-01
Payer: COMMERCIAL

## 2022-04-01 DIAGNOSIS — M79.672 LEFT FOOT PAIN: ICD-10-CM

## 2022-04-01 PROCEDURE — 73630 X-RAY EXAM OF FOOT: CPT

## 2022-04-06 ENCOUNTER — HOSPITAL ENCOUNTER (OUTPATIENT)
Dept: OCCUPATIONAL THERAPY | Age: 62
Setting detail: THERAPIES SERIES
Discharge: HOME | End: 2022-04-06
Payer: COMMERCIAL

## 2022-04-06 ENCOUNTER — HOSPITAL ENCOUNTER (OUTPATIENT)
Dept: PHYSICAL THERAPY | Age: 62
Setting detail: THERAPIES SERIES
Discharge: HOME | End: 2022-04-06
Payer: COMMERCIAL

## 2022-04-06 PROCEDURE — 97110 THERAPEUTIC EXERCISES: CPT

## 2022-04-06 PROCEDURE — 97112 NEUROMUSCULAR REEDUCATION: CPT

## 2022-04-06 NOTE — PROGRESS NOTES
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 Functional Outcome Measure Used Walking test for 6 minutes, Cordova Balance   Functional Outcome Score walking test: 614 feet in 6 minutes, perceived exertion 5,  Cordova balance: 41/56 (3/30/22)       Insurance: Primary: Payor: Bebo Velez /  /  / ,   Secondary: [facility]   Authorization Information: PRE CERTIFICATION REQUIRED: NO  INSURANCE THERAPY BENEFIT:  ALLOWED 30 PT, AND 30 OT VISITS PER CALENDAR YEAR, NONE OF THESE VISITS HAVE BEEN USED  AQUATIC THERAPY COVERED: YES  MODALITIES COVERED:  YES, YES MASSAGE  TELEHEALTH COVERED: NO  REFERENCE NUMBER: [id number]   Visit # 2, 2/10 for progress note   Visits Allowed: 30   Recertification Date:    Physician Follow-Up: . Physician Orders: PT evaluate and treat 3x per week per 6 weeks, OT evaluate and treat, 3x per week per 6 weeks   History of Present Illness: Patient reports of heart transplant on 2022 and was in [facility] for rehab for 17 days. He takes regular BP and today /70. Since he has had his new heart notes less shortness of breath.  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 Notes worse problem right now is weakness in his legs (left >right)  and negotiating stair steps in trilevel home and completing squats. Has to go up stair steps one at a time. Patient uses straight cane at his home for household walking. Uses walker for community ambulation and when outside home. Notes decreased balance. Does have some chest pain and right posterior shoulder and upper back discomfort with pulling up with right arm or supporting through the arm. Patient not to push or pull, no lifting more than 5#s. No pressure on his chest with use of his arms. Patient's endurance is limited with ability to walk 15 min using his walker and use of Elliptical bike at home for 15 minutes. Patient plans to complete cardiac rehab after completing OT/PT. SUBJECTIVE: Patient reports of having biopsy of his heart transplant yesterday. MRI of left foot due to swelling. Left foot hurts all the time and has had increased swelling. BP  Start of session: seated 151/83, pulse 93  O2 sats 98%, 20 min into session /87, pulse 101, O2 sats 98%  35-40 minutes into session: /76, pulse 97, O2 sats 98% final end of session 130/73, pulse 97     TREATMENT   Precautions: No lifting >5#, no pushing or pulling. HEART TRANSPLANT 2/7/2022. History of back and neck surgery. Pain: 2/10 anterior chest incision/sternal region.      X in shaded column indicates activity completed today   Modalities Parameters/  Location  Notes                     Manual Therapy Time/Technique  Notes                     Exercise/Intervention   Notes   6 min walk test  614 feet O2sat 95%, pulse96     Cordova Balance test 41/56      Bilateral heelraises with cues to wb throug great toes more instead of rolling ankle out  x10  x    Partial squats with ball between knees x10  x    3 way hip  Slow and controlled upright posture x10  x BUE on // bars except for hip extension one hand hold   Standing balance, narrow stance eyes closed, tandem stance r/l, l/r with eyes open  30 seconds each  x    rockerboard fwd/back, side to side  10x Balance 30 seconds  x    4 inch step ups with eccentric lowering with tap of opposite foot behind-forward  10x  x           NK table  extension LLE  7.5#  10x x Cues for posture while sitting at NK table to complete exercises   NK table flexion LLE  5# 10x x    NK table extension. flexion RLE  10#  2x10 x           Sit<>stands from chair butt taps with cushion in chair  x10   x    Gait training without walker: 100 feet    x May ambulate without walker in session between stations. Perceive exertion following PT session 0-10  6  x      Specific Interventions Next Treatment: balance retraining, strengthening core and LEs left drop foot, LLE weakness of hip and knee, ankle. Gait training with straight cane to no assistive device. Stair step negotiation. (history of heart transplant 2/7/2022,( history of arachnoiditis, lumbar fusion and cervical fusion)     Activity/Treatment Tolerance:  [x]  Patient tolerated treatment well  []  Patient limited by fatigue  []  Patient limited by pain   []  Patient limited by medical complications  []  Other:     Assessment:Initiated ex program for strength, balance and gait training. Exercises completed as patient care record, Cues for posture throughout session. Also noting LLE weakness and fatigue with exercises. BP/O2sats and pulse monitored throughout session. Stable with vitals throughout session. 3 rest breaks in session with vitals monitored. Ambulated without roller walker for 100 feet with knee hyperestension noted. Cues to soften knees due to hyperextension at knees with wb. Perceived exertion at end of session 6.      Body Structures/Functions/Activity Limitations: impaired activity tolerance, impaired balance, impaired endurance, impaired sensation, impaired strength and abnormal gait  Prognosis: good    GOALS:  Patient Goal: to participate in cardiac rehab following PT and be able to have improved activity level tolerance to cook, negotiate stair steps and walk without walker. Short Term Goals:  Time Frame: 4 weeks  1. Patient to demonstrate increased strength LLE: hip flexion 3+ to 4-/5 to 4/5, knee extensors and flexors from 4-/5 to 4+/5, left ankle dorsiflexion from 3/5 3+/5 to 4-/5. With increased stability and improved walking pattern with less high steppage gait with LLE, improved ankle strength with less left foot drop. 2.Patient to ambulate with least restrictive assistive device from roller walker to straight cane when ambulating household and community distances > 40 feet, ability to walk 100 feet or in clinic distances without roller walker to straight cane. 3. Cordova Balance test from 41/56 to 48/56  4. Patient to negotiate 8 stair steps from nonreciprocal technique to reciprocal technique using single handrail. SBA +1 person. 5. 6 minute walk test with stable vitals with ability to complete distance from 614 feet to 700 feet and perceived exertion from 5 to 3. Long Term Goals:  Time Frame: 8 weeks  1. Cordova Balance test from 48/56 to 52/56  2. 6 minute walk test from 700 feet to 800 feet with perceived exertion <3.   3. Patient to demonstrate independence in HEP with progression in endurance, increased core and LE strength, improved ambulation status to no assistive device with improved walking pattern and balance. Patient Education:   [x]  HEP/Education Completed: Plan of Care, Goals,    Medbridge Access Code:  []  No new Education completed  []  Reviewed Prior HEP      []  Patient verbalized and/or demonstrated understanding of education provided. []  Patient unable to verbalize and/or demonstrate understanding of education provided. Will continue education.   [x]  Barriers to learning: none    PLAN:  Treatment Recommendations:

## 2022-04-06 NOTE — PROGRESS NOTES
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]    Treatment Diagnosis Decreased ADLs, decreased endurance,    Date of Evaluation 3/30/22      Functional Outcome Measure Used FIQ   Functional Outcome Score 94/104 (3/30/22)       Insurance: Primary: Payor: Roni Bowen /  /  / ,   Secondary: [facility]   Authorization Information: PRE CERTIFICATION REQUIRED: NO  INSURANCE THERAPY BENEFIT:  ALLOWED 30 PT, AND 30 OT VISITS PER CALENDAR YEAR, NONE OF THESE VISITS HAVE BEEN USED  AQUATIC THERAPY COVERED: YES  MODALITIES COVERED:  YES, YES MASSAGE  TELEHEALTH COVERED: NO   Visit # 1, 1/10 for progress note   Visits Allowed: 30 visits   Recertification Date: 25   Physician Follow-Up: 22 to cardiologist   Physician Orders: OT eval and treat   Pertinent History:  Pt was in [facility] clinic from  . In bed x 10 days, then Patient reports of heart transplant on 2022 and was in [place] for rehab for 17 days. He takes regular BP and today /70. Since he has had his new heart notes less shortness of breath. 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 Notes worse problem right now is weakness in his legs (left >right)  and negotiating stair steps in trilevel home and completing squats. Has to go up stair steps one at a time. Patient uses straight cane at his home for household walking.  Uses walker for community ambulation and when outside home. Notes decreased balance. Does have some chest pain and right posterior shoulder and upper back discomfort with pulling up with right arm or supporting through the arm. Patient not to push or pull, no lifting more than 5#s. No pressure on his chest with use of his arms. Patient's endurance is limited with ability to walk 15 min using his walker and use of Elliptical bike at home for 15 minutes. Patient plans to complete cardiac rehab after completing OT/PT. SUBJECTIVE: pleasant, cooperative, motivated. Reports being fatigued from a biopsy appointment at [facility] yesterday. Current readings /87, HR 92 BPM, 98 % O2. 0/10 pain, with activities 97% O2,94 HR. After O2 97%, 92 BPM    TREATMENT   Precautions: 5# wt limit due to cardiac  Precautions. Pain:  2/10 left sided chest pain. X in shaded column indicates Activity Completed Today   Modalities Parameters/  Location  Notes/Comments                     Manual Therapy Time/  Technique  Notes/Comments                     Exercises   Sets/  Sec Reps  Notes/Comments    AROM B UEs in sitting to warm up 1 12 x  minimal cues for deep breathing throughout. Dynamic standing endurance x 7 min 4  5 x Side step then fwd step to reach for saebo cones , SBA For balance with min unsteadiness when leading with R foot. Seated Green theraband ex  1 20 x horiz ABD, diagonals, chest press   Biodex at 90 RPM, 2 min fwd, 2 min bwd   x Good tolerance minimal SOB. Activities Time    Notes/Comments   Completed dynavision tasks  2 X 60 sec. Mode A   43 then 47 hits. Mod SOB during tasks with pt holding breath at times.                   Specific Interventions Next Treatment: Strengthening, endurance, dynamic standing balance for ADL and IADL tasks, breathing tech during IADL tasks     Activity/Treatment Tolerance:  [x]  Patient tolerated treatment well  []  Patient limited by fatigue  []  Patient limited by pain   []  Patient limited by other medical complications  []  Other:     Assessment: PT  With good tolerance for dynamic standing and mobility tasks. Min cues for deep breathing throughout      Areas for Improvement: impaired activity tolerance, impaired balance, impaired endurance, impaired safety awareness, impaired strength and abdominal- diafragmatic breathing, purse lip breathing tech with IADL tasks. Prognosis: good    Patient Education:    [x]  HEP/Education Completed: Plan of Care, Goals,  Reviewed using a lighter theraband for HEP as provided to the pt from prior facility  [address] [place] [address] for HEP:   []  No new Education completed  []  Reviewed Prior HEP      [x]  Patient verbalized and/or demonstrated understanding of education provided. []  Patient unable to verbalize and/or demonstrate understanding of education provided. Will continue education. [x]  Barriers to learning: none    GOALS:  Patient Goal: I want better balance to be able to do daily tasks without my RW    Short Term Goals:  Time Frame: 10 sessions. *  Patient will demonstrate I knowledge of HEP for improved flexibility and strength for lifting. *  Pt will tolerate an OT session in standing x 15 min with MI and no LOB or rest breaks to improve endurance for homemaking tasks   PT will complete simulated homemaking tasks with no > min cues for proper breathing tech, abdominal- diaphoretic breathing    Pt will complete B UE endurance tasks to complete strengthening ex x 15 min without rest breaks. Long Term Goals:  Time Frame: 8 weeks  *  Patient will improve UEFS to at least 102/104  *  Pt will report completing LE dressing with no UE support for balance and no LOB.  Ricardo Yuen Pt will ambulate throughout the clinic without a device to carry items for homemaking and cooking tasks   PLAN:  Treatment Recommendations: Strengthening, Balance Training, Endurance Training, Neuromuscular Re-education, Home Exercise Program, Patient Education, Safety Education and Training, and Self-Care Education and Training    [x]  Plan of care initiated. Plan to see patient 3 times per week for 6 weeks to address the treatment planned outlined above.   []  Continue with current plan of care  []  Modify plan of care as follows:    []  Hold pending physician visit  []  Discharge    Time In 0730   Time Out 0800   Timed Code Minutes: 30 min   Total Treatment Time: 30  min       Electronically Signed by: SHIMON Robins/ OTR/BRANDON [id number]

## 2022-04-13 ENCOUNTER — HOSPITAL ENCOUNTER (OUTPATIENT)
Dept: OCCUPATIONAL THERAPY | Age: 62
Setting detail: THERAPIES SERIES
Discharge: HOME | End: 2022-04-13
Payer: COMMERCIAL

## 2022-04-13 PROCEDURE — 97110 THERAPEUTIC EXERCISES: CPT

## 2022-04-13 NOTE — PROGRESS NOTES
[address] THERAPY  [] EVALUATION  [x] DAILY NOTE (LAND) [] DAILY NOTE (AQUATIC ) [] PROGRESS NOTE [] DISCHARGE NOTE    [x] OUTPATIENT REHABILITATION [facility]   [] [place] [address]    [] 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]    Treatment Diagnosis Decreased ADLs, decreased endurance,    Date of Evaluation 3/30/22      Functional Outcome Measure Used FIQ   Functional Outcome Score 94/104 (3/30/22)       Insurance: Primary: Payor: Bryant Watson /  /  / ,   Secondary: BCBS   Authorization Information: PRE CERTIFICATION REQUIRED: NO  INSURANCE THERAPY BENEFIT:  ALLOWED 30 PT, AND 30 OT VISITS PER CALENDAR YEAR, NONE OF THESE VISITS HAVE BEEN USED  AQUATIC THERAPY COVERED: YES  MODALITIES COVERED:  YES, YES MASSAGE  TELEHEALTH COVERED: NO   Visit # 3, 3/10 for progress note   Visits Allowed: 30 visits   Recertification Date:    Physician Follow-Up: 22 to cardiologist   Physician Orders: OT eval and treat   Pertinent History:  Pt was in [facility] from  . In bed x 10 days, then Patient reports of heart transplant on 2022 and was in [place] for rehab for 17 days. He takes regular BP and today /70. Since he has had his new heart notes less shortness of breath. 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 Notes worse problem right now is weakness in his legs (left >right)  and negotiating stair steps in trilevel home and completing squats. Has to go up stair steps one at a time. Patient uses straight cane at his home for household walking.  Uses walker for community ambulation and when outside home. Notes decreased balance. Does have some chest pain and right posterior shoulder and upper back discomfort with pulling up with right arm or supporting through the arm. Patient not to push or pull, no lifting more than 5#s. No pressure on his chest with use of his arms. Patient's endurance is limited with ability to walk 15 min using his walker and use of Elliptical bike at home for 15 minutes. Patient plans to complete cardiac rehab after completing OT/PT. SUBJECTIVE: pleasant, cooperative, motivated. Reports feeling good this AM.   Current readings /84, HR 92 BPM, 98 % O2. 0/10 pain,    After exercise /83 O2 97%, 94 BPM    TREATMENT   Precautions: 5# wt limit due to cardiac  Precautions. Pain:  2/10 left sided chest pain. X in shaded column indicates Activity Completed Today   Modalities Parameters/  Location  Notes/Comments                     Manual Therapy Time/  Technique  Notes/Comments                     Exercises   Sets/  Sec Reps  Notes/Comments    AROM B UEs in sitting to warm up 1 12 x  minimal cues for deep breathing throughout. Dynamic standing endurance x 8 min 4  5 x Side step then fwd step to reach for saebo [facility] [place] For balance with no increase unsteadiness when leading with R foot. Seated Green theraband ex  1 20  horiz ABD, diagonals, chest press   Biodex at 90 RPM, 3 min fwd, 3 min bwd   x Good tolerance minimal SOB. Activities Time    Notes/Comments   Completed dynavision tasks  1 X 60 sec. Mode A   3 x 60 sec Mode B x 55  hits. Mild SOB during tasks with pt holding breath at times. Trial 1- 1 sec light speed, 12 misses, - 48 hits. Trial 2 - 2 sec light speed, 63 hits,   Trial 3- 2 sec light speed  68 hits . Ex utilized for reaction timing and endurance B UEs.                    Specific Interventions Next Treatment: Strengthening, endurance, dynamic standing balance for ADL and IADL tasks, breathing tech during IADL tasks     Activity/Treatment Tolerance:  [x]  Patient tolerated treatment well  []  Patient limited by fatigue  []  Patient limited by pain   []  Patient limited by other medical complications  []  Other:     Assessment: PT  With good tolerance for dynamic standing and mobility tasks. Min cues for deep breathing throughout. Good tolerance with endurance building       Areas for Improvement: impaired activity tolerance, impaired balance, impaired endurance, impaired safety awareness, impaired strength and abdominal- diafragmatic breathing, purse lip breathing tech with IADL tasks. Prognosis: good    Patient Education:    [x]  HEP/Education Completed: Plan of Care, Goals,  Reviewed using a lighter theraband for HEP as provided to the pt from prior facility  [address] [place] [address] for HEP:   []  No new Education completed  []  Reviewed Prior HEP      [x]  Patient verbalized and/or demonstrated understanding of education provided. []  Patient unable to verbalize and/or demonstrate understanding of education provided. Will continue education. [x]  Barriers to learning: none    GOALS:  Patient Goal: I want better balance to be able to do daily tasks without my RW    Short Term Goals:  Time Frame: 10 sessions. *  Patient will demonstrate I knowledge of HEP for improved flexibility and strength for lifting. *  Pt will tolerate an OT session in standing x 15 min with MI and no LOB or rest breaks to improve endurance for homemaking tasks   PT will complete simulated homemaking tasks with no > min cues for proper breathing tech, abdominal- diaphoretic breathing    Pt will complete B UE endurance tasks to complete strengthening ex x 15 min without rest breaks. Long Term Goals:  Time Frame: 8 weeks  *  Patient will improve UEFS to at least 102/104  *  Pt will report completing LE dressing with no UE support for balance and no LOB.  .   Pt will ambulate throughout the clinic without a device to carry items for homemaking and cooking tasks   PLAN:  Treatment Recommendations: Strengthening, Balance Training, Endurance Training, Neuromuscular Re-education, Home Exercise Program, Patient Education, Safety Education and Training, and Self-Care Education and Training    [x]  Plan of care initiated. Plan to see patient 3 times per week for 6 weeks to address the treatment planned outlined above.   []  Continue with current plan of care  []  Modify plan of care as follows:    []  Hold pending physician visit  []  Discharge    Time In 0930   Time Out 1000   Timed Code Minutes: 30 min   Total Treatment Time: 30  min       Electronically Signed by: SHIMON Lovelace/ OTR/L [id number]

## 2022-04-15 ENCOUNTER — HOSPITAL ENCOUNTER (OUTPATIENT)
Dept: OCCUPATIONAL THERAPY | Age: 62
Setting detail: THERAPIES SERIES
Discharge: HOME | End: 2022-04-15
Payer: COMMERCIAL

## 2022-04-15 PROCEDURE — 97110 THERAPEUTIC EXERCISES: CPT

## 2022-04-15 NOTE — PROGRESS NOTES
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]    Treatment Diagnosis Decreased ADLs, decreased endurance,    Date of Evaluation 3/30/22      Functional Outcome Measure Used FIQ   Functional Outcome Score 94/104 (3/30/22)       Insurance: Primary: Payor: MEDICARE /  /  / ,   Secondary: BC   Authorization Information: PRE CERTIFICATION REQUIRED: NO  INSURANCE THERAPY BENEFIT:  ALLOWED 30 PT, AND 30 OT VISITS PER CALENDAR YEAR, NONE OF THESE VISITS HAVE BEEN USED  AQUATIC THERAPY COVERED: YES  MODALITIES COVERED:  YES, YES MASSAGE  TELEHEALTH COVERED: NO   Visit # 4, 4/10 for progress note   Visits Allowed: 30 visits   Recertification Date:    Physician Follow-Up: 22 to cardiologist   Physician Orders: OT eval and treat   Pertinent History:  Pt was in [facility] from  . In bed x 10 days, then Patient reports of heart transplant on 2022 and was in [place] for rehab for 17 days. He takes regular BP and today /70. Since he has had his new heart notes less shortness of breath. 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 Notes worse problem right now is weakness in his legs (left >right)  and negotiating stair steps in trilevel home and completing squats. Has to go up stair steps one at a time. Patient uses straight cane at his home for household walking.  Uses walker for community ambulation and when outside home. Notes decreased balance. Does have some chest pain and right posterior shoulder and upper back discomfort with pulling up with right arm or supporting through the arm. Patient not to push or pull, no lifting more than 5#s. No pressure on his chest with use of his arms. Patient's endurance is limited with ability to walk 15 min using his walker and use of Elliptical bike at home for 15 minutes. Patient plans to complete cardiac rehab after completing OT/PT. SUBJECTIVE: Pt. States his arachnoiditis is \"acting up\" and so his legs feel shaky today   Start of session readings /84, HR 92 BPM, 99 % O2. 0/10 pain,    After exercise /79 O2 98% ,  94 BPM    TREATMENT   Precautions: 5# wt limit due to cardiac  Precautions. Pain:  Reports lower back pain down his legs     X in shaded column indicates Activity Completed Today   Modalities Parameters/  Location  Notes/Comments                     Manual Therapy Time/  Technique  Notes/Comments                     Exercises   Sets/  Sec Reps  Notes/Comments    AROM B UEs in sitting to warm up for shoulder flexion and abduction 1 10 X  minimal cues for deep breathing throughout. Dynamic standing endurance - standing at table leaning down side to side to get clothespins and place on vertical suzy, then take off post and place into shoulder height bucket    Standing at pool windows wiping down with fleece block using BUE - completed all windows    X            X Tolerated 2.5 minutes then sat to take rest break          Able to complete without holding onto ledge or walker   Seated Green theraband ex - BUE 1 20 X horiz ABD, diagonals, chest press, ER   Biodex at 80 RPM, 3 min fwd, 3 min bwd   X Good tolerance                        Activities Time    Notes/Comments   Completed dynavision tasks  1 X 60 sec. Mode A   3 x 60 sec Mode B  55  hits. Mild SOB during tasks with pt holding breath at times.    Trial 1- 1 sec light speed, 12 misses, - 48 hits. Trial 2 - 2 sec light speed, 63 hits,   Trial 3- 2 sec light speed  68 hits . Ex utilized for reaction timing and endurance B UEs. Specific Interventions Next Treatment: Strengthening, endurance, dynamic standing balance for ADL and IADL tasks, breathing tech during IADL tasks     Activity/Treatment Tolerance:  [x]  Patient tolerated treatment well  []  Patient limited by fatigue  []  Patient limited by pain   []  Patient limited by other medical complications  []  Other:     Assessment: Pt. Progressing - good tolerance for activity today      Areas for Improvement: impaired activity tolerance, impaired balance, impaired endurance, impaired safety awareness, impaired strength and abdominal- diafragmatic breathing, purse lip breathing tech with IADL tasks. Prognosis: good    Patient Education:    []  HEP/Education Completed: Plan of Care, Goals,  Reviewed using a lighter theraband for HEP as provided to the pt from prior facility  [address] [place] [address] for HEP:   [x]  No new Education completed  []  Reviewed Prior HEP      []  Patient verbalized and/or demonstrated understanding of education provided. []  Patient unable to verbalize and/or demonstrate understanding of education provided. Will continue education. [x]  Barriers to learning: none    GOALS:  Patient Goal: I want better balance to be able to do daily tasks without my RW    Short Term Goals:  Time Frame: 10 sessions. *  Patient will demonstrate I knowledge of HEP for improved flexibility and strength for lifting. *  Pt will tolerate an OT session in standing x 15 min with MI and no LOB or rest breaks to improve endurance for homemaking tasks   PT will complete simulated homemaking tasks with no > min cues for proper breathing tech, abdominal- diaphoretic breathing    Pt will complete B UE endurance tasks to complete strengthening ex x 15 min without rest breaks.      Long Term Goals:  Time Frame: 8 weeks  *  Patient will improve UEFS to at least 102/104  *  Pt will report completing LE dressing with no UE support for balance and no LOB. Meenakshi Lawsons Pt will ambulate throughout the clinic without a device to carry items for homemaking and cooking tasks   PLAN:  Treatment Recommendations: Strengthening, Balance Training, Endurance Training, Neuromuscular Re-education, Home Exercise Program, Patient Education, Safety Education and Training, and Self-Care Education and Training    []  Plan of care initiated. Plan to see patient 3 times per week for 6 weeks to address the treatment planned outlined above.   [x]  Continue with current plan of care  []  Modify plan of care as follows:    []  Hold pending physician visit  []  Discharge    Time In 0915   Time Out 0945   Timed Code Minutes: 30 min   Total Treatment Time: 30  min       Electronically Signed by:  JOMAR Ashton/L [id number]

## 2022-04-19 ENCOUNTER — HOSPITAL ENCOUNTER (OUTPATIENT)
Dept: PHYSICAL THERAPY | Age: 62
Setting detail: THERAPIES SERIES
Discharge: HOME | End: 2022-04-19
Payer: COMMERCIAL

## 2022-04-19 ENCOUNTER — HOSPITAL ENCOUNTER (OUTPATIENT)
Dept: OCCUPATIONAL THERAPY | Age: 62
Setting detail: THERAPIES SERIES
Discharge: HOME | End: 2022-04-19
Payer: COMMERCIAL

## 2022-04-19 PROCEDURE — 97110 THERAPEUTIC EXERCISES: CPT

## 2022-04-19 NOTE — PROGRESS NOTES
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 Functional Outcome Measure Used Walking test for 6 minutes, [place] Balance   Functional Outcome Score walking test: 614 feet in 6 minutes, perceived exertion 5,  Cordova balance: 41/56 (3/30/22)       Insurance: Primary: Payor: Daniela No /  /  / ,   Secondary: BCBS   Authorization Information: PRE CERTIFICATION REQUIRED: NO  INSURANCE THERAPY BENEFIT:  ALLOWED 30 PT, AND 30 OT VISITS PER CALENDAR YEAR, NONE OF THESE VISITS HAVE BEEN USED  AQUATIC THERAPY COVERED: YES  MODALITIES COVERED:  YES, YES MASSAGE  TELEHEALTH COVERED: NO  REFERENCE NUMBER: [id number]   Visit # 3, 3/10 for progress note   Visits Allowed: 30   Recertification Date:    Physician Follow-Up: . Physician Orders: PT evaluate and treat 3x per week per 6 weeks, OT evaluate and treat, 3x per week per 6 weeks   History of Present Illness: Patient reports of heart transplant on 2022 and was in [place] for rehab for 17 days. He takes regular BP and today /70. Since he has had his new heart notes less shortness of breath.  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 Notes worse problem right now is weakness in his legs (left >right)  and negotiating stair steps in trilevel home and completing squats. Has to go up stair steps one at a time. Patient uses straight cane at his home for household walking. Uses walker for community ambulation and when outside home. Notes decreased balance. Does have some chest pain and right posterior shoulder and upper back discomfort with pulling up with right arm or supporting through the arm. Patient not to push or pull, no lifting more than 5#s. No pressure on his chest with use of his arms. Patient's endurance is limited with ability to walk 15 min using his walker and use of Elliptical bike at home for 15 minutes. Patient plans to complete cardiac rehab after completing OT/PT. SUBJECTIVE: Pt states legs feel like rubber but overall feels good. No new or changing symptoms to this date. BP  Start of session: seated 150/78, pulse 95  O2 sats 98%, 20 min into session /78, pulse 96, O2 sats 98%  35-40 minutes into session: /74, pulse 97, O2 sats 100% final end of session 150/71, pulse 98     TREATMENT   Precautions: No lifting >5#, no pushing or pulling. HEART TRANSPLANT 2/7/2022. History of back and neck surgery. Pain: 0/10 anterior chest incision/sternal region.      X in shaded column indicates activity completed today   Modalities Parameters/  Location  Notes                     Manual Therapy Time/Technique  Notes                     Exercise/Intervention   Notes   6 min walk test  614 feet O2sat 95%, pulse96     Cordova Balance test 41/56      Bilateral heelraises with cues to wb throug great toes more instead of rolling ankle out  x12  x    Partial squats with ball between knees x12  x    3 way hip  Slow and controlled upright posture x12  x BUE on // bars except for hip extension one hand hold   Standing balance, narrow stance eyes closed, tandem stance r/l, l/r with eyes open  30 seconds each  x    rockerboard fwd/back, side to side  12x Balance 30 seconds  x    4 inch step ups with eccentric lowering with tap of opposite foot behind-forward  12x  x    Step ups lat x12 4 in x    NK table  extension LLE  7.5#  10x  Cues for posture while sitting at NK table to complete exercises   NK table flexion LLE  5# 10x     NK table extension. flexion RLE  10#  2x10            Sit<>stands from chair butt taps with cushion in chair  x6  x Decreased d/t fatigue    Gait training without walker: 100 feet    x May ambulate without walker in session between stations. Nu step LE ONLY  5 min Level 2 x                         Perceive exertion following PT session 0-10  4  x      Specific Interventions Next Treatment: balance retraining, strengthening core and LEs left drop foot, LLE weakness of hip and knee, ankle. Gait training with straight cane to no assistive device. Stair step negotiation. (history of heart transplant 2/7/2022,( history of arachnoiditis, lumbar fusion and cervical fusion)     Activity/Treatment Tolerance:  [x]  Patient tolerated treatment well  []  Patient limited by fatigue  []  Patient limited by pain   []  Patient limited by medical complications  []  Other:     Assessment: Continued with therex as stated above with increase reps and addition of nu step with use of LE only. Pt required frequent rest breaks due to fatigue of LE and feeling worn out. Pt completed therex with slow pace. Vcs for proper technique needed throughout treatment session. Will continue to progress as tolerated by pt per POC.      Body Structures/Functions/Activity Limitations: impaired activity tolerance, impaired balance, impaired endurance, impaired sensation, impaired strength and abnormal gait  Prognosis: good    GOALS:  Patient Goal: to participate in cardiac rehab following PT and be able to have improved activity level tolerance to cook, negotiate stair steps and walk without walker. Short Term Goals:  Time Frame: 4 weeks  1. Patient to demonstrate increased strength LLE: hip flexion 3+ to 4-/5 to 4/5, knee extensors and flexors from 4-/5 to 4+/5, left ankle dorsiflexion from 3/5 3+/5 to 4-/5. With increased stability and improved walking pattern with less high steppage gait with LLE, improved ankle strength with less left foot drop. 2.Patient to ambulate with least restrictive assistive device from roller walker to straight cane when ambulating household and community distances > 40 feet, ability to walk 100 feet or in clinic distances without roller walker to straight cane. 3. Cordova Balance test from 41/56 to 48/56  4. Patient to negotiate 8 stair steps from nonreciprocal technique to reciprocal technique using single handrail. SBA +1 person. 5. 6 minute walk test with stable vitals with ability to complete distance from 614 feet to 700 feet and perceived exertion from 5 to 3. Long Term Goals:  Time Frame: 8 weeks  1. Cordova Balance test from 48/56 to 52/56  2. 6 minute walk test from 700 feet to 800 feet with perceived exertion <3.   3. Patient to demonstrate independence in HEP with progression in endurance, increased core and LE strength, improved ambulation status to no assistive device with improved walking pattern and balance. Patient Education:   []  HEP/Education Completed: Plan of Care, Goals,    Medbridge Access Code:  []  No new Education completed  [x]  Reviewed Prior HEP      []  Patient verbalized and/or demonstrated understanding of education provided. []  Patient unable to verbalize and/or demonstrate understanding of education provided. Will continue education.   [x]  Barriers to learning: none    PLAN:  Treatment Recommendations: Strengthening, Balance Training, Functional Mobility Training, Transfer Training, Endurance Training, Gait Training, Stair Training, Neuromuscular Re-education, Home Exercise Program and Patient Education    []  Plan of care initiated. Plan to see patient 3 times per week for 8 weeks to address the treatment planned outlined above.   [x]  Continue with current plan of care  []  Modify plan of care as follows:    []  Hold pending physician visit  []  Discharge    Time In 0903   Time Out 0943   Timed Code Minutes: 40   Total Treatment Time: 40       Electronically Signed by: Alexandra Bedoya PTA

## 2022-04-19 NOTE — PROGRESS NOTES
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]    Treatment Diagnosis Decreased ADLs, decreased endurance,    Date of Evaluation 3/30/22      Functional Outcome Measure Used FIQ   Functional Outcome Score 94/104 (3/30/22)       Insurance: Primary: Payor: MEDICARE /  /  / ,   Secondary: BCBS   Authorization Information: PRE CERTIFICATION REQUIRED: NO  INSURANCE THERAPY BENEFIT:  ALLOWED 30 PT, AND 30 OT VISITS PER CALENDAR YEAR, NONE OF THESE VISITS HAVE BEEN USED  AQUATIC THERAPY COVERED: YES  MODALITIES COVERED:  YES, YES MASSAGE  TELEHEALTH COVERED: NO   Visit # 5, 5/10 for progress note   Visits Allowed: 30 visits   Recertification Date: 9-02-15   Physician Follow-Up: 22 to cardiologist   Physician Orders: OT eval and treat   Pertinent History:  Pt was in [facility] clinic from  . In bed x 10 days, then Patient reports of heart transplant on 2022 and was in [place] for rehab for 17 days. He takes regular BP and today /70. Since he has had his new heart notes less shortness of breath. 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 Notes worse problem right now is weakness in his legs (left >right)  and negotiating stair steps in trilevel home and completing squats. Has to go up stair steps one at a time. Patient uses straight cane at his home for household walking.  Uses walker for community ambulation and when outside home. Notes decreased balance. Does have some chest pain and right posterior shoulder and upper back discomfort with pulling up with right arm or supporting through the arm. Patient not to push or pull, no lifting more than 5#s. No pressure on his chest with use of his arms. Patient's endurance is limited with ability to walk 15 min using his walker and use of Elliptical bike at home for 15 minutes. Patient plans to complete cardiac rehab after completing OT/PT. SUBJECTIVE: Pt. 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 0/10 pain,    After exercise /78, 94 BPM HR, 98 % O2, no pain    TREATMENT   Precautions: 5-10 # wt limit due to cardiac  Precautions. Pain:  Reports lower back pain down his legs     X in shaded column indicates Activity Completed Today   Modalities Parameters/  Location  Notes/Comments                     Manual Therapy Time/  Technique  Notes/Comments                     Exercises   Sets/  Sec Reps  Notes/Comments    AROM B UEs in sitting to warm up for shoulder flexion and abduction 1 10   minimal cues for deep breathing throughout. Dynamic standing endurance - standing at rebounder for dynamic tasks standing. Standing at pool windows wiping down with fleece block using BUE - completed all windows    X             Tolerated 4 min then sat to take rest break, Trial 1 2 kg ball B UEs for toss and catch 2 x 10. Trial 2 1 kg ball toss 1 UE and catch 1 UE. MI for balance          Able to complete without holding onto ledge or walker   Seated Green theraband ex - BUE 1 20  horiz ABD, diagonals, chest press, ER   Biodex at 75  RPM, 3 min fwd, 3 min bwd   X Good tolerance   Weighted ball for serratus punch and roll ball up wall and lift off 2 5 x                  Activities Time    Notes/Comments   Completed dynavision tasks  1 X 60 sec.  Mode A   3 x 60 sec Mode B  55  hits. Mild SOB during tasks with pt holding breath at times. Trial 1- 1 sec light speed, 12 misses, - 48 hits. Trial 2 - 2 sec light speed, 63 hits,   Trial 3- 2 sec light speed  68 hits . Ex utilized for reaction timing and endurance B UEs. Specific Interventions Next Treatment: Strengthening, endurance, dynamic standing balance for ADL and IADL tasks, breathing tech during IADL tasks     Activity/Treatment Tolerance:  [x]  Patient tolerated treatment well  []  Patient limited by fatigue  []  Patient limited by pain   []  Patient limited by other medical complications  []  Other:     Assessment: Pt. Progressing - good tolerance for activity today. Mild SOB upon exertion. Areas for Improvement: impaired activity tolerance, impaired balance, impaired endurance, impaired safety awareness, impaired strength and abdominal- diafragmatic breathing, purse lip breathing tech with IADL tasks. Prognosis: good    Patient Education:    []  HEP/Education Completed: Plan of Care, Goals,  Reviewed using a lighter theraband for HEP as provided to the pt from prior facility  [address] [place] [address] for HEP:   [x]  No new Education completed  []  Reviewed Prior HEP      []  Patient verbalized and/or demonstrated understanding of education provided. []  Patient unable to verbalize and/or demonstrate understanding of education provided. Will continue education. [x]  Barriers to learning: none    GOALS:  Patient Goal: I want better balance to be able to do daily tasks without my RW    Short Term Goals:  Time Frame: 10 sessions. *  Patient will demonstrate I knowledge of HEP for improved flexibility and strength for lifting.   *  Pt will tolerate an OT session in standing x 15 min with MI and no LOB or rest breaks to improve endurance for homemaking tasks   PT will complete simulated homemaking tasks with no > min cues for proper breathing tech, abdominal- diaphoretic breathing    Pt will complete B UE endurance tasks to complete strengthening ex x 15 min without rest breaks. Long Term Goals:  Time Frame: 8 weeks  *  Patient will improve UEFS to at least 102/104  *  Pt will report completing LE dressing with no UE support for balance and no LOB. Azra Ryder Pt will ambulate throughout the clinic without a device to carry items for homemaking and cooking tasks   PLAN:  Treatment Recommendations: Strengthening, Balance Training, Endurance Training, Neuromuscular Re-education, Home Exercise Program, Patient Education, Safety Education and Training, and Self-Care Education and Training    []  Plan of care initiated. Plan to see patient 3 times per week for 6 weeks to address the treatment planned outlined above.   [x]  Continue with current plan of care  []  Modify plan of care as follows:    []  Hold pending physician visit  []  Discharge    Time In 0830   Time Out 0900   Timed Code Minutes: 30 min   Total Treatment Time: 30  min       Electronically Signed by:  SHIMON Kay OTR/L [id number]

## 2022-04-20 ENCOUNTER — HOSPITAL ENCOUNTER (OUTPATIENT)
Dept: PHYSICAL THERAPY | Age: 62
Setting detail: THERAPIES SERIES
Discharge: HOME | End: 2022-04-20
Payer: COMMERCIAL

## 2022-04-20 ENCOUNTER — HOSPITAL ENCOUNTER (OUTPATIENT)
Dept: OCCUPATIONAL THERAPY | Age: 62
Setting detail: THERAPIES SERIES
Discharge: HOME | End: 2022-04-20
Payer: COMMERCIAL

## 2022-04-20 PROCEDURE — 97110 THERAPEUTIC EXERCISES: CPT

## 2022-04-20 NOTE — PROGRESS NOTES
[facility]  PHYSICAL THERAPY  [] EVALUATION  [x] DAILY NOTE (LAND) [] DAILY NOTE (AQUATIC ) [] PROGRESS NOTE [] DISCHARGE NOTE    [x] OUTPATIENT REHABILITATION CENTER [facility]   [] [place] [address]    [] 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 Functional Outcome Measure Used Walking test for 6 minutes, Cordova Balance   Functional Outcome Score walking test: 614 feet in 6 minutes, perceived exertion 5,  Cordova balance: 41/56 (3/30/22)       Insurance: Primary: Payor: Fadumo Rodriguez /  /  / ,   Secondary: MEDICARE   Authorization Information: PRE CERTIFICATION REQUIRED: NO  INSURANCE THERAPY BENEFIT:  ALLOWED 30 PT, AND 30 OT VISITS PER CALENDAR YEAR, NONE OF THESE VISITS HAVE BEEN USED  AQUATIC THERAPY COVERED: YES  MODALITIES COVERED:  YES, YES MASSAGE  TELEHEALTH COVERED: NO  REFERENCE NUMBER: [id number]   Visit # 4, 4/10 for progress note   Visits Allowed: 30   Recertification Date:    Physician Follow-Up: . Physician Orders: PT evaluate and treat 3x per week per 6 weeks, OT evaluate and treat, 3x per week per 6 weeks   History of Present Illness: Patient reports of heart transplant on 2022 and was in [place] for rehab for 17 days. He takes regular BP and today /70. Since he has had his new heart notes less shortness of breath.  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 Notes worse problem right now is weakness in his legs (left >right)  and negotiating stair steps in trilevel home and completing squats. Has to go up stair steps one at a time. Patient uses straight cane at his home for household walking. Uses walker for community ambulation and when outside home. Notes decreased balance. Does have some chest pain and right posterior shoulder and upper back discomfort with pulling up with right arm or supporting through the arm. Patient not to push or pull, no lifting more than 5#s. No pressure on his chest with use of his arms. Patient's endurance is limited with ability to walk 15 min using his walker and use of Elliptical bike at home for 15 minutes. Patient plans to complete cardiac rehab after completing OT/PT. SUBJECTIVE: Pt states feeling worn out still from yesterdays appointment. No new or changing symptoms to this date. BP  Start of session: seated 132/69, pulse 95  O2 sats 93%, 20 min into session /78, pulse 96, O2 sats 98%  35-40 minutes into session: /7, pulse 100, O2 sats 100% final end of session 157/81, pulse 97     TREATMENT   Precautions: No lifting >5#, no pushing or pulling. HEART TRANSPLANT 2/7/2022. History of back and neck surgery. Pain: 0/10 anterior chest incision/sternal region.      X in shaded column indicates activity completed today   Modalities Parameters/  Location  Notes                     Manual Therapy Time/Technique  Notes                     Exercise/Intervention   Notes   6 min walk test  614 feet O2sat 95%, pulse96     Cordova Balance test 41/56      Bilateral heelraises with cues to wb throug great toes more instead of rolling ankle out  x12  x    Partial squats with ball between knees x12  x    3 way hip  Slow and controlled upright posture x12  x BUE on // bars except for hip extension one hand hold   Standing balance, narrow stance eyes closed, tandem stance r/l, l/r with eyes open  30 seconds each      rockerboard fwd/back, side to side  12x Balance 30 seconds  x    4 inch step ups with eccentric lowering with tap of opposite foot behind-forward  12x      Step ups lat x12 4 in     NK table  extension LLE  7.5#  10x x Cues for posture while sitting at NK table to complete exercises   NK table flexion LLE  7.5# 12x x    NK table extension. flexion RLE  10#  2x10 x    SC ambulation 200' In clinic x LLE weaker and tends to lock into place while ambulating. Vcs to avoid sharp turns   SBA- steady    Stair negotiation  X4/4 steps  x    Sit<>stands from chair butt taps with cushion in chair  x6   Decreased d/t fatigue    Gait training without walker: 100 feet    x May ambulate without walker in session between stations. Nu step LE ONLY  6 min Level 3 x                         Perceive exertion following PT session 0-10  4  x      Specific Interventions Next Treatment: balance retraining, strengthening core and LEs left drop foot, LLE weakness of hip and knee, ankle. Gait training with straight cane to no assistive device. Stair step negotiation. (history of heart transplant 2/7/2022,( history of arachnoiditis, lumbar fusion and cervical fusion)     Activity/Treatment Tolerance:  [x]  Patient tolerated treatment well  []  Patient limited by fatigue   []  Patient limited by pain   []  Patient limited by medical complications  []  Other:     Assessment: Continued with therex as stated above with increase reps and addition of SC ambulation and stair negotiation. Pt demonstrated good balance and proper gait sequencing with SC. Vcs to avoid sharp turns to avoid LOB due to feet placement. Pt stated feeling fatigued at end of treatment session. Will continue to progress as tolerated by pt per POC.      Body Structures/Functions/Activity Limitations: impaired activity tolerance, impaired balance, impaired endurance, impaired sensation, impaired strength and abnormal gait  Prognosis: good    GOALS:  Patient Goal: to participate in cardiac rehab following PT and be able to have improved activity level tolerance to cook, negotiate stair steps and walk without walker. Short Term Goals:  Time Frame: 4 weeks  1. Patient to demonstrate increased strength LLE: hip flexion 3+ to 4-/5 to 4/5, knee extensors and flexors from 4-/5 to 4+/5, left ankle dorsiflexion from 3/5 3+/5 to 4-/5. With increased stability and improved walking pattern with less high steppage gait with LLE, improved ankle strength with less left foot drop. 2.Patient to ambulate with least restrictive assistive device from roller walker to straight cane when ambulating household and community distances > 40 feet, ability to walk 100 feet or in clinic distances without roller walker to straight cane. 3. Cordova Balance test from 41/56 to 48/56  4. Patient to negotiate 8 stair steps from nonreciprocal technique to reciprocal technique using single handrail. SBA +1 person. 5. 6 minute walk test with stable vitals with ability to complete distance from 614 feet to 700 feet and perceived exertion from 5 to 3. Long Term Goals:  Time Frame: 8 weeks  1. Cordova Balance test from 48/56 to 52/56  2. 6 minute walk test from 700 feet to 800 feet with perceived exertion <3.   3. Patient to demonstrate independence in HEP with progression in endurance, increased core and LE strength, improved ambulation status to no assistive device with improved walking pattern and balance. Patient Education:   []  HEP/Education Completed: Plan of Care, Goals,    Medbridge Access Code:  []  No new Education completed  [x]  Reviewed Prior HEP      []  Patient verbalized and/or demonstrated understanding of education provided. []  Patient unable to verbalize and/or demonstrate understanding of education provided. Will continue education.   [x]  Barriers to learning: none    PLAN:  Treatment Recommendations: Strengthening, Balance Training, Functional Mobility Training, Transfer Training, Endurance Training, Gait Training, Stair Training, Neuromuscular Re-education, Home Exercise Program and Patient Education    []  Plan of care initiated. Plan to see patient 3 times per week for 8 weeks to address the treatment planned outlined above.   [x]  Continue with current plan of care  []  Modify plan of care as follows:    []  Hold pending physician visit  []  Discharge    Time In 1016   Time Out 1055   Timed Code Minutes: 39   Total Treatment Time: 39       Electronically Signed by: Alexandra Bedoya PTA

## 2022-04-20 NOTE — PROGRESS NOTES
ambulation and when outside home. Notes decreased balance. Does have some chest pain and right posterior shoulder and upper back discomfort with pulling up with right arm or supporting through the arm. Patient not to push or pull, no lifting more than 5#s. No pressure on his chest with use of his arms. Patient's endurance is limited with ability to walk 15 min using his walker and use of Elliptical bike at home for 15 minutes. Patient plans to complete cardiac rehab after completing OT/PT. 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 0/10 pain,    After exercise , 94 BPM HR, 98 % O2, no pain    TREATMENT   Precautions: 5-10 # wt limit due to cardiac  Precautions. Pain:  Reports lower back pain down his legs     X in shaded column indicates Activity Completed Today   Modalities Parameters/  Location  Notes/Comments                     Manual Therapy Time/  Technique  Notes/Comments                     Exercises   Sets/  Sec Reps  Notes/Comments    AROM B UEs in sitting to warm up for shoulder flexion and abduction 1 10   minimal cues for deep breathing throughout. Dynamic standing endurance - dynamic reaching tasks x 6 min  and move green theraball, squats x 5 reps and carrying the ball x 30 feet  x5 trials    Standing at pool windows wiping down with fleece block using BUE - completed all windows    X                       Able to complete without holding onto ledge or walker   Seated Green theraband ex - BUE 1 20  horiz ABD, diagonals, chest press, ER   Biodex at 75  RPM, 3 min fwd, 3 min bwd    Good tolerance   Supine sh circles, CW, CCW, serratus punch 2 # wt.  1 20 x    Seated trunk strengthening ex with green theraball fwd, trunk twists and bending to floor 1 5 x           Activities Time    Notes/Comments   Completed dynavision tasks  1 X 60 sec.  Mode A   3 x 60 sec Mode B  55 UE endurance tasks to complete strengthening ex x 15 min without rest breaks. Long Term Goals:  Time Frame: 8 weeks  *  Patient will improve UEFS to at least 102/104  *  Pt will report completing LE dressing with no UE support for balance and no LOB. Alexandria Loots Pt will ambulate throughout the clinic without a device to carry items for homemaking and cooking tasks   PLAN:  Treatment Recommendations: Strengthening, Balance Training, Endurance Training, Neuromuscular Re-education, Home Exercise Program, Patient Education, Safety Education and Training, and Self-Care Education and Training    []  Plan of care initiated. Plan to see patient 3 times per week for 6 weeks to address the treatment planned outlined above.   [x]  Continue with current plan of care  []  Modify plan of care as follows:    []  Hold pending physician visit  []  Discharge    Time In 0945   Time Out 1015   Timed Code Minutes: 30 min   Total Treatment Time: 30  min       Electronically Signed by:  SHIMON Luo OTR/L [id number]

## 2022-04-22 ENCOUNTER — HOSPITAL ENCOUNTER (OUTPATIENT)
Dept: PHYSICAL THERAPY | Age: 62
Setting detail: THERAPIES SERIES
Discharge: HOME | End: 2022-04-22
Payer: COMMERCIAL

## 2022-04-22 ENCOUNTER — APPOINTMENT (OUTPATIENT)
Dept: OCCUPATIONAL THERAPY | Age: 62
End: 2022-04-22
Payer: COMMERCIAL

## 2022-04-22 PROCEDURE — 97110 THERAPEUTIC EXERCISES: CPT

## 2022-04-22 NOTE — PROGRESS NOTES
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 Functional Outcome Measure Used Walking test for 6 minutes, [place] Balance   Functional Outcome Score walking test: 614 feet in 6 minutes, perceived exertion 5,  Cordova balance: 41/56 (3/30/22)       Insurance: Primary: Payor: Radha Cunningham /  /  / ,   Secondary: BC   Authorization Information: PRE CERTIFICATION REQUIRED: NO  INSURANCE THERAPY BENEFIT:  ALLOWED 30 PT, AND 30 OT VISITS PER CALENDAR YEAR, NONE OF THESE VISITS HAVE BEEN USED  AQUATIC THERAPY COVERED: YES  MODALITIES COVERED:  YES, YES MASSAGE  TELEHEALTH COVERED: NO  REFERENCE NUMBER: [id number]   Visit # 5, 5/10 for progress note   Visits Allowed: 30   Recertification Date: 3/28/44   Physician Follow-Up: . Physician Orders: PT evaluate and treat 3x per week per 6 weeks, OT evaluate and treat, 3x per week per 6 weeks   History of Present Illness: Patient reports of heart transplant on 2022 and was in [facility] for rehab for 17 days. He takes regular BP and today /70. Since he has had his new heart notes less shortness of breath.  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 Notes worse problem right now is weakness in his legs (left >right)  and negotiating stair steps in trilevel home and completing squats. Has to go up stair steps one at a time. Patient uses straight cane at his home for household walking. Uses walker for community ambulation and when outside home. Notes decreased balance. Does have some chest pain and right posterior shoulder and upper back discomfort with pulling up with right arm or supporting through the arm. Patient not to push or pull, no lifting more than 5#s. No pressure on his chest with use of his arms. Patient's endurance is limited with ability to walk 15 min using his walker and use of Elliptical bike at home for 15 minutes. Patient plans to complete cardiac rehab after completing OT/PT. SUBJECTIVE: Pt states feeling frustrated with how weak his LE feel. He acknowledges it will take time to regain his strength. No new or changing symptoms to this date. BP  Start of session: seated 152/85, pulse 93  O2 sats 95%, 20 min into session /84, pulse 95, O2 sats 94%  35-40 minutes into session: /7, pulse 100, O2 sats 100% final end of session 149/81, pulse 97     TREATMENT   Precautions: No lifting >5#, no pushing or pulling. HEART TRANSPLANT 2/7/2022. History of back and neck surgery. Pain: 0/10 anterior chest incision/sternal region.      X in shaded column indicates activity completed today   Modalities Parameters/  Location  Notes                     Manual Therapy Time/Technique  Notes                     Exercise/Intervention   Notes   6 min walk test  614 feet O2sat 95%, pulse96     Cordova Balance test 41/56      Bilateral heelraises with cues to wb throug great toes more instead of rolling ankle out  x15  x Progressed to foam    Partial squats with ball between knees x15  x Progressed to foam   3 way hip  Slow and controlled upright posture x15  x BUE on // bars except for hip extension one hand hold  Progressed to foam    Standing balance, narrow stance eyes closed, tandem stance r/l, l/r with eyes open  30 seconds each 20 sec- EC x Progressed to foam   Decreased EC time- pt LOB, CGA required    rockerboard fwd/back, side to side  12x Balance 30 seconds      4 inch step ups with eccentric lowering with tap of opposite foot behind-forward  12x      Step ups lat x12 4 in     NK table  extension LLE  7.5#  10x  Cues for posture while sitting at NK table to complete exercises   NK table flexion LLE  7.5# 12x     NK table extension. flexion RLE  10#  2x10     Step stance on airex       Heel and toe taps on airex fwd/bwd x10  x Mild sway   Dynamic gait: fwd, retro, marching, tandem  x2 laps // bars x     SC ambulation 200' In clinic  LLE weaker and tends to lock into place while ambulating. Vcs to avoid sharp turns   SBA- steady    Stair negotiation  X4/4 steps      Sit<>stands from chair butt taps with cushion in chair  x12  x    Gait training without walker: 100 feet    x May ambulate without walker in session between stations. Nu step LE ONLY  6 min Level 5 x                         Perceive exertion following PT session 0-10  4  x      Specific Interventions Next Treatment: balance retraining, strengthening core and LEs left drop foot, LLE weakness of hip and knee, ankle. Gait training with straight cane to no assistive device. Stair step negotiation. (history of heart transplant 2/7/2022,( history of arachnoiditis, lumbar fusion and cervical fusion)     Activity/Treatment Tolerance:  [x]  Patient tolerated treatment well  []  Patient limited by fatigue   []  Patient limited by pain   []  Patient limited by medical complications  []  Other:     Assessment: Continued with therex as stated above with increased reps and addition of dynamic gait and step stance balance. Pt LOB with tandem gait but able to self correct, CGA needed.  Focused treatment on balance retraining. Pt felt appropriately challeneged with exercise progressions. Vcs for slow controlled motion needed. Will continue to progress as tolerated by pt per POC. Body Structures/Functions/Activity Limitations: impaired activity tolerance, impaired balance, impaired endurance, impaired sensation, impaired strength and abnormal gait  Prognosis: good    GOALS:  Patient Goal: to participate in cardiac rehab following PT and be able to have improved activity level tolerance to cook, negotiate stair steps and walk without walker. Short Term Goals:  Time Frame: 4 weeks  1. Patient to demonstrate increased strength LLE: hip flexion 3+ to 4-/5 to 4/5, knee extensors and flexors from 4-/5 to 4+/5, left ankle dorsiflexion from 3/5 3+/5 to 4-/5. With increased stability and improved walking pattern with less high steppage gait with LLE, improved ankle strength with less left foot drop. 2.Patient to ambulate with least restrictive assistive device from roller walker to straight cane when ambulating household and community distances > 40 feet, ability to walk 100 feet or in clinic distances without roller walker to straight cane. 3. Cordova Balance test from 41/56 to 48/56  4. Patient to negotiate 8 stair steps from nonreciprocal technique to reciprocal technique using single handrail. SBA +1 person. 5. 6 minute walk test with stable vitals with ability to complete distance from 614 feet to 700 feet and perceived exertion from 5 to 3. Long Term Goals:  Time Frame: 8 weeks  1. Cordova Balance test from 48/56 to 52/56  2. 6 minute walk test from 700 feet to 800 feet with perceived exertion <3.   3. Patient to demonstrate independence in HEP with progression in endurance, increased core and LE strength, improved ambulation status to no assistive device with improved walking pattern and balance.        Patient Education:   []  HEP/Education Completed: Plan of Care, Goals,    Medbridge Access Code:  []  No new Education completed  [x]  Reviewed Prior HEP      []  Patient verbalized and/or demonstrated understanding of education provided. []  Patient unable to verbalize and/or demonstrate understanding of education provided. Will continue education. [x]  Barriers to learning: none    PLAN:  Treatment Recommendations: Strengthening, Balance Training, Functional Mobility Training, Transfer Training, Endurance Training, Gait Training, Stair Training, Neuromuscular Re-education, Home Exercise Program and Patient Education    []  Plan of care initiated. Plan to see patient 3 times per week for 8 weeks to address the treatment planned outlined above.   [x]  Continue with current plan of care  []  Modify plan of care as follows:    []  Hold pending physician visit  []  Discharge    Time In 0845   Time Out 0926   Timed Code Minutes: 41   Total Treatment Time: 41       Electronically Signed by: Jessica Rock PTA

## 2022-04-25 ENCOUNTER — TELEPHONE (OUTPATIENT)
Dept: CARDIOLOGY CLINIC | Age: 62
End: 2022-04-25

## 2022-04-25 ENCOUNTER — HOSPITAL ENCOUNTER (OUTPATIENT)
Dept: PHYSICAL THERAPY | Age: 62
Setting detail: THERAPIES SERIES
Discharge: HOME | End: 2022-04-25
Payer: COMMERCIAL

## 2022-04-25 ENCOUNTER — HOSPITAL ENCOUNTER (OUTPATIENT)
Dept: OCCUPATIONAL THERAPY | Age: 62
Setting detail: THERAPIES SERIES
Discharge: HOME | End: 2022-04-25
Payer: COMMERCIAL

## 2022-04-25 PROCEDURE — 97110 THERAPEUTIC EXERCISES: CPT

## 2022-04-25 NOTE — PROGRESS NOTES
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]    Treatment Diagnosis Decreased ADLs, decreased endurance,    Date of Evaluation 3/30/22      Functional Outcome Measure Used FIQ   Functional Outcome Score 94/104 (3/30/22)       Insurance: Primary: Payor: Bebo Velez /  /  / ,   Secondary: BCBS   Authorization Information: PRE CERTIFICATION REQUIRED: NO  INSURANCE THERAPY BENEFIT:  ALLOWED 30 PT, AND 30 OT VISITS PER CALENDAR YEAR, NONE OF THESE VISITS HAVE BEEN USED  AQUATIC THERAPY COVERED: YES  MODALITIES COVERED:  YES, YES MASSAGE  TELEHEALTH COVERED: NO   Visit # 6, 6/10 for progress note   Visits Allowed: 30 visits   Recertification Date:    Physician Follow-Up: 22 to cardiologist   Physician Orders: OT eval and treat   Pertinent History:  [facility]  . In bed x 10 days, then Patient reports of heart transplant on 2022 and was in [facility] for rehab for 17 days. He takes regular BP and today /70. Since he has had his new heart notes less shortness of breath. 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 Notes worse problem right now is weakness in his legs (left >right)  and negotiating stair steps in trilevel home and completing squats. Has to go up stair steps one at a time. Patient uses straight cane at his home for household walking.  Uses walker for community ambulation and when outside home. Notes decreased balance. Does have some chest pain and right posterior shoulder and upper back discomfort with pulling up with right arm or supporting through the arm. Patient not to push or pull, no lifting more than 5#s. No pressure on his chest with use of his arms. Patient's endurance is limited with ability to walk 15 min using his walker and use of Elliptical bike at home for 15 minutes. Patient plans to complete cardiac rehab after completing OT/PT. SUBJECTIVE: Pt. Reports his legs are a little sore this date. Feeling good overall. Occasionally clicking or catching at lower sternum felt. Start of session readings    After exercise , 145/79, 91 BPM HR, 98 % O2, no pain    TREATMENT   Precautions: 5-10 # wt limit due to cardiac  Precautions. Pain:  Reports lower back pain down his legs     X in shaded column indicates Activity Completed Today   Modalities Parameters/  Location  Notes/Comments                     Manual Therapy Time/  Technique  Notes/Comments                     Exercises   Sets/  Sec Reps  Notes/Comments    AROM B UEs in sitting to warm up for shoulder flexion and abduction 1 10   minimal cues for deep breathing throughout. Dynamic standing endurance - dynamic reaching tasks x 6 min  and move green therkortney lungpilar fwd  x 5 reps for body mechanics to  and place ball wth L foot. 10 reps fwd with R foot.       Standing at pool windows wiping down with fleece block using BUE - completed all windows    X                       Able to complete without holding onto ledge or walker   Seated Green theraband ex - BUE 1 20  horiz ABD, diagonals, chest press, ER   Biodex at 75  RPM, 3 min fwd, 3 min bwd   x Good tolerance   Supine sh circles, CW, CCW, serratus punch 2 # wt.  1 20     Seated trunk strengthening ex with green theraball fwd,ball up,  trunk twists and bending to floor 1 15 x    Green flex bar for forearm strengthening  1 15 x Palm up, palm down, twists,    Activities Time    Notes/Comments   Completed dynavision tasks  1 X 60 sec. Mode A   3 x 60 sec Mode B  55  hits. Mild SOB during tasks with pt holding breath at times. Trial 1- 1 sec light speed, 12 misses, - 48 hits. Trial 2 - 2 sec light speed, 63 hits,   Trial 3- 2 sec light speed  68 hits . Ex utilized for reaction timing and endurance B UEs. Specific Interventions Next Treatment: Strengthening, endurance, dynamic standing balance for ADL and IADL tasks, breathing tech during IADL tasks     Activity/Treatment Tolerance:  [x]  Patient tolerated treatment well  []  Patient limited by fatigue  []  Patient limited by pain   []  Patient limited by other medical complications  []  Other:     Assessment: Pt. Progressing - good tolerance for activity today. Mild SOB upon exertion. Balance is improving as well as endurance. Pt appreciative of UE/ forearm  strengthening    Areas for Improvement: impaired activity tolerance, impaired balance, impaired endurance, impaired safety awareness, impaired strength and abdominal- diafragmatic breathing, purse lip breathing tech with IADL tasks. Prognosis: good    Patient Education:    []  HEP/Education Completed: Plan of Care, Goals,  Reviewed using a lighter theraband for HEP as provided to the pt from prior facility  [address] [place] [address] for HEP:   [x]  No new Education completed  []  Reviewed Prior HEP      []  Patient verbalized and/or demonstrated understanding of education provided. []  Patient unable to verbalize and/or demonstrate understanding of education provided. Will continue education. [x]  Barriers to learning: none    GOALS:  Patient Goal: I want better balance to be able to do daily tasks without my RW    Short Term Goals:  Time Frame: 10 sessions. *  Patient will demonstrate I knowledge of HEP for improved flexibility and strength for lifting.   *  Pt will tolerate an OT session in standing x 15 min with MI and no LOB or rest breaks to improve endurance for homemaking tasks   PT will complete simulated homemaking tasks with no > min cues for proper breathing tech, abdominal- diaphoretic breathing    Pt will complete B UE endurance tasks to complete strengthening ex x 15 min without rest breaks. Long Term Goals:  Time Frame: 8 weeks  *  Patient will improve UEFS to at least 102/104  *  Pt will report completing LE dressing with no UE support for balance and no LOB. Hattie Shiver Pt will ambulate throughout the clinic without a device to carry items for homemaking and cooking tasks   PLAN:  Treatment Recommendations: Strengthening, Balance Training, Endurance Training, Neuromuscular Re-education, Home Exercise Program, Patient Education, Safety Education and Training, and Self-Care Education and Training    []  Plan of care initiated. Plan to see patient 3 times per week for 6 weeks to address the treatment planned outlined above.   [x]  Continue with current plan of care  []  Modify plan of care as follows:    []  Hold pending physician visit  []  Discharge    Time In 0945   Time Out 1015   Timed Code Minutes: 30 min   Total Treatment Time: 30  min       Electronically Signed by:  SHIMON Parada OTR/L [id number]

## 2022-04-25 NOTE — PROGRESS NOTES
[address] [facility]  PHYSICAL THERAPY  [] EVALUATION  [x] DAILY NOTE (LAND) [] DAILY NOTE (AQUATIC ) [] PROGRESS NOTE [] DISCHARGE NOTE    [x] OUTPATIENT REHABILITATION CENTER 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 Functional Outcome Measure Used Walking test for 6 minutes, Cordova Balance   Functional Outcome Score walking test: 614 feet in 6 minutes, perceived exertion 5,  Cordova balance: 41/56 (3/30/22)       Insurance: Primary: Payor: Bill Jensen /  /  / ,   Secondary: [facility]   Authorization Information: PRE CERTIFICATION REQUIRED: NO  INSURANCE THERAPY BENEFIT:  ALLOWED 30 PT, AND 30 OT VISITS PER CALENDAR YEAR, NONE OF THESE VISITS HAVE BEEN USED  AQUATIC THERAPY COVERED: YES  MODALITIES COVERED:  YES, YES MASSAGE  TELEHEALTH COVERED: NO  REFERENCE NUMBER: [id number]   Visit # 6, 6/10 for progress note   Visits Allowed: 30   Recertification Date: 83   Physician Follow-Up: . Physician Orders: PT evaluate and treat 3x per week per 6 weeks, OT evaluate and treat, 3x per week per 6 weeks   History of Present Illness: Patient reports of heart transplant on 2022 and was in [place] for rehab for 17 days. He takes regular BP and today /70. Since he has had his new heart notes less shortness of breath.  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 Notes worse problem right now is weakness in his legs (left >right)  and negotiating stair steps in trilevel home and completing squats. Has to go up stair steps one at a time. Patient uses straight cane at his home for household walking. Uses walker for community ambulation and when outside home. Notes decreased balance. Does have some chest pain and right posterior shoulder and upper back discomfort with pulling up with right arm or supporting through the arm. Patient not to push or pull, no lifting more than 5#s. No pressure on his chest with use of his arms. Patient's endurance is limited with ability to walk 15 min using his walker and use of Elliptical bike at home for 15 minutes. Patient plans to complete cardiac rehab after completing OT/PT. SUBJECTIVE: Pt notes feeling good today. Reports is sore in HS area after previous treatment session, but notes he was up and down on step stool yesterday organizing spices. Pt notes doctor increasing heart medication to fight off infection on Thursday (4/21) and it is known to increase BP. BP  Start of session: seated 152/85, pulse 93  O2 sats 95%, 20 min into session /81, pulse 92, O2 sats 94%  35-40 minutes into session: /78, pulse 103, O2 sats 98% final end of session 151/83, pulse 99     TREATMENT   Precautions: No lifting >5#, no pushing or pulling. HEART TRANSPLANT 2/7/2022. History of back and neck surgery. Pain: 0/10 anterior chest incision/sternal region.      X in shaded column indicates activity completed today   Modalities Parameters/  Location  Notes                     Manual Therapy Time/Technique  Notes                     Exercise/Intervention   Notes   6 min walk test  614 feet O2sat 95%, pulse96     Cordova Balance test 41/56      Bilateral heelraises with cues to wb throug great toes more instead of rolling ankle out  x15  x Progressed to foam    Partial squats with ball between knees x15  x Progressed to foam   3 way hip  Slow and controlled upright posture x15  x BUE on // bars except for hip extension one hand hold  Progressed to foam    Standing balance, narrow stance eyes closed, tandem stance r/l, l/r with eyes open  30 seconds each 20 sec- EC  Progressed to foam   Decreased EC time- pt LOB, CGA required    rockerboard fwd/back, side to side  12x Balance 30 seconds      4 inch step ups with eccentric lowering with tap of opposite foot behind-forward  12x      Step ups lat/fwd x12 6 in x    NK table  extension LLE  7.5#  10x  Cues for posture while sitting at NK table to complete exercises   NK table flexion LLE  7.5# 12x     NK table extension. flexion RLE  10#  2x10     Step stance on airex       Heel and toe taps on airex fwd/bwd x12  x Mild sway  1UE support    Dynamic gait: fwd, retro, marching, tandem  x2 laps // bars x  Added HS curls    SC ambulation 200' In clinic  LLE weaker and tends to lock into place while ambulating. Vcs to avoid sharp turns   SBA- steady    Stair negotiation  X4/4 steps  x    Sit<>stands from chair butt taps with cushion in chair  x12      Gait training without walker: 100 feet    x May ambulate without walker in session between stations. Nu step LE ONLY  6 min Level 5 x                         Perceive exertion following PT session 0-10  4  x      Specific Interventions Next Treatment: balance retraining, strengthening core and LEs left drop foot, LLE weakness of hip and knee, ankle. Gait training with straight cane to no assistive device. Stair step negotiation.  (history of heart transplant 2/7/2022,( history of arachnoiditis, lumbar fusion and cervical fusion)     Activity/Treatment Tolerance:  [x]  Patient tolerated treatment well  []  Patient limited by fatigue   []  Patient limited by pain   []  Patient limited by medical complications  []  Other:     Assessment: Continued with therex as stated above with increased reps and HS curls to dynamic gait as tolerated by pt. Pt stated feeling more winded this treatment session. Vcs for slow controlled motion. Pt demonstrating improved balance throughout treatment with mild sway at times. Will continue to progress as tolerated by pt per POC. Body Structures/Functions/Activity Limitations: impaired activity tolerance, impaired balance, impaired endurance, impaired sensation, impaired strength and abnormal gait  Prognosis: good    GOALS:  Patient Goal: to participate in cardiac rehab following PT and be able to have improved activity level tolerance to cook, negotiate stair steps and walk without walker. Short Term Goals:  Time Frame: 4 weeks  1. Patient to demonstrate increased strength LLE: hip flexion 3+ to 4-/5 to 4/5, knee extensors and flexors from 4-/5 to 4+/5, left ankle dorsiflexion from 3/5 3+/5 to 4-/5. With increased stability and improved walking pattern with less high steppage gait with LLE, improved ankle strength with less left foot drop. 2.Patient to ambulate with least restrictive assistive device from roller walker to straight cane when ambulating household and community distances > 40 feet, ability to walk 100 feet or in clinic distances without roller walker to straight cane. 3. Cordova Balance test from 41/56 to 48/56  4. Patient to negotiate 8 stair steps from nonreciprocal technique to reciprocal technique using single handrail. SBA +1 person. 5. 6 minute walk test with stable vitals with ability to complete distance from 614 feet to 700 feet and perceived exertion from 5 to 3. Long Term Goals:  Time Frame: 8 weeks  1.  Cordova Balance test from 48/56 to 52/56  2. 6 minute walk test from 700 feet to 800 feet with perceived exertion <3.   3. Patient to demonstrate independence in HEP with progression in endurance, increased core and LE strength, improved ambulation status to no assistive device with improved walking pattern and balance. Patient Education:   []  HEP/Education Completed: Plan of Care, Goals,    Medbridge Access Code:  []  No new Education completed  [x]  Reviewed Prior HEP      []  Patient verbalized and/or demonstrated understanding of education provided. []  Patient unable to verbalize and/or demonstrate understanding of education provided. Will continue education. [x]  Barriers to learning: none    PLAN:  Treatment Recommendations: Strengthening, Balance Training, Functional Mobility Training, Transfer Training, Endurance Training, Gait Training, Stair Training, Neuromuscular Re-education, Home Exercise Program and Patient Education    []  Plan of care initiated. Plan to see patient 3 times per week for 8 weeks to address the treatment planned outlined above.   [x]  Continue with current plan of care  []  Modify plan of care as follows:    []  Hold pending physician visit  []  Discharge    Time In 0915   Time Out 0955   Timed Code Minutes: 40   Total Treatment Time: 40       Electronically Signed by: Magdaleno Segura PTA

## 2022-04-27 ENCOUNTER — HOSPITAL ENCOUNTER (OUTPATIENT)
Dept: OCCUPATIONAL THERAPY | Age: 62
Setting detail: THERAPIES SERIES
Discharge: HOME | End: 2022-04-27
Payer: COMMERCIAL

## 2022-04-27 ENCOUNTER — HOSPITAL ENCOUNTER (OUTPATIENT)
Dept: PHYSICAL THERAPY | Age: 62
Setting detail: THERAPIES SERIES
Discharge: HOME | End: 2022-04-27
Payer: COMMERCIAL

## 2022-04-27 PROCEDURE — 97110 THERAPEUTIC EXERCISES: CPT

## 2022-04-27 PROCEDURE — 97530 THERAPEUTIC ACTIVITIES: CPT

## 2022-04-27 NOTE — PROGRESS NOTES
[address] [facility]  PHYSICAL THERAPY  [] EVALUATION  [x] DAILY NOTE (LAND) [] DAILY NOTE (AQUATIC ) [] PROGRESS NOTE [] DISCHARGE NOTE    [x] OUTPATIENT REHABILITATION CENTER 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 Functional Outcome Measure Used Walking test for 6 minutes, Cordova Balance   Functional Outcome Score walking test: 614 feet in 6 minutes, perceived exertion 5,  Cordova balance: 41/56 (3/30/22)       Insurance: Primary: Payor: MEDICARE /  /  / ,   Secondary: [facility]   Authorization Information: PRE CERTIFICATION REQUIRED: NO  INSURANCE THERAPY BENEFIT:  ALLOWED 30 PT, AND 30 OT VISITS PER CALENDAR YEAR, NONE OF THESE VISITS HAVE BEEN USED  AQUATIC THERAPY COVERED: YES  MODALITIES COVERED:  YES, YES MASSAGE  TELEHEALTH COVERED: NO  REFERENCE NUMBER: [id number]   Visit # 7, 7/10 for progress note   Visits Allowed: 30   Recertification Date:    Physician Follow-Up: . Physician Orders: PT evaluate and treat 3x per week per 6 weeks, OT evaluate and treat, 3x per week per 6 weeks   History of Present Illness: Patient reports of heart transplant on 2022 and was in [place] for rehab for 17 days. He takes regular BP and today /70. Since he has had his new heart notes less shortness of breath.  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 Notes worse problem right now is weakness in his legs (left >right)  and negotiating stair steps in trilevel home and completing squats. Has to go up stair steps one at a time. Patient uses straight cane at his home for household walking. Uses walker for community ambulation and when outside home. Notes decreased balance. Does have some chest pain and right posterior shoulder and upper back discomfort with pulling up with right arm or supporting through the arm. Patient not to push or pull, no lifting more than 5#s. No pressure on his chest with use of his arms. Patient's endurance is limited with ability to walk 15 min using his walker and use of Elliptical bike at home for 15 minutes. Patient plans to complete cardiac rehab after completing OT/PT. SUBJECTIVE: Patient states he always has back pain but nothing new to note today. Reports he is working on home exercises and riding his bike daily unless he is doing a lot of running errands. Patient felt okay after last therapy session. Vitals  Start of session: seated /79 mmHg, pulse 89 bpm,  O2 sats 99%   20 min into session: /80 mmHg, pulse 92 bpm, O2 sats 99%    35-40 minutes into session:   Final end of session: /78 mmHg, pulse 97 bpm, O2 sats 98%     TREATMENT   Precautions: No lifting >5#, no pushing or pulling. HEART TRANSPLANT 2/7/2022. History of back and neck surgery. Pain: 0/10 anterior chest incision/sternal region.      X in shaded column indicates activity completed today   Modalities Parameters/  Location  Notes                     Manual Therapy Time/Technique  Notes                     Exercise/Intervention   Notes   6 min walk test  614 feet O2sat 95%, pulse96     Cordova Balance test 41/56      Bilateral heelraises with cues to wb throug great toes more instead of rolling ankle out  x15  X     Partial squats with ball between knees on foam x15  X    3 way hip on foam  x15  X BUE on // bars except for hip extension one hand hold; Slow and controlled upright posture   Standing balance, narrow stance eyes closed, tandem stance r/l, l/r with eyes open  (on Foam) 30 seconds each 20 sec- EC  Decreased EC time- pt LOB, CGA required    rockerboard fwd/back, side to side  12x Balance 30 seconds      4 inch step ups with eccentric lowering with tap of opposite foot behind-forward  12x      Step ups 6 inch lat/fwd x12  X    Alternating step taps 6 inch standing on foam 2x 30 sec  X U UE support   NK table  extension LLE  7.5#  10x  Cues for posture while sitting at NK table to complete exercises   NK table flexion LLE  7.5# 12x     NK table extension. flexion RLE  10#  2x10     Step stance on airex       Heel and toe taps on airex fwd/bwd x12   Mild sway  1UE support    Dynamic gait: retro, marching, tandem, sidestepping x2 laps // bars X  Added HS curls    SC ambulation 200' In clinic  LLE weaker and tends to lock into place while ambulating. Vcs to avoid sharp turns   SBA- steady    Stair negotiation  4 steps x4  X    Sit<>stands from chair butt taps with cushion in chair  x12      Gait training without walker    X May ambulate without walker in session between stations. Nu step LE ONLY  6 min Level 5 X                         Perceive exertion following PT session 0-10  4        Specific Interventions Next Treatment: balance retraining, strengthening core and LEs left drop foot, LLE weakness of hip and knee, ankle. Gait training with straight cane to no assistive device. Stair step negotiation.  (history of heart transplant 2/7/2022,( history of arachnoiditis, lumbar fusion and cervical fusion)     Activity/Treatment Tolerance:  [x]  Patient tolerated treatment well  []  Patient limited by fatigue   []  Patient limited by pain   []  Patient limited by medical complications  []  Other:     Assessment: Continued with therex as noted above. Patient had a mild shortness of breath. Monitored blood pressure, pulse, and O2 sats throughout session. Patient took one seated rest break during session. Mild sway noted during balance activities. Progressing patient as he is able to tolerate. Body Structures/Functions/Activity Limitations: impaired activity tolerance, impaired balance, impaired endurance, impaired sensation, impaired strength and abnormal gait  Prognosis: good    GOALS:  Patient Goal: to participate in cardiac rehab following PT and be able to have improved activity level tolerance to cook, negotiate stair steps and walk without walker. Short Term Goals:  Time Frame: 4 weeks  1. Patient to demonstrate increased strength LLE: hip flexion 3+ to 4-/5 to 4/5, knee extensors and flexors from 4-/5 to 4+/5, left ankle dorsiflexion from 3/5 3+/5 to 4-/5. With increased stability and improved walking pattern with less high steppage gait with LLE, improved ankle strength with less left foot drop. 2.Patient to ambulate with least restrictive assistive device from roller walker to straight cane when ambulating household and community distances > 40 feet, ability to walk 100 feet or in clinic distances without roller walker to straight cane. 3. Cordova Balance test from 41/56 to 48/56  4. Patient to negotiate 8 stair steps from nonreciprocal technique to reciprocal technique using single handrail. SBA +1 person. 5. 6 minute walk test with stable vitals with ability to complete distance from 614 feet to 700 feet and perceived exertion from 5 to 3. Long Term Goals:  Time Frame: 8 weeks  1. Cordova Balance test from 48/56 to 52/56  2. 6 minute walk test from 700 feet to 800 feet with perceived exertion <3.   3. Patient to demonstrate independence in HEP with progression in endurance, increased core and LE strength, improved ambulation status to no assistive device with improved walking pattern and balance.        Patient Education:   [x] HEP/Education Completed: Continue HEP, monitor fatigue, BP, SOB   Medbridge Access Code:  []  No new Education completed  [x]  Reviewed Prior HEP      []  Patient verbalized and/or demonstrated understanding of education provided. []  Patient unable to verbalize and/or demonstrate understanding of education provided. Will continue education. []  Barriers to learning: none    PLAN:  Treatment Recommendations: Strengthening, Balance Training, Functional Mobility Training, Transfer Training, Endurance Training, Gait Training, Stair Training, Neuromuscular Re-education, Home Exercise Program and Patient Education    []  Plan of care initiated. Plan to see patient 3 times per week for 8 weeks to address the treatment planned outlined above.   [x]  Continue with current plan of care  []  Modify plan of care as follows:    []  Hold pending physician visit  []  Discharge    Time In 1004   Time Out 1045   Timed Code Minutes: 41 min   Total Treatment Time: 41 min       Electronically Signed by: Sierra Abdullahi PTA Yes

## 2022-04-27 NOTE — PROGRESS NOTES
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   Authorization Information: PRE CERTIFICATION REQUIRED: NO  INSURANCE THERAPY BENEFIT:  ALLOWED 30 PT, AND 30 OT VISITS PER CALENDAR YEAR, NONE OF THESE VISITS HAVE BEEN USED  AQUATIC THERAPY COVERED: YES  MODALITIES COVERED:  YES, YES MASSAGE  TELEHEALTH COVERED: NO   Visit # 7, 7/10 for progress note   Visits Allowed: 30 visits   Recertification Date:    Physician Follow-Up: 22 to cardiologist   Physician Orders: OT eval and treat   Pertinent History:  Pt was in [facility] clinic from  . In bed x 10 days, then Patient reports of heart transplant on 2022 and was in [place] for rehab for 17 days. He takes regular BP and today /70. Since he has had his new heart notes less shortness of breath. 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 Notes worse problem right now is weakness in his legs (left >right)  and negotiating stair steps in trilevel home and completing squats. Has to go up stair steps one at a time. Patient uses straight cane at his home for household walking.  Uses walker for community ambulation and when outside home. Notes decreased balance. Does have some chest pain and right posterior shoulder and upper back discomfort with pulling up with right arm or supporting through the arm. Patient not to push or pull, no lifting more than 5#s. No pressure on his chest with use of his arms. Patient's endurance is limited with ability to walk 15 min using his walker and use of Elliptical bike at home for 15 minutes. Patient plans to complete cardiac rehab after completing OT/PT. SUBJECTIVE: Pt. Reports his legs are a little sore this date. Feeling good overall. Occasionally clicking or catching at lower sternum felt. Start of session readings  121/68 89 BPM, 97%  After exercise , no pain    TREATMENT   Precautions: 5-10 # wt limit due to cardiac  Precautions. Pain:  Reports lower back pain down his legs     X in shaded column indicates Activity Completed Today   Modalities Parameters/  Location  Notes/Comments                     Manual Therapy Time/  Technique  Notes/Comments                     Exercises   Sets/  Sec Reps  Notes/Comments    AROM B UEs in sitting to warm up for shoulder flexion and abduction 1 10   minimal cues for deep breathing throughout. Dynamic standing endurance - dynamic reaching tasks x 6 min  and move red theraball lunges fwd  2 x 5 reps for body mechanics to  and place ball wth L foot. 10 reps fwd with R foot.       Standing at pool windows wiping down with fleece block using BUE - completed all windows    X                       Able to complete without holding onto ledge or walker   Seated Green theraband ex - BUE 1 20  horiz ABD, diagonals, chest press, ER   Biodex at 75  RPM, 3 min fwd, 3 min bwd    Good tolerance   Supine sh circles, CW, CCW, serratus punch 2 # wt.  1 20     Standing  trunk strengthening ex with red theraball fwd,ball up,  trunk twists and bending to floor 1 10 x    Green flex bar for forearm strengthening  1 15 x Palm up, palm down, twists,    Activities Time    Notes/Comments   Completed dynavision tasks  1 X 60 sec. Mode A   3 x 60 sec Mode B  55  hits. Mild SOB during tasks with pt holding breath at times. Trial 1- 1 sec light speed, 12 misses, - 48 hits. Trial 2 - 2 sec light speed, 63 hits,   Trial 3- 2 sec light speed  68 hits . Ex utilized for reaction timing and endurance B UEs. Specific Interventions Next Treatment: Strengthening, endurance, dynamic standing balance for ADL and IADL tasks, breathing tech during IADL tasks     Activity/Treatment Tolerance:  [x]  Patient tolerated treatment well  []  Patient limited by fatigue  []  Patient limited by pain   []  Patient limited by other medical complications  []  Other:     Assessment: Pt. Progressing - good tolerance for activity today. Mild SOB upon exertion. Balance is improving as well as endurance. Pt appreciative of UE/ forearm  strengthening    Areas for Improvement: impaired activity tolerance, impaired balance, impaired endurance, impaired safety awareness, impaired strength and abdominal- diafragmatic breathing, purse lip breathing tech with IADL tasks. Prognosis: good    Patient Education:    []  HEP/Education Completed: Plan of Care, Goals,  Reviewed using a lighter theraband for HEP as provided to the pt from prior facility  [address] [place] [address] for HEP:   [x]  No new Education completed  []  Reviewed Prior HEP      []  Patient verbalized and/or demonstrated understanding of education provided. []  Patient unable to verbalize and/or demonstrate understanding of education provided. Will continue education. [x]  Barriers to learning: none    GOALS:  Patient Goal: I want better balance to be able to do daily tasks without my RW    Short Term Goals:  Time Frame: 10 sessions. *  Patient will demonstrate I knowledge of HEP for improved flexibility and strength for lifting.   *  Pt will tolerate an OT session in standing x 15 min with MI and no LOB or rest breaks to improve endurance for homemaking tasks   PT will complete simulated homemaking tasks with no > min cues for proper breathing tech, abdominal- diaphoretic breathing    Pt will complete B UE endurance tasks to complete strengthening ex x 15 min without rest breaks. Long Term Goals:  Time Frame: 8 weeks  *  Patient will improve UEFS to at least 102/104  *  Pt will report completing LE dressing with no UE support for balance and no LOB. Reggy Swathi Pt will ambulate throughout the clinic without a device to carry items for homemaking and cooking tasks   PLAN:  Treatment Recommendations: Strengthening, Balance Training, Endurance Training, Neuromuscular Re-education, Home Exercise Program, Patient Education, Safety Education and Training, and Self-Care Education and Training    []  Plan of care initiated. Plan to see patient 3 times per week for 6 weeks to address the treatment planned outlined above.   [x]  Continue with current plan of care  []  Modify plan of care as follows:    []  Hold pending physician visit  []  Discharge    Time In 0937   Time Out 1003   Timed Code Minutes: 25 min   Total Treatment Time: 25  min       Electronically Signed by:  SHIMON Barton OTR/L [id number]

## 2022-04-29 ENCOUNTER — HOSPITAL ENCOUNTER (OUTPATIENT)
Dept: PHYSICAL THERAPY | Age: 62
Setting detail: THERAPIES SERIES
Discharge: HOME | End: 2022-04-29
Payer: COMMERCIAL

## 2022-04-29 ENCOUNTER — HOSPITAL ENCOUNTER (OUTPATIENT)
Dept: OCCUPATIONAL THERAPY | Age: 62
Setting detail: THERAPIES SERIES
Discharge: HOME | End: 2022-04-29
Payer: COMMERCIAL

## 2022-04-29 PROCEDURE — 97110 THERAPEUTIC EXERCISES: CPT

## 2022-04-29 NOTE — PROGRESS NOTES
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 Functional Outcome Measure Used Walking test for 6 minutes, Cordova Balance   Functional Outcome Score walking test: 614 feet in 6 minutes, perceived exertion 5,  Cordova balance: 41/56 (3/30/22)       Insurance: Primary: Payor: Lawrence Ortiz /  /  / ,   Secondary: [facility]   Authorization Information: PRE CERTIFICATION REQUIRED: NO  INSURANCE THERAPY BENEFIT:  ALLOWED 30 PT, AND 30 OT VISITS PER CALENDAR YEAR, NONE OF THESE VISITS HAVE BEEN USED  AQUATIC THERAPY COVERED: YES  MODALITIES COVERED:  YES, YES MASSAGE  TELEHEALTH COVERED: NO  REFERENCE NUMBER: [id number]   Visit # 8, 8/10 for progress note   Visits Allowed: 30   Recertification Date:    Physician Follow-Up: . Physician Orders: PT evaluate and treat 3x per week per 6 weeks, OT evaluate and treat, 3x per week per 6 weeks   History of Present Illness: Patient reports of heart transplant on 2022 and was in [place] for rehab for 17 days. He takes regular BP and today /70. Since he has had his new heart notes less shortness of breath.  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 Notes worse problem right now is weakness in his legs (left >right)  and negotiating stair steps in trilevel home and completing squats. Has to go up stair steps one at a time. Patient uses straight cane at his home for household walking. Uses walker for community ambulation and when outside home. Notes decreased balance. Does have some chest pain and right posterior shoulder and upper back discomfort with pulling up with right arm or supporting through the arm. Patient not to push or pull, no lifting more than 5#s. No pressure on his chest with use of his arms. Patient's endurance is limited with ability to walk 15 min using his walker and use of Elliptical bike at home for 15 minutes. Patient plans to complete cardiac rehab after completing OT/PT. SUBJECTIVE: Pt states no new or changing symptoms. States he feels about the same. Pt presents with open wound on L forearm, covered with gauze. Vitals  Start of session: seated /76 mmHg, pulse 90 bpm,  O2 sats 93%   20 min into session: /77 mmHg, pulse 96 bpm, O2 sats 99%    35-40 minutes into session:   Final end of session: /77 mmHg, pulse 94 bpm, O2 sats 97%     TREATMENT   Precautions: No lifting >5#, no pushing or pulling. HEART TRANSPLANT 2/7/2022. History of back and neck surgery. Pain: 0/10 anterior chest incision/sternal region.      X in shaded column indicates activity completed today   Modalities Parameters/  Location  Notes                     Manual Therapy Time/Technique  Notes                     Exercise/Intervention   Notes   6 min walk test  614 feet O2sat 95%, pulse96     Cordova Balance test 41/56      Bilateral heelraises with cues to wb throug great toes more instead of rolling ankle out  x15  X     Partial squats with ball between knees on foam x15  X    3 way hip on foam  x15  X BUE on // bars except for hip extension one hand hold; Slow and controlled upright posture   Standing balance, narrow stance eyes closed, tandem stance r/l, l/r with eyes open  (on Foam) 30 seconds each 20 sec- EC  Decreased EC time- pt LOB, CGA required    rockerboard fwd/back, side to side  12x Balance 30 seconds      4 inch step ups with eccentric lowering with tap of opposite foot behind-forward  12x      Step ups 6 inch lat/fwd x15  X Progressed to standing on foam    Alternating step taps 6 inch standing on foam 2x 30 sec  X 1 UE support   NK table  extension LLE  7.5#  10x  Cues for posture while sitting at NK table to complete exercises   NK table flexion LLE  7.5# 12x     NK table extension. flexion RLE  10#  2x10     Step stance on airex       Heel and toe taps on airex fwd/bwd x12   Mild sway  1UE support    Jamil step overs X4/2 laps green     Dynamic gait: retro, marching, tandem, sidestepping x2 laps // bars X  Added HS curls    SC ambulation 200' In clinic  LLE weaker and tends to lock into place while ambulating. Vcs to avoid sharp turns   SBA- steady    Stair negotiation  4 steps x4  X    Sit<>stands from chair butt taps with cushion in chair  x12      Gait training without walker    X May ambulate without walker in session between stations. SBA   Nu step LE ONLY  6 min Level 5 X                         Perceive exertion following PT session 0-10  4        Specific Interventions Next Treatment: balance retraining, strengthening core and LEs left drop foot, LLE weakness of hip and knee, ankle. Gait training with straight cane to no assistive device. Stair step negotiation.  (history of heart transplant 2/7/2022,( history of arachnoiditis, lumbar fusion and cervical fusion)     Activity/Treatment Tolerance:  [x]  Patient tolerated treatment well  []  Patient limited by fatigue   []  Patient limited by pain   []  Patient limited by medical complications  []  Other:     Assessment: Continued with therex as noted above with addition of jamil walk overs. Monitored blood pressure, pulse, and O2 sats throughout session. Pt c/o LLE weakness throughout treatment. Pt demonstrated improved balance control with mild sway noted. Ambulated throughout clinic with SBA and demonstrated steady balance. Will continue to progress as tolerated by pt per POC. Body Structures/Functions/Activity Limitations: impaired activity tolerance, impaired balance, impaired endurance, impaired sensation, impaired strength and abnormal gait  Prognosis: good    GOALS:  Patient Goal: to participate in cardiac rehab following PT and be able to have improved activity level tolerance to cook, negotiate stair steps and walk without walker. Short Term Goals:  Time Frame: 4 weeks  1. Patient to demonstrate increased strength LLE: hip flexion 3+ to 4-/5 to 4/5, knee extensors and flexors from 4-/5 to 4+/5, left ankle dorsiflexion from 3/5 3+/5 to 4-/5. With increased stability and improved walking pattern with less high steppage gait with LLE, improved ankle strength with less left foot drop. 2.Patient to ambulate with least restrictive assistive device from roller walker to straight cane when ambulating household and community distances > 40 feet, ability to walk 100 feet or in clinic distances without roller walker to straight cane. 3. Cordova Balance test from 41/56 to 48/56  4. Patient to negotiate 8 stair steps from nonreciprocal technique to reciprocal technique using single handrail. SBA +1 person. 5. 6 minute walk test with stable vitals with ability to complete distance from 614 feet to 700 feet and perceived exertion from 5 to 3. Long Term Goals:  Time Frame: 8 weeks  1.  Cordova Balance test from 48/56 to 52/56  2. 6 minute walk test from 700 feet to 800 feet with perceived exertion <3.   3. Patient to demonstrate independence in HEP with progression in endurance, increased core and LE strength, improved ambulation status to no assistive device with improved walking pattern and balance. Patient Education:   [x]  HEP/Education Completed: Continue HEP, monitor fatigue, BP, SOB   Medbridge Access Code:  []  No new Education completed  [x]  Reviewed Prior HEP      []  Patient verbalized and/or demonstrated understanding of education provided. []  Patient unable to verbalize and/or demonstrate understanding of education provided. Will continue education. []  Barriers to learning: none    PLAN:  Treatment Recommendations: Strengthening, Balance Training, Functional Mobility Training, Transfer Training, Endurance Training, Gait Training, Stair Training, Neuromuscular Re-education, Home Exercise Program and Patient Education    []  Plan of care initiated. Plan to see patient 3 times per week for 8 weeks to address the treatment planned outlined above.   [x]  Continue with current plan of care  []  Modify plan of care as follows:    []  Hold pending physician visit  []  Discharge    Time In 0930   Time Out 1009   Timed Code Minutes: 39   Total Treatment Time: 39       Electronically Signed by: Randy Kwan PTA

## 2022-04-29 NOTE — PROGRESS NOTES
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]    Treatment Diagnosis Decreased ADLs, decreased endurance,    Date of Evaluation 3/30/22      Functional Outcome Measure Used FIQ   Functional Outcome Score 94/104 (3/30/22)       Insurance: Primary: Payor: Danyel Silva /  /  / ,   Secondary: [facility]   Authorization Information: PRE CERTIFICATION REQUIRED: NO  INSURANCE THERAPY BENEFIT:  ALLOWED 30 PT, AND 30 OT VISITS PER CALENDAR YEAR, NONE OF THESE VISITS HAVE BEEN USED  AQUATIC THERAPY COVERED: YES  MODALITIES COVERED:  YES, YES MASSAGE  TELEHEALTH COVERED: NO   Visit # 8, 8/10 for progress note   Visits Allowed: 30 visits   Recertification Date:    Physician Follow-Up: 22 to cardiologist   Physician Orders: OT eval and treat   Pertinent History:  Pt was in [place] clinic from  . In bed x 10 days, then Patient reports of heart transplant on 2022 and was in [place] for rehab for 17 days. He takes regular BP and today /70. Since he has had his new heart notes less shortness of breath. 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 Notes worse problem right now is weakness in his legs (left >right)  and negotiating stair steps in trilevel home and completing squats. Has to go up stair steps one at a time. Patient uses straight cane at his home for household walking.  Uses walker for community ambulation and when outside home. Notes decreased balance. Does have some chest pain and right posterior shoulder and upper back discomfort with pulling up with right arm or supporting through the arm. Patient not to push or pull, no lifting more than 5#s. No pressure on his chest with use of his arms. Patient's endurance is limited with ability to walk 15 min using his walker and use of Elliptical bike at home for 15 minutes. Patient plans to complete cardiac rehab after completing OT/PT. SUBJECTIVE: Pt. Reports he is feeling fine but that PT wore him out today. Pt. Feels his legs are weaker than his upper body  Start of session readings  121/68 89 BPM, 97%  After exercise , no pain    TREATMENT   Precautions: 5-10 # wt limit due to cardiac  Precautions. Pain:  Reports lower back pain down his legs     X in shaded column indicates Activity Completed Today   Modalities Parameters/  Location  Notes/Comments                     Manual Therapy Time/  Technique  Notes/Comments                     Exercises   Sets/  Sec Reps  Notes/Comments    AROM B UEs in sitting to warm up for shoulder flexion and abduction 1 10   minimal cues for deep breathing throughout. Dynamic standing endurance - dynamic reaching tasks x 6 min  and move red therabariya lunges fwd  2 x 5 reps for body mechanics to  and place ball wth L foot. 10 reps fwd with R foot.       Standing at pool windows wiping down with fleece block using BUE - completed all windows                           Able to complete without holding onto ledge or walker   Seated Green theraband ex - BUE 1 20  horiz ABD, diagonals, chest press, ER   Biodex in standing at 70  RPM, 2 min fwd, 3 min bwd   XX Good tolerance   Seated on large Ball for BUE PRE's using 4# for core strength, UE strength, and endurance:  Bicep curls  Abduction to 90  Chest press outward  PNF patterns using 2#  Trunk rotation holding small weighted burgundy ball arms straight out front 1 15 ea XX Cues to keep abdominal tight while on ball   Standing  trunk strengthening ex with yellow theraball holding ball out in front, lifting ball up, trunk twists and then bending to floor 1 10 XX    Green flex bar for forearm strengthening  1 15 XX Palm up, palm down, twists,    Activities Time    Notes/Comments   Completed dynavision tasks  1 X 60 sec. Mode A   3 x 60 sec Mode B  55  hits. Mild SOB during tasks with pt holding breath at times. Trial 1- 1 sec light speed, 12 misses, - 48 hits. Trial 2 - 2 sec light speed, 63 hits,   Trial 3- 2 sec light speed  68 hits . Ex utilized for reaction timing and endurance B UEs. Specific Interventions Next Treatment: Strengthening, endurance, dynamic standing balance for ADL and IADL tasks, breathing tech during IADL tasks     Activity/Treatment Tolerance:  [x]  Patient tolerated treatment well  []  Patient limited by fatigue  []  Patient limited by pain   []  Patient limited by other medical complications  []  Other:     Assessment: Pt. Progressing - good tolerance for activity today. Mild SOB upon exertion. Pt. Did well with exercises while seated on physio ball today    Areas for Improvement: impaired activity tolerance, impaired balance, impaired endurance, impaired safety awareness, impaired strength and abdominal- diafragmatic breathing, purse lip breathing tech with IADL tasks. Prognosis: good    Patient Education:    []  HEP/Education Completed: Plan of Care, Goals,  Reviewed using a lighter theraband for HEP as provided to the pt from prior facility  [address] [place] [address] for HEP:   [x]  No new Education completed  []  Reviewed Prior HEP      []  Patient verbalized and/or demonstrated understanding of education provided. []  Patient unable to verbalize and/or demonstrate understanding of education provided. Will continue education.   [x]  Barriers to learning: none    GOALS:  Patient Goal: I want better balance to be able to do daily tasks without my RW    Short Term Goals:  Time Frame: 10 sessions. *  Patient will demonstrate I knowledge of HEP for improved flexibility and strength for lifting. *  Pt will tolerate an OT session in standing x 15 min with MI and no LOB or rest breaks to improve endurance for homemaking tasks   PT will complete simulated homemaking tasks with no > min cues for proper breathing tech, abdominal- diaphoretic breathing    Pt will complete B UE endurance tasks to complete strengthening ex x 15 min without rest breaks. Long Term Goals:  Time Frame: 8 weeks  *  Patient will improve UEFS to at least 102/104  *  Pt will report completing LE dressing with no UE support for balance and no LOB. Martine Rise Pt will ambulate throughout the clinic without a device to carry items for homemaking and cooking tasks   PLAN:  Treatment Recommendations: Strengthening, Balance Training, Endurance Training, Neuromuscular Re-education, Home Exercise Program, Patient Education, Safety Education and Training, and Self-Care Education and Training    []  Plan of care initiated. Plan to see patient 3 times per week for 6 weeks to address the treatment planned outlined above.   [x]  Continue with current plan of care  []  Modify plan of care as follows:    []  Hold pending physician visit  []  Discharge    Time In 1015   Time Out 1045   Timed Code Minutes: 30 min   Total Treatment Time: 30  min       Electronically Signed by:  JOMAR Payne/BRANDON [id number]

## 2022-05-02 ENCOUNTER — HOSPITAL ENCOUNTER (OUTPATIENT)
Age: 62
Discharge: HOME | End: 2022-05-02
Payer: COMMERCIAL

## 2022-05-02 ENCOUNTER — HOSPITAL ENCOUNTER (OUTPATIENT)
Dept: OCCUPATIONAL THERAPY | Age: 62
Setting detail: THERAPIES SERIES
Discharge: HOME | End: 2022-05-02
Payer: COMMERCIAL

## 2022-05-02 ENCOUNTER — HOSPITAL ENCOUNTER (OUTPATIENT)
Dept: PHYSICAL THERAPY | Age: 62
Setting detail: THERAPIES SERIES
Discharge: HOME | End: 2022-05-02
Payer: COMMERCIAL

## 2022-05-02 LAB
INFLUENZA A: NOT DETECTED
INFLUENZA B: NOT DETECTED
SARS-COV-2 RNA, RT PCR: NOT DETECTED

## 2022-05-02 PROCEDURE — 87636 SARSCOV2 & INF A&B AMP PRB: CPT

## 2022-05-02 PROCEDURE — 97110 THERAPEUTIC EXERCISES: CPT

## 2022-05-02 PROCEDURE — 97164 PT RE-EVAL EST PLAN CARE: CPT

## 2022-05-02 NOTE — PROGRESS NOTES
[facility]  PHYSICAL THERAPY  [] EVALUATION  [] DAILY NOTE (LAND) [] DAILY NOTE (AQUATIC ) [x] PROGRESS NOTE [] DISCHARGE NOTE    ** PLEASE SIGN, DATE AND TIME CERTIFICATION BELOW AND RETURN TO [facility] OUTPATIENT REHABILITATION (FAX #: [phone]). ATTEST/CO-SIGN IF ACCESSING VIA [employer]. THANK YOU.**    I certify that I have examined the patient below and determined that Physical Medicine and Rehabilitation service is necessary and that I approve the established plan of care for up to 90 days or as specifically noted. Attestation, signature or co-signature of physician indicates approval of certification requirements.    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        Functional Outcome Measure Used Walking test for 6 minutes, Cordova Balance   Functional Outcome Score walking test: 614 feet in 6 minutes, perceived exertion 5,  Cordova balance: 41/56 (3/30/22)       Insurance: Primary: Payor: MEDICARE /  /  / ,   Secondary: [facility]   Authorization Information: PRE CERTIFICATION REQUIRED: NO  INSURANCE THERAPY BENEFIT:  ALLOWED 30 PT, AND 30 OT VISITS PER CALENDAR YEAR, NONE OF THESE VISITS HAVE BEEN USED  AQUATIC THERAPY COVERED: YES  MODALITIES COVERED:  YES, YES MASSAGE  TELEHEALTH COVERED: NO  REFERENCE NUMBER: [id number]   Visit # 9, 0/10 for progress note   Visits Allowed: 30   Recertification Date: 2/10/77   Physician Follow-Up: April 5, 6, 2022. Physician Orders: PT evaluate and treat 3x per week per 6 weeks, OT evaluate and treat, 3x per week per 6 weeks   History of Present Illness: Patient reports of heart transplant on 2/7/2022 and was in [place] for rehab for 17 days. He takes regular BP and today /70. Since he has had his new heart notes less shortness of breath. 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 Notes worse problem right now is weakness in his legs (left >right)  and negotiating stair steps in trilevel home and completing squats. Has to go up stair steps one at a time. Patient uses straight cane at his home for household walking. Uses walker for community ambulation and when outside home. Notes decreased balance. Does have some chest pain and right posterior shoulder and upper back discomfort with pulling up with right arm or supporting through the arm. Patient not to push or pull, no lifting more than 5#s. No pressure on his chest with use of his arms. Patient's endurance is limited with ability to walk 15 min using his walker and use of Elliptical bike at home for 15 minutes. Patient plans to complete cardiac rehab after completing OT/PT. 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 Some of the stretches in PT helps. He has constant pain radiating down his left leg. Balance issues continue with leg weakness. Feels balance is better but does have a tendency to lean left. Balance ex going okay in PT. Only using walker for long distance walking. Uses the cane most the time. On this Wednesday, 5/4/2022 he has another biopsy of heart at [facility]. Vitals  Start of session: seated /78 mmHg, pulse 88 bpm,  O2 sats 98%   20 min into session: /77 mmHg, pulse 90 bpm, O2 sats 98%    35-40 minutes into session: /82, pulse 97, O2 sat 97%   Final end of session: /77 mmHg, pulse 94 bpm, O2 sats 97%     TREATMENT   Precautions: No lifting >5#, no pushing or pulling. HEART TRANSPLANT 2/7/2022. History of back and neck surgery. Pain: 0/10 anterior chest incision/sternal region. X in shaded column indicates activity completed today   Modalities Parameters/  Location  Notes                     Manual Therapy Time/Technique  Notes                     Exercise/Intervention   Notes   6 min walk test  694.8 feet O2sat 97%, pulse97 x Perceived exertion 6   Cordova Balance test 48/56  x    Bilateral heelraises with cues to wb throug great toes more instead of rolling ankle out  x15       Partial squats with ball between knees on foam x15      3 way hip on foam  x15   BUE on // bars except for hip extension one hand hold; Slow and controlled upright posture   Standing balance, narrow stance eyes closed, tandem stance r/l, l/r with eyes open  (on Foam) 30 seconds each 20 sec- EC  Decreased EC time- pt LOB, CGA required    rockerboard fwd/back, side to side  12x Balance 30 seconds      4 inch step ups with eccentric lowering with tap of opposite foot behind-forward  12x      Step ups 6 inch lat/fwd x15   Progressed to standing on foam    Alternating step taps 6 inch standing on foam 2x 30 sec   1 UE support   NK table  extension LLE  7.5#  15x x Cues for posture while sitting at NK table to complete exercises   NK table flexion LLE  7.5# 12x     NK table extension. flexion RLE  10#  2x10     Step stance on airex       Heel and toe taps on airex fwd/bwd x12   Mild sway  1UE support    Marilia step overs X4/2 laps green     Dynamic gait: retro, marching, tandem, sidestepping x2 laps // bars  Added HS curls    SC ambulation 200' In clinic  LLE weaker and tends to lock into place while ambulating. Vcs to avoid sharp turns   SBA- steady    Stair negotiation  4 steps x4      Sit<>stands from chair butt taps with cushion in chair  x12      Gait training without walker     May ambulate without walker in session between stations. SBA   Nu step LE ONLY  6 min Level 5     Hydrohiop BLE Load 5 x15 x           REassessment  30 min  x    Perceive exertion following PT session 0-10  4        Specific Interventions Next Treatment: balance retraining, strengthening core and LEs left drop foot, LLE weakness of hip and knee, ankle. Gait training with straight cane to no assistive device. Stair step negotiation. (history of heart transplant 2/7/2022,( history of arachnoiditis, lumbar fusion and cervical fusion)     Activity/Treatment Tolerance:  [x]  Patient tolerated treatment well  []  Patient limited by fatigue   []  Patient limited by pain   []  Patient limited by medical complications  []  Other:     Assessment: Reassessment. Patient meeting goals with progress noted with improved balance with Cordova increased 48/56, improved 6 minute walk test with increased walking distance, BP/O2 sats stable, Strength increased at left hip and knee musculature. Ankle df remained 3+/5 LLE. Patient has increased endurance with less rest breaks in session. Ambulating without roller walker in clinic. Using straight cane at home and limited walking. Community distances uses roller walker. Will continue PT 2x-3x per week to address functional mobility, strength, balance, and gait. Body Structures/Functions/Activity Limitations: impaired activity tolerance, impaired balance, impaired endurance, impaired sensation, impaired strength and abnormal gait  Prognosis: good    GOALS:  Patient Goal: to participate in cardiac rehab following PT and be able to have improved activity level tolerance to cook, negotiate stair steps and walk without walker. IN PROGRESS does cook for him and his wife.  Does live in a tri-level and negotiates steps during the day. LEs do get weak but feels he is improving. Roller walker for ambulation for community distances. Otherwise, using cane or no assistive device. Short Term Goals:  Time Frame: 4 weeks  1. Patient to demonstrate increased strength LLE: hip flexion 3+ to 4-/5 to 4/5, knee extensors and flexors from 4-/5 to 4+/5, left ankle dorsiflexion from 3/5 3+/5 to 4-/5. With increased stability and improved walking pattern with less high steppage gait with LLE, improved ankle strength with less left foot drop. GOAL NOT MET left hip flexors 4-/5, right hip flexors 5/5, left knee extensors 4+/5, left knee flexors 4/5,  Right knee flexors and extensors 5/5, left ankle dorsiflexors, left ankle 3+/5, right ankle df 5/5. Left hip flexors and knee extensor/flexors improved. Left ankle df remained 3+/5. 2.Patient to ambulate with least restrictive assistive device from roller walker to straight cane when ambulating household and community distances > 40 feet, ability to walk 100 feet or in clinic distances without roller walker to straight cane. GOAL MET Patient ambulating 100 feet in clinic without assistive device. 3. Cordova Balance test from 41/56 to 48/56  GOAL MET Cordova Balance Test improved to 48/56  REVISE GOAL: see long term goal 52/56    4. Patient to negotiate 8 stair steps from nonreciprocal technique to reciprocal technique using single handrail. SBA +1 person. GOAL MET Patient negotiates 8 steps reciprocally with single rail use. Revise goal:  4. Patient to negoatiate steps 8 steps without use of handrail with improved balance and strength modified independently. 5. 6 minute walk test with stable vitals with ability to complete distance from 614 feet to 700 feet and perceived exertion from 5 to 3. GOAL NOT MET but improving 6 minutes walk test: 694 feet perceived exertion 6. Long Term Goals:  Time Frame: 8 weeks  1.  Cordova Balance test from 50/03 to 52/56  ONGOING  2. 6 minute walk test from 700 feet to 800 feet with perceived exertion <3. ONGOING  3. Patient to demonstrate independence in HEP with progression in endurance, increased core and LE strength, improved ambulation status to no assistive device with improved walking pattern and balance. ONGOING      Patient Education:   [x]  HEP/Education Completed: Continue HEP, monitor fatigue, BP, SOB   Medbridge Access Code:  []  No new Education completed  [x]  Reviewed Prior HEP      []  Patient verbalized and/or demonstrated understanding of education provided. []  Patient unable to verbalize and/or demonstrate understanding of education provided. Will continue education. []  Barriers to learning: none    PLAN:  Treatment Recommendations: Strengthening, Balance Training, Functional Mobility Training, Transfer Training, Endurance Training, Gait Training, Stair Training, Neuromuscular Re-education, Home Exercise Program and Patient Education    []  Plan of care initiated. Plan to see patient 3 times per week for 8 weeks to address the treatment planned outlined above.   [x]  Continue with current plan of care  []  Modify plan of care as follows:    []  Hold pending physician visit  []  Discharge    Time In 1000   Time Out 1050   Timed Code Minutes: 15   Total Treatment Time: 50       Electronically Signed by: Candice Garg PT

## 2022-05-02 NOTE — PROGRESS NOTES
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]    Treatment Diagnosis Decreased ADLs, decreased endurance,    Date of Evaluation 3/30/22      Functional Outcome Measure Used FIQ   Functional Outcome Score 94/104 (3/30/22)       Insurance: Primary: Payor: Marlene Grossman /  /  / ,   Secondary: [employer]   Authorization Information: PRE CERTIFICATION REQUIRED: NO  INSURANCE THERAPY BENEFIT:  ALLOWED 30 PT, AND 30 OT VISITS PER CALENDAR YEAR, NONE OF THESE VISITS HAVE BEEN USED  AQUATIC THERAPY COVERED: YES  MODALITIES COVERED:  YES, YES MASSAGE  TELEHEALTH COVERED: NO   Visit # 9, 9/10 for progress note   Visits Allowed: 30 visits   Recertification Date:    Physician Follow-Up: 22 to cardiologist   Physician Orders: OT eval and treat   Pertinent History:  Pt was in [facility] clinic from  . In bed x 10 days, then Patient reports of heart transplant on 2022 and was in [place] for rehab for 17 days. He takes regular BP and today /70. Since he has had his new heart notes less shortness of breath. 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 Notes worse problem right now is weakness in his legs (left >right)  and negotiating stair steps in trilevel home and completing squats. Has to go up stair steps one at a time. Patient uses straight cane at his home for household walking.  Uses walker for community ambulation and when outside home. Notes decreased balance. Does have some chest pain and right posterior shoulder and upper back discomfort with pulling up with right arm or supporting through the arm. Patient not to push or pull, no lifting more than 5#s. No pressure on his chest with use of his arms. Patient's endurance is limited with ability to walk 15 min using his walker and use of Elliptical bike at home for 15 minutes. Patient plans to complete cardiac rehab after completing OT/PT. SUBJECTIVE: Pt. Reports he is feeling good. Pt. Feels his legs are weaker than his upper body. Reports of good endurance noted. Start of session readings  147/80  92 BPM, 98%  After exercise , no pain    TREATMENT   Precautions: 5-10 # wt limit due to cardiac  Precautions. Pain:  Reports lower back pain down his legs     X in shaded column indicates Activity Completed Today   Modalities Parameters/  Location  Notes/Comments                     Manual Therapy Time/  Technique  Notes/Comments                     Exercises   Sets/  Sec Reps  Notes/Comments    AROM B UEs in sitting to warm up for shoulder flexion and abduction 1 10   minimal cues for deep breathing throughout. Dynamic standing endurance - dynamic reaching tasks x 6 min  and move red therkortney lunges fwd  10  reps for each foot. Trunk twists with min cues for sustained ABD muscle hold.        Standing at pool windows wiping down with fleece block using BUE - completed all windows    x                       Able to complete without holding onto ledge or walker   Supine Green theraband ex - BUE 1 20 x horiz ABD, diagonals, chest press, no clicking noted in chest.    Biodex in standing at 70  RPM, 2 min fwd, 3 min bwd   X Good tolerance   Seated on large Ball for BUE PRE's using 4# for core strength, UE strength, and endurance:  Bicep curls  Abduction to 90  Chest press outward  PNF patterns using 2#  Trunk rotation holding small weighted burgundy ball arms straight out front 1 15 ea  Cues to keep abdominal tight while on ball   Standing  trunk strengthening ex with yellow theraball holding ball out in front, lifting ball up, trunk twists and then bending to floor 1 10 XX    Green flex bar for forearm strengthening , gross grasp with green theraputty B hands gripping x 1 min 1 15 X Palm up, palm down, twists,    Activities Time    Notes/Comments   Completed dynavision tasks  1 X 60 sec. Mode A   3 x 60 sec Mode B  55  hits. Mild SOB during tasks with pt holding breath at times. Trial 1- 1 sec light speed, 12 misses, - 48 hits. Trial 2 - 2 sec light speed, 63 hits,   Trial 3- 2 sec light speed  68 hits . Ex utilized for reaction timing and endurance B UEs. Specific Interventions Next Treatment: Strengthening, endurance, dynamic standing balance for ADL and IADL tasks, breathing tech during IADL tasks     Activity/Treatment Tolerance:  [x]  Patient tolerated treatment well  []  Patient limited by fatigue  []  Patient limited by pain   []  Patient limited by other medical complications  []  Other:     Assessment: Pt. Progressing - good tolerance for activity today. Mild SOB upon exertion. Pt. Did well with core strengthening ex with physio ball today    Areas for Improvement: impaired activity tolerance, impaired balance, impaired endurance, impaired safety awareness, impaired strength and abdominal- diafragmatic breathing, purse lip breathing tech with IADL tasks. Prognosis: good    Patient Education:    []  HEP/Education Completed: Plan of Care, Goals,  Reviewed using a lighter theraband for HEP as provided to the pt from prior facility  [address] [place] [address] for HEP:   [x]  No new Education completed  []  Reviewed Prior HEP      []  Patient verbalized and/or demonstrated understanding of education provided. []  Patient unable to verbalize and/or demonstrate understanding of education provided.   Will continue education. [x]  Barriers to learning: none    GOALS:  Patient Goal: I want better balance to be able to do daily tasks without my RW    Short Term Goals:  Time Frame: 10 sessions. *  Patient will demonstrate I knowledge of HEP for improved flexibility and strength for lifting. *  Pt will tolerate an OT session in standing x 15 min with MI and no LOB or rest breaks to improve endurance for homemaking tasks   PT will complete simulated homemaking tasks with no > min cues for proper breathing tech, abdominal- diaphoretic breathing    Pt will complete B UE endurance tasks to complete strengthening ex x 15 min without rest breaks. Long Term Goals:  Time Frame: 8 weeks  *  Patient will improve UEFS to at least 102/104  *  Pt will report completing LE dressing with no UE support for balance and no LOB. Aquiles Sinclair Pt will ambulate throughout the clinic without a device to carry items for homemaking and cooking tasks   PLAN:  Treatment Recommendations: Strengthening, Balance Training, Endurance Training, Neuromuscular Re-education, Home Exercise Program, Patient Education, Safety Education and Training, and Self-Care Education and Training    []  Plan of care initiated. Plan to see patient 3 times per week for 6 weeks to address the treatment planned outlined above.   [x]  Continue with current plan of care  []  Modify plan of care as follows:    []  Hold pending physician visit  []  Discharge    Time In 1050   Time Out 1122   Timed Code Minutes: 32 min   Total Treatment Time: 32  min       Electronically Signed by:  SHIMON Kay OTR/L [id number]

## 2022-05-03 ENCOUNTER — HOSPITAL ENCOUNTER (OUTPATIENT)
Dept: OCCUPATIONAL THERAPY | Age: 62
Setting detail: THERAPIES SERIES
Discharge: HOME | End: 2022-05-03
Payer: COMMERCIAL

## 2022-05-03 ENCOUNTER — HOSPITAL ENCOUNTER (OUTPATIENT)
Dept: PHYSICAL THERAPY | Age: 62
Setting detail: THERAPIES SERIES
Discharge: HOME | End: 2022-05-03
Payer: COMMERCIAL

## 2022-05-03 PROCEDURE — 97110 THERAPEUTIC EXERCISES: CPT

## 2022-05-03 NOTE — PROGRESS NOTES
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 Functional Outcome Measure Used Walking test for 6 minutes, Cordova Balance   Functional Outcome Score walking test: 614 feet in 6 minutes, perceived exertion 5,  Cordova balance: 41/56 (3/30/22)       Insurance: Primary: Payor: MEDICARE /  /  / ,   Secondary: [facility]   Authorization Information: PRE CERTIFICATION REQUIRED: NO  INSURANCE THERAPY BENEFIT:  ALLOWED 30 PT, AND 30 OT VISITS PER CALENDAR YEAR, NONE OF THESE VISITS HAVE BEEN USED  AQUATIC THERAPY COVERED: YES  MODALITIES COVERED:  YES, YES MASSAGE  TELEHEALTH COVERED: NO  REFERENCE NUMBER: [id number]   Visit # 10, 1/10 for progress note   Visits Allowed: 30   Recertification Date:    Physician Follow-Up: . Physician Orders: PT evaluate and treat 3x per week per 6 weeks, OT evaluate and treat, 3x per week per 6 weeks   History of Present Illness: Patient reports of heart transplant on 2022 and was in [place] for rehab for 17 days. He takes regular BP and today /70. Since he has had his new heart notes less shortness of breath.  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 Notes worse problem right now is weakness in his legs (left >right)  and negotiating stair steps in trilevel home and completing squats. Has to go up stair steps one at a time. Patient uses straight cane at his home for household walking. Uses walker for community ambulation and when outside home. Notes decreased balance. Does have some chest pain and right posterior shoulder and upper back discomfort with pulling up with right arm or supporting through the arm. Patient not to push or pull, no lifting more than 5#s. No pressure on his chest with use of his arms. Patient's endurance is limited with ability to walk 15 min using his walker and use of Elliptical bike at home for 15 minutes. Patient plans to complete cardiac rehab after completing OT/PT. SUBJECTIVE:  Patient states he has an appointment at [facility] tomorrow for a heart biopsy and they will be driving down tonight. Patient denies pain and fatigue at start of therapy session. Patient using walker because of distance to come into therapy     Vitals  Start of session: seated /79 mmHg, pulse 90 bpm,  O2 sats 98%   20 min into session: BP  mmHg, pulse  bpm, O2 sats %    30 minutes into session: /85, pulse 98 bpm, O2 sat 98%   Final end of session: /87 mmHg, pulse 91 bpm, O2 sats 98%     TREATMENT   Precautions: No lifting >5#, no pushing or pulling. HEART TRANSPLANT 2/7/2022. History of back and neck surgery. Pain: 0/10 anterior chest incision/sternal region.      X in shaded column indicates activity completed today   Modalities Parameters/  Location  Notes                     Manual Therapy Time/Technique  Notes                     Exercise/Intervention   Notes   6 min walk test  694.8 feet O2sat 97%, pulse97  Perceived exertion 6   Cordova Balance test 48/56      Bilateral heelraises with cues to wb throug great toes more instead of rolling ankle out  x15       Partial squats with ball between knees on foam x15      3 way hip on foam  x15   BUE on // bars except for hip extension one hand hold; Slow and controlled upright posture   Standing balance, narrow stance eyes closed, tandem stance r/l, l/r with eyes open  (on Foam) 30 seconds each 20 sec- EC  Decreased EC time- pt LOB, CGA required    Rockerboard fwd/back, side to side  15x Balance 30 seconds  X    4 inch step ups with eccentric lowering with tap of opposite foot behind-forward  12x      Step ups 6 inch lat/fwd x15   Progressed to standing on foam    Alternating step taps 6 inch standing on foam 2x 30 sec   1 UE support   NK table  Extension/flexion LLE  7.5#  2x10 X Cues for posture while sitting at NK table to complete exercises   NK table extension/flexion RLE  10#  2x10 X    Total gym squats Level J 2x10  X    Step stance on airex       Heel and toe taps on airex fwd/bwd x12   Mild sway  1UE support    Mairlia step overs X4/2 laps green     Dynamic gait: retro, marching, tandem, sidestepping x2 laps // bars  Added HS curls    SC ambulation 200' In clinic  LLE weaker and tends to lock into place while ambulating. Vcs to avoid sharp turns   SBA- steady    Stair negotiation  4 steps x4      Sit<>stands from chair butt taps with cushion in chair  x12      Gait training without walker     May ambulate without walker in session between stations. SBA   Nu step LE ONLY  6 min Level 5 X    Hydrohiop BLE Load 5 2x10 X           REassessment  30 min      Perceive exertion following PT session 0-10  4        Specific Interventions Next Treatment: balance retraining, strengthening core and LEs left drop foot, LLE weakness of hip and knee, ankle. Gait training with straight cane to no assistive device. Stair step negotiation.  (history of heart transplant 2/7/2022,( history of arachnoiditis, lumbar fusion and cervical fusion)     Activity/Treatment Tolerance:  [x]  Patient tolerated treatment well  []  Patient limited by fatigue   []  Patient limited by pain   []  Patient limited by medical complications  []  Other:     Assessment: Patient tolerate exercises well with minimal fatigue noted at end of session. Patient ambulated throughout clinic without assistive device. Added total gym today with good tolerance. Patient taking rest breaks throughout session as needed for shortness of breath and fatigue. Patient denies pain. Continue to monitor BP and O2 sats throughout session. BP was elevated with activities toward end of session. Body Structures/Functions/Activity Limitations: impaired activity tolerance, impaired balance, impaired endurance, impaired sensation, impaired strength and abnormal gait  Prognosis: good    GOALS:  Patient Goal: to participate in cardiac rehab following PT and be able to have improved activity level tolerance to cook, negotiate stair steps and walk without walker. Short Term Goals:  Time Frame: 4 weeks  1. Patient to demonstrate increased strength LLE: hip flexion 3+ to 4-/5 to 4/5, knee extensors and flexors from 4-/5 to 4+/5, left ankle dorsiflexion from 3/5 3+/5 to 4-/5. With increased stability and improved walking pattern with less high steppage gait with LLE, improved ankle strength with less left foot drop. 2. Patient to negoatiate steps 8 steps without use of handrail with improved balance and strength modified independently. 5. 6 minute walk test with stable vitals with ability to complete distance from 614 feet to 700 feet and perceived exertion from 5 to 3. Long Term Goals:  Time Frame: 8 weeks  1. Cordova Balance test from 48/56 to 52/56    2. 6 minute walk test from 700 feet to 800 feet with perceived exertion <3.     3. Patient to demonstrate independence in HEP with progression in endurance, increased core and LE strength, improved ambulation status to no assistive device with improved walking pattern and balance.        Patient Education:   [x]  HEP/Education Completed: Continue HEP, monitor fatigue, BP, SOB   Medbridge Access Code:  []  No new Education completed  [x]  Reviewed Prior HEP      []  Patient verbalized and/or demonstrated understanding of education provided. []  Patient unable to verbalize and/or demonstrate understanding of education provided. Will continue education. []  Barriers to learning: none    PLAN:  Treatment Recommendations: Strengthening, Balance Training, Functional Mobility Training, Transfer Training, Endurance Training, Gait Training, Stair Training, Neuromuscular Re-education, Home Exercise Program and Patient Education    []  Plan of care initiated. Plan to see patient 3 times per week for 8 weeks to address the treatment planned outlined above.   [x]  Continue with current plan of care  []  Modify plan of care as follows:    []  Hold pending physician visit  []  Discharge    Time In 0830   Time Out 0915   Timed Code Minutes: 45 min   Total Treatment Time: 45 min       Electronically Signed by: Kit Geiger PTA

## 2022-05-03 NOTE — PROGRESS NOTES
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]    Treatment Diagnosis Decreased ADLs, decreased endurance,    Date of Evaluation 3/30/22      Functional Outcome Measure Used FIQ   Functional Outcome Score 94/104 (3/30/22)       Insurance: Primary: Payor: Lavelle Evans /  /  / ,   Secondary: [employer]   Authorization Information: PRE CERTIFICATION REQUIRED: NO  INSURANCE THERAPY BENEFIT:  ALLOWED 30 PT, AND 30 OT VISITS PER CALENDAR YEAR, NONE OF THESE VISITS HAVE BEEN USED  AQUATIC THERAPY COVERED: YES  MODALITIES COVERED:  YES, YES MASSAGE  TELEHEALTH COVERED: NO   Visit # 10, 10/10 for progress note 22   Visits Allowed: 30 visits   Recertification Date:    Physician Follow-Up: 22 to cardiologist   Physician Orders: OT eval and treat   Pertinent History:  Pt was in [facility] from  . In bed x 10 days, then Patient reports of heart transplant on 2022 and was in [place] for rehab for 17 days. He takes regular BP and today /70. Since he has had his new heart notes less shortness of breath. 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 Notes worse problem right now is weakness in his legs (left >right)  and negotiating stair steps in trilevel home and completing squats. Has to go up stair steps one at a time. Patient uses straight cane at his home for household walking.  Uses walker for community ambulation and when outside home. Notes decreased balance. Does have some chest pain and right posterior shoulder and upper back discomfort with pulling up with right arm or supporting through the arm. Patient not to push or pull, no lifting more than 5#s. No pressure on his chest with use of his arms. Patient's endurance is limited with ability to walk 15 min using his walker and use of Elliptical bike at home for 15 minutes. Patient plans to complete cardiac rehab after completing OT/PT. SUBJECTIVE: Pt. Reports he is feeling good. Pt. Feels his legs are weaker than his upper body. Reports of good endurance noted. Pt reports his balance is still off  with dynamic reaching tasks including donning pants. He continues to need to hold on to put pants on over his feet. Pleased with his progress. Start of session readings  147/80  92 BPM, 98%  After exercise , no pain    TREATMENT   Precautions: 5-10 # wt limit due to cardiac  Precautions. Pain:  Reports lower back pain down his legs     X in shaded column indicates Activity Completed Today   Modalities Parameters/  Location  Notes/Comments                     Manual Therapy Time/  Technique  Notes/Comments                     Exercises   Sets/  Sec Reps  Notes/Comments    AROM B UEs in sitting to warm up for shoulder flexion and abduction 1 10   minimal cues for deep breathing throughout. Dynamic standing endurance - dynamic reaching tasks x 6 min  and move red byron joshua fwd  10  reps for each foot. Trunk twists with min cues for sustained ABD muscle hold.        Standing at pool windows wiping down with fleece block using BUE - completed all windows                         Able to complete without holding onto ledge or walker   Supine Green theraband ex - BUE 1 20  horiz ABD, diagonals, chest press, no clicking noted in chest.    Biodex in standing at 70  RPM, 3 min fwd, 3 min bwd   X Good tolerance   Seated on large Ball for BUE PRE's using 3# for core strength, UE strength, and endurance  Chest press outward 3 # wt  Horiz ABD  using 2#  Trunk rotation, no wt,  Full ABD no wt  1 10 ea x Cues to keep abdominal tight while on ball   Standing  trunk strengthening ex with yellow theraball holding ball out in front, lifting ball up, trunk twists and then bending to floor 1 10     Green flex bar for forearm strengthening , gross grasp with green theraputty B hands gripping x 1 min 1 15 X Palm up, palm down, twists,    Activities Time    Notes/Comments   Completed dynavision tasks  1 X 60 sec. Mode A   3 x 60 sec Mode B  55  hits. Mild SOB during tasks with pt holding breath at times. Trial 1- 1 sec light speed, 12 misses, - 48 hits. Trial 2 - 2 sec light speed, 63 hits,   Trial 3- 2 sec light speed  68 hits . Ex utilized for reaction timing and endurance B UEs. Specific Interventions Next Treatment: Strengthening, endurance, dynamic standing balance for ADL and IADL tasks, breathing tech during IADL tasks     Activity/Treatment Tolerance:  [x]  Patient tolerated treatment well  []  Patient limited by fatigue  []  Patient limited by pain   []  Patient limited by other medical complications  []  Other:     Assessment: Pt. Is making steady progress. He met all STGs for OT. He displays increased endurance throughout, standing and dynamic mobility x 10-15 minutes prior to a rest break. OT has been focusing on UE strengthening while adding in core strengthening ex to improve balance and strength needed for dynamic standing and reaching tasks. He is completing light simulate homemaking tasks of reaching for items to shoulder height as well as overhead and carrying items in the clinic without a RW to encourage increased strength and endurance for homemaking tasks, He displays good tolerance for activity today. Mild SOB upon exertion. Pt. Did well with core strengthening ex with physio ball.  He would benefit from additional skilled OT services to address advancing UE strengthening per physician recommendation/ restrictions as well as endurance building / core strengthenign ex to improve independence in LE dressing as well as homemaking tasks  To return to independent functioning. Areas for Improvement: impaired activity tolerance, impaired balance, impaired endurance, impaired safety awareness, impaired strength and abdominal- diafragmatic breathing, purse lip breathing tech with IADL tasks. Prognosis: good    Patient Education:     HEP/Education Completed: Plan of Care, Goals,  Reviewed using a lighter theraband for HEP as provided to the pt from prior facility    [address] for HEP:   [x]  No new Education completed  []  Reviewed Prior HEP      []  Patient verbalized and/or demonstrated understanding of education provided. []  Patient unable to verbalize and/or demonstrate understanding of education provided. Will continue education. [x]  Barriers to learning: none    GOALS:  Patient Goal: I want better balance to be able to do daily tasks without my RW    Short Term Goals:  Time Frame: 10 sessions. *  Patient will demonstrate I knowledge of HEP for improved flexibility and strength for lifting. GOAL MET CONTINUE WITH UPGRADES   *  Pt will tolerate an OT session in standing x 15 min with MI and no LOB or rest breaks to improve endurance for homemaking tasks GOAL MET REVISE GOAL - PT will tolerate an OT session standing x 20 min with independence and no  LOB to improve endurance for homemaking tasks   PT will complete simulated homemaking tasks with no > min cues for proper breathing tech, abdominal- diaphoretic breathing  GOAL MET REVISE GOAL NEW GOAL PT WILL complete simulated homemaking tasks with no cues for proper breathing tech and no cues for safe or adaptive tech    Pt will complete B UE endurance tasks to complete strengthening ex x 15 min without rest breaks.  GOAL MET REVISE GOAL Pt will complete B UE endurance tasks including increased weighted activities as recommended by physician without rest breaks. Long Term Goals:  Time Frame: 8 weeks  *  Patient will improve UEFS to at least 102/104 GOAL NOT MET CONTINUE GOAL   *  Pt will report completing LE dressing with no UE support for balance and no LOB. Jackie Pettit GOAL NOT MET CONTINUE GOAL    Pt will ambulate throughout the clinic without a device to carry items for homemaking and cooking tasks  GOAL NOT MET CONTINUE GOAL  PLAN:  Treatment Recommendations: Strengthening, Balance Training, Endurance Training, Neuromuscular Re-education, Home Exercise Program, Patient Education, Safety Education and Training, and Self-Care Education and Training    []  Plan of care initiated. Plan to see patient 3 times per week for 6 weeks to address the treatment planned outlined above.   [x]  Continue with current plan of care  []  Modify plan of care as follows:    []  Hold pending physician visit  []  Discharge    Time In 0800   Time Out 0830   Timed Code Minutes: 30 min   Total Treatment Time: 30  min       Electronically Signed by:  SHIMON Sanchez OTR/L [id number]

## 2022-05-04 ENCOUNTER — APPOINTMENT (OUTPATIENT)
Dept: OCCUPATIONAL THERAPY | Age: 62
End: 2022-05-04
Payer: COMMERCIAL

## 2022-05-04 ENCOUNTER — APPOINTMENT (OUTPATIENT)
Dept: PHYSICAL THERAPY | Age: 62
End: 2022-05-04
Payer: COMMERCIAL

## 2022-05-06 ENCOUNTER — HOSPITAL ENCOUNTER (OUTPATIENT)
Dept: OCCUPATIONAL THERAPY | Age: 62
Setting detail: THERAPIES SERIES
Discharge: HOME | End: 2022-05-06
Payer: COMMERCIAL

## 2022-05-06 ENCOUNTER — HOSPITAL ENCOUNTER (OUTPATIENT)
Dept: PHYSICAL THERAPY | Age: 62
Setting detail: THERAPIES SERIES
Discharge: HOME | End: 2022-05-06
Payer: COMMERCIAL

## 2022-05-06 PROCEDURE — 97110 THERAPEUTIC EXERCISES: CPT

## 2022-05-06 PROCEDURE — 97112 NEUROMUSCULAR REEDUCATION: CPT

## 2022-05-06 NOTE — PROGRESS NOTES
[address] THERAPY  [] EVALUATION  [] DAILY NOTE (LAND) [] DAILY NOTE (AQUATIC ) [x] PROGRESS NOTE [] DISCHARGE NOTE    [x] OUTPATIENT REHABILITATION CENTER [facility]   [] [place] [address]    [] 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]    Treatment Diagnosis Decreased ADLs, decreased endurance,    Date of Evaluation 3/30/22      Functional Outcome Measure Used FIQ   Functional Outcome Score 94/104 (3/30/22)       Insurance: Primary: Payor: Christianne Stephens /  /  / ,   Secondary: BCBS   Authorization Information: PRE CERTIFICATION REQUIRED: NO  INSURANCE THERAPY BENEFIT:  ALLOWED 30 PT, AND 30 OT VISITS PER CALENDAR YEAR, NONE OF THESE VISITS HAVE BEEN USED  AQUATIC THERAPY COVERED: YES  MODALITIES COVERED:  YES, YES MASSAGE  TELEHEALTH COVERED: NO   Visit # 11, 1/10 for PN;  progress note 22   Visits Allowed: 30 visits   Recertification Date: 3-74-19   Physician Follow-Up: 22 to cardiologist   Physician Orders: OT eval and treat   Pertinent History:  Pt was in [facility] clinic from  . In bed x 10 days, then Patient reports of heart transplant on 2022 and was in [place] for rehab for 17 days. He takes regular BP and today /70. Since he has had his new heart notes less shortness of breath. 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 Notes worse problem right now is weakness in his legs (left >right)  and negotiating stair steps in trilevel home and completing squats. Has to go up stair steps one at a time. Patient uses straight cane at his home for household walking.  Uses walker for community ambulation and when outside home. Notes decreased balance. Does have some chest pain and right posterior shoulder and upper back discomfort with pulling up with right arm or supporting through the arm. Patient not to push or pull, no lifting more than 5#s. No pressure on his chest with use of his arms. Patient's endurance is limited with ability to walk 15 min using his walker and use of Elliptical bike at home for 15 minutes. Patient plans to complete cardiac rehab after completing OT/PT. SUBJECTIVE: Pt. Reports he feels well this morning. Saw Doctor on 5/3/22 and was told his precautions were lifted and for him to \"do what he can do\". TREATMENT   Precautions: Precaution lifted 5/3/22   Pain:  Reports lower back pain down his legs     X in shaded column indicates Activity Completed Today   Modalities Parameters/  Location  Notes/Comments                     Manual Therapy Time/  Technique  Notes/Comments                     Exercises   Sets/  Sec Reps  Notes/Comments    AROM B UEs in sitting to warm up for shoulder flexion and abduction 1 10   minimal cues for deep breathing throughout. Dynamic standing endurance - dynamic reaching tasks x 6 min  and move red byron joshua fwd  10  reps for each foot. Trunk twists with min cues for sustained ABD muscle hold.        Standing at pool windows wiping down with fleece block using BUE - completed all windows                         Able to complete without holding onto ledge or walker   Supine Green theraband ex - BUE 1 20  horiz ABD, diagonals, chest press, no clicking noted in chest.    Biodex in sitting at 70  RPM, 3 min fwd, 3 min bwd   Xx Good tolerance   Seated on large Ball for BUE PRE's using 3# for core strength, UE strength, and endurance  Chest press outward 3 # wt  Horiz ABD  using 2#  Trunk rotation holding burgundy weighted ball out front  Army press 3#  ER with 3# 1 15 ea Xx Cues to keep abdominal tight while on ball; increased from 10 to 15 reps today   Chest press 50#  2 15 Xx    tricep 30# 2  10 Xx    Bicep curl 15 # 2 10 Xx    Standing  trunk strengthening ex with yellow theraball holding ball out in front, lifting ball up, trunk twists and then bending to floor 1 10     Red power web to squeeze and pull bouchra. hands 1 15 Xx    Green flex bar for forearm strengthening , gross grasp with green theraputty B hands gripping x 1 min 1 15 Xx Palm up, palm down, twists,    Activities Time    Notes/Comments   Completed dynavision tasks  1 X 60 sec. Mode A   3 x 60 sec Mode B  55  hits. Mild SOB during tasks with pt holding breath at times. Trial 1- 1 sec light speed, 12 misses, - 48 hits. Trial 2 - 2 sec light speed, 63 hits,   Trial 3- 2 sec light speed  68 hits . Ex utilized for reaction timing and endurance B UEs. Specific Interventions Next Treatment: Strengthening, endurance, dynamic standing balance for ADL and IADL tasks, breathing tech during IADL tasks     Activity/Treatment Tolerance:  [x]  Patient tolerated treatment well  []  Patient limited by fatigue  []  Patient limited by pain   []  Patient limited by other medical complications  []  Other:     Assessment: Pt. Is making steady progress - was able to increase exercise routine this date due to precautions being lifted. Started pt. On exercise machines today for endurance and strength with good tolerance. Areas for Improvement: impaired activity tolerance, impaired balance, impaired endurance, impaired safety awareness, impaired strength and abdominal- diafragmatic breathing, purse lip breathing tech with IADL tasks.      Prognosis: good    Patient Education:     HEP/Education Completed: Plan of Care, Goals,  Reviewed using a lighter theraband for HEP as provided to the pt from prior facility    Chandrakant Pena for HEP:   [x]  No new Education completed  []  Reviewed Prior HEP      []  Patient verbalized and/or demonstrated understanding of education provided. []  Patient unable to verbalize and/or demonstrate understanding of education provided. Will continue education. [x]  Barriers to learning: none    GOALS:  Patient Goal: I want better balance to be able to do daily tasks without my RW    Short Term Goals:  Time Frame: 10 sessions. *  Patient will demonstrate I knowledge of HEP for improved flexibility and strength for lifting. GOAL MET CONTINUE WITH UPGRADES   *  Pt will tolerate an OT session in standing x 15 min with MI and no LOB or rest breaks to improve endurance for homemaking tasks GOAL MET REVISE GOAL - PT will tolerate an OT session standing x 20 min with independence and no  LOB to improve endurance for homemaking tasks   PT will complete simulated homemaking tasks with no > min cues for proper breathing tech, abdominal- diaphoretic breathing  GOAL MET REVISE GOAL NEW GOAL PT WILL complete simulated homemaking tasks with no cues for proper breathing tech and no cues for safe or adaptive tech    Pt will complete B UE endurance tasks to complete strengthening ex x 15 min without rest breaks. GOAL MET REVISE GOAL Pt will complete B UE endurance tasks including increased weighted activities as recommended by physician without rest breaks. Long Term Goals:  Time Frame: 8 weeks  *  Patient will improve UEFS to at least 102/104 GOAL NOT MET CONTINUE GOAL   *  Pt will report completing LE dressing with no UE support for balance and no LOB. Amy Michel GOAL NOT MET CONTINUE GOAL    Pt will ambulate throughout the clinic without a device to carry items for homemaking and cooking tasks  GOAL NOT MET CONTINUE GOAL  PLAN:  Treatment Recommendations: Strengthening, Balance Training, Endurance Training, Neuromuscular Re-education, Home Exercise Program, Patient Education, Safety Education and Training, and Self-Care Education and Training    []  Plan of care initiated.   Plan to see patient 3 times per week for 6 weeks to address the treatment planned outlined above.   [x]  Continue with current plan of care  []  Modify plan of care as follows:    []  Hold pending physician visit  []  Discharge    Time In 0800   Time Out 0830   Timed Code Minutes: 30 min   Total Treatment Time: 30  min       Electronically Signed by:  Christi Catherine, OTR/L [id number]

## 2022-05-06 NOTE — PROGRESS NOTES
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 Functional Outcome Measure Used Walking test for 6 minutes, [place] Balance   Functional Outcome Score walking test: 614 feet in 6 minutes, perceived exertion 5,  Cordova balance: 41/56 (3/30/22)       Insurance: Primary: Payor: Bryant Watson /  /  / ,   Secondary: BC   Authorization Information: PRE CERTIFICATION REQUIRED: NO  INSURANCE THERAPY BENEFIT:  ALLOWED 30 PT, AND 30 OT VISITS PER CALENDAR YEAR, NONE OF THESE VISITS HAVE BEEN USED  AQUATIC THERAPY COVERED: YES  MODALITIES COVERED:  YES, YES MASSAGE  TELEHEALTH COVERED: NO  REFERENCE NUMBER: [id number]   Visit # 11, 2/10 for progress note   Visits Allowed: 30   Recertification Date:    Physician Follow-Up: . Physician Orders: PT evaluate and treat 3x per week per 6 weeks, OT evaluate and treat, 3x per week per 6 weeks   History of Present Illness: Patient reports of heart transplant on 2022 and was in [place] for rehab for 17 days. He takes regular BP and today /70. Since he has had his new heart notes less shortness of breath.  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 Notes worse problem right now is weakness in his legs (left >right)  and negotiating stair steps in trilevel home and completing squats. Has to go up stair steps one at a time. Patient uses straight cane at his home for household walking. Uses walker for community ambulation and when outside home. Notes decreased balance. Does have some chest pain and right posterior shoulder and upper back discomfort with pulling up with right arm or supporting through the arm. Patient not to push or pull, no lifting more than 5#s. No pressure on his chest with use of his arms. Patient's endurance is limited with ability to walk 15 min using his walker and use of Elliptical bike at home for 15 minutes. Patient plans to complete cardiac rehab after completing OT/PT. SUBJECTIVE: Saw the doctor at [facility] for biopsy of his heart. Doctor released no restrictions with weights as comfortable with therapist. Legs not tired yet since not walking a lot this morning. Vitals  Start of session: seated /81 mmHg, pulse 98 bpm,  O2 sats 98%  After Nustep KARSTEN 141/86, pulse 86.   30 min into session:146/77 BP  mmHg,  91pulse  bpm, 98 O2 sats %     Final end of session: /81 mmHg, pulse 88 bpm, O2 sats 97%     TREATMENT   Precautions: No lifting >5#, no pushing or pulling. HEART TRANSPLANT 2/7/2022. History of back and neck surgery. Pain: 0/10 anterior chest incision/sternal region.      X in shaded column indicates activity completed today   Modalities Parameters/  Location  Notes                     Manual Therapy Time/Technique  Notes                     Exercise/Intervention   Notes   6 min walk test  694.8 feet O2sat 97%, pulse97  Perceived exertion 6   [place] Balance test 48/56      Bilateral heelraises with cues to wb throug great toes more instead of rolling ankle out  x15       Partial squats with ball between knees on foam x15  x    3 way hip on foam  x15   BUE on // bars except for hip extension one hand hold; Slow and controlled upright posture   Standing balance, narrow stance eyes closed, tandem stance r/l, l/r with eyes open  (on Foam) 30 seconds each 20 sec- EC x Decreased EC time- pt LOB, CGA required    Rockerboard fwd/back, side to side  15x Balance 30 seconds      4 inch step ups with eccentric lowering with tap of opposite foot behind-forward  12x      Step ups 6 inch lat/fwd x10  x Progressed to standing on foam    Alternating step taps 6 inch standing on foam 2x 30 sec  x 1 UE support   NK table  Extension/flexion LLE  10#  2x10 x Cues for posture while sitting at NK table to complete exercises and proper breath    NK table extension/flexion RLE  12.5#  2x10 x Cues on proper breath   Total gym squats Level J 2x10  x Pillow behind head. Step stance on airex       Heel and toe taps on airex fwd/bwd x12   Mild sway  1UE support    Marilia step overs X4/2 laps green     Dynamic gait: retro, marching, tandem, sidestepping x2 laps // bars  Added HS curls    SC ambulation 200' In clinic  LLE weaker and tends to lock into place while ambulating. Vcs to avoid sharp turns   SBA- steady    Stair negotiation  4 steps x4      Sit<>stands from chair butt taps with cushion in chair  x12      Gait training without walker     May ambulate without walker in session between stations. SBA   Nu step LE ONLY  6 min Level 5 X    Hydrohiop BLE Load 5 2x10     4 way hip  30#  x    REassessment  30 min      Perceive exertion following PT session 0-10  6  x      Specific Interventions Next Treatment: balance retraining, strengthening core and LEs left drop foot, LLE weakness of hip and knee, ankle. Gait training with straight cane to no assistive device. Stair step negotiation.  (history of heart transplant 2/7/2022,( history of arachnoiditis, lumbar fusion and cervical fusion)     Activity/Treatment Tolerance:  [x]  Patient tolerated treatment well  []  Patient limited by fatigue   []  Patient limited by pain   []  Patient limited by medical complications  []  Other:     Assessment: Patient tolerate exercises well with moderate fatigue noted at end of session. PE 6. Patient ambulated throughout clinic without assistive device. Added 4 way hip and increased wt on NK table. Continue to monitor BP and O2 sats throughout session. Cues on proper breath with ex and posture during sessions. Patient progressing well with strength and balance ex. Body Structures/Functions/Activity Limitations: impaired activity tolerance, impaired balance, impaired endurance, impaired sensation, impaired strength and abnormal gait  Prognosis: good    GOALS:  Patient Goal: to participate in cardiac rehab following PT and be able to have improved activity level tolerance to cook, negotiate stair steps and walk without walker. Short Term Goals:  Time Frame: 4 weeks  1. Patient to demonstrate increased strength LLE: hip flexion 3+ to 4-/5 to 4/5, knee extensors and flexors from 4-/5 to 4+/5, left ankle dorsiflexion from 3/5 3+/5 to 4-/5. With increased stability and improved walking pattern with less high steppage gait with LLE, improved ankle strength with less left foot drop. 2. Patient to negoatiate steps 8 steps without use of handrail with improved balance and strength modified independently. 5. 6 minute walk test with stable vitals with ability to complete distance from 614 feet to 700 feet and perceived exertion from 5 to 3. Long Term Goals:  Time Frame: 8 weeks  1. [place] Balance test from 48/56 to 52/56    2. 6 minute walk test from 700 feet to 800 feet with perceived exertion <3.     3. Patient to demonstrate independence in HEP with progression in endurance, increased core and LE strength, improved ambulation status to no assistive device with improved walking pattern and balance.        Patient Education:   [x]  HEP/Education Completed: Continue HEP, monitor fatigue, BP, SOB   Medbridge Access Code:  []  No new Education completed  [x]  Reviewed Prior HEP      []  Patient verbalized and/or demonstrated understanding of education provided. []  Patient unable to verbalize and/or demonstrate understanding of education provided. Will continue education. []  Barriers to learning: none    PLAN:  Treatment Recommendations: Strengthening, Balance Training, Functional Mobility Training, Transfer Training, Endurance Training, Gait Training, Stair Training, Neuromuscular Re-education, Home Exercise Program and Patient Education    []  Plan of care initiated. Plan to see patient 3 times per week for 8 weeks to address the treatment planned outlined above.   [x]  Continue with current plan of care  []  Modify plan of care as follows:    []  Hold pending physician visit  []  Discharge    Time In 0830   Time Out 0914   Timed Code Minutes: 44 min   Total Treatment Time: 44 min       Electronically Signed by: Justice Barakat PT

## 2022-05-09 ENCOUNTER — APPOINTMENT (OUTPATIENT)
Dept: OCCUPATIONAL THERAPY | Age: 62
End: 2022-05-09
Payer: COMMERCIAL

## 2022-05-09 ENCOUNTER — APPOINTMENT (OUTPATIENT)
Dept: PHYSICAL THERAPY | Age: 62
End: 2022-05-09
Payer: COMMERCIAL

## 2022-05-11 ENCOUNTER — HOSPITAL ENCOUNTER (OUTPATIENT)
Dept: PHYSICAL THERAPY | Age: 62
Setting detail: THERAPIES SERIES
Discharge: HOME | End: 2022-05-11
Payer: COMMERCIAL

## 2022-05-11 ENCOUNTER — HOSPITAL ENCOUNTER (OUTPATIENT)
Dept: OCCUPATIONAL THERAPY | Age: 62
Setting detail: THERAPIES SERIES
Discharge: HOME | End: 2022-05-11
Payer: COMMERCIAL

## 2022-05-11 PROCEDURE — 97112 NEUROMUSCULAR REEDUCATION: CPT

## 2022-05-11 PROCEDURE — 97110 THERAPEUTIC EXERCISES: CPT

## 2022-05-11 NOTE — PROGRESS NOTES
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]    Treatment Diagnosis Decreased ADLs, decreased endurance,    Date of Evaluation 3/30/22      Functional Outcome Measure Used FIQ   Functional Outcome Score 94/104 (3/30/22)       Insurance: Primary: Payor: Shaka Castillo /  /  / ,   Secondary: [employer]   Authorization Information: PRE CERTIFICATION REQUIRED: NO  INSURANCE THERAPY BENEFIT:  ALLOWED 30 PT, AND 30 OT VISITS PER CALENDAR YEAR, NONE OF THESE VISITS HAVE BEEN USED  AQUATIC THERAPY COVERED: YES  MODALITIES COVERED:  YES, YES MASSAGE  TELEHEALTH COVERED: NO   Visit # 12, 2/10 for PN;  progress note 22   Visits Allowed: 30 visits   Recertification Date:    Physician Follow-Up: 22 to cardiologist   Physician Orders: OT eval and treat   Pertinent History:  Pt was in [place] clinic from  . In bed x 10 days, then Patient reports of heart transplant on 2022 and was in [place] for rehab for 17 days. He takes regular BP and today /70. Since he has had his new heart notes less shortness of breath. 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 Notes worse problem right now is weakness in his legs (left >right)  and negotiating stair steps in trilevel home and completing squats. Has to go up stair steps one at a time. Patient uses straight cane at his home for household walking.  Uses walker for community ambulation and when outside home. Notes decreased balance. Does have some chest pain and right posterior shoulder and upper back discomfort with pulling up with right arm or supporting through the arm. Patient not to push or pull, no lifting more than 5#s. No pressure on his chest with use of his arms. Patient's endurance is limited with ability to walk 15 min using his walker and use of Elliptical bike at home for 15 minutes. Patient plans to complete cardiac rehab after completing OT/PT. SUBJECTIVE: Pt. Reports he feels well this morning. Saw Doctor on 5/3/22 and was told his precautions were lifted and for him to \"do what he can do\". He feels like he is getting better endurance however fair balance remains. TREATMENT   Precautions: Precaution lifted 5/3/22   Pain:  Reports lower back pain down his legs     X in shaded column indicates Activity Completed Today   Modalities Parameters/  Location  Notes/Comments                     Manual Therapy Time/  Technique  Notes/Comments                     Exercises   Sets/  Sec Reps  Notes/Comments    AROM B UEs in sitting to warm up for shoulder flexion and abduction 1 10   minimal cues for deep breathing throughout. Dynamic standing endurance - dynamic reaching tasks x 6 min  and move green byron joshua fwd  10  . Trunk twists with min cues for sustained ABD muscle hold.    Seated for trunk extension and sh flexion combination    Standing at pool windows wiping down with fleece block using BUE - completed all windows      x           Tolerated 7 reps L LE, 1-0 reps R LE. 2 seated rest breaks       Able to complete without holding onto ledge or walker   Supine Green theraband ex - BUE 1 20  horiz ABD, diagonals, chest press, no clicking noted in chest.    Biodex in sitting at 75  RPM, 2.5 min fwd, 2.5  min bwd   X Good tolerance   Seated on large Ball for BUE PRE's using 3# for core strength, UE strength, and endurance  Chest press outward 3 # wt  Horiz ABD  using 2#  Trunk rotation holding burgundy weighted ball out front  Army press 3#  ER with 3# 1 15 ea  Cues to keep abdominal tight while on ball; increased from 10 to 15 reps today   Chest press 50#  2 15     tricep 30# 2  10     Bicep curl 15 # 2 10     Standing  trunk strengthening ex with yellow theraball holding ball out in front, lifting ball up, trunk twists and then bending to floor 1 10     Gross gripper with 55#  1 15 x    Green flex bar for forearm strengthening , flex, ext, twists  1 15 X Palm up, palm down, twists,    Standing rebounder with 2.5 kg ball 2 10 x 1st trial B feet close, 2nd trial step and toss to challenge balance and UE strength                               Activities Time    Notes/Comments   Completed dynavision tasks  1 X 60 sec. Mode A   3 x 60 sec Mode B  55  hits. Mild SOB during tasks with pt holding breath at times. Trial 1- 1 sec light speed, 12 misses, - 48 hits. Trial 2 - 2 sec light speed, 63 hits,   Trial 3- 2 sec light speed  68 hits . Ex utilized for reaction timing and endurance B UEs. Specific Interventions Next Treatment: Strengthening, endurance, dynamic standing balance for ADL and IADL tasks, breathing tech during IADL tasks     Activity/Treatment Tolerance:  [x]  Patient tolerated treatment well  []  Patient limited by fatigue  []  Patient limited by pain   []  Patient limited by other medical complications  []  Other:     Assessment: Pt. Is making steady progress - was able to increase exercise routine this date due to precautions being lifted. Good tolerance with dynamic balance tasks and challenging UE strengthening. Areas for Improvement: impaired activity tolerance, impaired balance, impaired endurance, impaired safety awareness, impaired strength and abdominal- diafragmatic breathing, purse lip breathing tech with IADL tasks.      Prognosis: good    Patient Education: HEP/Education Completed: Plan of Care, Goals,  Reviewed using a lighter theraband for HEP as provided to the pt from prior facility    Chandrakant Garza [address] for HEP:   [x]  No new Education completed  []  Reviewed Prior HEP      []  Patient verbalized and/or demonstrated understanding of education provided. []  Patient unable to verbalize and/or demonstrate understanding of education provided. Will continue education. [x]  Barriers to learning: none    GOALS:  Patient Goal: I want better balance to be able to do daily tasks without my RW    Short Term Goals:  Time Frame: 10 sessions. *  Patient will demonstrate I knowledge of HEP for improved flexibility and strength for lifting. GOAL MET CONTINUE WITH UPGRADES   *  Pt will tolerate an OT session in standing x 15 min with MI and no LOB or rest breaks to improve endurance for homemaking tasks GOAL MET REVISE GOAL - PT will tolerate an OT session standing x 20 min with independence and no  LOB to improve endurance for homemaking tasks   PT will complete simulated homemaking tasks with no > min cues for proper breathing tech, abdominal- diaphoretic breathing  GOAL MET REVISE GOAL NEW GOAL PT WILL complete simulated homemaking tasks with no cues for proper breathing tech and no cues for safe or adaptive tech    Pt will complete B UE endurance tasks to complete strengthening ex x 15 min without rest breaks. GOAL MET REVISE GOAL Pt will complete B UE endurance tasks including increased weighted activities as recommended by physician without rest breaks. Long Term Goals:  Time Frame: 8 weeks  *  Patient will improve UEFS to at least 102/104 GOAL NOT MET CONTINUE GOAL   *  Pt will report completing LE dressing with no UE support for balance and no LOB. Cesar Malloy GOAL NOT MET CONTINUE GOAL    Pt will ambulate throughout the clinic without a device to carry items for homemaking and cooking tasks  GOAL NOT MET CONTINUE GOAL  PLAN:  Treatment Recommendations: Strengthening, Balance Training, Endurance Training, Neuromuscular Re-education, Home Exercise Program, Patient Education, Safety Education and Training, and Self-Care Education and Training    []  Plan of care initiated. Plan to see patient 3 times per week for 6 weeks to address the treatment planned outlined above.   [x]  Continue with current plan of care  []  Modify plan of care as follows:    []  Hold pending physician visit  []  Discharge    Time In 0920   Time Out 0950   Timed Code Minutes: 30 min   Total Treatment Time: 30  min       Electronically Signed by: SHIMON Mtz OTR/L [id number]

## 2022-05-11 NOTE — PROGRESS NOTES
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 Functional Outcome Measure Used Walking test for 6 minutes, [place] Balance   Functional Outcome Score walking test: 614 feet in 6 minutes, perceived exertion 5,  Cordova balance: 41/56 (3/30/22)       Insurance: Primary: Payor: Darlene Xavier /  /  / ,   Secondary: [facility]   Authorization Information: PRE CERTIFICATION REQUIRED: NO  INSURANCE THERAPY BENEFIT:  ALLOWED 30 PT, AND 30 OT VISITS PER CALENDAR YEAR, NONE OF THESE VISITS HAVE BEEN USED  AQUATIC THERAPY COVERED: YES  MODALITIES COVERED:  YES, YES MASSAGE  TELEHEALTH COVERED: NO  REFERENCE NUMBER: [id number]   Visit # 12, 3/10 for progress note   Visits Allowed: 30   Recertification Date: 3/02/83   Physician Follow-Up: . Physician Orders: PT evaluate and treat 3x per week per 6 weeks, OT evaluate and treat, 3x per week per 6 weeks   History of Present Illness: Patient reports of heart transplant on 2022 and was in [place] for rehab for 17 days. He takes regular BP and today /70. Since he has had his new heart notes less shortness of breath.  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 Notes worse problem right now is weakness in his legs (left >right)  and negotiating stair steps in trilevel home and completing squats. Has to go up stair steps one at a time. Patient uses straight cane at his home for household walking. Uses walker for community ambulation and when outside home. Notes decreased balance. Does have some chest pain and right posterior shoulder and upper back discomfort with pulling up with right arm or supporting through the arm. Patient not to push or pull, no lifting more than 5#s. No pressure on his chest with use of his arms. Patient's endurance is limited with ability to walk 15 min using his walker and use of Elliptical bike at home for 15 minutes. Patient plans to complete cardiac rehab after completing OT/PT. SUBJECTIVE: Patient states he is having pain from the arachnoiditis today but otherwise doing well. Reports he feels good when he is moving. Patient states today he will start taking double his blood pressure meds. Vitals  Start of session: seated /63 mmHg, pulse 98 bpm,  O2 sats 98%    After Nustep: /80, pulse 92, O2 98%  30 min into session: /92 mmHg,  pulse 92 bpm,  O2 sats 99%     Final end of session: /78 mmHg, pulse 92 bpm, O2 sats 97%     TREATMENT   Precautions: No lifting >5#, no pushing or pulling. HEART TRANSPLANT 2/7/2022. History of back and neck surgery. Pain: 0/10 anterior chest incision/sternal region.  7/10 arachnoiditis pain in lower back and legs    X in shaded column indicates activity completed today   Modalities Parameters/  Location  Notes                     Manual Therapy Time/Technique  Notes                     Exercise/Intervention   Notes   6 min walk test  694.8 feet O2sat 97%, pulse97  Perceived exertion 6   Cordova Balance test 48/56      Bilateral heelraises with cues to wb throug great toes more instead of rolling ankle out  x15       Partial squats with ball between knees on foam x15  X    3 way hip on foam  x15   BUE on // bars except for hip extension one hand hold; Slow and controlled upright posture   Standing balance, narrow stance eyes closed, tandem stance r/l, l/r with eyes open  (on Foam) 30 seconds each  X Decreased EC time- pt LOB, CGA required    Rockerboard fwd/back, side to side  15x Balance 30 seconds      4 inch step ups with eccentric lowering with tap of opposite foot behind-forward  12x      Step ups standing on foam 6 inch lat/fwd x10  X    Alternating step taps 6 inch standing on foam 2x 30 sec  X 1 UE support   NK table  Extension/flexion LLE  10#  2x10 X Cues for posture while sitting at NK table to complete exercises and proper breath    NK table extension/flexion RLE  12.5#  2x10 X Cues on proper breath   Total gym squats Level J 2x10  X Pillow behind head. Step stance on airex       Heel and toe taps on airex fwd/bwd x12   Mild sway  1UE support    Marilia step overs X4/2 laps green     Dynamic gait: retro, marching, tandem, sidestepping x2 laps // bars  Added HS curls    SC ambulation 200' In clinic  LLE weaker and tends to lock into place while ambulating. Vcs to avoid sharp turns   SBA- steady    Stair negotiation  4 steps x4      Sit<>stands from chair butt taps with cushion in chair  x12      Gait training without walker     May ambulate without walker in session between stations. SBA   Nu step LE ONLY  6 min Level 5 X    Hydrohiop BLE Load 5 2x10     Multi- hip 4 way hip  30#      REassessment  30 min      Perceive exertion following PT session 0-10  6  X      Specific Interventions Next Treatment: balance retraining, strengthening core and LEs left drop foot, LLE weakness of hip and knee, ankle. Gait training with straight cane to no assistive device. Stair step negotiation.  (history of heart transplant 2/7/2022, (history of arachnoiditis, lumbar fusion and cervical fusion) Activity/Treatment Tolerance:  [x]  Patient tolerated treatment well  []  Patient limited by fatigue   []  Patient limited by pain   []  Patient limited by medical complications  []  Other:     Assessment: Patient needed 3 rest breaks today due to fatigue and arachnoiditis flare up and did not progress program. Monitored blood pressure and O2 during therapy session. Patient ambulated throughout session without assistive device with mild fatigue noted at end of session. Body Structures/Functions/Activity Limitations: impaired activity tolerance, impaired balance, impaired endurance, impaired sensation, impaired strength and abnormal gait  Prognosis: good    GOALS:  Patient Goal: to participate in cardiac rehab following PT and be able to have improved activity level tolerance to cook, negotiate stair steps and walk without walker. Short Term Goals:  Time Frame: 4 weeks  1. Patient to demonstrate increased strength LLE: hip flexion 3+ to 4-/5 to 4/5, knee extensors and flexors from 4-/5 to 4+/5, left ankle dorsiflexion from 3/5 3+/5 to 4-/5. With increased stability and improved walking pattern with less high steppage gait with LLE, improved ankle strength with less left foot drop. 2. Patient to negoatiate steps 8 steps without use of handrail with improved balance and strength modified independently. 3. 6 minute walk test with stable vitals with ability to complete distance from 614 feet to 700 feet and perceived exertion from 5 to 3. Long Term Goals:  Time Frame: 8 weeks  1. Cordova Balance test from 48/56 to 52/56    2. 6 minute walk test from 700 feet to 800 feet with perceived exertion <3.     3. Patient to demonstrate independence in HEP with progression in endurance, increased core and LE strength, improved ambulation status to no assistive device with improved walking pattern and balance.        Patient Education:   [x]  HEP/Education Completed: Continue HEP, monitor fatigue, BP, SOB   [facility] Access Code:  []  No new Education completed  [x]  Reviewed Prior HEP      []  Patient verbalized and/or demonstrated understanding of education provided. []  Patient unable to verbalize and/or demonstrate understanding of education provided. Will continue education. []  Barriers to learning: none    PLAN:  Treatment Recommendations: Strengthening, Balance Training, Functional Mobility Training, Transfer Training, Endurance Training, Gait Training, Stair Training, Neuromuscular Re-education, Home Exercise Program and Patient Education    []  Plan of care initiated. Plan to see patient 3 times per week for 8 weeks to address the treatment planned outlined above.   [x]  Continue with current plan of care  []  Modify plan of care as follows:    []  Hold pending physician visit  []  Discharge    Time In 0830   Time Out 0915   Timed Code Minutes: 45 min   Total Treatment Time: 45 min       Electronically Signed by: Rufino Russell PTA

## 2022-05-13 ENCOUNTER — HOSPITAL ENCOUNTER (OUTPATIENT)
Dept: OCCUPATIONAL THERAPY | Age: 62
Setting detail: THERAPIES SERIES
Discharge: HOME | End: 2022-05-13
Payer: COMMERCIAL

## 2022-05-13 ENCOUNTER — HOSPITAL ENCOUNTER (OUTPATIENT)
Dept: PHYSICAL THERAPY | Age: 62
Setting detail: THERAPIES SERIES
Discharge: HOME | End: 2022-05-13
Payer: COMMERCIAL

## 2022-05-13 ENCOUNTER — HOSPITAL ENCOUNTER (OUTPATIENT)
Dept: MRI IMAGING | Age: 62
Discharge: HOME | End: 2022-05-13
Payer: COMMERCIAL

## 2022-05-13 DIAGNOSIS — M79.672 LEFT FOOT PAIN: ICD-10-CM

## 2022-05-13 DIAGNOSIS — R93.89 ABNORMAL X-RAY: ICD-10-CM

## 2022-05-13 PROCEDURE — 97110 THERAPEUTIC EXERCISES: CPT

## 2022-05-13 PROCEDURE — 97112 NEUROMUSCULAR REEDUCATION: CPT

## 2022-05-13 PROCEDURE — 73718 MRI LOWER EXTREMITY W/O DYE: CPT

## 2022-05-13 NOTE — PROGRESS NOTES
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 Functional Outcome Measure Used Walking test for 6 minutes, Cordova Balance   Functional Outcome Score walking test: 614 feet in 6 minutes, perceived exertion 5,  Cordova balance: 41/56 (3/30/22)       Insurance: Primary: Payor: Selwyn Haile /  /  / ,   Secondary: [facility]   Authorization Information: PRE CERTIFICATION REQUIRED: NO  INSURANCE THERAPY BENEFIT:  ALLOWED 30 PT, AND 30 OT VISITS PER CALENDAR YEAR, NONE OF THESE VISITS HAVE BEEN USED  AQUATIC THERAPY COVERED: YES  MODALITIES COVERED:  YES, YES MASSAGE  TELEHEALTH COVERED: NO  REFERENCE NUMBER: [id number]   Visit # 15, 4/10 for progress note   Visits Allowed: 30   Recertification Date:    Physician Follow-Up: . Physician Orders: PT evaluate and treat 3x per week per 6 weeks, OT evaluate and treat, 3x per week per 6 weeks   History of Present Illness: Patient reports of heart transplant on 2022 and was in [place] for rehab for 17 days. He takes regular BP and today /70. Since he has had his new heart notes less shortness of breath.  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 Notes worse problem right now is weakness in his legs (left >right)  and negotiating stair steps in trilevel home and completing squats. Has to go up stair steps one at a time. Patient uses straight cane at his home for household walking. Uses walker for community ambulation and when outside home. Notes decreased balance. Does have some chest pain and right posterior shoulder and upper back discomfort with pulling up with right arm or supporting through the arm. Patient not to push or pull, no lifting more than 5#s. No pressure on his chest with use of his arms. Patient's endurance is limited with ability to walk 15 min using his walker and use of Elliptical bike at home for 15 minutes. Patient plans to complete cardiac rehab after completing OT/PT. 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 He has dull ache with episodes of giving out of his legs without warning. He has MRI of left foot due to numbness and lump on it. Pain with touching top of left foot. This MRI is scheduled at 1200 pm today. Vitals  Start of session: seated /95 mmHg, pulse 89 bpm,  O2 sats 98%    After Nustep: /77 , pulse 89 , O2 98 %  30 min into session: /75 mmHg,  pulse 93 bpm,  O2 sats 98 %    Final end of session: /85 mmHg, pulse 89 bpm, O2 sats 98%     TREATMENT   Precautions: No lifting restrictions. Per therapist discretion. Conservative management with the exercises for strengthening due to heart transplant and decreasing stress on heart. Proper breath with exercises. HEART TRANSPLANT 2/7/2022. History of back and neck surgery. Pain: 0/10 anterior chest incision/sternal region.  4/10 arachnoiditis pain in lower back and legs    X in shaded column indicates activity completed today   Modalities Parameters/  Location  Notes                     Manual Therapy Time/Technique Notes                     Exercise/Intervention   Notes   6 min walk test  694.8 feet O2sat 97%, pulse97  Perceived exertion 6   Cordova Balance test 48/56      Bilateral heelraises with cues to wb throug great toes more instead of rolling ankle out  x15       Partial squats with ball between knees on foam x15      3 way hip on foam  x15   BUE on // bars except for hip extension one hand hold; Slow and controlled upright posture   Standing balance, narrow stance eyes closed, tandem stance r/l, l/r with eyes open  (on Foam) 30 seconds each   Decreased EC time- pt LOB, CGA required    Rockerboard fwd/back, side to side  15x Balance 30 seconds      4 inch step ups with eccentric lowering with tap of opposite foot behind-forward  12x      Step ups standing on  6 inch lat/fwd x10  x Forward only today   Alternating step taps 6 inch standing  2x 15x   x 1 UE support   NK table  Extension/flexion LLE  10#  2x10  Cues for posture while sitting at NK table to complete exercises and proper breath    NK table extension/flexion RLE  15#  2x10 x Cues on proper breath   Total gym squats Level J 2x10  X Pillow behind head. Step stance on airex       Heel and toe taps on airex fwd/bwd x12   Mild sway  1UE support    Marilia step overs X4/2 laps green x    Dynamic gait: retro, marching, tandem, sidestepping, hamstring curls  x2 laps Taylor rail  x    SC ambulation 200' In clinic  LLE weaker and tends to lock into place while ambulating. Vcs to avoid sharp turns   SBA- steady    Stair negotiation  4 steps x4      Sit<>stands from chair butt taps with cushion in chair  x12      Gait training without walker     May ambulate without walker in session between stations. SBA   Nu step LE ONLY  6 min Level 5 X    Hydrohiop BLE Load 5 2x10     Multi- hip 4 way hip  30# x10 x    REassessment  30 min      Perceive exertion following PT session 0-10  0  x Just legs feeling tired.       Specific Interventions Next Treatment: balance retraining, strengthening core and LEs left drop foot, LLE weakness of hip and knee, ankle. Gait training with straight cane to no assistive device. Stair step negotiation. (history of heart transplant 2/7/2022, (history of arachnoiditis, lumbar fusion and cervical fusion)     Activity/Treatment Tolerance:  [x]  Patient tolerated treatment well  []  Patient limited by fatigue   []  Patient limited by pain   []  Patient limited by medical complications  []  Other:     Assessment: Concentrated on strengthening and dynamic gait for balance and to improve varied walking on different surfaces. Progressing well with strength and balance. Noting continued LLE weakness as compared to RLE. Tolerated ex well with 3 rest breaks today. Body Structures/Functions/Activity Limitations: impaired activity tolerance, impaired balance, impaired endurance, impaired sensation, impaired strength and abnormal gait  Prognosis: good    GOALS:  Patient Goal: to participate in cardiac rehab following PT and be able to have improved activity level tolerance to cook, negotiate stair steps and walk without walker. Short Term Goals:  Time Frame: 4 weeks  1. Patient to demonstrate increased strength LLE: hip flexion 3+ to 4-/5 to 4/5, knee extensors and flexors from 4-/5 to 4+/5, left ankle dorsiflexion from 3/5 3+/5 to 4-/5. With increased stability and improved walking pattern with less high steppage gait with LLE, improved ankle strength with less left foot drop. 2. Patient to negoatiate steps 8 steps without use of handrail with improved balance and strength modified independently. 3. 6 minute walk test with stable vitals with ability to complete distance from 614 feet to 700 feet and perceived exertion from 5 to 3. Long Term Goals:  Time Frame: 8 weeks  1.  Cordova Balance test from 48/56 to 52/56    2. 6 minute walk test from 700 feet to 800 feet with perceived exertion <3.     3. Patient to demonstrate independence in HEP with progression in endurance, increased core and LE strength, improved ambulation status to no assistive device with improved walking pattern and balance. Patient Education:   [x]  HEP/Education Completed: Continue HEP, monitor fatigue, BP, SOB   Medbridge Access Code:  []  No new Education completed  [x]  Reviewed Prior HEP      []  Patient verbalized and/or demonstrated understanding of education provided. []  Patient unable to verbalize and/or demonstrate understanding of education provided. Will continue education. []  Barriers to learning: none    PLAN:  Treatment Recommendations: Strengthening, Balance Training, Functional Mobility Training, Transfer Training, Endurance Training, Gait Training, Stair Training, Neuromuscular Re-education, Home Exercise Program and Patient Education    []  Plan of care initiated. Plan to see patient 3 times per week for 8 weeks to address the treatment planned outlined above.   [x]  Continue with current plan of care  []  Modify plan of care as follows:    []  Hold pending physician visit  []  Discharge    Time In 21    Time Out 0847   Timed Code Minutes: 49   Total Treatment Time: 49       Electronically Signed by: Chela Heath PT

## 2022-05-13 NOTE — PROGRESS NOTES
[address] THERAPY  [] EVALUATION  [] DAILY NOTE (LAND) [] DAILY NOTE (AQUATIC ) [x] PROGRESS NOTE [] DISCHARGE NOTE    [x] OUTPATIENT REHABILITATION CENTER [facility]   [] [place] [address]    [] 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]    Treatment Diagnosis Decreased ADLs, decreased endurance,    Date of Evaluation 3/30/22      Functional Outcome Measure Used FIQ   Functional Outcome Score 94/104 (3/30/22)       Insurance: Primary: Payor: MEDICARE /  /  / ,   Secondary: [employer]   Authorization Information: PRE CERTIFICATION REQUIRED: NO  INSURANCE THERAPY BENEFIT:  ALLOWED 30 PT, AND 30 OT VISITS PER CALENDAR YEAR, NONE OF THESE VISITS HAVE BEEN USED  AQUATIC THERAPY COVERED: YES  MODALITIES COVERED:  YES, YES MASSAGE  TELEHEALTH COVERED: NO   Visit # 15, 3/10 for PN;  progress note 22   Visits Allowed: 30 visits   Recertification Date:    Physician Follow-Up: 22 to cardiologist   Physician Orders: OT eval and treat   Pertinent History:  Pt was in [facility] from  . In bed x 10 days, then Patient reports of heart transplant on 2022 and was in [place] for rehab for 17 days. He takes regular BP and today /70. Since he has had his new heart notes less shortness of breath. 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 Notes worse problem right now is weakness in his legs (left >right)  and negotiating stair steps in trilevel home and completing squats. Has to go up stair steps one at a time. Patient uses straight cane at his home for household walking.  Uses walker for community ambulation and when outside home. Notes decreased balance. Does have some chest pain and right posterior shoulder and upper back discomfort with pulling up with right arm or supporting through the arm. Patient not to push or pull, no lifting more than 5#s. No pressure on his chest with use of his arms. Patient's endurance is limited with ability to walk 15 min using his walker and use of Elliptical bike at home for 15 minutes. Patient plans to complete cardiac rehab after completing OT/PT. SUBJECTIVE: Pt. Reports he feels well this morning - still feels his main issues are balance and LE strength. BP at start of session 167/85 pulse 89, O2 98%     TREATMENT   Precautions: Precaution lifted 5/3/22   Pain:  Reports lower back pain down his legs     X in shaded column indicates Activity Completed Today   Modalities Parameters/  Location  Notes/Comments                     Manual Therapy Time/  Technique  Notes/Comments                     Exercises   Sets/  Sec Reps  Notes/Comments    AROM B UEs in sitting to warm up for shoulder flexion and abduction 1 10   minimal cues for deep breathing throughout. Dynamic standing endurance - dynamic reaching tasks x 6 min  and move green theraball, lunges fwd  10  . Trunk twists with min cues for sustained ABD muscle hold.    Seated for trunk extension and sh flexion combination       x           Tolerated 7 reps L LE, 1-0 reps R LE. 2 seated rest breaks          Supine Green theraband ex - BUE 1 20  horiz ABD, diagonals, chest press, no clicking noted in chest.    Biodex in standing at 75  RPM, 2.5 min fwd, 2.5  min bwd   Xx For warm up today   Seated on large [employer] for BUE PRE's using for core strength, UE strength, and endurance:  Shoulder flexion with 1# alternating lifting RUE/LLE, then LUE/RLE  Chest press outward 3 # wt  Horiz ABD  using 2#  Trunk rotation holding green physio ball out front  Army press 3#  ER with 3# 1 15 ea Xx Cues to keep abdominal tight while on ball; Required  for balance on ball with alternating UE/LE    Chest press 60#  1 20 Xx    tricep 30# 1  20 Xx    Bicep curl 20 # 1 20 Xx    Standing  trunk strengthening ex with yellow theraball holding ball out in front, lifting ball up, trunk twists and then bending to floor 1 10     Gross gripper with 55# - bouchra. hands Hold 5 sec 15 Xx    Green flex bar for forearm strengthening , flex, ext, twists  1 15 Xx Palm up, palm down, twists,    Standing rebounder with 2.5 kg ball 2 10  1st trial B feet close, 2nd trial step and toss to challenge balance and UE strength                               Activities Time    Notes/Comments   Completed dynavision tasks  1 X 60 sec. Mode A   3 x 60 sec Mode B  55  hits. Mild SOB during tasks with pt holding breath at times. Trial 1- 1 sec light speed, 12 misses, - 48 hits. Trial 2 - 2 sec light speed, 63 hits,   Trial 3- 2 sec light speed  68 hits . Ex utilized for reaction timing and endurance B UEs. Specific Interventions Next Treatment: Strengthening, endurance, dynamic standing balance for ADL and IADL tasks, breathing tech during IADL tasks     Activity/Treatment Tolerance:  [x]  Patient tolerated treatment well  []  Patient limited by fatigue  []  Patient limited by pain   []  Patient limited by other medical complications  []  Other:     Assessment: Pt. Is making steady progress - definite balance issue today on ball with lifting alternating UE/LE. Areas for Improvement: impaired activity tolerance, impaired balance, impaired endurance, impaired safety awareness, impaired strength and abdominal- diafragmatic breathing, purse lip breathing tech with IADL tasks.      Prognosis: good    Patient Education:     HEP/Education Completed: Plan of Care, Goals,  Reviewed using a lighter theraband for HEP as provided to the pt from prior facility    Chandrakant Pena for HEP:   [x]  No new Education completed  []  Reviewed Prior HEP      []  Patient verbalized and/or demonstrated understanding of education provided. []  Patient unable to verbalize and/or demonstrate understanding of education provided. Will continue education. [x]  Barriers to learning: none    GOALS:  Patient Goal: I want better balance to be able to do daily tasks without my RW    Short Term Goals:  Time Frame: 10 sessions. *  Patient will demonstrate I knowledge of HEP for improved flexibility and strength for lifting. GOAL MET CONTINUE WITH UPGRADES   *  Pt will tolerate an OT session in standing x 15 min with MI and no LOB or rest breaks to improve endurance for homemaking tasks GOAL MET REVISE GOAL - PT will tolerate an OT session standing x 20 min with independence and no  LOB to improve endurance for homemaking tasks   PT will complete simulated homemaking tasks with no > min cues for proper breathing tech, abdominal- diaphoretic breathing  GOAL MET REVISE GOAL NEW GOAL PT WILL complete simulated homemaking tasks with no cues for proper breathing tech and no cues for safe or adaptive tech    Pt will complete B UE endurance tasks to complete strengthening ex x 15 min without rest breaks. GOAL MET REVISE GOAL Pt will complete B UE endurance tasks including increased weighted activities as recommended by physician without rest breaks. Long Term Goals:  Time Frame: 8 weeks  *  Patient will improve UEFS to at least 102/104 GOAL NOT MET CONTINUE GOAL   *  Pt will report completing LE dressing with no UE support for balance and no LOB. Kalia Ra GOAL NOT MET CONTINUE GOAL    Pt will ambulate throughout the clinic without a device to carry items for homemaking and cooking tasks  GOAL NOT MET CONTINUE GOAL  PLAN:  Treatment Recommendations: Strengthening, Balance Training, Endurance Training, Neuromuscular Re-education, Home Exercise Program, Patient Education, Safety Education and Training, and Self-Care Education and Training    []  Plan of care initiated. Plan to see patient 3 times per week for 6 weeks to address the treatment planned outlined above.   [x]  Continue with current plan of care  []  Modify plan of care as follows:    []  Hold pending physician visit  []  Discharge    Time In 0845   Time Out 0915   Timed Code Minutes: 30 min   Total Treatment Time: 30  min       Electronically Signed by: Christian Gabriel OTR/BRANDON [id number]

## 2022-05-16 ENCOUNTER — HOSPITAL ENCOUNTER (OUTPATIENT)
Dept: OCCUPATIONAL THERAPY | Age: 62
Setting detail: THERAPIES SERIES
Discharge: HOME | End: 2022-05-16
Payer: COMMERCIAL

## 2022-05-16 ENCOUNTER — HOSPITAL ENCOUNTER (OUTPATIENT)
Dept: PHYSICAL THERAPY | Age: 62
Setting detail: THERAPIES SERIES
Discharge: HOME | End: 2022-05-16
Payer: COMMERCIAL

## 2022-05-16 PROCEDURE — 97110 THERAPEUTIC EXERCISES: CPT

## 2022-05-16 NOTE — PROGRESS NOTES
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 Functional Outcome Measure Used Walking test for 6 minutes, Cordova Balance   Functional Outcome Score walking test: 614 feet in 6 minutes, perceived exertion 5,  Cordova balance: 41/56 (3/30/22)       Insurance: Primary: Payor: Bill Jensen /  /  / ,   Secondary: [facility]   Authorization Information: PRE CERTIFICATION REQUIRED: NO  INSURANCE THERAPY BENEFIT:  ALLOWED 30 PT, AND 30 OT VISITS PER CALENDAR YEAR, NONE OF THESE VISITS HAVE BEEN USED  AQUATIC THERAPY COVERED: YES  MODALITIES COVERED:  YES, YES MASSAGE  TELEHEALTH COVERED: NO  REFERENCE NUMBER: [id number]   Visit # 14, 5/10 for progress note   Visits Allowed: 30   Recertification Date: 38   Physician Follow-Up: . Physician Orders: PT evaluate and treat 3x per week per 6 weeks, OT evaluate and treat, 3x per week per 6 weeks   History of Present Illness: Patient reports of heart transplant on 2022 and was in [place] for rehab for 17 days. He takes regular BP and today /70. Since he has had his new heart notes less shortness of breath.  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 Notes worse problem right now is weakness in his legs (left >right)  and negotiating stair steps in trilevel home and completing squats. Has to go up stair steps one at a time. Patient uses straight cane at his home for household walking. Uses walker for community ambulation and when outside home. Notes decreased balance. Does have some chest pain and right posterior shoulder and upper back discomfort with pulling up with right arm or supporting through the arm. Patient not to push or pull, no lifting more than 5#s. No pressure on his chest with use of his arms. Patient's endurance is limited with ability to walk 15 min using his walker and use of Elliptical bike at home for 15 minutes. Patient plans to complete cardiac rehab after completing OT/PT. SUBJECTIVE: Pt notes feeling tightness today. Notes did receive MRI results back but waiting for confirmation of what is happening from doctor. Vitals  Start of session: seated /95 mmHg, pulse 89 bpm,  O2 sats 98%    After Nustep: /77 , pulse 89 , O2 98 %  30 min into session: /75 mmHg,  pulse 93 bpm,  O2 sats 98 %    Final end of session: /85 mmHg, pulse 89 bpm, O2 sats 98%     TREATMENT   Precautions: No lifting restrictions. Per therapist discretion. Conservative management with the exercises for strengthening due to heart transplant and decreasing stress on heart. Proper breath with exercises. HEART TRANSPLANT 2/7/2022. History of back and neck surgery. Pain: 0/10 anterior chest incision/sternal region.  4/10 arachnoiditis pain in lower back and legs    X in shaded column indicates activity completed today   Modalities Parameters/  Location  Notes                     Manual Therapy Time/Technique  Notes                     Exercise/Intervention   Notes   6 min walk test  694.8 feet O2sat 97%, pulse97  Perceived exertion 6   Cordova Balance test 48/56      Bilateral heelraises with cues to wb throug great toes more instead of rolling ankle out  x15       Partial squats with ball between knees on foam x15      3 way hip on foam  x15   BUE on // bars except for hip extension one hand hold; Slow and controlled upright posture   Standing balance, narrow stance eyes closed, tandem stance r/l, l/r with eyes open  (on Foam) 30 seconds each   Decreased EC time- pt LOB, CGA required    Rockerboard fwd/back, side to side  15x Balance 30 seconds      4 inch step ups with eccentric lowering with tap of opposite foot behind-forward  12x      Step ups standing on  6 inch lat/fwd x12  x Forward only today   Alternating step taps 6 inch standing  2x 15x   x 1 UE support   NK table  Extension/flexion LLE  15#  2x15 x    NK table extension/flexion RLE  15#  2x15 x    Total gym squats Level J 2x10   Pillow behind head. Step stance on airex       Heel and toe taps on airex fwd/bwd x12   Mild sway  1UE support    Marilia step overs X4/2 laps green     Dynamic gait: retro, marching, tandem, sidestepping, hamstring curls  x2 laps Taylor rail  x    SC ambulation 200' In clinic  LLE weaker and tends to lock into place while ambulating. Vcs to avoid sharp turns   SBA- steady    Stair negotiation  4 steps x4      Sit<>stands from chair butt taps with cushion in chair  x12      Gait training without walker     May ambulate without walker in session between stations. SBA   Nu step LE ONLY  6 min Level 5 X    Hydrohiop BLE Load 5 2x10     Multi- hip 4 way hip  30# x12 x    Leg press   calf raises x10  x10 60#  40# X  x Pt noted enjoyed utilizing the leg press over total gym          REassessment  30 min      Perceive exertion following PT session 0-10  0  x Just legs feeling tired. Specific Interventions Next Treatment: balance retraining, strengthening core and LEs left drop foot, LLE weakness of hip and knee, ankle.  Gait training with straight cane to no assistive device. Stair step negotiation. (history of heart transplant 2/7/2022, (history of arachnoiditis, lumbar fusion and cervical fusion)     Activity/Treatment Tolerance:  [x]  Patient tolerated treatment well  []  Patient limited by fatigue   []  Patient limited by pain   []  Patient limited by medical complications  []  Other:     Assessment: Continued therex as stated above with addition of leg press machine. Pt tolerated addition of therex well. Pt stated LE feels weak but slowly improving. Pt noted a decrease in stiffness at conclusion of treatment. Will continue to progress as tolerated by pt per POC. Body Structures/Functions/Activity Limitations: impaired activity tolerance, impaired balance, impaired endurance, impaired sensation, impaired strength and abnormal gait  Prognosis: good    GOALS:  Patient Goal: to participate in cardiac rehab following PT and be able to have improved activity level tolerance to cook, negotiate stair steps and walk without walker. Short Term Goals:  Time Frame: 4 weeks  1. Patient to demonstrate increased strength LLE: hip flexion 3+ to 4-/5 to 4/5, knee extensors and flexors from 4-/5 to 4+/5, left ankle dorsiflexion from 3/5 3+/5 to 4-/5. With increased stability and improved walking pattern with less high steppage gait with LLE, improved ankle strength with less left foot drop. 2. Patient to negoatiate steps 8 steps without use of handrail with improved balance and strength modified independently. 3. 6 minute walk test with stable vitals with ability to complete distance from 614 feet to 700 feet and perceived exertion from 5 to 3. Long Term Goals:  Time Frame: 8 weeks  1.  Cordova Balance test from 48/56 to 52/56    2. 6 minute walk test from 700 feet to 800 feet with perceived exertion <3.     3. Patient to demonstrate independence in HEP with progression in endurance, increased core and LE strength, improved ambulation status to no assistive device with improved walking pattern and balance. Patient Education:   [x]  HEP/Education Completed: Continue HEP, monitor fatigue, BP, SOB   Medbridge Access Code:  []  No new Education completed  [x]  Reviewed Prior HEP      []  Patient verbalized and/or demonstrated understanding of education provided. []  Patient unable to verbalize and/or demonstrate understanding of education provided. Will continue education. []  Barriers to learning: none    PLAN:  Treatment Recommendations: Strengthening, Balance Training, Functional Mobility Training, Transfer Training, Endurance Training, Gait Training, Stair Training, Neuromuscular Re-education, Home Exercise Program and Patient Education    []  Plan of care initiated. Plan to see patient 3 times per week for 8 weeks to address the treatment planned outlined above.   [x]  Continue with current plan of care  []  Modify plan of care as follows:    []  Hold pending physician visit  []  Discharge    Time In 0800   Time Out 0842   Timed Code Minutes: 42   Total Treatment Time: 42       Electronically Signed by: Kris Grier PTA

## 2022-05-16 NOTE — PROGRESS NOTES
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]    Treatment Diagnosis Decreased ADLs, decreased endurance,    Date of Evaluation 3/30/22      Functional Outcome Measure Used FIQ   Functional Outcome Score 94/104 (3/30/22)       Insurance: Primary: Payor: MEDICARE /  /  / ,   Secondary: [employer]   Authorization Information: PRE CERTIFICATION REQUIRED: NO  INSURANCE THERAPY BENEFIT:  ALLOWED 30 PT, AND 30 OT VISITS PER CALENDAR YEAR, NONE OF THESE VISITS HAVE BEEN USED  AQUATIC THERAPY COVERED: YES  MODALITIES COVERED:  YES, YES MASSAGE  TELEHEALTH COVERED: NO   Visit # 14, 4/10 for PN;  progress note 22   Visits Allowed: 30 visits   Recertification Date:    Physician Follow-Up: 22 to cardiologist   Physician Orders: OT eval and treat   Pertinent History:  Pt was in [facility] from  . In bed x 10 days, then Patient reports of heart transplant on 2022 and was in [place] for rehab for 17 days. He takes regular BP and today /70. Since he has had his new heart notes less shortness of breath. 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 Notes worse problem right now is weakness in his legs (left >right)  and negotiating stair steps in trilevel home and completing squats. Has to go up stair steps one at a time. Patient uses straight cane at his home for household walking.  Uses walker for community ambulation and when outside home. Notes decreased balance. Does have some chest pain and right posterior shoulder and upper back discomfort with pulling up with right arm or supporting through the arm. Patient not to push or pull, no lifting more than 5#s. No pressure on his chest with use of his arms. Patient's endurance is limited with ability to walk 15 min using his walker and use of Elliptical bike at home for 15 minutes. Patient plans to complete cardiac rehab after completing OT/PT. SUBJECTIVE: Pt. Reports he feels well this morning - still feels his main issues are balance and LE strength. Min fatigued from PT session. Alternated standing an dynamic mobility with UE strengthening ex. BP at start of session 167/85 pulse 89, O2 98%     TREATMENT   Precautions: Precaution lifted 5/3/22   Pain:  Reports lower back pain down his legs     X in shaded column indicates Activity Completed Today   Modalities Parameters/  Location  Notes/Comments                     Manual Therapy Time/  Technique  Notes/Comments                     Exercises   Sets/  Sec Reps  Notes/Comments    AROM B UEs in sitting to warm up for shoulder flexion and abduction 1 10   minimal cues for deep breathing throughout. Dynamic standing endurance - dynamic reaching tasks    Walking lunges fwd  And combo chest press with yellow theraball. . Standing  Trunk twists with min cues for sustained ABD muscle hold yellow theraball. Seated for trunk extension and sh flexion combination       x           Tolerated 8 reps lunges fwd, 10 reps additional trunk ex.   1 seated rest breaks          Supine Green theraband ex - BUE 1 20  horiz ABD, diagonals, chest press, no clicking noted in chest.    Biodex in standing at 75  RPM, 3.5 min fwd, 3.5  min bwd   X For warm up today   Seated on large Blue Ball for BUE PRE's using for core strength, UE strength, and endurance:  Shoulder flexion with 1# alternating lifting RUE/LLE, then LUE/RLE  Chest press outward 3 # wt  Horiz ABD  using 2#  Trunk rotation holding green physio ball out front  Army press 3#  ER with 3# 1 15 ea  Cues to keep abdominal tight while on ball; Required  for balance on ball with alternating UE/LE    Chest press 60#  2 15 X    tricep 30# 2  15 X    Lat pull down 30# 2 15 x    Bicep curl 20 # 2 15 X    Standing  trunk strengthening ex with yellow theraball holding ball out in front, lifting ball up, trunk twists and then bending to floor 1 10     Gross gripper with 55# - bouchra. hands Hold 5 sec 15     Green flex bar for forearm strengthening , flex, ext, twists  1 15  Palm up, palm down, twists,    Standing rebounder with 2.5 kg ball 2 10  1st trial B feet close, 2nd trial step and toss to challenge balance and UE strength                               Activities Time    Notes/Comments   Completed dynavision tasks  1 X 60 sec. Mode A   3 x 60 sec Mode B  55  hits. Mild SOB during tasks with pt holding breath at times. Trial 1- 1 sec light speed, 12 misses, - 48 hits. Trial 2 - 2 sec light speed, 63 hits,   Trial 3- 2 sec light speed  68 hits . Ex utilized for reaction timing and endurance B UEs. Specific Interventions Next Treatment: Strengthening, endurance, dynamic standing balance for ADL and IADL tasks, breathing tech during IADL tasks     Activity/Treatment Tolerance:  [x]  Patient tolerated treatment well  []  Patient limited by fatigue  []  Patient limited by pain   []  Patient limited by other medical complications  []  Other:     Assessment: Pt. Is making steady progress -  Improvements made with endurance . Areas for Improvement: impaired activity tolerance, impaired balance, impaired endurance, impaired safety awareness, impaired strength and abdominal- diafragmatic breathing, purse lip breathing tech with IADL tasks.      Prognosis: good    Patient Education:     HEP/Education Completed: Plan of Care, Goals,  Reviewed using a lighter theraband for HEP as provided to the pt from prior facility    Chandrakant Garza [address] for HEP:   [x]  No new Education completed  []  Reviewed Prior HEP      []  Patient verbalized and/or demonstrated understanding of education provided. []  Patient unable to verbalize and/or demonstrate understanding of education provided. Will continue education. [x]  Barriers to learning: none    GOALS:  Patient Goal: I want better balance to be able to do daily tasks without my RW    Short Term Goals:  Time Frame: 10 sessions. *  Patient will demonstrate I knowledge of HEP for improved flexibility and strength for lifting. GOAL MET CONTINUE WITH UPGRADES   *  Pt will tolerate an OT session in standing x 15 min with MI and no LOB or rest breaks to improve endurance for homemaking tasks GOAL MET REVISE GOAL - PT will tolerate an OT session standing x 20 min with independence and no  LOB to improve endurance for homemaking tasks   PT will complete simulated homemaking tasks with no > min cues for proper breathing tech, abdominal- diaphoretic breathing  GOAL MET REVISE GOAL NEW GOAL PT WILL complete simulated homemaking tasks with no cues for proper breathing tech and no cues for safe or adaptive tech    Pt will complete B UE endurance tasks to complete strengthening ex x 15 min without rest breaks. GOAL MET REVISE GOAL Pt will complete B UE endurance tasks including increased weighted activities as recommended by physician without rest breaks. Long Term Goals:  Time Frame: 8 weeks  *  Patient will improve UEFS to at least 102/104 GOAL NOT MET CONTINUE GOAL   *  Pt will report completing LE dressing with no UE support for balance and no LOB. Nilo Magic GOAL NOT MET CONTINUE GOAL    Pt will ambulate throughout the clinic without a device to carry items for homemaking and cooking tasks  GOAL NOT MET CONTINUE GOAL  PLAN:  Treatment Recommendations: Strengthening, Balance Training, Endurance Training, Neuromuscular Re-education, Home Exercise Program, Patient Education, Safety Education and Training, and Self-Care Education and Training    []  Plan of care initiated. Plan to see patient 3 times per week for 6 weeks to address the treatment planned outlined above.   [x]  Continue with current plan of care  []  Modify plan of care as follows:    []  Hold pending physician visit  []  Discharge    Time In 0848   Time Out 0918   Timed Code Minutes: 30 min   Total Treatment Time: 30  min       Electronically Signed by: SHIMON Kay OTR/L [id number]

## 2022-05-18 ENCOUNTER — HOSPITAL ENCOUNTER (OUTPATIENT)
Dept: PHYSICAL THERAPY | Age: 62
Setting detail: THERAPIES SERIES
Discharge: HOME | End: 2022-05-18
Payer: COMMERCIAL

## 2022-05-18 ENCOUNTER — HOSPITAL ENCOUNTER (OUTPATIENT)
Dept: OCCUPATIONAL THERAPY | Age: 62
Setting detail: THERAPIES SERIES
Discharge: HOME | End: 2022-05-18
Payer: COMMERCIAL

## 2022-05-18 PROCEDURE — 97110 THERAPEUTIC EXERCISES: CPT

## 2022-05-18 NOTE — PROGRESS NOTES
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]    Treatment Diagnosis Decreased ADLs, decreased endurance,    Date of Evaluation 3/30/22      Functional Outcome Measure Used FIQ   Functional Outcome Score 94/104 (3/30/22)       Insurance: Primary: Payor: MEDICARE /  /  / ,   Secondary: BCBS   Authorization Information: PRE CERTIFICATION REQUIRED: NO  INSURANCE THERAPY BENEFIT:  ALLOWED 30 PT, AND 30 OT VISITS PER CALENDAR YEAR, NONE OF THESE VISITS HAVE BEEN USED  AQUATIC THERAPY COVERED: YES  MODALITIES COVERED:  YES, YES MASSAGE  TELEHEALTH COVERED: NO   Visit # 16, 6/10 for PN;  progress note 22   Visits Allowed: 30 visits   Recertification Date:    Physician Follow-Up: 22 to cardiologist   Physician Orders: OT eval and treat   Pertinent History:  Pt was in [email or web address] clinic from  . In bed x 10 days, then Patient reports of heart transplant on 2022 and was in [place] for rehab for 17 days. He takes regular BP and today /70. Since he has had his new heart notes less shortness of breath. 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 Notes worse problem right now is weakness in his legs (left >right)  and negotiating stair steps in trilevel home and completing squats. Has to go up stair steps one at a time. Patient uses straight cane at his home for household walking.  Uses walker for community ambulation and when outside home. Notes decreased balance. Does have some chest pain and right posterior shoulder and upper back discomfort with pulling up with right arm or supporting through the arm. Patient not to push or pull, no lifting more than 5#s. No pressure on his chest with use of his arms. Patient's endurance is limited with ability to walk 15 min using his walker and use of Elliptical bike at home for 15 minutes. Patient plans to complete cardiac rehab after completing OT/PT. SUBJECTIVE: Pt. Motivated. Neck was sore , potentially from pulling down - lat pull down last session. BP at end of session 147/78 pulse 91, O2 98%     TREATMENT   Precautions: Precaution lifted 5/3/22   Pain:  Reports lower back pain down his legs     X in shaded column indicates Activity Completed Today   Modalities Parameters/  Location  Notes/Comments                     Manual Therapy Time/  Technique  Notes/Comments                     Exercises   Sets/  Sec Reps  Notes/Comments    AROM B UEs in sitting to warm up for shoulder flexion and abduction 1 10   minimal cues for deep breathing throughout. Dynamic standing endurance - dynamic reaching tasks    Walking lunges fwd  And combo chest press with yellow theraball. . Standing  Trunk twists with min cues for sustained ABD muscle hold yellow theraball. Seated for trunk extension and sh flexion combination                  Tolerated 8 reps lunges fwd, 10 reps additional trunk ex.   1 seated rest breaks          Supine Green theraband ex - BUE 1 20  horiz ABD, diagonals, chest press, no clicking noted in chest.    Biodex in sitting at 75  RPM, 3.5 min fwd, 3.5  min bwd   X For warm up today   Seated on large [employer] for BUE PRE's using for core strength, UE strength, and endurance:  Shoulder flexion with 1# alternating lifting RUE/LLE, then LUE/RLE  Chest press outward 3 # wt  Horiz ABD  using 2#  Trunk rotation holding green physio ball out front  Army press 3#  ER with 3# 1 15 ea  Cues to keep abdominal tight while on ball; Required  for balance on ball with alternating UE/LE    Chest press 60#  2 15 X    tricep 30# 2  15 X    Lat pull down 30# 2 15     Bicep curl 20 # 2 15 X    Standing  trunk strengthening ex with yellow theraball holding ball out in front, lifting ball up, trunk twists and then bending to floor 1 10     Gross gripper with 55# - bouchra. hands Hold 5 sec 15     Green flex bar for forearm strengthening , flex, ext, twists  1 15  Palm up, palm down, twists,    Standing rebounder with 2.5 kg ball 2 10  1st trial B feet close, 2nd trial step and toss to challenge balance and UE strength   Modified michael ex leaning over the table  I, T, Y, extension, 2 # rows 1 10 x                         Activities Time    Notes/Comments   Completed dynavision tasks  1 X 60 sec. Mode A   3 x 60 sec Mode B  55  hits. Mild SOB during tasks with pt holding breath at times. Trial 1- 1 sec light speed, 12 misses, - 48 hits. Trial 2 - 2 sec light speed, 63 hits,   Trial 3- 2 sec light speed  68 hits . Ex utilized for reaction timing and endurance B UEs. Specific Interventions Next Treatment: Strengthening, endurance, dynamic standing balance for ADL and IADL tasks, breathing tech during IADL tasks     Activity/Treatment Tolerance:  [x]  Patient tolerated treatment well  []  Patient limited by fatigue  []  Patient limited by pain   []  Patient limited by other medical complications  []  Other:     Assessment: Pt. Is making steady progress -  Improvements made with endurance . Areas for Improvement: impaired activity tolerance, impaired balance, impaired endurance, impaired safety awareness, impaired strength and abdominal- diafragmatic breathing, purse lip breathing tech with IADL tasks.      Prognosis: good    Patient Education:     HEP/Education Completed: Plan of Care, Goals,  Reviewed using a lighter theraband for HEP as provided to the pt from prior facility    Chandrakant Garza [address] for HEP:   [x]  No new Education completed  []  Reviewed Prior HEP      []  Patient verbalized and/or demonstrated understanding of education provided. []  Patient unable to verbalize and/or demonstrate understanding of education provided. Will continue education. [x]  Barriers to learning: none    GOALS:  Patient Goal: I want better balance to be able to do daily tasks without my RW    Short Term Goals:  Time Frame: 10 sessions. *  Patient will demonstrate I knowledge of HEP for improved flexibility and strength for lifting. GOAL MET CONTINUE WITH UPGRADES   *  Pt will tolerate an OT session in standing x 15 min with MI and no LOB or rest breaks to improve endurance for homemaking tasks GOAL MET REVISE GOAL - PT will tolerate an OT session standing x 20 min with independence and no  LOB to improve endurance for homemaking tasks   PT will complete simulated homemaking tasks with no > min cues for proper breathing tech, abdominal- diaphoretic breathing  GOAL MET REVISE GOAL NEW GOAL PT WILL complete simulated homemaking tasks with no cues for proper breathing tech and no cues for safe or adaptive tech    Pt will complete B UE endurance tasks to complete strengthening ex x 15 min without rest breaks. GOAL MET REVISE GOAL Pt will complete B UE endurance tasks including increased weighted activities as recommended by physician without rest breaks. Long Term Goals:  Time Frame: 8 weeks  *  Patient will improve UEFS to at least 102/104 GOAL NOT MET CONTINUE GOAL   *  Pt will report completing LE dressing with no UE support for balance and no LOB. [place] Stands GOAL NOT MET CONTINUE GOAL    Pt will ambulate throughout the clinic without a device to carry items for homemaking and cooking tasks  GOAL NOT MET CONTINUE GOAL  PLAN:  Treatment Recommendations: Strengthening, Balance Training, Endurance Training, Neuromuscular Re-education, Home Exercise Program, Patient Education, Safety Education and Training, and Self-Care Education and Training    []  Plan of care initiated. Plan to see patient 3 times per week for 6 weeks to address the treatment planned outlined above.   [x]  Continue with current plan of care  []  Modify plan of care as follows:    []  Hold pending physician visit  []  Discharge    Time In 0848   Time Out 0915   Timed Code Minutes: 27 min   Total Treatment Time: 27  min       Electronically Signed by: SHIMON Tran OTR/L [id number]

## 2022-05-20 ENCOUNTER — HOSPITAL ENCOUNTER (OUTPATIENT)
Dept: PHYSICAL THERAPY | Age: 62
Setting detail: THERAPIES SERIES
Discharge: HOME | End: 2022-05-20
Payer: COMMERCIAL

## 2022-05-20 ENCOUNTER — HOSPITAL ENCOUNTER (OUTPATIENT)
Dept: OCCUPATIONAL THERAPY | Age: 62
Setting detail: THERAPIES SERIES
Discharge: HOME | End: 2022-05-20
Payer: COMMERCIAL

## 2022-05-20 PROCEDURE — 97116 GAIT TRAINING THERAPY: CPT

## 2022-05-20 PROCEDURE — 97110 THERAPEUTIC EXERCISES: CPT

## 2022-05-20 PROCEDURE — 97112 NEUROMUSCULAR REEDUCATION: CPT

## 2022-05-20 NOTE — PROGRESS NOTES
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 Functional Outcome Measure Used Walking test for 6 minutes, [place] Balance   Functional Outcome Score walking test: 614 feet in 6 minutes, perceived exertion 5,  Cordova balance: 41/56 (3/30/22)       Insurance: Primary: Payor: Selwyn Haile /  /  / ,   Secondary: [facility]   Authorization Information: PRE CERTIFICATION REQUIRED: NO  INSURANCE THERAPY BENEFIT:  ALLOWED 30 PT, AND 30 OT VISITS PER CALENDAR YEAR, NONE OF THESE VISITS HAVE BEEN USED  AQUATIC THERAPY COVERED: YES  MODALITIES COVERED:  YES, YES MASSAGE  TELEHEALTH COVERED: NO  REFERENCE NUMBER: [id number]   Visit # 16, 7/10 for progress note   Visits Allowed: 30   Recertification Date:    Physician Follow-Up: . Physician Orders: PT evaluate and treat 3x per week per 6 weeks, OT evaluate and treat, 3x per week per 6 weeks   History of Present Illness: Patient reports of heart transplant on 2022 and was in [place] for rehab for 17 days. He takes regular BP and today /70. Since he has had his new heart notes less shortness of breath.  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 Notes worse problem right now is weakness in his legs (left >right)  and negotiating stair steps in trilevel home and completing squats. Has to go up stair steps one at a time. Patient uses straight cane at his home for household walking. Uses walker for community ambulation and when outside home. Notes decreased balance. Does have some chest pain and right posterior shoulder and upper back discomfort with pulling up with right arm or supporting through the arm. Patient not to push or pull, no lifting more than 5#s. No pressure on his chest with use of his arms. Patient's endurance is limited with ability to walk 15 min using his walker and use of Elliptical bike at home for 15 minutes. Patient plans to complete cardiac rehab after completing OT/PT. SUBJECTIVE: Patient reports that his arachnoiditis continues to be flared up. Noting weakness and pain in his legs. Feels his strength is little better in his legs but progressing slowly. Flared up his neck with Lat pull down in OT. Had to hold on step ups in last PT session due to LEs/arachnoiditis. Vitals  Start of session: seated /83 mmHg, pulse 89 bpm,  O2 sats 97%    After Nustep: /78 , pulse 87 , O2 98 %  30 min into session: /75 mmHg,  pulse 93 bpm,  O2 sats 98 %    Final end of session: /76 mmHg, pulse 92 bpm, O2 sats 97%     TREATMENT   Precautions: No lifting restrictions. Per therapist discretion. Conservative management with the exercises for strengthening due to heart transplant and decreasing stress on heart. Proper breath with exercises. HEART TRANSPLANT 2/7/2022. History of back and neck surgery. Pain: 0/10 anterior chest incision/sternal region.  4/10 arachnoiditis pain in lower back and legs    X in shaded column indicates activity completed today   Modalities Parameters/  Location  Notes                     Manual Therapy Time/Technique  Notes                     Exercise/Intervention   Notes   6 min walk test  694.8 feet O2sat 97%, pulse97  Perceived exertion 6   Cordova Balance test 48/56      Bilateral heelraises with cues to wb throug great toes more instead of rolling ankle out  x15  x     Forward lunges in // bars with one hand hold  5x  x    Partial squats with ball between knees on foam. x15  x    3 way hip on foam  x15  x BUE on // bars except for hip extension one hand hold; Slow and controlled upright posture   Standing balance, narrow stance eyes closed, tandem stance r/l, l/r with eyes open  (on Foam) 30 seconds each  x Decreased EC time- pt LOB, CGA required    Rockerboard fwd/back, side to side  15x Balance 30 seconds  x    4 inch step ups with eccentric lowering with tap of opposite foot behind-forward  12x      Step ups standing on  6 inch lat/fwd x12   Forward only today   Alternating step taps 6 inch standing  2x 15x    1 UE support   NK table  Extension/flexion LLE  15#  2x15     NK table extension/flexion RLE  15#  2x15     Total gym squats Level J 2x10   Pillow behind head. Step stance on airex   x    Heel and toe taps on airex fwd/bwd x12   Mild sway  1UE support    Marilia step overs X4/2 laps green x    Dynamic gait: retro, marching, tandem, sidestepping, hamstring curls  x2 laps // rail  x    SC ambulation 200' In clinic  LLE weaker and tends to lock into place while ambulating. Vcs to avoid sharp turns   SBA- steady    Stair negotiation  4 steps x4      Sit<>stands from chair butt taps with cushion in chair  x12      Gait training without walker     May ambulate without walker in session between stations.  SBA   Nu step LE ONLY  7 min Level 5 X    Hydrohiop BLE Load 5 2x10     Multi- hip 4 way hip  30# x12     Leg press   calf raises 2x10  2x10 60#  40#  Pt noted enjoyed utilizing the leg press over total gym   Weighted walk: fwd, side/side,retro x2 15#  Pt noted feeling challenged  CGA- slight instability noted   Trampoline ball toss: narrow stance, tandem stance x10 each Red ball  Progress to airex NEXT SESSION          REassessment  30 min      Perceive exertion following PT session 0-10  0  x Just legs feeling tired. Specific Interventions Next Treatment: balance retraining, strengthening core and LEs left drop foot, LLE weakness of hip and knee, ankle. Gait training with straight cane to no assistive device. Stair step negotiation. (history of heart transplant 2/7/2022, (history of arachnoiditis, lumbar fusion and cervical fusion)     Activity/Treatment Tolerance:  [x]  Patient tolerated treatment well  []  Patient limited by fatigue   []  Patient limited by pain   []  Patient limited by medical complications  []  Other:     Assessment: Concentrated on balance and dynamic gait today. Did not complete any Nautilus or wt strengthening ex due to flare up of arachnoiditis. Note decreased balance standing on foam with eyes closed and in tandem stance. Rockerboard in neutral also difficult for patient. Patient continues to not ambulate with walker in PT with good balance with walking and no path deviation noted. Body Structures/Functions/Activity Limitations: impaired activity tolerance, impaired balance, impaired endurance, impaired sensation, impaired strength and abnormal gait  Prognosis: good    GOALS:  Patient Goal: to participate in cardiac rehab following PT and be able to have improved activity level tolerance to cook, negotiate stair steps and walk without walker. Short Term Goals:  Time Frame: 4 weeks  1. Patient to demonstrate increased strength LLE: hip flexion 3+ to 4-/5 to 4/5, knee extensors and flexors from 4-/5 to 4+/5, left ankle dorsiflexion from 3/5 3+/5 to 4-/5. With increased stability and improved walking pattern with less high steppage gait with LLE, improved ankle strength with less left foot drop.      2. Patient to negoatiate steps 8 steps without use of handrail with improved balance and strength modified independently. 3. 6 minute walk test with stable vitals with ability to complete distance from 614 feet to 700 feet and perceived exertion from 5 to 3. Long Term Goals:  Time Frame: 8 weeks  1. Cordova Balance test from 48/56 to 52/56    2. 6 minute walk test from 700 feet to 800 feet with perceived exertion <3.     3. Patient to demonstrate independence in HEP with progression in endurance, increased core and LE strength, improved ambulation status to no assistive device with improved walking pattern and balance. Patient Education:   [x]  HEP/Education Completed: Continue HEP, monitor fatigue, BP, SOB   Medbridge Access Code:  []  No new Education completed  [x]  Reviewed Prior HEP      []  Patient verbalized and/or demonstrated understanding of education provided. []  Patient unable to verbalize and/or demonstrate understanding of education provided. Will continue education. []  Barriers to learning: none    PLAN:  Treatment Recommendations: Strengthening, Balance Training, Functional Mobility Training, Transfer Training, Endurance Training, Gait Training, Stair Training, Neuromuscular Re-education, Home Exercise Program and Patient Education    []  Plan of care initiated. Plan to see patient 3 times per week for 8 weeks to address the treatment planned outlined above.   [x]  Continue with current plan of care  []  Modify plan of care as follows:    []  Hold pending physician visit  []  Discharge    Time In 0845   Time Out 0928   Timed Code Minutes: 43   Total Treatment Time: 43       Electronically Signed by: Stefan Hassan, PT

## 2022-05-20 NOTE — PROGRESS NOTES
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]    Treatment Diagnosis Decreased ADLs, decreased endurance,    Date of Evaluation 3/30/22      Functional Outcome Measure Used FIQ   Functional Outcome Score 94/104 (3/30/22)       Insurance: Primary: Payor: MEDICARE /  /  / ,   Secondary: [employer]   Authorization Information: PRE CERTIFICATION REQUIRED: NO  INSURANCE THERAPY BENEFIT:  ALLOWED 30 PT, AND 30 OT VISITS PER CALENDAR YEAR, NONE OF THESE VISITS HAVE BEEN USED  AQUATIC THERAPY COVERED: YES  MODALITIES COVERED:  YES, YES MASSAGE  TELEHEALTH COVERED: NO   Visit # 35, 7/10 for PN;  progress note 22   Visits Allowed: 30 visits   Recertification Date:    Physician Follow-Up: 22 to cardiologist   Physician Orders: OT eval and treat   Pertinent History:  Pt was in [facility] from  . In bed x 10 days, then Patient reports of heart transplant on 2022 and was in [facility] for rehab for 17 days. He takes regular BP and today /70. Since he has had his new heart notes less shortness of breath. 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 Notes worse problem right now is weakness in his legs (left >right)  and negotiating stair steps in trilevel home and completing squats. Has to go up stair steps one at a time. Patient uses straight cane at his home for household walking.  Uses walker for community ambulation and when outside home. Notes decreased balance. Does have some chest pain and right posterior shoulder and upper back discomfort with pulling up with right arm or supporting through the arm. Patient not to push or pull, no lifting more than 5#s. No pressure on his chest with use of his arms. Patient's endurance is limited with ability to walk 15 min using his walker and use of Elliptical bike at home for 15 minutes. Patient plans to complete cardiac rehab after completing OT/PT. SUBJECTIVE: Pt. States he is doing well today    TREATMENT   Precautions: Precaution lifted 5/3/22; NO LAT PULL DOWNS   Pain:  Reports lower back pain down his legs     X in shaded column indicates Activity Completed Today   Modalities Parameters/  Location  Notes/Comments                     Manual Therapy Time/  Technique  Notes/Comments                     Exercises   Sets/  Sec Reps  Notes/Comments    AROM B UEs in sitting to warm up for shoulder flexion and abduction 1 10   minimal cues for deep breathing throughout. Dynamic standing endurance - dynamic reaching tasks    Walking lunges fwd  And combo chest press with yellow theraball. . Standing  Trunk twists with min cues for sustained ABD muscle hold yellow theraball. Seated for trunk extension and sh flexion combination                  Tolerated 8 reps lunges fwd, 10 reps additional trunk ex.   1 seated rest breaks          Supine Green theraband ex - BUE 1 20  horiz ABD, diagonals, chest press, no clicking noted in chest.    Biodex in sitting at 60  RPM, 3 Min fwd, 3  min bwd   XX For warm up today   Seated on large [employer] for BUE PRE's using for core strength, UE strength, and endurance:  Shoulder flexion with 1# alternating lifting RUE/LLE, then LUE/RLE  Chest press outward 3 # wt  Horiz ABD  using 2#  Trunk rotation holding green physio ball out front  Army press 3#  ER with 3# 1 15 ea  Cues to keep abdominal tight while on ball; Required  for balance on ball with alternating UE/LE    Chest press 60#  2 15 XX    tricep 30# 2  15 XX    Bicep curl 20 # 2 15 XX Pt. Winded by end of the 3 nautilus exercises this date. Sat to rest.   Hydrostick BUE for alphabet   XX For UE strength, standing balance and endurance; had to stop for 2 rest breaks    Standing  trunk strengthening ex with yellow theraball holding ball out in front, lifting ball up, trunk twists and then bending to floor 1 10     Gross gripper with 55# - bouchra. hands Hold 5 sec 15     Green flex bar for forearm strengthening , flex, ext, twists  1 15  Palm up, palm down, twists,    Standing rebounder with 2.5 kg ball 2 10  1st trial B feet close, 2nd trial step and toss to challenge balance and UE strength   Modified Wan ex leaning over the table  I, T, Y, extension, 2 # rows 1 10 XX BUE                        Activities Time    Notes/Comments   Completed dynavision tasks  1 X 60 sec. Mode A   3 x 60 sec Mode B  55  hits. Mild SOB during tasks with pt holding breath at times. Trial 1- 1 sec light speed, 12 misses, - 48 hits. Trial 2 - 2 sec light speed, 63 hits,   Trial 3- 2 sec light speed  68 hits . Ex utilized for reaction timing and endurance B UEs. Specific Interventions Next Treatment: Strengthening, endurance, dynamic standing balance for ADL and IADL tasks, breathing tech during IADL tasks     Activity/Treatment Tolerance:  [x]  Patient tolerated treatment well  []  Patient limited by fatigue  []  Patient limited by pain   []  Patient limited by other medical complications  []  Other:     Assessment: Pt. Is making steady progress -  Had to take several rest breaks today     Areas for Improvement: impaired activity tolerance, impaired balance, impaired endurance, impaired safety awareness, impaired strength and abdominal- diafragmatic breathing, purse lip breathing tech with IADL tasks.      Prognosis: good    Patient Education: Strengthening, Balance Training, Endurance Training, Neuromuscular Re-education, Home Exercise Program, Patient Education, Safety Education and Training, and Self-Care Education and Training    []  Plan of care initiated. Plan to see patient 3 times per week for 6 weeks to address the treatment planned outlined above.   [x]  Continue with current plan of care  []  Modify plan of care as follows:    []  Hold pending physician visit  []  Discharge    Time In 0930   Time Out 1000   Timed Code Minutes: 30 min   Total Treatment Time: 30  min       Electronically Signed by: JOMAR Tay/BRANDON [id number]

## 2022-05-23 ENCOUNTER — HOSPITAL ENCOUNTER (OUTPATIENT)
Dept: OCCUPATIONAL THERAPY | Age: 62
Setting detail: THERAPIES SERIES
Discharge: HOME | End: 2022-05-23
Payer: COMMERCIAL

## 2022-05-23 ENCOUNTER — HOSPITAL ENCOUNTER (OUTPATIENT)
Dept: PHYSICAL THERAPY | Age: 62
Setting detail: THERAPIES SERIES
Discharge: HOME | End: 2022-05-23
Payer: COMMERCIAL

## 2022-05-23 PROCEDURE — 97110 THERAPEUTIC EXERCISES: CPT

## 2022-05-23 PROCEDURE — 97112 NEUROMUSCULAR REEDUCATION: CPT

## 2022-05-23 NOTE — PROGRESS NOTES
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]    Treatment Diagnosis Decreased ADLs, decreased endurance,    Date of Evaluation 3/30/22      Functional Outcome Measure Used FIQ   Functional Outcome Score 94/104 (3/30/22)       Insurance: Primary: Payor: Jarett Cardenas /  /  / ,   Secondary: [facility]   Authorization Information: PRE CERTIFICATION REQUIRED: NO  INSURANCE THERAPY BENEFIT:  ALLOWED 30 PT, AND 30 OT VISITS PER CALENDAR YEAR, NONE OF THESE VISITS HAVE BEEN USED  AQUATIC THERAPY COVERED: YES  MODALITIES COVERED:  YES, YES MASSAGE  TELEHEALTH COVERED: NO   Visit # 72, 8/10 for PN;  progress note 22   Visits Allowed: 30 visits   Recertification Date:    Physician Follow-Up: 22 to cardiologist   Physician Orders: OT eval and treat   Pertinent History:  Pt was in [facility] [facility] clinic from  . In bed x 10 days, then Patient reports of heart transplant on 2022 and was in [place] for rehab for 17 days. He takes regular BP and today /70. Since he has had his new heart notes less shortness of breath. 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 Notes worse problem right now is weakness in his legs (left >right)  and negotiating stair steps in trilevel home and completing squats. Has to go up stair steps one at a time. Patient uses straight cane at his home for household walking.  Uses walker for community ambulation and when outside home. Notes decreased balance. Does have some chest pain and right posterior shoulder and upper back discomfort with pulling up with right arm or supporting through the arm. Patient not to push or pull, no lifting more than 5#s. No pressure on his chest with use of his arms. Patient's endurance is limited with ability to walk 15 min using his walker and use of Elliptical bike at home for 15 minutes. Patient plans to complete cardiac rehab after completing OT/PT. SUBJECTIVE: Pt. States he is more concerned with his breathing. He gets more SOB easily . TREATMENT   Precautions: Precaution lifted 5/3/22; NO LAT PULL DOWNS   Pain:  Reports lower back pain down his legs     X in shaded column indicates Activity Completed Today   Modalities Parameters/  Location  Notes/Comments                     Manual Therapy Time/  Technique  Notes/Comments                     Exercises   Sets/  Sec Reps  Notes/Comments    AROM B UEs in standing to warm up for shoulder flexion  1 10 x  warm up     Dynamic standing endurance - dynamic reaching tasks    Walking lunges fwd  And combo chest press with yellow theraball. . Standing  Trunk twists with min cues for sustained ABD muscle hold yellow theraball. Seated for trunk extension and sh flexion combination                  Tolerated 8 reps lunges fwd, 10 reps additional trunk ex.   1 seated rest breaks          Supine Green theraband ex - BUE 1 20  horiz ABD, diagonals, chest press, no clicking noted in chest.    Biodex in sitting at 60  RPM, 3 Min fwd, 3  min bwd    For warm up today   Seated on large [employer] for BUE PRE's using for core strength, UE strength, and endurance:  Shoulder flexion with 1# alternating lifting RUE/LLE, then LUE/RLE  Chest press outward 3 # wt  Horiz ABD  using 2#  Trunk rotation holding green physio ball out front  Army press 3#  ER with 3# 1 15 ea  Cues to keep abdominal tight while on ball; Required  for balance on ball with alternating UE/LE    Chest press 60#  2 20 XX    tricep 30# 2  20  XX    Bicep curl 20 # 2 20 XX Pt. Winded by end of the 3 nautilus exercises this date. Sat to rest.   Hydrostick BUE for alphabet    For UE strength, standing balance and endurance; had to stop for 2 rest breaks    Standing  trunk strengthening ex with yellow theraball holding ball out in front, lifting ball up, trunk twists and then bending to floor 1 10     Gross gripper with 55# - bouchra. hands Hold 5 sec 15     Green flex bar for forearm strengthening , flex, ext, twists  1 15  Palm up, palm down, twists,    Standing rebounder with 2.5 kg ball 3 15 x 1st trial B feet close, 2nd trial step and toss to challenge balance and UE strength   Modified Wan ex leaning over the table  I, T, Y, extension, 2 # rows 1 10  BUE                        Activities Time    Notes/Comments   Completed dynavision tasks  2 x 120 sec Mode B- 2 sec lights. x  2 sec light speed Ex utilized for reaction timing and endurance B UEs. Min SOB with seated rest break after ex                  Specific Interventions Next Treatment: Strengthening, endurance, dynamic standing balance for ADL and IADL tasks, breathing tech during IADL tasks     Activity/Treatment Tolerance:  [x]  Patient tolerated treatment well  []  Patient limited by fatigue  []  Patient limited by pain   []  Patient limited by other medical complications  []  Other:     Assessment: Pt. Is making steady progress -  Had to take several rest breaks today. More SOB upon exertion this AM vs UE strengthening. Areas for Improvement: impaired activity tolerance, impaired balance, impaired endurance, impaired safety awareness, impaired strength and abdominal- diafragmatic breathing, purse lip breathing tech with IADL tasks.      Prognosis: good    Patient Education:     HEP/Education Completed: Plan of Care, Goals,  Reviewed using a lighter theraband for HEP as provided to the pt from prior facility    Chandrakant Pena for HEP:   [x]  No new Education completed  []  Reviewed Prior HEP      []  Patient verbalized and/or demonstrated understanding of education provided. []  Patient unable to verbalize and/or demonstrate understanding of education provided. Will continue education. [x]  Barriers to learning: none    GOALS:  Patient Goal: I want better balance to be able to do daily tasks without my RW    Short Term Goals:  Time Frame: 10 sessions. *  Patient will demonstrate I knowledge of HEP for improved flexibility and strength for lifting. GOAL MET CONTINUE WITH UPGRADES   *  Pt will tolerate an OT session in standing x 15 min with MI and no LOB or rest breaks to improve endurance for homemaking tasks GOAL MET REVISE GOAL - PT will tolerate an OT session standing x 20 min with independence and no  LOB to improve endurance for homemaking tasks   PT will complete simulated homemaking tasks with no > min cues for proper breathing tech, abdominal- diaphoretic breathing  GOAL MET REVISE GOAL NEW GOAL PT WILL complete simulated homemaking tasks with no cues for proper breathing tech and no cues for safe or adaptive tech    Pt will complete B UE endurance tasks to complete strengthening ex x 15 min without rest breaks. GOAL MET REVISE GOAL Pt will complete B UE endurance tasks including increased weighted activities as recommended by physician without rest breaks. Long Term Goals:  Time Frame: 8 weeks  *  Patient will improve UEFS to at least 102/104 GOAL NOT MET CONTINUE GOAL   *  Pt will report completing LE dressing with no UE support for balance and no LOB. [place] GOAL NOT MET CONTINUE GOAL    Pt will ambulate throughout the clinic without a device to carry items for homemaking and cooking tasks  GOAL NOT MET CONTINUE GOAL  PLAN:  Treatment Recommendations: Strengthening, Balance Training, Endurance Training, Neuromuscular Re-education, Home Exercise Program, Patient Education, Safety Education and Training, and Self-Care Education and Training    []  Plan of care initiated. Plan to see patient 3 times per week for 6 weeks to address the treatment planned outlined above.   [x]  Continue with current plan of care  []  Modify plan of care as follows:    []  Hold pending physician visit  []  Discharge    Time In 0830   Time Out 0900   Timed Code Minutes: 30 min   Total Treatment Time: 30  min       Electronically Signed by: SHIMON Rock OTR/L [id number]

## 2022-05-23 NOTE — PROGRESS NOTES
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 Functional Outcome Measure Used Walking test for 6 minutes, Cordova Balance   Functional Outcome Score walking test: 614 feet in 6 minutes, perceived exertion 5,  Cordova balance: 41/56 (3/30/22)       Insurance: Primary: Payor: Shaka Castillo /  /  / ,   Secondary: [facility]   Authorization Information: PRE CERTIFICATION REQUIRED: NO  INSURANCE THERAPY BENEFIT:  ALLOWED 30 PT, AND 30 OT VISITS PER CALENDAR YEAR, NONE OF THESE VISITS HAVE BEEN USED  AQUATIC THERAPY COVERED: YES  MODALITIES COVERED:  YES, YES MASSAGE  TELEHEALTH COVERED: NO  REFERENCE NUMBER: [id number]   Visit # 16, 8/10 for progress note   Visits Allowed: 30   Recertification Date:    Physician Follow-Up: . Physician Orders: PT evaluate and treat 3x per week per 6 weeks, OT evaluate and treat, 3x per week per 6 weeks   History of Present Illness: Patient reports of heart transplant on 2022 and was in [place] for rehab for 17 days. He takes regular BP and today /70. Since he has had his new heart notes less shortness of breath.  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 Notes worse problem right now is weakness in his legs (left >right)  and negotiating stair steps in trilevel home and completing squats. Has to go up stair steps one at a time. Patient uses straight cane at his home for household walking. Uses walker for community ambulation and when outside home. Notes decreased balance. Does have some chest pain and right posterior shoulder and upper back discomfort with pulling up with right arm or supporting through the arm. Patient not to push or pull, no lifting more than 5#s. No pressure on his chest with use of his arms. Patient's endurance is limited with ability to walk 15 min using his walker and use of Elliptical bike at home for 15 minutes. Patient plans to complete cardiac rehab after completing OT/PT. SUBJECTIVE: Patient reports that his BP was low over the weekend. Patient reports that his arachnoiditis has been flared up with LE symptoms. Waiting for physician to get back regarding his MRI last week. Notes pain in legs and back 3/10. Notes symptoms in legs do get better as he exercises but legs do stiffen back up following PT sessions. Reports will be in [facility] [place] on [address] 3 for physician visit and biopsy. Vitals  Start of session: seated /75 mmHg, pulse 95 bpm,  O2 sats 97%    After Nustep: /72 , pulse 97, O2 98 % After leg press : dizziness reported: 130/77, pulse 91, after 4 way hip O2 sat 97 complaining of shortness of breath. 30 min into session: BP  mmHg,  pulse bpm,  O2 sats 98 %    Final end of session: /78 mmHg, pulse 91 bpm, O2 sats 97%     TREATMENT   Precautions: No lifting restrictions. Per therapist discretion. Conservative management with the exercises for strengthening due to heart transplant and decreasing stress on heart. Proper breath with exercises. HEART TRANSPLANT 2/7/2022. History of back and neck surgery.     Pain: 0/10 anterior chest incision/sternal region. 3/10 arachnoiditis pain in lower back and legs    X in shaded column indicates activity completed today   Modalities Parameters/  Location  Notes                     Manual Therapy Time/Technique  Notes                     Exercise/Intervention   Notes   6 min walk test  694.8 feet O2sat 97%, pulse97  Perceived exertion 6   Cordova Balance test 48/56      Bilateral heelraises with cues to wb throug great toes more instead of rolling ankle out  x15       Forward lunges in // bars with one hand hold  5x      Partial squats with ball between knees on foam.marches on foam x15  x Marches on foam only    3 way hip on foam  x15   BUE on // bars except for hip extension one hand hold; Slow and controlled upright posture   Standing balance, narrow stance eyes closed, tandem stance r/l, l/r with eyes open  (on Foam) 30 seconds each  x Decreased EC time- pt LOB, CGA required    Rockerboard fwd/back, side to side  15x Balance 30 seconds      4 inch step ups with eccentric lowering with tap of opposite foot behind-forward  12x      Step ups standing on  6 inch lat/fwd x12   Forward only today   Alternating step taps 6 inch standing  2x 15x    1 UE support   NK table  Extension/flexion LLE  15#  2x15     NK table extension/flexion RLE  15#  2x15     Total gym squats Level J 2x10   Pillow behind head. Step stance on airex       Heel and toe taps on airex fwd/bwd x12   Mild sway  1UE support    Marilia step overs X4/2 laps green     Dynamic gait: retro, marching, tandem, sidestepping, hamstring curls  x2 laps // rail      SC ambulation 200' In clinic  LLE weaker and tends to lock into place while ambulating. Vcs to avoid sharp turns   SBA- steady    Stair negotiation  4 steps x4      Sit<>stands from chair butt taps with cushion in chair  x12      Gait training without walker     May ambulate without walker in session between stations.  SBA   Nu step LE ONLY  7 min Level 5 x    Hydrohiop BLE Load 5 2x10     Multi- hip 4 way hip  30# x10 x Noted shortness of breath following. Leg press   calf raises 2x10  2x10 60#  40# x  x Pt noted enjoyed utilizing the leg press over total gym   Weighted walk: fwd, side/side,retro x2 15#  Pt noted feeling challenged  CGA- slight instability noted   Trampoline ball toss: narrow stance, tandem stance x10 each Red ball  Progress to airex NEXT SESSION          REassessment  30 min      Perceive exertion following PT session 0-10  0  x Just legs feeling tired. Specific Interventions Next Treatment: balance retraining, strengthening core and LEs left drop foot, LLE weakness of hip and knee, ankle. Gait training with straight cane to no assistive device. Stair step negotiation. (history of heart transplant 2/7/2022, (history of arachnoiditis, lumbar fusion and cervical fusion)     Activity/Treatment Tolerance:  []  Patient tolerated treatment well  [x]  Patient limited by fatigue   []  Patient limited by pain   []  Patient limited by medical complications  [x]  Other: shortness of breath and dizziness  Assessment: Exercises paced in session with frequent restbreaks today. Patient more fatigued and having complaints of dizziness, shortness of breath. Noted also increased weakness lower extremitites left >right. BP and O2 sats taken throughout session. Patient to call doctor today and report of his symptoms over weekend and in session today. Body Structures/Functions/Activity Limitations: impaired activity tolerance, impaired balance, impaired endurance, impaired sensation, impaired strength and abnormal gait  Prognosis: good    GOALS:  Patient Goal: to participate in cardiac rehab following PT and be able to have improved activity level tolerance to cook, negotiate stair steps and walk without walker. Short Term Goals:  Time Frame: 4 weeks  1.  Patient to demonstrate increased strength LLE: hip flexion 3+ to 4-/5 to 4/5, knee extensors and flexors from 4-/5 to 4+/5, left ankle dorsiflexion from 3/5 3+/5 to 4-/5. With increased stability and improved walking pattern with less high steppage gait with LLE, improved ankle strength with less left foot drop. 2. Patient to negoatiate steps 8 steps without use of handrail with improved balance and strength modified independently. 3. 6 minute walk test with stable vitals with ability to complete distance from 614 feet to 700 feet and perceived exertion from 5 to 3. Long Term Goals:  Time Frame: 8 weeks  1. Cordova Balance test from 48/56 to 52/56    2. 6 minute walk test from 700 feet to 800 feet with perceived exertion <3.     3. Patient to demonstrate independence in HEP with progression in endurance, increased core and LE strength, improved ambulation status to no assistive device with improved walking pattern and balance. Patient Education:   [x]  HEP/Education Completed: Continue HEP, monitor fatigue, BP, SOB   Medbridge Access Code:  []  No new Education completed  [x]  Reviewed Prior HEP      []  Patient verbalized and/or demonstrated understanding of education provided. []  Patient unable to verbalize and/or demonstrate understanding of education provided. Will continue education. []  Barriers to learning: none    PLAN:  Treatment Recommendations: Strengthening, Balance Training, Functional Mobility Training, Transfer Training, Endurance Training, Gait Training, Stair Training, Neuromuscular Re-education, Home Exercise Program and Patient Education    []  Plan of care initiated. Plan to see patient 3 times per week for 8 weeks to address the treatment planned outlined above.   [x]  Continue with current plan of care  []  Modify plan of care as follows:    []  Hold pending physician visit  []  Discharge    Time In 0903   Time Out 0945   Timed Code Minutes: 42   Total Treatment Time: 42       Electronically Signed by: Chela Heath, PT

## 2022-05-25 ENCOUNTER — HOSPITAL ENCOUNTER (OUTPATIENT)
Dept: OCCUPATIONAL THERAPY | Age: 62
Setting detail: THERAPIES SERIES
Discharge: HOME | End: 2022-05-25
Payer: COMMERCIAL

## 2022-05-25 ENCOUNTER — HOSPITAL ENCOUNTER (OUTPATIENT)
Dept: PHYSICAL THERAPY | Age: 62
Setting detail: THERAPIES SERIES
Discharge: HOME | End: 2022-05-25
Payer: COMMERCIAL

## 2022-05-25 PROCEDURE — 97110 THERAPEUTIC EXERCISES: CPT

## 2022-05-25 NOTE — PROGRESS NOTES
[address] [facility]  PHYSICAL THERAPY  [] EVALUATION  [x] DAILY NOTE (LAND) [] DAILY NOTE (AQUATIC ) [] PROGRESS NOTE [] DISCHARGE NOTE    [x] OUTPATIENT REHABILITATION CENTER [facility]   [] [place] [address]    [] 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 Functional Outcome Measure Used Walking test for 6 minutes, Cordova Balance   Functional Outcome Score walking test: 614 feet in 6 minutes, perceived exertion 5,  Cordova balance: 41/56 (3/30/22)       Insurance: Primary: Payor: Radha Cunningham /  /  / ,   Secondary: [facility]   Authorization Information: PRE CERTIFICATION REQUIRED: NO  INSURANCE THERAPY BENEFIT:  ALLOWED 30 PT, AND 30 OT VISITS PER CALENDAR YEAR, NONE OF THESE VISITS HAVE BEEN USED  AQUATIC THERAPY COVERED: YES  MODALITIES COVERED:  YES, YES MASSAGE  TELEHEALTH COVERED: NO  REFERENCE NUMBER: [id number]   Visit # 18, 9/10 for progress note   Visits Allowed: 30   Recertification Date: 28   Physician Follow-Up: . Physician Orders: PT evaluate and treat 3x per week per 6 weeks, OT evaluate and treat, 3x per week per 6 weeks   History of Present Illness: Patient reports of heart transplant on 2022 and was in [facility] for rehab for 17 days. He takes regular BP and today /70. Since he has had his new heart notes less shortness of breath.  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 Notes worse problem right now is weakness in his legs (left >right)  and negotiating stair steps in trilevel home and completing squats. Has to go up stair steps one at a time. Patient uses straight cane at his home for household walking. Uses walker for community ambulation and when outside home. Notes decreased balance. Does have some chest pain and right posterior shoulder and upper back discomfort with pulling up with right arm or supporting through the arm. Patient not to push or pull, no lifting more than 5#s. No pressure on his chest with use of his arms. Patient's endurance is limited with ability to walk 15 min using his walker and use of Elliptical bike at home for 15 minutes. Patient plans to complete cardiac rehab after completing OT/PT. SUBJECTIVE: Pt notes feeling okay to this date. Reports spoke with transplant nurses regarding his labored breathing and they noted it was alright and to monitor it. Pt denied dizziness at start of treatment. Vitals  Start of session: seated /75 mmHg, pulse 95 bpm,  O2 sats 97%    After Nustep: /72 , pulse 97, O2 98 % After leg press : dizziness reported: 130/77, pulse 91, after 4 way hip O2 sat 97 complaining of shortness of breath. 30 min into session: BP  mmHg,  pulse bpm,  O2 sats 98 %    Final end of session: /78 mmHg, pulse 91 bpm, O2 sats 97%     TREATMENT   Precautions: No lifting restrictions. Per therapist discretion. Conservative management with the exercises for strengthening due to heart transplant and decreasing stress on heart. Proper breath with exercises. HEART TRANSPLANT 2/7/2022. History of back and neck surgery. Pain: 0/10 anterior chest incision/sternal region.  3/10 arachnoiditis pain in lower back and legs    X in shaded column indicates activity completed today   Modalities Parameters/  Location  Notes Manual Therapy Time/Technique  Notes                     Exercise/Intervention   Notes   6 min walk test  694.8 feet O2sat 97%, pulse97  Perceived exertion 6   Cordova Balance test 48/56      Bilateral heelraises with cues to wb throug great toes more instead of rolling ankle out  x15       Forward lunges in // bars with one hand hold  5x      Partial squats with ball between knees on foam.marches on foam x15   Marches on foam only    3 way hip on foam  x15  x BUE on // bars except for hip extension one hand hold; Slow and controlled upright posture   Standing balance, narrow stance eyes closed, tandem stance r/l, l/r with eyes open  (on Foam) 30 seconds each   Decreased EC time- pt LOB, CGA required    Rockerboard fwd/back, side to side  15x Balance 30 seconds      4 inch step ups with eccentric lowering with tap of opposite foot behind-forward  12x      Step ups standing on  6 inch lat/fwd x12  x Forward only today   Alternating step taps 6 inch standing  2x 15x    1 UE support   NK table  Extension/flexion LLE  15#  2x15     NK table extension/flexion RLE  15#  2x15     Total gym squats Level J x15- DL  x10 SL bilat  X  x Pillow behind head. Added bilat SL squats*   Step stance on airex       Heel and toe taps on airex fwd/bwd x12   Mild sway  1UE support    Marilia step overs X4/2 laps green     Dynamic gait: retro, marching, tandem, sidestepping, hamstring curls  x2 laps // rail      SC ambulation 200' In clinic  LLE weaker and tends to lock into place while ambulating. Vcs to avoid sharp turns   SBA- steady    Stair negotiation  4 steps x4      Sit<>stands from chair butt taps with cushion in chair  x12      Gait training without walker     May ambulate without walker in session between stations.  SBA   Nu step LE ONLY  6 min Level 5 x    Hydrohiop BLE Load 5 2x10     Multi- hip 4 way hip  30# x15 x No SOB present    Leg press   calf raises 2x15  2x15 80#  50# x  x Pt noted enjoyed utilizing the leg press over total gym   Weighted walk: fwd, side/side,retro x2 15# x Improved stability-SBA  No SOB present   Trampoline ball toss: narrow stance, tandem stance x10 each Red ball  Progress to airex NEXT SESSION          REassessment  30 min      Perceive exertion following PT session 0-10  0  x Just legs feeling tired. Specific Interventions Next Treatment: balance retraining, strengthening core and LEs left drop foot, LLE weakness of hip and knee, ankle. Gait training with straight cane to no assistive device. Stair step negotiation. (history of heart transplant 2/7/2022, (history of arachnoiditis, lumbar fusion and cervical fusion)     Activity/Treatment Tolerance:  []  Patient tolerated treatment well  [x]  Patient limited by fatigue   []  Patient limited by pain   []  Patient limited by medical complications  [x]  Other: shortness of breath and dizziness  Assessment: Continued therex as stated above with increased resistance throughout. Monitored pts SOB and fatigue throughout to avoid overexertion. Pt expressed emphasis on LE strengthening. Pt required x2 seated rest breaks. Pt demonstrated improved breathing technique during treatment session. Will continue to progress as tolerated by pt per POC. Body Structures/Functions/Activity Limitations: impaired activity tolerance, impaired balance, impaired endurance, impaired sensation, impaired strength and abnormal gait  Prognosis: good    GOALS:  Patient Goal: to participate in cardiac rehab following PT and be able to have improved activity level tolerance to cook, negotiate stair steps and walk without walker. Short Term Goals:  Time Frame: 4 weeks  1. Patient to demonstrate increased strength LLE: hip flexion 3+ to 4-/5 to 4/5, knee extensors and flexors from 4-/5 to 4+/5, left ankle dorsiflexion from 3/5 3+/5 to 4-/5.  With increased stability and improved walking pattern with less high steppage gait with LLE, improved ankle strength with less left foot drop.     2. Patient to negoatiate steps 8 steps without use of handrail with improved balance and strength modified independently. 3. 6 minute walk test with stable vitals with ability to complete distance from 614 feet to 700 feet and perceived exertion from 5 to 3. Long Term Goals:  Time Frame: 8 weeks  1. Cordova Balance test from 48/56 to 52/56    2. 6 minute walk test from 700 feet to 800 feet with perceived exertion <3.     3. Patient to demonstrate independence in HEP with progression in endurance, increased core and LE strength, improved ambulation status to no assistive device with improved walking pattern and balance. Patient Education:   [x]  HEP/Education Completed: Continue HEP, monitor fatigue, BP, SOB  Medbridge Access Code:  []  No new Education completed  [x]  Reviewed Prior HEP      []  Patient verbalized and/or demonstrated understanding of education provided. []  Patient unable to verbalize and/or demonstrate understanding of education provided. Will continue education. []  Barriers to learning: none    PLAN:  Treatment Recommendations: Strengthening, Balance Training, Functional Mobility Training, Transfer Training, Endurance Training, Gait Training, Stair Training, Neuromuscular Re-education, Home Exercise Program and Patient Education    []  Plan of care initiated. Plan to see patient 3 times per week for 8 weeks to address the treatment planned outlined above.   [x]  Continue with current plan of care  []  Modify plan of care as follows:    []  Hold pending physician visit  []  Discharge    Time In 0800   Time Out 0843   Timed Code Minutes: 43   Total Treatment Time: 43       Electronically Signed by: Courtney Cota PTA

## 2022-05-25 NOTE — PROGRESS NOTES
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]    Treatment Diagnosis Decreased ADLs, decreased endurance,    Date of Evaluation 3/30/22      Functional Outcome Measure Used FIQ   Functional Outcome Score 94/104 (3/30/22)       Insurance: Primary: Payor: Kimberley Wyatt /  /  / ,   Secondary: [facility]   Authorization Information: PRE CERTIFICATION REQUIRED: NO  INSURANCE THERAPY BENEFIT:  ALLOWED 30 PT, AND 30 OT VISITS PER CALENDAR YEAR, NONE OF THESE VISITS HAVE BEEN USED  AQUATIC THERAPY COVERED: YES  MODALITIES COVERED:  YES, YES MASSAGE  TELEHEALTH COVERED: NO   Visit # 19, 9/10 for PN;  progress note 22, 22   Visits Allowed: 30 visits   Recertification Date:    Physician Follow-Up: 22 to cardiologist   Physician Orders: OT eval and treat   Pertinent History:  Pt was in [place] clinic from  . In bed x 10 days, then Patient reports of heart transplant on 2022 and was in [place] for rehab for 17 days. He takes regular BP and today /70. Since he has had his new heart notes less shortness of breath. 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 Notes worse problem right now is weakness in his legs (left >right)  and negotiating stair steps in trilevel home and completing squats. Has to go up stair steps one at a time. Patient uses straight cane at his home for household walking. Uses walker for community ambulation and when outside home. Notes decreased balance. Does have some chest pain and right posterior shoulder and upper back discomfort with pulling up with right arm or supporting through the arm. Patient not to push or pull, no lifting more than 5#s. No pressure on his chest with use of his arms. Patient's endurance is limited with ability to walk 15 min using his walker and use of Elliptical bike at home for 15 minutes. Patient plans to complete cardiac rehab after completing OT/PT. SUBJECTIVE: Pt. States he is better with his breathing as the week goes on. He notices improvements in balance and strength. However, he continues to report decreased weakness in R LE due to arachnoiditis. He feels good with strength of UEs. TREATMENT   Precautions: Precaution lifted 5/3/22; NO LAT PULL DOWNS   Pain:  Reports lower back pain down his legs     X in shaded column indicates Activity Completed Today   Modalities Parameters/  Location  Notes/Comments                     Manual Therapy Time/  Technique  Notes/Comments                     Exercises   Sets/  Sec Reps  Notes/Comments    AROM B UEs in standing to warm up for shoulder flexion  1 10   warm up     Dynamic standing endurance - dynamic reaching tasks    Walking lunges fwd  And combo chest press with yellow theraball. . Standing  Trunk twists with min cues for sustained ABD muscle hold yellow theraball. Seated for trunk extension and sh flexion combination                  Tolerated 8 reps lunges fwd, 10 reps additional trunk ex.   1 seated rest breaks          Supine Green theraband ex - BUE 1 20  horiz ABD, diagonals, chest press, no clicking noted in chest.    Biodex in sitting at 60  RPM, 3 Min fwd, 3  min bwd   x For warm up today   Seated on large Blue Ball for BUE PRE's using for core strength, UE strength, and endurance:  Shoulder flexion with 1# alternating lifting RUE/LLE, then LUE/RLE  Chest press outward 3 # wt  Horiz ABD  using 2#  Trunk rotation holding green physio ball out front  Army press 3#  ER with 3# 1 15 ea  Cues to keep abdominal tight while on ball; Required  for balance on ball with alternating UE/LE    Chest press 60#  2 20 X    tricep 30# 2  20  X    Bicep curl 20 # 2 20 X Pt. Winded by end of the 3 nautilus exercises this date. 1 min standing rest break   Hydrostick BUE for alphabet    For UE strength, standing balance and endurance; had to stop for 2 rest breaks    Standing  trunk strengthening ex with yellow theraball holding ball out in front, lifting ball up, trunk twists and then bending to floor 1 10     Gross gripper with 55# - bouchra. hands Hold 5 sec 15     Green flex bar for forearm strengthening , flex, ext, twists  1 15  Palm up, palm down, twists,    Standing rebounder with 2.5 kg ball 3 15  1st trial B feet close, 2nd trial step and toss to challenge balance and UE strength   Modified Wan ex leaning over the table  I, T, Y, extension, 2 # rows 1 10  BUE   Low row 30#  2 15 x                  Activities Time    Notes/Comments   Completed dynavision tasks  2 x 120 sec Mode B- 2 sec lights. 2 sec light speed Ex utilized for reaction timing and endurance B UEs. Min SOB with seated rest break after ex    Simulated homemaking tasks of gathering items for transporting throughout the home 10 min x Pt able to carry a basket of items, up to 6 # weights and 2 # basket around the clinic with independence. Pt stood x 2 trials for short standing rest break. Min fatigue noted. Pt able to balance cones with ball on top with ambulation, good tolerance.             Specific Interventions Next Treatment: Strengthening, endurance, dynamic standing balance for ADL and IADL tasks, breathing tech during IADL tasks     Activity/Treatment Tolerance:  [x]  Patient tolerated treatment well  []  Patient limited by fatigue  []  Patient limited by pain   []  Patient limited by other medical complications  [] Other:     Assessment: Pt. Is making steady progress , able to complete 10 minutes of standing endurance prior to a short rest break. He displays increased dynamic balance without AE during core strengthening ex. He remains with weakness and fair balance when leading with the R LE. He does say he is able to stand to don his pants with less unsteadiness noted. He has been completing UE strengthening ex with good tolerance and improved strength throughout. His main limitations remain with endurance during dynamic standing and LE weakness without UE support. Discussed continuing with OT for the final remaining sessions for continuing to build dynamic standing endurance during UE strengthening ex  For improved tolerance of returning to higher level homemaking tasks  And transitioning to an HEP for skilled OT sessions. Pt is planning on starting cardiac rehab for improved endurance building after skilled PT services. Areas for Improvement: impaired activity tolerance, impaired balance, impaired endurance, impaired safety awareness, impaired strength and abdominal- diafragmatic breathing, purse lip breathing tech with IADL tasks. Prognosis: good    Patient Education:     HEP/Education Completed: Plan of Care, Goals,  Reviewed using a lighter theraband for HEP as provided to the pt from prior facility    [address] for HEP:   [x]  No new Education completed  []  Reviewed Prior HEP      []  Patient verbalized and/or demonstrated understanding of education provided. []  Patient unable to verbalize and/or demonstrate understanding of education provided. Will continue education. [x]  Barriers to learning: none    GOALS:  Patient Goal: I want better balance to be able to do daily tasks without my RW    Short Term Goals:  Time Frame: 10 sessions. *  Patient will demonstrate I knowledge of HEP for improved flexibility and strength for lifting.  GOAL MET CONTINUE WITH UPGRADES   *  Pt will tolerate an OT session in standing x 15 min with MI and no LOB or rest breaks to improve endurance for homemaking tasks GOAL MET REVISE GOAL - PT will tolerate an OT session standing x 20 min with independence and no  LOB to improve endurance for homemaking tasks 5-25-22 GOAL NOT MET, continue goal          PT will complete simulated homemaking tasks with no > min cues for proper breathing tech, abdominal- diaphoretic breathing  GOAL MET REVISE GOAL NEW GOAL PT WILL complete simulated homemaking tasks with no cues for proper breathing tech and no cues for safe or adaptive tech GOAL MET DISCONTINUE GOAL    Pt will complete B UE endurance tasks to complete strengthening ex x 15 min without rest breaks. GOAL MET REVISE GOAL Pt will complete B UE endurance tasks including increased weighted activities as recommended by physician without rest breaks. 5-25 GOAL NOT MET CONTINUE GOAL     Long Term Goals:  Time Frame: 8 weeks  *  Patient will improve UEFS to at least 102/104 GOAL NOT MET CONTINUE GOAL    *  Pt will report completing LE dressing with no UE support for balance and no LOB. [place] GOAL NOT MET CONTINUE GOAL  5-25 GOAL MET DISCONTINUE GOAL   Pt will ambulate throughout the clinic without a device to carry items for homemaking and cooking tasks  GOAL NOT MET CONTINUE GOAL 5-25 GOAL MET DISCONTINUE GOAL    PLAN:  Treatment Recommendations: Strengthening, Balance Training, Endurance Training, Neuromuscular Re-education, Home Exercise Program, Patient Education, Safety Education and Training, and Self-Care Education and Training    []  Plan of care initiated. Plan to see patient 3 times per week for 6 weeks to address the treatment planned outlined above. [x]  Continue with current plan of care to see pt for the 4 remaining sessions then transition to HEP.    []  Modify plan of care as follows:    []  Hold pending physician visit  []  Discharge    Time In 0845   Time Out 0915   Timed Code Minutes: 30 min   Total Treatment Time: 30 min       Electronically Signed by: Fina Thomas, MOT OTR/L [id number]

## 2022-05-27 ENCOUNTER — HOSPITAL ENCOUNTER (OUTPATIENT)
Dept: OCCUPATIONAL THERAPY | Age: 62
Setting detail: THERAPIES SERIES
Discharge: HOME | End: 2022-05-27
Payer: COMMERCIAL

## 2022-05-27 ENCOUNTER — HOSPITAL ENCOUNTER (OUTPATIENT)
Dept: PHYSICAL THERAPY | Age: 62
Setting detail: THERAPIES SERIES
Discharge: HOME | End: 2022-05-27
Payer: COMMERCIAL

## 2022-05-27 PROCEDURE — 97110 THERAPEUTIC EXERCISES: CPT

## 2022-05-27 NOTE — PROGRESS NOTES
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 Functional Outcome Measure Used Walking test for 6 minutes, [place] Balance   Functional Outcome Score walking test: 614 feet in 6 minutes, perceived exertion 5,  Cordova balance: 41/56 (3/30/22)       Insurance: Primary: Payor: Smita Mckeon /  /  / ,   Secondary: [facility]   Authorization Information: PRE CERTIFICATION REQUIRED: NO  INSURANCE THERAPY BENEFIT:  ALLOWED 30 PT, AND 30 OT VISITS PER CALENDAR YEAR, NONE OF THESE VISITS HAVE BEEN USED  AQUATIC THERAPY COVERED: YES  MODALITIES COVERED:  YES, YES MASSAGE  TELEHEALTH COVERED: NO  REFERENCE NUMBER: [id number]   Visit # 23, 10/10 for progress note   Visits Allowed: 30   Recertification Date: 31   Physician Follow-Up: . Physician Orders: PT evaluate and treat 3x per week per 6 weeks, OT evaluate and treat, 3x per week per 6 weeks   History of Present Illness: Patient reports of heart transplant on 2022 and was in [place] for rehab for 17 days. He takes regular BP and today /70. Since he has had his new heart notes less shortness of breath.  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 Notes worse problem right now is weakness in his legs (left >right)  and negotiating stair steps in trilevel home and completing squats. Has to go up stair steps one at a time. Patient uses straight cane at his home for household walking. Uses walker for community ambulation and when outside home. Notes decreased balance. Does have some chest pain and right posterior shoulder and upper back discomfort with pulling up with right arm or supporting through the arm. Patient not to push or pull, no lifting more than 5#s. No pressure on his chest with use of his arms. Patient's endurance is limited with ability to walk 15 min using his walker and use of Elliptical bike at home for 15 minutes. Patient plans to complete cardiac rehab after completing OT/PT. SUBJECTIVE:  Patient reports he is feeling good today with mild fatigue and shortness of breath. No complaints of dizziness today. Vitals  Start of session seated: /82 mmHg, pulse 87 bpm,  O2 sats 98%    30 min into session: BP  mmHg,  pulse bpm,  O2 sats %    Final end of session: /80 mmHg, pulse 90 bpm, O2 sats 97%     TREATMENT   Precautions: No lifting restrictions. Per therapist discretion. Conservative management with the exercises for strengthening due to heart transplant and decreasing stress on heart. Proper breath with exercises. HEART TRANSPLANT 2/7/2022. History of back and neck surgery. Pain: 0/10 anterior chest incision/sternal region.  3/10 arachnoiditis pain in lower back and legs    X in shaded column indicates activity completed today   Modalities Parameters/  Location  Notes                     Manual Therapy Time/Technique  Notes                     Exercise/Intervention   Notes   6 min walk test  743 feet O2sat 97%, pulse 97 X Perceived exertion 1   [place] Balance test 48/56      Bilateral heelraises with cues to wb throug great toes more instead of rolling ankle out  x15       Forward lunges in // bars with one hand hold  5x      Partial squats with ball between knees on foam.marches on foam x15   Marches on foam only    3 way hip on foam  x15  X BUE on // bars except for hip extension one hand hold; Slow and controlled upright posture   Standing balance, narrow stance eyes closed, tandem stance r/l, l/r with eyes open  (on Foam) 30 seconds each   Decreased EC time- pt LOB, CGA required    Rockerboard fwd/back, side to side  15x Balance 30 seconds      4 inch step ups with eccentric lowering with tap of opposite foot behind-forward  12x      Step ups standing on  6 inch lat/fwd x12   Forward only today   Alternating step taps 6 inch standing  2x 15x    1 UE support   NK table  Extension/flexion LLE  15#  2x15     NK table extension/flexion RLE  15#  2x15     Total gym squats Level J x15- DL  x10 SL bilat     Pillow behind head. Added bilat SL squats*   Step stance on airex       Heel and toe taps on airex fwd/bwd x12   Mild sway  1UE support    Marilia step overs X4/2 laps green     Dynamic gait: retro, marching, tandem, sidestepping, hamstring curls  x2 laps // rail      SC ambulation 200' In clinic  LLE weaker and tends to lock into place while ambulating. Vcs to avoid sharp turns   SBA- steady    Stair negotiation  4 steps x4  X 1-2 light touches to rails, reciprocal pattern   Sit<>stands from chair butt taps with cushion in chair  x12      Gait training without walker     May ambulate without walker in session between stations.  SBA   Nu step LE ONLY  6 min Level 5 X    Hydrohiop BLE Load 5 2x10     Multi- hip 4 way hip  30# x15 X No SOB present    Leg press   Calf raises 2x15  2x15 80#  50# X  X Pt noted enjoyed utilizing the leg press over total gym   Weighted walk: fwd, side/side,retro x2 15#  Improved stability-SBA  No SOB present   Trampoline ball toss: narrow stance, tandem stance x10 each Red ball  Progress to airex NEXT SESSION          REassessment  30 min      Perceive exertion following PT session 0-10  0  X Just legs feeling tired. Specific Interventions Next Treatment: balance retraining, strengthening core and LEs left drop foot, LLE weakness of hip and knee, ankle. Gait training with straight cane to no assistive device. Stair step negotiation. (history of heart transplant 2/7/2022, (history of arachnoiditis, lumbar fusion and cervical fusion)     Activity/Treatment Tolerance:  [x]  Patient tolerated treatment well  [x]  Patient limited by fatigue   []  Patient limited by pain   []  Patient limited by medical complications  []  Other:     Assessment: Patient needed 2 brief resting breaks today due to fatigue and mild shortness of breath. No complaints noted throughout session. Good tolerance of exercises but did not complete full program as time was spent addressing some of patient's goals. Patient admits mild fatigue at conclusion of therapy session. Body Structures/Functions/Activity Limitations: impaired activity tolerance, impaired balance, impaired endurance, impaired sensation, impaired strength and abnormal gait  Prognosis: good    GOALS:  Patient Goal: to participate in cardiac rehab following PT and be able to have improved activity level tolerance to cook, negotiate stair steps and walk without walker. Short Term Goals:  Time Frame: 4 weeks  1. Patient to demonstrate increased strength LLE: hip flexion 3+ to 4-/5 to 4/5, knee extensors and flexors from 4-/5 to 4+/5, left ankle dorsiflexion from 3/5 3+/5 to 4-/5. With increased stability and improved walking pattern with less high steppage gait with LLE, improved ankle strength with less left foot drop. NOT TESTED    2. Patient to negoatiate steps 8 steps without use of handrail with improved balance and strength modified independently.  MET: Patient able to negotiate 4 steps x2 without use of handrail and using a reciprocal pattern    3. 6 minute walk test with stable vitals with ability to complete distance from 614 feet to 700 feet and perceived exertion from 5 to 3. MET: Patient ambulated [phone] feet with perceived exertion of 1. Long Term Goals:  Time Frame: 8 weeks  1. [place] Balance test from 48/56 to 52/56. NOT TESTED    2. 6 minute walk test from 700 feet to 800 feet with perceived exertion <3. NOT MET    3. Patient to demonstrate independence in HEP with progression in endurance, increased core and LE strength, improved ambulation status to no assistive device with improved walking pattern and balance. MET: Patient reports compliance with HEP      Patient Education:   [x]  HEP/Education Completed: Continue HEP, monitor fatigue, BP, SOB   Medbridge Access Code:  []  No new Education completed  [x]  Reviewed Prior HEP      []  Patient verbalized and/or demonstrated understanding of education provided. []  Patient unable to verbalize and/or demonstrate understanding of education provided. Will continue education. []  Barriers to learning: none    PLAN:  Treatment Recommendations: Strengthening, Balance Training, Functional Mobility Training, Transfer Training, Endurance Training, Gait Training, Stair Training, Neuromuscular Re-education, Home Exercise Program and Patient Education    []  Plan of care initiated. Plan to see patient 3 times per week for 8 weeks to address the treatment planned outlined above.   [x]  Continue with current plan of care  []  Modify plan of care as follows:    []  Hold pending physician visit  []  Discharge    Time In 0830   Time Out 0915   Timed Code Minutes: 45 min   Total Treatment Time: 45 min       Electronically Signed by: Naomi Paz PTA

## 2022-05-27 NOTE — PROGRESS NOTES
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]    Treatment Diagnosis Decreased ADLs, decreased endurance,    Date of Evaluation 3/30/22      Functional Outcome Measure Used FIQ   Functional Outcome Score 94/104 (3/30/22)       Insurance: Primary: Payor: Sheila Ramos /  /  / ,   Secondary: BCBS   Authorization Information: PRE CERTIFICATION REQUIRED: NO  INSURANCE THERAPY BENEFIT:  ALLOWED 30 PT, AND 30 OT VISITS PER CALENDAR YEAR, NONE OF THESE VISITS HAVE BEEN USED  AQUATIC THERAPY COVERED: YES  MODALITIES COVERED:  YES, YES MASSAGE  TELEHEALTH COVERED: NO   Visit # 20, 1/10 for PN;  progress note 22, 22   Visits Allowed: 30 visits   Recertification Date: 72   Physician Follow-Up: 22 to cardiologist   Physician Orders: OT eval and treat   Pertinent History:  Pt was in [facility] from  . In bed x 10 days, then Patient reports of heart transplant on 2022 and was in [place] for rehab for 17 days. He takes regular BP and today /70. Since he has had his new heart notes less shortness of breath. 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 Notes worse problem right now is weakness in his legs (left >right)  and negotiating stair steps in trilevel home and completing squats. Has to go up stair steps one at a time. Patient uses straight cane at his home for household walking. Uses walker for community ambulation and when outside home. Notes decreased balance. Does have some chest pain and right posterior shoulder and upper back discomfort with pulling up with right arm or supporting through the arm. Patient not to push or pull, no lifting more than 5#s. No pressure on his chest with use of his arms. Patient's endurance is limited with ability to walk 15 min using his walker and use of Elliptical bike at home for 15 minutes. Patient plans to complete cardiac rehab after completing OT/PT. SUBJECTIVE: Pt. States      TREATMENT   Precautions: Precaution lifted 5/3/22; NO LAT PULL DOWNS   Pain:  Reports lower back pain down his legs     X in shaded column indicates Activity Completed Today   Modalities Parameters/  Location  Notes/Comments                     Manual Therapy Time/  Technique  Notes/Comments                     Exercises   Sets/  Sec Reps  Notes/Comments    AROM B UEs in standing to warm up for shoulder flexion  1 10   warm up     Dynamic standing endurance - slight lunges forward while throwing and catching yellow weighted ball at rebounder 1 10   xx         Fatigue noted with this task today         Biodex in standing at 60  RPM, 3 Min fwd, 3  min bwd   xx For warm up today   Seated on large [employer] for BUE PRE's using for core strength, UE strength, and endurance:  Shoulder flexion with 1# alternating lifting RUE/LLE, then LUE/RLE  Chest press outward 3 # wt  Horiz ABD  using 2#  Trunk rotation holding green physio ball out front  Army press 3#  ER with 3# 1 15 ea  Cues to keep abdominal tight while on ball; Required  for balance on ball with alternating UE/LE    Chest press 60#  2 20     tricep 30# 2  20      Bicep curl 20 # 2 20  Pt. Winded by end of the 3 nautilus exercises this date.   1 min standing rest break   Standing for orange theraband exercises BUE:  Shoulder abduction  Shoulder flexion  Shoulder extension  Elbow bicep curl  Riivald 1 15 ea xx Done with pt. Standing on band exercise 1-4   Hydrostick BUE for alphabet   xx For UE strength, standing balance and endurance; had to stop for 2 rest breaks    Standing  trunk strengthening ex with yellow theraball holding ball out in front, lifting ball up, trunk twists and then bending to floor 1 10     Gross gripper with 55# - bouchra. hands Hold 5 sec 15     Green flex bar for forearm strengthening , flex, ext, twists  1 15 xx Palm up, palm down, twists,    Standing rebounder with 2.5 kg ball 3 15  1st trial B feet close, 2nd trial step and toss to challenge balance and UE strength   Modified Wan ex leaning over the table  I, T, Y, extension, 2 # rows 1 10  BUE   Low row 30#  2 15                   Activities Time    Notes/Comments   Completed dynavision tasks  2 x 120 sec Mode B- 2 sec lights. 2 sec light speed Ex utilized for reaction timing and endurance B UEs. Min SOB with seated rest break after ex    Simulated homemaking tasks of gathering items for transporting throughout the home 10 min  Pt able to carry a basket of items, up to 6 # weights and 2 # basket around the clinic with independence. Pt stood x 2 trials for short standing rest break. Min fatigue noted. Pt able to balance cones with ball on top with ambulation, good tolerance. Specific Interventions Next Treatment: Strengthening, endurance, dynamic standing balance for ADL and IADL tasks, breathing tech during IADL tasks     Activity/Treatment Tolerance:  [x]  Patient tolerated treatment well  []  Patient limited by fatigue  []  Patient limited by pain   []  Patient limited by other medical complications  []  Other:     Assessment: Pt. Is making steady progress; Completed most of session in standing today with 2 rest breaks.  Activity with weighted ball at rebounder was challenging for pt. today  Areas for Improvement: impaired activity tolerance, impaired balance, impaired endurance, impaired safety awareness, impaired strength and abdominal- diafragmatic breathing, purse lip breathing tech with IADL tasks. Prognosis: good    Patient Education:     HEP/Education Completed: Plan of Care, Goals,  Reviewed using a lighter theraband for HEP as provided to the pt from prior facility    Chandrakant Pena for HEP:   [x]  No new Education completed  []  Reviewed Prior HEP      []  Patient verbalized and/or demonstrated understanding of education provided. []  Patient unable to verbalize and/or demonstrate understanding of education provided. Will continue education. [x]  Barriers to learning: none    GOALS:  Patient Goal: I want better balance to be able to do daily tasks without my RW    Short Term Goals:  Time Frame: 10 sessions. *  Patient will demonstrate I knowledge of HEP for improved flexibility and strength for lifting. GOAL MET CONTINUE WITH UPGRADES   *  Pt will tolerate an OT session in standing x 15 min with MI and no LOB or rest breaks to improve endurance for homemaking tasks GOAL MET REVISE GOAL - PT will tolerate an OT session standing x 20 min with independence and no  LOB to improve endurance for homemaking tasks 5-25-22 GOAL NOT MET, continue goal          PT will complete simulated homemaking tasks with no > min cues for proper breathing tech, abdominal- diaphoretic breathing  GOAL MET REVISE GOAL NEW GOAL PT WILL complete simulated homemaking tasks with no cues for proper breathing tech and no cues for safe or adaptive tech GOAL MET DISCONTINUE GOAL    Pt will complete B UE endurance tasks to complete strengthening ex x 15 min without rest breaks. GOAL MET REVISE GOAL Pt will complete B UE endurance tasks including increased weighted activities as recommended by physician without rest breaks.  5-25 GOAL NOT MET CONTINUE GOAL     Long Term Goals:  Time Frame: 8 weeks  *  Patient will improve UEFS to at least 102/104 GOAL NOT MET CONTINUE GOAL    *  Pt will report completing LE dressing with no UE support for balance and no LOB. Shaaron Money GOAL NOT MET CONTINUE GOAL  5-25 GOAL MET DISCONTINUE GOAL   Pt will ambulate throughout the clinic without a device to carry items for homemaking and cooking tasks  GOAL NOT MET CONTINUE GOAL 5-25 GOAL MET DISCONTINUE GOAL    PLAN:  Treatment Recommendations: Strengthening, Balance Training, Endurance Training, Neuromuscular Re-education, Home Exercise Program, Patient Education, Safety Education and Training, and Self-Care Education and Training    []  Plan of care initiated. Plan to see patient 3 times per week for 6 weeks to address the treatment planned outlined above. [x]  Continue with current plan of care to see pt for the 4 remaining sessions then transition to HEP.    []  Modify plan of care as follows:    []  Hold pending physician visit  []  Discharge    Time In 0800   Time Out 0830   Timed Code Minutes: 30 min   Total Treatment Time: 30  min       Electronically Signed by: JOMAR Haro/BRANDON [id number]

## 2022-05-30 ENCOUNTER — HOSPITAL ENCOUNTER (OUTPATIENT)
Dept: PHYSICAL THERAPY | Age: 62
Setting detail: THERAPIES SERIES
End: 2022-05-30
Payer: COMMERCIAL

## 2022-05-30 ENCOUNTER — HOSPITAL ENCOUNTER (OUTPATIENT)
Dept: OCCUPATIONAL THERAPY | Age: 62
Setting detail: THERAPIES SERIES
End: 2022-05-30
Payer: COMMERCIAL

## 2022-06-01 ENCOUNTER — HOSPITAL ENCOUNTER (OUTPATIENT)
Dept: PHYSICAL THERAPY | Age: 62
Setting detail: THERAPIES SERIES
Discharge: HOME | End: 2022-06-01
Payer: COMMERCIAL

## 2022-06-01 ENCOUNTER — HOSPITAL ENCOUNTER (OUTPATIENT)
Dept: OCCUPATIONAL THERAPY | Age: 62
Setting detail: THERAPIES SERIES
Discharge: HOME | End: 2022-06-01
Payer: COMMERCIAL

## 2022-06-01 PROCEDURE — 97110 THERAPEUTIC EXERCISES: CPT

## 2022-06-01 NOTE — PROGRESS NOTES
[facility]  PHYSICAL THERAPY  [] EVALUATION  [x] DAILY NOTE (LAND) [] DAILY NOTE (AQUATIC ) [] PROGRESS NOTE [] DISCHARGE NOTE    [x] OUTPATIENT REHABILITATION CENTER [facility]   [] 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 Functional Outcome Measure Used Walking test for 6 minutes, [place] Balance   Functional Outcome Score walking test: 614 feet in 6 minutes, perceived exertion 5,  Cordova balance: 41/56 (3/30/22)       Insurance: Primary: Payor: Laxmi Quezada /  /  / ,   Secondary: BC   Authorization Information: PRE CERTIFICATION REQUIRED: NO  INSURANCE THERAPY BENEFIT:  ALLOWED 30 PT, AND 30 OT VISITS PER CALENDAR YEAR, NONE OF THESE VISITS HAVE BEEN USED  AQUATIC THERAPY COVERED: YES  MODALITIES COVERED:  YES, YES MASSAGE  TELEHEALTH COVERED: NO  REFERENCE NUMBER: [id number]   Visit # 21, 11/10 for progress note   Visits Allowed: 30   Recertification Date: 58   Physician Follow-Up: . Physician Orders: PT evaluate and treat 3x per week per 6 weeks, OT evaluate and treat, 3x per week per 6 weeks   History of Present Illness: Patient reports of heart transplant on 2022 and was in [place] for rehab for 17 days. He takes regular BP and today /70. Since he has had his new heart notes less shortness of breath.  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 Notes worse problem right now is weakness in his legs (left >right)  and negotiating stair steps in trilevel home and completing squats. Has to go up stair steps one at a time. Patient uses straight cane at his home for household walking. Uses walker for community ambulation and when outside home. Notes decreased balance. Does have some chest pain and right posterior shoulder and upper back discomfort with pulling up with right arm or supporting through the arm. Patient not to push or pull, no lifting more than 5#s. No pressure on his chest with use of his arms. Patient's endurance is limited with ability to walk 15 min using his walker and use of Elliptical bike at home for 15 minutes. Patient plans to complete cardiac rehab after completing OT/PT. SUBJECTIVE:  Pt notes same aches everyday. Notes feeling better than previous treatment session. Vitals  Start of session seated: /82 mmHg, pulse 87 bpm,  O2 sats 98%    30 min into session: BP  mmHg,  pulse bpm,  O2 sats %    Final end of session: /80 mmHg, pulse 90 bpm, O2 sats 97%     TREATMENT   Precautions: No lifting restrictions. Per therapist discretion. Conservative management with the exercises for strengthening due to heart transplant and decreasing stress on heart. Proper breath with exercises. HEART TRANSPLANT 2/7/2022. History of back and neck surgery. Pain: 0/10 anterior chest incision/sternal region.  3/10 arachnoiditis pain in lower back and legs    X in shaded column indicates activity completed today   Modalities Parameters/  Location  Notes                     Manual Therapy Time/Technique  Notes                     Exercise/Intervention   Notes   6 min walk test  743 feet O2sat 97%, pulse 97  Perceived exertion 1   Cordova Balance test 48/56      Bilateral heelraises with cues to wb throug great toes more instead of rolling ankle out  x15       Forward lunges in // bars with one hand hold  5x      Partial squats with ball between knees on foam.marches on foam x15   Marches on foam only    3 way hip on foam  x20  X Held airex d/t pt increased fatigue   BUE support    Standing balance, narrow stance eyes closed, tandem stance r/l, l/r with eyes open  (on Foam) 30 seconds each   Decreased EC time- pt LOB, CGA required    Rockerboard fwd/back, side to side  15x Balance 30 seconds      4 inch step ups with eccentric lowering with tap of opposite foot behind-forward  12x      Step ups standing on  6 inch lat/fwd x15  x Forward only today  Pt had difficulty with clearance with L foot while ascending    Alternating step taps 6 inch standing  2x 15x    1 UE support   NK table  Extension/flexion LLE  15#  2x15     NK table extension/flexion RLE  15#  2x15     Total gym squats Level J x15- DL  x10 SL bilat     Pillow behind head. Added bilat SL squats*   Step stance on airex       Heel and toe taps on airex fwd/bwd x12   Mild sway  1UE support    Marilia step overs X4/2 laps green     Dynamic gait: retro, marching, tandem, sidestepping, hamstring curls  x2 laps // rail      SC ambulation 200' In clinic  LLE weaker and tends to lock into place while ambulating. Vcs to avoid sharp turns   SBA- steady    Stair negotiation  4 steps x4  X 1-2 light touches to rails, reciprocal pattern   Sit<>stands from chair butt taps with cushion in chair  x12      Gait training without walker     May ambulate without walker in session between stations.  SBA   Nu step LE ONLY  6 min Level 5 X    Hydrohiop BLE Load 5 2x10     Multi- hip 4 way hip  30# x15  No SOB present    Leg press   Calf raises 2x15  2x15 90#  50# X  X Pt noted enjoyed utilizing the leg press over total gym   Weighted walk: fwd, side/side,retro x2 20# x Improved stability-SBA  No SOB present   Trampoline ball toss: narrow stance, tandem stance x10 each Yellow ball x Attempted airex, pt noted \"my legs pattern    3. 6 minute walk test with stable vitals with ability to complete distance from 614 feet to 700 feet and perceived exertion from 5 to 3. MET: Patient ambulated [phone] feet with perceived exertion of 1. Long Term Goals:  Time Frame: 8 weeks  1. Cordova Balance test from 48/56 to 52/56. NOT TESTED    2. 6 minute walk test from 700 feet to 800 feet with perceived exertion <3. NOT MET    3. Patient to demonstrate independence in HEP with progression in endurance, increased core and LE strength, improved ambulation status to no assistive device with improved walking pattern and balance. MET: Patient reports compliance with HEP      Patient Education:   [x]  HEP/Education Completed: Continue HEP, monitor fatigue, BP, SOB   Medbridge Access Code:  []  No new Education completed  [x]  Reviewed Prior HEP      []  Patient verbalized and/or demonstrated understanding of education provided. []  Patient unable to verbalize and/or demonstrate understanding of education provided. Will continue education. []  Barriers to learning: none    PLAN:  Treatment Recommendations: Strengthening, Balance Training, Functional Mobility Training, Transfer Training, Endurance Training, Gait Training, Stair Training, Neuromuscular Re-education, Home Exercise Program and Patient Education    []  Plan of care initiated. Plan to see patient 3 times per week for 8 weeks to address the treatment planned outlined above.   [x]  Continue with current plan of care  []  Modify plan of care as follows:    []  Hold pending physician visit  []  Discharge    Time In 0800   Time Out 0841   Timed Code Minutes: 41   Total Treatment Time: 41       Electronically Signed by: Corrie Acosta PTA

## 2022-06-01 NOTE — PROGRESS NOTES
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]    Treatment Diagnosis Decreased ADLs, decreased endurance,    Date of Evaluation 3/30/22      Functional Outcome Measure Used FIQ   Functional Outcome Score 94/104 (3/30/22)       Insurance: Primary: Payor: Luis Alberto Buckley /  /  / ,   Secondary: BCBS   Authorization Information: PRE CERTIFICATION REQUIRED: NO  INSURANCE THERAPY BENEFIT:  ALLOWED 30 PT, AND 30 OT VISITS PER CALENDAR YEAR, NONE OF THESE VISITS HAVE BEEN USED  AQUATIC THERAPY COVERED: YES  MODALITIES COVERED:  YES, YES MASSAGE  TELEHEALTH COVERED: NO   Visit # 21, 2/10 for PN;  progress note 22, 22   Visits Allowed: 30 visits   Recertification Date:    Physician Follow-Up: 22 to cardiologist   Physician Orders: OT eval and treat   Pertinent History:  Pt was in [facility] from  . In bed x 10 days, then Patient reports of heart transplant on 2022 and was in [place] for rehab for 17 days. He takes regular BP and today /70. Since he has had his new heart notes less shortness of breath. 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 Notes worse problem right now is weakness in his legs (left >right)  and negotiating stair steps in trilevel home and completing squats. Has to go up stair steps one at a time. Patient uses straight cane at his home for household walking. Uses walker for community ambulation and when outside home. Notes decreased balance. Does have some chest pain and right posterior shoulder and upper back discomfort with pulling up with right arm or supporting through the arm. Patient not to push or pull, no lifting more than 5#s. No pressure on his chest with use of his arms. Patient's endurance is limited with ability to walk 15 min using his walker and use of Elliptical bike at home for 15 minutes. Patient plans to complete cardiac rehab after completing OT/PT. SUBJECTIVE: Pt. States  he feels betterhowever min increased fatigue from recent PT session and increased weights     TREATMENT   Precautions: Precaution lifted 5/3/22; NO LAT PULL DOWNS   Pain:  Reports lower back pain down his legs     X in shaded column indicates Activity Completed Today   Modalities Parameters/  Location  Notes/Comments                     Manual Therapy Time/  Technique  Notes/Comments                     Exercises   Sets/  Sec Reps  Notes/Comments    AROM B UEs  to warm up for shoulder flexion  1 10 x  warm up     Dynamic standing endurance - slight lunges forward while throwing and catching yellow weighted ball at rebounder 1 10            Fatigue noted with this task today         Biodex in sitting at 60  RPM, 3 Min fwd, 3  min bwd   x For warm up today due to recent PT visit.     Seated on large [employer] for BUE PRE's using for core strength, UE strength, and endurance:  Shoulder flexion with 1# alternating lifting RUE/LLE, then LUE/RLE  Chest press outward 3 # wt  Horiz ABD  using 2#  Trunk rotation holding green physio ball out front  Army press 3#  ER with 3# 1 15 ea  Cues to keep abdominal tight while on ball; Required  for balance on ball with alternating UE/LE    Chest press 60#  2 20 x    tricep 40# 2  20 /15 x    Bicep curl 25 # 2 20 x Quick standing rest break after nautilis equipment   Standing for orange theraband exercises BUE:  Shoulder abduction  Shoulder flexion  Shoulder extension  Elbow bicep curl  Riivald 1 15 ea  Done with pt. Standing on band exercise 1-4   Hydrostick BUE for sh flex, side to side, circles, diagonals 1 10 x For UE strength, standing balance and endurance;1 quick rest break after ex. Standing  trunk strengthening ex with yellow theraball holding ball out in front, lifting ball up, trunk twists and then bending to floor 1 10     Gross gripper with 55# - bouchra. hands Hold 5 sec 15     Green flex bar for forearm strengthening , flex, ext, twists  1 15  Palm up, palm down, twists,    Standing rebounder with 2.5 kg ball 3 15  1st trial B feet close, 2nd trial step and toss to challenge balance and UE strength   Modified Wan ex leaning over the table  I, T, Y, extension, 2 # rows 1 10  BUE   Low row 30#  2 15                   Activities Time    Notes/Comments   Completed dynavision tasks  2 x 120 sec Mode B- 2 sec lights. 2 sec light speed Ex utilized for reaction timing and endurance B UEs. Min SOB with seated rest break after ex    Simulated homemaking tasks of gathering items for transporting throughout the home 10 min  Pt able to carry a basket of items, up to 6 # weights and 2 # basket around the clinic with independence. Pt stood x 2 trials for short standing rest break. Min fatigue noted. Pt able to balance cones with ball on top with ambulation, good tolerance. Specific Interventions Next Treatment: Strengthening, endurance, dynamic standing balance for ADL and IADL tasks, breathing tech during IADL tasks     Activity/Treatment Tolerance:  [x]  Patient tolerated treatment well  []  Patient limited by fatigue  []  Patient limited by pain   []  Patient limited by other medical complications  []  Other:     Assessment: Pt. Is making steady progress; Completed most of session in standing today with 2 rest breaks.  Activity with weighted ball at rebounder was challenging for pt. today  Areas for Improvement: impaired activity tolerance, impaired balance, impaired endurance, impaired safety awareness, impaired strength and abdominal- diafragmatic breathing, purse lip breathing tech with IADL tasks. Prognosis: good    Patient Education:     HEP/Education Completed: Plan of Care, Goals,  Reviewed using a lighter theraband for HEP as provided to the pt from prior facility    Chandrakant Garza [address] for HEP:   [x]  No new Education completed  []  Reviewed Prior HEP      []  Patient verbalized and/or demonstrated understanding of education provided. []  Patient unable to verbalize and/or demonstrate understanding of education provided. Will continue education. [x]  Barriers to learning: none    GOALS:  Patient Goal: I want better balance to be able to do daily tasks without my RW    Short Term Goals:  Time Frame: 10 sessions. *  Patient will demonstrate I knowledge of HEP for improved flexibility and strength for lifting. GOAL MET CONTINUE WITH UPGRADES  *  Pt will tolerate an OT session in standing x 15 min with MI and no LOB or rest breaks to improve endurance for homemaking tasks GOAL MET REVISE GOAL - PT will tolerate an OT session standing x 20 min with independence and no  LOB to improve endurance for homemaking tasks 5-25-22 GOAL NOT MET, continue goal        PT will complete simulated homemaking tasks with no > min cues for proper breathing tech, abdominal- diaphoretic breathing  GOAL MET REVISE GOAL NEW GOAL PT WILL complete simulated homemaking tasks with no cues for proper breathing tech and no cues for safe or adaptive tech GOAL MET DISCONTINUE GOAL    Pt will complete B UE endurance tasks to complete strengthening ex x 15 min without rest breaks. GOAL MET REVISE GOAL Pt will complete B UE endurance tasks including increased weighted activities as recommended by physician without rest breaks.  5-25 GOAL NOT MET CONTINUE GOAL     Long Term Goals:  Time Frame: 8 weeks  *  Patient will improve UEFS to at least 102/104 GOAL NOT MET CONTINUE GOAL    *  Pt will report completing LE dressing with no UE support for balance and no LOB. Reji Henrik GOAL NOT MET CONTINUE GOAL  5-25 GOAL MET DISCONTINUE GOAL  Pt will ambulate throughout the clinic without a device to carry items for homemaking and cooking tasks  GOAL NOT MET CONTINUE GOAL 5-25 GOAL MET DISCONTINUE GOAL    PLAN:  Treatment Recommendations: Strengthening, Balance Training, Endurance Training, Neuromuscular Re-education, Home Exercise Program, Patient Education, Safety Education and Training, and Self-Care Education and Training    []  Plan of care initiated. Plan to see patient 3 times per week for 6 weeks to address the treatment planned outlined above. [x]  Continue with current plan of care to see pt for the 4 remaining sessions then transition to HEP.    []  Modify plan of care as follows:    []  Hold pending physician visit  []  Discharge    Time In 0850   Time Out 0920   Timed Code Minutes: 30 min   Total Treatment Time: 30  min       Electronically Signed by: SHIMON Landaverde OTR/L [id number]

## 2022-06-03 ENCOUNTER — APPOINTMENT (OUTPATIENT)
Dept: PHYSICAL THERAPY | Age: 62
End: 2022-06-03
Payer: COMMERCIAL

## 2022-06-03 ENCOUNTER — APPOINTMENT (OUTPATIENT)
Dept: OCCUPATIONAL THERAPY | Age: 62
End: 2022-06-03
Payer: COMMERCIAL

## 2022-06-06 ENCOUNTER — HOSPITAL ENCOUNTER (OUTPATIENT)
Dept: OCCUPATIONAL THERAPY | Age: 62
Setting detail: THERAPIES SERIES
Discharge: HOME | End: 2022-06-06
Payer: COMMERCIAL

## 2022-06-06 ENCOUNTER — TELEPHONE (OUTPATIENT)
Dept: PHYSICAL MEDICINE AND REHAB | Age: 62
End: 2022-06-06

## 2022-06-06 ENCOUNTER — HOSPITAL ENCOUNTER (OUTPATIENT)
Dept: PHYSICAL THERAPY | Age: 62
Setting detail: THERAPIES SERIES
Discharge: HOME | End: 2022-06-06
Payer: COMMERCIAL

## 2022-06-06 PROCEDURE — 97112 NEUROMUSCULAR REEDUCATION: CPT

## 2022-06-06 PROCEDURE — 97164 PT RE-EVAL EST PLAN CARE: CPT

## 2022-06-06 PROCEDURE — 97110 THERAPEUTIC EXERCISES: CPT

## 2022-06-06 NOTE — TELEPHONE ENCOUNTER
Called patient to reschedule his 06/16/2022 appointment with Dr. Kelle Diaz.   No answer and voice mailbox is full

## 2022-06-06 NOTE — PROGRESS NOTES
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]    Treatment Diagnosis Decreased ADLs, decreased endurance,    Date of Evaluation 3/30/22      Functional Outcome Measure Used FIQ   Functional Outcome Score 94/104 (3/30/22)       Insurance: Primary: Payor: Konstantin Simons /  /  / ,   Secondary: BC   Authorization Information: PRE CERTIFICATION REQUIRED: NO  INSURANCE THERAPY BENEFIT:  ALLOWED 30 PT, AND 30 OT VISITS PER CALENDAR YEAR, NONE OF THESE VISITS HAVE BEEN USED  AQUATIC THERAPY COVERED: YES  MODALITIES COVERED:  YES, YES MASSAGE  TELEHEALTH COVERED: NO   Visit # 22, 3/10 for PN;  progress note 22, 22   Visits Allowed: 30 visits   Recertification Date:    Physician Follow-Up: 22 to cardiologist   Physician Orders: OT eval and treat   Pertinent History:  [facility] In bed x 10 days, then Patient reports of heart transplant on 2022 and was in [place] for rehab for 17 days. He takes regular BP and today /70. Since he has had his new heart notes less shortness of breath. 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 Notes worse problem right now is weakness in his legs (left >right)  and negotiating stair steps in trilevel home and completing squats. Has to go up stair steps one at a time. Patient uses straight cane at his home for household walking. Uses walker for community ambulation and when outside home. Notes decreased balance. Does have some chest pain and right posterior shoulder and upper back discomfort with pulling up with right arm or supporting through the arm. Patient not to push or pull, no lifting more than 5#s. No pressure on his chest with use of his arms. Patient's endurance is limited with ability to walk 15 min using his walker and use of Elliptical bike at home for 15 minutes. Patient plans to complete cardiac rehab after completing OT/PT. SUBJECTIVE: Pt.reports feeling well from recent biopsy , however has been having increased difficulty with LE weakness this weekend with B knees wanting to buckle. Plans on seeing Dr. Bridger Villarreal on 6-15-22. BP taken during session 143/80, 88 HR, 98 % O2 on room air.     TREATMENT   Precautions: Precaution lifted 5/3/22; NO LAT PULL DOWNS   Pain:  Reports lower back pain down his legs     X in shaded column indicates Activity Completed Today   Modalities Parameters/  Location  Notes/Comments                     Manual Therapy Time/  Technique  Notes/Comments                     Exercises   Sets/  Sec Reps  Notes/Comments    AROM B UEs  to warm up for shoulder flexion  1 10 x  warm up     Dynamic standing endurance - slight lunges forward while throwing and catching yellow weighted ball at rebounder 1 10            Fatigue noted with this task today         Biodex in sitting at 60  RPM, 3 Min fwd, 3  min bwd   x During seated RB from standing tasks    Seated on large [employer] for BUE PRE's using for core strength, UE strength, and endurance:  Shoulder flexion with 1# alternating lifting RUE/LLE, then LUE/RLE  Chest press outward 3 # wt  Horiz ABD  using 2#  Trunk rotation holding green physio ball out front  Army press 3#  ER with 3# 1 15 ea  Cues to keep abdominal tight while on ball; Required  for balance on ball with alternating UE/LE    Chest press 60#  2 20 x    tricep 40#/30# 2  20 /20 x    Bicep curl 25 #/20 # 2 20 x Seated rest break after nautilis equipment   Standing for orange theraband exercises BUE:  Shoulder abduction  Shoulder flexion  Shoulder extension  Elbow bicep curl  Riivald 1 15 ea  Done with pt. Standing on band exercise 1-4   Hydrostick BUE for sh flex, side to side, circles, diagonals 1 10  For UE strength, standing balance and endurance;1 quick rest break after ex. Standing  trunk strengthening ex with jarvis joshua holding ball out in front, lifting ball up, trunk twists . B feet close together to challenge balance 1 10 x fartigue noted with LEs during challenging balance task. Required 1 seated rest break. Gross gripper with 55# - bouchra. hands Hold 5 sec 15     Green flex bar for forearm strengthening , flex, ext, twists  1 15  Palm up, palm down, twists,    Standing rebounder with 2.5 kg ball 3 15  1st trial B feet close, 2nd trial step and toss to challenge balance and UE strength   Modified Wan ex leaning over the table  I, T, Y, extension, 2 # rows 1 10  BUE   Low row 30#  2 15                   Activities Time    Notes/Comments   Completed dynavision tasks  2 x 120 sec Mode B- 2 sec lights. 2 sec light speed Ex utilized for reaction timing and endurance B UEs. Min SOB with seated rest break after ex    Simulated homemaking tasks of gathering items for transporting throughout the home 10 min  Pt able to carry a basket of items, up to 6 # weights and 2 # basket around the clinic with independence. Pt stood x 2 trials for short standing rest break. Min fatigue noted. Pt able to balance cones with ball on top with ambulation, good tolerance.             Specific Interventions Next Treatment: Strengthening, endurance, dynamic standing balance for ADL and IADL tasks, breathing tech during IADL tasks     Activity/Treatment Tolerance:  [x]  Patient tolerated treatment well  []  Patient limited by fatigue  []  Patient limited by pain   []  Patient limited by other medical complications  []  Other:     Assessment: Pt. Is making steady progress; Did require 2  Seated rest breaks. He was slightly more fatigued this date, decreasing wts to increase repetitions for increased endurance. Areas for Improvement: impaired activity tolerance, impaired balance, impaired endurance, impaired safety awareness, impaired strength and abdominal- diafragmatic breathing, purse lip breathing tech with IADL tasks. Prognosis: good    Patient Education:     HEP/Education Completed: Plan of Care, Goals,  Reviewed using a lighter theraband for HEP as provided to the pt from prior facility    Chandrakant Garza [address] for HEP:   [x]  No new Education completed  []  Reviewed Prior HEP      []  Patient verbalized and/or demonstrated understanding of education provided. []  Patient unable to verbalize and/or demonstrate understanding of education provided. Will continue education. [x]  Barriers to learning: none    GOALS:  Patient Goal: I want better balance to be able to do daily tasks without my RW    Short Term Goals:  Time Frame: 10 sessions. *  Patient will demonstrate I knowledge of HEP for improved flexibility and strength for lifting. GOAL MET CONTINUE WITH UPGRADES   *  Pt will tolerate an OT session in standing x 15 min with MI and no LOB or rest breaks to improve endurance for homemaking tasks GOAL MET REVISE GOAL - PT will tolerate an OT session standing x 20 min with independence and no  LOB to improve endurance for homemaking tasks 5-25-22 GOAL NOT MET, continue goal          PT will complete simulated homemaking tasks with no > min cues for proper breathing tech, abdominal- diaphoretic breathing  GOAL MET REVISE GOAL NEW GOAL PT WILL complete simulated homemaking tasks with no cues for proper breathing tech and no cues for safe or adaptive tech GOAL MET DISCONTINUE GOAL    Pt will complete B UE endurance tasks to complete strengthening ex x 15 min without rest breaks.  GOAL MET REVISE GOAL Pt will complete B UE endurance tasks including increased weighted activities as recommended by physician without rest breaks. 5-25 GOAL NOT MET CONTINUE GOAL     Long Term Goals:  Time Frame: 8 weeks  *  Patient will improve UEFS to at least 102/104 GOAL NOT MET CONTINUE GOAL    *  Pt will report completing LE dressing with no UE support for balance and no LOB. Shaaron Money GOAL NOT MET CONTINUE GOAL  5-25 GOAL MET DISCONTINUE GOAL   Pt will ambulate throughout the clinic without a device to carry items for homemaking and cooking tasks  GOAL NOT MET CONTINUE GOAL 5-25 GOAL MET DISCONTINUE GOAL    PLAN:  Treatment Recommendations: Strengthening, Balance Training, Endurance Training, Neuromuscular Re-education, Home Exercise Program, Patient Education, Safety Education and Training, and Self-Care Education and Training    []  Plan of care initiated. Plan to see patient 3 times per week for 6 weeks to address the treatment planned outlined above. [x]  Continue with current plan of care to see pt for the 4 remaining sessions then transition to HEP.    []  Modify plan of care as follows:    []  Hold pending physician visit  []  Discharge    Time In 0830   Time Out 0900   Timed Code Minutes: 30 min   Total Treatment Time: 30  min       Electronically Signed by: SHIMON Ya OTR/L [id number]

## 2022-06-06 NOTE — PROGRESS NOTES
[address] [facility]  PHYSICAL THERAPY  [] EVALUATION  [] DAILY NOTE (LAND) [] DAILY NOTE (AQUATIC ) [x] PROGRESS NOTE [] DISCHARGE NOTE  ** PLEASE SIGN, DATE AND TIME CERTIFICATION BELOW AND RETURN TO [facility] OUTPATIENT REHABILITATION (FAX #: [phone]). ATTEST/CO-SIGN IF ACCESSING VIA [employer]. THANK YOU.**    I certify that I have examined the patient below and determined that Physical Medicine and Rehabilitation service is necessary and that I approve the established plan of care for up to 90 days or as specifically noted. Attestation, signature or co-signature of physician indicates approval of certification requirements.    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        Functional Outcome Measure Used Walking test for 6 minutes, Cordova Balance   Functional Outcome Score walking test: 614 feet in 6 minutes, perceived exertion 5,  Cordova balance: 41/56 (3/30/22)   6 minute walk test 743 feet, perceived exertion 1, Cordova Balance test 49/56  2022 PN      Insurance: Primary: Payor: Cyndi Melendez /  /  / ,   Secondary: [facility]   Authorization Information: PRE CERTIFICATION REQUIRED: NO  INSURANCE THERAPY BENEFIT:  ALLOWED 30 PT, AND 30 OT VISITS PER CALENDAR YEAR, NONE OF THESE VISITS HAVE BEEN USED  AQUATIC THERAPY COVERED: YES  MODALITIES COVERED:  YES, YES MASSAGE  TELEHEALTH COVERED: NO  REFERENCE NUMBER: [id number]   Visit # 21, 0/10 for progress note   Visits Allowed: 30   Recertification Date: 0/02/73   Physician Follow-Up: April 5, 6, 2022. Physician Orders: PT evaluate and treat 3x per week per 6 weeks, OT evaluate and treat, 3x per week per 6 weeks   History of Present Illness: Patient reports of heart transplant on 2/7/2022 and was in [facility] for rehab for 17 days. He takes regular BP and today /70. Since he has had his new heart notes less shortness of breath. 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 Notes worse problem right now is weakness in his legs (left >right)  and [facility] and completing squats. Has to go up stair steps one at a time. Patient uses straight cane at his home for household walking. Uses walker for community ambulation and when outside home. Notes decreased balance. Does have some chest pain and right posterior shoulder and upper back discomfort with pulling up with right arm or supporting through the arm. Patient not to push or pull, no lifting more than 5#s. No pressure on his chest with use of his arms. Patient's endurance is limited with ability to walk 15 min using his walker and use of Elliptical bike at home for 15 minutes. Patient plans to complete cardiac rehab after completing OT/PT. SUBJECTIVE:   Patient reports that arachnoiditis flared up more. [place] like yesterday that legs were going to come out from underneath him. Did quite a bit of standing in kitchen yesterday. Has appointment with Cecil Joya on 6/15/2022 and would like another MRI of his spine.  Left foot is about the same but feels swelling and pain persist. Patient reports that he has improved in strength and some balance. Feels balance is off yet. Symptoms in legs vary due to arachnoiditis. But would like to know if it is due to arachnoiditis or something else going on. Using straight cane when legs feel weak for household distances. When going for long walks he uses his straight cane. Vitals  Start of session seated: /78 mmHg, pulse 80 bpm,  O2 sats 97%    30 min into session: BP  mmHg,  pulse bpm,  O2 sats %    Final end of session: /80 mmHg, pulse 90 bpm, O2 sats 97%     TREATMENT   Precautions: No lifting restrictions. Per therapist discretion. Conservative management with the exercises for strengthening due to heart transplant and decreasing stress on heart. Proper breath with exercises. HEART TRANSPLANT 2/7/2022. History of back and neck surgery. Pain: 0/10 anterior chest incision/sternal region.  3/10 arachnoiditis pain in lower back and legs    X in shaded column indicates activity completed today   Modalities Parameters/  Location  Notes                     Manual Therapy Time/Technique  Notes                     Exercise/Intervention   Notes   6 min walk test  743 feet O2sat 97%, pulse 97  Perceived exertion 1   Cordova Balance test 48/56      Bilateral heelraises with cues to wb throug great toes more instead of rolling ankle out  x15  x     Forward lunges in // bars with one hand hold  5x      Partial squats with ball between knees on foam.marches on foam x15   Marches on foam only    3 way hip on foam  x20   Held airex d/t pt increased fatigue   BUE support    Standing balance, narrow stance eyes closed, tandem stance r/l, l/r with eyes open  (on Foam) 30 seconds each  x Decreased EC time- pt LOB, CGA required    Rockerboard fwd/back, side to side  15x Balance 30 seconds      4 inch step ups with eccentric lowering with tap of opposite foot behind-forward  12x      Step ups standing on  6 inch lat/fwd x15   Forward only today  Pt had difficulty with clearance with L foot while ascending Alternating step taps 6 inch standing  2x 15x    1 UE support   NK table  Extension/flexion LLE  15#  2x15     NK table extension/flexion RLE  15#  2x15     Total gym squats Level J x15- DL  x10 SL bilat     Pillow behind head. Added bilat SL squats*   Step stance on airex       Heel and toe taps on airex fwd/bwd x12   Mild sway  1UE support    Marilia step overs X4/2 laps green     Dynamic gait: retro, marching, tandem, sidestepping, hamstring curls  x2 laps // rail  x    SC ambulation 200' In clinic  LLE weaker and tends to lock into place while ambulating. Vcs to avoid sharp turns   SBA- steady    Stair negotiation  4 steps x4   1-2 light touches to rails, reciprocal pattern   Sit<>stands from chair butt taps with cushion in chair  x12      Gait training without walker     May ambulate without walker in session between stations. SBA   Nu step LE ONLY  6 min Level 5     Hydrohiop BLE Load 5 2x10     Multi- hip 4 way hip  30# x15  No SOB present    Leg press   Calf raises 2x15  2x15 90#  50#  Pt noted enjoyed utilizing the leg press over total gym   Weighted walk: fwd, side/side,retro x2 20#  Improved stability-SBA  No SOB present   Trampoline ball toss: narrow stance, tandem stance x10 each Yellow ball  Attempted airex, pt noted \"my legs feel so wobbly all of a sudden\"          REassessment  30 min  x    Perceive exertion following PT session 0-10  0   Just legs feeling tired. Specific Interventions Next Treatment: balance retraining, strengthening core and LEs left drop foot, LLE weakness of hip and knee, ankle. Gait training with straight cane to no assistive device. Stair step negotiation.  (history of heart transplant 2/7/2022, (history of arachnoiditis, lumbar fusion and cervical fusion)     Activity/Treatment Tolerance:  [x]  Patient tolerated treatment well  [x]  Patient limited by fatigue   []  Patient limited by pain   []  Patient limited by medical complications  []  Other:     Assessment: Reasessment today. Revised goals as noted in goal summary. Patient demonstrating progress with strength LLE. Endurance with 6 minute walk test with improved distance. Cordova Balance improved to 49/56. Will continue to follow 2x per week for 4-6 more weeks with progression in strength, balance, and endurance. Noted patient does complain of his history of arachnoidits at back and LEs which is hindering some of his balance and strength in his LEs, Strength demonstrating gradual improvement. Body Structures/Functions/Activity Limitations: impaired activity tolerance, impaired balance, impaired endurance, impaired sensation, impaired strength and abnormal gait  Prognosis: good    GOALS:  Patient Goal: to participate in cardiac rehab following PT and be able to have improved activity level tolerance to cook, negotiate stair steps and walk without walker. Short Term Goals:  Time Frame: 4 weeks  1. Patient to demonstrate increased strength LLE: hip flexion 3+ to 4-/5 to 4/5, knee extensors and flexors from 4-/5 to 4+/5, left ankle dorsiflexion from 3/5 3+/5 to 4-/5. With increased stability and improved walking pattern with less high steppage gait with LLE, improved ankle strength with less left foot drop. GOAL MET for hip flexors left 4/5, knee flexors and extensors 5/5, left ankle, 4-/5 left ankle dorsiflexion  Revise goal:  1. Patient to demonstrate increased strength LLE: hip flexion from 4/5 to 4+/5, left ankle dorsiflexion 4-/5 to 4/5 with less ami steppage gait LLE increased stability with walking pattern. 2. Patient to negoatiate steps 8 steps without use of handrail with improved balance and strength modified independently. MET: Patient able to negotiate 4 steps x2 without use of handrail and using a reciprocal pattern  Revised Goal:   2. Patient to negotiated 12 steps without use of handrail with improved balance, strength modified independently.      3. 6 minute walk test with stable vitals with ability to complete distance from 614 feet to 700 feet and perceived exertion from 5 to 3. MET: Patient ambulated 676 5178 feet with perceived exertion of 1. Revised Goal \:  3. Patient to complete 6 minute walk test in fron 743 to 800 feet with perceived exertion no greater than 3/10. Long Term Goals:  Time Frame: 8 weeks  1. Cordova Balance test from 48/56 to 52/56. NOT MET  Continue goal:   1. Cordova Balance test 49/56    2. 6 minute walk test from 700 feet to 800 feet with perceived exertion <3. NOT MET    3. Patient to demonstrate independence in HEP with progression in endurance, increased core and LE strength, improved ambulation status to no assistive device with improved walking pattern and balance. MET: Patient reports compliance with HEP      Patient Education:   [x]  HEP/Education Completed: Continue HEP, monitor fatigue, BP, SOB   Medbridge Access Code:  []  No new Education completed  [x]  Reviewed Prior HEP      []  Patient verbalized and/or demonstrated understanding of education provided. []  Patient unable to verbalize and/or demonstrate understanding of education provided. Will continue education. []  Barriers to learning: none    PLAN:  Treatment Recommendations: Strengthening, Balance Training, Functional Mobility Training, Transfer Training, Endurance Training, Gait Training, Stair Training, Neuromuscular Re-education, Home Exercise Program and Patient Education    []  Plan of care initiated. Plan to see patient 3 times per week for 8 weeks to address the treatment planned outlined above.   [x]  Continue with current plan of care  []  Modify plan of care as follows:    []  Hold pending physician visit  []  Discharge    Time In 0905   Time Out 0945   Timed Code Minutes: 15   Total Treatment Time: 40       Electronically Signed by: Brandan Cross PT

## 2022-06-08 ENCOUNTER — HOSPITAL ENCOUNTER (OUTPATIENT)
Dept: OCCUPATIONAL THERAPY | Age: 62
Setting detail: THERAPIES SERIES
Discharge: HOME | End: 2022-06-08
Payer: COMMERCIAL

## 2022-06-08 ENCOUNTER — HOSPITAL ENCOUNTER (OUTPATIENT)
Dept: PHYSICAL THERAPY | Age: 62
Setting detail: THERAPIES SERIES
Discharge: HOME | End: 2022-06-08
Payer: COMMERCIAL

## 2022-06-08 PROCEDURE — 97110 THERAPEUTIC EXERCISES: CPT

## 2022-06-08 PROCEDURE — 97112 NEUROMUSCULAR REEDUCATION: CPT

## 2022-06-08 NOTE — DISCHARGE SUMMARY
household walking. Uses walker for community ambulation and when outside home. Notes decreased balance. Does have some chest pain and right posterior shoulder and upper back discomfort with pulling up with right arm or supporting through the arm. Patient not to push or pull, no lifting more than 5#s. No pressure on his chest with use of his arms. Patient's endurance is limited with ability to walk 15 min using his walker and use of Elliptical bike at home for 15 minutes. Patient plans to complete cardiac rehab after completing OT/PT. SUBJECTIVE: Pt.reports that he was cooking over the weekend. BP taken during session 148/82, 87 HR, 99 % O2 on room air.     TREATMENT   Precautions: Precaution lifted 5/3/22; NO LAT PULL DOWNS   Pain:  Reports lower back pain down his legs     X in shaded column indicates Activity Completed Today   Modalities Parameters/  Location  Notes/Comments                     Manual Therapy Time/  Technique  Notes/Comments                     Exercises   Sets/  Sec Reps  Notes/Comments    AROM B UEs  to warm up for shoulder flexion  1 10   warm up     Dynamic standing endurance - slight lunges forward while throwing and catching yellow weighted ball at rebounder 1 10            Fatigue noted with this task today         Biodex in sitting at 60  RPM, 3 Min fwd, 3  min bwd   x During seated RB from standing tasks    Seated on large [employer] for BUE PRE's using for core strength, UE strength, and endurance:  Shoulder flexion with 1# alternating lifting RUE/LLE, then LUE/RLE  Chest press outward 3 # wt  Horiz ABD  using 2#  Trunk rotation holding green physio ball out front  Army press 3#  ER with 3# 1 15 ea  Cues to keep abdominal tight while on ball; Required  for balance on ball with alternating UE/LE    Chest press 60#  2 20     tricep 40#/30# 2  20 /20     Bicep curl 25 #/20 # 2 20  Seated rest break after nautilis equipment   Standing for orange theraband exercises BUE:  Shoulder abduction  Shoulder flexion  Shoulder extension  Elbow bicep curl  Riivald 1 15 ea  Done with pt. Standing on band exercise 1-4   Hydrostick BUE for sh flex, side to side, circles, diagonals 1 10  For UE strength, standing balance and endurance;1 quick rest break after ex. Standing  trunk strengthening ex with jarvis joshua holding ball out in front, lifting ball up, trunk twists . B feet close together to challenge balance 1 10  fartigue noted with LEs during challenging balance task. Required 1 seated rest break. Gross gripper with 55# - bouchra. hands Hold 5 sec 15     Green flex bar for forearm strengthening , flex, ext, twists  1 15  Palm up, palm down, twists,    Standing rebounder with 2.5 kg ball 3 15  1st trial B feet close, 2nd trial step and toss to challenge balance and UE strength   Modified Wan ex leaning over the table  I, T, Y, extension, 2 # rows 1 10  BUE   Low row 30#  2 15                   Activities Time    Notes/Comments   Completed dynavision tasks  60 second intervals, green light/red, 2 digit number flash. Variety of tasks on the dynavision this date x Min SOB, no RB needed   Simulated homemaking tasks of gathering items for transporting throughout the home 10 min  Pt able to carry a basket of items, up to 6 # weights and 2 # basket around the clinic with independence. Pt stood x 2 trials for short standing rest break. Min fatigue noted. Pt able to balance cones with ball on top with ambulation, good tolerance. Specific Interventions Next Treatment: Strengthening, endurance, dynamic standing balance for ADL and IADL tasks, breathing tech during IADL tasks     Activity/Treatment Tolerance:  [x]  Patient tolerated treatment well  []  Patient limited by fatigue  []  Patient limited by pain   []  Patient limited by other medical complications  []  Other:     Assessment: Patient has made great progress toward goals.   Patient reports that he has returned to performance of his indoor chores, such as cooking. Reports that some days are better than others regarding activity tolerance. Patient reports that the arachnoiditis creates greater difficulty with performance of activity than strength or activity tolerance. Patient is continuing with PT at this time. Still plans for cardiac therapy upon completion of PT. Discharge this date from OT. Patient denies questions with HEP and reports that he has all of the handouts that he needs. Areas for Improvement: impaired activity tolerance, impaired balance, impaired endurance, impaired safety awareness, impaired strength and abdominal- diafragmatic breathing, purse lip breathing tech with IADL tasks. Prognosis: good    Patient Education:     HEP/Education Completed: Plan of Care, Goals,  Reviewed using a lighter theraband for HEP as provided to the pt from prior facility    Chandrak[facility] [address] for HEP:   []  No new Education completed  [x]  Reviewed Prior HEP      []  Patient verbalized and/or demonstrated understanding of education provided. []  Patient unable to verbalize and/or demonstrate understanding of education provided. Will continue education. [x]  Barriers to learning: none    GOALS:  Patient Goal: I want better balance to be able to do daily tasks without my RW    Short Term Goals:  Time Frame: 10 sessions. *  Patient will demonstrate I knowledge of HEP for improved flexibility and strength for lifting. GOAL MET   *  PT will tolerate an OT session standing x 20 min with independence and no  LOB to improve endurance for homemaking tasks  GOAL MET rest breaks required varies  *  Pt will complete B UE endurance tasks including increased weighted activities as recommended by physician without rest breaks.   GOAL  MET rest breaks required varies     Long Term Goals:  Time Frame: 8 weeks  *  Patient will improve UEFS to at least 102/104 GOAL NOT MET 92     PLAN:  Treatment Recommendations: Strengthening, Balance Training, Endurance Training, Neuromuscular Re-education, Home Exercise Program, Patient Education, Safety Education and Training, and Self-Care Education and Training    []  Plan of care initiated. Plan to see patient 3 times per week for 6 weeks to address the treatment planned outlined above. []  Continue with current plan of care to see pt for the 4 remaining sessions then transition to HEP.    []  Modify plan of care as follows:    []  Hold pending physician visit  [x]  Discharge    Time In 0800   Time Out 0830   Timed Code Minutes: 30 min   Total Treatment Time: 30  min       Electronically Signed by: SHIMON Ward, OTR/L [id number]

## 2022-06-08 NOTE — PROGRESS NOTES
[address] [facility]  PHYSICAL THERAPY  [] EVALUATION  [x] DAILY NOTE (LAND) [] DAILY NOTE (AQUATIC ) [] PROGRESS NOTE [] DISCHARGE NOTE      [x] OUTPATIENT REHABILITATION CENTER [facility]   [] [place] [address]    [] 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 Functional Outcome Measure Used Walking test for 6 minutes, Cordova Balance   Functional Outcome Score walking test: 614 feet in 6 minutes, perceived exertion 5,  Cordova balance: 41/56 (3/30/22)   6 minute walk test 743 feet, perceived exertion 1, Cordova Balance test 49/56  2022 PN      Insurance: Primary: Payor: Yuniel Ford /  /  / ,   Secondary: [facility]   Authorization Information: PRE CERTIFICATION REQUIRED: NO  INSURANCE THERAPY BENEFIT:  ALLOWED 30 PT, AND 30 OT VISITS PER CALENDAR YEAR, NONE OF THESE VISITS HAVE BEEN USED  AQUATIC THERAPY COVERED: YES  MODALITIES COVERED:  YES, YES MASSAGE  TELEHEALTH COVERED: NO  REFERENCE NUMBER: [id number]   Visit # 25, 1/10 for progress note   Visits Allowed: 30   Recertification Date:    Physician Follow-Up: . Physician Orders: PT evaluate and treat 3x per week per 6 weeks, OT evaluate and treat, 3x per week per 6 weeks   History of Present Illness: Patient reports of heart transplant on 2022 and was in [place] for rehab for 17 days.  He takes regular BP and today BP 136/70. Since he has had his new heart notes less shortness of breath. 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 Notes worse problem right now is weakness in his legs (left >right)  and negotiating stair steps in trilevel home and completing squats. Has to go up stair steps one at a time. Patient uses straight cane at his home for household walking. Uses walker for community ambulation and when outside home. Notes decreased balance. Does have some chest pain and right posterior shoulder and upper back discomfort with pulling up with right arm or supporting through the arm. Patient not to push or pull, no lifting more than 5#s. No pressure on his chest with use of his arms. Patient's endurance is limited with ability to walk 15 min using his walker and use of Elliptical bike at home for 15 minutes. Patient plans to complete cardiac rehab after completing OT/PT. SUBJECTIVE:   Patient reports of walking quite a bit yesterday for about 2 miles. Notes feeling pretty good today. Arachnoiditis for back and lower extremities is not quite as bad today. Adjusted dosage for BP and antirejection med which was bumped down per patient. Notes occasional dizziness with about 2x this week. Vitals  Start of session seated: /80 mmHg, pulse 84 bpm,  O2 sats 97%  Following Nustep: 147/78 pulse 85, O2 sats 97  30 min into session: /85  mmHg,95  pulse bpm,   97 O2 sats %  Perceived exertion 7. Final end of session: /80 mmHg, pulse 88 bpm, O2 sats 97%     TREATMENT   Precautions: No lifting restrictions. Per therapist discretion. Conservative management with the exercises for strengthening due to heart transplant and decreasing stress on heart. Proper breath with exercises. HEART TRANSPLANT 2/7/2022. History of back and neck surgery. Pain: 0/10 anterior chest incision/sternal region.  4/10 arachnoiditis pain in lower back and legs X in shaded column indicates activity completed today   Modalities Parameters/  Location  Notes                     Manual Therapy Time/Technique  Notes                     Exercise/Intervention   Notes   6 min walk test  743 feet O2sat 97%, pulse 97  Perceived exertion 1   Cordova Balance test 48/56      Bilateral heelraises with cues to wb throug great toes more instead of rolling ankle out  x15  x     Forward lunges in // bars with one hand hold  5x  x    Partial squats with ball between knees on foam.marches on foam x15   Marches on foam only    3 way hip on foam  x20   Held airex d/t pt increased fatigue   BUE support    Standing balance, narrow stance eyes closed, tandem stance r/l, l/r with eyes open  (on Foam) 30 seconds each  x Decreased EC time   Rockerboard fwd/back, side to side  15x Balance 30 seconds  x    4 inch step ups with eccentric lowering with tap of opposite foot behind-forward  12x      Step ups standing on  6 inch lat/fwd x15   Forward only today  Pt had difficulty with clearance with L foot while ascending    Alternating step taps 6 inch standing  2x 15x    1 UE support   NK table  Extension/flexion LLE  15#  2x15 x    NK table extension/flexion RLE  15#  2x15 x    Total gym squats Level J x15- DL  x10 SL bilat     Pillow behind head. Added bilat SL squats*   Tandem  stance on airex 30 seconds  x    Heel and toe taps on airex fwd/bwd x12   Mild sway  1UE support    Marilia step overs X4/2 laps green x    Dynamic gait: retro, marching, tandem, sidestepping, hamstring curls  x2 laps // rail  x    SC ambulation 200' In clinic  LLE weaker and tends to lock into place while ambulating. Vcs to avoid sharp turns   SBA- steady    Stair negotiation  4 steps x4   1-2 light touches to rails, reciprocal pattern   Sit<>stands from chair butt taps with cushion in chair  x12  x    Gait training without walker     May ambulate without walker in session between stations.  SBA   Nu step * UE/LE   6 min extensors and flexors from 4-/5 to 4+/5, left ankle dorsiflexion from 3/5 3+/5 to 4-/5. With increased stability and improved walking pattern with less high steppage gait with LLE, improved ankle strength with less left foot drop. GOAL MET for hip flexors left 4/5, knee flexors and extensors 5/5, left ankle, 4-/5 left ankle dorsiflexion  Revise goal:  1. Patient to demonstrate increased strength LLE: hip flexion from 4/5 to 4+/5, left ankle dorsiflexion 4-/5 to 4/5 with less ami steppage gait LLE increased stability with walking pattern. 2. Patient to negoatiate steps 8 steps without use of handrail with improved balance and strength modified independently. MET: Patient able to negotiate 4 steps x2 without use of handrail and using a reciprocal pattern  Revised Goal:   2. Patient to negotiated 12 steps without use of handrail with improved balance, strength modified independently. 3. 6 minute walk test with stable vitals with ability to complete distance from 614 feet to 700 feet and perceived exertion from 5 to 3. MET: Patient ambulated 676 5178 feet with perceived exertion of 1. Revised Goal \:  3. Patient to complete 6 minute walk test in fron 743 to 800 feet with perceived exertion no greater than 3/10. Long Term Goals:  Time Frame: 8 weeks  1. Cordova Balance test from 48/56 to 52/56. NOT MET  Continue goal:   1. Cordova Balance test 49/56    2. 6 minute walk test from 700 feet to 800 feet with perceived exertion <3. NOT MET    3. Patient to demonstrate independence in HEP with progression in endurance, increased core and LE strength, improved ambulation status to no assistive device with improved walking pattern and balance.  MET: Patient reports compliance with HEP      Patient Education:   [x]  HEP/Education Completed: Continue HEP, monitor fatigue, BP, SOB   Medbridge Access Code:  []  No new Education completed  [x]  Reviewed Prior HEP      []  Patient verbalized and/or demonstrated understanding of education provided. []  Patient unable to verbalize and/or demonstrate understanding of education provided. Will continue education. []  Barriers to learning: none    PLAN:  Treatment Recommendations: Strengthening, Balance Training, Functional Mobility Training, Transfer Training, Endurance Training, Gait Training, Stair Training, Neuromuscular Re-education, Home Exercise Program and Patient Education    []  Plan of care initiated. Plan to see patient 3 times per week for 8 weeks to address the treatment planned outlined above.   [x]  Continue with current plan of care  []  Modify plan of care as follows:    []  Hold pending physician visit  []  Discharge    Time In 0830   Time Out 0919   Timed Code Minutes: 49   Total Treatment Time: 49       Electronically Signed by: Emmett Barton PT Hydroxyzine Counseling: Patient advised that the medication is sedating and not to drive a car after taking this medication.  Patient informed of potential adverse effects including but not limited to dry mouth, urinary retention, and blurry vision.  The patient verbalized understanding of the proper use and possible adverse effects of hydroxyzine.  All of the patient's questions and concerns were addressed.

## 2022-06-10 ENCOUNTER — APPOINTMENT (OUTPATIENT)
Dept: GENERAL RADIOLOGY | Age: 62
End: 2022-06-10
Payer: COMMERCIAL

## 2022-06-10 ENCOUNTER — HOSPITAL ENCOUNTER (EMERGENCY)
Age: 62
Discharge: HOME | End: 2022-06-10
Payer: COMMERCIAL

## 2022-06-10 VITALS
TEMPERATURE: 97.3 F | HEART RATE: 86 BPM | RESPIRATION RATE: 16 BRPM | DIASTOLIC BLOOD PRESSURE: 64 MMHG | SYSTOLIC BLOOD PRESSURE: 114 MMHG | OXYGEN SATURATION: 96 % | WEIGHT: 225 LBS | BODY MASS INDEX: 33.33 KG/M2 | HEIGHT: 69 IN

## 2022-06-10 DIAGNOSIS — S20.212A CONTUSION OF RIB ON LEFT SIDE, INITIAL ENCOUNTER: ICD-10-CM

## 2022-06-10 DIAGNOSIS — S00.81XA ABRASION OF FOREHEAD, INITIAL ENCOUNTER: Primary | ICD-10-CM

## 2022-06-10 DIAGNOSIS — S09.90XA CLOSED HEAD INJURY, INITIAL ENCOUNTER: ICD-10-CM

## 2022-06-10 PROCEDURE — 6360000002 HC RX W HCPCS: Performed by: NURSE PRACTITIONER

## 2022-06-10 PROCEDURE — 71101 X-RAY EXAM UNILAT RIBS/CHEST: CPT

## 2022-06-10 PROCEDURE — 99213 OFFICE O/P EST LOW 20 MIN: CPT

## 2022-06-10 PROCEDURE — 99203 OFFICE O/P NEW LOW 30 MIN: CPT | Performed by: NURSE PRACTITIONER

## 2022-06-10 PROCEDURE — 96372 THER/PROPH/DIAG INJ SC/IM: CPT

## 2022-06-10 RX ORDER — PREDNISONE 20 MG/1
10 TABLET ORAL
COMMUNITY
Start: 2022-03-17

## 2022-06-10 RX ORDER — TRAMADOL HYDROCHLORIDE 50 MG/1
50 TABLET ORAL EVERY 6 HOURS PRN
Qty: 12 TABLET | Refills: 0 | Status: SHIPPED | OUTPATIENT
Start: 2022-06-10 | End: 2022-06-13

## 2022-06-10 RX ORDER — KETOROLAC TROMETHAMINE 30 MG/ML
30 INJECTION, SOLUTION INTRAMUSCULAR; INTRAVENOUS ONCE
Status: COMPLETED | OUTPATIENT
Start: 2022-06-10 | End: 2022-06-10

## 2022-06-10 RX ADMIN — KETOROLAC TROMETHAMINE 30 MG: 30 INJECTION, SOLUTION INTRAMUSCULAR at 10:36

## 2022-06-10 ASSESSMENT — ENCOUNTER SYMPTOMS
ABDOMINAL PAIN: 0
BACK PAIN: 1

## 2022-06-10 ASSESSMENT — PAIN DESCRIPTION - LOCATION
LOCATION: BACK
LOCATION: BACK

## 2022-06-10 ASSESSMENT — PAIN DESCRIPTION - PAIN TYPE: TYPE: ACUTE PAIN

## 2022-06-10 ASSESSMENT — PAIN SCALES - GENERAL
PAINLEVEL_OUTOF10: 10
PAINLEVEL_OUTOF10: 10

## 2022-06-10 ASSESSMENT — PAIN - FUNCTIONAL ASSESSMENT
PAIN_FUNCTIONAL_ASSESSMENT: 0-10
PAIN_FUNCTIONAL_ASSESSMENT: PREVENTS OR INTERFERES WITH MANY ACTIVE NOT PASSIVE ACTIVITIES

## 2022-06-10 ASSESSMENT — PAIN DESCRIPTION - DESCRIPTORS: DESCRIPTORS: DISCOMFORT

## 2022-06-10 ASSESSMENT — PAIN DESCRIPTION - ORIENTATION
ORIENTATION: LEFT
ORIENTATION: LEFT

## 2022-06-10 ASSESSMENT — PAIN DESCRIPTION - FREQUENCY: FREQUENCY: CONTINUOUS

## 2022-06-10 ASSESSMENT — PAIN DESCRIPTION - ONSET: ONSET: SUDDEN

## 2022-06-10 NOTE — ED TRIAGE NOTES
Discharge instructions and prescriptions reviewed with pt. Pt verbalized understanding. Pt ambulated out in stable condition. Pain has decreased upon discharge.

## 2022-06-10 NOTE — ED PROVIDER NOTES
[place]  Urgent Care Encounter       CHIEF COMPLAINT       Chief Complaint   Patient presents with    Fall    Back Pain       Nurses Notes reviewed and I agree except as noted in the HPI. HISTORY OF PRESENT ILLNESS   Ethel Marion is a 57 y.o. male who presents to the urgent care center complaining of pain to the left upper back. The patient stated that on Wednesday he was returning from physical therapy when he was getting out of the car walking into his home apparently felt dizzy and fell with his left side of the back striking the corner of some stairs. Patient did hit his head but denied any loss of consciousness the patient does have an abrasion noted to the forehead above the eyebrow. The patient did have a heart transplant at [facility] in February. They have been trying to adjust his medication and apparently his blood pressure has been hard to control. The patient states that over the past 2 days he feels fine other than pain with movement and inspiration. He complains of sharp stabbing type pain to his left back and rates his pain 10 on a 10 scale. He denies any altered mental status confusion nausea vomiting. The patient does walk with a cane and was ambulatory to the room without any assistance. Patient is awake alert and oriented x3 at the present time. The history is provided by the patient. No  was used.    Back Pain  Location:  Thoracic spine  Quality:  Stabbing  Pain severity:  Severe  Pain is:  Same all the time  Onset quality:  Sudden  Duration:  2 days  Timing:  Constant  Progression:  Unchanged  Chronicity:  New  Relieved by:  Nothing  Worsened by:  Nothing  Ineffective treatments:  None tried  Associated symptoms: no abdominal pain, no bladder incontinence, no chest pain, no headaches, no leg pain, no numbness, no pelvic pain and no weakness        REVIEW OF SYSTEMS     Review of Systems   Cardiovascular: Negative for chest pain. Gastrointestinal: Negative for abdominal pain. Genitourinary: Negative for bladder incontinence and pelvic pain. Musculoskeletal: Positive for back pain. Neurological: Negative for weakness, numbness and headaches. PAST MEDICAL HISTORY         Diagnosis Date    Acute kidney injury ([address]     Cardiomyopathy, dilated ([address]     VIRAL    Claustrophobia     Congestive heart failure (CHF) (HCC)     Depression 12/26/2013    HTN (hypertension)     Hyperlipidemia     Medtronic dual ICD 2/14/2014    Osteoarthritis     Osteoarthritis of spine with radiculopathy, lumbar region 12/28/2015    Spinal stenosis     Unspecified sleep apnea     unable to wear CPAP       SURGICALHISTORY     Patient  has a past surgical history that includes cardiovascular stress test (09/2013); doppler echocardiography (09/2013); doppler echocardiography (09/2013); Nasal sinus surgery; Carpal tunnel release (Right); Colonoscopy; back surgery (8/26/2014); Endoscopy, colon, diagnostic; Vasectomy (???); sinus surgery (1980's???); other surgical history (10/9/2015); Neck surgery (2015); back surgery (2013); Tonsillectomy (1980's??); hernia repair (???); other surgical history (01/18/2016); other surgical history (2-8-2016); other surgical history (N/A, 03-21-16); other surgical history (Bilateral, 05-16-16); other surgical history (08/01/2016); cervical fusion (05/17/2017); other surgical history (Left, 09/11/2017); Lumbar spine surgery (Left, 9/11/2017); other surgical history (Right, 10/31/2017); Lumbar spine surgery (Right, 10/31/2017); other surgical history (Right, 05/09/2018); pr exc skin benig <5mm remaindr body (Right, 5/9/2018); pr inject rx other periph nerve (Left, 6/19/2018); Lumbar spine surgery (Left, 4/11/2019); Cardiac catheterization (10/2013); Cardiac defibrillator placement (2013); Injection Procedure For Sacroiliac Joint (Left, 7/2/2019);  Injection Procedure For Sacroiliac Joint (Left, 8/26/2019); Lumbar spine surgery (Left, 10/3/2019); Facet joint injection (Right, 5/26/2020); Diagnostic Cardiac Cath Lab Procedure (07/2020); pacemaker placement; Colonoscopy (Left, 3/15/2021); and Heart transplant (02/07/2022).     CURRENT MEDICATIONS       Discharge Medication List as of 6/10/2022 11:41 AM      CONTINUE these medications which have NOT CHANGED    Details   predniSONE (DELTASONE) 20 MG tablet Take 10 mg by mouth daily (with breakfast)Historical Med      torsemide (DEMADEX) 20 MG tablet 4 tabs AM, 3 tabs PM, Disp-90 tablet, R-1Historical Med      tamsulosin (FLOMAX) 0.4 MG capsule Take 1 capsule by mouth nightlyHistorical Med      metOLazone (ZAROXOLYN) 5 MG tablet Take 1 tablet by mouth daily as needed (AS directed by CHF clinic), Disp-30 tablet, R-0Normal      isosorbide dinitrate (ISORDIL) 5 MG tablet Take 10 mg by mouth 3 times daily Historical Med      potassium chloride (KLOR-CON M) 20 MEQ extended release tablet TAKE 1 TABLET BY MOUTH ONCE DAILYHistorical Med      hydrALAZINE (APRESOLINE) 25 MG tablet TAKE 1 2 (ONE HALF) TABLET BY MOUTH THREE TIMES DAILYHistorical Med      finasteride (PROSCAR) 5 MG tablet Take 5 mg by mouth dailyHistorical Med      JARDIANCE 10 MG tablet 10 mg , DAWHistorical Med      cyclobenzaprine (FLEXERIL) 5 MG tablet Take 5 mg by mouth daily as needed for Muscle spasmsHistorical Med      metoprolol succinate (TOPROL XL) 25 MG extended release tablet Take 0.5 tablets by mouth dailyHistorical Med      amiodarone (CORDARONE) 200 MG tablet Take 1 tablet twice a day for 7 days, then decrease to 1 tablet by mouth once a day, Disp-90 tablet,R-1Normal      atorvastatin (LIPITOR) 80 MG tablet Take 80 mg by mouth nightlyHistorical Med      digoxin (LANOXIN) 125 MCG tablet Take 0.125 mg by mouth every other day Historical Med      valsartan (DIOVAN) 40 MG tablet Take 20 mg by mouth 2 times daily Historical Med      Magnesium 500 MG CAPS Take by mouth dailyHistorical Med      zinc sulfate (ZINCATE) 220 (50 Zn) MG capsule Take 50 mg by mouth dailyHistorical Med      allopurinol (ZYLOPRIM) 300 MG tablet Take 300 mg by mouth dailyHistorical Med      Acetaminophen (TYLENOL ARTHRITIS PAIN PO) Take by mouth 3 times daily as neededHistorical Med             ALLERGIES     Patient is has No Known Allergies. Patients   Immunization History   Administered Date(s) Administered    COVID-19, Pfizer Purple top, DILUTE for use, 12+ yrs, 30mcg/0.3mL dose 03/11/2021, 04/01/2021, 10/25/2021    Hepatitis A/Hepatitis B (Twinrix) 09/09/2020    Influenza Virus Vaccine 09/09/2020    Pneumococcal Conjugate 13-valent (Glennda Drake) 09/09/2020    Tdap (Boostrix, Adacel) 07/18/2018    Zoster Recombinant (Shingrix) 09/11/2020       FAMILY HISTORY     Patient's family history includes Coronary Art Dis in his father; Heart Attack in his father; Heart Disease in his father; High Blood Pressure in his father; Parkinsonism in his father. SOCIAL HISTORY     Patient  reports that he has never smoked. He has never used smokeless tobacco. He reports current alcohol use of about 6.0 standard drinks of alcohol per week. He reports that he does not use drugs. PHYSICAL EXAM     ED TRIAGE VITALS  BP: 114/64, Temp: 97.3 °F (36.3 °C), Heart Rate: 86, Resp: 16, SpO2: 96 %,Estimated body mass index is 33.23 kg/m² as calculated from the following:    Height as of this encounter: 5' 9\" (1.753 m). Weight as of this encounter: 225 lb (102.1 kg). ,No LMP for male patient. Physical Exam  Vitals and nursing note reviewed. Constitutional:       General: He is not in acute distress. Appearance: Normal appearance. He is well-developed. He is not ill-appearing, toxic-appearing or diaphoretic. HENT:      Head: Normocephalic. Abrasion present. No raccoon eyes or Andrade's sign.         Right Ear: External ear normal.      Left Ear: External ear normal.      Nose: Nose normal.      Mouth/Throat:      Mouth: Mucous membranes are moist. Eyes:      Pupils: Pupils are equal, round, and reactive to light. Cardiovascular:      Rate and Rhythm: Normal rate. Pulmonary:      Effort: Pulmonary effort is normal. No accessory muscle usage or respiratory distress. Breath sounds: Normal breath sounds and air entry. No decreased air movement. Comments: Patient has incisional scarring noted over the lumbar spine. Musculoskeletal:         General: Tenderness and signs of injury present. No swelling. Cervical back: Normal range of motion. Thoracic back: Tenderness and bony tenderness present. No swelling or lacerations. Decreased range of motion. Back:       Comments: Left thoracic and rib region as well as a hematoma to the right pelvis. Patient has a linear hematoma noted to the left thoracic spine. Patient also has a resolving hematoma to the right lower back. Skin:     General: Skin is warm and dry. Capillary Refill: Capillary refill takes less than 2 seconds. Neurological:      Mental Status: He is alert and oriented to person, place, and time. GCS: GCS eye subscore is 4. GCS verbal subscore is 5. GCS motor subscore is 6. Cranial Nerves: No facial asymmetry. Sensory: Sensation is intact. No sensory deficit. Motor: Motor function is intact. Gait: Gait is intact. Psychiatric:         Mood and Affect: Mood normal.         Behavior: Behavior normal. Behavior is cooperative. DIAGNOSTIC RESULTS     Labs:No results found for this visit on 06/10/22. IMAGING:    XR RIBS LEFT INCLUDE CHEST (MIN 3 VIEWS)   Final Result   Mild cardiomegaly. No acute cardiopulmonary disease            **This report has been created using voice recognition software. It may contain minor errors which are inherent in voice recognition technology. **      Final report electronically signed by Dr. Thai Montejo on 6/10/2022 11:55 AM            EKG:      URGENT CARE COURSE:     Vitals:    06/10/22 1012 06/10/22 1146   BP: 139/72 114/64   Pulse: 90 86   Resp: 16    Temp: 97.3 °F (36.3 °C)    TempSrc: Temporal    SpO2: 96%    Weight: 225 lb (102.1 kg)    Height: 5' 9\" (1.753 m)        Medications   ketorolac (TORADOL) injection 30 mg (30 mg IntraMUSCular Given 6/10/22 1036)     Patient states that he has pain 7 on a 10 scale       PROCEDURES:  None    FINAL IMPRESSION      1. Abrasion of forehead, initial encounter    2. Closed head injury, initial encounter    3. Contusion of rib on left side, initial encounter          DISPOSITION/ PLAN      I did discuss clinical findings with the patient as well as vital signs in assessment findings. I did discuss with Patient/patient representative of findings of chest wall/rib injury. The patient was advised to use a pillow to the effected area for splinting with deep inspirations. The patient was advised to take at least 10 deep breaths per hour to reduce the incidence or chances of atelectasis. the patient was also advised to monitor for any chest pain different from what his currently experiencing, shortness of breath, hemoptysis, development of fever or any swelling to the area. The patient was also advised to take medication as directed, apply ice to the area 15-20 minutes 2-3 times a day for the first 24-48 hours followed with heat for 15-20 minutes every 3-4 hours for comfort. The patient was advised to follow-up with her primary care provider for reevaluation within 1 week. If the patient experiences any of the above symptoms the patient is to go to the emergency department for reevaluation or dial 911. The patient/Patient representative is agreeable to the current treatment plan at this time. Patient left ambulatory without any problems.     PATIENT REFERRED TO:  MD Kieran Maguire [address] / [facility] [address]      DISCHARGE MEDICATIONS:  Discharge Medication List as of 6/10/2022 11:41 AM      START taking these medications    Details traMADol (ULTRAM) 50 MG tablet Take 1 tablet by mouth every 6 hours as needed for Pain for up to 3 days. , Disp-12 tablet, R-0Normal             Discharge Medication List as of 6/10/2022 11:41 AM          Discharge Medication List as of 6/10/2022 11:41 AM          KRIS Rodriguez CNP    (Please note that portions of this note were completed with a voice recognition program. Efforts were made to edit the dictations but occasionally words are mis-transcribed.)           KRIS Rodriguez CNP  06/10/22 4698

## 2022-06-13 ENCOUNTER — CARE COORDINATION (OUTPATIENT)
Dept: OTHER | Facility: CLINIC | Age: 62
End: 2022-06-13

## 2022-06-13 ENCOUNTER — HOSPITAL ENCOUNTER (OUTPATIENT)
Dept: PHYSICAL THERAPY | Age: 62
Setting detail: THERAPIES SERIES
End: 2022-06-13
Payer: COMMERCIAL

## 2022-06-13 NOTE — CARE COORDINATION
Ambulatory Care Coordination Note  6/13/2022  CM Risk Score: 5  Charlson 10 Year Mortality Risk Score: 47%     ACC: Roberto Carlos Ackerman, RN    Summary Note: ACM attempted to reach patient for introduction to Associate Care Management related to Urgent Care visit post fall for back pain. Unable to leave a message; voice mail box is full. Will attempt to outreach patient again.      Future Appointments   Date Time Provider Jhonathan Jaci   6/14/2022  7:30 AM KRIS Hodgson - CNP [facility] [place]   6/15/2022  9:45 AM Marcastrotte Hoots, PTA [facility] [facility]   6/20/2022  9:00 AM Dmitriy Regalado, PTA [facility] [place] [facility]   6/22/2022  8:00 AM Kit Geiger, PTA [facility] [facility]   6/27/2022  9:00 AM Radha Gaspar, PT [facility] [place] [facility]   6/29/2022  8:45 AM Joyae Hoots, PTA [facility] [place] [facility]   7/5/2022  8:00 AM Kit Geiger, PTA [facility] [place] [facility]   7/8/2022  8:00 AM Radha Gaspar, PT [facility] [place] [facility]   7/11/2022  9:00 AM Radha Gaspar, PT [facility] [place] [facility]   10/5/2022  8:00 AM Jennifer Henning MD [facility] [address] [facility]

## 2022-06-14 ENCOUNTER — OFFICE VISIT (OUTPATIENT)
Dept: PHYSICAL MEDICINE AND REHAB | Age: 62
End: 2022-06-14
Payer: COMMERCIAL

## 2022-06-14 ENCOUNTER — CARE COORDINATION (OUTPATIENT)
Dept: OTHER | Facility: CLINIC | Age: 62
End: 2022-06-14

## 2022-06-14 VITALS
DIASTOLIC BLOOD PRESSURE: 56 MMHG | WEIGHT: 225 LBS | BODY MASS INDEX: 33.33 KG/M2 | SYSTOLIC BLOOD PRESSURE: 100 MMHG | HEIGHT: 69 IN

## 2022-06-14 DIAGNOSIS — M54.6 ACUTE LEFT-SIDED THORACIC BACK PAIN: ICD-10-CM

## 2022-06-14 DIAGNOSIS — R07.81 RIB PAIN ON LEFT SIDE: ICD-10-CM
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
## 2022-06-14 DIAGNOSIS — M54.50 LUMBAR PAIN: ICD-10-CM

## 2022-06-14 PROCEDURE — G8427 DOCREV CUR MEDS BY ELIG CLIN: HCPCS | Performed by: NURSE PRACTITIONER

## 2022-06-14 PROCEDURE — G8417 CALC BMI ABV UP PARAM F/U: HCPCS | Performed by: NURSE PRACTITIONER

## 2022-06-14 PROCEDURE — 1036F TOBACCO NON-USER: CPT | Performed by: NURSE PRACTITIONER

## 2022-06-14 PROCEDURE — 99214 OFFICE O/P EST MOD 30 MIN: CPT | Performed by: NURSE PRACTITIONER

## 2022-06-14 PROCEDURE — 3017F COLORECTAL CA SCREEN DOC REV: CPT | Performed by: NURSE PRACTITIONER

## 2022-06-14 RX ORDER — SULFAMETHOXAZOLE AND TRIMETHOPRIM 800; 160 MG/1; MG/1
1 TABLET ORAL SEE ADMIN INSTRUCTIONS
COMMUNITY

## 2022-06-14 RX ORDER — MAGNESIUM OXIDE 400 MG/1
800 TABLET ORAL 2 TIMES DAILY
COMMUNITY

## 2022-06-14 RX ORDER — TACROLIMUS 1 MG/1
1 CAPSULE ORAL 2 TIMES DAILY
COMMUNITY

## 2022-06-14 RX ORDER — SENNA PLUS 8.6 MG/1
1 TABLET ORAL PRN
COMMUNITY

## 2022-06-14 RX ORDER — AMLODIPINE BESYLATE 10 MG/1
10 TABLET ORAL DAILY
COMMUNITY

## 2022-06-14 RX ORDER — METHIMAZOLE 10 MG/1
10 TABLET ORAL 3 TIMES DAILY
COMMUNITY

## 2022-06-14 RX ORDER — ASPIRIN 81 MG/1
81 TABLET ORAL DAILY
COMMUNITY
End: 2022-07-26

## 2022-06-14 RX ORDER — MYCOPHENOLATE MOFETIL 250 MG/1
CAPSULE ORAL 2 TIMES DAILY
COMMUNITY

## 2022-06-14 RX ORDER — CHOLECALCIFEROL (VITAMIN D3) 1250 MCG
1 CAPSULE ORAL
COMMUNITY

## 2022-06-14 RX ORDER — POLYETHYLENE GLYCOL 3350 17 G/17G
17 POWDER, FOR SOLUTION ORAL PRN
COMMUNITY

## 2022-06-14 RX ORDER — PRAVASTATIN SODIUM 20 MG
20 TABLET ORAL DAILY
COMMUNITY

## 2022-06-14 RX ORDER — TACROLIMUS 0.5 MG/1
0.5 CAPSULE ORAL DAILY
COMMUNITY

## 2022-06-14 RX ORDER — CYANOCOBALAMIN (VITAMIN B-12) 1000 MCG
1 TABLET, EXTENDED RELEASE ORAL 2 TIMES DAILY WITH MEALS
COMMUNITY

## 2022-06-14 RX ORDER — LANOLIN ALCOHOL/MO/W.PET/CERES
3 CREAM (GRAM) TOPICAL DAILY
COMMUNITY

## 2022-06-14 RX ORDER — AMITRIPTYLINE HYDROCHLORIDE 10 MG/1
10 TABLET, FILM COATED ORAL NIGHTLY
COMMUNITY

## 2022-06-14 NOTE — CARE COORDINATION
Ambulatory Care Coordination Note  6/14/2022  CM Risk Score: 5  Charlson 10 Year Mortality Risk Score: 47%     ACC: Adam Julian, RN    Summary Note: ACM attempted 2nd outreach to reach patient for introduction to Associate Care Management. Unable to leave a message; voice mail box is full. Unable to Reach Letter sent to patient via [place]. Will continue to outreach patient.          Future Appointments   Date Time Provider Jhonathan Rodriguezi   6/20/2022  9:00 AM Kandice Sherwood, PTA [facility] [place] [facility]   6/22/2022  8:00 AM Verneita Ashley, PTA [facility] [place] [facility]   6/27/2022  9:00 AM Severo Pert, PT [facility] [place] [facility]   6/29/2022  8:45 AM Daysi Verduzco, PTA [facility] [place] [facility]   7/5/2022  8:00 AM Gopi Ramirez, PTA [facility] [place] [facility]   7/6/2022 10:15 AM Cara Sofia MD [facility] [place]   7/8/2022  8:00 AM Severo Pert, PT [facility] [place] [facility]   7/11/2022  9:00 AM Severo Pert, PT [facility] [place] [facility]   10/5/2022  8:00 AM Ge Parsons MD [facility] [address] [facility]

## 2022-06-14 NOTE — PROGRESS NOTES
[facility] [address]  Dept: [phone]  Dept Fax: [phone]: [phone]    Visit Date: 6/14/2022    Functionality Assessment/Goals Worksheet     On a scale of 0 (Does not Interfere) to 10 (Completely Interferes)     1. Which number describes how during the past week pain has interfered with       the following:  A. General Activity:  10  B. Mood: 5  C. Walking Ability:  10  D. Normal Work (Includes both work outside the home and housework):  10  E. Relations with Other People:   5  F. Sleep:   10  G. Enjoyment of Life:   8    2. Patient Prefers to Take their Pain Medications:     []  On a regular basis   [x]  Only when necessary    []  Does not take pain medications    3. What are the Patient's Goals/Expectations for Visiting Pain Management? [x]  Learn about my pain    []  Receive Medication   []  Physical Therapy     []  Treat Depression   []  Receive Injections    []  Treat Sleep   []  Deal with Anxiety and Stress   []  Treat Opoid Dependence/Addiction   []  Other:    Chronic Pain/PM&R Clinic Note     Encounter Date: 6/14/22    Subjective:   Chief Complaint:   Chief Complaint   Patient presents with    Follow-up     discuss getting MRI        History of Present Illness:   Castillo Melchor is a 57 y.o. male seen in the clinic initially on 12/28/2015, followed this practice for years for pain and was last seen 7/09/2020. He reports he has been dealing with other health issues and not been able to come back. He had a heart transplant 2/7/2022, has been with occupational therapy and physical therapy for associated weakness, left side is worse. He presents today, 6/14/2022, for increased mid back, low back and new left sided rib pain. He reports that he fell last Wednesday, 6/8/2022, on a concrete step.  He reports that he was going up the steps, lost his radiculopathy, lumbar region 12/28/2015    Spinal stenosis     Unspecified sleep apnea     unable to wear CPAP       Past Surgical History:   Procedure Laterality Date    BACK SURGERY  8/26/2014    L4- S1 decompression, L4-5 PSF and TLIF    BACK SURGERY  2013   [facility]  10/2013    [facility]    CARDIAC DEFIBRILLATOR PLACEMENT  2013    [employer]    CARDIOVASCULAR STRESS TEST  09/2013    CARPAL TUNNEL RELEASE Right     CERVICAL FUSION  05/17/2017    [address]    COLONOSCOPY      COLONOSCOPY Left 3/15/2021    COLONOSCOPY POLYPECTOMY SNARE/COLD BIOPSY performed by Chanda Cotter DO at [place] [address] CATH LAB PROCEDURE  07/2020    [facility]    DOPPLER ECHOCARDIOGRAPHY  09/2013    DOPPLER ECHOCARDIOGRAPHY  09/2013    [facility]    ENDOSCOPY, COLON, DIAGNOSTIC      FACET JOINT INJECTION Right 5/26/2020    L-facet RFA @ L2-3,L3-4,L4-5  RIGHT performed by Ifrah Mercedes MD at [facility] [address] [facility] Left 7/2/2019    Left SI injection performed by Ifrah Mercedes MD at [address] [place] [address] Left 8/26/2019    Left SI injection # 2 performed by Ifrah Mercedes MD at [address] [place] [address] 280  02/07/2022    HERNIA REPAIR  ? ??    umbilical    LUMBAR SPINE SURGERY Left 9/11/2017    LUMBAR RFA L2-3, L3-4, L4-5, L5-S1 LEFT SIDE performed by Ifrah Mercedes MD at [facility] Right 10/31/2017    LUMBAR FACET RFA @ L2-3, L3-4, L4-5 AND L5-S1 RIGHT SIDE performed by Ifrah Mercedes MD at [facility] Left 4/11/2019    L-RFA @ L2-3, L3-4, L4-5, L5-S1 LEFT SIDE FIRST.  performed by Ifrah Mercedes MD at [address] Left 10/3/2019    LEFT SI RFA performed by Ifrah Mercedes MD at [address] [place] [address] SURGERY      NECK SURGERY  2015    OTHER SURGICAL HISTORY  10/9/2015    C3-6 ACDF    OTHER SURGICAL HISTORY  01/18/2016    FACET INJECTIONS L3-L4 L4-L5 L5 S1    OTHER SURGICAL HISTORY  2-8-2016    RFA - L3-S1    OTHER SURGICAL HISTORY N/A 03-21-16    cervical epidural block C7    OTHER SURGICAL HISTORY Bilateral 05-16-16    cervical facet block C7-T1    OTHER SURGICAL HISTORY  08/01/2016    CERVICAL ABLATION    OTHER SURGICAL HISTORY Left 09/11/2017    Lumbar RFA at L2-3, L3-4, L4-5, L5-S1    OTHER SURGICAL HISTORY Right 10/31/2017    Lumbar RFA at L2-3, L3-4, L4-5, L5-S1    OTHER SURGICAL HISTORY Right 05/09/2018    Excision scalp mass right forehead    PACEMAKER PLACEMENT      MI EXC SKIN BENIG <5MM REMAINDR BODY Right 5/9/2018    EXCISION RIGHT FOREHEAD CYST performed by Lizzy Gregory MD at [address] Left 6/19/2018    L-RFA @ L2-3, L3-4, L4-5, L5-S1 LEFT SIDE FIRST. performed by Dionne Mcclain MD at [address]  1980's???    TONSILLECTOMY  1980's??    VASECTOMY  ? ??       Family History   Problem Relation Age of Onset    Heart Disease Father     Coronary Art Dis Father     Heart Attack Father     High Blood Pressure Father     Parkinsonism Father          Medications & Allergies:   Current Outpatient Medications   Medication Instructions    Acetaminophen (TYLENOL ARTHRITIS PAIN PO) Oral, 3 TIMES DAILY PRN    allopurinol (ZYLOPRIM) 300 mg, DAILY    amiodarone (CORDARONE) 200 MG tablet Take 1 tablet twice a day for 7 days, then decrease to 1 tablet by mouth once a day    amitriptyline (ELAVIL) 10 mg, Oral, NIGHTLY    amLODIPine (NORVASC) 10 mg, Oral, DAILY    aspirin 81 mg, Oral, DAILY    atorvastatin (LIPITOR) 80 mg, NIGHTLY    calcium citrate-vitamin D (CITRICAL + D) 315-250 MG-UNIT TABS per tablet 1 tablet, Oral, 2 TIMES DAILY WITH MEALS    Cholecalciferol (VITAMIN D3) 1.25 MG (80533 UT) CAPS 1 capsule, Oral, Every Friday     cyclobenzaprine (FLEXERIL) 5 mg, DAILY PRN    digoxin (LANOXIN) 0.125 mg, Oral, EVERY OTHER DAY    finasteride (PROSCAR) 5 mg, Oral, DAILY    hydrALAZINE (APRESOLINE) 100 mg, Oral, 3 TIMES DAILY    isosorbide dinitrate (ISORDIL) 10 mg, 3 TIMES DAILY    Jardiance 10 mg    Magnesium 500 MG CAPS DAILY    magnesium oxide (MAG-OX) 800 mg, Oral, 2 TIMES DAILY    melatonin 3 mg, Oral, DAILY, 3 capsules by mouth daily     methIMAzole (TAPAZOLE) 10 mg, Oral, 3 TIMES DAILY    metOLazone (ZAROXOLYN) 5 mg, Oral, DAILY PRN    metoprolol succinate (TOPROL XL) 12.5 mg, DAILY    mycophenolate (CELLCEPT) 250 MG capsule Oral, 2 TIMES DAILY, 4 capsules BID     Pantoprazole Sodium (PROTONIX PO) 20 mg, Oral, DAILY    polyethylene glycol (GLYCOLAX) 17 g, Oral, PRN    potassium chloride (KLOR-CON M) 20 MEQ extended release tablet TAKE 1 TABLET BY MOUTH ONCE DAILY    pravastatin (PRAVACHOL) 20 mg, Oral, DAILY    predniSONE (DELTASONE) 10 mg, DAILY WITH BREAKFAST    senna (SENOKOT) 8.6 MG tablet 1 tablet, Oral, PRN    sulfamethoxazole-trimethoprim (BACTRIM DS;SEPTRA DS) 800-160 MG per tablet 1 tablet, Oral, SEE ADMIN INSTRUCTIONS, Take 1 tablet every Monday Wednesday and Friday 8:00 am     tacrolimus (PROGRAF) 1 mg, Oral, 2 TIMES DAILY    tacrolimus (PROGRAF) 0.5 mg, Oral, DAILY    tamsulosin (FLOMAX) 0.4 MG capsule 1 capsule, Oral, NIGHTLY    torsemide (DEMADEX) 20 MG tablet 4 tabs AM, 3 tabs PM    valsartan (DIOVAN) 20 mg, 2 TIMES DAILY    zinc sulfate (ZINCATE) 50 mg, DAILY       Allergies   Allergen Reactions    Entresto [Sacubitril-Valsartan]        Review of Systems:   Constitutional: negative for weight changes or fevers  Genitourinary: negative for bowel/bladder incontinence   Musculoskeletal: positive for low back pain, mid back pain, left sided rib pain   Neurological: positive for left leg weakness or numbness/tingling  Behavioral/Psych: negative for anxiety/depression All other systems reviewed and are negative    Objective:     Vitals:    06/14/22 0740   BP: (!) 100/56       Constitutional: Pleasant, no acute distress   Head: Normocephalic, atraumatic   Eyes: Conjunctivae normal   Neck: Supple, symmetrical   Lungs: Normal respiratory effort, non-labored breathing   Cardiovascular: Limbs warm and well perfused   Abdomen: Non-protruded   Musculoskeletal: Muscle bulk symmetric, no atrophy, no gross deformities   · Lower Extremities: ROM WNL. · Thorax: left sided paraspinal tenderness, midline tenderness, pain along the 9th, 10th ribs, mild bruising noted. No scoliosis or kyphosis. · Lumbar Spine: ROM reduced. Left sided lumbar paraspinals tender to palpation, midline tenderness. SLR positive bilaterally. SHREE positive bilaterally. GAENSLEN positive bilaterally. positive facet loading bilaterally. Bilateral SI joints tender to palpation. SI   Neurological: Cranial nerves II-XII grossly intact. · Gait - antalgic gait. Ambulates with cane  Skin: No rashes or lesions present   Psychological: Cooperative, no exaggerated pain behaviors       Assessment:    Diagnosis Orders   1. Fall, initial encounter  MRI LUMBAR SPINE W WO CONTRAST    MRI THORACIC SPINE W WO CONTRAST   2. Rib pain on left side  MRI LUMBAR SPINE W WO CONTRAST    MRI THORACIC SPINE W WO CONTRAST   3. Lumbar pain  MRI LUMBAR SPINE W WO CONTRAST   4. Acute left-sided thoracic back pain  MRI THORACIC SPINE W WO CONTRAST        Chet Hawkins is a 57 y.o.male presenting to the pain clinic for evaluation of left sided rib pain, mid back pain, low back pain. With reports of recent fall in increased pain, will order Lumbar and Thoracic MRIs. Educated to continue use of ice, heat, gentle range of motion. We will see him back in 4 weeks, after MRIs are completed. He is to follow up with Dr Aleksandr Resendiz to review images and he is established with him prior to this. Plan:    The following treatment recommendations and plan were discussed in detail with Sierra Friedman. Imaging:   I have reviewed patients imaging of Ribs XR and results were discussed with patient today. Analgesics:   Patient is taking Acetaminophen. Patient informed that the maximum amount of acetaminophen taken on a regular basis should only be 4000 mg per day. Adjuvants:   None    Interventions:   None    Anticoagulation/NPO Recommendations:   None    Multidisciplinary Pain Management:   In the presence of complex, chronic, and multi-factorial pain, the importance of a multidisciplinary approach to pain management in the patients management regimen was emphasized and discussed in great detail. PHYSICAL THERAPY: Patient is advised to see a physical therapist for gentle stretching exercises and conditioning exercises for management of pain. PSYCHOLOGY: Patient is advised to see a clinical pain psychologist, for the psychosocial aspect of pain care through coping skills, relaxation strategies, cognitive group therapy etc.   OBESITY: Patient is advised to seek nutrition consult to help in managing their weight to decrease its impact on pain. The patient was also advised to continue physical therapy and stretching exercises at home and cognitive behavioral and/or group therapy. Referrals:  MRI of thoracic and Lumbar spine    Prescriptions Written This Visit:   None    Follow-up: With DR Jacquie young 4 weeks    It was my pleasure to evaluate Sierra Friedman today. I spent over 35 minutes evaluating this patient, reviewing previous notes and images and completing documentation.        Triston Hallman, APRN - CNP   Interventional Pain Management/PM&R   [place]

## 2022-06-15 ENCOUNTER — APPOINTMENT (OUTPATIENT)
Dept: PHYSICAL THERAPY | Age: 62
End: 2022-06-15
Payer: COMMERCIAL

## 2022-06-20 ENCOUNTER — HOSPITAL ENCOUNTER (OUTPATIENT)
Dept: PHYSICAL THERAPY | Age: 62
Setting detail: THERAPIES SERIES
End: 2022-06-20
Payer: COMMERCIAL

## 2022-06-20 ENCOUNTER — APPOINTMENT (OUTPATIENT)
Dept: CT IMAGING | Age: 62
End: 2022-06-20
Payer: COMMERCIAL

## 2022-06-20 ENCOUNTER — HOSPITAL ENCOUNTER (EMERGENCY)
Age: 62
Discharge: HOME | End: 2022-06-20
Attending: EMERGENCY MEDICINE
Payer: COMMERCIAL

## 2022-06-20 VITALS
BODY MASS INDEX: 32.44 KG/M2 | SYSTOLIC BLOOD PRESSURE: 154 MMHG | RESPIRATION RATE: 19 BRPM | DIASTOLIC BLOOD PRESSURE: 87 MMHG | OXYGEN SATURATION: 95 % | HEART RATE: 75 BPM | HEIGHT: 69 IN | TEMPERATURE: 98 F | WEIGHT: 219 LBS

## 2022-06-20 DIAGNOSIS — S22.42XA CLOSED FRACTURE OF MULTIPLE RIBS OF LEFT SIDE, INITIAL ENCOUNTER: Primary | ICD-10-CM

## 2022-06-20 PROCEDURE — 99284 EMERGENCY DEPT VISIT MOD MDM: CPT

## 2022-06-20 PROCEDURE — 6360000002 HC RX W HCPCS: Performed by: STUDENT IN AN ORGANIZED HEALTH CARE EDUCATION/TRAINING PROGRAM

## 2022-06-20 PROCEDURE — 6370000000 HC RX 637 (ALT 250 FOR IP): Performed by: STUDENT IN AN ORGANIZED HEALTH CARE EDUCATION/TRAINING PROGRAM

## 2022-06-20 PROCEDURE — 74176 CT ABD & PELVIS W/O CONTRAST: CPT

## 2022-06-20 PROCEDURE — 71250 CT THORAX DX C-: CPT

## 2022-06-20 PROCEDURE — 96374 THER/PROPH/DIAG INJ IV PUSH: CPT

## 2022-06-20 PROCEDURE — 76376 3D RENDER W/INTRP POSTPROCES: CPT

## 2022-06-20 RX ORDER — LIDOCAINE 4 G/G
1 PATCH TOPICAL ONCE
Status: DISCONTINUED | OUTPATIENT
Start: 2022-06-20 | End: 2022-06-20 | Stop reason: HOSPADM

## 2022-06-20 RX ORDER — HYDROCODONE BITARTRATE AND ACETAMINOPHEN 5; 325 MG/1; MG/1
1 TABLET ORAL EVERY 6 HOURS PRN
Qty: 10 TABLET | Refills: 0 | Status: SHIPPED | OUTPATIENT
Start: 2022-06-20 | End: 2022-06-23

## 2022-06-20 RX ORDER — LIDOCAINE 50 MG/G
1 PATCH TOPICAL DAILY
Qty: 10 PATCH | Refills: 0 | Status: SHIPPED | OUTPATIENT
Start: 2022-06-20 | End: 2022-06-30

## 2022-06-20 RX ORDER — KETOROLAC TROMETHAMINE 30 MG/ML
15 INJECTION, SOLUTION INTRAMUSCULAR; INTRAVENOUS ONCE
Status: COMPLETED | OUTPATIENT
Start: 2022-06-20 | End: 2022-06-20

## 2022-06-20 RX ADMIN — KETOROLAC TROMETHAMINE 15 MG: 30 INJECTION, SOLUTION INTRAMUSCULAR; INTRAVENOUS at 11:52

## 2022-06-20 ASSESSMENT — ENCOUNTER SYMPTOMS
CHEST TIGHTNESS: 0
ABDOMINAL DISTENTION: 0
SINUS PRESSURE: 0
EYE ITCHING: 0
EYE REDNESS: 0
DIARRHEA: 0
EYE DISCHARGE: 0
SHORTNESS OF BREATH: 0
EYE PAIN: 0
ABDOMINAL PAIN: 0
NAUSEA: 0
CONSTIPATION: 0
SINUS PAIN: 0
VOMITING: 0
BACK PAIN: 1
RHINORRHEA: 0
COLOR CHANGE: 0

## 2022-06-20 ASSESSMENT — PAIN SCALES - GENERAL
PAINLEVEL_OUTOF10: 9
PAINLEVEL_OUTOF10: 10

## 2022-06-20 ASSESSMENT — PAIN DESCRIPTION - ORIENTATION
ORIENTATION: RIGHT;LEFT
ORIENTATION: LEFT

## 2022-06-20 ASSESSMENT — PAIN DESCRIPTION - FREQUENCY: FREQUENCY: CONTINUOUS

## 2022-06-20 ASSESSMENT — PAIN DESCRIPTION - LOCATION
LOCATION: BACK
LOCATION: BACK

## 2022-06-20 ASSESSMENT — PAIN DESCRIPTION - ONSET: ONSET: ON-GOING

## 2022-06-20 ASSESSMENT — PAIN DESCRIPTION - PAIN TYPE: TYPE: ACUTE PAIN

## 2022-06-20 ASSESSMENT — PAIN - FUNCTIONAL ASSESSMENT: PAIN_FUNCTIONAL_ASSESSMENT: 0-10

## 2022-06-20 NOTE — ED NOTES
Pt medicated per order. Pt tolerated well. Waiting on ct results. Will continue to monitor for safety and comfort.       Radhika Hayes RN  06/20/22 [id number]

## 2022-06-20 NOTE — ED NOTES
Pt medicated per order. Ice pack applied. Pt tolerating well. Call light within reach. Will continue to monitor for safety and comfort.       Jules Renner RN  06/20/22 7080

## 2022-06-20 NOTE — ED NOTES
Pt to ED from home for complaint of middle-lower back pain rated 10/10 that radiates entirety of back around the abdomen. Pt states having fall 10 days ago, being seen by urgent care. Pt states pain management, Dr. Ibis Garg office, recommends MRI due to ongoing pain following fall. Pt states no loss of continence since fall, no new numbness/tingling since fall. Pt states no further symptoms at this time. Pt breaths easy and unlabored with no distress noted. Call light in reach.        [facility]  06/20/22 1777

## 2022-06-20 NOTE — ED NOTES
Dr. Lasha Fernandes at bedside to review results and POC w/pt. Pt verbalized understanding.       Shade Ferro RN  06/20/22 8079

## 2022-06-20 NOTE — ED NOTES
Pt rprts having outpatient order for MRI, scheduled on July 6th.       Eleazar Strauss RN  06/20/22 1014

## 2022-06-20 NOTE — ED PROVIDER NOTES
[address] [facility]          Pt Name: Randa Mora  MRN: [id number]  [place] 1960  Date of evaluation: 6/20/2022  Treating Resident Physician: Izaiah Story DO  Supervising Physician: Yulia Wolfe MD    History obtained from the patient. CHIEF COMPLAINT       Chief Complaint   Patient presents with    Back Pain           HISTORY OF PRESENT ILLNESS    HPI  Randa Mora is a 57 y.o. male who presents to the emergency department for evaluation of back pain. The patient states that he recently had a heart transplant on 2/7/2022 due to cardiomyopathy and congestive heart failure. The patient states that he has been told that he could develop dizziness due to problems related to nerves traveling to his heart after his heart transplant. He states that 10 days ago he was walking into his house, got dizzy, and fell. He struck his left side and his buttocks on a concrete step. He was seen at urgent care at that time and had a negative x-ray of his chest and left ribs. He followed up with the pain management clinic on 6/14/2022 and was scheduled for MRIs of the thoracic and lumbar spine and to follow-up with Dr. Andres Gonzales. The patient reports that his back pain has not improved, and is worse with coughing and breathing, lying on either side, bending over, and movement. He reports having to cancel his rehab appointments from his heart transplant due to back pain. He reports that his pain is located to his left mid back to his buttock and he states that his pain \"feels like a knife in my back\" while lying in the bed in the emergency department. He reports that he is concerned that his pain could be due to his kidneys. 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 He reports that his pain is refractory to his home tramadol. He reports that he has been alternating ice and heat at home.     He has a past medical history of lumbar and two cervical spine fusions. He states that his wife spoke with someone at [facility] today who told him that the only way he would get an MRI faster would be if he came to the emergency department. He denies red flag back pain symptoms of fever, saddle anesthesia, weakness, bowel or bladder incontinence, and IV drug use. The patient has no other acute complaints at this time. REVIEW OF SYSTEMS   Review of Systems   Constitutional: Negative for fever. HENT: Negative for ear pain, rhinorrhea, sinus pressure and sinus pain. Eyes: Negative for pain, discharge, redness and itching. Respiratory: Negative for chest tightness and shortness of breath. Cardiovascular: Negative for chest pain and palpitations. Gastrointestinal: Negative for abdominal distention, abdominal pain, constipation, diarrhea, nausea and vomiting. Genitourinary: Negative for dysuria, frequency, hematuria and urgency. Musculoskeletal: Positive for back pain. Negative for arthralgias. Skin: Negative for color change. Neurological: Positive for dizziness, light-headedness and numbness. Negative for syncope.          PAST MEDICAL AND SURGICAL HISTORY     Past Medical History:   Diagnosis Date    Acute kidney injury ([address]     Cardiomyopathy, dilated ([address]     VIRAL    Claustrophobia     Congestive heart failure (CHF) (HCC)     Depression 12/26/2013    HTN (hypertension)     Hyperlipidemia     [employer] dual ICD 2/14/2014    Osteoarthritis     Osteoarthritis of spine with radiculopathy, lumbar region 12/28/2015    Spinal stenosis     Unspecified sleep apnea     unable to wear CPAP     Past Surgical History:   Procedure Laterality Date    BACK SURGERY  8/26/2014    L4- S1 decompression, L4-5 PSF and TLIF    BACK SURGERY  2013   Elana Hathaway CARDIAC CATHETERIZATION  10/2013    [address]  2013    [employer]    CARDIOVASCULAR STRESS TEST  09/2013    CARPAL TUNNEL RELEASE Right     CERVICAL FUSION  05/17/2017    [facility] COLONOSCOPY      COLONOSCOPY Left 3/15/2021    COLONOSCOPY POLYPECTOMY SNARE/COLD BIOPSY performed by Yvette Walker DO at [place] [address] CATH LAB PROCEDURE  07/2020    [facility]    DOPPLER ECHOCARDIOGRAPHY  09/2013    DOPPLER ECHOCARDIOGRAPHY  09/2013    [facility]    ENDOSCOPY, COLON, DIAGNOSTIC      FACET JOINT INJECTION Right 5/26/2020    L-facet RFA @ L2-3,L3-4,L4-5  RIGHT performed by Bee Duke MD at [address] [place] [address] Left 7/2/2019    Left SI injection performed by Bee Duke MD at [address] [place] [address] Left 8/26/2019    Left SI injection # 2 performed by Bee Duke MD at [address] [place] [address] 280  02/07/2022    HERNIA REPAIR  ? ??    umbilical    LUMBAR SPINE SURGERY Left 9/11/2017    LUMBAR RFA L2-3, L3-4, L4-5, L5-S1 LEFT SIDE performed by Bee Duke MD at [facility] Right 10/31/2017    LUMBAR FACET RFA @ L2-3, L3-4, L4-5 AND L5-S1 RIGHT SIDE performed by Bee Duke MD at [facility] Left 4/11/2019    L-RFA @ L2-3, L3-4, L4-5, L5-S1 LEFT SIDE FIRST.  performed by Bee Duke MD at [facility] Left 10/3/2019    LEFT SI RFA performed by Bee Duke MD at [address] [place] [address]  2015    OTHER SURGICAL HISTORY  10/9/2015    C3-6 ACDF    OTHER SURGICAL HISTORY  01/18/2016    FACET INJECTIONS L3-L4 L4-L5 L5 S1    OTHER SURGICAL HISTORY  2-8-2016    RFA - L3-S1    OTHER SURGICAL HISTORY N/A 03-21-16    cervical epidural block C7    OTHER SURGICAL HISTORY Bilateral 05-16-16    cervical facet block C7-T1    OTHER SURGICAL HISTORY  08/01/2016    CERVICAL ABLATION    OTHER SURGICAL HISTORY Left 09/11/2017    Lumbar RFA at L2-3, L3-4, L4-5, L5-S1    OTHER SURGICAL HISTORY Right 10/31/2017    Lumbar RFA at L2-3, L3-4, L4-5, L5-S1    OTHER SURGICAL HISTORY Right 05/09/2018    Excision scalp mass right forehead    PACEMAKER PLACEMENT      MO EXC SKIN BENIG <5MM REMAINDR BODY Right 5/9/2018    EXCISION RIGHT FOREHEAD CYST performed by Llana Schilder, MD at [address] Left 6/19/2018    L-RFA @ L2-3, L3-4, L4-5, L5-S1 LEFT SIDE FIRST. performed by William Mandujano MD at [address]  1980's???    TONSILLECTOMY  1980's??    VASECTOMY  ? ??         MEDICATIONS   No current facility-administered medications for this encounter. Current Outpatient Medications:     lidocaine (LIDODERM) 5 %, Place 1 patch onto the skin daily for 10 days 12 hours on, 12 hours off., Disp: 10 patch, Rfl: 0    HYDROcodone-acetaminophen (NORCO) 5-325 MG per tablet, Take 1 tablet by mouth every 6 hours as needed for Pain for up to 3 days. Intended supply: 3 days.  Take lowest dose possible to manage pain, Disp: 10 tablet, Rfl: 0    pravastatin (PRAVACHOL) 20 MG tablet, Take 20 mg by mouth daily, Disp: , Rfl:     amitriptyline (ELAVIL) 10 MG tablet, Take 10 mg by mouth nightly, Disp: , Rfl:     amLODIPine (NORVASC) 10 MG tablet, Take 10 mg by mouth daily, Disp: , Rfl:     methIMAzole (TAPAZOLE) 10 MG tablet, Take 10 mg by mouth 3 times daily, Disp: , Rfl:     sulfamethoxazole-trimethoprim (BACTRIM DS;SEPTRA DS) 800-160 MG per tablet, Take 1 tablet by mouth See Admin Instructions Take 1 tablet every Monday Wednesday and Friday 8:00 am, Disp: , Rfl:     polyethylene glycol (GLYCOLAX) 17 GM/SCOOP powder, Take 17 g by mouth as needed, Disp: , Rfl:     senna (SENOKOT) 8.6 MG tablet, Take 1 tablet by mouth as needed for Constipation, Disp: , Rfl:     melatonin 3 MG TABS tablet, Take 3 mg by mouth daily 3 capsules by mouth daily, Disp: , Rfl:     tacrolimus (PROGRAF) 1 MG capsule, Take 1 mg by mouth 2 times daily, Disp: , Rfl:     tacrolimus (PROGRAF) 0.5 MG capsule, Take 0.5 mg by mouth daily, Disp: , Rfl:     mycophenolate (CELLCEPT) 250 MG capsule, Take by mouth 2 times daily 4 capsules BID, Disp: , Rfl:     Pantoprazole Sodium (PROTONIX PO), Take 20 mg by mouth daily, Disp: , Rfl:     calcium citrate-vitamin D (CITRICAL + D) 315-250 MG-UNIT TABS per tablet, Take 1 tablet by mouth 2 times daily (with meals), Disp: , Rfl:     magnesium oxide (MAG-OX) 400 MG tablet, Take 800 mg by mouth 2 times daily, Disp: , Rfl:     Cholecalciferol (VITAMIN D3) 1.25 MG (53926 UT) CAPS, Take 1 capsule by mouth Every Friday, Disp: , Rfl:     aspirin 81 MG EC tablet, Take 81 mg by mouth daily, Disp: , Rfl:     predniSONE (DELTASONE) 20 MG tablet, Take 10 mg by mouth daily (with breakfast) (Patient not taking: Reported on 6/14/2022), Disp: , Rfl:     torsemide (DEMADEX) 20 MG tablet, 4 tabs AM, 3 tabs PM, Disp: 90 tablet, Rfl: 1    tamsulosin (FLOMAX) 0.4 MG capsule, Take 1 capsule by mouth nightly, Disp: , Rfl:     metOLazone (ZAROXOLYN) 5 MG tablet, Take 1 tablet by mouth daily as needed (AS directed by CHF clinic) (Patient not taking: Reported on 6/14/2022), Disp: 30 tablet, Rfl: 0    isosorbide dinitrate (ISORDIL) 5 MG tablet, Take 10 mg by mouth 3 times daily  (Patient not taking: Reported on 6/14/2022), Disp: , Rfl:     potassium chloride (KLOR-CON M) 20 MEQ extended release tablet, TAKE 1 TABLET BY MOUTH ONCE DAILY, Disp: , Rfl:     hydrALAZINE (APRESOLINE) 25 MG tablet, Take 100 mg by mouth 3 times daily , Disp: , Rfl:     finasteride (PROSCAR) 5 MG tablet, Take 5 mg by mouth daily, Disp: , Rfl:     JARDIANCE 10 MG tablet, 10 mg  (Patient not taking: Reported on 6/14/2022), Disp: , Rfl:     cyclobenzaprine (FLEXERIL) 5 MG tablet, Take 5 mg by mouth daily as needed for Muscle spasms (Patient not taking: Reported on 6/14/2022), Disp: , Rfl:     metoprolol succinate (TOPROL XL) 25 MG extended release tablet, Take 0.5 tablets by mouth daily (Patient not taking: Reported on 6/14/2022), Disp: , Rfl:     amiodarone (CORDARONE) 200 MG tablet, Take 1 tablet twice a day for 7 days, then decrease to 1 tablet by mouth once a day (Patient not taking: Reported on 6/14/2022), Disp: 90 tablet, Rfl: 1    atorvastatin (LIPITOR) 80 MG tablet, Take 80 mg by mouth nightly (Patient not taking: Reported on 6/14/2022), Disp: , Rfl:     digoxin (LANOXIN) 125 MCG tablet, Take 0.125 mg by mouth every other day , Disp: , Rfl:     valsartan (DIOVAN) 40 MG tablet, Take 20 mg by mouth 2 times daily  (Patient not taking: Reported on 6/14/2022), Disp: , Rfl:     Magnesium 500 MG CAPS, Take by mouth daily (Patient not taking: Reported on 6/14/2022), Disp: , Rfl:     zinc sulfate (ZINCATE) 220 (50 Zn) MG capsule, Take 50 mg by mouth daily (Patient not taking: Reported on 6/14/2022), Disp: , Rfl:     allopurinol (ZYLOPRIM) 300 MG tablet, Take 300 mg by mouth daily (Patient not taking: Reported on 6/14/2022), Disp: , Rfl:     Acetaminophen (TYLENOL ARTHRITIS PAIN PO), Take by mouth 3 times daily as needed, Disp: , Rfl:       SOCIAL HISTORY     Social History     Social History Narrative    Not on file     Social History     Tobacco Use    Smoking status: Never Smoker    Smokeless tobacco: Never Used   Vaping Use    Vaping Use: Never used   Substance Use Topics    Alcohol use:  Yes     Alcohol/week: 6.0 standard drinks     Types: 6 Standard drinks or equivalent per week     Comment: social    Drug use: No         ALLERGIES     Allergies   Allergen Reactions    Entresto [Sacubitril-Valsartan]          FAMILY HISTORY     Family History   Problem Relation Age of Onset    Heart Disease Father     Coronary Art Dis Father     Heart Attack Father     High Blood Pressure Father     Parkinsonism Father          PREVIOUS RECORDS   Previous records reviewed        PHYSICAL EXAM     ED Triage Vitals [06/20/22 0913]   BP Temp Temp Source Heart Rate Resp SpO2 Height Weight   (!) 162/85 98 °F (36.7 °C) Oral 91 16 98 % 5' 9\" (1.753 m) 219 lb (99.3 kg)     Initial vital signs and nursing assessment reviewed and abnormal from Hypertension. Body mass index is 32.34 kg/m². Pulsoximetry is normal per my interpretation. Additional Vital Signs:  Vitals:    06/20/22 1342   BP:    Pulse: 75   Resp: 19   Temp:    SpO2: 95%       Physical Exam  Constitutional:       General: He is not in acute distress. Appearance: Normal appearance. He is not ill-appearing. HENT:      Head: Normocephalic and atraumatic. Nose: Nose normal. No congestion or rhinorrhea. Mouth/Throat:      Mouth: Mucous membranes are moist.      Pharynx: Oropharynx is clear. No oropharyngeal exudate or posterior oropharyngeal erythema. Eyes:      Extraocular Movements: Extraocular movements intact. Pupils: Pupils are equal, round, and reactive to light. Cardiovascular:      Rate and Rhythm: Normal rate and regular rhythm. Pulses: Normal pulses. Heart sounds: Murmur heard. Comments: Faint systolic murmur present. Pulmonary:      Effort: Pulmonary effort is normal. No respiratory distress. Breath sounds: Normal breath sounds. Abdominal:      General: Abdomen is flat. There is no distension. Palpations: Abdomen is soft. Tenderness: There is no abdominal tenderness. Musculoskeletal:         General: Tenderness present. No swelling. Normal range of motion. Cervical back: Normal range of motion and neck supple. Right lower leg: No edema. Left lower leg: No edema. Comments: There is prominent left lower thoracic and left lumbar spine tenderness to palpation with noticeable swelling in this area compared to the right. Straight leg raise test positive on the left. Skin:     General: Skin is warm and dry.       Capillary Refill: Capillary refill takes less than 2 seconds. Neurological:      General: No focal deficit present. Mental Status: He is alert and oriented to person, place, and time. Mental status is at baseline. MEDICAL DECISION MAKING   Initial Assessment:   3 57-year-old male presented to the emergency department for evaluation of back pain after a fall 10 days ago. 2. No red flag back pain symptoms. Plan:    CT chest, abdomen, and pelvis without contrast with thoracic and lumbar recons to evaluate for traumatic injury. ED RESULTS   Laboratory results:  Labs Reviewed - No data to display    Radiologic studies results:  CT CHEST WO CONTRAST   Final Result       1. Possible fractures involving approximately 6 and 7 left costochondral junctions seen better on previous plain radiographs dated 10 Michelle 2022. Please correlate clinically. No evidence of pneumothorax. .   2. Status post previous sternotomy. 3. Postoperative changes in the lower cervical spine. No acute fracture in the thoracic spine. 4. Small metallic density in the left pulmonary artery since previous study dated 27 November 2018. The previously noted pacemaker is no longer seen. 5. Otherwise negative CT scan of the chest.               **This report has been created using voice recognition software. It may contain minor errors which are inherent in voice recognition technology. **      Final report electronically signed by DR Franky Herring on 6/20/2022 11:57 AM      CT ABDOMEN PELVIS WO CONTRAST Additional Contrast? None   Final Result      1. Fractures involving the left lower lateral rib cage. No associated pneumothorax. 2. Horseshoe kidney. 3. 2 cm hypodensity in the right lobe of the liver laterally. Please correlate with liver function tests. 4. Atherosclerotic calcification in the abdominal aorta, iliac and visceral arteries. 5. Postoperative changes in the lower lumbar spine. Lumbar spondylosis. No acute fracture in the lumbar spine.          **This report has been created using voice recognition software. It may contain minor errors which are inherent in voice recognition technology. **      Final report electronically signed by DR Perez Smith on 6/20/2022 12:07 PM      CT THORACIC RECONSTRUCTION WO POST PROCESS   Final Result       1. Acute nondisplaced fracture of the left 11th rib where it articulates with the spine. 2. No fractures in the thoracic or lumbar vertebral bodies. **This report has been created using voice recognition software. It may contain minor errors which are inherent in voice recognition technology. **            Final report electronically signed by Dr. Samara Orellana on 6/20/2022 12:01 PM      CT LUMBAR RECONSTRUCTION WO POST PROCESS   Final Result       1. Acute nondisplaced fracture of the left 11th rib where it articulates with the spine. 2. No fractures in the thoracic or lumbar vertebral bodies. **This report has been created using voice recognition software. It may contain minor errors which are inherent in voice recognition technology. **            Final report electronically signed by Dr. Samara Orellana on 6/20/2022 12:01 PM          ED Medications administered this visit:   Medications   ketorolac (TORADOL) injection 15 mg (15 mg IntraVENous Given 6/20/22 1152)         ED COURSE        CT scans reviewed and remarkable for left lower rib fractures. This case was discussed with Dr. Katy Johnson and Suma Quinones PA-C, with the trauma team who agreed with discharging the patient home and with follow-up in the trauma clinic and they discussed outpatient rib fracture management.   We will plan to discharge the patient home with instructions to not lift anything over 10 pounds, with a prescription for Lidoderm patches, with an incentive spirometer, to encourage coughing and deep breathing, and with a prescription for Norco.  I discussed this plan with the patient who verbalized understanding and all questions were answered. Strict return precautions and follow up instructions were discussed with the patient prior to discharge, with which the patient agrees. The patient is instructed to: Your CT scans today revealed rib fractures. Begin taking Lidoderm patches as prescribed. It is okay to use acetaminophen over-the-counter as directed on the bottle for pain. Begin taking Norco as prescribed, but Norco contains acetaminophen and do not take more than 4000 mg of acetaminophen in 1 day. Use the incentive spirometer that you received here every 15 minutes at home and try to cough and breathe deeply when using the incentive spirometer. Do not lift anything over 10 pounds. Follow-up with your primary care provider and with LUCIA Contreras at the trauma clinic on 6/24/2022. Do not hesitate to return to the emergency department if you are experiencing any new or worsening symptoms such as worsening chest pain, shortness of breath, lightheadedness, dizziness, feel as if you are going to pass out, fever, or if you have any other concerns. MEDICATION CHANGES     Discharge Medication List as of 6/20/2022  2:30 PM      START taking these medications    Details   lidocaine (LIDODERM) 5 % Place 1 patch onto the skin daily for 10 days 12 hours on, 12 hours off., Disp-10 patch, R-0Normal      HYDROcodone-acetaminophen (NORCO) 5-325 MG per tablet Take 1 tablet by mouth every 6 hours as needed for Pain for up to 3 days. Intended supply: 3 days. Take lowest dose possible to manage pain, Disp-10 tablet, R-0Print               FINAL DISPOSITION     Final diagnoses:   Closed fracture of multiple ribs of left side, initial encounter     Condition: condition: fair  Dispo: Discharge to home      This transcription was electronically signed.  Parts of this transcriptions may have been dictated by use of voice recognition software and electronically transcribed, and parts may have been transcribed with the assistance of an ED scribe. The transcription may contain errors not detected in proofreading. Please refer to my supervising physician's documentation if my documentation differs.     Electronically Signed: Hua Kumar DO, 06/20/22, 6:22 PM     Hua Kumar DO  Resident  06/20/22 Johanna Buckley

## 2022-06-20 NOTE — ED NOTES
Pt rprts owning 2 incentive spirometers; gave him a new goal of [id number]. Pt verbalized understanding.       Grecia Doran RN  06/20/22 6859

## 2022-06-21 ENCOUNTER — CARE COORDINATION (OUTPATIENT)
Dept: OTHER | Facility: CLINIC | Age: 62
End: 2022-06-21

## 2022-06-21 NOTE — CARE COORDINATION
Ambulatory Care Coordination Note  6/21/2022  CM Risk Score: 5  Charlson 10 Year Mortality Risk Score: 47%     ACC: Tai Cancino, MAXIMILIANO    Summary Note: ACM attempted 3rd outreach to reach patient for introduction to Associate Care Management. Unable to leave a voice mail message; vm box is full. Will continue to outreach patient.        Future Appointments   Date Time Provider Jhonathan Rodriguezi   6/27/2022  9:00 AM [facility]   6/29/2022  8:45 AM Liz Morrow, PTA [facility] [place]   7/5/2022  8:00 AM Georgiateo Miriam, PTA [facility]   7/6/2022 10:15 AM Anthony West MD N [facility] [place]   7/8/2022  8:00 AM Luis Alberto Finger, PT STR[facility] [place]   7/11/2022  9:00 AM Luis Alberto Finger, PT STR[facility] [place]   7/14/2022  9:00 AM STR MRI RM1 STRZ MRI STR Radiolog   7/14/2022 11:30 AM STR MRI RM1 STRZ MRI STR Radiolog   10/5/2022  8:00 AM Sloan Apley, MD N [facility] [place]

## 2022-06-22 ENCOUNTER — APPOINTMENT (OUTPATIENT)
Dept: PHYSICAL THERAPY | Age: 62
End: 2022-06-22
Payer: COMMERCIAL

## 2022-06-23 ENCOUNTER — CARE COORDINATION (OUTPATIENT)
Dept: OTHER | Facility: CLINIC | Age: 62
End: 2022-06-23

## 2022-06-23 NOTE — LETTER
Dear Sofia Garcia,    My name is Dinah Castillo RN, Associate Care Manager for [facility] [place], and I have been trying to reach you. The Associate Care Management (ACM) program is a free-of-charge, confidential service provided to our employees and their family members covered by the [facility]. I can help you with care transitions such as when you come home from the hospital, when help is needed to manage your disease, or when you need assistance coordinating services or appointments. As healthcare providers, we know that patients do better when they have close follow up with a primary care provider (PCP). I can help you find one that is convenient to you and covered by your insurance. I can also help you understand any after visit instructions, such as what symptoms to watch out for, or any new or changed medications.      We can work together using your preferred communication -- telephone, email, [address] If you do not have a [employer] account, I can help you request access. Our program is designed to provide you with the opportunity to have a [facility] partner with you for your healthcare needs. Due to not being able to reach you, I am closing out the current program, but will remain available to you should you have any questions. Please contact me at [phone]. Sincerely,    ABIGAIL Laughlin, RN  Associate Care Manager  Cell: [phone]  [email or web address]

## 2022-06-23 NOTE — CARE COORDINATION
Ambulatory Care Coordination Note  6/23/2022  CM Risk Score: 5  Charlson 10 Year Mortality Risk Score: 47%     ACC: Mi Greenfield, RN    Summary Note: ACM attempted fourth and final call to patient for introduction to Associate Care Management. HIPAA compliant message left requesting a return phone call at patients convenience. Final Unable to Reach Letter sent via mychart and mail. No further outreach scheduled with this ACM, ACM will sign off care team at this time. Patient has been provided with this ACM's contact information.      Future Appointments   Date Time Provider Jhonathan Rodriguezi   6/27/2022  9:00 AM [address] [place] [address]   6/29/2022  8:45 AM Ginny Riedel, PTA [facility]   7/5/2022  8:00 AM Yong Whitman PTA [facility]   7/6/2022 10:15 AM Carlie Low MD N [facility] Pain [facility] - [facility]   7/8/2022  8:00 AM Emi George, PT [facility]   7/11/2022  9:00 AM Emi George, PT STR[facility] PT [facility]   7/14/2022  9:00 AM STR MRI RM1 STRZ MRI STR Radiolog   7/14/2022 11:30 AM STR MRI RM1 STRZ MRI STR Radiolog   10/5/2022  8:00 AM Ladi Cantrell MD N SRPX Heart [facility] - [facility]

## 2022-06-24 NOTE — DISCHARGE SUMMARY
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 See last note for details related to results of therapy and goal achievement. Reason for discharge: Placed patient on hold due to fall and having rib fractures. Patient is still in much pain and has follow up first week of July with doctor in [place]. Will discharge at this time and require new orders from physician once he is ready to return to PT. Will evaluated with new orders from physician due to change in medical status. Maci Craroll, [address], Cert.  MDT, Irene Bingham

## 2022-06-27 ENCOUNTER — HOSPITAL ENCOUNTER (OUTPATIENT)
Dept: PHYSICAL THERAPY | Age: 62
Setting detail: THERAPIES SERIES
Discharge: HOME | End: 2022-06-27
Payer: COMMERCIAL

## 2022-06-29 ENCOUNTER — APPOINTMENT (OUTPATIENT)
Dept: PHYSICAL THERAPY | Age: 62
End: 2022-06-29
Payer: COMMERCIAL

## 2022-07-05 ENCOUNTER — APPOINTMENT (OUTPATIENT)
Dept: PHYSICAL THERAPY | Age: 62
End: 2022-07-05
Payer: COMMERCIAL

## 2022-07-06 ENCOUNTER — NURSE ONLY (OUTPATIENT)
Dept: LAB | Age: 62
End: 2022-07-06

## 2022-07-06 LAB
T4 FREE: 1.53 NG/DL (ref 0.93–1.76)
TSH SERPL DL<=0.05 MIU/L-ACNC: 0.29 UIU/ML (ref 0.4–4.2)

## 2022-07-08 ENCOUNTER — APPOINTMENT (OUTPATIENT)
Dept: PHYSICAL THERAPY | Age: 62
End: 2022-07-08
Payer: COMMERCIAL

## 2022-07-09 LAB — T3 TOTAL: 56 NG/DL (ref 60–181)

## 2022-07-11 ENCOUNTER — APPOINTMENT (OUTPATIENT)
Dept: PHYSICAL THERAPY | Age: 62
End: 2022-07-11
Payer: COMMERCIAL

## 2022-07-14 ENCOUNTER — HOSPITAL ENCOUNTER (OUTPATIENT)
Dept: MRI IMAGING | Age: 62
Discharge: HOME | End: 2022-07-14
Payer: COMMERCIAL

## 2022-07-14 DIAGNOSIS — M54.50 LUMBAR PAIN: ICD-10-CM
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
## 2022-07-14 DIAGNOSIS — R07.81 RIB PAIN ON LEFT SIDE: ICD-10-CM

## 2022-07-14 PROCEDURE — 72158 MRI LUMBAR SPINE W/O & W/DYE: CPT

## 2022-07-14 PROCEDURE — 6360000004 HC RX CONTRAST MEDICATION: Performed by: NURSE PRACTITIONER

## 2022-07-14 PROCEDURE — A9579 GAD-BASE MR CONTRAST NOS,1ML: HCPCS | Performed by: NURSE PRACTITIONER

## 2022-07-14 RX ADMIN — GADOTERIDOL 20 ML: 279.3 INJECTION, SOLUTION INTRAVENOUS at 09:39

## 2022-07-20 ENCOUNTER — OFFICE VISIT (OUTPATIENT)
Dept: PHYSICAL MEDICINE AND REHAB | Age: 62
End: 2022-07-20
Payer: COMMERCIAL

## 2022-07-20 VITALS
BODY MASS INDEX: 32.58 KG/M2 | SYSTOLIC BLOOD PRESSURE: 126 MMHG | WEIGHT: 220 LBS | DIASTOLIC BLOOD PRESSURE: 64 MMHG | HEIGHT: 69 IN

## 2022-07-20 DIAGNOSIS — M46.1 SI (SACROILIAC) JOINT INFLAMMATION (HCC): ICD-10-CM

## 2022-07-20 DIAGNOSIS — M47.812 CERVICAL SPONDYLOSIS: ICD-10-CM

## 2022-07-20 DIAGNOSIS — M54.81 BILATERAL OCCIPITAL NEURALGIA: ICD-10-CM

## 2022-07-20 DIAGNOSIS — M47.816 SPONDYLOSIS OF LUMBAR SPINE: Primary | ICD-10-CM

## 2022-07-20 DIAGNOSIS — G89.29 CHRONIC IDIOPATHIC PAIN SYNDROME: ICD-10-CM

## 2022-07-20 DIAGNOSIS — M47.816 LUMBAR FACET ARTHROPATHY: ICD-10-CM

## 2022-07-20 DIAGNOSIS — M96.1 LUMBAR POST-LAMINECTOMY SYNDROME: ICD-10-CM

## 2022-07-20 PROCEDURE — 1036F TOBACCO NON-USER: CPT | Performed by: PAIN MEDICINE

## 2022-07-20 PROCEDURE — 99214 OFFICE O/P EST MOD 30 MIN: CPT | Performed by: PAIN MEDICINE

## 2022-07-20 PROCEDURE — 3017F COLORECTAL CA SCREEN DOC REV: CPT | Performed by: PAIN MEDICINE

## 2022-07-20 PROCEDURE — G8427 DOCREV CUR MEDS BY ELIG CLIN: HCPCS | Performed by: PAIN MEDICINE

## 2022-07-20 PROCEDURE — G8417 CALC BMI ABV UP PARAM F/U: HCPCS | Performed by: PAIN MEDICINE

## 2022-07-20 RX ORDER — HYDRALAZINE HYDROCHLORIDE 100 MG/1
TABLET, FILM COATED ORAL
COMMUNITY
Start: 2022-06-06

## 2022-07-20 NOTE — PROGRESS NOTES
07/14/22  FINDINGS:       The lumbar vertebral bodies are normally aligned. There are postoperative changes with bilateral pedicular screws at L5 and S1 laminectomy and fusion at L5. There is degenerative change in the vertebral body endplates adjacent to the L4-5 and L5-S1 disc    spaces and to lesser extent adjacent to the L1-2 and to a lesser extent T12-L1 disc spaces. .  There is no bone marrow edema. There are no compression fractures. No pars defects are noted. The visualized aspects of the distal spinal cord are normal. There appears be a small amount of fat in the filum terminale. There are no gross abnormalities in the distal thoracic spine. On the axial images, at T12-L1, there is no disc herniation, canal or foraminal stenosis. At L1-L2, there is a degenerated disc with no superimposed disc herniation, canal or foraminal stenosis. At L2-3, there is no disc herniation, canal or foraminal stenosis       At L3-4, there is a 1.6 mm bulging disc and facet hypertrophy. This causes mild canal and mild-to-moderate bilateral foraminal stenosis       At L4-5, there  is a 2.9 mm ventral extradural defect and facet hypertrophy. There is moderate to severe left and moderate right-sided foraminal stenosis with no canal stenosis. At L5-S1, there are postoperative changes. There is mild-to-moderate bilateral foraminal stenosis with no canal stenosis. There is no abnormal enhancement. There is degenerative change involving the sacroiliac joints bilaterally. There is poor visualization of the nerve roots in the lower thecal sac. This may represent possible arachnoiditis. .       There is atrophy in the paraspinal muscles posteriorly. Impression       1.  Postoperative changes with bilateral pedicular screws at L5 and S1, laminectomy and fusion at L5.   2. There is moderate to severe left and moderate right-sided foraminal stenosis with no canal stenosis at L4-5.   3. There is mild-to-moderate bilateral foraminal stenosis with no canal stenosis at L5-S1.   4. There is mild canal and mild-to-moderate bilateral foraminal stenosis at L3-4.   5. There is possible arachnoiditis in the lower thecal sac. 6. There is a small amount of fat in the filum terminale. 7. There is degenerative change involving the sacroiliac joints bilaterally. 8. Atrophy in the paraspinal muscles posteriorly. 9. No acute compression fracture noted. Medications reviewed. The patientis allergic to entresto [sacubitril-valsartan]. Subjective:      Review of Systems   Constitutional:  Positive for activity change. Negative for appetite change, chills, diaphoresis, fatigue, fever and unexpected weight change. HENT:  Negative for congestion, ear pain, hearing loss, mouth sores, nosebleeds, rhinorrhea, sinus pressure and sore throat. Eyes:  Negative for photophobia, pain and visual disturbance. Respiratory:  Negative for cough, chest tightness, shortness of breath and wheezing. Cardiovascular:  Negative for chest pain and palpitations. Hx Heart Transplant @ [facility] on Feb/2022   Gastrointestinal:  Negative for abdominal pain, constipation, diarrhea, nausea and vomiting. Endocrine: Negative for cold intolerance, heat intolerance, polydipsia, polyphagia and polyuria. Genitourinary:  Negative for decreased urine volume, difficulty urinating, frequency and hematuria. Musculoskeletal:  Positive for arthralgias, back pain, gait problem and myalgias. Negative for joint swelling, neck pain and neck stiffness. Skin:  Negative for color change and rash. Allergic/Immunologic: Negative for food allergies and immunocompromised state. Neurological:  Positive for weakness and numbness. Negative for dizziness, tremors, seizures, syncope, facial asymmetry, speech difficulty, light-headedness and headaches. Hematological:  Does not bruise/bleed easily. Psychiatric/Behavioral:  Positive for sleep disturbance. Negative for agitation, behavioral problems, confusion, decreased concentration, dysphoric mood, hallucinations, self-injury and suicidal ideas. The patient is not nervous/anxious and is not hyperactive. Objective:     Vitals:    07/20/22 1602   BP: 126/64   Weight: 220 lb (99.8 kg)   Height: 5' 9\" (1.753 m)       Physical Exam  Vitals and nursing note reviewed. Constitutional:       General: He is not in acute distress. Appearance: He is well-developed. He is not diaphoretic. HENT:      Head: Normocephalic and atraumatic. Right Ear: External ear normal.      Left Ear: External ear normal.      Nose: Nose normal.      Mouth/Throat:      Pharynx: No oropharyngeal exudate. Eyes:      General: No scleral icterus. Right eye: No discharge. Left eye: No discharge. Conjunctiva/sclera: Conjunctivae normal.      Pupils: Pupils are equal, round, and reactive to light. Neck:      Thyroid: No thyromegaly. Cardiovascular:      Rate and Rhythm: Normal rate and regular rhythm. Heart sounds: Normal heart sounds. No murmur heard. No friction rub. No gallop. Pulmonary:      Effort: Pulmonary effort is normal. No respiratory distress. Breath sounds: Normal breath sounds. No wheezing or rales. Chest:      Chest wall: No tenderness. Abdominal:      General: Bowel sounds are normal. There is no distension. Palpations: Abdomen is soft. Tenderness: There is no abdominal tenderness. There is no guarding or rebound. Musculoskeletal:      Cervical back: Neck supple. Tenderness and crepitus present. No edema, erythema or rigidity. No muscular tenderness. Decreased range of motion. Thoracic back: Tenderness present. Lumbar back: Tenderness present. Decreased range of motion. Positive right straight leg raise test and positive left straight leg raise test.        Back:    Skin:     General: Skin is warm. Coloration: Skin is not pale. Findings: No erythema or rash. Neurological:      Mental Status: He is alert and oriented to person, place, and time. He is not disoriented. Cranial Nerves: No cranial nerve deficit. Sensory: No sensory deficit. Motor: Weakness present. No atrophy or abnormal muscle tone. Coordination: Coordination normal.      Gait: Gait abnormal.      Deep Tendon Reflexes: Reflexes are normal and symmetric. Babinski sign absent on the right side. Babinski sign absent on the left side. Psychiatric:         Attention and Perception: He is attentive. Mood and Affect: Mood is not anxious or depressed. Affect is blunt. Affect is not labile, angry or inappropriate. Speech: Speech normal. He is communicative. Speech is not rapid and pressured, delayed, slurred or tangential.         Behavior: Behavior normal. Behavior is not agitated, slowed, aggressive, withdrawn, hyperactive or combative. Thought Content: Thought content normal. Thought content is not paranoid or delusional. Thought content does not include homicidal or suicidal ideation. Thought content does not include homicidal or suicidal plan. Cognition and Memory: Cognition normal. Memory is not impaired. He does not exhibit impaired recent memory or impaired remote memory. Judgment: Judgment normal. Judgment is not impulsive or inappropriate. SHREE  Patricks test  positive  Yeoman's  or Gaenslen;s positive  Kemps  positive  Spurlings  na       Assessment:     1. Spondylosis of lumbar spine    2. Lumbar facet arthropathy    3. Lumbar post-laminectomy syndrome    4. Cervical spondylosis    5. SI (sacroiliac) joint inflammation (HCC)    6. Bilateral occipital neuralgia    7. Chronic idiopathic pain syndrome            Plan:      OARRS reviewed. Current MED: 0  Patient was not offered naloxone for home.    Discussed long term side effects of medications, tolerance, dependency and addiction. Previous UDS reviewed  UDS preformed today for compliance. Patient told can not receive any pain medications from any other source. No evidence of abuse, diversion or aberrant behavior. Medications and/or procedures to improve function and quality of life- patient understanding with this and that may not be pain free  Discussed with patient about safe storage of medications at home  Discussed possible weaning of medication dosing dependent on treatment/procedure results. Testing: Reviewed in detail MRI Lumbar Spine with patient and wife. Procedures: Bilateral L-Facet RFA @ L2-3 and L3-4  Discussed with patient about risks with procedure including infection, reaction to medication, increased pain, or bleeding. Medications: Reviewed  If patient is on blood thinners will need approval to hold yes or no: no  Does patient have implanted device Stimulator, AICD or Pacemaker etc?  NO         NEEDS CLEARANCE FROM [facility]          Return BILATERAL L-FACET RFA @ L2-3 and L3-4.                Electronically signed by Jaci Matthews MD on7/21/2022 at 11:24 AM

## 2022-07-21 ENCOUNTER — TELEPHONE (OUTPATIENT)
Dept: PHYSICAL MEDICINE AND REHAB | Age: 62
End: 2022-07-21

## 2022-07-21 ASSESSMENT — ENCOUNTER SYMPTOMS
RHINORRHEA: 0
DIARRHEA: 0
CONSTIPATION: 0
PHOTOPHOBIA: 0
EYE PAIN: 0
ABDOMINAL PAIN: 0
COLOR CHANGE: 0
SINUS PRESSURE: 0
CHEST TIGHTNESS: 0
NAUSEA: 0
SORE THROAT: 0
COUGH: 0
WHEEZING: 0
BACK PAIN: 1
SHORTNESS OF BREATH: 0
VOMITING: 0

## 2022-07-26 ENCOUNTER — TELEPHONE (OUTPATIENT)
Dept: PHYSICAL MEDICINE AND REHAB | Age: 62
End: 2022-07-26

## 2022-08-08 ENCOUNTER — HOSPITAL ENCOUNTER (OUTPATIENT)
Dept: PHYSICAL THERAPY | Age: 62
Setting detail: THERAPIES SERIES
Discharge: HOME | End: 2022-08-08
Payer: COMMERCIAL

## 2022-08-08 PROCEDURE — 97162 PT EVAL MOD COMPLEX 30 MIN: CPT

## 2022-08-08 NOTE — PROGRESS NOTES
IS A SOFT LIMIT. [address]  [address] THERAPY COVERED:  YES  MODALITIES COVERED:  YES  TELEHEALTH COVERED:    REFERENCE NUMBER:     Visit # 1, 1/10 for progress note   Visits Allowed: 8   Recertification Date: 91/6/8293   Physician Follow-Up: Pema Burnett MD heart doctor,  follow up on 8/12/2022   Physician Orders: Evaluate and treat   History of Present Illness: Previous PT for 22 visits post heart transplant. Evaluate and treat: post heart transplant. Patient would like to strengthen his legs, increase walking distance. Walking is limited low back and leg symptoms due to MRI showing foraminal stenosis [id number]. See MRI REPORT. Patient also has history of arachnoiditis of lumbar region. Patient had previous fall with sustaining fractured ribs and was unable to attend further therapy visits. At this time, received further orders for evaluation and treatment to resume PT. Patient states that he has left leg issues with weakness, decreased balance, limited walking distance. Uses straight cane when he does a lot of walking. \"Less than 1 block\" But, for house hold distances does not use his cane. He does use his cane when negotiating stair steps. He could while receiving PT he was able to negotiated stair steps with light use of single handrail. Patient goals are to be able to improved leg strength, balance and walking. Patient does not want to go into cardiac rehab until completing PT. Patient feels that he won't be 100%. Feels his back with stenosis is also a factor for his leg pain along with the arachnoiditis. SUBJECTIVE: Previous PT for 22 visits post heart transplant. Evaluate and treat: post heart transplant. Patient would like to strengthen his legs, increase walking distance. Walking is limited low back and leg symptoms due to MRI showing foraminal stenosis [id number]. See MRI REPORT. Patient also has history of arachnoiditis of lumbar region.  Patient had previous fall with sustaining fractured ribs and was unable to attend further therapy visits. At this time, received further orders for evaluation and treatment to resume PT. Patient notes evenings and nighttime are most difficult due to pain in both his legs. Difficulty sleeping due to this pain and difficulty getting comfortable. Dr. Delmar Velazquez for pain management of his back and leg symptoms. Patient states that he has left leg issues with weakness, decreased balance, limited walking distance. Uses straight cane when he does a lot of walking. \"Less than 1 block\" But, for house hold distances does not use his cane. He does use his cane when negotiating stair steps. He could while receiving PT he was able to negotiated stair steps with light use of single handrail. Patient goals are to be able to improved leg strength, balance and walking. Patient does not want to go into cardiac rehab until completing PT. Patient feels that he won't be 100%. Feels his back with stenosis is also a factor for his leg pain along with the arachnoiditis. Social/Functional History and Current Status:  Medications and Allergies have been reviewed and are listed on Medical History Questionnaire. Kirstin Cooney lives with spouse in a multiple floor home with stairs and no handrail to enter. 1 step    Task Previous Current   ADLs  Independent Independent   IADL's Independent Modified Independent   Ambulation Modified Independent Modified Independent   Transfers Independent Independent   Recreation Dependent/Unable Dependent/Unable   Community Integration Independent Independent   Driving Active  Active    Work On Disability  On Disability     OBJECTIVE:    Pain: 4/10 low back pain, Notes with more movement LE symptoms. Left ankle/foot issues swelling and pain. Observation Patient ambulates into PT with straight cane with good ahmet. Forward trunk, forward shoulders.     Posture Moderate forward head, shoulders, mild kyphosis, decreased wt shift through LLE in standing posture. Range of Motion LE ROM WFLS actively,    Strength Hip flexors right 5/5, left 4/5, knee extensors right/left 5/5, knee flexors right 5/5, left 4-/5, ankle df right 5/5, left 4-/5, plantar flexors right 4-/5, left 4-/5 with difficulty with 5x heelraises single, Patient able to complete partial squat with 60 degrees knee flexion. Sensation Left foot numbness dorsum. Bed Mobility independent   Transfers independent   Ambulation Ambulates with single point cane with good stride length, ahmet. Stairs Negotiates stair step reciprocally with single handrail   Balance Tandem stance l/r 13 seconds, r/l 34 seconds to 40 seconds. Special Tests  6minute walk test 940.4 in 6 minutes. 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 LLE radiculopathy. LE weakness, decreased walking tolerance, balance. Pain:     \"X in shaded column indicates activity completed today    *\" next to exercise/intervention indicates progression   Modalities Parameters/  Location  Notes                     Manual Therapy Time/Technique  Notes                     Exercise/  Intervention   Notes   6 minute walk test  940 feet  x    Cordova balance test  52/56 x                                                                     Specific Interventions Next Treatment: balance retraining, strengthening LEs, gait training with dynamic gait,     Activity/Treatment Tolerance:  [x]  Patient tolerated treatment well  []  Patient limited by fatigue  []  Patient limited by pain   []  Patient limited by medical complications  []  Other:     Assessment: Patient referred to PT post heart transplant, deconditioning. Patient was followed previously in PT for 22 visits and returns to PT for further PT following rib fractures in June 2020 from a fall.  Patient demonstrate good progress since last session in PT in 6/8/2022. Cordova balance scale 52/56, 6 minutes walk test 940 feet with minimal perceived exertion <2. Noted decreased balance with tandem and single leg stance and decreased LLE strength as compared to RLE. Noted use of straight cane 3 minutes into his 6 min walk test due to LLE pain and weakness. Patient is also being followed in pain management for lumbar pain and radiculopathy. At this time, he has improved from objective measurements recorded previously in PT in June 8, 2022. Will follow for 1-2x per week to address strength, balance, and difficulty walking. Some of his deficits can be due to his back pain and LE radiculopathy which are limiting his walking distance, strength and balance. Body Structures/Functions/Activity Limitations: edema, impaired activity tolerance, impaired balance, impaired endurance, impaired strength, and pain  Prognosis: good    GOALS:  Patient Goal: To be able to stand and walk longer, improve strength and balance. Short Term Goals:  Time Frame: 4 weeks  Patient to demonstrate increased strength LLE at hip flexors 4/5 to 5/5, left hamstring from 4-/5 to 5/5, ankle plantarflexors from 4-/5 to 4/5 with increased stability and functional strength with partial squats, heelraises and negotiating 12 stair steps. Patient to demonstrate increased balance with tandem stance from l/r 13 seconds to 30 seconds. SLS from 3 seconds to 10 seconds. Patient to demonstrate ability to ambulate 1000 feet in 6 minutes with perceived exertion <3. Long Term Goals:  Time Frame: 8 weeks  1. Cordova balance test from 52/56 to 54/56  2. Patient independent in HEP with follow through for balance, strength, gait training/walking distance with improved endurance.        Patient Education:   []  HEP/Education Completed: Plan of Care, Goals,   Medbridge Access Code:  []  No new Education completed  []  Reviewed Prior HEP      []  Patient verbalized and/or demonstrated understanding of education provided. []  Patient unable to verbalize and/or demonstrate understanding of education provided. Will continue education. [x]  Barriers to learning: none    PLAN:  Treatment Recommendations: Strengthening, Balance Training, Functional Mobility Training, Transfer Training, Endurance Training, Gait Training, Neuromuscular Re-education, Home Exercise Program, and Patient Education    [x]  Plan of care initiated. Plan to see patient 1-2 times per week for 8 weeks to address the treatment planned outlined above.   []  Continue with current plan of care  []  Modify plan of care as follows:    []  Hold pending physician visit  []  Discharge    Time In 1350   Time Out 1435   Timed Code Minutes: 0 min   Total Treatment Time: 45 min       Electronically Signed by: Blue Abarca PT

## 2022-08-11 ENCOUNTER — PREP FOR PROCEDURE (OUTPATIENT)
Dept: PHYSICAL MEDICINE AND REHAB | Age: 62
End: 2022-08-11

## 2022-08-12 NOTE — DISCHARGE INSTRUCTIONS
ANESTHESIA INSTRUCTIONS FOLLOWING SURGERY        Since you may experience some intermittent light-headedness for the next several hours, we suggest you plan on bed rest or quiet relaxation this evening. You must have a friend or relative stay with you tonight. Because of the sedation you have received, it is recommended that you do not drive a motor vehicle, operate any kind of machinery, or sign any contractual agreement for 24 hours following the procedure. You should not take alcoholic beverages tonight and only take sleeping medication that has been specifically prescribed for you by your physician. Call office [phone] if you have:  Temperature greater than 100.4  Persistent nausea and vomiting  Severe uncontrolled pain  Redness, tenderness, or signs of infection (pain, swelling, redness, odor or green/yellow discharge around the site)  Difficulty breathing, headache or visual disturbances  Hives  Persistent dizziness or light-headedness  Extreme fatigue  Any other questions or concerns you may have after discharge    In an emergency, call 911 or go to an Emergency Department at a nearby hospital    It is important to bring a complete, current list of your medications to any medical appointments or hospitalizations. REMINDER:   Carry a list of your medications and allergies with you at all times  Call your pharmacy at least 1 week in advance to refill prescriptions    Diet: Resume your usual diet. Good nutrition promotes healing. Increase fluid intake. Activity: Rest for 24 hrs then resume normal activity. HOME MEDICATIONS:      If on blood thinning products such as; Aspirin, NSAIDS, Plavix, Coumadin, Xarelto, Fish Oil, Multi-Vitamins or Herbal Supplements restart in 24 hours      Restart Metformin in 48 hours if you had procedure with dye. Restart Metformin in 24 hours if no dye used during procedure.         Education Materials Received: {yes/no:058094}  Belongings Returned: {yes/no:407919}          I understand and acknowledge receipt of the above instructions. Patient or Guardian Signature                                                         Date/Time                                                                                                                                            Physician's or R.N.'s Signature                                                                  Date/Time      The discharge instructions have been reviewed with the patient and/or Guardian. Patient and/or Guardian signed and retained a printed copy. Call office [phone] if you have:  Temperature greater than 100.4  Persistent nausea and vomiting  Severe uncontrolled pain  Redness, tenderness, or signs of infection (pain, swelling, redness, odor or green/yellow discharge around the site)  Difficulty breathing, headache or visual disturbances  Hives  Persistent dizziness or light-headedness  Extreme fatigue  Any other questions or concerns you may have after discharge    In an emergency, call 911 or go to an Emergency Department at a nearby hospital    It is important to bring a complete, current list of your medications to any medical appointments or hospitalizations. REMINDER:   Carry a list of your medications and allergies with you at all times  Call your pharmacy at least 1 week in advance to refill prescriptions    Diet: Resume your usual diet. Good nutrition promotes healing. Increase fluid intake. Activity: Rest for 24 hrs then resume normal activity. Education Materials Received: {yes/no:983218}  Belongings Returned: {yes/no:020609}          I understand and acknowledge receipt of the above instructions. Patient or Guardian Signature                                                         Date/Time                                                                                                                                            Physician's or R.N.'s Signature                                                                  Date/Time      The discharge instructions have been reviewed with the patient and/or Guardian. Patient and/or Guardian signed and retained a printed copy.

## 2022-08-17 ENCOUNTER — HOSPITAL ENCOUNTER (OUTPATIENT)
Dept: PHYSICAL THERAPY | Age: 62
Setting detail: THERAPIES SERIES
Discharge: HOME | End: 2022-08-17
Payer: COMMERCIAL

## 2022-08-17 PROCEDURE — 97112 NEUROMUSCULAR REEDUCATION: CPT

## 2022-08-17 PROCEDURE — 97110 THERAPEUTIC EXERCISES: CPT

## 2022-08-17 NOTE — PROGRESS NOTES
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]    Treatment Diagnosis Difficulty walking with LE radiculopathy, decreased endurance, decreased strength LEs, decreased balance   Date of Evaluation 22    Additional Pertinent History 2022 heart transplant, history of cervical fusion x2, lumbar surgery with fusion x1, s/p rib fractures from fall 22, previously followed in PT 22 visits for post heart transplant and critical illness myopathy      Functional Outcome Measure Used Cord[place],  6 minute walk test   Functional Outcome Score 52/56,  6 minutes walk test 940.4 feet (22)       Insurance: Primary: Payor: MEDICARE /  /  / ,   Secondary: BC   Authorization Information: PRE CERTIFICATION REQUIRED: YES, AFTER THE 30TH VISIT  INSURANCE THERAPY BENEFIT:  ALLOWED 27 VISITS OF PT, THIS IS A SOFT LIMIT. [address] [facility]  Floor THERAPY COVERED:  YES  MODALITIES COVERED:  YES  TELEHEALTH COVERED:    REFERENCE NUMBER:     Visit # 2, 2/10 for progress note   Visits Allowed: 8   Recertification Date:    Physician Follow-Up: Lela Hirsch MD heart doctor,  follow up on 2022   Physician Orders: Evaluate and treat   History of Present Illness: Previous PT for 22 visits post heart transplant. Evaluate and treat: post heart transplant. Patient would like to strengthen his legs, increase walking distance.  Walking is limited low back and leg symptoms due to MRI showing foraminal stenosis [id number]. See MRI REPORT. Patient also has history of arachnoiditis of lumbar region. Patient had previous fall with sustaining fractured ribs and was unable to attend further therapy visits. At this time, received further orders for evaluation and treatment to resume PT. Patient states that he has left leg issues with weakness, decreased balance, limited walking distance. Uses straight cane when he does a lot of walking. \"Less than 1 block\" But, for house hold distances does not use his cane. He does use his cane when negotiating stair steps. He could while receiving PT he was able to negotiated stair steps with light use of single handrail. Patient goals are to be able to improved leg strength, balance and walking. Patient does not want to go into cardiac rehab until completing PT. Patient feels that he won't be 100%. Feels his back with stenosis is also a factor for his leg pain along with the arachnoiditis. SUBJECTIVE: Patient reports of continued back and LLE symptoms limiting his walking and standing tolerance. Pain level dull ache at 4/10. Balance continues to be a challenge for him with some of the exercises. Patient has not been completed HEP. Did put new inserts in his shoes and notes feet do not hurt him as much. Was standing 4 hours the whole time don and his feel and legs hurt less. 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 LLE radiculopathy. LE weakness, decreased walking tolerance, balance.     Pain: 4/10 back and LLE     \"X in shaded column indicates activity completed today    *\" next to exercise/intervention indicates progression   Modalities Parameters/  Location  Notes                     Manual Therapy Time/Technique  Notes Exercise/  Intervention   Notes   6 minute walk test  940 feet      Cordova balance test  52/56     Nustep bariatric unit 8 minutes  Level 3 x    // bars: bilateral heelraises on foam one hand hold 15x  x    Standing single heelraises without foam  10x Bilateral hand hold on // bars x    Tandem stance on foam   14-15 count x2 x    Romberg narrow stance with eyes closed 15 count  x    3 way hip closed chain and open chain flexion, abduction and extension x10  x    Partial squats  x10  x    Step ups with high march or hip flexion of open chain leg  X5 each  Bilateral handrails hand hold  x    Lateral step ups with one hand hold on rail x10  x    Reciprocal lunges in // bars  5x  x    Stepping over hurdles green forward and lateral  X4 lengths each  In // bars  x Without use of hands on bars          Hamstring stretch at step  3x 10 second  x      Specific Interventions Next Treatment: balance retraining, strengthening LEs, gait training with dynamic gait,     Activity/Treatment Tolerance:  [x]  Patient tolerated treatment well  []  Patient limited by fatigue  []  Patient limited by pain   []  Patient limited by medical complications  []  Other:     Assessment: Initiated therap ex for strength, balance and endurance. Tolerated session well with weakness with LLE and decreased balance in tandem and dynamic gait activities. Only 1-2 seated rest breaks in session. MIld shortness of breath following step ups. Body Structures/Functions/Activity Limitations: edema, impaired activity tolerance, impaired balance, impaired endurance, impaired strength, and pain  Prognosis: good    GOALS:  Patient Goal: To be able to stand and walk longer, improve strength and balance.      Short Term Goals:  Time Frame: 4 weeks  Patient to demonstrate increased strength LLE at hip flexors 4/5 to 5/5, left hamstring from 4-/5 to 5/5, ankle plantarflexors from 4-/5 to 4/5 with increased stability and functional strength with partial squats, heelraises and negotiating 12 stair steps. Patient to demonstrate increased balance with tandem stance from l/r 13 seconds to 30 seconds. SLS from 3 seconds to 10 seconds. Patient to demonstrate ability to ambulate 1000 feet in 6 minutes with perceived exertion <3. Long Term Goals:  Time Frame: 8 weeks  1. Cordova balance test from 52/56 to 54/56  2. Patient independent in HEP with follow through for balance, strength, gait training/walking distance with improved endurance. Patient Education:   []  HEP/Education Completed: Plan of Care, Goals,   Medbridge Access Code:  []  No new Education completed  []  Reviewed Prior HEP      []  Patient verbalized and/or demonstrated understanding of education provided. []  Patient unable to verbalize and/or demonstrate understanding of education provided. Will continue education. [x]  Barriers to learning: none    PLAN:  Treatment Recommendations: Strengthening, Balance Training, Functional Mobility Training, Transfer Training, Endurance Training, Gait Training, Neuromuscular Re-education, Home Exercise Program, and Patient Education    []  Plan of care initiated. Plan to see patient 1-2 times per week for 8 weeks to address the treatment planned outlined above.   [x]  Continue with current plan of care  []  Modify plan of care as follows:    []  Hold pending physician visit  []  Discharge    Time In 0901   Time Out 0943   Timed Code Minutes: 42   Total Treatment Time: 42       Electronically Signed by: Amos Charlton PT

## 2022-08-19 ENCOUNTER — ANESTHESIA (OUTPATIENT)
Dept: OPERATING ROOM | Age: 62
End: 2022-08-19
Payer: COMMERCIAL

## 2022-08-19 ENCOUNTER — HOSPITAL ENCOUNTER (OUTPATIENT)
Age: 62
Setting detail: OUTPATIENT SURGERY
Discharge: HOME | End: 2022-08-19
Attending: PAIN MEDICINE | Admitting: PAIN MEDICINE
Payer: COMMERCIAL

## 2022-08-19 ENCOUNTER — ANESTHESIA EVENT (OUTPATIENT)
Dept: OPERATING ROOM | Age: 62
End: 2022-08-19
Payer: COMMERCIAL

## 2022-08-19 ENCOUNTER — APPOINTMENT (OUTPATIENT)
Dept: GENERAL RADIOLOGY | Age: 62
End: 2022-08-19
Attending: PAIN MEDICINE
Payer: COMMERCIAL

## 2022-08-19 VITALS
HEART RATE: 84 BPM | HEIGHT: 69 IN | SYSTOLIC BLOOD PRESSURE: 158 MMHG | RESPIRATION RATE: 18 BRPM | TEMPERATURE: 96.9 F | BODY MASS INDEX: 34.8 KG/M2 | WEIGHT: 235 LBS | DIASTOLIC BLOOD PRESSURE: 79 MMHG | OXYGEN SATURATION: 95 %

## 2022-08-19 PROCEDURE — 64635 DESTROY LUMB/SAC FACET JNT: CPT | Performed by: PAIN MEDICINE

## 2022-08-19 PROCEDURE — 3600000057 HC PAIN LEVEL 4 ADDL 15 MIN: Performed by: PAIN MEDICINE

## 2022-08-19 PROCEDURE — 3700000001 HC ADD 15 MINUTES (ANESTHESIA): Performed by: PAIN MEDICINE

## 2022-08-19 PROCEDURE — 64636 DESTROY L/S FACET JNT ADDL: CPT | Performed by: PAIN MEDICINE

## 2022-08-19 PROCEDURE — 2500000003 HC RX 250 WO HCPCS: Performed by: NURSE ANESTHETIST, CERTIFIED REGISTERED

## 2022-08-19 PROCEDURE — 7100000011 HC PHASE II RECOVERY - ADDTL 15 MIN: Performed by: PAIN MEDICINE

## 2022-08-19 PROCEDURE — 7100000010 HC PHASE II RECOVERY - FIRST 15 MIN: Performed by: PAIN MEDICINE

## 2022-08-19 PROCEDURE — 6360000002 HC RX W HCPCS: Performed by: NURSE ANESTHETIST, CERTIFIED REGISTERED

## 2022-08-19 PROCEDURE — 3209999900 FLUORO FOR SURGICAL PROCEDURES

## 2022-08-19 PROCEDURE — 3600000056 HC PAIN LEVEL 4 BASE: Performed by: PAIN MEDICINE

## 2022-08-19 PROCEDURE — 2500000003 HC RX 250 WO HCPCS: Performed by: PAIN MEDICINE

## 2022-08-19 PROCEDURE — 3700000000 HC ANESTHESIA ATTENDED CARE: Performed by: PAIN MEDICINE

## 2022-08-19 PROCEDURE — 2709999900 HC NON-CHARGEABLE SUPPLY: Performed by: PAIN MEDICINE

## 2022-08-19 RX ORDER — FENTANYL CITRATE 50 UG/ML
INJECTION, SOLUTION INTRAMUSCULAR; INTRAVENOUS PRN
Status: DISCONTINUED | OUTPATIENT
Start: 2022-08-19 | End: 2022-08-19 | Stop reason: SDUPTHER

## 2022-08-19 RX ORDER — LIDOCAINE HYDROCHLORIDE 10 MG/ML
INJECTION, SOLUTION EPIDURAL; INFILTRATION; INTRACAUDAL; PERINEURAL PRN
Status: DISCONTINUED | OUTPATIENT
Start: 2022-08-19 | End: 2022-08-19 | Stop reason: ALTCHOICE

## 2022-08-19 RX ORDER — BUPIVACAINE HYDROCHLORIDE 2.5 MG/ML
INJECTION, SOLUTION EPIDURAL; INFILTRATION; INTRACAUDAL PRN
Status: DISCONTINUED | OUTPATIENT
Start: 2022-08-19 | End: 2022-08-19 | Stop reason: ALTCHOICE

## 2022-08-19 RX ORDER — LIDOCAINE HYDROCHLORIDE 20 MG/ML
INJECTION, SOLUTION EPIDURAL; INFILTRATION; INTRACAUDAL; PERINEURAL PRN
Status: DISCONTINUED | OUTPATIENT
Start: 2022-08-19 | End: 2022-08-19 | Stop reason: SDUPTHER

## 2022-08-19 RX ORDER — PROPOFOL 10 MG/ML
INJECTION, EMULSION INTRAVENOUS PRN
Status: DISCONTINUED | OUTPATIENT
Start: 2022-08-19 | End: 2022-08-19 | Stop reason: SDUPTHER

## 2022-08-19 RX ADMIN — PROPOFOL 70 MG: 10 INJECTION, EMULSION INTRAVENOUS at 09:17

## 2022-08-19 RX ADMIN — FENTANYL CITRATE 100 MCG: 50 INJECTION, SOLUTION INTRAMUSCULAR; INTRAVENOUS at 09:06

## 2022-08-19 RX ADMIN — LIDOCAINE HYDROCHLORIDE 60 MG: 20 INJECTION, SOLUTION EPIDURAL; INFILTRATION; INTRACAUDAL; PERINEURAL at 09:06

## 2022-08-19 ASSESSMENT — PAIN - FUNCTIONAL ASSESSMENT: PAIN_FUNCTIONAL_ASSESSMENT: 0-10

## 2022-08-19 ASSESSMENT — PAIN DESCRIPTION - DESCRIPTORS: DESCRIPTORS: SHOOTING;THROBBING

## 2022-08-19 NOTE — ANESTHESIA POSTPROCEDURE EVALUATION
Department of Anesthesiology  Postprocedure Note    Patient: Severo Das  MRN: [id number]  YOB: 1960  Date of evaluation: 8/19/2022      Procedure Summary     Date: 08/19/22 Room / Location: [facility] 03 / [facility]    Anesthesia Start: 1831 Anesthesia Stop: 2035    Procedure: Bilateral Lumbar-Facet Radiofrequency ablation lumbar 2-3  3-4 (Bilateral) Diagnosis:       Lumbar spondylosis      (LUMBAR SPONDYLOSIS)    Surgeons: Felix Aquino MD Responsible Provider: Vishnu Kennedy DO    Anesthesia Type: MAC ASA Status: 3          Anesthesia Type: No value filed.     Fifi Phase I:      Fifi Phase II: Fifi Score: 10      Anesthesia Post Evaluation    Patient location during evaluation: bedside  Patient participation: complete - patient participated  Level of consciousness: awake  Airway patency: patent  Nausea & Vomiting: no vomiting and no nausea  Cardiovascular status: hemodynamically stable  Respiratory status: acceptable  Hydration status: stable

## 2022-08-19 NOTE — H&P
[address] [place] [address]  History and Physical Update    Pt Name: Nita Segovia  MRN: [id number]  YOB: 1960  Date of evaluation: 8/19/2022      I have examined the patient and reviewed the H&P/Consult and there are no changes to the patient or plans.         Electronically signed by Kelly Tarango MD on 8/19/2022 at 8:28 AM

## 2022-08-19 NOTE — ANESTHESIA PRE PROCEDURE
Department of Anesthesiology  Preprocedure Note       Name:  Nita Segovia   Age:  57 y.o.  :  1960                                          MRN:  [id number]         Date:  2022      Surgeon: Jeffy To):  Kelly Tarango MD    Procedure: Procedure(s):  Bilateral Lumbar-Facet Radiofrequency ablation lumbar 2-3  3-4    Medications prior to admission:   Prior to Admission medications    Medication Sig Start Date End Date Taking?  Authorizing Provider   hydrALAZINE (APRESOLINE) 100 MG tablet  22   Historical Provider, MD   pravastatin (PRAVACHOL) 20 MG tablet Take 20 mg by mouth daily    Historical Provider, MD   amitriptyline (ELAVIL) 10 MG tablet Take 10 mg by mouth nightly    Historical Provider, MD   amLODIPine (NORVASC) 10 MG tablet Take 10 mg by mouth daily    Historical Provider, MD   methIMAzole (TAPAZOLE) 10 MG tablet Take 10 mg by mouth 3 times daily    Historical Provider, MD   sulfamethoxazole-trimethoprim (BACTRIM DS;SEPTRA DS) 800-160 MG per tablet Take 1 tablet by mouth See Admin Instructions Take 1 tablet every  and Friday 8:00 am    Historical Provider, MD   polyethylene glycol (GLYCOLAX) 17 GM/SCOOP powder Take 17 g by mouth as needed    Historical Provider, MD   senna (SENOKOT) 8.6 MG tablet Take 1 tablet by mouth as needed for Constipation    Historical Provider, MD   melatonin 3 MG TABS tablet Take 3 mg by mouth daily 3 capsules by mouth daily    Historical Provider, MD   tacrolimus (PROGRAF) 1 MG capsule Take 1 mg by mouth 2 times daily    Historical Provider, MD   tacrolimus (PROGRAF) 0.5 MG capsule Take 0.5 mg by mouth daily    Historical Provider, MD   mycophenolate (CELLCEPT) 250 MG capsule Take by mouth 2 times daily 4 capsules BID    Historical Provider, MD   Pantoprazole Sodium (PROTONIX PO) Take 20 mg by mouth daily    Historical Provider, MD   calcium citrate-vitamin D (CITRICAL + D) 315-250 MG-UNIT TABS per tablet Take 1 tablet by mouth 2 times daily (with meals)    Historical Provider, MD   magnesium oxide (MAG-OX) 400 MG tablet Take 800 mg by mouth 2 times daily    Historical Provider, MD   Cholecalciferol (VITAMIN D3) 1.25 MG (02304 UT) CAPS Take 1 capsule by mouth Every Friday    Historical Provider, MD   predniSONE (DELTASONE) 20 MG tablet Take 10 mg by mouth daily (with breakfast) 3/17/22   Historical Provider, MD   torsemide (DEMADEX) 20 MG tablet 4 tabs AM, 3 tabs PM 9/14/21   KRIS Borjas CNP   tamsulosin (FLOMAX) 0.4 MG capsule Take 1 capsule by mouth nightly 4/12/21   Historical Provider, MD   metOLazone (ZAROXOLYN) 5 MG tablet Take 1 tablet by mouth daily as needed (AS directed by CHF clinic) 5/10/21   KRIS Borjas CNP   isosorbide dinitrate (ISORDIL) 5 MG tablet Take 10 mg by mouth in the morning and 10 mg at noon and 10 mg before bedtime. 3/25/21   Historical Provider, MD   potassium chloride (KLOR-CON M) 20 MEQ extended release tablet TAKE 1 TABLET BY MOUTH ONCE DAILY 3/13/21   Historical Provider, MD   hydrALAZINE (APRESOLINE) 25 MG tablet Take 100 mg by mouth 3 times daily  3/25/21   Historical Provider, MD   finasteride (PROSCAR) 5 MG tablet Take 5 mg by mouth daily 2/19/21 6/14/22  Historical Provider, MD   JARDIANCE 10 MG tablet 10 mg 1/23/21   Historical Provider, MD   cyclobenzaprine (FLEXERIL) 5 MG tablet Take 5 mg by mouth daily as needed for Muscle spasms    Historical Provider, MD   metoprolol succinate (TOPROL XL) 25 MG extended release tablet Take 12.5 mg by mouth in the morning.  12/17/20   KRIS Borjas CNP   amiodarone (CORDARONE) 200 MG tablet Take 1 tablet twice a day for 7 days, then decrease to 1 tablet by mouth once a day 12/7/20   Timbo Olivera MD   atorvastatin (LIPITOR) 80 MG tablet Take 80 mg by mouth nightly 7/27/20   Historical Provider, MD   digoxin (LANOXIN) 125 MCG tablet Take 0.125 mg by mouth every other day  7/27/20   Historical Provider, MD   valsartan (DIOVAN) 40 MG tablet Take 20 mg by mouth in the morning and 20 mg before bedtime. 7/28/20   Historical Provider, MD   Magnesium 500 MG CAPS Take by mouth daily    Historical Provider, MD   zinc sulfate (ZINCATE) 220 (50 Zn) MG capsule Take 50 mg by mouth in the morning. Historical Provider, MD   allopurinol (ZYLOPRIM) 300 MG tablet Take 300 mg by mouth in the morning. 9/5/19   Historical Provider, MD   Acetaminophen (TYLENOL ARTHRITIS PAIN PO) Take by mouth 3 times daily as needed    Historical Provider, MD       Current medications:    No current facility-administered medications for this encounter. Allergies: Allergies   Allergen Reactions    Entresto [Sacubitril-Valsartan]        Problem List:    Patient Active Problem List   Diagnosis Code    Snoring R06.83    SYED (obstructive sleep apnea) G47.33    Insomnia G47.00    Daytime somnolence R40.0    Sleep difficulties G47.9    Sleep talking G47.8    Restless sleeper G47.9    Claustrophobia F40.240    HTN (hypertension) I10    CHF (congestive heart failure) (HCC) I50.9    Gout M10.9    Arthritis M19.90    HLD (hyperlipidemia) E78.5    Dilated cardiomyopathy (HCC) I42.0    Congestive heart failure (CHF) (HCC) I50.9    Medtronic dual ICD Z95.810    Hyponatremia E87.1    S/P lumbar laminectomy Z98.890    Ileus, postoperative (HCC) K91.89, K56.7    Lumbar postlaminectomy syndrome M96.1    Chronic pain syndrome G89.4    Spondylosis of lumbar region without myelopathy or radiculopathy M47.816    Scalp mass R22.0    Sacroiliac inflammation (HCC) M46.1    Chronic systolic heart failure (HCC) I50.22    Depression F32. A    AICD discharge Z45.02    Leukocytosis D72.829    Acute kidney injury ([facility] [address] N17.9    Obesity (BMI 30-39. 9) E66.9    Osteoarthritis of multiple joints M15.9    Volume depletion E86.9    Increased anion gap metabolic acidosis E65.4    Presence of automatic cardioverter/defibrillator (AICD) Z95.810    CHF (congestive heart failure), NYHA class III, chronic, systolic (HCC) S79.36       Past Medical History:        Diagnosis Date    Acute kidney injury ([facility] [address]     Cardiomyopathy, dilated ([facility] [address]     VIRAL    Claustrophobia     Congestive heart failure (CHF) (HCC)     Depression 12/26/2013    Diabetes mellitus ([facility] [address]     HTN (hypertension)     Hyperlipidemia     Medtronic dual ICD 02/14/2014    Osteoarthritis     Osteoarthritis of spine with radiculopathy, lumbar region 12/28/2015    Spinal stenosis     Unspecified sleep apnea     unable to wear CPAP       Past Surgical History:        Procedure Laterality Date    BACK SURGERY  08/26/2014    L4- S1 decompression, L4-5 PSF and TLIF    BACK SURGERY  2013    CARDIAC CATHETERIZATION  10/2013    [address]  2013    Medtronic    CARDIOVASCULAR STRESS TEST  09/2013    CARPAL TUNNEL RELEASE Right     CERVICAL FUSION  05/17/2017    [address] [place] [address] COLONOSCOPY      COLONOSCOPY Left 03/15/2021    COLONOSCOPY POLYPECTOMY SNARE/COLD BIOPSY performed by Oscar Glez DO at [address] CATH LAB PROCEDURE  07/2020    [facility]    DOPPLER ECHOCARDIOGRAPHY  09/2013    DOPPLER ECHOCARDIOGRAPHY  09/2013    [facility]    ENDOSCOPY, COLON, DIAGNOSTIC      FACET JOINT INJECTION Right 05/26/2020    L-facet RFA @ L2-3,L3-4,L4-5  RIGHT performed by Chevy Koch MD at [address] [place] [address] Left 07/02/2019    Left SI injection performed by Chevy Koch MD at [address] [place] [address] Left 08/26/2019    Left SI injection # 2 performed by Chevy Koch MD at [facility]  02/07/2022    HERNIA REPAIR  ? ??    umbilical    LUMBAR SPINE SURGERY Left 09/11/2017    LUMBAR RFA L2-3, L3-4, L4-5, L5-S1 LEFT SIDE performed by Chevy Koch MD at [address] [facility] Right 10/31/2017    LUMBAR FACET RFA @ L2-3, L3-4, L4-5 AND L5-S1 RIGHT SIDE performed by Benjamine Castleman, MD at [address] Left 04/11/2019    L-RFA @ L2-3, L3-4, L4-5, L5-S1 LEFT SIDE FIRST. performed by Benjamine Castleman, MD at [address] Left 10/03/2019    LEFT SI RFA performed by Benjamine Castleman, MD at [address] [facility]  2015    OTHER SURGICAL HISTORY  10/09/2015    C3-6 ACDF    OTHER SURGICAL HISTORY  01/18/2016    FACET INJECTIONS L3-L4 L4-L5 L5 S1    OTHER SURGICAL HISTORY  02/08/2016    RFA - L3-S1    OTHER SURGICAL HISTORY N/A 03/21/2016    cervical epidural block C7    OTHER SURGICAL HISTORY Bilateral 05/16/2016    cervical facet block C7-T1    OTHER SURGICAL HISTORY  08/01/2016    CERVICAL ABLATION    OTHER SURGICAL HISTORY Left 09/11/2017    Lumbar RFA at L2-3, L3-4, L4-5, L5-S1    OTHER SURGICAL HISTORY Right 10/31/2017    Lumbar RFA at L2-3, L3-4, L4-5, L5-S1    OTHER SURGICAL HISTORY Right 05/09/2018    Excision scalp mass right forehead    PACEMAKER PLACEMENT      NJ EXC SKIN BENIG <5MM REMAINDR BODY Right 05/09/2018    EXCISION RIGHT FOREHEAD CYST performed by Damir Navas MD at [address] Left 06/19/2018    L-RFA @ L2-3, L3-4, L4-5, L5-S1 LEFT SIDE FIRST. performed by Benjamine Castleman, MD at [address]   1980's???    TONSILLECTOMY  1980's??    VASECTOMY  ? ??       Social History:    Social History     Tobacco Use    Smoking status: Never    Smokeless tobacco: Never   Substance Use Topics    Alcohol use: Yes     Alcohol/week: 6.0 standard drinks     Types: 6 Standard drinks or equivalent per week     Comment: social                                Counseling given: Not Answered      Vital Signs (Current): There were no vitals filed for this visit. BP Readings from Last 3 Encounters:   07/20/22 126/64   06/20/22 (!) 154/87   06/14/22 (!) 100/56       NPO Status:                                                                                 BMI:   Wt Readings from Last 3 Encounters:   07/20/22 220 lb (99.8 kg)   06/20/22 219 lb (99.3 kg)   07/14/22 220 lb (99.8 kg)     There is no height or weight on file to calculate BMI.    CBC:   Lab Results   Component Value Date/Time    WBC 9.1 07/12/2022 07:44 AM    RBC 4.21 07/12/2022 07:44 AM    RBC 4.49 07/09/2020 10:48 AM    HGB 10.1 07/12/2022 07:44 AM    HCT 34.6 07/12/2022 07:44 AM    MCV 82.2 07/12/2022 07:44 AM    RDW 14.8 07/09/2020 10:48 AM     07/12/2022 07:44 AM       CMP:   Lab Results   Component Value Date/Time     07/12/2022 07:44 AM    K 4.2 07/12/2022 07:44 AM    K 3.8 05/02/2020 04:27 AM     07/12/2022 07:44 AM    CO2 27 07/12/2022 07:44 AM    BUN 18 07/12/2022 07:44 AM    CREATININE 1.5 07/12/2022 07:44 AM    LABGLOM 47 07/12/2022 07:44 AM    GLUCOSE 72 07/12/2022 07:44 AM    GLUCOSE 100 04/21/2020 08:36 AM    PROT 5.8 09/12/2020 03:06 PM    CALCIUM 9.3 07/12/2022 07:44 AM    BILITOT 1.2 09/12/2020 03:06 PM    ALKPHOS 112 09/12/2020 03:06 PM    AST 17 09/12/2020 03:06 PM    ALT 17 09/12/2020 03:06 PM       POC Tests: No results for input(s): POCGLU, POCNA, POCK, POCCL, POCBUN, POCHEMO, POCHCT in the last 72 hours.     Coags:   Lab Results   Component Value Date/Time    INR 1.03 05/02/2020 04:27 AM    APTT 34.5 05/02/2020 04:27 AM       HCG (If Applicable): No results found for: PREGTESTUR, PREGSERUM, HCG, HCGQUANT     ABGs: No results found for: PHART, PO2ART, JMN7QTQ, ZKU3WEK, BEART, V7WYGBIE     Type & Screen (If Applicable):  Lab Results   Component Value Date    LABRH NEG 10/09/2015       Drug/Infectious Status (If Applicable):  No results found for: HIV, HEPCAB    COVID-19 Screening (If Applicable):   Lab Results   Component Value Date/Time    COVID19 NOT DETECTED 05/02/2022 09:34 AM           Anesthesia Evaluation  Patient summary reviewed  Airway: Mallampati: III  TM distance: >3 FB   Neck ROM: full  Mouth opening: > = 3 FB   Dental:          Pulmonary:   (+) sleep apnea:                             Cardiovascular:    (+) hypertension:, CHF:,       ECG reviewed                        Neuro/Psych:   (+) neuromuscular disease:, psychiatric history:            GI/Hepatic/Renal:             Endo/Other:    (+) Diabetes, : arthritis:., .                 Abdominal:             Vascular: Other Findings:           Anesthesia Plan      MAC     ASA 3       Induction: intravenous. Anesthetic plan and risks discussed with patient. Plan discussed with ARTUR. Gopi Rayo.  [address] [place] [address]   8/19/2022

## 2022-08-19 NOTE — H&P
H&P    Patient states increase lower back and leg pain. Pain increases with bending, pushing, pulling, walking, standing, getting up and down, and housework or working at job. Treatments Tried injections  Pain Description stabbing/jabbing, throbbing, aching, numbness/tingling, and pins and needles        Prior Injections:  Surgery: Lumbar Fusion L5-S1  Injections: LESI - no relief  L MBB L3-4, L4-5,L5-S1  L RFA L2-3, L3-4,L4-5,L5-S1  KAYE  C MBB C6-7,C7-T1  C RFA C6-7, C7-T1  Left SI injection  Left SI RFA     Radiology:     MRI LUMBAR SPINE  07/14/22  FINDINGS:       The lumbar vertebral bodies are normally aligned. There are postoperative changes with bilateral pedicular screws at L5 and S1 laminectomy and fusion at L5. There is degenerative change in the vertebral body endplates adjacent to the L4-5 and L5-S1 disc    spaces and to lesser extent adjacent to the L1-2 and to a lesser extent T12-L1 disc spaces. .  There is no bone marrow edema. There are no compression fractures. No pars defects are noted. The visualized aspects of the distal spinal cord are normal. There appears be a small amount of fat in the filum terminale. There are no gross abnormalities in the distal thoracic spine. On the axial images, at T12-L1, there is no disc herniation, canal or foraminal stenosis. At L1-L2, there is a degenerated disc with no superimposed disc herniation, canal or foraminal stenosis. At L2-3, there is no disc herniation, canal or foraminal stenosis       At L3-4, there is a 1.6 mm bulging disc and facet hypertrophy. This causes mild canal and mild-to-moderate bilateral foraminal stenosis       At L4-5, there  is a 2.9 mm ventral extradural defect and facet hypertrophy. There is moderate to severe left and moderate right-sided foraminal stenosis with no canal stenosis. At L5-S1, there are postoperative changes.  There is mild-to-moderate bilateral foraminal stenosis with no canal stenosis. There is no abnormal enhancement. There is degenerative change involving the sacroiliac joints bilaterally. There is poor visualization of the nerve roots in the lower thecal sac. This may represent possible arachnoiditis. .       There is atrophy in the paraspinal muscles posteriorly. Impression       1. Postoperative changes with bilateral pedicular screws at L5 and S1, laminectomy and fusion at L5.   2. There is moderate to severe left and moderate right-sided foraminal stenosis with no canal stenosis at L4-5.   3. There is mild-to-moderate bilateral foraminal stenosis with no canal stenosis at L5-S1.   4. There is mild canal and mild-to-moderate bilateral foraminal stenosis at L3-4.   5. There is possible arachnoiditis in the lower thecal sac. 6. There is a small amount of fat in the filum terminale. 7. There is degenerative change involving the sacroiliac joints bilaterally. 8. Atrophy in the paraspinal muscles posteriorly. 9. No acute compression fracture noted. Medications reviewed. The patientis allergic to entresto [sacubitril-valsartan]. Subjective:      Review of Systems  Constitutional:  Positive for activity change. Negative for appetite change, chills, diaphoresis, fatigue, fever and unexpected weight change. HENT:  Negative for congestion, ear pain, hearing loss, mouth sores, nosebleeds, rhinorrhea, sinus pressure and sore throat. Eyes:  Negative for photophobia, pain and visual disturbance. Respiratory:  Negative for cough, chest tightness, shortness of breath and wheezing. Cardiovascular:  Negative for chest pain and palpitations. Hx Heart Transplant @ [facility] on Feb/2022   Gastrointestinal:  Negative for abdominal pain, constipation, diarrhea, nausea and vomiting. Endocrine: Negative for cold intolerance, heat intolerance, polydipsia, polyphagia and polyuria.   Genitourinary:  Negative for decreased urine volume, difficulty urinating, frequency and hematuria. Musculoskeletal:  Positive for arthralgias, back pain, gait problem and myalgias. Negative for joint swelling, neck pain and neck stiffness. Skin:  Negative for color change and rash. Allergic/Immunologic: Negative for food allergies and immunocompromised state. Neurological:  Positive for weakness and numbness. Negative for dizziness, tremors, seizures, syncope, facial asymmetry, speech difficulty, light-headedness and headaches. Hematological:  Does not bruise/bleed easily. Psychiatric/Behavioral:  Positive for sleep disturbance. Negative for agitation, behavioral problems, confusion, decreased concentration, dysphoric mood, hallucinations, self-injury and suicidal ideas. The patient is not nervous/anxious and is not hyperactive. Objective:      Vitals       Vitals:     07/20/22 1602   BP: 126/64   Weight: 220 lb (99.8 kg)   Height: 5' 9\" (1.753 m)            Physical Exam  Vitals and nursing note reviewed. Constitutional:       General: He is not in acute distress. Appearance: He is well-developed. He is not diaphoretic. HENT:     Head: Normocephalic and atraumatic. Right Ear: External ear normal.     Left Ear: External ear normal.     Nose: Nose normal.     Mouth/Throat:     Pharynx: No oropharyngeal exudate. Eyes:     General: No scleral icterus. Right eye: No discharge. Left eye: No discharge. Conjunctiva/sclera: Conjunctivae normal.     Pupils: Pupils are equal, round, and reactive to light. Neck:     Thyroid: No thyromegaly. Cardiovascular:     Rate and Rhythm: Normal rate and regular rhythm. Heart sounds: Normal heart sounds. No murmur heard. No friction rub. No gallop. Pulmonary:     Effort: Pulmonary effort is normal. No respiratory distress. Breath sounds: Normal breath sounds. No wheezing or rales. Chest:     Chest wall: No tenderness.   Abdominal:     General: Bowel sounds are normal. There is no distension. Palpations: Abdomen is soft. Tenderness: There is no abdominal tenderness. There is no guarding or rebound. Musculoskeletal:     Cervical back: Neck supple. Tenderness and crepitus present. No edema, erythema or rigidity. No muscular tenderness. Decreased range of motion. Thoracic back: Tenderness present. Lumbar back: Tenderness present. Decreased range of motion. Positive right straight leg raise test and positive left straight leg raise test.       Back:    Skin:     General: Skin is warm. Coloration: Skin is not pale. Findings: No erythema or rash. Neurological:     Mental Status: He is alert and oriented to person, place, and time. He is not disoriented. Cranial Nerves: No cranial nerve deficit. Sensory: No sensory deficit. Motor: Weakness present. No atrophy or abnormal muscle tone. Coordination: Coordination normal.     Gait: Gait abnormal.     Deep Tendon Reflexes: Reflexes are normal and symmetric. Babinski sign absent on the right side. Babinski sign absent on the left side. Psychiatric:         Attention and Perception: He is attentive. Mood and Affect: Mood is not anxious or depressed. Affect is blunt. Affect is not labile, angry or inappropriate. Speech: Speech normal. He is communicative. Speech is not rapid and pressured, delayed, slurred or tangential.         Behavior: Behavior normal. Behavior is not agitated, slowed, aggressive, withdrawn, hyperactive or combative. Thought Content: Thought content normal. Thought content is not paranoid or delusional. Thought content does not include homicidal or suicidal ideation. Thought content does not include homicidal or suicidal plan. Cognition and Memory: Cognition normal. Memory is not impaired. He does not exhibit impaired recent memory or impaired remote memory. Judgment: Judgment normal. Judgment is not impulsive or inappropriate.       SHREE  Patricks test positive  Yeoman's  or Gaenslen;s positive  Kemps  positive  Spurlings  na     Assessment:      1. Spondylosis of lumbar spine   2. Lumbar facet arthropathy   3. Lumbar post-laminectomy syndrome   4. Cervical spondylosis   5. SI (sacroiliac) joint inflammation (HCC)   6. Bilateral occipital neuralgia   7. Chronic idiopathic pain syndrome       Plan:      OARRS reviewed. Current MED: 0  Patient was not offered naloxone for home. Discussed long term side effects of medications, tolerance, dependency and addiction. Previous UDS reviewed  UDS preformed today for compliance. Patient told can not receive any pain medications from any other source. No evidence of abuse, diversion or aberrant behavior. Medications and/or procedures to improve function and quality of life- patient understanding with this and that may not be pain free  Discussed with patient about safe storage of medications at home  Discussed possible weaning of medication dosing dependent on treatment/procedure results. Testing: Reviewed in detail MRI Lumbar Spine with patient and wife. Procedures: Bilateral L-Facet RFA @ L2-3 and L3-4  Discussed with patient about risks with procedure including infection, reaction to medication, increased pain, or bleeding. Medications: Reviewed  If patient is on blood thinners will need approval to hold yes or no: no  Does patient have implanted device Stimulator, AICD or Pacemaker etc?  NO            NEEDS CLEARANCE FROM [facility]   CLEARED              Return BILATERAL L-FACET RFA @ L2-3 and L3-4.

## 2022-08-19 NOTE — PROGRESS NOTES
Resting quietly in bed with call light in reach. Reviewed discharge instructions and voiced understanding. Call light in reach.

## 2022-08-19 NOTE — PROGRESS NOTES
2095- Pt to PACU phase 2 Chris Fay at bedside pt hooked up to MEME greer  0925- pulse ox left on sat 94% snack given  0948- IV removed  0949- wife in waiting room told to get car  0951- Pt discharged walked out to car with this RN and his cane.

## 2022-08-19 NOTE — OP NOTE
(Left, 10/03/2019); Facet joint injection (Right, 05/26/2020); Diagnostic Cardiac Cath Lab Procedure (07/2020); pacemaker placement; Colonoscopy (Left, 03/15/2021); Heart transplant (02/07/2022); and Cardiac pacemaker removal (02/2022). Pre-Sedation Documentation and Exam:   Vital signs have been reviewed (see flow sheet for vitals). Sedation/ Anesthesia Plan:   MAC    Patient is an appropriate candidate for plan of sedation: yes    Preoperative Diagnosis:  L-spondylosis, L-facet arthropathy    Post-Op Dx: as above    Procedure Performed:  :Radiofrequency ablation of median branches at the levels of L2-3 ans L3-4 bilateral under fluoroscopic guidance     Indication for the Procedure: The patient has ahistory of chronic low back pain that is not responding well to the conservative treatment. Patient's pain is mostly axial in nature. Pain is interfering with the activities of daily living. Physical examination revealed facet tenderness and facet loading is positive. Patient had undergone lumbar facet joint injections with pain relief that lasted for only a short period of time and had greater than 70% pain relief with confirmatory median branch blocks. Hence we decided to do radiofrequency abalation of median branches for long term pain releif. The procedure and risks  were discussed with the patient and an informed consent was obtained. Procedure:     A meaningful communication was kept up with the patient throughout the procedure. The patient is placed in prone position and skin over the back was prepped and draped in sterile manner. Then using fluoroscopy the junction of the transverse process of the vertebra with the superior process of the facet joint was observed and the view was optimized.   The skin and deep tissues posterior were infiltrated with  15  ml of  2% xylocaine  The RF canula with the 10 mm active tip was introduced through the skin wheal under fluoroscopy guidance such that the tip of the needle lies in the groove of the transverse process with the superior processes of the facet joint. Then a lateral view of the lumbar spine was obtained to make sure the tip of needle is not in the neural foramen. Then electric impedence was checked to make sure it is acceptable. Then a sensory stimulus was applied at 50 Hz up to 1 volt and concordant pain symptoms were reproduced. Then a motor stimulus was applied at 2 Hz up to 2 volts and no motor stimulation was seen in lower extremities. Some multifidus stimulus was seen. Then after negative aspiration 0.25%  Marcaine 2dd was injected through the needle. Then a lesion was done at 80 degrees centigrade for 90 seconds. T  EBL-0    Patient's vital signs and neurological status remained stable throughout the procedure and post procedural period. The patient tolerated the procedure well and was discharged home in stable condition.     Electronically signed by Linnette Cain MD on 8/19/22 at 9:09 AM EDT

## 2022-08-22 ENCOUNTER — HOSPITAL ENCOUNTER (OUTPATIENT)
Dept: PHYSICAL THERAPY | Age: 62
Setting detail: THERAPIES SERIES
Discharge: HOME | End: 2022-08-22
Payer: COMMERCIAL

## 2022-08-22 PROCEDURE — 97110 THERAPEUTIC EXERCISES: CPT

## 2022-08-22 NOTE — PROGRESS NOTES
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]    Treatment Diagnosis Difficulty walking with LE radiculopathy, decreased endurance, decreased strength LEs, decreased balance   Date of Evaluation 22    Additional Pertinent History 2022 heart transplant, history of cervical fusion x2, lumbar surgery with fusion x1, s/p rib fractures from fall 22, previously followed in PT 22 visits for post heart transplant and critical illness myopathy      Functional Outcome Measure Used [place],  6 minute walk test   Functional Outcome Score 52/56,  6 minutes walk test 940.4 feet (22)       Insurance: Primary: Payor: MEDICARE /  /  / ,   Secondary: BC   Authorization Information: PRE CERTIFICATION REQUIRED: YES, AFTER THE 30TH VISIT  INSURANCE THERAPY BENEFIT:  ALLOWED 27 VISITS OF PT, THIS IS A SOFT LIMIT. [address] [facility]  Floor THERAPY COVERED:  YES  MODALITIES COVERED:  YES  TELEHEALTH COVERED:    REFERENCE NUMBER:     Visit # 3, 3/10 for progress note   Visits Allowed: 8   Recertification Date: 77/3/3400   Physician Follow-Up: Lance Coker MD heart doctor,  follow up on 2022   Physician Orders: Evaluate and treat   History of Present Illness: Previous PT for 22 visits post heart transplant. Evaluate and treat: post heart transplant. Patient would like to strengthen his legs, increase walking distance.  Walking is limited low back and leg symptoms due to MRI showing foraminal stenosis [id number]. See MRI REPORT. Patient also has history of arachnoiditis of lumbar region. Patient had previous fall with sustaining fractured ribs and was unable to attend further therapy visits. At this time, received further orders for evaluation and treatment to resume PT. Patient states that he has left leg issues with weakness, decreased balance, limited walking distance. Uses straight cane when he does a lot of walking. \"Less than 1 block\" But, for house hold distances does not use his cane. He does use his cane when negotiating stair steps. He could while receiving PT he was able to negotiated stair steps with light use of single handrail. Patient goals are to be able to improved leg strength, balance and walking. Patient does not want to go into cardiac rehab until completing PT. Patient feels that he won't be 100%. Feels his back with stenosis is also a factor for his leg pain along with the arachnoiditis. SUBJECTIVE: Patient presents without Assistive device this date, stated that he uses cane when necessary. Patient reports that he has not experienced a loss of balance since previous treatment but he does feel unstable on a regular basis. Patient reports persistent low back pain upon arrival, rates 10/10. Blood Pressure taken upon arrival and after exercise, see below. Patient reports that his pain had lowered to 7/10 after therapeutic interventions. 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 LLE radiculopathy. LE weakness, decreased walking tolerance, balance.     Pain: 4/10 back and LLE     \"X in shaded column indicates activity completed today    *\" next to exercise/intervention indicates progression   Modalities Parameters/  Location  Notes                     Manual Therapy Time/Technique  Notes                     Exercise/  Intervention   Notes   6 minute walk test  940 feet      [place] balance test  52/56     [facility] bariatric unit 8 minutes  Level 3 X    // bars: bilateral heelraises on foam one hand hold 15x  X    Standing single heelraises without foam  10x Bilateral hand hold on // bars     Tandem stance on foam   14-15 count x2 X    Romberg narrow stance with eyes closed 15 count  X    3 way hip closed chain and open chain flexion, abduction and extension x10  X Only open chain this date *   Partial squats  x10      Step-ups 6\" forward and lateral.  X10 ea  X    Step ups with high march or hip flexion of open chain leg  X5 each  Bilateral handrails hand hold      Lateral step ups with one hand hold on rail x10      Reciprocal lunges in // bars  5x      Stepping over hurdles green forward and lateral  X4 lengths each  In // bars   Without use of hands on bars          Hamstring stretch at step  3x 15 second * X    Gastroc Stretch at step  3x 15 second *     Dynamic Gait       High knee marching * 2 laps  X    Retro Stepping * 2 laps  X                                         Specific Interventions Next Treatment: balance retraining, strengthening LEs, gait training with dynamic gait,     Activity/Treatment Tolerance:  [x]  Patient tolerated treatment well  []  Patient limited by fatigue  []  Patient limited by pain   []  Patient limited by medical complications  []  Other:     Assessment: Treatment interventions completed as recorded above, additional dynamic gait activities added this date as indicated by * in chart. Intermittent therapeutic rest breaks required throughout treatment session due to continued pain in low back and mild fatigue. Patient tolerated treatment well. GOALS:  Patient Goal: To be able to stand and walk longer, improve strength and balance.      Short Term Goals:  Time Frame: 4 weeks  Patient to demonstrate increased strength LLE at hip flexors 4/5 to 5/5, left hamstring from 4-/5 to 5/5, ankle plantarflexors from 4-/5 to 4/5 with increased stability and functional strength with partial squats, heelraises and negotiating 12 stair steps. Patient to demonstrate increased balance with tandem stance from l/r 13 seconds to 30 seconds. SLS from 3 seconds to 10 seconds. Patient to demonstrate ability to ambulate 1000 feet in 6 minutes with perceived exertion <3. Long Term Goals:  Time Frame: 8 weeks  1. Cordova balance test from 52/56 to 54/56  2. Patient independent in HEP with follow through for balance, strength, gait training/walking distance with improved endurance. Patient Education:   []  HEP/Education Completed: Plan of Care, Goals,   Medbridge Access Code:  []  No new Education completed  []  Reviewed Prior HEP      []  Patient verbalized and/or demonstrated understanding of education provided. []  Patient unable to verbalize and/or demonstrate understanding of education provided. Will continue education. [x]  Barriers to learning: none    PLAN:  Treatment Recommendations: Strengthening, Balance Training, Functional Mobility Training, Transfer Training, Endurance Training, Gait Training, Neuromuscular Re-education, Home Exercise Program, and Patient Education    []  Plan of care initiated. Plan to see patient 1-2 times per week for 8 weeks to address the treatment planned outlined above.   [x]  Continue with current plan of care  []  Modify plan of care as follows:    []  Hold pending physician visit  []  Discharge    Time In 0800   Time Out 0845   Timed Code Minutes: 45 min   Total Treatment Time: 45 min       Electronically Signed by: Cecil Rodríguez PTA

## 2022-08-24 ENCOUNTER — HOSPITAL ENCOUNTER (OUTPATIENT)
Dept: PHYSICAL THERAPY | Age: 62
Setting detail: THERAPIES SERIES
Discharge: HOME | End: 2022-08-24
Payer: COMMERCIAL

## 2022-08-24 PROCEDURE — 97110 THERAPEUTIC EXERCISES: CPT

## 2022-08-24 PROCEDURE — 97112 NEUROMUSCULAR REEDUCATION: CPT

## 2022-08-24 NOTE — PROGRESS NOTES
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]    Treatment Diagnosis Difficulty walking with LE radiculopathy, decreased endurance, decreased strength LEs, decreased balance   Date of Evaluation 22    Additional Pertinent History 2022 heart transplant, history of cervical fusion x2, lumbar surgery with fusion x1, s/p rib fractures from fall 22, previously followed in PT 22 visits for post heart transplant and critical illness myopathy      Functional Outcome Measure Used Cordova,  6 minute walk test   Functional Outcome Score 52/56,  6 minutes walk test 940.4 feet (22)       Insurance: Primary: Payor: MEDICARE /  /  / ,   Secondary: [facility]   Authorization Information: PRE CERTIFICATION REQUIRED: YES, AFTER THE 30TH VISIT  INSURANCE THERAPY BENEFIT:  ALLOWED 27 VISITS OF PT, THIS IS A SOFT LIMIT. [address] [facility] [place]  Floor THERAPY COVERED:  YES  MODALITIES COVERED:  YES  TELEHEALTH COVERED:    REFERENCE NUMBER:     Treament count 3, 3/10   Visits Allowed: 8   Recertification Date: 6683   Physician Follow-Up: Michelle Nicole MD heart doctor,  follow up on 2022   Physician Orders: Evaluate and treat   History of Present Illness: Previous PT for 22 visits post heart transplant. Evaluate and treat: post heart transplant. Patient would like to strengthen his legs, increase walking distance.  Walking is limited low back and leg symptoms due to MRI showing foraminal stenosis [id number]. See MRI REPORT. Patient also has history of arachnoiditis of lumbar region. Patient had previous fall with sustaining fractured ribs and was unable to attend further therapy visits. At this time, received further orders for evaluation and treatment to resume PT. Patient states that he has left leg issues with weakness, decreased balance, limited walking distance. Uses straight cane when he does a lot of walking. \"Less than 1 block\" But, for house hold distances does not use his cane. He does use his cane when negotiating stair steps. He could while receiving PT he was able to negotiated stair steps with light use of single handrail. Patient goals are to be able to improved leg strength, balance and walking. Patient does not want to go into cardiac rehab until completing PT. Patient feels that he won't be 100%. Feels his back with stenosis is also a factor for his leg pain along with the arachnoiditis. SUBJECTIVE: Patient reports of nerve ablation for his back last Friday. Patient feels that this made his back pain worse. Notes pain at low back and LLE. Notes sitting and laying down bother his back the most. Difficulty sleeping due to pain with average of only 3 hours of interrupted sleep. /80 pulse 87 prior to NUstep beginning of session 125/71 following Nustep. 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 LLE radiculopathy. LE weakness, decreased walking tolerance, balance.     Pain: 5/10 back and LLE     \"X in shaded column indicates activity completed today    *\" next to exercise/intervention indicates progression   Modalities Parameters/  Location  Notes                     Manual Therapy Time/Technique  Notes                     Exercise/  Intervention   Notes   6 minute walk test  940 feet      Cordova balance test  52/56     [facility] bariatric unit 6 minutes  Level 4 and lowered to level 3 after 3 minutes X LLE symptoms   // bars: bilateral heelraises  one hand hold 15x  x    Standing single heelraises without foam  10x Bilateral hand hold on // bars x    Tandem stance on foam   14-15 count x2 x    Romberg narrow stance with eyes closed 15 count  x    3 way hip closed chain and open chain flexion, abduction and extension x10 Orange band x    Partial squats  total gym Level J  x10   x    Rocker board  fwd/back, side/side and balance in neutral  x10 Balance in neutral 15 seconds x    Step-ups 6\" forward and lateral.  X10 ea      Step ups with high march or hip flexion of open chain leg  X5 each  Bilateral handrails hand hold      Lateral step ups with one hand hold on rail x10      Reciprocal lunges in // bars  5x  x    Stepping over hurdles green forward and lateral  X4 lengths each  In // bars   Without use of hands on bars          Hamstring stretch at step  3x 15 second  x    Gastroc Stretch at step  3x 15 second  x    Dynamic Gait       High knee marching  2 laps  x    Retro Stepping  2 laps  x    Tandem walking in // bars 2 laps   x                                  Specific Interventions Next Treatment: balance retraining, strengthening LEs, gait training with dynamic gait,     Activity/Treatment Tolerance:  [x]  Patient tolerated treatment well  []  Patient limited by fatigue  []  Patient limited by pain   []  Patient limited by medical complications  []  Other:     Assessment: Note modified treatment plan due to increased back and left leg symptoms today. Resistance band was added for 3 way hip today and Total Gym today instead of standing partial squats. Patient looked more pale at end of session and noted elevated BP in sitting. Held on step ups today due to back and leg symptoms and elevated BP. GOALS:  Patient Goal: To be able to stand and walk longer, improve strength and balance.      Short Term Goals:  Time Frame: 4 weeks  Patient to demonstrate increased strength LLE at hip flexors 4/5 to 5/5, left hamstring from 4-/5 to 5/5, ankle plantarflexors from 4-/5 to 4/5 with increased stability and functional strength with partial squats, heelraises and negotiating 12 stair steps. Patient to demonstrate increased balance with tandem stance from l/r 13 seconds to 30 seconds. SLS from 3 seconds to 10 seconds. Patient to demonstrate ability to ambulate 1000 feet in 6 minutes with perceived exertion <3. Long Term Goals:  Time Frame: 8 weeks  1. Cordova balance test from 52/56 to 54/56  2. Patient independent in HEP with follow through for balance, strength, gait training/walking distance with improved endurance. Patient Education:   []  HEP/Education Completed: Plan of Care, Goals,   Medbridge Access Code:  []  No new Education completed  []  Reviewed Prior HEP      []  Patient verbalized and/or demonstrated understanding of education provided. []  Patient unable to verbalize and/or demonstrate understanding of education provided. Will continue education. [x]  Barriers to learning: none    PLAN:  Treatment Recommendations: Strengthening, Balance Training, Functional Mobility Training, Transfer Training, Endurance Training, Gait Training, Neuromuscular Re-education, Home Exercise Program, and Patient Education    []  Plan of care initiated. Plan to see patient 1-2 times per week for 8 weeks to address the treatment planned outlined above.   [x]  Continue with current plan of care  []  Modify plan of care as follows:    []  Hold pending physician visit  []  Discharge    Time In 0818   Time Out 0858   Timed Code Minutes: 40   Total Treatment Time: 40       Electronically Signed by: Ron Florez, PT

## 2022-08-29 ENCOUNTER — HOSPITAL ENCOUNTER (OUTPATIENT)
Dept: PHYSICAL THERAPY | Age: 62
Setting detail: THERAPIES SERIES
Discharge: HOME | End: 2022-08-29
Payer: COMMERCIAL

## 2022-08-29 PROCEDURE — 97110 THERAPEUTIC EXERCISES: CPT

## 2022-08-29 PROCEDURE — 97112 NEUROMUSCULAR REEDUCATION: CPT

## 2022-08-29 NOTE — PROGRESS NOTES
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]    Treatment Diagnosis Difficulty walking with LE radiculopathy, decreased endurance, decreased strength LEs, decreased balance   Date of Evaluation 22    Additional Pertinent History 2022 heart transplant, history of cervical fusion x2, lumbar surgery with fusion x1, s/p rib fractures from fall 22, previously followed in PT 22 visits for post heart transplant and critical illness myopathy      Functional Outcome Measure Used Cordova,  6 minute walk test   Functional Outcome Score 52/56,  6 minutes walk test 940.4 feet (22)       Insurance: Primary: Payor: MEDICARE /  /  / ,   Secondary: [employer]   Authorization Information: PRE CERTIFICATION REQUIRED: YES, AFTER THE 30TH VISIT  INSURANCE THERAPY BENEFIT:  ALLOWED 27 VISITS OF PT, THIS IS A SOFT LIMIT. [address] [facility]  Floor THERAPY COVERED:  YES  MODALITIES COVERED:  YES  TELEHEALTH COVERED:    REFERENCE NUMBER:     Treament count 4, 4/10   Visits Allowed: 8   Recertification Date:    Physician Follow-Up: Shantell Jim MD heart doctor,  follow up on 2022   Physician Orders: Evaluate and treat   History of Present Illness: Previous PT for 22 visits post heart transplant. Evaluate and treat: post heart transplant. Patient would like to strengthen his legs, increase walking distance.  Walking is limited low back and leg symptoms due to MRI showing foraminal stenosis [id number]. See MRI REPORT. Patient also has history of arachnoiditis of lumbar region. Patient had previous fall with sustaining fractured ribs and was unable to attend further therapy visits. At this time, received further orders for evaluation and treatment to resume PT. Patient states that he has left leg issues with weakness, decreased balance, limited walking distance. Uses straight cane when he does a lot of walking. \"Less than 1 block\" But, for house hold distances does not use his cane. He does use his cane when negotiating stair steps. He could while receiving PT he was able to negotiated stair steps with light use of single handrail. Patient goals are to be able to improved leg strength, balance and walking. Patient does not want to go into cardiac rehab until completing PT. Patient feels that he won't be 100%. Feels his back with stenosis is also a factor for his leg pain along with the arachnoiditis. SUBJECTIVE: Patient reports he is stiff at his back this morning. Feels like he needs to stretch today. Back pain remains about the same as always. /67 pulse 88 prior to NUstep beginning of session 113/66 pulse 91  following Nustep. BP following ex in clinic seated: 118/70 pulse 95. 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 LLE radiculopathy. LE weakness, decreased walking tolerance, balance.     Pain: 5/10 back and LLE     \"X in shaded column indicates activity completed today    *\" next to exercise/intervention indicates progression   Modalities Parameters/  Location  Notes                     Manual Therapy Time/Technique  Notes                     Exercise/  Intervention   Notes   6 minute walk test  940 feet      Cordova balance test  52/56     Nustep bariatric unit 6 minutes  Level 5  X    // bars: bilateral heelraises  one hand hold 15x  x    Standing single heelraises without foam 15x Bilateral hand hold on // bars x    Tandem stance on foam   14-15 count x2 x    Romberg narrow stance with eyes closed 15 count  x    3 way hip closed chain and open chain flexion, abduction and extension x10 Orange band x    Partial squats  total gym Level J  x15   x    Rocker board  fwd/back, side/side and balance in neutral  x10 Balance in neutral 20 seconds x    Step-ups 6\" forward and lateral.  X10 ea  x Forward only today   Step ups with high march or hip flexion of open chain leg  X5 each  Bilateral handrails hand hold      Lateral step ups with one hand hold on rail x10      Reciprocal lunges in // bars  5x  x    Stepping over hurdles green forward and lateral  X4 lengths each  In // bars  x Without use of hands on bars   Tandem walking in // bars X4  in // bars  Light contact of bar with one hand x    Hamstring stretch at step  3x 15 second  x    Gastroc Stretch at step  3x 15 second  x    Dynamic Gait       High knee marching  2 laps      Retro Stepping  2 laps                                           Specific Interventions Next Treatment: balance retraining, strengthening LEs, gait training with dynamic gait,     Activity/Treatment Tolerance:  [x]  Patient tolerated treatment well  []  Patient limited by fatigue  []  Patient limited by pain   []  Patient limited by medical complications  []  Other:     Assessment: Patient resumed step ups forward today. Progressed Total gym to 15 reps. Patient progressed well through the     GOALS:  Patient Goal: To be able to stand and walk longer, improve strength and balance. Short Term Goals:  Time Frame: 4 weeks  Patient to demonstrate increased strength LLE at hip flexors 4/5 to 5/5, left hamstring from 4-/5 to 5/5, ankle plantarflexors from 4-/5 to 4/5 with increased stability and functional strength with partial squats, heelraises and negotiating 12 stair steps.   Patient to demonstrate increased balance with tandem stance from l/r 13 seconds to 30 seconds. SLS from 3 seconds to 10 seconds. Patient to demonstrate ability to ambulate 1000 feet in 6 minutes with perceived exertion <3. Long Term Goals:  Time Frame: 8 weeks  1. Cordova balance test from 52/56 to 54/56  2. Patient independent in HEP with follow through for balance, strength, gait training/walking distance with improved endurance. Patient Education:   []  HEP/Education Completed: Plan of Care, Goals,   Medbridge Access Code:  []  No new Education completed  []  Reviewed Prior HEP      []  Patient verbalized and/or demonstrated understanding of education provided. []  Patient unable to verbalize and/or demonstrate understanding of education provided. Will continue education. [x]  Barriers to learning: none    PLAN:  Treatment Recommendations: Strengthening, Balance Training, Functional Mobility Training, Transfer Training, Endurance Training, Gait Training, Neuromuscular Re-education, Home Exercise Program, and Patient Education    []  Plan of care initiated. Plan to see patient 1-2 times per week for 8 weeks to address the treatment planned outlined above.   [x]  Continue with current plan of care  []  Modify plan of care as follows:    []  Hold pending physician visit  []  Discharge    Time In 1046   Time Out 1128   Timed Code Minutes: 42   Total Treatment Time: 42       Electronically Signed by: Bea Mariscal, PT

## 2022-08-31 ENCOUNTER — HOSPITAL ENCOUNTER (OUTPATIENT)
Dept: PHYSICAL THERAPY | Age: 62
Setting detail: THERAPIES SERIES
Discharge: HOME | End: 2022-08-31
Payer: COMMERCIAL

## 2022-08-31 PROCEDURE — 97110 THERAPEUTIC EXERCISES: CPT

## 2022-08-31 NOTE — PROGRESS NOTES
[address] [facility]  PHYSICAL THERAPY  [] EVALUATION  [x] DAILY NOTE (LAND) [] DAILY NOTE (AQUATIC ) [] PROGRESS NOTE [] DISCHARGE NOTE    [x] OUTPATIENT REHABILITATION CENTER [facility]   [] [place] [address]    [] 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]    Treatment Diagnosis Difficulty walking with LE radiculopathy, decreased endurance, decreased strength LEs, decreased balance   Date of Evaluation 22    Additional Pertinent History 2022 heart transplant, history of cervical fusion x2, lumbar surgery with fusion x1, s/p rib fractures from fall 22, previously followed in PT 22 visits for post heart transplant and critical illness myopathy      Functional Outcome Measure Used Cordova,  6 minute walk test   Functional Outcome Score 52/56,  6 minutes walk test 940.4 feet (22)       Insurance: Primary: Payor: MEDICARE /  /  / ,   Secondary: BC   Authorization Information: PRE CERTIFICATION REQUIRED: YES, AFTER THE 30TH VISIT  INSURANCE THERAPY BENEFIT:  ALLOWED 27 VISITS OF PT, THIS IS A SOFT LIMIT. [address] [facility]  Floor THERAPY COVERED:  YES  MODALITIES COVERED:  YES  TELEHEALTH COVERED:    REFERENCE NUMBER:     Treament count 5, 5/10   Visits Allowed: 8   Recertification Date: 2320   Physician Follow-Up: Lela Hirsch MD heart doctor,  follow up on 2022   Physician Orders: Evaluate and treat   History of Present Illness: Previous PT for 22 visits post heart transplant. Evaluate and treat: post heart transplant. Patient would like to strengthen his legs, increase walking distance.  Walking is limited low back and leg symptoms due to MRI showing foraminal stenosis [id number]. See MRI REPORT. Patient also has history of arachnoiditis of lumbar region. Patient had previous fall with sustaining fractured ribs and was unable to attend further therapy visits. At this time, received further orders for evaluation and treatment to resume PT. Patient states that he has left leg issues with weakness, decreased balance, limited walking distance. Uses straight cane when he does a lot of walking. \"Less than 1 block\" But, for house hold distances does not use his cane. He does use his cane when negotiating stair steps. He could while receiving PT he was able to negotiated stair steps with light use of single handrail. Patient goals are to be able to improved leg strength, balance and walking. Patient does not want to go into cardiac rehab until completing PT. Patient feels that he won't be 100%. Feels his back with stenosis is also a factor for his leg pain along with the arachnoiditis. SUBJECTIVE: Pt reports he continues to stretch to keep things loose. No new complaints this date. /67 pulse 88 prior to NUstep beginning of session 113/66 pulse 91  following Nustep. Did not assess vitals initial *  BP following ex in clinic seated: 113/70 pulse 92. 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 LLE radiculopathy. LE weakness, decreased walking tolerance, balance.     Pain: 5/10 back and LLE     \"X in shaded column indicates activity completed today    *\" next to exercise/intervention indicates progression   Modalities Parameters/  Location  Notes                     Manual Therapy Time/Technique  Notes                     Exercise/  Intervention   Notes   6 minute walk test  940 feet      Cordova balance test  52/56     Nustep bariatric unit 6 minutes  Level 5  X    // bars: bilateral heelraises  one hand hold 15x      Standing single heelraises without foam  15x Bilateral hand hold on // bars x    Tandem stance on foam   30 sec x2 x    Romberg narrow stance with eyes closed 15 count  x    3 way hip closed chain and open chain flexion, abduction and extension x10 Orange band     Partial squats  total gym Level J  x15       Rocker board  fwd/back, side/side and balance in neutral  x10 Balance in neutral 20 seconds x    Step-ups 6\" forward and lateral.  X12* ea  x    Step ups with high march or hip flexion of open chain leg  X5 each  Bilateral handrails hand hold      Lateral step ups with one hand hold on rail x10      Reciprocal lunges in // bars  5x      Stepping over hurdles green forward and lateral  X4 lengths each  In // bars   Without use of hands on bars   Tandem walking in // bars X4  in // bars  Light contact of bar with one hand     Hamstring stretch at step  3x 15 second      Gastroc Stretch at step  3x 15 second             NK table: flexion/extension* x10 5 lb x    Leg press* x10 60# x    4 way hip machine* x10 25# x           Dynamic Gait       High knee marching  2 laps      Retro Stepping  2 laps                                           Specific Interventions Next Treatment: balance retraining, strengthening LEs, gait training with dynamic gait,     Activity/Treatment Tolerance:  [x]  Patient tolerated treatment well  []  Patient limited by fatigue  []  Patient limited by pain   []  Patient limited by medical complications  []  Other:     Assessment: Pt voiced initiating weight machines this date. Pt tolerated weight machines well and denied adverse reactions. Will continue to progress as tolerated by pt per POC. GOALS:  Patient Goal: To be able to stand and walk longer, improve strength and balance.      Short Term Goals:  Time Frame: 4 weeks  Patient to demonstrate increased strength LLE at hip flexors 4/5 to 5/5, left hamstring from 4-/5 to 5/5, ankle plantarflexors from 4-/5 to 4/5 with increased stability and functional strength with partial squats, heelraises and negotiating 12 stair steps. Patient to demonstrate increased balance with tandem stance from l/r 13 seconds to 30 seconds. SLS from 3 seconds to 10 seconds. Patient to demonstrate ability to ambulate 1000 feet in 6 minutes with perceived exertion <3. Long Term Goals:  Time Frame: 8 weeks  1. Cordova balance test from 52/56 to 54/56  2. Patient independent in HEP with follow through for balance, strength, gait training/walking distance with improved endurance. Patient Education:   []  HEP/Education Completed: Plan of Care, Goals,   Medbridge Access Code:  []  No new Education completed  []  Reviewed Prior HEP      []  Patient verbalized and/or demonstrated understanding of education provided. []  Patient unable to verbalize and/or demonstrate understanding of education provided. Will continue education. [x]  Barriers to learning: none    PLAN:  Treatment Recommendations: Strengthening, Balance Training, Functional Mobility Training, Transfer Training, Endurance Training, Gait Training, Neuromuscular Re-education, Home Exercise Program, and Patient Education    []  Plan of care initiated. Plan to see patient 1-2 times per week for 8 weeks to address the treatment planned outlined above.   [x]  Continue with current plan of care  []  Modify plan of care as follows:    []  Hold pending physician visit  []  Discharge    Time In [phone]   Time Out 0984   Timed Code Minutes: 41   Total Treatment Time: 41       Electronically Signed by: Noah Pina PTA

## 2022-09-01 ENCOUNTER — OFFICE VISIT (OUTPATIENT)
Dept: PHYSICAL MEDICINE AND REHAB | Age: 62
End: 2022-09-01
Payer: COMMERCIAL

## 2022-09-01 VITALS
WEIGHT: 235 LBS | SYSTOLIC BLOOD PRESSURE: 158 MMHG | BODY MASS INDEX: 34.8 KG/M2 | DIASTOLIC BLOOD PRESSURE: 82 MMHG | HEART RATE: 82 BPM | HEIGHT: 69 IN

## 2022-09-01 DIAGNOSIS — M96.1 LUMBAR POST-LAMINECTOMY SYNDROME: ICD-10-CM

## 2022-09-01 DIAGNOSIS — M47.816 LUMBAR FACET ARTHROPATHY: ICD-10-CM

## 2022-09-01 DIAGNOSIS — M46.1 SI (SACROILIAC) JOINT INFLAMMATION (HCC): ICD-10-CM

## 2022-09-01 DIAGNOSIS — G89.29 CHRONIC IDIOPATHIC PAIN SYNDROME: ICD-10-CM

## 2022-09-01 DIAGNOSIS — M47.816 SPONDYLOSIS OF LUMBAR SPINE: Primary | ICD-10-CM

## 2022-09-01 DIAGNOSIS — G89.4 CHRONIC PAIN SYNDROME: ICD-10-CM

## 2022-09-01 PROCEDURE — 99214 OFFICE O/P EST MOD 30 MIN: CPT | Performed by: NURSE PRACTITIONER

## 2022-09-01 NOTE — PROGRESS NOTES
Chronic Pain/PM&R Clinic Note     Encounter Date: 9/1/22    Subjective:   Chief Complaint:   Chief Complaint   Patient presents with    Follow-up     F/U procedure       History of Present Illness:   Siria Austin is a 57 y.o. male seen in the clinic initially on 12/28/2015, followed this practice for years for pain and was last seen 7/09/2020. He reports he has been dealing with other health issues and not been able to come back. He had a heart transplant 2/7/2022, has been with occupational therapy and physical therapy for associated weakness, left side is worse. He presents today, 6/14/2022, for increased mid back, low back and new left sided rib pain. He reports that he fell last Wednesday, 6/8/2022, on a concrete step. He reports that he was going up the steps, lost his balance and fell onto his left side. Describes the pain as a constant stabbing, sharp pain. Worse with certain movements. States that trying to bend over, preform squats, certain movement make it worse. He reports that he is having a hard time completing physical therapy. Denies burning, numbness, tingling. Went to urgent care, had rib images completed and told no fracture, got tramadol, using with no relief. Alternating ice and heat with mild relief. He is requesting to have MRIs completed to rule out any fractures. He states he can now have MRIs as he no longer has the heart defibrillator. Today, 9/1/2022, patient presents for procedure follow up. He completed lumbar radiofrequency ablation at bilateral L2-3,L3-4 with Dr Jaci Grimm on 8/19/2022. He reports he received no relief from the procedure. He states pain starts in low mid line back, then radiates down both legs to the ankles. He states it is worse with sitting, better with standing. He feels like his leg weakness is worse, left side more than right. Going to therapy. He has tried ice, heat, with no relief. Dull pain, sharp at times. Restless leg feeling as well.  Asking about Mirapex. History of Interventions:   Surgery: Lumbar Fusion L5-S1  Injections: LESI - no relief  L MBB L3-4, L4-5,L5-S1  L RFA L2-3, L3-4,L4-5,L5-S1  KAYE   C MBB C6-7,C7-T1  C RFA C6-7, C7-T1  Left SI injection  Left SI RFA  LRFA L2-3,L3-4 b/l - no relief     Current Treatment Medications:   OTC Tylenol    Historical Treatment Medications:   States multiple narcotics due to multiple surgeries, unsure what  Lyrica - no relief   Neurontin- no relief    Imaging:  PROCEDURE: XR RIBS LEFT INCLUDE CHEST (MIN 3 VIEWS)       CLINICAL INFORMATION: Trauma by fall left rib pain . TECHNIQUE: PA chest and 4 projections of the left ribs. COMPARISON: 5/2/2020           FINDINGS: The heart is mildly enlarged in size. There are no infiltrates or effusions. There is no pneumothorax. Pulmonary vessels are not congested. Mediastinum is not widened. Midline sternotomy from prior CABG. Postural changes cervical spine. No rib    fractures identified. Interval removal of prior pacer device. Impression   Mild cardiomegaly. No acute cardiopulmonary disease   7/14/2022 LUMBAR MRI  PROCEDURE: MRI LUMBAR SPINE W WO CONTRAST       CLINICAL INFORMATION: Fall, initial encounter, Rib pain on left side, Lumbar pain. COMPARISON: CT scan of the lumbar spine dated 6/20/2022. TECHNIQUE: Sagittal and axial T1 and T2-weighted images were obtained to the lumbar spine. Postcontrast axial and sagittal T1-weighted images were also obtained. FINDINGS:       The lumbar vertebral bodies are normally aligned. There are postoperative changes with bilateral pedicular screws at L5 and S1 laminectomy and fusion at L5. There is degenerative change in the vertebral body endplates adjacent to the L4-5 and L5-S1 disc    spaces and to lesser extent adjacent to the L1-2 and to a lesser extent T12-L1 disc spaces. .  There is no bone marrow edema. There are no compression fractures. No pars defects are noted.          The visualized aspects of the distal spinal cord are normal. There appears be a small amount of fat in the filum terminale. There are no gross abnormalities in the distal thoracic spine. On the axial images, at T12-L1, there is no disc herniation, canal or foraminal stenosis. At L1-L2, there is a degenerated disc with no superimposed disc herniation, canal or foraminal stenosis. At L2-3, there is no disc herniation, canal or foraminal stenosis       At L3-4, there is a 1.6 mm bulging disc and facet hypertrophy. This causes mild canal and mild-to-moderate bilateral foraminal stenosis       At L4-5, there  is a 2.9 mm ventral extradural defect and facet hypertrophy. There is moderate to severe left and moderate right-sided foraminal stenosis with no canal stenosis. At L5-S1, there are postoperative changes. There is mild-to-moderate bilateral foraminal stenosis with no canal stenosis. There is no abnormal enhancement. There is degenerative change involving the sacroiliac joints bilaterally. There is poor visualization of the nerve roots in the lower thecal sac. This may represent possible arachnoiditis. .       There is atrophy in the paraspinal muscles posteriorly. Impression       1. Postoperative changes with bilateral pedicular screws at L5 and S1, laminectomy and fusion at L5.   2. There is moderate to severe left and moderate right-sided foraminal stenosis with no canal stenosis at L4-5.   3. There is mild-to-moderate bilateral foraminal stenosis with no canal stenosis at L5-S1.   4. There is mild canal and mild-to-moderate bilateral foraminal stenosis at L3-4.   5. There is possible arachnoiditis in the lower thecal sac. 6. There is a small amount of fat in the filum terminale. 7. There is degenerative change involving the sacroiliac joints bilaterally. 8. Atrophy in the paraspinal muscles posteriorly. 9. No acute compression fracture noted.    CT LUMBAR 6/20/2022  PROCEDURE: CT THORACIC RECONSTRUCTION WO POST PROCESS, CT LUMBAR RECONSTRUCTION WO POST PROCESS       CLINICAL INFORMATION: Fall 10 days ago. Concern for possible rib or thoracic or lumbar spine fracture. Left flank swelling. .       COMPARISON: CT lumbar spine 7/2/2020. CT thoracic spine 6/4/2018. TECHNIQUE: 3 mm axial CT images were reconstructed through the thoracic and lumbar spine. These are reconstructed from the patient's CT scan of the chest, abdomen and pelvis. IV contrast is present. Sagittal and coronal reconstructions were obtained. All CT scans at this facility use dose modulation, iterative reconstruction, and/or weight-based dosing when appropriate to reduce radiation dose to as low as reasonably achievable. FINDINGS:       The thoracic vertebral bodies are normally aligned. There is normal mineralization. No suspicious osseous lesions are present. There are no compression fractures. There is an acute nondisplaced fracture of the left 11th rib where it articulates with    the vertebral body. This is best seen on coronal images 37 and 38. There is a small amount of overlying soft tissue swelling. On the axial images, there is no spinal canal stenosis noted at any level. No suspicious findings are present in the paraspinal tissues. The lumbar vertebral bodies are normally aligned. There are no compression fractures. No pars defects are noted. The patient has had prior lumbar fusion at the L5-S1 level. There is worsening loss of disc height at the L4-5 level. The posterior    elements are within appropriate limits. There is some mild degenerative changes the sacroiliac joints. There are no gross abnormalities within the spinal canal.       On the axial images, there is no severe spinal canal stenosis at any level. 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 Impression       1.  Acute nondisplaced fracture of the left 11th rib where it articulates with the spine. 2. No fractures in the thoracic or lumbar vertebral bodies. Past Medical History:   Diagnosis Date    Acute kidney injury ([address]     Cardiomyopathy, dilated ([address]     VIRAL    Claustrophobia     Congestive heart failure (CHF) (HCC)     Depression 12/26/2013    HTN (hypertension)     Hyperlipidemia     [employer] dual ICD 02/14/2014    Osteoarthritis     Osteoarthritis of spine with radiculopathy, lumbar region 12/28/2015    Spinal stenosis     Unspecified sleep apnea     unable to wear CPAP       Past Surgical History:   Procedure Laterality Date    BACK SURGERY  08/26/2014    L4- S1 decompression, L4-5 PSF and TLIF    BACK SURGERY  2013    CARDIAC CATHETERIZATION  10/2013    [address]  2013    [employer]    CARDIAC PACEMAKER REMOVAL  02/2022    CARDIOVASCULAR STRESS TEST  09/2013    CARPAL TUNNEL RELEASE Right     CERVICAL FUSION  05/17/2017    [facility]    COLONOSCOPY      COLONOSCOPY Left 03/15/2021    COLONOSCOPY POLYPECTOMY SNARE/COLD BIOPSY performed by Ana Lilia Chávez DO at [place] [address] CATH LAB PROCEDURE  07/2020    [facility]    DOPPLER ECHOCARDIOGRAPHY  09/2013    DOPPLER ECHOCARDIOGRAPHY  09/2013    [facility]    ENDOSCOPY, COLON, DIAGNOSTIC      FACET JOINT INJECTION Right 05/26/2020    L-facet RFA @ L2-3,L3-4,L4-5  RIGHT performed by Orestes Kapadia MD at [address] Left 07/02/2019    Left SI injection performed by Orestes Kapadia MD at [address] Left 08/26/2019    Left SI injection # 2 performed by Orestes Kapadia MD at [facility]  02/07/2022    HERNIA REPAIR  ? ??    umbilical    LUMBAR SPINE SURGERY Left 09/11/2017    LUMBAR RFA L2-3, L3-4, L4-5, L5-S1 LEFT SIDE performed by Orestes Kapadia MD at [address] [place] [address] CENTER OR    LUMBAR SPINE SURGERY Right 10/31/2017    LUMBAR FACET RFA @ L2-3, L3-4, L4-5 AND L5-S1 RIGHT SIDE performed by Kelly Tarango MD at [facility] [place], operated by [facility] Left 04/11/2019    L-RFA @ L2-3, L3-4, L4-5, L5-S1 LEFT SIDE FIRST. performed by Kelly Tarango MD at [facility] [place], operated by [facility] Left 10/03/2019    LEFT SI RFA performed by Kelly Tarango MD at [address]  2015    OTHER SURGICAL HISTORY  10/09/2015    C3-6 ACDF    OTHER SURGICAL HISTORY  01/18/2016    FACET INJECTIONS L3-L4 L4-L5 L5 S1    OTHER SURGICAL HISTORY  02/08/2016    RFA - L3-S1    OTHER SURGICAL HISTORY N/A 03/21/2016    cervical epidural block C7    OTHER SURGICAL HISTORY Bilateral 05/16/2016    cervical facet block C7-T1    OTHER SURGICAL HISTORY  08/01/2016    CERVICAL ABLATION    OTHER SURGICAL HISTORY Left 09/11/2017    Lumbar RFA at L2-3, L3-4, L4-5, L5-S1    OTHER SURGICAL HISTORY Right 10/31/2017    Lumbar RFA at L2-3, L3-4, L4-5, L5-S1    OTHER SURGICAL HISTORY Right 05/09/2018    Excision scalp mass right forehead    PACEMAKER PLACEMENT      PAIN MANAGEMENT PROCEDURE Bilateral 8/19/2022    Bilateral Lumbar-Facet Radiofrequency ablation lumbar 2-3  3-4 performed by Kelly Tarango MD at [address] <5MM [address] Right 05/09/2018    EXCISION RIGHT FOREHEAD CYST performed by Feli Carias MD at [address] Left 06/19/2018    L-RFA @ L2-3, L3-4, L4-5, L5-S1 LEFT SIDE FIRST. performed by Kelly Tarango MD at [facility]  1980's???    TONSILLECTOMY  1980's? ?    VASECTOMY  ? ??       Family History   Problem Relation Age of Onset    Heart Disease Father     Coronary Art Dis Father     Heart Attack Father     High Blood Pressure Father     Parkinsonism Father          Medications & Allergies:   Current Outpatient Medications   Medication Instructions    Acetaminophen (TYLENOL ARTHRITIS PAIN PO) Oral, 3 TIMES DAILY PRN    allopurinol (ZYLOPRIM) 300 mg, Oral, DAILY    amiodarone (CORDARONE) 200 MG tablet Take 1 tablet twice a day for 7 days, then decrease to 1 tablet by mouth once a day    amitriptyline (ELAVIL) 10 mg, Oral, NIGHTLY    amLODIPine (NORVASC) 10 mg, Oral, DAILY    atorvastatin (LIPITOR) 80 mg, Oral, NIGHTLY    calcium citrate-vitamin D (CITRICAL + D) 315-250 MG-UNIT TABS per tablet 1 tablet, Oral, 2 TIMES DAILY WITH MEALS    Cholecalciferol (VITAMIN D3) 1.25 MG (92931 UT) CAPS 1 capsule, Oral, Every Friday     cyclobenzaprine (FLEXERIL) 5 mg, Oral, DAILY PRN    digoxin (LANOXIN) 0.125 mg, Oral, EVERY OTHER DAY    finasteride (PROSCAR) 5 mg, Oral, DAILY    hydrALAZINE (APRESOLINE) 100 MG tablet No dose, route, or frequency recorded.     hydrALAZINE (APRESOLINE) 100 mg, Oral, 3 TIMES DAILY    isosorbide dinitrate (ISORDIL) 10 mg, Oral, 3 TIMES DAILY    Jardiance 10 mg    Magnesium 500 MG CAPS Oral, DAILY    magnesium oxide (MAG-OX) 800 mg, Oral, 2 TIMES DAILY    melatonin 3 mg, Oral, DAILY, 3 capsules by mouth daily     methIMAzole (TAPAZOLE) 10 mg, Oral, 3 TIMES DAILY    metOLazone (ZAROXOLYN) 5 mg, Oral, DAILY PRN    metoprolol succinate (TOPROL XL) 12.5 mg, Oral, DAILY    mycophenolate (CELLCEPT) 250 MG capsule Oral, 2 TIMES DAILY, 4 capsules BID     Pantoprazole Sodium (PROTONIX PO) 20 mg, Oral, DAILY    polyethylene glycol (GLYCOLAX) 17 g, Oral, PRN    potassium chloride (KLOR-CON M) 20 MEQ extended release tablet TAKE 1 TABLET BY MOUTH ONCE DAILY    pravastatin (PRAVACHOL) 20 mg, Oral, DAILY    predniSONE (DELTASONE) 10 mg, Oral, DAILY WITH BREAKFAST    senna (SENOKOT) 8.6 MG tablet 1 tablet, Oral, PRN    sulfamethoxazole-trimethoprim (BACTRIM DS;SEPTRA DS) 800-160 MG per tablet 1 tablet, Oral, SEE ADMIN INSTRUCTIONS, Take 1 tablet every Monday Wednesday and Friday 8:00 am     tacrolimus (PROGRAF) 1 mg, Oral, 2 TIMES DAILY    tacrolimus (PROGRAF) 0.5 mg, Oral, DAILY    tamsulosin (FLOMAX) 0.4 MG capsule 1 capsule, Oral, NIGHTLY    torsemide (DEMADEX) 20 MG tablet 4 tabs AM, 3 tabs PM    valsartan (DIOVAN) 20 mg, Oral, 2 TIMES DAILY    zinc sulfate (ZINCATE) 50 mg, Oral, DAILY       Allergies   Allergen Reactions    Entresto [Sacubitril-Valsartan] Dizziness or Vertigo       Review of Systems:   Constitutional: negative for weight changes or fevers  Genitourinary: negative for bowel/bladder incontinence   Musculoskeletal: positive for low back pain, mid back pain, left sided rib pain   Neurological: positive for left leg weakness or numbness/tingling  Behavioral/Psych: negative for anxiety/depression   All other systems reviewed and are negative    Objective:     Vitals:    09/01/22 1006   BP: (!) 158/82   Pulse:        Constitutional: Pleasant, no acute distress   Head: Normocephalic, atraumatic   Eyes: Conjunctivae normal   Neck: Supple, symmetrical   Lungs: Normal respiratory effort, non-labored breathing   Cardiovascular: Limbs warm and well perfused   Abdomen: Non-protruded   Musculoskeletal: Muscle bulk symmetric, no atrophy, no gross deformities   · Lower Extremities: ROM WNL. · Thorax: left sided paraspinal tenderness, midline tenderness, pain along the 9th, 10th ribs, mild bruising noted. No scoliosis or kyphosis. · Lumbar Spine: ROM reduced. Left sided lumbar paraspinals tender to palpation, midline tenderness. SLR positive bilaterally. SHREE positive bilaterally. GAENSLEN positive bilaterally. positive facet loading bilaterally. Bilateral SI joints tender to palpation. SI   Neurological: Cranial nerves II-XII grossly intact. · Gait - antalgic gait. Ambulates with cane  Skin: No rashes or lesions present   Psychological: Cooperative, no exaggerated pain behaviors       Assessment:    Diagnosis Orders   1. Spondylosis of lumbar spine        2. Lumbar facet arthropathy        3.  Lumbar management of pain. PSYCHOLOGY: Patient is advised to see a clinical pain psychologist, for the psychosocial aspect of pain care through coping skills, relaxation strategies, cognitive group therapy etc.   OBESITY: Patient is advised to seek nutrition consult to help in managing their weight to decrease its impact on pain. The patient was also advised to continue physical therapy and stretching exercises at home and cognitive behavioral and/or group therapy. Referrals:  None    Prescriptions Written This Visit:   None    Follow-up:   Call if needed    It was my pleasure to evaluate Pedro oJnes today. I spent over 35 minutes evaluating this patient, reviewing previous notes and images and completing documentation.        Darrell Caldwell, APRN - CNP   Interventional Pain Management/PM&R   [place]

## 2022-09-06 ENCOUNTER — HOSPITAL ENCOUNTER (OUTPATIENT)
Dept: PHYSICAL THERAPY | Age: 62
Setting detail: THERAPIES SERIES
Discharge: HOME | End: 2022-09-06
Payer: MEDICARE

## 2022-09-06 PROCEDURE — 97110 THERAPEUTIC EXERCISES: CPT

## 2022-09-06 NOTE — PROGRESS NOTES
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]    Treatment Diagnosis Difficulty walking with LE radiculopathy, decreased endurance, decreased strength LEs, decreased balance   Date of Evaluation 22    Additional Pertinent History 2022 heart transplant, history of cervical fusion x2, lumbar surgery with fusion x1, s/p rib fractures from fall 22, previously followed in PT 22 visits for post heart transplant and critical illness myopathy      Functional Outcome Measure Used [place],  6 minute walk test   Functional Outcome Score 52/56,  6 minutes walk test 940.4 feet (22)       Insurance: Primary: Payor: MEDICARE /  /  / ,   Secondary: [facility]   Authorization Information: PRE CERTIFICATION REQUIRED: YES, AFTER THE 30TH VISIT  INSURANCE THERAPY BENEFIT:  ALLOWED 27 VISITS OF PT, THIS IS A SOFT LIMIT. [address] [facility]  Floor THERAPY COVERED:  YES  MODALITIES COVERED:  YES  TELEHEALTH COVERED:    REFERENCE NUMBER:     Treament count 6, 6/10   Visits Allowed: 8   Recertification Date:    Physician Follow-Up: Rhianna Etienne MD heart doctor,  follow up on 2022   Physician Orders: Evaluate and treat   History of Present Illness: Previous PT for 22 visits post heart transplant. Evaluate and treat: post heart transplant. Patient would like to strengthen his legs, increase walking distance.  Walking is limited low back and leg symptoms due to MRI bars     Tandem stance on foam   30 sec x2     Romberg narrow stance with eyes closed 15 count      3 way hip closed chain and open chain flexion, abduction and extension x10 Orange band     Partial squats  total gym Level J  x15       Rocker board  fwd/back, side/side and balance in neutral  x10 Balance in neutral 20 seconds     Step-ups 6\" forward and lateral.  X12 ea      Step ups with high march or hip flexion of open chain leg  X5 each  Bilateral handrails hand hold      Lateral step ups with one hand hold on rail x10      Reciprocal lunges in // bars  5x      Stepping over hurdles green forward and lateral  X4 lengths each  In // bars   Without use of hands on bars   Tandem walking in // bars X4  in // bars  Light contact of bar with one hand     Hamstring stretch at step  3x 15 second      Gastroc Stretch at step  3x 15 second             NK table: flexion/extension X15* 7.5 lb* x    Leg press X15* 80#* x    4 way hip machine X15* 30#* x    Hydro hip: unilateral, bilateral* x15 Bilat: L3  Unilat: L2 x    Sit<>stands* x10  x Butt taps to chair, no UE support                 Dynamic Gait       High knee marching w/ balancing 2 cones/balls * 2 laps  x    Retro Stepping  2 laps  x           Tandem,HS curls w/ balancing 2 cones/balls* 2 laps   x Mod sway, Pt noted L LE \"wants to give out\"                          Specific Interventions Next Treatment: balance retraining, strengthening LEs, gait training with dynamic gait,     Activity/Treatment Tolerance:  [x]  Patient tolerated treatment well  []  Patient limited by fatigue  []  Patient limited by pain   []  Patient limited by medical complications  []  Other:     Assessment: Initiated therex with use of bike. Pt noted using his stationary bike at home frequently. Pt tolerated progressions well and denied adverse reactions. Pt demo fair balance control while balancing 2 balls on top of 2 cones with dynamic gait, mod sway and CGA needed throughout.  Pt noted \"dull Edward, PTA

## 2022-09-06 NOTE — PLAN OF CARE
Hospital Facility-Based Program  [facility]/Traditional Cardiac Rehab  PHYSICIAN ORDER  Class I Level B based on research  Medical Director:  Dr. Srini Parker MD     Patient Name: Adeline Headley : 1960  Referring Physician: Dr. Lord Joy  Date: 2022  Allergies: Allergies as of 2022 - Fully Reviewed 2022   Allergen Reaction Noted    Entresto [sacubitril-valsartan] Dizziness or Vertigo 2022        Diagnosis:  Heart Transplant on 22    [x] [facility] Int[facility] Cardiac Rehab with telemetry monitoring, resting and exercise        BPs & HRs with each session. Hospital setting for patient safety. [x] 72 sessions: 36 exercise sessions, 36 education sessions   [] 36 sessions: 18 exercise sessions, 18 education sessions  [] Traditional Cardiac Rehab with telemetry monitoring, resting and exercise BPs &       HRs with each session. Hospital setting for patient safety. [] 36 sessions:  32 exercise sessions, 4 education sessions     Per Patient symptoms, proceed with:   [x]Nitroglycerine 0.4mg SL every 5 minutes prn, maximum of 3, for chest pain   [x]12-lead EKG for symptoms of chest pain or noted change in heart rhythm   [x]Administer O2 per nasal cannula for symptoms of chest pain or acute dyspnea    Physician Prescribed Exercise:  Plan of Care:  Patient to attend exercise sessions with aerobic endurance and strength training for a total of 31-60 min/day, 3 days/week with supplemented 30+ minutes of aerobic exercise at home on days not participating in Cardiac Rehab. Aerobic Endurance Training  Aerobic Endurance mode (TM, AD, NS) starting at 5-8 minutes progressing by 2-3 minutes each week to a total of 15-30 minutes 2-3x/week. Arms only 5 min  Stair step increasing to 2 min  Resistance/strength training:  Hand weights starting at 1-5 lbs increasing in weight by 1-2 lbs and/or per patient tolerance weekly.   Start with 8 repetitions and increase the repetitions each exercise session per patient tolerance for a total of 15 repetitions. Measurable Endurance Goal:  Aerobic endurance goal to be measured in minutes. Start endurance training per patient tolerance at 1-8 minutes per exercise type, progressing to a total of 31-60 minutes using various modes of training (see Exercise Prescription). Progression:    [x] Weekly 5-10% intensity progression, as tolerated, during cardiac rehab sessions. [x] 13-17 Rate of Perceived Exertion on the Chong Scale (6-20). Measurable Muscular Strength Goal:  Starting at 1-5 lbs x 8 reps, progressing to 5-25 lbs x 15 reps per patient tolerance.       Electronically signed by Maria Esther Velazquez PT on 9/6/2022 at 12:48 PM    Exercise Physiologist/Date/Time

## 2022-09-09 ENCOUNTER — HOSPITAL ENCOUNTER (OUTPATIENT)
Dept: PHYSICAL THERAPY | Age: 62
Setting detail: THERAPIES SERIES
Discharge: HOME | End: 2022-09-09
Payer: MEDICARE

## 2022-09-09 PROCEDURE — 97112 NEUROMUSCULAR REEDUCATION: CPT

## 2022-09-09 PROCEDURE — 97110 THERAPEUTIC EXERCISES: CPT

## 2022-09-09 NOTE — PROGRESS NOTES
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]    Treatment Diagnosis Difficulty walking with LE radiculopathy, decreased endurance, decreased strength LEs, decreased balance   Date of Evaluation 22    Additional Pertinent History 2022 heart transplant, history of cervical fusion x2, lumbar surgery with fusion x1, s/p rib fractures from fall 22, previously followed in PT 22 visits for post heart transplant and critical illness myopathy      Functional Outcome Measure Used [place],  6 minute walk test   Functional Outcome Score 52/56,  6 minutes walk test 940.4 feet (22)       Insurance: Primary: Payor: Arsenio Yao 150 /  /  / ,   Secondary: MEDICARE   Authorization Information: PRE CERTIFICATION REQUIRED: YES, AFTER THE 30TH VISIT  INSURANCE THERAPY BENEFIT:  ALLOWED 27 VISITS OF PT, THIS IS A SOFT LIMIT. [address] [place]  Floor THERAPY COVERED:  YES  MODALITIES COVERED:  YES  TELEHEALTH COVERED:    REFERENCE NUMBER:     Treament count 7, 7/10   Visits Allowed: 8   Recertification Date: 4045   Physician Follow-Up: Anastasia Zapata MD heart doctor,  follow up on 2022   Physician Orders: Evaluate and treat   History of Present Illness: Previous PT for 22 visits post heart transplant. Evaluate and treat: post heart transplant. Patient would like to strengthen his legs, increase walking distance.  Walking is limited low back and leg symptoms due to MRI showing foraminal stenosis [id number]. See MRI REPORT. Patient also has history of arachnoiditis of lumbar region. Patient had previous fall with sustaining fractured ribs and was unable to attend further therapy visits. At this time, received further orders for evaluation and treatment to resume PT. Patient states that he has left leg issues with weakness, decreased balance, limited walking distance. Uses straight cane when he does a lot of walking. \"Less than 1 block\" But, for house hold distances does not use his cane. He does use his cane when negotiating stair steps. He could while receiving PT he was able to negotiated stair steps with light use of single handrail. Patient goals are to be able to improved leg strength, balance and walking. Patient does not want to go into cardiac rehab until completing PT. Patient feels that he won't be 100%. Feels his back with stenosis is also a factor for his leg pain along with the arachnoiditis. SUBJECTIVE: Patient reports that last session went well with progression in use of weights for strengthening. Next day a little sore in the butt muscles. Back issues remain the same. LLE does not feel too bad today. BP  134/68 pulse 89  following bike. BP following ex in clinic seated: 129/7 pulse 93. 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 LLE radiculopathy. LE weakness, decreased walking tolerance, balance.     Pain: 2/10 back and LLE     \"X in shaded column indicates activity completed today    *\" next to exercise/intervention indicates progression   Modalities Parameters/  Location  Notes                     Manual Therapy Time/Technique  Notes                     Exercise/  Intervention   Notes   6 minute walk test  940 feet      [place] balance test  52/56     [facility] bariatric unit 6 minutes  Level 5      Bike  6 min L 3 x    // bars: single heelraises  one hand hold 5x  x    Standing single heelraises without foam  15x Bilateral hand hold on // bars x    Tandem stance on foam   30 sec x2 x    Romberg narrow stance with eyes closed on foam 15 count  x    3 way hip closed chain and open chain flexion, abduction and extension x10 Orange band     Partial squats  total gym Level J  x15       Rocker board  fwd/back, side/side and balance in neutral  x10 Balance in neutral 20 seconds x    Step-ups 6\" forward and lateral. Without handrail CGA  X10 ea  x    Step ups with high march or hip flexion of open chain leg  X5 each  Bilateral handrails hand hold      Lateral step ups without handrail * x10  x    Reciprocal lunges in // bars  5x      Stepping over hurdles green forward and lateral  X4 lengths each  In // bars   Without use of hands on bars   Tandem walking in // bars X4  in // bars  Light contact of bar with one hand     Hamstring stretch at step  3x 15 second  x    Gastroc Stretch at step  3x 15 second  x           NK table: flexion/extension X15 7.5 lb x    Leg press X15 80# x    4 way hip machine X15 30# x    Hydro hip: unilateral, bilateral* x15 Bilat: L3  Unilat: L2 LLE L3 RLE  x    Sit<>stands x10  x Butt taps to chair, no UE support                 Dynamic Gait       High knee marching w/ balancing 2 cones/balls * 2 laps      Retro Stepping  2 laps             Tandem,HS curls w/ balancing 2 cones/balls* 2 laps    Mod sway, Pt noted L LE \"wants to give out\"                          Specific Interventions Next Treatment: balance retraining, strengthening LEs, gait training with dynamic gait,     Activity/Treatment Tolerance:  [x]  Patient tolerated treatment well  []  Patient limited by fatigue  []  Patient limited by pain   []  Patient limited by medical complications  []  Other:     Assessment: Patient's BP stable. Noted good progress with strengthening ex with noted weakness LLE with hip extensors and hamstring, abductors. Challenged balance with rockerboard lateral movements.  Perceived exertion 2, Notes legs \"rubbery\" at end of session. GOALS:  Patient Goal: To be able to stand and walk longer, improve strength and balance. Short Term Goals:  Time Frame: 4 weeks  Patient to demonstrate increased strength LLE at hip flexors 4/5 to 5/5, left hamstring from 4-/5 to 5/5, ankle plantarflexors from 4-/5 to 4/5 with increased stability and functional strength with partial squats, heelraises and negotiating 12 stair steps. Patient to demonstrate increased balance with tandem stance from l/r 13 seconds to 30 seconds. SLS from 3 seconds to 10 seconds. Patient to demonstrate ability to ambulate 1000 feet in 6 minutes with perceived exertion <3. Long Term Goals:  Time Frame: 8 weeks  1. [place] balance test from 52/56 to 54/56  2. Patient independent in HEP with follow through for balance, strength, gait training/walking distance with improved endurance. Patient Education:   []  HEP/Education Completed: Plan of Care, Goals,   Medbridge Access Code:  []  No new Education completed  []  Reviewed Prior HEP      []  Patient verbalized and/or demonstrated understanding of education provided. []  Patient unable to verbalize and/or demonstrate understanding of education provided. Will continue education. [x]  Barriers to learning: none    PLAN:  Treatment Recommendations: Strengthening, Balance Training, Functional Mobility Training, Transfer Training, Endurance Training, Gait Training, Neuromuscular Re-education, Home Exercise Program, and Patient Education    []  Plan of care initiated. Plan to see patient 1-2 times per week for 8 weeks to address the treatment planned outlined above.   [x]  Continue with current plan of care  []  Modify plan of care as follows:    []  Hold pending physician visit  []  Discharge    Time In [id number]   Time Out 0844   Timed Code Minutes: 49   Total Treatment Time: 49       Electronically Signed by: Rosita Jordan, PT

## 2022-09-12 ENCOUNTER — HOSPITAL ENCOUNTER (OUTPATIENT)
Dept: PHYSICAL THERAPY | Age: 62
Setting detail: THERAPIES SERIES
Discharge: HOME | End: 2022-09-12
Payer: MEDICARE

## 2022-09-12 PROCEDURE — 97110 THERAPEUTIC EXERCISES: CPT

## 2022-09-12 PROCEDURE — 97164 PT RE-EVAL EST PLAN CARE: CPT

## 2022-09-12 NOTE — DISCHARGE SUMMARY
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 889 feet 22. Insurance: Primary: Payor: MEDICARE /  /  / ,   Secondary: [facility]   Authorization Information: PRE CERTIFICATION REQUIRED: YES, AFTER THE 30TH VISIT  INSURANCE THERAPY BENEFIT:  ALLOWED 30 VISITS OF PT, THIS IS A SOFT LIMIT. [address] [place]  Floor THERAPY COVERED:  YES  MODALITIES COVERED:  YES  TELEHEALTH COVERED:    REFERENCE NUMBER:     Treament count 8      Visits Allowed: 8   Recertification Date: 6926   Physician Follow-Up: Pema Burnett MD heart doctor,  follow up on 2022   Physician Orders: Evaluate and treat   History of Present Illness: Previous PT for 22 visits post heart transplant. Evaluate and treat: post heart transplant. Patient would like to strengthen his legs, increase walking distance.  Walking is limited low back and leg symptoms due to MRI showing foraminal stenosis [id number]. See MRI REPORT. Patient also has history of arachnoiditis of lumbar region. Patient had previous fall with sustaining fractured ribs and was unable to attend further therapy visits. At this time, received further orders for evaluation and treatment to resume PT. Patient states that he has left leg issues with weakness, decreased balance, limited walking distance. Uses straight cane when he does a lot of walking. \"Less than 1 block\" But, for house hold distances does not use his cane. He does use his cane when negotiating stair steps. He could while receiving PT he was able to negotiated stair steps with light use of single handrail. Patient goals are to be able to improved leg strength, balance and walking. Patient does not want to go into cardiac rehab until completing PT. Patient feels that he won't be 100%. Feels his back with stenosis is also a factor for his leg pain along with the arachnoiditis. SUBJECTIVE: Patient reports that he has cardiac rehab appointment tomorrow. Cardiac appointments in [place] have been going well. He no longer has to have biopsies. He now just has to have bloodwork and echocardiograms. Patient reports that PT has helped him with balance and strength. Using cane occasionally/periodically. Use it for when he feels more weak on his left side. His left leg does fatigue at times but using cane he can walk longer distances. For household distances and limited community distances not using cane. Endurance is better. Patient reports that endurance is better in the last year since cardiac transplant. Notes not too much breathing issues but fatigue at times. Able to complete 45 minutes therapy appointment with 1 rest break. BP  99/64  pulse 90 . BP following ex/reassessment: 102/71 pulse 97 end of session 102/63, pulse 92.      TREATMENT   Precautions: S/p heart transplant 2/7/2022, lumbar 2 cones/balls* 2 laps    Mod sway, Pt noted L LE \"wants to give out\"                 Reassessment  20 minutes  x      Specific Interventions Next Treatment: balance retraining, strengthening LEs, gait training with dynamic gait,     Activity/Treatment Tolerance:  [x]  Patient tolerated treatment well  []  Patient limited by fatigue  []  Patient limited by pain   []  Patient limited by medical complications  []  Other:     Assessment: Reassessment today. Discharge Summary. Refer to goal summary for status. Patient has progressed with 6 minute walk test but decreased distance today. Strength LE improved with mild decreased LLE yet. Balance improved to 53/56 with LIMA. Patient has met most of the goals. Patient to continue with cardiac rehab following this discharge from PT.         GOALS:  Patient Goal: To be able to stand and walk longer, improve strength and balance. Short Term Goals:  Time Frame: 4 weeks  Patient to demonstrate increased strength LLE at hip flexors 4/5 to 5/5, left hamstring from 4-/5 to 5/5, ankle plantarflexors from 4-/5 to 4/5 with increased stability and functional strength with partial squats, heelraises and negotiating 12 stair steps. GOAL MET with LLE hip fexors 5/5, left hamstrings not met to 4+/5. Ankle plantarflexors. Left ankle 4-/5, right ankle 4/5. Goal not met for plantarflexors. Patient to demonstrate increased balance with tandem stance from l/r 13 seconds to 30 seconds. SLS from 3 seconds to 10 seconds. GOAL MET for tandem stance improved to 30 seconds, SLS remained at 3 seconds. More challenged on LLE>   Patient to demonstrate ability to ambulate 1000 feet in 6 minutes with perceived exertion <3. GOAL NOT -900 feet in 6 minutes walk test <2 perceived exertion. Long Term Goals:  Time Frame: 8 weeks  1. [place] balance test from 52/56 to 54/56  GOAL NOT MET but did improve to 53/5   2.  Patient independent in [facility] with follow through for balance, strength, gait training/walking distance with improved endurance. GOAL MET Patient independent with HEP but plans to continue to work on endurance in cardiac rehab program     Patient Education:   [x]  HEP/Education Completed: Plan of Care, Goals, discussed continuing his fitness and strengthening at local exercise facility, Continue HEP. [facility] Access Code:  []  No new Education completed  [x]  Reviewed Prior HEP      []  Patient verbalized and/or demonstrated understanding of education provided. []  Patient unable to verbalize and/or demonstrate understanding of education provided. Will continue education. [x]  Barriers to learning: none    PLAN:  Treatment Recommendations: Strengthening, Balance Training, Functional Mobility Training, Transfer Training, Endurance Training, Gait Training, Neuromuscular Re-education, Home Exercise Program, and Patient Education    []  Plan of care initiated. Plan to see patient 1-2 times per week for 8 weeks to address the treatment planned outlined above.   []  Continue with current plan of care  []  Modify plan of care as follows:    []  Hold pending physician visit  [x]  Discharge    Time In 0901   Time Out 0945   Timed Code Minutes: 15   Total Treatment Time: 44       Electronically Signed by: Kenna Berry, PT

## 2022-09-13 ENCOUNTER — HOSPITAL ENCOUNTER (OUTPATIENT)
Dept: CARDIAC REHAB | Age: 62
Setting detail: THERAPIES SERIES
Discharge: HOME | End: 2022-09-13
Payer: MEDICARE

## 2022-09-13 VITALS — WEIGHT: 234 LBS | HEIGHT: 69 IN | BODY MASS INDEX: 34.66 KG/M2

## 2022-09-13 PROCEDURE — 93798 PHYS/QHP OP CAR RHAB W/ECG: CPT

## 2022-09-13 NOTE — PLAN OF CARE
[id number] [id number]  Traditional Cardiac Rehab Program - [facility] Program  Individualized Cardiac Treatment Plan    Patient Name:  Latoya Gomes  :  1960  Age:  57 y.o. MRN:  [id number]  Diagnosis: Heart Transplant  Date of Event: 22   Physician:  Layla WALLS  Next Office Visit:  10/5/22  Date Entered Program: 22  Risk Stratifications: [] Low [] Intermediate [x] High  Allergies: Allergies   Allergen Reactions    Entresto [Sacubitril-Valsartan] Dizziness or Vertigo       COVID -19 Screen  Do you have any of the following symptoms:  [] Fever [] Cough [] SOB [] Muscle/Body Ache [] Loss of taste/smell [x] None    Have you traveled outside of the [place]? [] Yes      [x] No    Have you been around anyone who has tested Positive for COVID 19?  [] Yes      [x] No    The following Education has been completed with patient. [x] Wait in the lobby until we call you back to rehab. [x] You must wear a mask while in the medical center, and in cardiac rehab. Please bring your own. [x] Bring your own bottle of water with you.       Individual Cardiac Treatment Plan -EXERCISE  INITIAL 30 DAY 60 DAY 90  DAY FINAL DAY   EXERCISE  ASSESSMENT/PLAN EXERCISE  REASSESSMENT EXERCISE   REASSESSMENT EXERCISE   REASSESSMENT EXERCISE   REASSESSMENT EXERCISE  DISCHARGE/FOLLOW-UP   Date: 22 Date: Date: Date: Date: Date:   Session #1  First Exercise Session:  ** Session # ** Session # ** Session # ** Session # ** Session # **  Last Exercise Session:  **   Stages of Change Stages of Change Stages of Change Stages of Change Stages of Change Stages of Change   [] Pre Contemplation  [] Contemplation  [] Preparation  [x] Action  [] Maintenance  [] Relapse [] Pre Contemplation  [] Contemplation  [] Preparation  [] Action  [] Maintenance  [] Relapse [] Pre Contemplation  [] Contemplation  [] Preparation  [] Action  [] Maintenance  [] Relapse [] Pre Contemplation  [] Contemplation  [] Preparation  [] Action  [] Maintenance  [] Relapse [] Pre Contemplation  [] Contemplation  [] Preparation  [] Action  [] Maintenance  [] Relapse [] Pre Contemplation  [] Contemplation  [] Preparation  [] Action  [] Maintenance  [] Relapse   EXERCISE ASSESSMENT EXERCISE ASSESSMENT EXERCISE ASSESSMENT EXERCISE ASSESSMENT EXERCISE ASSESSMENT EXERCISE ASSESSMENT   6 Min Walk Test  Distance walked:   0.09 miles  475 ft.  1.7 METs  Max HR:99 BPM      RPE:  12    THR:  117-130  Rhythm:  NSR     6 Min Walk Test  Distance walked:   ** miles  ** ft  ** METs  Max HR:** BPM      RPE:  **  %Change ft= **    Rhythm:  **   DASI: 5.1 METs DASI: ** METs DASI: ** METs DASI: ** METs DASI: ** METs DASI: ** METs   Return to Work  [employer] on returning to work? [] Yes              [] No   [x] Disabled     [] Retired     Return to work:  Has Cresencio returned to work? [] Yes    [] No    Return to work date set? [] Yes, **    [] No    Cresencio is doing ** at work. Return to work:  Has Edward Calender returned to work? [] Yes    [] No    Return to work date set? [] Yes, **    [] No    Cresencio is doing ** at work. Return to work:  Has Edward Calender returned to work? [] Yes    [] No    Return to work date set? [] Yes, **    [] No    Cresencio is doing ** at work. Return to work:  Has Edward Calender returned to work? [] Yes    [] No    Return to work date set? [] Yes, **    [] No    Cresencio is doing ** at work. Return to work:  Has Edward Calender returned to work? [] Yes    [] No    Return to work? [] Yes, **    [] No    *Required MET Level achieved for job duties?    [] Yes    [] No   Orthopedic Limitations/  [x] Yes    [] No  If yes please list:  left leg weaker     Orthopedic Limitations  *If patient has orthopedic issue:   Actions/  accomodations needed to make Cresencio successful : Orthopedic Limitations   Orthopedic Limitations   Orthopedic Limitations Orthopedic Limitations     Fall Risk    [x]Assessed as a fall risk- with fatigue  [] Not a fall risk      [] Balance Issues       [] Walker        [x] [address] [facility]       [] Wheelchair  Actions needed: use cane as needed for balance. [x] Safety issues reviewed      Fall Risk  *If patient is a fall risk, action needed to accommodate:  Fall Risk Fall Risk Fall Risk Fall Risk   Home Exercise  [x] Yes    [] No  Type: recumbant bike  Frequency: 4 x per week  Duration: 30 min Home Exercise  [] Yes    [] No  Type: **  Frequency:**  Duration: ** Home Exercise  [] Yes    [] No  Type: **  Frequency: **  Duration: ** Home Exercise  [] Yes    [] No  Type: **  Frequency: **  Duration: ** Home Exercise  [] Yes    [] No  Type: **  Frequency: **  Duration: ** Home Exercise  [] Yes    [] No  Type: **  Frequency: **  Duration: **   Angina with Activity? [] Yes    [x] No  Angina Management: na Angina with Activity? [] Yes    [] No  Angina Management: ** Angina with Activity? [] Yes    [] No  Angina Management: ** Angina with Activity? [] Yes    [] No  Angina Management: ** Angina with Activity? [] Yes    [] No  Angina Management: ** Angina with Activity?   [] Yes    [] No  Angina Management: **   EXERCISE PLAN EXERCISE PLAN EXERCISE PLAN EXERCISE PLAN EXERCISE PLAN EXERCISE PLAN   *Interventions* *Interventions* *Interventions* *Interventions* *Interventions* *Interventions*   Exercise Prescription  (per physician & CR staff) Exercise Prescription  (per physician & CR staff) Exercise Prescription  (per physician & CR staff) Exercise Prescription  (per physician & CR staff) Exercise Prescription  (per physician & CR staff) Exercise Prescription  (per physician & CR staff)   Cardiovascular Cardiovascular Cardiovascular Cardiovascular Cardiovascular Cardiovascular   Mode:    [x] Treadmill (TM)  [x] Schwinn Airdyne (AD)  [x] Arms Ergometer (AE)  [] NuStep  [] Elliptical (E) MODE:    [] Treadmill (TM)  [] Schwinn Airdyne (AD)  [] Arms Ergometer (AE)  [] NuStep  [] Elliptical (E) MODE:    [] Treadmill (TM)  [] Schwinn Airdyne (AD)  [] Arms Ergometer (AE)  [] NuStep  [] Elliptical (E) MODE:    [] Treadmill (TM)  [] Schwinn Airdyne (AD)  [] Arms Ergometer (AE)  [] NuStep  [] Elliptical (E) MODE:    [] Treadmill (TM)  [] Schwinn Airdyne (AD)  [] Arms Ergometer (AE)  [] NuStep  [] Elliptical (E) MODE:    [] Treadmill (TM)  [] Schwinn Airdyne (AD)  [] Arms Ergometer (AE)  [] NuStep  [] Elliptical (E)   Initial Workloads  TM: [email or web address] 1.8 METs  AD: 0.6 level = 1.7 METs  NS: 14  Brizuela= 1.7 METs  AE: 0.3 level = 1.4 METs Current Workloads  TM:  @ %=  METs  AD:  level =  METs  NS:   Brizuela=  METs  AE:  level =  METs Current Workloads  TM:  @ %=  METs  AD:  level =  METs  NS:   Brizuela=  METs  AE:  level =  METs Current Workloads  TM:  @ %=  METs  AD:  level =  METs  NS:   Brizuela=  METs  AE:  level =  METs Current Workloads  TM:  @ %=  METs  AD:  level =  METs  NS:   Brizuela=  METs  AE:  level =  METs Current Workloads  TM:  @ %=  METs  AD:  level =  METs  NS:   Brizuela=  METs  AE:  level =  METs     Frequency:    ICR: 3x/week  Home: 2-3x/wk Frequency:   ICR: 3x/week  Home: 3x/wk Frequency:  ICR: 3x/week  Home: 3-4x/wk Frequency:  ICR: 3x/week  Home: 3-4x/wk Frequency:  ICR: 3x/week  Home: 3-4x/wk Frequency:  Cresencio will continue exercise at  5-7 days/week   Duration:   Total aerobic exercise = 30-45 min    5-8 min/mode Duration:  Total aerobic exercises = ** min     **min/mode Duration:  Total aerobic exercises = ** min     **min/mode Duration:  Total aerobic exercises = ** min     **min/mode Duration:  Total aerobic exercises = ** min     **min/mode Duration:  Total erobic exercise =  60-90 min   Intensity:   MET Level = 1.7-1.8  RPE = 12-15 Intensity:  Max MET Level = **  RPE = 12-15 Intensity:  Max MET Level = **  RPE = 12-15 Intensity:  Max MET Level = **  RPE = 12-15 Intensity:  Max MET Level = **  RPE = 12-15 Intensity:  Max MET Level = ** RPE = 12-15   Progression: increase aerobic activity up to 15 min over next 4 weeks by increasing time 2-3 min/week.  Progression: Progression:   Progression: Progression: Progression:  Increase time/intensity when RPE <13, and HR is in [facility]   [x] Yes      [] No  Upper and Lower body strength training 2x/wk    Wt: 2#       Reps:  8-15    *Increase wt. after completing 15 reps with an RPE of <12/13. [] Yes      [] No  Upper and Lower body strength training 2x/wk    Wt: **#       Reps:  8-15    *Increase wt. after completing 15 reps with an RPE of <12/13. [] Yes      [] No  Upper and Lower body strength training 2x/wk    Wt: **#       Reps:  8-15    *Increase wt. after completing 15 reps with an RPE of <12/13. [] Yes      [] No  Upper and Lower body strength training 2x/wk    Wt: **#       Reps:  8-15    *Increase wt. after completing 15 reps with an RPE of <12/13. [] Yes      [] No  Upper and Lower body strength training 2x/wk    Wt: **#       Reps:  8-15    *Increase wt. after completing 15 reps with an RPE of <12/13. Continue Strength Training at home   [] Exercise Log & Strength training handout given     Wt: **#       Reps:  8-15    *Increase wt. after completing 15 reps with an RPE of <12/13. Flexibility Flexibility Flexibility Flexibility Flexibility Flexibility   [x] Yes      [] No  25 min session of Core Strength & Flexibility 1x/per week  Attends Core Strength & Flexibility   [] Yes      [] No Attends Core Strength & Flexibility   [] Yes      [] No Attends Core Strength & Flexibility   [] Yes      [] No Attends Core Strength & Flexibility   [] Yes      [] No Continue Core Strength & Flexibility at home   Home Exercise  *Cresencio verbalizes planning to bike/walk 3-4 days/week for 20-30 min on days not in rehab. Home Exercise  *Cresencio verbalizes planning to ** ** days/week for ** min on days not in rehab. Home Exercise  *Cresencio verbalizes planning to ** ** days/week for ** min on days not in rehab.  Home Exercise  *Cresencio verbalizes planning to ** ** days/week for ** min on days not in rehab. Home Exercise  *Cresencio verbalizes planning to ** ** days/week for ** min on days not in rehab. Home Exercise  *Cresencio verbalizes his/her plan to ** ** days/week for ** min @ **   *Education* *Education* *Education* *Education* *Education* *Education*   RPE Scale  [x] Yes      [] No  Exercise Safety  [x] Yes      [] No  Equipment Orientation  [x] Yes      [] No  S/S to Report  [x] Yes      [] No  Warm Up/Cool Down  [x] Yes      [] No  Home Exercise  [x] Yes      [] No     All Exercise Education Completed  [] Yes      [] No    Exercise Education Attended/Date Exercise Education Attended/Date Exercise Education Attended/Date Exercise Education Attended/Date All Sessions Completed? [] Yes  [] No   *Goals* *Goals* *Goals* *Goals* *Goals* *Goals*   Initial Exercise Goals Exercise Goals  Exercise Goals   Exercise Goals  Exercise Goals  Exercise Goals   Cresencio plans to:  [x] Attend exercise sessions 3x/wk  [x] initiate home exercise 2-3x/wk for 10-20 min  [x] Increase 6 min walk distance by 10%  [] Attend Exercise workshops Cresencio plans to:  [x] Attend exercise sessions 3x/wk  [x] continue home exercise 2-3x/wk for 20-30 min  [x] Attend Exercise workshops Cresencio's plans to:  [x] Attend exercise sessions 3x/wk  [x] continue home exercise 3-4x/wk for 30-45 min  [x] Determine plan of exercise following rehab  [x] Attend Exercise workshops Cresencio's plans to:  [x] Attend exercise sessions 3x/wk  [x] continue home exercise 3-4x/wk for 30-45 min  [x] Determine plan of exercise following rehab  [x] Attend Exercise workshops Cresencio's plans to:  [x] Attend exercise sessions 3x/wk  [x] continue home exercise 3-4x/wk for 30-45 min  [x] Determine plan of exercise following rehab  [x] Attend Exercise workshops Cresencio achieved exercise goals?     []  Yes    [] No  If no, why?  **  [] Increased 6 min walk distance by 10%  [] Currently exercising 30-60 min/day, 5-7days/wk   [] Plans to continue exercise on own  [] Plans to join a local fitness center to continue exercise  [] Does not plan to continue to exercise after rehab   Mutually agreed upon goals:  Viridiana Harris, travel, household chores Progress towards mutually agreed upon goals:  Cresencio  ** Progress towards mutually agreed upon goals:  Cresencio  ** Progress towards mutually agreed upon goals:  Cresencio ** Progress towards mutually agreed upon goals:  Cresencio  ** Progress towards mutually agreed upon goals:  Cresencio  **    *MET level required for above goal:  5-6 METs MET level Achieved:  **METs MET level Achieved:  **METs MET level Achieved:  **METs MET level Achieved:  **METs MET level Achieved:  **METs     Individual Cardiac Treatment Plan - Nutrition  NUTRITION  ASSESSMENT/PLAN NUTRITION  REASSESSMENT NUTRITION   REASSESSMENT NUTRITION   REASSESSMENT NUTRITION  DISCHARGE/FOLLOW-UP NUTRITION  DISCHARGE/FOLLOW-UP   Stages of Change Stages of Change Stages of Change Stages of Change Stages of Change Stages of Change   [] Pre Contemplation  [x] Contemplation  [] Preparation  [] Action  [] Maintenance  [] Relapse [] Pre Contemplation  [] Contemplation  [] Preparation  [] Action  [] Maintenance  [] Relapse [] Pre Contemplation  [] Contemplation  [] Preparation  [] Action  [] Maintenance  [] Relapse [] Pre Contemplation  [] Contemplation  [] Preparation  [] Action  [] Maintenance  [] Relapse [] Pre Contemplation  [] Contemplation  [] Preparation  [] Action  [] Maintenance  [] Relapse [] Pre Contemplation  [] Contemplation  [] Preparation  [] Action  [] Maintenance  [] Relapse   NUTRITION ASSESSMENT NUTRITION ASSESSMENT NUTRITION ASSESSMENT NUTRITION ASSESSMENT NUTRITION ASSESSMENT NUTRITION ASSESSMENT   Weight Management  Weight: 234        Height: 69\"   BMI: 34.6  Weight Management  Weight: **                  Weight Management  Weight: **                  Weight Management  Weight: ** Weight Management  Weight: ** Weight Management  Weight: ** BMI: **   Eating Plan  Current eating habits: heart healthy, no salt Eating Plan  Changes: Eating Plan  Changes: Eating Plan  Changes: Eating Plan  Changes: Eating Plan Improvements:   Alcohol Use  [] none          [] daily  [] weekly      [x] special   Type: beer  Amount: 1        Diet Assessment Tool:  RATE YOUR PLATE  *Given to patient to complete and return. Diet Assessment Tool:    Score: 50/72        Diet Assessment Tool: RATE YOUR PLATE  Score: **/58   NUTRITION PLAN NUTRITION PLAN NUTRITION PLAN NUTRITION PLAN NUTRITION PLAN NUTRITION PLAN   *Interventions* *Interventions* *Interventions* *Interventions* *Interventions* *Interventions*   Initial Survey given Goal Setting Discussion:   [] Yes      [] No        Follow Up Survey Reviewed & Goals Updated:     Professional Referral  Please check if needed. [] Dietitian Consult   [] Wt. Management Referral  [] Other:  Professional Referral  Please check if needed. [] Dietitian Consult   [] Wt. Management Referral  [] Other: Professional Referral  Please check if needed. [] Dietitian Consult   [] Wt. Management Referral  [] Other: Professional Referral  Please check if needed. [] Dietitian Consult   [] Wt. Management Referral  [] Other: Professional Referral  Please check if needed. [] Dietitian Consult   [] Wt. Management Referral  [] Other: Professional Referral  Please check if needed. [] Dietitian Consult   [] Wt. Management Referral  [] Other:    *Education* *Education* *Education* *Education* *Education*    Nutritional Education Attended/Date Nutritional Education Attended/Date Nutritional Education Attended/Date Nutritional Education Attended/Date All Sessions Completed?     [] Yes  [] No   Cooking School  Recommended     [x] Adding Flavor  [x] Fast & Healthy     Breakfasts  [x] Salads & Dressings  [x] Savory Soups  [x] Simple Sides & Sauces  [x] Appetizers &     Snacks  [x] Delicious Desserts  [x] Plant-Based Proteins  [x] Fast Evening Meals  [x] Weekend Breakfasts  [x] Efficiency Cooking  [x] [employer]-Pot [employer]  Sessions Completed   [facility]  Sessions Completed [facility]  Sessions Completed     [facility]  Sessions Completed [facility]    # of sessions completed:  **   *Goals* *Goals* *Goals* *Goals* *Goals* *Goals*   Cresencio's nutritional goals are as follows:  Complete and return diet survey Cresencio's nutritional goals are as follows:  [] Attend Nutrition Workshops  [] Attend 1:1   [] Attend Cooking Classes  [] ** Cresencio's nutritional goals are as follows:  [] Attend Nutrition Workshops  [] Attend 1:1   [] Attend Cooking Classes  [] Complete and return diet survey  [] ** Cresencio's nutritional goals are as follows:  [] Attend Nutrition Workshops  [] Attend 1:1   [] Attend Cooking Classes  [] ** Cresencio's nutritional goals are as follows:  [] Attend Nutrition Workshops  [] Attend 1:1   [] Attend Cooking Classes  [] ** Cresencio achieved nutritional goals   [] Yes    [] No  If no, why?   Use knowledge gained to continue Pritikin eating plan at home       Individual Cardiac Treatment Plan - Psychosocial  PSYCHOSOCIAL  ASSESSMENT/PLAN PSYCHOSOCIAL  REASSESSMENT PSYCHOSOCIAL   REASSESSMENT PSYCHOSOCIAL   REASSESSMENT PSYCHOSOCIAL  DISCHARGE/FOLLOW-UP PSYCHOSOCIAL  DISCHARGE/FOLLOW-UP   Stages of Change Stages of Change Stages of Change Stages of Change Stages of Change Stages of Change   [] Pre Contemplation  [x] Contemplation  [] Preparation  [] Action  [] Maintenance  [] Relapse [] Pre Contemplation  [] Contemplation  [] Preparation  [] Action  [] Maintenance  [] Relapse [] Pre Contemplation  [] Contemplation  [] Preparation  [] Action  [] Maintenance  [] Relapse [] Pre Contemplation  [] Contemplation  [] Preparation  [] Action  [] Maintenance  [] Relapse [] Pre Contemplation  [] Contemplation  [] Preparation  [] Action  [] Maintenance  [] Relapse [] Pre Contemplation  [] Contemplation  [] Preparation  [] Action  [] Maintenance  [] Relapse   PSYCHOSOCIAL ASSESSMENT PSYCHOSOCIAL ASSESSMENT PSYCHOSOCIAL ASSESSMENT PSYCHOSOCIAL ASSESSMENT PSYCHOSOCIAL ASSESSMENT PSYCHOSOCIAL ASSESSMENT   Behavioral Outcomes Behavioral Outcomes Behavioral Outcomes Behavioral Outcomes Behavioral Outcomes Behavioral Outcomes   PHQ-9 score 3  Depression Severity  [x]Minimal  []Mild   []Moderate  []Moderately Severe  []Severe    PHQ-9 score **  Depression Severity  []Minimal  []Mild   []Moderate  []Moderately Severe []Severe PHQ-9 score **  Depression Severity  []Minimal  []Mild   []Moderate  []Moderately Severe []Severe   Does patient have Family Support? [x] Yes      [] No  No signs of marital/family distress        Within the Past Month:  *Have you wished you were dead or wished you could go to sleep and not wake up? [] Yes      [x] No  *Have you had any thoughts of killing yourself? [] Yes      [x] No          Using a scale of 0-10, 0=none, 10=very:   Rate your depression: 0  Rate your anxiety:  0  Using a scale of 0-10, 0=none, 10=very:   Rate your depression: **  Rate your anxiety:  ** Using a scale of 0-10, 0=none, 10=very:   Rate your depression: **  Rate your anxiety:  ** Using a scale of 0-10, 0=none, 10=very:   Rate your depression: **  Rate your anxiety:  ** Using a scale of 0-10, 0=none, 10=very:   Rate your depression: **  Rate your anxiety:  ** Using a scale of 0-10, 0=none, 10=very:   Rate your depression: **  Rate your anxiety:  **   Signs and Symptoms of Depression Present? [] Yes      [x] No   Signs and Symptoms of Depression Present? [] Yes      [] No  If yes, please explain:  ** Signs and Symptoms of Depression Present? [] Yes      [] No  If yes, please explain:  ** Signs and Symptoms of Depression Present? [] Yes      [] No  If yes, please explain:  ** Signs and Symptoms of Depression Present? [] Yes      [] No  If yes, please explain:  ** Signs and Symptoms of Depression Present?     [] Yes      [] No  If yes, please explain: **   Signs and Symptoms of Anxiety Present? [] Yes      [x] No   Signs and Symptoms of Anxiety Present? [] Yes      [] No  If yes, please explain:  ** Signs and Symptoms of Anxiety Present? [] Yes      [] No  If yes, please explain:  ** Signs and Symptoms of Anxiety Present? [] Yes      [] No  If yes, please explain:  ** Signs and Symptoms of Anxiety Present? [] Yes      [] No  If yes, please explain:  ** Signs and Symptoms of Anxiety Present? [] Yes      [] No  If yes, please explain:  **   [] Patient has poor eye contact   [] Flat affect present. [] Signs of anxiety, anger or hostility    [] Signs social isolation present. []  Signs of alcohol or substance abuse        PSYCHOSOCIAL PLAN PSYCHOSOCIAL PLAN PSYCHOSOCIAL PLAN PSYCHOSOCIAL PLAN PSYCHOSOCIAL PLAN PSYCHOSOCIAL PLAN   *Interventions* *Interventions* *Interventions* *Interventions* *Interventions* *Interventions*   *Please check if needed  [] Psych Consult  [] Physician Referral  [x] Stress Management Skills *Please check if needed  [] Psych Consult  [] Physician Referral  [] Stress Management Skills *Please check if needed  [] Psych Consult  [] Physician Referral  [] Stress Management Skills *Please check if needed  [] Psych Consult  [] Physician Referral  [] Stress Management Skills *Please check if needed  [] Psych Consult  [] Physician Referral  [] Stress Management Skills *Please check if needed  [] Psych Consult  [] Physician Referral  [] Stress Management Skills   Is patient currently taking anti-depressant or anti-anxiety medications? [x] Yes      [] No  If yes, please list medications:  amitriptyline Change in anti-depressant or anti-anxiety medications? [] Yes      [] No  If yes, please list medications:  ** Change in anti-depressant or anti-anxiety medications? [] Yes      [] No  If yes, please list medications:  ** Change in anti-depressant or anti-anxiety medications?   [] Yes      [] No  If yes, please list medications: ** Change in anti-depressant or anti-anxiety medications? [] Yes      [] No  If yes, please list medications:  ** Change in anti-depressant or anti-anxiety medications? [] Yes      [] No  If yes, please list medications:  **   *Education* *Education* *Education* *Education* *Education* *Education*   Healthy Mind-Set Workshops Recommended  [x] Stress & Health  [x] Taking Charge of Stress  [x] New Thoughts, New Behaviors  [x] Managing Moods & Relationships Healthy Mind-Set Workshops Attended/Date Healthy Mind-Set Workshops Attended/Date Healthy Mind-Set Workshops Attended/Date Healthy Mind-Set Workshops  Completed  [] Yes      [] No Healthy Mind-Set Workshops  Completed  [] Yes      [] No   *Goals* *Goals* *Goals* *Goals* *Goals* *Goals*   Cresencio's psychosocial goals are as follows:  Complete HADS & Rolan & Evelio, Quality of Life Index, Cardiac Version IV Cresencio's psychosocial goals are as follows:  [] Attend Healthy Mind-Set Workshops  [] Reduce depression symptom severity by 1 level  [] ** Cresencio's psychosocial goals are as follows:  [] Attend Healthy Mind-Set Workshops  [] Reduce depression symptom severity by 1 level  [] ** Cresencio's psychosocial goals are as follows:  [] Attend Healthy Mind-Set Workshops  [] Reduce depression symptom severity by 1 level  [] ** Cresencio achieved psychosocial goals? [] Yes    [] No  If no, why?  **  [] Use methods learned to continue to reduce depression symptom severity by 1 level  [] ** Cresencio achieved psychosocial goals?   [] Yes    [] No  If no, why?  **  [] Use methods learned to continue to reduce depression symptom severity by 1 level  [] **     Individual Cardiac Treatment Plan - Other:  Risk Factor/Education  RISK FACTOR  ASSESSMENT/PLAN RISK FACTOR  REASSESSMENT  RISK FACTOR  REASSESSMENT RISK FACTOR  REASSESSMENT RISK FACTOR   DISCHARGE/FOLLOW-UP RISK FACTOR   DISCHARGE/FOLLOW-UP   Stages of Change Stages of Change Stages of Change Stages of Change Stages of Change Stages of Change   [] Pre Contemplation  [x] Contemplation  [] Preparation  [] Action  [] Maintenance  [] Relapse [] Pre Contemplation  [] Contemplation  [] Preparation  [] Action  [] Maintenance  [] Relapse [] Pre Contemplation  [] Contemplation  [] Preparation  [] Action  [] Maintenance  [] Relapse [] Pre Contemplation  [] Contemplation  [] Preparation  [] Action  [] Maintenance  [] Relapse [] Pre Contemplation  [] Contemplation  [] Preparation  [] Action  [] Maintenance  [] Relapse [] Pre Contemplation  [] Contemplation  [] Preparation  [] Action  [] Maintenance  [] Relapse   RISK FACTOR/EDUCATION ASSESSMENT RISK FACTOR/EDUCATION ASSESSMENT RISK FACTOR/EDUCATION ASSESSMENT RISK FACTOR/EDUCATION ASSESSMENT RISK FACTOR /EDUCATION ASSESSMENT RISK FACTOR /EDUCATION ASSESSMENT   Hypertension  [x] Yes      [] No    Resting BP: 144/82  Peak Ex BP:152/64  Medication: hydralazine, valsartan  Metoprolol, amlodipine   Hypertension  Resting BP: **  Peak Ex BP:**  Medication Changes:  [] Yes      [] No Hypertension  Resting BP: **  Peak Ex BP:**  Medication Changes:  [] Yes      [] No Hypertension  Resting BP: **  Peak Ex BP:**  Medication Changes:  [] Yes      [] No Hypertension  Resting BP: **  Peak Ex BP:**  Medication Changes:  [] Yes      [] No Hypertension  Resting BP: **  Peak Ex BP:**  Medication Changes:  [] Yes      [] No   Lipids  HLD/DLD  [x] Yes      [] No  TOTAL CHOL: 200  HDL:  102  LDL:  86  TRI  Medication: pravastatin, atorvastatin Lipids  Medication Changes:  [] Yes      [] No     Lipids  Medication Changes:  [] Yes      [] No     Lipids  Medication Changes:  [] Yes      [] No     Lipids    TOTAL CHOL: **  HDL:  **  LDL:  **  TRIG:  **  Medication Changes:  [] Yes      [] No Lipids    TOTAL CHOL: **  HDL:  **  LDL:  **  TRIG:  **  Medication Changes:  [] Yes      [] No   Diabetes  [] Yes      [x] No   Diabetes  Most Recent BS:  BS have been in range  [] Yes      [] No  Medication Changes  [] Yes      [] No Diabetes  Most Recent BS:  BS have been in range  [] Yes      [] No  Medication Changes  [] Yes      [] No     Diabetes  Most Recent BS:  BS have been in range  [] Yes      [] No  Medication Changes  [] Yes      [] No     Diabetes  Most Recent BS:  BS have been in range  [] Yes      [] No  Medication Changes  [] Yes      [] No Diabetes  Most Recent BS:  BS have been in range  [] Yes      [] No  Medication Changes  [] Yes      [] No       Tobacco Use  [] Current  [] Former  [x] Never      Smokeless Tobacco use:   [] Yes      [x] No   Tobacco Use  Change in smoking status   [] Yes      [] No    Quit date: **   Tobacco Use  Change in smoking status   [] Yes      [] No    Quit date: **   Tobacco Use  Change in smoking status   [] Yes      [] No    Quit date: ** Tobacco Use  Change in smoking status   [] Yes      [] No    Quit date: ** Tobacco Use  Change in smoking status   [] Yes      [] No    Quit date: **             Learning Barriers  Please select one:  [] Speech  [] Literacy  [] Hearing  [] Cognitive  [x] Vision  [x] Ready to Learn Learning Barriers Addressed:   [] Yes      [] No   Learning Barriers Addressed:   [] Yes      [] No   Learning Barriers Addressed:  [] Yes      [] No Learning Barriers Addressed:  [] Yes      [] No Learning Barriers Addressed:  [] Yes      [] No     RISK FACTOR/EDUCATION PLAN RISK FACTOR/EDUCATION PLAN RISK FACTOR/EDUCATION PLAN RISK FACTOR/EDUCATION PLAN RISK FACTOR/EDUCATION PLAN RISK FACTOR/EDUCATION PLAN     *Interventions* *Interventions* *Interventions* *Interventions*     Completed Videos/Date Completed Videos/Date Completed Videos/Date Recommended Educational Videos Completed    [] Yes      [] No    **If not completed, Why? **           Smoking Cessation/Relaspe Prevention Intervention needed? [] Yes      [x] No   Smoking Cessation/Relapse Prevention Scheduled? [] Yes      [] No  Date:  ** Smoking Cessation/Relapse Prevention completed?    [] Yes      [] No  Date: ** Smoking -Healhty Salads & Dressing  [] Cooking -Dinner & Sides  [] Cooking -Soups & Desserts    Overview  [] [employer] Additional Educational Videos Completed Additional Educational Videos Completed Additional Educational Videos Completed Additional Educational Videos Completed Additional Educational Videos Completed    [] Yes    [] No   *Goals* *Goals* *Goals* *Goals* *Goals* *Goals*   Cresencio's risk factor/education goals are as follows:    [x] Optimal BP <140/90  [] Blood Sugar <120  [x] Attend recommended video education sessions  [x] Takes medications as prescribed 100% of the time   [] ** Cresencio's risk factor/education goals are as follows:    [x] Optimal BP <140/90  [] Blood Sugar <120  [x] Attend recommended video education sessions  [x] Takes medications as prescribed 100% of the time   [] ** Cresencio's risk factor/education goals are as follows:    [x] Optimal BP <140/90  [] Blood Sugar <120  [x] Attend recommended video education sessions  [x] Takes medications as prescribed 100% of the time   [] ** Cresencio's risk factor/education goals are as follows:    [x] Optimal BP <140/90  [] Blood Sugar <120  [x] Attend recommended video education sessions  [x] Takes medications as prescribed 100% of the time   [] ** Cresencoi's risk factor/education goals are as follows:    [x] Optimal BP <140/90  [] Blood Sugar <120  [x] Attend recommended video education sessions  [x] Takes medications as prescribed 100% of the time   [] ** Cresencio achieved risk factor goals?   [] Yes    [] No  If no, why?  **     Monitored telemetry has revealed NSR   Monitored telemetry has revealed **  [] documented arrhythmia at increasing workloads  [] associated symptoms ** Monitored telemetry has revealed  [] documented arrhythmia at increasing workloads  [] associated symptoms ** Monitored telemetry has revealed **  [] documented arrhythmia at increasing workloads  [] associated symptoms ** Monitored telemetry has revealed **  [] documented arrhythmia at increasing workloads  [] associated symptoms ** Monitored telemetry has revealed **  [] documented arrhythmia at increasing workloads  [] associated symptoms **   Physician Response    [x] Cardiac rehab is reasonably and medically necessary for continuous cardiac monitoring surveillance  of patient's cardiac activity  [x] Initiate continuous telemetry monitoring and notify me with any concerns  [] Other   Physician Response    [x] Cardiac rehab is reasonably and medically necessary for continuous cardiac monitoring surveillance  of patient's cardiac activity  [x] Continue continuous telemetry monitoring and notify me with any concerns  [] Other     Physician Response    [x] Cardiac rehab is reasonably and medically necessary for continuous cardiac monitoring surveillance  of patient's cardiac activity  [x] Continue continuous telemetry monitoring and notify me with any concerns   [] Other     Physician Response    [x] Cardiac rehab is reasonably and medically necessary for continuous cardiac monitoring surveillance  of patient's cardiac activity  [x] Continue continuous telemetry monitoring and notify me with any concerns   [] Other     Physician Response    [x] Cardiac rehab is reasonably and medically necessary for continuous cardiac monitoring surveillance  of patient's cardiac activity  [x] Continue continuous telemetry monitoring and notify me with any concerns   [] Other

## 2022-09-19 ENCOUNTER — HOSPITAL ENCOUNTER (OUTPATIENT)
Dept: CARDIAC REHAB | Age: 62
Setting detail: THERAPIES SERIES
Discharge: HOME | End: 2022-09-19
Payer: MEDICARE

## 2022-09-19 PROCEDURE — 93798 PHYS/QHP OP CAR RHAB W/ECG: CPT

## 2022-09-21 ENCOUNTER — HOSPITAL ENCOUNTER (OUTPATIENT)
Dept: CARDIAC REHAB | Age: 62
Setting detail: THERAPIES SERIES
Discharge: HOME | End: 2022-09-21
Payer: MEDICARE

## 2022-09-21 PROCEDURE — 93798 PHYS/QHP OP CAR RHAB W/ECG: CPT

## 2022-09-23 ENCOUNTER — HOSPITAL ENCOUNTER (OUTPATIENT)
Dept: CARDIAC REHAB | Age: 62
Setting detail: THERAPIES SERIES
Discharge: HOME | End: 2022-09-23
Payer: MEDICARE

## 2022-09-23 PROCEDURE — 93798 PHYS/QHP OP CAR RHAB W/ECG: CPT

## 2022-09-26 ENCOUNTER — HOSPITAL ENCOUNTER (OUTPATIENT)
Dept: CARDIAC REHAB | Age: 62
Setting detail: THERAPIES SERIES
Discharge: HOME | End: 2022-09-26
Payer: MEDICARE

## 2022-09-26 PROCEDURE — 93798 PHYS/QHP OP CAR RHAB W/ECG: CPT

## 2022-09-28 ENCOUNTER — HOSPITAL ENCOUNTER (OUTPATIENT)
Dept: CARDIAC REHAB | Age: 62
Setting detail: THERAPIES SERIES
Discharge: HOME | End: 2022-09-28
Payer: MEDICARE

## 2022-09-28 PROCEDURE — 93798 PHYS/QHP OP CAR RHAB W/ECG: CPT

## 2022-09-28 PROCEDURE — G0422 INTENS CARDIAC REHAB W/EXERC: HCPCS

## 2022-09-30 ENCOUNTER — HOSPITAL ENCOUNTER (OUTPATIENT)
Dept: CARDIAC REHAB | Age: 62
Setting detail: THERAPIES SERIES
Discharge: HOME | End: 2022-09-30
Payer: MEDICARE

## 2022-09-30 PROCEDURE — 93798 PHYS/QHP OP CAR RHAB W/ECG: CPT

## 2022-10-03 ENCOUNTER — HOSPITAL ENCOUNTER (OUTPATIENT)
Dept: CARDIAC REHAB | Age: 62
Setting detail: THERAPIES SERIES
Discharge: HOME | End: 2022-10-03
Payer: MEDICARE

## 2022-10-03 PROCEDURE — 93798 PHYS/QHP OP CAR RHAB W/ECG: CPT

## 2022-10-03 NOTE — PROGRESS NOTES
[facility]  Phase 2 Cardiac Rehab Weekly Progress Report      Patient prescribed exercise:  8:00 class. 2-3 times per week in rehab, 1-4 times per week at home for the amount of sessions/weeks specified by insurance. Current Levels: NuStep:  40 Brizuela for 15 minutes x 2, UBE: Level 24W for 5 minutes. Progression Discussion: Maintain/Increase Aerobic exercise 15 minutes to work on endurance. Attempt to increase intensity by 5-20% for each modality this week. Try to increase intensities until Cresencio rates the exercises a 13-17 on Chong RPE.

## 2022-10-05 ENCOUNTER — HOSPITAL ENCOUNTER (OUTPATIENT)
Dept: CARDIAC REHAB | Age: 62
Setting detail: THERAPIES SERIES
End: 2022-10-05
Payer: COMMERCIAL

## 2022-10-07 ENCOUNTER — HOSPITAL ENCOUNTER (OUTPATIENT)
Dept: CARDIAC REHAB | Age: 62
Setting detail: THERAPIES SERIES
Discharge: HOME | End: 2022-10-07
Payer: MEDICARE

## 2022-10-07 PROCEDURE — 93798 PHYS/QHP OP CAR RHAB W/ECG: CPT

## 2022-10-10 ENCOUNTER — HOSPITAL ENCOUNTER (OUTPATIENT)
Dept: CARDIAC REHAB | Age: 62
Setting detail: THERAPIES SERIES
Discharge: HOME | End: 2022-10-10
Payer: MEDICARE

## 2022-10-10 PROCEDURE — 93798 PHYS/QHP OP CAR RHAB W/ECG: CPT

## 2022-10-10 NOTE — PLAN OF CARE
[address] Cardiac Rehab Program - [facility]  Individualized Cardiac Treatment Plan    Patient Name:  Mel Redding  :  1960  Age:  57 y.o. MRN:  [id number]  Diagnosis: Heart Transplant  Date of Event: 22   Physician:  Smith WALLS  Next Office Visit:  10/5/22  Date Entered Program: 22  Risk Stratifications: [] Low [] Intermediate [x] High  Allergies: Allergies   Allergen Reactions    Entresto [Sacubitril-Valsartan] Dizziness or Vertigo       COVID -19 Screen  Do you have any of the following symptoms:  [] Fever [] Cough [] SOB [] Muscle/Body Ache [] Loss of taste/smell [x] None    Have you traveled outside of the [place]? [] Yes      [x] No    Have you been around anyone who has tested Positive for COVID 19?  [] Yes      [x] No    The following Education has been completed with patient. [x] Wait in the lobby until we call you back to rehab. [x] You must wear a mask while in the medical center, and in cardiac rehab. Please bring your own. [x] Bring your own bottle of water with you.       Individual Cardiac Treatment Plan -EXERCISE  INITIAL 30 DAY 60 DAY 90  DAY FINAL DAY   EXERCISE  ASSESSMENT/PLAN EXERCISE  REASSESSMENT EXERCISE   REASSESSMENT EXERCISE   REASSESSMENT EXERCISE   REASSESSMENT EXERCISE  DISCHARGE/FOLLOW-UP   Date: 22 Date: 10/10/22 Date: Date: Date: Date:   Session #1  First Exercise Session:  ** Session # 10 Session # ** Session # ** Session # ** Session # **  Last Exercise Session:  **   Stages of Change Stages of Change Stages of Change Stages of Change Stages of Change Stages of Change   [] Pre Contemplation  [] Contemplation  [] Preparation  [x] Action  [] Maintenance  [] Relapse [] Pre Contemplation  [] Contemplation  [] Preparation  [x] Action  [] Maintenance  [] Relapse [] Pre Contemplation  [] Contemplation  [] Preparation  [] Action  [] Maintenance  [] Relapse [] Pre Contemplation  [] Contemplation  [] Preparation  [] Action  [] Maintenance  [] Relapse [] Pre Contemplation  [] Contemplation  [] Preparation  [] Action  [] Maintenance  [] Relapse [] Pre Contemplation  [] Contemplation  [] Preparation  [] Action  [] Maintenance  [] Relapse   EXERCISE ASSESSMENT EXERCISE ASSESSMENT EXERCISE ASSESSMENT EXERCISE ASSESSMENT EXERCISE ASSESSMENT EXERCISE ASSESSMENT   6 Min Walk Test  Distance walked:   0.09 miles  475 ft.  1.7 METs  Max HR:99 BPM      RPE:  12    THR:  117-130  Rhythm:  NSR     6 Min Walk Test  Distance walked:   ** miles  ** ft  ** METs  Max HR:** BPM      RPE:  **  %Change ft= **    Rhythm:  **   DASI: 5.1 METs DASI: 4.73 METs DASI: ** METs DASI: ** METs DASI: ** METs DASI: ** METs   Return to Work  [employer] on returning to work? [] Yes              [] No   [x] Disabled     [] Retired     Return to work:  Disabled   Return to work:  Has Cresencio returned to work? [] Yes    [] No    Return to work date set? [] Yes, **    [] No    Cresencio is doing ** at work. Return to work:  Has Margurette Oiler returned to work? [] Yes    [] No    Return to work date set? [] Yes, **    [] No    Cresencio is doing ** at work. Return to work:  Has Margurette Oiler returned to work? [] Yes    [] No    Return to work date set? [] Yes, **    [] No    Cresencio is doing ** at work. Return to work:  Has Margurette Oiler returned to work? [] Yes    [] No    Return to work? [] Yes, **    [] No    *Required MET Level achieved for job duties?    [] Yes    [] No   Orthopedic Limitations/  [x] Yes    [] No  If yes please list:  left leg weaker     Orthopedic Limitations  *If patient has orthopedic issue:   Actions/  accomodations needed to make Cresencio successful : NuStep only Orthopedic Limitations   Orthopedic Limitations   Orthopedic Limitations Orthopedic Limitations     Fall Risk    [x]Assessed as a fall risk- with fatigue  [] Not a fall risk      [] Balance Issues       [] Walker        [x] [employer]       [] Wheelchair  Actions needed: use cane as needed for balance. [x] Safety issues reviewed      Fall Risk  *If patient is a fall risk, action needed to accommodate:  NuStep only Fall Risk Fall Risk Fall Risk Fall Risk   Home Exercise  [x] Yes    [] No  Type: recumbant bike  Frequency: 4 x per week  Duration: 30 min Home Exercise  [x] Yes    [] No  Type: recumbent bike, [employer]   Frequency:3d/wk  Duration: 60mins Home Exercise  [] Yes    [] No  Type: **  Frequency: **  Duration: ** Home Exercise  [] Yes    [] No  Type: **  Frequency: **  Duration: ** Home Exercise  [] Yes    [] No  Type: **  Frequency: **  Duration: ** Home Exercise  [] Yes    [] No  Type: **  Frequency: **  Duration: **   Angina with Activity? [] Yes    [x] No  Angina Management: na Angina with Activity? [] Yes    [x] No  Angina Management: na Angina with Activity? [] Yes    [] No  Angina Management: ** Angina with Activity? [] Yes    [] No  Angina Management: ** Angina with Activity? [] Yes    [] No  Angina Management: ** Angina with Activity?   [] Yes    [] No  Angina Management: **   EXERCISE PLAN EXERCISE PLAN EXERCISE PLAN EXERCISE PLAN EXERCISE PLAN EXERCISE PLAN   *Interventions* *Interventions* *Interventions* *Interventions* *Interventions* *Interventions*   Exercise Prescription  (per physician & CR staff) Exercise Prescription  (per physician & CR staff) Exercise Prescription  (per physician & CR staff) Exercise Prescription  (per physician & CR staff) Exercise Prescription  (per physician & CR staff) Exercise Prescription  (per physician & CR staff)   Cardiovascular Cardiovascular Cardiovascular Cardiovascular Cardiovascular Cardiovascular   Mode:    [x] Treadmill (TM)  [x] Schwinn Airdyne (AD)  [x] Arms Ergometer (AE)  [] NuStep  [] Elliptical (E) MODE:    [] Treadmill (TM)  [] Schwinn Airdyne (AD)  [x] Arms Ergometer (AE)  [x] NuStep  [] Elliptical (E) MODE:    [] Treadmill (TM)  [] Schwinn Airdyne (AD)  [] Arms Ergometer (AE)  [] NuStep  [] Elliptical (E) MODE: [] Treadmill (TM)  [] Schwinn Airdyne (AD)  [] Arms Ergometer (AE)  [] NuStep  [] Elliptical (E) MODE:    [] Treadmill (TM)  [] Schwinn Airdyne (AD)  [] Arms Ergometer (AE)  [] NuStep  [] Elliptical (E) MODE:    [] Treadmill (TM)  [] Schwinn Airdyne (AD)  [] Arms Ergometer (AE)  [] NuStep  [] Elliptical (E)   Initial Workloads  TM: [email or web address] 1.8 METs  AD: 0.6 level = 1.7 METs  NS: 14  Brizuela= 1.7 METs  AE: 0.3 level = 1.4 METs Current Workloads    NS:   40Watts=  2.2METs  AE:  24W=  1.9METs Current Workloads  TM:  @ %=  METs  AD:  level =  METs  NS:   Brizuela=  METs  AE:  level =  METs Current Workloads  TM:  @ %=  METs  AD:  level =  METs  NS:   Brizuela=  METs  AE:  level =  METs Current Workloads  TM:  @ %=  METs  AD:  level =  METs  NS:   Brizuela=  METs  AE:  level =  METs Current Workloads  TM:  @ %=  METs  AD:  level =  METs  NS:   Brizuela=  METs  AE:  level =  METs     Frequency:    ICR: 3x/week  Home: 2-3x/wk Frequency:   ICR: 3x/week  Home: 3x/wk Frequency:  ICR: 3x/week  Home: 3-4x/wk Frequency:  ICR: 3x/week  Home: 3-4x/wk Frequency:  ICR: 3x/week  Home: 3-4x/wk Frequency:  Cresencio will continue exercise at  5-7 days/week   Duration:   Total aerobic exercise = 30-45 min    5-8 min/mode Duration:  Total aerobic exercises = 35-40 min     15 min/mode Duration:  Total aerobic exercises = ** min     **min/mode Duration:  Total aerobic exercises = ** min     **min/mode Duration:  Total aerobic exercises = ** min     **min/mode Duration:  Total erobic exercise =  60-90 min   Intensity:   MET Level = 1.7-1.8  RPE = 12-15 Intensity:  Max MET Level = 2.2  RPE = 12-15 Intensity:  Max MET Level = **  RPE = 12-15 Intensity:  Max MET Level = **  RPE = 12-15 Intensity:  Max MET Level = **  RPE = 12-15 Intensity:  Max MET Level = ** RPE = 12-15   Progression: increase aerobic activity up to 15 min over next 4 weeks by increasing time 2-3 min/week.  Progression: increase aerobic intensity 5-10% over the next 4 weeks   Progression: Progression: Progression: Progression:  Increase time/intensity when RPE <13, and HR is in [facility]   [x] Yes      [] No  Upper and Lower body strength training 2x/wk    Wt: 2#       Reps:  8-15    *Increase wt. after completing 15 reps with an RPE of <12/13. [x] Yes      [] No  Upper and Lower body strength training 2x/wk    Wt: 3#       Reps:  8-15    *Increase wt. after completing 15 reps with an RPE of <12/13. [] Yes      [] No  Upper and Lower body strength training 2x/wk    Wt: **#       Reps:  8-15    *Increase wt. after completing 15 reps with an RPE of <12/13. [] Yes      [] No  Upper and Lower body strength training 2x/wk    Wt: **#       Reps:  8-15    *Increase wt. after completing 15 reps with an RPE of <12/13. [] Yes      [] No  Upper and Lower body strength training 2x/wk    Wt: **#       Reps:  8-15    *Increase wt. after completing 15 reps with an RPE of <12/13. Continue Strength Training at home   [] Exercise Log & Strength training handout given     Wt: **#       Reps:  8-15    *Increase wt. after completing 15 reps with an RPE of <12/13. Flexibility Flexibility Flexibility Flexibility Flexibility Flexibility   [x] Yes      [] No  25 min session of Core Strength & Flexibility 1x/per week  Attends Core Strength & Flexibility   [x] Yes      [] No Attends Core Strength & Flexibility   [] Yes      [] No Attends Core Strength & Flexibility   [] Yes      [] No Attends Core Strength & Flexibility   [] Yes      [] No Continue Core Strength & Flexibility at home   Home Exercise  *Cresencio verbalizes planning to bike/walk 3-4 days/week for 20-30 min on days not in rehab. Home Exercise  *Cresencio verbalizes planning to bike/walk 3-4days/week for 30-60 min on days not in rehab. Home Exercise  *Cresencio verbalizes planning to ** ** days/week for ** min on days not in rehab.  Home Exercise  *Cresencio verbalizes planning to ** ** days/week for ** min on days not in rehab. Home Exercise  *Cresencio verbalizes planning to ** ** days/week for ** min on days not in rehab. Home Exercise  *Cresencio verbalizes his/her plan to ** ** days/week for ** min @ **   *Education* *Education* *Education* *Education* *Education* *Education*   RPE Scale  [x] Yes      [] No  Exercise Safety  [x] Yes      [] No  Equipment Orientation  [x] Yes      [] No  S/S to Report  [x] Yes      [] No  Warm Up/Cool Down  [x] Yes      [] No  Home Exercise  [x] Yes      [] No     All Exercise Education Completed  [] Yes      [] No    Exercise Education Attended/Date Exercise Education Attended/Date Exercise Education Attended/Date Exercise Education Attended/Date All Sessions Completed? [] Yes  [] No   *Goals* *Goals* *Goals* *Goals* *Goals* *Goals*   Initial Exercise Goals Exercise Goals  Exercise Goals   Exercise Goals  Exercise Goals  Exercise Goals   Cresencio plans to:  [x] Attend exercise sessions 3x/wk  [x] initiate home exercise 2-3x/wk for 10-20 min  [x] Increase 6 min walk distance by 10%  [] Attend Exercise workshops Cresencio plans to:  [x] Attend exercise sessions 3x/wk  [x] continue home exercise 2-3x/wk for 20-30 min  [x] Attend Exercise workshops Cresencio's plans to:  [x] Attend exercise sessions 3x/wk  [x] continue home exercise 3-4x/wk for 30-45 min  [x] Determine plan of exercise following rehab  [x] Attend Exercise workshops Cresencio's plans to:  [x] Attend exercise sessions 3x/wk  [x] continue home exercise 3-4x/wk for 30-45 min  [x] Determine plan of exercise following rehab  [x] Attend Exercise workshops Cresencio's plans to:  [x] Attend exercise sessions 3x/wk  [x] continue home exercise 3-4x/wk for 30-45 min  [x] Determine plan of exercise following rehab  [x] Attend Exercise workshops Cresencio achieved exercise goals?     []  Yes    [] No  If no, why?  **  [] Increased 6 min walk distance by 10%  [] Currently exercising 30-60 min/day, 5-7days/wk   [] Plans to continue exercise on own  [] Plans to join a local fitness center to continue exercise  [] Does not plan to continue to exercise after rehab   Mutually agreed upon goals:  [place], travel, household chores Progress towards mutually agreed upon goals:  Yulissa Ewing  continues to work on his strength and endurance to be able to return to his goals Progress towards mutually agreed upon goals:  Cresencio  ** Progress towards mutually agreed upon goals:  Cresencio ** Progress towards mutually agreed upon goals:  Cresencio  ** Progress towards mutually agreed upon goals:  Cresencio  **    *MET level required for above goal:  5-6 METs MET level Achieved:  2. 2METs MET level Achieved:  **METs MET level Achieved:  **METs MET level Achieved:  **METs MET level Achieved:  **METs     Individual Cardiac Treatment Plan - Nutrition  NUTRITION  ASSESSMENT/PLAN NUTRITION  REASSESSMENT NUTRITION   REASSESSMENT NUTRITION   REASSESSMENT NUTRITION  DISCHARGE/FOLLOW-UP NUTRITION  DISCHARGE/FOLLOW-UP   Stages of Change Stages of Change Stages of Change Stages of Change Stages of Change Stages of Change   [] Pre Contemplation  [x] Contemplation  [] Preparation  [] Action  [] Maintenance  [] Relapse [] Pre Contemplation  [x] Contemplation  [] Preparation  [] Action  [] Maintenance  [] Relapse [] Pre Contemplation  [] Contemplation  [] Preparation  [] Action  [] Maintenance  [] Relapse [] Pre Contemplation  [] Contemplation  [] Preparation  [] Action  [] Maintenance  [] Relapse [] Pre Contemplation  [] Contemplation  [] Preparation  [] Action  [] Maintenance  [] Relapse [] Pre Contemplation  [] Contemplation  [] Preparation  [] Action  [] Maintenance  [] Relapse   NUTRITION ASSESSMENT NUTRITION ASSESSMENT NUTRITION ASSESSMENT NUTRITION ASSESSMENT NUTRITION ASSESSMENT NUTRITION ASSESSMENT   Weight Management  Weight: 234        Height: 69\"   BMI: 34.6  Weight Management  Weight: 232                 Weight Management  Weight: **                  Weight Management  Weight: ** Weight Management  Weight: ** Weight Management  Weight: **                    BMI: **   Eating Plan  Current eating habits: heart healthy, no salt Eating Plan  Changes: none Eating Plan  Changes: Eating Plan  Changes: Eating Plan  Changes: Eating Plan Improvements:   Alcohol Use  [] none          [] daily  [] weekly      [x] special   Type: beer  Amount: 1        Diet Assessment Tool:  RATE YOUR PLATE  *Given to patient to complete and return. Diet Assessment Tool:    Score: 50/72        Diet Assessment Tool: RATE YOUR PLATE  Score: **/16   NUTRITION PLAN NUTRITION PLAN NUTRITION PLAN NUTRITION PLAN NUTRITION PLAN NUTRITION PLAN   *Interventions* *Interventions* *Interventions* *Interventions* *Interventions* *Interventions*   Initial Survey given Goal Setting Discussion:   [x] Yes      [] No        Follow Up Survey Reviewed & Goals Updated:     Professional Referral  Please check if needed. [] Dietitian Consult   [] Wt. Management Referral  [] Other:  Professional Referral  Please check if needed. [] Dietitian Consult   [] Wt. Management Referral  [] Other: Professional Referral  Please check if needed. [] Dietitian Consult   [] Wt. Management Referral  [] Other: Professional Referral  Please check if needed. [] Dietitian Consult   [] Wt. Management Referral  [] Other: Professional Referral  Please check if needed. [] Dietitian Consult   [] Wt. Management Referral  [] Other: Professional Referral  Please check if needed. [] Dietitian Consult   [] Wt. Management Referral  [] Other:    *Education* *Education* *Education* *Education* *Education*    Nutritional Education Attended/Date Nutritional Education Attended/Date Nutritional Education Attended/Date Nutritional Education Attended/Date All Sessions Completed?     [] Yes  [] No   Cooking School  Recommended     [x] Adding Flavor  [x] Fast & Healthy     Breakfasts  [x] Salads & Dressings  [x] Savory Soups  [x] Simple Sides & Sauces  [x] Appetizers &     Snacks  [x] Delicious Desserts  [x] Plant-Based Proteins  [x] Fast Evening Meals  [x] Weekend Breakfasts  [x] Efficiency Cooking  [x] One-Pot [employer] Completed    Patient does not attend cooking sessions, switched to [employer] Completed [address]  Sessions Completed     [address]  Sessions Completed Cooking School    # of sessions completed:  **   *Goals* *Goals* *Goals* *Goals* *Goals* *Goals*   Cresencio's nutritional goals are as follows:  Complete and return diet survey Cresencio's nutritional goals are as follows:  [] Attend Nutrition Workshops  [] Attend 1:1   [] Attend Cooking Classes  [] ** Cresencio's nutritional goals are as follows:  [] Attend Nutrition Workshops  [] Attend 1:1   [] Attend Cooking Classes  [] Complete and return diet survey  [] ** Cresencio's nutritional goals are as follows:  [] Attend Nutrition Workshops  [] Attend 1:1   [] Attend Cooking Classes  [] ** Cresencio's nutritional goals are as follows:  [] Attend Nutrition Workshops  [] Attend 1:1   [] Attend Cooking Classes  [] ** Cresencio achieved nutritional goals   [] Yes    [] No  If no, why?   Use knowledge gained to continue Pritikin eating plan at home       Individual Cardiac Treatment Plan - Psychosocial  PSYCHOSOCIAL  ASSESSMENT/PLAN PSYCHOSOCIAL  REASSESSMENT PSYCHOSOCIAL   REASSESSMENT PSYCHOSOCIAL   REASSESSMENT PSYCHOSOCIAL  DISCHARGE/FOLLOW-UP PSYCHOSOCIAL  DISCHARGE/FOLLOW-UP   Stages of Change Stages of Change Stages of Change Stages of Change Stages of Change Stages of Change   [] Pre Contemplation  [x] Contemplation  [] Preparation  [] Action  [] Maintenance  [] Relapse [] Pre Contemplation  [x] Contemplation  [] Preparation  [] Action  [] Maintenance  [] Relapse [] Pre Contemplation  [] Contemplation  [] Preparation  [] Action  [] Maintenance  [] Relapse [] Pre Contemplation  [] Contemplation  [] Preparation  [] Action  [] Maintenance  [] Relapse [] Pre Contemplation  [] Contemplation  [] Preparation  [] Action  [] Maintenance  [] Relapse [] Pre Contemplation  [] Contemplation  [] Preparation  [] Action  [] Maintenance  [] Relapse   PSYCHOSOCIAL ASSESSMENT PSYCHOSOCIAL ASSESSMENT PSYCHOSOCIAL ASSESSMENT PSYCHOSOCIAL ASSESSMENT PSYCHOSOCIAL ASSESSMENT PSYCHOSOCIAL ASSESSMENT   Behavioral Outcomes Behavioral Outcomes Behavioral Outcomes Behavioral Outcomes Behavioral Outcomes Behavioral Outcomes   PHQ-9 score 3  Depression Severity  [x]Minimal  []Mild   []Moderate  []Moderately Severe  []Severe    PHQ-9 score **  Depression Severity  []Minimal  []Mild   []Moderate  []Moderately Severe []Severe PHQ-9 score **  Depression Severity  []Minimal  []Mild   []Moderate  []Moderately Severe []Severe   Does patient have Family Support? [x] Yes      [] No  No signs of marital/family distress        Within the Past Month:  *Have you wished you were dead or wished you could go to sleep and not wake up? [] Yes      [x] No  *Have you had any thoughts of killing yourself? [] Yes      [x] No          Using a scale of 0-10, 0=none, 10=very:   Rate your depression: 0  Rate your anxiety:  0  Using a scale of 0-10, 0=none, 10=very:   Rate your depression: 0  Rate your anxiety:  0 Using a scale of 0-10, 0=none, 10=very:   Rate your depression: **  Rate your anxiety:  ** Using a scale of 0-10, 0=none, 10=very:   Rate your depression: **  Rate your anxiety:  ** Using a scale of 0-10, 0=none, 10=very:   Rate your depression: **  Rate your anxiety:  ** Using a scale of 0-10, 0=none, 10=very:   Rate your depression: **  Rate your anxiety:  **   Signs and Symptoms of Depression Present? [] Yes      [x] No   Signs and Symptoms of Depression Present? [] Yes      [x] No   Signs and Symptoms of Depression Present? [] Yes      [] No  If yes, please explain:  ** Signs and Symptoms of Depression Present? [] Yes      [] No  If yes, please explain:  ** Signs and Symptoms of Depression Present?     [] Yes [] No  If yes, please explain:  ** Signs and Symptoms of Depression Present? [] Yes      [] No  If yes, please explain:  **   Signs and Symptoms of Anxiety Present? [] Yes      [x] No   Signs and Symptoms of Anxiety Present? [] Yes      [] No Signs and Symptoms of Anxiety Present? [] Yes      [] No  If yes, please explain:  ** Signs and Symptoms of Anxiety Present? [] Yes      [] No  If yes, please explain:  ** Signs and Symptoms of Anxiety Present? [] Yes      [] No  If yes, please explain:  ** Signs and Symptoms of Anxiety Present? [] Yes      [] No  If yes, please explain:  **   [] Patient has poor eye contact   [] Flat affect present. [] Signs of anxiety, anger or hostility    [] Signs social isolation present. []  Signs of alcohol or substance abuse        PSYCHOSOCIAL PLAN PSYCHOSOCIAL PLAN PSYCHOSOCIAL PLAN PSYCHOSOCIAL PLAN PSYCHOSOCIAL PLAN PSYCHOSOCIAL PLAN   *Interventions* *Interventions* *Interventions* *Interventions* *Interventions* *Interventions*   *Please check if needed  [] Psych Consult  [] Physician Referral  [x] Stress Management Skills *Please check if needed  [] Psych Consult  [] Physician Referral  [x] Stress Management Skills *Please check if needed  [] Psych Consult  [] Physician Referral  [] Stress Management Skills *Please check if needed  [] Psych Consult  [] Physician Referral  [] Stress Management Skills *Please check if needed  [] Psych Consult  [] Physician Referral  [] Stress Management Skills *Please check if needed  [] Psych Consult  [] Physician Referral  [] Stress Management Skills   Is patient currently taking anti-depressant or anti-anxiety medications? [x] Yes      [] No  If yes, please list medications:  amitriptyline Change in anti-depressant or anti-anxiety medications? [] Yes      [x] No   Change in anti-depressant or anti-anxiety medications?   [] Yes      [] No  If yes, please list medications:  ** Change in anti-depressant or anti-anxiety medications? [] Yes      [] No  If yes, please list medications:  ** Change in anti-depressant or anti-anxiety medications? [] Yes      [] No  If yes, please list medications:  ** Change in anti-depressant or anti-anxiety medications? [] Yes      [] No  If yes, please list medications:  **   *Education* *Education* *Education* *Education* *Education* *Education*   Healthy Mind-Set Workshops Recommended  [x] Stress & Health  [x] Taking Charge of Stress  [x] New Thoughts, New Behaviors  [x] Managing Moods & Relationships Healthy Mind-Set Workshops Attended/Date    Patient does not attend workshops, switched to [employer] Workshops Attended/Date Healthy Mind-Set Workshops Attended/Date [employer]  Completed  [] Yes      [] No Healthy Mind-Set Workshops  Completed  [] Yes      [] No   *Goals* *Goals* *Goals* *Goals* *Goals* *Goals*   Cresencio's psychosocial goals are as follows:  Complete HADS & Rolan & Evelio, Quality of Life Index, Cardiac Version IV Cresencio's psychosocial goals are as follows:  [] Attend Healthy Mind-Set Workshops  [] Reduce depression symptom severity by 1 level  [] ** Cresencio's psychosocial goals are as follows:  [] Attend Healthy Mind-Set Workshops  [] Reduce depression symptom severity by 1 level  [] ** Cresencio's psychosocial goals are as follows:  [] Attend Healthy Mind-Set Workshops  [] Reduce depression symptom severity by 1 level  [] ** Cresencio achieved psychosocial goals? [] Yes    [] No  If no, why?  **  [] Use methods learned to continue to reduce depression symptom severity by 1 level  [] ** Cresencio achieved psychosocial goals?   [] Yes    [] No  If no, why?  **  [] Use methods learned to continue to reduce depression symptom severity by 1 level  [] **     Individual Cardiac Treatment Plan - Other:  Risk Factor/Education  RISK FACTOR  ASSESSMENT/PLAN RISK FACTOR  REASSESSMENT  RISK FACTOR  REASSESSMENT RISK FACTOR  REASSESSMENT RISK FACTOR   DISCHARGE/FOLLOW-UP RISK FACTOR   DISCHARGE/FOLLOW-UP   Stages of Change Stages of Change Stages of Change Stages of Change Stages of Change Stages of Change   [] Pre Contemplation  [x] Contemplation  [] Preparation  [] Action  [] Maintenance  [] Relapse [] Pre Contemplation  [x] Contemplation  [] Preparation  [] Action  [] Maintenance  [] Relapse [] Pre Contemplation  [] Contemplation  [] Preparation  [] Action  [] Maintenance  [] Relapse [] Pre Contemplation  [] Contemplation  [] Preparation  [] Action  [] Maintenance  [] Relapse [] Pre Contemplation  [] Contemplation  [] Preparation  [] Action  [] Maintenance  [] Relapse [] Pre Contemplation  [] Contemplation  [] Preparation  [] Action  [] Maintenance  [] Relapse   RISK FACTOR/EDUCATION ASSESSMENT RISK FACTOR/EDUCATION ASSESSMENT RISK FACTOR/EDUCATION ASSESSMENT RISK FACTOR/EDUCATION ASSESSMENT RISK FACTOR /EDUCATION ASSESSMENT RISK FACTOR /EDUCATION ASSESSMENT   Hypertension  [x] Yes      [] No    Resting BP: 144/82  Peak Ex BP:152/64  Medication: hydralazine, valsartan  Metoprolol, amlodipine   Hypertension  Resting BP: 134/66  Peak Ex BP:164/72  Medication Changes:  [] Yes      [x] No Hypertension  Resting BP: **  Peak Ex BP:**  Medication Changes:  [] Yes      [] No Hypertension  Resting BP: **  Peak Ex BP:**  Medication Changes:  [] Yes      [] No Hypertension  Resting BP: **  Peak Ex BP:**  Medication Changes:  [] Yes      [] No Hypertension  Resting BP: **  Peak Ex BP:**  Medication Changes:  [] Yes      [] No   Lipids  HLD/DLD  [x] Yes      [] No  TOTAL CHOL: 200  HDL:  102  LDL:  86  TRI  Medication: pravastatin, atorvastatin Lipids  Medication Changes:  [] Yes      [x] No     Lipids  Medication Changes:  [] Yes      [] No     Lipids  Medication Changes:  [] Yes      [] No     Lipids    TOTAL CHOL: **  HDL:  **  LDL:  **  TRIG:  **  Medication Changes:  [] Yes      [] No Lipids    TOTAL CHOL: **  HDL:  **  LDL:  **  TRIG:  **  Medication Changes:  [] Yes      [] No Diabetes  [] Yes      [x] No   Diabetes  NA   Diabetes  Most Recent BS:  BS have been in range  [] Yes      [] No  Medication Changes  [] Yes      [] No     Diabetes  Most Recent BS:  BS have been in range  [] Yes      [] No  Medication Changes  [] Yes      [] No     Diabetes  Most Recent BS:  BS have been in range  [] Yes      [] No  Medication Changes  [] Yes      [] No Diabetes  Most Recent BS:  BS have been in range  [] Yes      [] No  Medication Changes  [] Yes      [] No       Tobacco Use  [] Current  [] Former  [x] Never      Smokeless Tobacco use:   [] Yes      [x] No   Tobacco Use  NA   Tobacco Use  Change in smoking status   [] Yes      [] No    Quit date: **   Tobacco Use  Change in smoking status   [] Yes      [] No    Quit date: ** Tobacco Use  Change in smoking status   [] Yes      [] No    Quit date: ** Tobacco Use  Change in smoking status   [] Yes      [] No    Quit date: **             Learning Barriers  Please select one:  [] Speech  [] Literacy  [] Hearing  [] Cognitive  [x] Vision  [x] Ready to Learn Learning Barriers Addressed:   [x] Yes      [] No   Learning Barriers Addressed:   [] Yes      [] No   Learning Barriers Addressed:  [] Yes      [] No Learning Barriers Addressed:  [] Yes      [] No Learning Barriers Addressed:  [] Yes      [] No     RISK FACTOR/EDUCATION PLAN RISK FACTOR/EDUCATION PLAN RISK FACTOR/EDUCATION PLAN RISK FACTOR/EDUCATION PLAN RISK FACTOR/EDUCATION PLAN RISK FACTOR/EDUCATION PLAN     *Interventions* *Interventions* *Interventions* *Interventions*     Completed Videos/Date Completed Videos/Date Completed Videos/Date Recommended Educational Videos Completed    [] Yes      [] No    **If not completed, Why? **           Smoking Cessation/Relaspe Prevention Intervention needed? [] Yes      [x] No   Smoking Cessation/Relapse Prevention Scheduled? NA Smoking Cessation/Relapse Prevention completed?    [] Yes      [] No  Date: ** Smoking Cessation/Relapse Prevention completed? [] Yes      [] No  Date: ** Smoking Cessation/Relapse Prevention completed? [] Yes      [] No  Date: ** Smoking Cessation Counseling attended  [] Yes      [] No  **If not completed, Why? **   Professional Referrals:  *Please check if needed  [] Diabetes Clinic  [] Lipid Clinic   [] Other:      Professional Referrals:  *Please check if needed  [] Diabetes Clinic  [] Lipid Clinic   [] Other:   Preventative Medication Preventative Medication Preventative Medication Preventative Medication Preventative Medication Preventative Medication   Aspirin  [] Yes    [x] No  Blood Thinner: Clopidogrel/Effient/Brillinta  [] Yes    [x] No  Beta Blocker  [x] Yes    [] No  Ace Inhibitor  [] Yes    [x] No  Statin/Lipid Lowering  [x] Yes    [] No Medication Changes? [] Yes    [x] No Medication Changes? [] Yes    [] No Medication Changes? [] Yes    [] No Medication Changes? [] Yes    [] No Medication Changes? [] Yes    [] No   *Education* *Education* *Education* *Education* *Education* *Education*   Does Cresencio require any additional education? [] Yes    [x] No   Does Cresencio require any additional education? [] Yes    [x] No Does Cresencio require any additional education? [] Yes    [] No Does Cresencio require any additional education? [] Yes    [] No Does Cresencio require any additional education? [] Yes    [] No Does Cresencio require any additional education?   [] Yes    [] No   Additional Educational Videos    Exercise  [] Improve Performance    Medical  [] Aging Enhancing Your QoL  [] Biology of Weight Control  [] Decoding Lab Results  [] Diseases of Our Time - Overview  [] Sleep Disorders    Nutrition  [] Dining Out - Part 2  [] Fueling a Healthy Body  [] Menu Workshop  [] Planning Your Eating Strategy  [] Targeting Your Nutrition Priorities  [] Vitamins & Minerals    Healthy Mind-Set  [] Smoking Cessation    Culinary  []Becoming a Pritikin    [] Cooking - Breakfast & Snacks  [] Cooking -Healhty Salads & Dressing  [] Cooking -Dinner & Sides  [] Cooking -Soups & Desserts    Overview  [] The Pritikin Solution Additional Educational Videos Completed Additional Educational Videos Completed Additional Educational Videos Completed Additional Educational Videos Completed Additional Educational Videos Completed    [] Yes    [] No   *Goals* *Goals* *Goals* *Goals* *Goals* *Goals*   Cresencio's risk factor/education goals are as follows:    [x] Optimal BP <140/90  [] Blood Sugar <120  [x] Attend recommended video education sessions  [x] Takes medications as prescribed 100% of the time   [] ** Cresencio's risk factor/education goals are as follows:    [x] Optimal BP <140/90  [] Blood Sugar <120  [x] Attend recommended video education sessions  [x] Takes medications as prescribed 100% of the time   [] ** Cresencio's risk factor/education goals are as follows:    [x] Optimal BP <140/90  [] Blood Sugar <120  [x] Attend recommended video education sessions  [x] Takes medications as prescribed 100% of the time   [] ** Cresencio's risk factor/education goals are as follows:    [x] Optimal BP <140/90  [] Blood Sugar <120  [x] Attend recommended video education sessions  [x] Takes medications as prescribed 100% of the time   [] ** Cresencio's risk factor/education goals are as follows:    [x] Optimal BP <140/90  [] Blood Sugar <120  [x] Attend recommended video education sessions  [x] Takes medications as prescribed 100% of the time   [] ** Cresencio achieved risk factor goals?   [] Yes    [] No  If no, why?  **     Monitored telemetry has revealed NSR   Monitored telemetry has revealed NSR Monitored telemetry has revealed  [] documented arrhythmia at increasing workloads  [] associated symptoms ** Monitored telemetry has revealed **  [] documented arrhythmia at increasing workloads  [] associated symptoms ** Monitored telemetry has revealed **  [] documented arrhythmia at increasing workloads  [] associated symptoms ** Monitored telemetry has revealed **  [] documented arrhythmia at increasing workloads  [] associated symptoms **   Physician Response    [x] Cardiac rehab is reasonably and medically necessary for continuous cardiac monitoring surveillance  of patient's cardiac activity  [x] Initiate continuous telemetry monitoring and notify me with any concerns  [] Other   Physician Response    [x] Cardiac rehab is reasonably and medically necessary for continuous cardiac monitoring surveillance  of patient's cardiac activity  [x] Continue continuous telemetry monitoring and notify me with any concerns  [] Other     Physician Response    [x] Cardiac rehab is reasonably and medically necessary for continuous cardiac monitoring surveillance  of patient's cardiac activity  [x] Continue continuous telemetry monitoring and notify me with any concerns   [] Other     Physician Response    [x] Cardiac rehab is reasonably and medically necessary for continuous cardiac monitoring surveillance  of patient's cardiac activity  [x] Continue continuous telemetry monitoring and notify me with any concerns   [] Other     Physician Response    [x] Cardiac rehab is reasonably and medically necessary for continuous cardiac monitoring surveillance  of patient's cardiac activity  [x] Continue continuous telemetry monitoring and notify me with any concerns   [] Other       Cosigned by: Sterling Phillips MD at 9/13/2022  1:29 PM     Revision History  Date/Time User Provider Type Action   9/13/2022  1:29 PM Sterling Phillips MD Physician Cosign   9/13/2022 12:11 PM Cassie Rossi PT Exercise Physiologist Sign

## 2022-10-12 ENCOUNTER — HOSPITAL ENCOUNTER (OUTPATIENT)
Dept: CARDIAC REHAB | Age: 62
Setting detail: THERAPIES SERIES
Discharge: HOME | End: 2022-10-12
Payer: MEDICARE

## 2022-10-12 PROCEDURE — 93798 PHYS/QHP OP CAR RHAB W/ECG: CPT

## 2022-10-14 ENCOUNTER — HOSPITAL ENCOUNTER (OUTPATIENT)
Dept: CARDIAC REHAB | Age: 62
Setting detail: THERAPIES SERIES
Discharge: HOME | End: 2022-10-14
Payer: MEDICARE

## 2022-10-14 PROCEDURE — 93798 PHYS/QHP OP CAR RHAB W/ECG: CPT

## 2022-10-17 ENCOUNTER — HOSPITAL ENCOUNTER (OUTPATIENT)
Dept: CARDIAC REHAB | Age: 62
Setting detail: THERAPIES SERIES
Discharge: HOME | End: 2022-10-17
Payer: MEDICARE

## 2022-10-17 PROCEDURE — 93798 PHYS/QHP OP CAR RHAB W/ECG: CPT

## 2022-10-17 NOTE — PROGRESS NOTES
[facility]  Phase 2 Cardiac Rehab Weekly Progress Report      Patient prescribed exercise:  8:00 class. 2-3 times per week in rehab, 1-4 times per week at home for the amount of sessions/weeks specified by insurance. Current Levels:  NuStep:  44 Pollack for 15 minutes x2, UBE: 24 pollack for 5 minutes. Progression Discussion: Maintain/Increase Aerobic exercise 15 minutes to work on endurance. Attempt to increase intensity by 5-20% for each modality this week. Try to increase intensities until Cresencio rates the exercises a 13-17 on Chong RPE.

## 2022-10-19 ENCOUNTER — HOSPITAL ENCOUNTER (OUTPATIENT)
Dept: CARDIAC REHAB | Age: 62
Setting detail: THERAPIES SERIES
End: 2022-10-19
Payer: COMMERCIAL

## 2022-10-21 ENCOUNTER — HOSPITAL ENCOUNTER (OUTPATIENT)
Dept: CARDIAC REHAB | Age: 62
Setting detail: THERAPIES SERIES
Discharge: HOME | End: 2022-10-21
Payer: MEDICARE

## 2022-10-21 PROCEDURE — 93798 PHYS/QHP OP CAR RHAB W/ECG: CPT

## 2022-10-24 ENCOUNTER — HOSPITAL ENCOUNTER (OUTPATIENT)
Dept: CARDIAC REHAB | Age: 62
Setting detail: THERAPIES SERIES
Discharge: HOME | End: 2022-10-24
Payer: COMMERCIAL

## 2022-10-24 PROCEDURE — 93798 PHYS/QHP OP CAR RHAB W/ECG: CPT

## 2022-10-26 ENCOUNTER — HOSPITAL ENCOUNTER (OUTPATIENT)
Dept: CARDIAC REHAB | Age: 62
Setting detail: THERAPIES SERIES
Discharge: HOME | End: 2022-10-26
Payer: COMMERCIAL

## 2022-10-26 PROCEDURE — 93798 PHYS/QHP OP CAR RHAB W/ECG: CPT

## 2022-10-28 ENCOUNTER — HOSPITAL ENCOUNTER (OUTPATIENT)
Dept: CARDIAC REHAB | Age: 62
Setting detail: THERAPIES SERIES
Discharge: HOME | End: 2022-10-28
Payer: COMMERCIAL

## 2022-10-28 PROCEDURE — 93798 PHYS/QHP OP CAR RHAB W/ECG: CPT

## 2022-10-31 ENCOUNTER — HOSPITAL ENCOUNTER (OUTPATIENT)
Dept: CARDIAC REHAB | Age: 62
Setting detail: THERAPIES SERIES
Discharge: HOME | End: 2022-10-31
Payer: COMMERCIAL

## 2022-10-31 PROCEDURE — 93798 PHYS/QHP OP CAR RHAB W/ECG: CPT

## 2022-11-02 ENCOUNTER — HOSPITAL ENCOUNTER (OUTPATIENT)
Dept: CARDIAC REHAB | Age: 62
Setting detail: THERAPIES SERIES
Discharge: HOME | End: 2022-11-02
Payer: MEDICARE

## 2022-11-02 PROCEDURE — 93798 PHYS/QHP OP CAR RHAB W/ECG: CPT

## 2022-11-04 ENCOUNTER — HOSPITAL ENCOUNTER (OUTPATIENT)
Dept: CARDIAC REHAB | Age: 62
Setting detail: THERAPIES SERIES
Discharge: HOME | End: 2022-11-04
Payer: MEDICARE

## 2022-11-04 PROCEDURE — 93798 PHYS/QHP OP CAR RHAB W/ECG: CPT

## 2022-11-07 ENCOUNTER — HOSPITAL ENCOUNTER (OUTPATIENT)
Dept: CARDIAC REHAB | Age: 62
Setting detail: THERAPIES SERIES
Discharge: HOME | End: 2022-11-07
Payer: MEDICARE

## 2022-11-07 PROCEDURE — 93798 PHYS/QHP OP CAR RHAB W/ECG: CPT

## 2022-11-07 NOTE — PLAN OF CARE
[address] Cardiac Rehab Program - [facility]  Individualized Cardiac Treatment Plan    Patient Name:  Rose Cameron  :  1960  Age:  57 y.o. MRN:  [id number]  Diagnosis: Heart Transplant  Date of Event: 22   Physician:  Haley WALLS  Next Office Visit:  10/5/22  Date Entered Program: 22  Risk Stratifications: [] Low [] Intermediate [x] High  Allergies: Allergies   Allergen Reactions    Entresto [Sacubitril-Valsartan] Dizziness or Vertigo       COVID -19 Screen  Do you have any of the following symptoms:  [] Fever [] Cough [] SOB [] Muscle/Body Ache [] Loss of taste/smell [x] None    Have you traveled outside of the [place]? [] Yes      [x] No    Have you been around anyone who has tested Positive for COVID 19?  [] Yes      [x] No    The following Education has been completed with patient. [x] Wait in the lobby until we call you back to rehab. [x] You must wear a mask while in the medical center, and in cardiac rehab. Please bring your own. [x] Bring your own bottle of water with you.       Individual Cardiac Treatment Plan -EXERCISE  INITIAL 30 DAY 61 DAY 90  DAY FINAL DAY   EXERCISE  ASSESSMENT/PLAN EXERCISE  REASSESSMENT EXERCISE   REASSESSMENT EXERCISE   REASSESSMENT EXERCISE   REASSESSMENT EXERCISE  DISCHARGE/FOLLOW-UP   Date: 22 Date: 10/10/22 Date: 22 Date: Date: Date:   Session #1  First Exercise Session:  ** Session # 10 Session # 21 Session # ** Session # ** Session # **  Last Exercise Session:  **   Stages of Change Stages of Change Stages of Change Stages of Change Stages of Change Stages of Change   [] Pre Contemplation  [] Contemplation  [] Preparation  [x] Action  [] Maintenance  [] Relapse [] Pre Contemplation  [] Contemplation  [] Preparation  [x] Action  [] Maintenance  [] Relapse [] Pre Contemplation  [] Contemplation  [] Preparation  [x] Action  [] Maintenance  [] Relapse [] Pre Contemplation  [] Contemplation  [] Preparation  [] Action  [] Maintenance  [] Relapse [] Pre Contemplation  [] Contemplation  [] Preparation  [] Action  [] Maintenance  [] Relapse [] Pre Contemplation  [] Contemplation  [] Preparation  [] Action  [] Maintenance  [] Relapse   EXERCISE ASSESSMENT EXERCISE ASSESSMENT EXERCISE ASSESSMENT EXERCISE ASSESSMENT EXERCISE ASSESSMENT EXERCISE ASSESSMENT   6 Min Walk Test  Distance walked:   0.09 miles  475 ft.  1.7 METs  Max HR:99 BPM      RPE:  12    THR:  117-130  Rhythm:  NSR     6 Min Walk Test  Distance walked:   ** miles  ** ft  ** METs  Max HR:** BPM      RPE:  **  %Change ft= **    Rhythm:  **   DASI: 5.1 METs DASI: 4.73 METs DASI: 5.1 METs DASI: ** METs DASI: ** METs DASI: ** METs   Return to Work  [employer] on returning to work? [] Yes              [] No   [x] Disabled     [] Retired     Return to work:  Disabled   Return to work:  na Return to work:  Has Cresencio returned to work? [] Yes    [] No    Return to work date set? [] Yes, **    [] No    Cresencio is doing ** at work. Return to work:  Has Rob Thompson returned to work? [] Yes    [] No    Return to work date set? [] Yes, **    [] No    Cresencio is doing ** at work. Return to work:  Has Rob Thompson returned to work? [] Yes    [] No    Return to work? [] Yes, **    [] No    *Required MET Level achieved for job duties? [] Yes    [] No   Orthopedic Limitations/  [x] Yes    [] No  If yes please list:  left leg weaker     Orthopedic Limitations  *If patient has orthopedic issue:   Actions/  accomodations needed to make Cresencio successful : NuStep only Orthopedic Limitations  NuStep only Orthopedic Limitations   Orthopedic Limitations Orthopedic Limitations     Fall Risk    [x]Assessed as a fall risk- with fatigue  [] Not a fall risk      [] Balance Issues       [] Walker        [x] Cane       [] Wheelchair  Actions needed: use cane as needed for balance.   [x] Safety issues reviewed      Fall Risk  *If patient is a fall risk, action needed to accommodate:  NuStep only Fall Risk  na Fall Risk Fall Risk Fall Risk   Home Exercise  [x] Yes    [] No  Type: recumbant bike  Frequency: 4 x per week  Duration: 30 min Home Exercise  [x] Yes    [] No  Type: recumbent bike, [employer]   Frequency:3d/wk  Duration: 60mins Home Exercise  [x] Yes    [] No  Type: works out at [employer]  Frequency: 3 x per week  Duration: 60 min Home Exercise  [] Yes    [] No  Type: **  Frequency: **  Duration: ** Home Exercise  [] Yes    [] No  Type: **  Frequency: **  Duration: ** Home Exercise  [] Yes    [] No  Type: **  Frequency: **  Duration: **   Angina with Activity? [] Yes    [x] No  Angina Management: na Angina with Activity? [] Yes    [x] No  Angina Management: na Angina with Activity? [] Yes    [x] No  Angina Management: na Angina with Activity? [] Yes    [] No  Angina Management: ** Angina with Activity? [] Yes    [] No  Angina Management: ** Angina with Activity?   [] Yes    [] No  Angina Management: **   EXERCISE PLAN EXERCISE PLAN EXERCISE PLAN EXERCISE PLAN EXERCISE PLAN EXERCISE PLAN   *Interventions* *Interventions* *Interventions* *Interventions* *Interventions* *Interventions*   Exercise Prescription  (per physician & CR staff) Exercise Prescription  (per physician & CR staff) Exercise Prescription  (per physician & CR staff) Exercise Prescription  (per physician & CR staff) Exercise Prescription  (per physician & CR staff) Exercise Prescription  (per physician & CR staff)   Cardiovascular Cardiovascular Cardiovascular Cardiovascular Cardiovascular Cardiovascular   Mode:    [x] Treadmill (TM)  [x] Schwinn Airdyne (AD)  [x] Arms Ergometer (AE)  [] NuStep  [] Elliptical (E) MODE:    [] Treadmill (TM)  [] Schwinn Airdyne (AD)  [x] Arms Ergometer (AE)  [x] NuStep  [] Elliptical (E) MODE:    [] Treadmill (TM)  [] Schwinn Airdyne (AD)  [x] Arms Ergometer (AE)  xNuStep  [][] Elliptical (E) MODE:    [] Treadmill (TM)  [] Schwinn Airdyne (AD)  [] Arms Ergometer (AE)  [] NuStep  [] Elliptical (E) MODE:    [] Treadmill (TM)  [] Schwinn Airdyne (AD)  [] Arms Ergometer (AE)  [] NuStep  [] Elliptical (E) MODE:    [] Treadmill (TM)  [] Schwinn Airdyne (AD)  [] Arms Ergometer (AE)  [] NuStep  [] Elliptical (E)   Initial Workloads  TM: [email or web address] 1.8 METs  AD: 0.6 level = 1.7 METs  NS: 14  Brizuela= 1.7 METs  AE: 0.3 level = 1.4 METs Current Workloads    NS:   40Watts=  2.2METs  AE:  24W=  1.9METs Current Workloads  NS:   52 Brizuela=  2.4METs  AE:  24 Brizuela=  METs Current Workloads  TM:  @ %=  METs  AD:  level =  METs  NS:   Brizuela=  METs  AE:  level =  METs Current Workloads  TM:  @ %=  METs  AD:  level =  METs  NS:   Brizuela=  METs  AE:  level =  METs Current Workloads  TM:  @ %=  METs  AD:  level =  METs  NS:   Brizuela=  METs  AE:  level =  METs     Frequency:    ICR: 3x/week  Home: 2-3x/wk Frequency:   ICR: 3x/week  Home: 3x/wk Frequency:  ICR: 3x/week  Home: 3-4x/wk Frequency:  ICR: 3x/week  Home: 3-4x/wk Frequency:  ICR: 3x/week  Home: 3-4x/wk Frequency:  Cresencio will continue exercise at  5-7 days/week   Duration:   Total aerobic exercise = 30-45 min    5-8 min/mode Duration:  Total aerobic exercises = 35-40 min     15 min/mode Duration:  Total aerobic exercises = 35-40 min     15min/mode Duration:  Total aerobic exercises = ** min     **min/mode Duration:  Total aerobic exercises = ** min     **min/mode Duration:  Total erobic exercise =  60-90 min   Intensity:   MET Level = 1.7-1.8  RPE = 12-15 Intensity:  Max MET Level = 2.2  RPE = 12-15 Intensity:  Max MET Level = 2.4  RPE = 12-15 Intensity:  Max MET Level = **  RPE = 12-15 Intensity:  Max MET Level = **  RPE = 12-15 Intensity:  Max MET Level = ** RPE = 12-15   Progression: increase aerobic activity up to 15 min over next 4 weeks by increasing time 2-3 min/week.  Progression: increase aerobic intensity 5-10% over the next 4 weeks   Progression:  Increase intensity 5-10% over the next 4 weeks as tolerated Progression: Progression: Progression:  Increase time/intensity when RPE <13, and HR is in [facility]   [x] Yes      [] No  Upper and Lower body strength training 2x/wk    Wt: 2#       Reps:  8-15    *Increase wt. after completing 15 reps with an RPE of <12/13. [x] Yes      [] No  Upper and Lower body strength training 2x/wk    Wt: 3#       Reps:  8-15    *Increase wt. after completing 15 reps with an RPE of <12/13. [x] Yes      [] No  Upper and Lower body strength training 2x/wk    Wt: 4#       Reps:  8-15    *Increase wt. after completing 15 reps with an RPE of <12/13. [] Yes      [] No  Upper and Lower body strength training 2x/wk    Wt: **#       Reps:  8-15    *Increase wt. after completing 15 reps with an RPE of <12/13. [] Yes      [] No  Upper and Lower body strength training 2x/wk    Wt: **#       Reps:  8-15    *Increase wt. after completing 15 reps with an RPE of <12/13. Continue Strength Training at home   [] Exercise Log & Strength training handout given     Wt: **#       Reps:  8-15    *Increase wt. after completing 15 reps with an RPE of <12/13. Flexibility Flexibility Flexibility Flexibility Flexibility Flexibility   [x] Yes      [] No  25 min session of Core Strength & Flexibility 1x/per week  Attends Core Strength & Flexibility   [x] Yes      [] No Attends Core Strength & Flexibility   [x] Yes      [] No Attends Core Strength & Flexibility   [] Yes      [] No Attends Core Strength & Flexibility   [] Yes      [] No Continue Core Strength & Flexibility at home   Home Exercise  *Cresencio verbalizes planning to bike/walk 3-4 days/week for 20-30 min on days not in rehab. Home Exercise  *Cresencio verbalizes planning to bike/walk 3-4days/week for 30-60 min on days not in rehab. Home Exercise  *Cresencio verbalizes planning to work out 3-4 days/week for 30-60 min on days not in rehab.  Home Exercise  *Cresencio verbalizes planning to ** ** days/week for ** min on days not in rehab. Home Exercise  *Cresencio verbalizes planning to ** ** days/week for ** min on days not in rehab. Home Exercise  *Cresencio verbalizes his/her plan to ** ** days/week for ** min @ **   *Education* *Education* *Education* *Education* *Education* *Education*   RPE Scale  [x] Yes      [] No  Exercise Safety  [x] Yes      [] No  Equipment Orientation  [x] Yes      [] No  S/S to Report  [x] Yes      [] No  Warm Up/Cool Down  [x] Yes      [] No  Home Exercise  [x] Yes      [] No     All Exercise Education Completed  [] Yes      [] No    Exercise Education Attended/Date Exercise Education Attended/Date Exercise Education Attended/Date Exercise Education Attended/Date All Sessions Completed? [] Yes  [] No   *Goals* *Goals* *Goals* *Goals* *Goals* *Goals*   Initial Exercise Goals Exercise Goals  Exercise Goals   Exercise Goals  Exercise Goals  Exercise Goals   Cresencio plans to:  [x] Attend exercise sessions 3x/wk  [x] initiate home exercise 2-3x/wk for 10-20 min  [x] Increase 6 min walk distance by 10%  [] Attend Exercise workshops Cresencio plans to:  [x] Attend exercise sessions 3x/wk  [x] continue home exercise 2-3x/wk for 20-30 min  [x] Attend Exercise workshops Cresencio's plans to:  [x] Attend exercise sessions 3x/wk  [x] continue home exercise 3-4x/wk for 30-45 min  [x] Determine plan of exercise following rehab  [x] Attend Exercise workshops Cresencio's plans to:  [x] Attend exercise sessions 3x/wk  [x] continue home exercise 3-4x/wk for 30-45 min  [x] Determine plan of exercise following rehab  [x] Attend Exercise workshops Cresencio's plans to:  [x] Attend exercise sessions 3x/wk  [x] continue home exercise 3-4x/wk for 30-45 min  [x] Determine plan of exercise following rehab  [x] Attend Exercise workshops Cresencio achieved exercise goals?     []  Yes    [] No  If no, why?  **  [] Increased 6 min walk distance by 10%  [] Currently exercising 30-60 min/day, 5-7days/wk   [] Plans to continue exercise on own  [] Plans to join a local fitness center to continue exercise  [] Does not plan to continue to exercise after rehab   Mutually agreed upon goals:  Laverda Hay, travel, household chores Progress towards mutually agreed upon goals:  Luis M Parsons  continues to work on his strength and endurance to be able to return to his goals Progress towards mutually agreed upon goals:  Luis M Parsons  continues to improve strength and endurance to make his ADL's, yardwork, travel easier. Progress towards mutually agreed upon goals:  Cresencio ** Progress towards mutually agreed upon goals:  Cresencio  ** Progress towards mutually agreed upon goals:  Cresencio  **    *MET level required for above goal:  5-6 METs MET level Achieved:  2. 2METs MET level Achieved:  2.4 METs MET level Achieved:  **METs MET level Achieved:  **METs MET level Achieved:  **METs     Individual Cardiac Treatment Plan - Nutrition  NUTRITION  ASSESSMENT/PLAN NUTRITION  REASSESSMENT NUTRITION   REASSESSMENT NUTRITION   REASSESSMENT NUTRITION  DISCHARGE/FOLLOW-UP NUTRITION  DISCHARGE/FOLLOW-UP   Stages of Change Stages of Change Stages of Change Stages of Change Stages of Change Stages of Change   [] Pre Contemplation  [x] Contemplation  [] Preparation  [] Action  [] Maintenance  [] Relapse [] Pre Contemplation  [x] Contemplation  [] Preparation  [] Action  [] Maintenance  [] Relapse [] Pre Contemplation  [x] Contemplation  [] Preparation  [] Action  [] Maintenance  [] Relapse [] Pre Contemplation  [] Contemplation  [] Preparation  [] Action  [] Maintenance  [] Relapse [] Pre Contemplation  [] Contemplation  [] Preparation  [] Action  [] Maintenance  [] Relapse [] Pre Contemplation  [] Contemplation  [] Preparation  [] Action  [] Maintenance  [] Relapse   NUTRITION ASSESSMENT NUTRITION ASSESSMENT NUTRITION ASSESSMENT NUTRITION ASSESSMENT NUTRITION ASSESSMENT NUTRITION ASSESSMENT   Weight Management  Weight: 234        Height: 69\"   BMI: 34.6  Weight Management  Weight: 232 Weight Management  Weight: 233                 Weight Management  Weight: ** Weight Management  Weight: ** Weight Management  Weight: **                    BMI: **   Eating Plan  Current eating habits: heart healthy, no salt Eating Plan  Changes: none Eating Plan  Changes: none reported Eating Plan  Changes: Eating Plan  Changes: Eating Plan Improvements:   Alcohol Use  [] none          [] daily  [] weekly      [x] special   Type: beer  Amount: 1        Diet Assessment Tool:  RATE YOUR PLATE  *Given to patient to complete and return. Diet Assessment Tool:    Score: 50/72        Diet Assessment Tool: RATE YOUR PLATE  Score: **/06   NUTRITION PLAN NUTRITION PLAN NUTRITION PLAN NUTRITION PLAN NUTRITION PLAN NUTRITION PLAN   *Interventions* *Interventions* *Interventions* *Interventions* *Interventions* *Interventions*   Initial Survey given Goal Setting Discussion:   [x] Yes      [] No        Follow Up Survey Reviewed & Goals Updated:     Professional Referral  Please check if needed. [] Dietitian Consult   [] Wt. Management Referral  [] Other:  Professional Referral  Please check if needed. [] Dietitian Consult   [] Wt. Management Referral  [] Other: Professional Referral  Please check if needed. [] Dietitian Consult   [] Wt. Management Referral  [] Other: Professional Referral  Please check if needed. [] Dietitian Consult   [] Wt. Management Referral  [] Other: Professional Referral  Please check if needed. [] Dietitian Consult   [] Wt. Management Referral  [] Other: Professional Referral  Please check if needed. [] Dietitian Consult   [] Wt. Management Referral  [] Other:    *Education* *Education* *Education* *Education* *Education*    Nutritional Education Attended/Date Nutritional Education Attended/Date Nutritional Education Attended/Date Nutritional Education Attended/Date All Sessions Completed?     [] Yes  [] No   Cooking School  Recommended     [x] Adding Flavor  [x] Fast & Healthy Contemplation  [] Preparation  [] Action  [] Maintenance  [] Relapse [] Pre Contemplation  [] Contemplation  [] Preparation  [] Action  [] Maintenance  [] Relapse [] Pre Contemplation  [] Contemplation  [] Preparation  [] Action  [] Maintenance  [] Relapse   PSYCHOSOCIAL ASSESSMENT PSYCHOSOCIAL ASSESSMENT PSYCHOSOCIAL ASSESSMENT PSYCHOSOCIAL ASSESSMENT PSYCHOSOCIAL ASSESSMENT PSYCHOSOCIAL ASSESSMENT   Behavioral Outcomes Behavioral Outcomes Behavioral Outcomes Behavioral Outcomes Behavioral Outcomes Behavioral Outcomes   PHQ-9 score 3  Depression Severity  [x]Minimal  []Mild   []Moderate  []Moderately Severe  []Severe    PHQ-9 score **  Depression Severity  []Minimal  []Mild   []Moderate  []Moderately Severe []Severe PHQ-9 score **  Depression Severity  []Minimal  []Mild   []Moderate  []Moderately Severe []Severe   Does patient have Family Support? [x] Yes      [] No  No signs of marital/family distress        Within the Past Month:  *Have you wished you were dead or wished you could go to sleep and not wake up? [] Yes      [x] No  *Have you had any thoughts of killing yourself? [] Yes      [x] No          Using a scale of 0-10, 0=none, 10=very:   Rate your depression: 0  Rate your anxiety:  0  Using a scale of 0-10, 0=none, 10=very:   Rate your depression: 0  Rate your anxiety:  0 Using a scale of 0-10, 0=none, 10=very:   Rate your depression: 0  Rate your anxiety:  0 Using a scale of 0-10, 0=none, 10=very:   Rate your depression: **  Rate your anxiety:  ** Using a scale of 0-10, 0=none, 10=very:   Rate your depression: **  Rate your anxiety:  ** Using a scale of 0-10, 0=none, 10=very:   Rate your depression: **  Rate your anxiety:  **   Signs and Symptoms of Depression Present? [] Yes      [x] No   Signs and Symptoms of Depression Present? [] Yes      [x] No   Signs and Symptoms of Depression Present? [] Yes      [x] No  If yes, please explain:  ** Signs and Symptoms of Depression Present?     [] Yes [] No  If yes, please explain:  ** Signs and Symptoms of Depression Present? [] Yes      [] No  If yes, please explain:  ** Signs and Symptoms of Depression Present? [] Yes      [] No  If yes, please explain:  **   Signs and Symptoms of Anxiety Present? [] Yes      [x] No   Signs and Symptoms of Anxiety Present? [] Yes      [] No Signs and Symptoms of Anxiety Present? [] Yes      [x] No  If yes, please explain:  ** Signs and Symptoms of Anxiety Present? [] Yes      [] No  If yes, please explain:  ** Signs and Symptoms of Anxiety Present? [] Yes      [] No  If yes, please explain:  ** Signs and Symptoms of Anxiety Present? [] Yes      [] No  If yes, please explain:  **   [] Patient has poor eye contact   [] Flat affect present. [] Signs of anxiety, anger or hostility    [] Signs social isolation present. []  Signs of alcohol or substance abuse        PSYCHOSOCIAL PLAN PSYCHOSOCIAL PLAN PSYCHOSOCIAL PLAN PSYCHOSOCIAL PLAN PSYCHOSOCIAL PLAN PSYCHOSOCIAL PLAN   *Interventions* *Interventions* *Interventions* *Interventions* *Interventions* *Interventions*   *Please check if needed  [] Psych Consult  [] Physician Referral  [x] Stress Management Skills *Please check if needed  [] Psych Consult  [] Physician Referral  [x] Stress Management Skills *Please check if needed  [] Psych Consult  [] Physician Referral  [x] Stress Management Skills *Please check if needed  [] Psych Consult  [] Physician Referral  [] Stress Management Skills *Please check if needed  [] Psych Consult  [] Physician Referral  [] Stress Management Skills *Please check if needed  [] Psych Consult  [] Physician Referral  [] Stress Management Skills   Is patient currently taking anti-depressant or anti-anxiety medications? [x] Yes      [] No  If yes, please list medications:  amitriptyline Change in anti-depressant or anti-anxiety medications? [] Yes      [x] No   Change in anti-depressant or anti-anxiety medications?   [] Yes [x] No  If yes, please list medications:  ** Change in anti-depressant or anti-anxiety medications? [] Yes      [] No  If yes, please list medications:  ** Change in anti-depressant or anti-anxiety medications? [] Yes      [] No  If yes, please list medications:  ** Change in anti-depressant or anti-anxiety medications? [] Yes      [] No  If yes, please list medications:  **   *Education* *Education* *Education* *Education* *Education* *Education*   Healthy Mind-Set Workshops Recommended  [x] Stress & Health  [x] Taking Charge of Stress  [x] New Thoughts, New Behaviors  [x] Managing Moods & Relationships Healthy Mind-Set Workshops Attended/Date    Patient does not attend workshops, switched to [employer] Workshops Attended/Date  na Healthy Mind-Set Workshops Attended/Date Healthy Mind-Set Workshops  Completed  [] Yes      [] No Healthy Mind-Set Workshops  Completed  [] Yes      [] No   *Goals* *Goals* *Goals* *Goals* *Goals* *Goals*   Cresencio's psychosocial goals are as follows:  Complete HADS & Rolan & Evelio, Quality of Life Index, Cardiac Version IV Cresencio's psychosocial goals are as follows:  [] Attend Healthy Mind-Set Workshops  [] Reduce depression symptom severity by 1 level  [] ** Cresencio's psychosocial goals are as follows:  [] Attend Healthy Mind-Set Workshops  [x] Reduce depression symptom severity by 1 level  [] ** Cresencio's psychosocial goals are as follows:  [] Attend Healthy Mind-Set Workshops  [] Reduce depression symptom severity by 1 level  [] ** Cresencio achieved psychosocial goals? [] Yes    [] No  If no, why?  **  [] Use methods learned to continue to reduce depression symptom severity by 1 level  [] ** Cresencio achieved psychosocial goals?   [] Yes    [] No  If no, why?  **  [] Use methods learned to continue to reduce depression symptom severity by 1 level  [] **     Individual Cardiac Treatment Plan - Other:  Risk Factor/Education  RISK FACTOR  ASSESSMENT/PLAN RISK FACTOR  REASSESSMENT RISK FACTOR  REASSESSMENT RISK FACTOR  REASSESSMENT RISK FACTOR   DISCHARGE/FOLLOW-UP RISK FACTOR   DISCHARGE/FOLLOW-UP   Stages of Change Stages of Change Stages of Change Stages of Change Stages of Change Stages of Change   [] Pre Contemplation  [x] Contemplation  [] Preparation  [] Action  [] Maintenance  [] Relapse [] Pre Contemplation  [x] Contemplation  [] Preparation  [] Action  [] Maintenance  [] Relapse [] Pre Contemplation  [] Contemplation  [x] Preparation  [] Action  [] Maintenance  [] Relapse [] Pre Contemplation  [] Contemplation  [] Preparation  [] Action  [] Maintenance  [] Relapse [] Pre Contemplation  [] Contemplation  [] Preparation  [] Action  [] Maintenance  [] Relapse [] Pre Contemplation  [] Contemplation  [] Preparation  [] Action  [] Maintenance  [] Relapse   RISK FACTOR/EDUCATION ASSESSMENT RISK FACTOR/EDUCATION ASSESSMENT RISK FACTOR/EDUCATION ASSESSMENT RISK FACTOR/EDUCATION ASSESSMENT RISK FACTOR /EDUCATION ASSESSMENT RISK FACTOR /EDUCATION ASSESSMENT   Hypertension  [x] Yes      [] No    Resting BP: 144/82  Peak Ex BP:152/64  Medication: hydralazine, valsartan  Metoprolol, amlodipine   Hypertension  Resting BP: 134/66  Peak Ex BP:164/72  Medication Changes:  [] Yes      [x] No Hypertension  Resting BP: 140/82  Peak Ex BP: 178/82  Medication Changes:  [] Yes      [x] No Hypertension  Resting BP: **  Peak Ex BP:**  Medication Changes:  [] Yes      [] No Hypertension  Resting BP: **  Peak Ex BP:**  Medication Changes:  [] Yes      [] No Hypertension  Resting BP: **  Peak Ex BP:**  Medication Changes:  [] Yes      [] No   Lipids  HLD/DLD  [x] Yes      [] No  TOTAL CHOL: 200  HDL:  102  LDL:  86  TRI  Medication: pravastatin, atorvastatin Lipids  Medication Changes:  [] Yes      [x] No     Lipids  Medication Changes:  [] Yes      [x] No     Lipids  Medication Changes:  [] Yes      [] No     Lipids    TOTAL CHOL: **  HDL:  **  LDL:  **  TRIG:  **  Medication Changes:  [] Yes      [] No Lipids    TOTAL CHOL: **  HDL:  **  LDL:  **  TRIG:  **  Medication Changes:  [] Yes      [] No   Diabetes  [] Yes      [x] No   Diabetes  NA   Diabetes  na     Diabetes  Most Recent BS:  BS have been in range  [] Yes      [] No  Medication Changes  [] Yes      [] No     Diabetes  Most Recent BS:  BS have been in range  [] Yes      [] No  Medication Changes  [] Yes      [] No Diabetes  Most Recent BS:  BS have been in range  [] Yes      [] No  Medication Changes  [] Yes      [] No       Tobacco Use  [] Current  [] Former  [x] Never      Smokeless Tobacco use:   [] Yes      [x] No   Tobacco Use  NA   Tobacco Use  Change in smoking status   [] Yes      [x] No       Tobacco Use  Change in smoking status   [] Yes      [] No    Quit date: ** Tobacco Use  Change in smoking status   [] Yes      [] No    Quit date: ** Tobacco Use  Change in smoking status   [] Yes      [] No    Quit date: **             Learning Barriers  Please select one:  [] Speech  [] Literacy  [] Hearing  [] Cognitive  [x] Vision  [x] Ready to Learn Learning Barriers Addressed:   [x] Yes      [] No   Learning Barriers Addressed:   [] Yes      [x] No   Learning Barriers Addressed:  [] Yes      [] No Learning Barriers Addressed:  [] Yes      [] No Learning Barriers Addressed:  [] Yes      [] No     RISK FACTOR/EDUCATION PLAN RISK FACTOR/EDUCATION PLAN RISK FACTOR/EDUCATION PLAN RISK FACTOR/EDUCATION PLAN RISK FACTOR/EDUCATION PLAN RISK FACTOR/EDUCATION PLAN     *Interventions* *Interventions* *Interventions* *Interventions*     Completed Videos/Date Completed Videos/Date Completed Videos/Date Recommended Educational Videos Completed    [] Yes      [] No    **If not completed, Why? **           Smoking Cessation/Relaspe Prevention Intervention needed? [] Yes      [x] No   Smoking Cessation/Relapse Prevention Scheduled? NA Smoking Cessation/Relapse Prevention completed?   na Smoking Cessation/Relapse Prevention completed?    [] Yes      [] No  Date: ** Smoking Cessation/Relapse Prevention completed? [] Yes      [] No  Date: ** Smoking Cessation Counseling attended  [] Yes      [] No  **If not completed, Why? **   Professional Referrals:  *Please check if needed  [] Diabetes Clinic  [] Lipid Clinic   [] Other:      Professional Referrals:  *Please check if needed  [] Diabetes Clinic  [] Lipid Clinic   [] Other:   Preventative Medication Preventative Medication Preventative Medication Preventative Medication Preventative Medication Preventative Medication   Aspirin  [] Yes    [x] No  Blood Thinner: Clopidogrel/Effient/Brillinta  [] Yes    [x] No  Beta Blocker  [x] Yes    [] No  Ace Inhibitor  [] Yes    [x] No  Statin/Lipid Lowering  [x] Yes    [] No Medication Changes? [] Yes    [x] No Medication Changes? [] Yes    [x] No Medication Changes? [] Yes    [] No Medication Changes? [] Yes    [] No Medication Changes? [] Yes    [] No   *Education* *Education* *Education* *Education* *Education* *Education*   Does Cresencio require any additional education? [] Yes    [x] No   Does Cresencio require any additional education? [] Yes    [x] No Does Cresencio require any additional education? [] Yes    [x] No Does Cresencio require any additional education? [] Yes    [] No Does Cresencio require any additional education? [] Yes    [] No Does Cresencio require any additional education?   [] Yes    [] No   Additional Educational Videos    Exercise  [] Improve Performance    Medical  [] Aging Enhancing Your QoL  [] Biology of Weight Control  [] Decoding Lab Results  [] Diseases of Our Time - Overview  [] Sleep Disorders    Nutrition  [] Dining Out - Part 2  [] Fueling a Healthy Body  [] Menu Workshop  [] Planning Your Eating Strategy  [] Targeting Your Nutrition Priorities  [] Vitamins & Minerals    Healthy Mind-Set  [] Smoking Cessation    Culinary  []Becoming a Pritikin    [] Cooking - Breakfast & Snacks  [] Cooking -Healhty Salads & Dressing  [] Cooking -Dinner & Sides  [] Cooking -Soups & Desserts    Overview  [] The Pritikin Solution Additional Educational Videos Completed Additional Educational Videos Completed Additional Educational Videos Completed Additional Educational Videos Completed Additional Educational Videos Completed    [] Yes    [] No   *Goals* *Goals* *Goals* *Goals* *Goals* *Goals*   Cresencio's risk factor/education goals are as follows:    [x] Optimal BP <140/90  [] Blood Sugar <120  [x] Attend recommended video education sessions  [x] Takes medications as prescribed 100% of the time   [] ** Cresencio's risk factor/education goals are as follows:    [x] Optimal BP <140/90  [] Blood Sugar <120  [x] Attend recommended video education sessions  [x] Takes medications as prescribed 100% of the time   [] ** Cresencio's risk factor/education goals are as follows:    [x] Optimal BP <140/90  [] Blood Sugar <120  [x] Attend recommended video education sessions  [x] Takes medications as prescribed 100% of the time   [] ** Cresencio's risk factor/education goals are as follows:    [x] Optimal BP <140/90  [] Blood Sugar <120  [x] Attend recommended video education sessions  [x] Takes medications as prescribed 100% of the time   [] ** Cresencio's risk factor/education goals are as follows:    [x] Optimal BP <140/90  [] Blood Sugar <120  [x] Attend recommended video education sessions  [x] Takes medications as prescribed 100% of the time   [] ** Cresencio achieved risk factor goals?   [] Yes    [] No  If no, why?  **     Monitored telemetry has revealed NSR   Monitored telemetry has revealed NSR Monitored telemetry has revealed NSR   Monitored telemetry has revealed **  [] documented arrhythmia at increasing workloads  [] associated symptoms ** Monitored telemetry has revealed **  [] documented arrhythmia at increasing workloads  [] associated symptoms ** Monitored telemetry has revealed **  [] documented arrhythmia at increasing workloads  [] associated symptoms **   Physician Response    [x] Cardiac rehab is reasonably and medically necessary for continuous cardiac monitoring surveillance  of patient's cardiac activity  [x] Initiate continuous telemetry monitoring and notify me with any concerns  [] Other   Physician Response    [x] Cardiac rehab is reasonably and medically necessary for continuous cardiac monitoring surveillance  of patient's cardiac activity  [x] Continue continuous telemetry monitoring and notify me with any concerns  [] Other     Physician Response    [x] Cardiac rehab is reasonably and medically necessary for continuous cardiac monitoring surveillance  of patient's cardiac activity  [x] Continue continuous telemetry monitoring and notify me with any concerns   [] Other     Physician Response    [x] Cardiac rehab is reasonably and medically necessary for continuous cardiac monitoring surveillance  of patient's cardiac activity  [x] Continue continuous telemetry monitoring and notify me with any concerns   [] Other     Physician Response    [x] Cardiac rehab is reasonably and medically necessary for continuous cardiac monitoring surveillance  of patient's cardiac activity  [x] Continue continuous telemetry monitoring and notify me with any concerns   [] Other       Cosigned by: Lizzy Duarte MD at 9/13/2022  1:29 PM     Revision History  Date/Time User Provider Type Action   9/13/2022  1:29 PM Lizzy Duarte MD Physician [address] [place] [address]   9/13/2022 12:11 PM Hayes Jaimes PT Exercise Physiologist Sign     Cosigned by: Lizzy Duarte MD at 10/13/2022 11:38 AM     Revision History  Date/Time User Provider Type Action   10/13/2022 11:38 AM Lizzy Duarte MD Physician Cosign   10/10/2022 10:38 AM Maggi Brown Exercise Physiologist Sign

## 2022-11-09 ENCOUNTER — HOSPITAL ENCOUNTER (OUTPATIENT)
Dept: CARDIAC REHAB | Age: 62
Setting detail: THERAPIES SERIES
Discharge: HOME | End: 2022-11-09
Payer: MEDICARE

## 2022-11-09 PROCEDURE — 93798 PHYS/QHP OP CAR RHAB W/ECG: CPT

## 2022-11-11 ENCOUNTER — HOSPITAL ENCOUNTER (OUTPATIENT)
Dept: CARDIAC REHAB | Age: 62
Setting detail: THERAPIES SERIES
Discharge: HOME | End: 2022-11-11
Payer: MEDICARE

## 2022-11-11 PROCEDURE — 93798 PHYS/QHP OP CAR RHAB W/ECG: CPT

## 2022-11-14 ENCOUNTER — HOSPITAL ENCOUNTER (OUTPATIENT)
Dept: CARDIAC REHAB | Age: 62
Setting detail: THERAPIES SERIES
Discharge: HOME | End: 2022-11-14
Payer: MEDICARE

## 2022-11-14 PROCEDURE — 93798 PHYS/QHP OP CAR RHAB W/ECG: CPT

## 2022-11-16 ENCOUNTER — HOSPITAL ENCOUNTER (OUTPATIENT)
Dept: CARDIAC REHAB | Age: 62
Setting detail: THERAPIES SERIES
Discharge: HOME | End: 2022-11-16
Payer: MEDICARE

## 2022-11-16 PROCEDURE — 93798 PHYS/QHP OP CAR RHAB W/ECG: CPT

## 2022-11-18 ENCOUNTER — HOSPITAL ENCOUNTER (OUTPATIENT)
Dept: CARDIAC REHAB | Age: 62
Setting detail: THERAPIES SERIES
Discharge: HOME | End: 2022-11-18
Payer: MEDICARE

## 2022-11-18 PROCEDURE — 93798 PHYS/QHP OP CAR RHAB W/ECG: CPT

## 2022-11-21 ENCOUNTER — HOSPITAL ENCOUNTER (OUTPATIENT)
Dept: CARDIAC REHAB | Age: 62
Setting detail: THERAPIES SERIES
Discharge: HOME | End: 2022-11-21
Payer: MEDICARE

## 2022-11-21 PROCEDURE — 93798 PHYS/QHP OP CAR RHAB W/ECG: CPT

## 2022-11-25 ENCOUNTER — HOSPITAL ENCOUNTER (OUTPATIENT)
Dept: CARDIAC REHAB | Age: 62
Setting detail: THERAPIES SERIES
End: 2022-11-25
Payer: MEDICARE

## 2022-11-28 ENCOUNTER — HOSPITAL ENCOUNTER (OUTPATIENT)
Dept: CARDIAC REHAB | Age: 62
Setting detail: THERAPIES SERIES
Discharge: HOME | End: 2022-11-28
Payer: MEDICARE

## 2022-11-28 PROCEDURE — 93798 PHYS/QHP OP CAR RHAB W/ECG: CPT

## 2022-12-05 NOTE — PLAN OF CARE
[facility] Cardiac Rehab Program - [address] [facility]  Individualized Cardiac Treatment Plan    Patient Name:  Riccardo Barnes  :  1960  Age:  57 y.o. MRN:  [id number]  Diagnosis: Heart Transplant  Date of Event: 22   Physician:  Rita WALLS  Next Office Visit:  10/5/22  Date Entered Program: 22  Risk Stratifications: [] Low [] Intermediate [x] High  Allergies: Allergies   Allergen Reactions    Entresto [Sacubitril-Valsartan] Dizziness or Vertigo       COVID -19 Screen  Do you have any of the following symptoms:  [] Fever [] Cough [] SOB [] Muscle/Body Ache [] Loss of taste/smell [x] None    Have you traveled outside of the [place]? [] Yes      [x] No    Have you been around anyone who has tested Positive for COVID 19?  [] Yes      [x] No    The following Education has been completed with patient. [x] Wait in the lobby until we call you back to rehab. [x] You must wear a mask while in the medical center, and in cardiac rehab. Please bring your own. [x] Bring your own bottle of water with you. Individual Cardiac Treatment Plan -EXERCISE  INITIAL 30 DAY 60 DAY 90  DAY FINAL DAY   EXERCISE  ASSESSMENT/PLAN EXERCISE  REASSESSMENT EXERCISE   REASSESSMENT EXERCISE   REASSESSMENT EXERCISE   REASSESSMENT EXERCISE  DISCHARGE/FOLLOW-UP   Date: 22 Date: 10/10/22 Date: 22 Date: 22    Pt not here for reassessment. Pt has been sick.     Date: Date:   Session #1  First Exercise Session:  ** Session # 10 Session # 21 Session # 28 Session # ** Session # **  Last Exercise Session:  **   Stages of Change Stages of Change Stages of Change Stages of Change Stages of Change Stages of Change   [] Pre Contemplation  [] Contemplation  [] Preparation  [x] Action  [] Maintenance  [] Relapse [] Pre Contemplation  [] Contemplation  [] Preparation  [x] Action  [] Maintenance  [] Relapse [] Pre Contemplation  [] Contemplation  [] Preparation  [x] Action  [] Maintenance  [] Relapse [] Pre Contemplation  [] Contemplation  [] Preparation  [x] Action  [] Maintenance  [] Relapse [] Pre Contemplation  [] Contemplation  [] Preparation  [] Action  [] Maintenance  [] Relapse [] Pre Contemplation  [] Contemplation  [] Preparation  [] Action  [] Maintenance  [] Relapse   EXERCISE ASSESSMENT EXERCISE ASSESSMENT EXERCISE ASSESSMENT EXERCISE ASSESSMENT EXERCISE ASSESSMENT EXERCISE ASSESSMENT   6 Min Walk Test  Distance walked:   0.09 miles  475 ft.  1.7 METs  Max HR:99 BPM      RPE:  12    THR:  117-130  Rhythm:  NSR     6 Min Walk Test  Distance walked:   ** miles  ** ft  ** METs  Max HR:** BPM      RPE:  **  %Change ft= **    Rhythm:  **   DASI: 5.1 METs DASI: 4.73 METs DASI: 5.1 METs DASI: na DASI: ** METs DASI: ** METs   Return to Work  [employer] on returning to work? [] Yes              [] No   [x] Disabled     [] Retired     Return to work:  Disabled   Return to work:  na Return to work:  Has Cresencio returned to work? Disabled. Return to work:  Has Bailey Alarcon returned to work? [] Yes    [] No    Return to work date set? [] Yes, **    [] No    Cresencio is doing ** at work. Return to work:  Has Bailey Alarcon returned to work? [] Yes    [] No    Return to work? [] Yes, **    [] No    *Required MET Level achieved for job duties? [] Yes    [] No   Orthopedic Limitations/  [x] Yes    [] No  If yes please list:  left leg weaker     Orthopedic Limitations  *If patient has orthopedic issue:   Actions/  accomodations needed to make Cresencio successful : NuStep only Orthopedic Limitations  NuStep only Orthopedic Limitations  NuStep only Orthopedic Limitations Orthopedic Limitations     Fall Risk    [x]Assessed as a fall risk- with fatigue  [] Not a fall risk      [] Balance Issues       [] Walker        [x] Cane       [] Wheelchair  Actions needed: use cane as needed for balance.   [x] Safety issues reviewed      Fall Risk  *If patient is a fall risk, action needed to accommodate: NuStep only Fall Risk  na Fall Risk  na Fall Risk Fall Risk   Home Exercise  [x] Yes    [] No  Type: recumbant bike  Frequency: 4 x per week  Duration: 30 min Home Exercise  [x] Yes    [] No  Type: recumbent bike, [employer]   Frequency:3d/wk  Duration: 60mins Home Exercise  [x] Yes    [] No  Type: works out at [employer]  Frequency: 3 x per week  Duration: 60 min Home Exercise  [x] Yes    [] No  Type: [employer]. Home Exercise  [] Yes    [] No  Type: **  Frequency: **  Duration: ** Home Exercise  [] Yes    [] No  Type: **  Frequency: **  Duration: **   Angina with Activity? [] Yes    [x] No  Angina Management: na Angina with Activity? [] Yes    [x] No  Angina Management: na Angina with Activity? [] Yes    [x] No  Angina Management: na Angina with Activity? [] Yes    [x] No  Angina Management: na Angina with Activity? [] Yes    [] No  Angina Management: ** Angina with Activity?   [] Yes    [] No  Angina Management: **   EXERCISE PLAN EXERCISE PLAN EXERCISE PLAN EXERCISE PLAN EXERCISE PLAN EXERCISE PLAN   *Interventions* *Interventions* *Interventions* *Interventions* *Interventions* *Interventions*   Exercise Prescription  (per physician & CR staff) Exercise Prescription  (per physician & CR staff) Exercise Prescription  (per physician & CR staff) Exercise Prescription  (per physician & CR staff) Exercise Prescription  (per physician & CR staff) Exercise Prescription  (per physician & CR staff)   Cardiovascular Cardiovascular Cardiovascular Cardiovascular Cardiovascular Cardiovascular   Mode:    [x] Treadmill (TM)  [x] Schwinn Airdyne (AD)  [x] Arms Ergometer (AE)  [] NuStep  [] Elliptical (E) MODE:    [] Treadmill (TM)  [] Schwinn Airdyne (AD)  [x] Arms Ergometer (AE)  [x] NuStep  [] Elliptical (E) MODE:    [] Treadmill (TM)  [] Schwinn Airdyne (AD)  [x] Arms Ergometer (AE)  xNuStep  [][] Elliptical (E) MODE:    [] Treadmill (TM)  [] Schwinn Airdyne (AD)  [x] Arms Ergometer (AE)  [x] NuStep  [] Elliptical (E) MODE:    [] Treadmill (TM)  [] Schwinn Airdyne (AD)  [] Arms Ergometer (AE)  [] NuStep  [] Elliptical (E) MODE:    [] Treadmill (TM)  [] Schwinn Airdyne (AD)  [] Arms Ergometer (AE)  [] NuStep  [] Elliptical (E)   Initial Workloads  TM: [email or web address] 1.8 METs  AD: 0.6 level = 1.7 METs  NS: 14  Pollack= 1.7 METs  AE: 0.3 level = 1.4 METs Current Workloads    NS:   40Watts=  2.2METs  AE:  24W=  1.9METs Current Workloads  NS:   52 Pollack=  2.4METs  AE:  24 Poll[place]=  METs Current Workloads  NS:  52 Poll[place]=  2.4 METs  AE:  24 pollack =  1.9 METs Current Workloads  TM:  @ %=  METs  AD:  level =  METs  NS:   Pollack=  METs  AE:  level =  METs Current Workloads  TM:  @ %=  METs  AD:  level =  METs  NS:   [place]ollack=  METs  AE:  level =  METs     Frequency:    ICR: 3x/week  Home: 2-3x/wk Frequency:   ICR: 3x/week  Home: 3x/wk Frequency:  ICR: 3x/week  Home: 3-4x/wk Frequency:  ICR: 3x/week  Home: 3-4x/wk Frequency:  ICR: 3x/week  Home: 3-4x/wk Frequency:  Cresencio will continue exercise at  5-7 days/week   Duration:   Total aerobic exercise = 30-45 min    5-8 min/mode Duration:  Total aerobic exercises = 35-40 min     15 min/mode Duration:  Total aerobic exercises = 35-40 min     15 min/mode Duration:  Total aerobic exercises = 35-40 min     15 min/mode Duration:  Total aerobic exercises = ** min     **min/mode Duration:  Total erobic exercise =  60-90 min   Intensity:   MET Level = 1.7-1.8  RPE = 12-15 Intensity:  Max MET Level = 2.2  RPE = 12-15 Intensity:  Max MET Level = 2.4  RPE = 12-15 Intensity:  Max MET Level = 2.4  RPE = 12-15 Intensity:  Max MET Level = **  RPE = 12-15 Intensity:  Max MET Level = ** RPE = 12-15   Progression: increase aerobic activity up to 15 min over next 4 weeks by increasing time 2-3 min/week.  Progression: increase aerobic intensity 5-10% over the next 4 weeks   Progression:  Increase intensity 5-10% over the next 4 weeks as tolerated Progression:  Increase intensity 5-10% over the next 4 weeks as tolerated Progression: Progression:  Increase time/intensity when RPE <13, and HR is in [facility]   [x] Yes      [] No  Upper and Lower body strength training 2x/wk    Wt: 2#       Reps:  8-15    *Increase wt. after completing 15 reps with an RPE of <12/13. [x] Yes      [] No  Upper and Lower body strength training 2x/wk    Wt: 3#       Reps:  8-15    *Increase wt. after completing 15 reps with an RPE of <12/13. [x] Yes      [] No  Upper and Lower body strength training 2x/wk    Wt: 4#       Reps:  8-15    *Increase wt. after completing 15 reps with an RPE of <12/13. [x] Yes      [] No  Upper and Lower body strength training 2x/wk    Wt: 4#       Reps:  8-15    *Increase wt. after completing 15 reps with an RPE of <12/13. [] Yes      [] No  Upper and Lower body strength training 2x/wk    Wt: **#       Reps:  8-15    *Increase wt. after completing 15 reps with an RPE of <12/13. Continue Strength Training at home   [] Exercise Log & Strength training handout given     Wt: **#       Reps:  8-15    *Increase wt. after completing 15 reps with an RPE of <12/13. Flexibility Flexibility Flexibility Flexibility Flexibility Flexibility   [x] Yes      [] No  25 min session of Core Strength & Flexibility 1x/per week  Attends Core Strength & Flexibility   [x] Yes      [] No Attends Core Strength & Flexibility   [x] Yes      [] No Attends Core Strength & Flexibility   [x] Yes      [] No Attends Core Strength & Flexibility   [] Yes      [] No Continue Core Strength & Flexibility at home   Home Exercise  *Cresencio verbalizes planning to bike/walk 3-4 days/week for 20-30 min on days not in rehab. Home Exercise  *Cresencio verbalizes planning to bike/walk 3-4days/week for 30-60 min on days not in rehab. Home Exercise  *Cresencio verbalizes planning to work out 3-4 days/week for 30-60 min on days not in rehab.  Home Exercise  *Cresencio verbalizes planning to exercise 3-4 days/week for 30-60 min on days not in rehab. Home Exercise  *Cresencio verbalizes planning to ** ** days/week for ** min on days not in rehab. Home Exercise  *Cresencio verbalizes his/her plan to ** ** days/week for ** min @ **   *Education* *Education* *Education* *Education* *Education* *Education*   RPE Scale  [x] Yes      [] No  Exercise Safety  [x] Yes      [] No  Equipment Orientation  [x] Yes      [] No  S/S to Report  [x] Yes      [] No  Warm Up/Cool Down  [x] Yes      [] No  Home Exercise  [x] Yes      [] No     All Exercise Education Completed  [] Yes      [] No    Exercise Education Attended/Date Exercise Education Attended/Date Exercise Education Attended/Date Exercise Education Attended/Date All Sessions Completed? [] Yes  [] No   *Goals* *Goals* *Goals* *Goals* *Goals* *Goals*   Initial Exercise Goals Exercise Goals  Exercise Goals   Exercise Goals  Exercise Goals  Exercise Goals   Cresencio plans to:  [x] Attend exercise sessions 3x/wk  [x] initiate home exercise 2-3x/wk for 10-20 min  [x] Increase 6 min walk distance by 10%  [] Attend Exercise workshops Cresencio plans to:  [x] Attend exercise sessions 3x/wk  [x] continue home exercise 2-3x/wk for 20-30 min  [x] Attend Exercise workshops Cresencio's plans to:  [x] Attend exercise sessions 3x/wk  [x] continue home exercise 3-4x/wk for 30-45 min  [x] Determine plan of exercise following rehab  [x] Attend Exercise workshops Cresencio's plans to:  [x] Attend exercise sessions 3x/wk  [x] continue home exercise 3-4x/wk for 30-45 min  [x] Determine plan of exercise following rehab  [x] Attend Exercise workshops Cresencio's plans to:  [x] Attend exercise sessions 3x/wk  [x] continue home exercise 3-4x/wk for 30-45 min  [x] Determine plan of exercise following rehab  [x] Attend Exercise workshops Cresencio achieved exercise goals?     []  Yes    [] No  If no, why?  **  [] Increased 6 min walk distance by 10%  [] Currently exercising 30-60 min/day, 5-7days/wk   [] Plans to continue exercise on own  [] Plans to join a local fitness center to continue exercise  [] Does not plan to continue to exercise after rehab   Mutually agreed upon goals:  Laverda Hay, travel, household chores Progress towards mutually agreed upon goals:  Luis M Parsons  continues to work on his strength and endurance to be able to return to his goals Progress towards mutually agreed upon goals:  Luis M Parsons  continues to improve strength and endurance to make his ADL's, yardwork, travel easier. Progress towards mutually agreed upon goals:  Luis M Parsons continues to improve strength and endurance to make his ADL's, yardwork, travel easier. Progress towards mutually agreed upon goals:  Cresencio  ** Progress towards mutually agreed upon goals:  Cresencio  **    *MET level required for above goal:  5-6 METs MET level Achieved:  2. 2METs MET level Achieved:  2.4 METs MET level Achieved:  2.4 METs MET level Achieved:  **METs MET level Achieved:  **METs     Individual Cardiac Treatment Plan - Nutrition  NUTRITION  ASSESSMENT/PLAN NUTRITION  REASSESSMENT NUTRITION   REASSESSMENT NUTRITION   REASSESSMENT NUTRITION  DISCHARGE/FOLLOW-UP NUTRITION  DISCHARGE/FOLLOW-UP   Stages of Change Stages of Change Stages of Change Stages of Change Stages of Change Stages of Change   [] Pre Contemplation  [x] Contemplation  [] Preparation  [] Action  [] Maintenance  [] Relapse [] Pre Contemplation  [x] Contemplation  [] Preparation  [] Action  [] Maintenance  [] Relapse [] Pre Contemplation  [x] Contemplation  [] Preparation  [] Action  [] Maintenance  [] Relapse [] Pre Contemplation  [x] Contemplation  [] Preparation  [] Action  [] Maintenance  [] Relapse [] Pre Contemplation  [] Contemplation  [] Preparation  [] Action  [] Maintenance  [] Relapse [] Pre Contemplation  [] Contemplation  [] Preparation  [] Action  [] Maintenance  [] Relapse   NUTRITION ASSESSMENT NUTRITION ASSESSMENT NUTRITION ASSESSMENT NUTRITION ASSESSMENT NUTRITION ASSESSMENT NUTRITION ASSESSMENT   Weight Management  Weight: 234        Height: 69\"   BMI: 34.6  Weight Management  Weight: 232                 Weight Management  Weight: 233                 Weight Management  Weight: 233.5 Weight Management  Weight: ** Weight Management  Weight: **                    BMI: **   Eating Plan  Current eating habits: heart healthy, no salt Eating Plan  Changes: none Eating Plan  Changes: none reported Eating Plan  Changes:na Eating Plan  Changes: Eating Plan Improvements:   Alcohol Use  [] none          [] daily  [] weekly      [x] special   Type: beer  Amount: 1        Diet Assessment Tool:  RATE YOUR PLATE  *Given to patient to complete and return. Diet Assessment Tool:    Score: 50/72        Diet Assessment Tool: RATE YOUR PLATE  Score: **/77   NUTRITION PLAN NUTRITION PLAN NUTRITION PLAN NUTRITION PLAN NUTRITION PLAN NUTRITION PLAN   *Interventions* *Interventions* *Interventions* *Interventions* *Interventions* *Interventions*   Initial Survey given Goal Setting Discussion:   [x] Yes      [] No        Follow Up Survey Reviewed & Goals Updated:     Professional Referral  Please check if needed. [] Dietitian Consult   [] Wt. Management Referral  [] Other:  Professional Referral  Please check if needed. [] Dietitian Consult   [] Wt. Management Referral  [] Other: Professional Referral  Please check if needed. [] Dietitian Consult   [] Wt. Management Referral  [] Other: Professional Referral  Please check if needed. [] Dietitian Consult   [] Wt. Management Referral  [] Other: Professional Referral  Please check if needed. [] Dietitian Consult   [] Wt. Management Referral  [] Other: Professional Referral  Please check if needed. [] Dietitian Consult   [] Wt.  Management Referral  [] Other:    *Education* *Education* *Education* *Education* *Education*    Nutritional Education Attended/Date Nutritional Education Attended/Date Nutritional Education Attended/Date Nutritional Education Attended/Date All Sessions Completed? [] Yes  [] No   Cooking School  Recommended     [x] Adding Flavor  [x] Fast & Healthy     Breakfasts  [x] Salads & Dressings  [x] Savory Soups  [x] Simple Sides & Sauces  [x] Appetizers &     Snacks  [x] Delicious Desserts  [x] Plant-Based Proteins  [x] Fast Evening Meals  [x] Weekend Breakfasts  [x] Efficiency Cooking  [x] One-Pot [employer] Completed    Patient does not attend cooking sessions, switched to [employer] Completed    na [facility]  Sessions Completed    na Cooking School  Sessions Completed Cooking School    # of sessions completed:  **   *Goals* *Goals* *Goals* *Goals* *Goals* *Goals*   Cresencio's nutritional goals are as follows:  Complete and return diet survey Cresencio's nutritional goals are as follows:  [] Attend Nutrition Workshops  [] Attend 1:1   [] Attend Cooking Classes  [] ** Cresencio's nutritional goals are as follows:  [] Attend Nutrition Workshops  [] Attend 1:1    Attend Cooking Classes  [x]Complete and return diet survey  [] ** Cresencio's nutritional goals are as follows:  [] Attend Nutrition Workshops  [] Attend 1:1   [] Attend Cooking Classes  [x] Return survey Cresencio's nutritional goals are as follows:  [] Attend Nutrition Workshops  [] Attend 1:1   [] Attend Cooking Classes  [] ** Cresencio achieved nutritional goals   [] Yes    [] No  If no, why?   Use knowledge gained to continue Pritikin eating plan at home       Individual Cardiac Treatment Plan - Psychosocial  PSYCHOSOCIAL  ASSESSMENT/PLAN PSYCHOSOCIAL  REASSESSMENT PSYCHOSOCIAL   REASSESSMENT PSYCHOSOCIAL   REASSESSMENT PSYCHOSOCIAL  DISCHARGE/FOLLOW-UP PSYCHOSOCIAL  DISCHARGE/FOLLOW-UP   Stages of Change Stages of Change Stages of Change Stages of Change Stages of Change Stages of Change   [] Pre Contemplation  [x] Contemplation  [] Preparation  [] Action  [] Maintenance  [] Relapse [] Pre Contemplation  [x] Contemplation  [] Preparation  [] Action  [] Maintenance  [] Relapse [] Pre Contemplation  [] Contemplation  [] Preparation  [] Action  [x] Maintenance  [] Relapse [] Pre Contemplation  [] Contemplation  [] Preparation  [] Action  [x] Maintenance  [] Relapse [] Pre Contemplation  [] Contemplation  [] Preparation  [] Action  [] Maintenance  [] Relapse [] Pre Contemplation  [] Contemplation  [] Preparation  [] Action  [] Maintenance  [] Relapse   PSYCHOSOCIAL ASSESSMENT PSYCHOSOCIAL ASSESSMENT PSYCHOSOCIAL ASSESSMENT PSYCHOSOCIAL ASSESSMENT PSYCHOSOCIAL ASSESSMENT PSYCHOSOCIAL ASSESSMENT   Behavioral Outcomes Behavioral Outcomes Behavioral Outcomes Behavioral Outcomes Behavioral Outcomes Behavioral Outcomes   PHQ-9 score 3  Depression Severity  [x]Minimal  []Mild   []Moderate  []Moderately Severe  []Severe    PHQ-9 score **  Depression Severity  []Minimal  []Mild   []Moderate  []Moderately Severe []Severe PHQ-9 score **  Depression Severity  []Minimal  []Mild   []Moderate  []Moderately Severe []Severe   Does patient have Family Support? [x] Yes      [] No  No signs of marital/family distress        Within the Past Month:  *Have you wished you were dead or wished you could go to sleep and not wake up? [] Yes      [x] No  *Have you had any thoughts of killing yourself? [] Yes      [x] No          Using a scale of 0-10, 0=none, 10=very:   Rate your depression: 0  Rate your anxiety:  0  Using a scale of 0-10, 0=none, 10=very:   Rate your depression: 0  Rate your anxiety:  0 Using a scale of 0-10, 0=none, 10=very:   Rate your depression: 0  Rate your anxiety:  0 Using a scale of 0-10, 0=none, 10=very:   Rate your depression: 0  Rate your anxiety:  0 Using a scale of 0-10, 0=none, 10=very:   Rate your depression: **  Rate your anxiety:  ** Using a scale of 0-10, 0=none, 10=very:   Rate your depression: **  Rate your anxiety:  **   Signs and Symptoms of Depression Present? [] Yes      [x] No   Signs and Symptoms of Depression Present?     [] Yes      [x] No Signs and Symptoms of Depression Present? [] Yes      [x] No  If yes, please explain:  ** Signs and Symptoms of Depression Present? [] Yes      [x] No   Signs and Symptoms of Depression Present? [] Yes      [] No  If yes, please explain:  ** Signs and Symptoms of Depression Present? [] Yes      [] No  If yes, please explain:  **   Signs and Symptoms of Anxiety Present? [] Yes      [x] No   Signs and Symptoms of Anxiety Present? [] Yes      [] No Signs and Symptoms of Anxiety Present? [] Yes      [x] No  If yes, please explain:  ** Signs and Symptoms of Anxiety Present? [] Yes      [x] No   Signs and Symptoms of Anxiety Present? [] Yes      [] No  If yes, please explain:  ** Signs and Symptoms of Anxiety Present? [] Yes      [] No  If yes, please explain:  **   [] Patient has poor eye contact   [] Flat affect present. [] Signs of anxiety, anger or hostility    [] Signs social isolation present. []  Signs of alcohol or substance abuse        PSYCHOSOCIAL PLAN PSYCHOSOCIAL PLAN PSYCHOSOCIAL PLAN PSYCHOSOCIAL PLAN PSYCHOSOCIAL PLAN PSYCHOSOCIAL PLAN   *Interventions* *Interventions* *Interventions* *Interventions* *Interventions* *Interventions*   *Please check if needed  [] Psych Consult  [] Physician Referral  [x] Stress Management Skills *Please check if needed  [] Psych Consult  [] Physician Referral  [x] Stress Management Skills *Please check if needed  [] Psych Consult  [] Physician Referral  [x] Stress Management Skills *Please check if needed  [] Psych Consult  [] Physician Referral  [x] Stress Management Skills *Please check if needed  [] Psych Consult  [] Physician Referral  [] Stress Management Skills *Please check if needed  [] Psych Consult  [] Physician Referral  [] Stress Management Skills   Is patient currently taking anti-depressant or anti-anxiety medications?   [x] Yes      [] No  If yes, please list medications:  amitriptyline Change in anti-depressant or anti-anxiety medications? [] Yes      [x] No   Change in anti-depressant or anti-anxiety medications? [] Yes      [x] No  If yes, please list medications:  ** Change in anti-depressant or anti-anxiety medications? [] Yes      [x] No  If yes, please list medications:  na Change in anti-depressant or anti-anxiety medications? [] Yes      [] No  If yes, please list medications:  ** Change in anti-depressant or anti-anxiety medications? [] Yes      [] No  If yes, please list medications:  **   *Education* *Education* *Education* *Education* *Education* *Education*   Healthy Mind-Set Workshops Recommended  [x] Stress & Health  [x] Taking Charge of Stress  [x] New Thoughts, New Behaviors  [x] Managing Moods & Relationships Healthy Mind-Set Workshops Attended/Date    Patient does not attend workshops, switched to [employer] Workshops Attended/Date  na Healthy Mind-Set Workshops Attended/Date    na Healthy Mind-Set Workshops  Completed  [] Yes      [] No Healthy Mind-Set Workshops  Completed  [] Yes      [] No   *Goals* *Goals* *Goals* *Goals* *Goals* *Goals*   Cresencio's psychosocial goals are as follows:  Complete HADS & Rolan & Evelio, Quality of Life Index, Cardiac Version IV Cresencio's psychosocial goals are as follows:  [] Attend Healthy Mind-Set Workshops  [] Reduce depression symptom severity by 1 level  [] ** Cresencio's psychosocial goals are as follows:  [] Attend Healthy Mind-Set Workshops  [x] Reduce depression symptom severity by 1 level  [] ** Cresencio's psychosocial goals are as follows:  [] Attend Healthy Mind-Set Workshops  [x] Reduce depression symptom severity by 1 level  [] ** Cresencio achieved psychosocial goals? [] Yes    [] No  If no, why?  **  [] Use methods learned to continue to reduce depression symptom severity by 1 level  [] ** Cresencio achieved psychosocial goals?   [] Yes    [] No  If no, why?  **  [] Use methods learned to continue to reduce depression symptom severity by 1 level  [] **     Individual Cardiac Treatment Plan - Other:  Risk Factor/Education  RISK FACTOR  ASSESSMENT/PLAN RISK FACTOR  REASSESSMENT  RISK FACTOR  REASSESSMENT RISK FACTOR  REASSESSMENT RISK FACTOR   DISCHARGE/FOLLOW-UP RISK FACTOR   DISCHARGE/FOLLOW-UP   Stages of Change Stages of Change Stages of Change Stages of Change Stages of Change Stages of Change   [] Pre Contemplation  [x] Contemplation  [] Preparation  [] Action  [] Maintenance  [] Relapse [] Pre Contemplation  [x] Contemplation  [] Preparation  [] Action  [] Maintenance  [] Relapse [] Pre Contemplation  [] Contemplation  [x] Preparation  [] Action  [] Maintenance  [] Relapse [] Pre Contemplation  [] Contemplation  [x] Preparation  [] Action  [] Maintenance  [] Relapse [] Pre Contemplation  [] Contemplation  [] Preparation  [] Action  [] Maintenance  [] Relapse [] Pre Contemplation  [] Contemplation  [] Preparation  [] Action  [] Maintenance  [] Relapse   RISK FACTOR/EDUCATION ASSESSMENT RISK FACTOR/EDUCATION ASSESSMENT RISK FACTOR/EDUCATION ASSESSMENT RISK FACTOR/EDUCATION ASSESSMENT RISK FACTOR /EDUCATION ASSESSMENT RISK FACTOR /EDUCATION ASSESSMENT   Hypertension  [x] Yes      [] No    Resting BP: 144/82  Peak Ex BP:152/64  Medication: hydralazine, valsartan  Metoprolol, amlodipine   Hypertension  Resting BP: 134/66  Peak Ex BP:164/72  Medication Changes:  [] Yes      [x] No Hypertension  Resting BP: 140/82  Peak Ex BP: 178/82  Medication Changes:  [] Yes      [x] No Hypertension  Resting BP: 128/80  Peak Ex BP: 158/82  Medication Changes:  [] Yes      [x] No Hypertension  Resting BP: **  Peak Ex BP:**  Medication Changes:  [] Yes      [] No Hypertension  Resting BP: **  Peak Ex BP:**  Medication Changes:  [] Yes      [] No   Lipids  HLD/DLD  [x] Yes      [] No  TOTAL CHOL: 200  HDL:  102  LDL:  86  TRI  Medication: pravastatin, atorvastatin Lipids  Medication Changes:  [] Yes      [x] No     Lipids  Medication Changes:  [] Yes      [x] No     Lipids  Medication Changes:  [] Yes      [x] No     Lipids    TOTAL CHOL: **  HDL:  **  LDL:  **  TRIG:  **  Medication Changes:  [] Yes      [] No Lipids    TOTAL CHOL: **  HDL:  **  LDL:  **  TRIG:  **  Medication Changes:  [] Yes      [] No   Diabetes  [] Yes      [x] No   Diabetes  NA   Diabetes  na     Diabetes  na     Diabetes  Most Recent BS:  BS have been in range  [] Yes      [] No  Medication Changes  [] Yes      [] No Diabetes  Most Recent BS:  BS have been in range  [] Yes      [] No  Medication Changes  [] Yes      [] No       Tobacco Use  [] Current  [] Former  [x] Never      Smokeless Tobacco use:   [] Yes      [x] No   Tobacco Use  NA   Tobacco Use  Change in smoking status   [] Yes      [x] No       Tobacco Use  Change in smoking status   [] Yes      [x] No     Tobacco Use  Change in smoking status   [] Yes      [] No    Quit date: ** Tobacco Use  Change in smoking status   [] Yes      [] No    Quit date: **             Learning Barriers  Please select one:  [] Speech  [] Literacy  [] Hearing  [] Cognitive  [x] Vision  [x] Ready to Learn Learning Barriers Addressed:   [x] Yes      [] No   Learning Barriers Addressed:   [] Yes      [x] No   Learning Barriers Addressed:  [] Yes      [x] No Learning Barriers Addressed:  [] Yes      [] No Learning Barriers Addressed:  [] Yes      [] No     RISK FACTOR/EDUCATION PLAN RISK FACTOR/EDUCATION PLAN RISK FACTOR/EDUCATION PLAN RISK FACTOR/EDUCATION PLAN RISK FACTOR/EDUCATION PLAN RISK FACTOR/EDUCATION PLAN     *Interventions* *Interventions* *Interventions* *Interventions*     Completed Videos/Date Completed Videos/Date Completed Videos/Date Recommended Educational Videos Completed    [] Yes      [] No    **If not completed, Why? **           Smoking Cessation/Relaspe Prevention Intervention needed? [] Yes      [x] No   Smoking Cessation/Relapse Prevention Scheduled? NA Smoking Cessation/Relapse Prevention completed?   na Smoking Cessation/Relapse Prevention completed?    na Smoking Cessation/Relapse Prevention completed? [] Yes      [] No  Date: ** Smoking Cessation Counseling attended  [] Yes      [] No  **If not completed, Why? **   Professional Referrals:  *Please check if needed  [] Diabetes Clinic  [] Lipid Clinic   [] Other:      Professional Referrals:  *Please check if needed  [] Diabetes Clinic  [] Lipid Clinic   [] Other:   Preventative Medication Preventative Medication Preventative Medication Preventative Medication Preventative Medication Preventative Medication   Aspirin  [] Yes    [x] No  Blood Thinner: Clopidogrel/Effient/Brillinta  [] Yes    [x] No  Beta Blocker  [x] Yes    [] No  Ace Inhibitor  [] Yes    [x] No  Statin/Lipid Lowering  [x] Yes    [] No Medication Changes? [] Yes    [x] No Medication Changes? [] Yes    [x] No Medication Changes? [] Yes    [x] No Medication Changes? [] Yes    [] No Medication Changes? [] Yes    [] No   *Education* *Education* *Education* *Education* *Education* *Education*   Does Cresencio require any additional education? [] Yes    [x] No   Does Cresencio require any additional education? [] Yes    [x] No Does Cresencio require any additional education? [] Yes    [x] No Does Cresencio require any additional education? [] Yes    [x] No Does Cresencio require any additional education? [] Yes    [] No Does Cresencio require any additional education?   [] Yes    [] No   Additional Educational Videos    Exercise  [] Improve Performance    Medical  [] Aging Enhancing Your QoL  [] Biology of Weight Control  [] Decoding Lab Results  [] Diseases of Our Time - Overview  [] Sleep Disorders    Nutrition  [] Dining Out - Part 2  [] Fueling a Healthy Body  [] Menu Workshop  [] Planning Your Eating Strategy  [] Targeting Your Nutrition Priorities  [] Vitamins & Minerals    Healthy Mind-Set  [] Smoking Cessation    Culinary  []Becoming a Pritikin    [] Cooking - Breakfast & Snacks  [] Cooking -Healhty Salads & Dressing  [] Cooking -Dinner & Sides  [] Cooking -Soups & Desserts    Overview  [] The Pritikin Solution Additional Educational Videos Completed Additional Educational Videos Completed Additional Educational Videos Completed Additional Educational Videos Completed Additional Educational Videos Completed    [] Yes    [] No   *Goals* *Goals* *Goals* *Goals* *Goals* *Goals*   Cresencio's risk factor/education goals are as follows:    [x] Optimal BP <140/90  [] Blood Sugar <120  [x] Attend recommended video education sessions  [x] Takes medications as prescribed 100% of the time   [] ** Cresencio's risk factor/education goals are as follows:    [x] Optimal BP <140/90  [] Blood Sugar <120  [x] Attend recommended video education sessions  [x] Takes medications as prescribed 100% of the time   [] ** Cresencio's risk factor/education goals are as follows:    [x] Optimal BP <140/90  [] Blood Sugar <120  [x] Attend recommended video education sessions  [x] Takes medications as prescribed 100% of the time   [] ** Cresencio's risk factor/education goals are as follows:    [x] Optimal BP <140/90  [] Blood Sugar <120  [x] Attend recommended video education sessions  [x] Takes medications as prescribed 100% of the time   [] ** Cresencio's risk factor/education goals are as follows:    [x] Optimal BP <140/90  [] Blood Sugar <120  [x] Attend recommended video education sessions  [x] Takes medications as prescribed 100% of the time   [] ** Cresencio achieved risk factor goals?   [] Yes    [] No  If no, why?  **     Monitored telemetry has revealed NSR   Monitored telemetry has revealed NSR Monitored telemetry has revealed NSR   Monitored telemetry has revealed NSR Monitored telemetry has revealed **  [] documented arrhythmia at increasing workloads  [] associated symptoms ** Monitored telemetry has revealed **  [] documented arrhythmia at increasing workloads  [] associated symptoms **   Physician Response    [x] Cardiac rehab is reasonably and medically necessary for continuous cardiac monitoring surveillance  of patient's cardiac activity  [x] Initiate continuous telemetry monitoring and notify me with any concerns  [] Other   Physician Response    [x] Cardiac rehab is reasonably and medically necessary for continuous cardiac monitoring surveillance  of patient's cardiac activity  [x] Continue continuous telemetry monitoring and notify me with any concerns  [] Other     Physician Response    [x] Cardiac rehab is reasonably and medically necessary for continuous cardiac monitoring surveillance  of patient's cardiac activity  [x] Continue continuous telemetry monitoring and notify me with any concerns   [] Other     Physician Response    [x] Cardiac rehab is reasonably and medically necessary for continuous cardiac monitoring surveillance  of patient's cardiac activity  [x] Continue continuous telemetry monitoring and notify me with any concerns   [] Other     Physician Response    [x] Cardiac rehab is reasonably and medically necessary for continuous cardiac monitoring surveillance  of patient's cardiac activity  [x] Continue continuous telemetry monitoring and notify me with any concerns   [] Other       Cosigned by: Gordy Ryder MD at 9/13/2022  1:29 PM     Revision History  Date/Time User Provider Type Action   9/13/2022  1:29 PM Gordy Ryder MD Physician [address] [place] [address]   9/13/2022 12:11 PM Rickey Contreras, PT Exercise Physiologist Sign     Cosigned by: Gordy Ryder MD at 10/13/2022 11:38 AM     Revision History  Date/Time User Provider Type Action   10/13/2022 11:38 AM Gordy Ryder MD Physician Cosign   10/10/2022 10:38 AM Maggi Brown Exercise Physiologist Sign     Cosigned by: Gordy Ryder MD at 11/9/2022  1:00 PM     Revision History  Date/Time User Provider Type Action   11/9/2022  1:00 PM oGrdy Ryder MD Physician Cosign   11/7/2022 12:34 PM Rickey Contreras, PT Exercise Physiologist Sign

## 2022-12-07 ENCOUNTER — HOSPITAL ENCOUNTER (OUTPATIENT)
Dept: CARDIAC REHAB | Age: 62
Setting detail: THERAPIES SERIES
Discharge: HOME | End: 2022-12-07
Payer: MEDICARE

## 2022-12-07 PROCEDURE — 93798 PHYS/QHP OP CAR RHAB W/ECG: CPT

## 2022-12-09 ENCOUNTER — HOSPITAL ENCOUNTER (OUTPATIENT)
Dept: CARDIAC REHAB | Age: 62
Setting detail: THERAPIES SERIES
Discharge: HOME | End: 2022-12-09
Payer: MEDICARE

## 2022-12-09 PROCEDURE — 93798 PHYS/QHP OP CAR RHAB W/ECG: CPT

## 2022-12-12 ENCOUNTER — HOSPITAL ENCOUNTER (OUTPATIENT)
Dept: CARDIAC REHAB | Age: 62
Setting detail: THERAPIES SERIES
Discharge: HOME | End: 2022-12-12
Payer: MEDICARE

## 2022-12-12 PROCEDURE — 93798 PHYS/QHP OP CAR RHAB W/ECG: CPT

## 2022-12-14 ENCOUNTER — HOSPITAL ENCOUNTER (OUTPATIENT)
Dept: CARDIAC REHAB | Age: 62
Setting detail: THERAPIES SERIES
Discharge: HOME | End: 2022-12-14
Payer: MEDICARE

## 2022-12-14 PROCEDURE — 93798 PHYS/QHP OP CAR RHAB W/ECG: CPT

## 2022-12-16 ENCOUNTER — HOSPITAL ENCOUNTER (OUTPATIENT)
Dept: CARDIAC REHAB | Age: 62
Setting detail: THERAPIES SERIES
Discharge: HOME | End: 2022-12-16
Payer: MEDICARE

## 2022-12-16 PROCEDURE — 93798 PHYS/QHP OP CAR RHAB W/ECG: CPT

## 2022-12-19 ENCOUNTER — HOSPITAL ENCOUNTER (OUTPATIENT)
Dept: CARDIAC REHAB | Age: 62
Setting detail: THERAPIES SERIES
Discharge: HOME | End: 2022-12-19
Payer: MEDICARE

## 2022-12-19 PROCEDURE — 93798 PHYS/QHP OP CAR RHAB W/ECG: CPT

## 2022-12-21 ENCOUNTER — HOSPITAL ENCOUNTER (OUTPATIENT)
Dept: CARDIAC REHAB | Age: 62
Setting detail: THERAPIES SERIES
Discharge: HOME | End: 2022-12-21
Payer: MEDICARE

## 2022-12-21 PROCEDURE — 93798 PHYS/QHP OP CAR RHAB W/ECG: CPT

## 2022-12-28 ENCOUNTER — HOSPITAL ENCOUNTER (OUTPATIENT)
Dept: CARDIAC REHAB | Age: 62
Setting detail: THERAPIES SERIES
Discharge: HOME | End: 2022-12-28
Payer: MEDICARE

## 2022-12-28 PROCEDURE — 93798 PHYS/QHP OP CAR RHAB W/ECG: CPT

## 2022-12-28 NOTE — PLAN OF CARE
[facility] - [address] [facility]  Individualized Cardiac Treatment Plan    Patient Name:  Power Cavazos  :  1960  Age:  57 y.o. MRN:  [id number]  Diagnosis: Heart Transplant  Date of Event: 22   Physician:  Sumanth WALLS  Next Office Visit:  10/5/22  Date Entered Program: 22  Risk Stratifications: [] Low [] Intermediate [x] High  Allergies: Allergies   Allergen Reactions    Entresto [Sacubitril-Valsartan] Dizziness or Vertigo       COVID -19 Screen  Do you have any of the following symptoms:  [] Fever [] Cough [] SOB [] Muscle/Body Ache [] Loss of taste/smell [x] None    Have you traveled outside of the [place]? [] Yes      [x] No    Have you been around anyone who has tested Positive for COVID 19?  [] Yes      [x] No    The following Education has been completed with patient. [x] Wait in the lobby until we call you back to rehab. [x] You must wear a mask while in the medical center, and in cardiac rehab. Please bring your own. [x] Bring your own bottle of water with you. Individual Cardiac Treatment Plan -EXERCISE  INITIAL 30 DAY 60 DAY 90 DAY FINAL DAY   EXERCISE  ASSESSMENT/PLAN EXERCISE  REASSESSMENT EXERCISE   REASSESSMENT EXERCISE   REASSESSMENT EXERCISE  DISCHARGE/FOLLOW-UP   Date: 22 Date: 10/10/22 Date: 22 Date: 22    Pt not here for reassessment. Pt has been sick.     Date: 22   Session #1  First Exercise Session:  ** Session # 10 Session # 21 Session # 29 Session # 36  Last Exercise Session:  22   Stages of Change Stages of Change Stages of Change Stages of Change Stages of Change   [] Pre Contemplation  [] Contemplation  [] Preparation  [x] Action  [] Maintenance  [] Relapse [] Pre Contemplation  [] Contemplation  [] Preparation  [x] Action  [] Maintenance  [] Relapse [] Pre Contemplation  [] Contemplation  [] Preparation  [x] Action  [] Maintenance  [] Relapse [] Pre Contemplation  [] Contemplation  [] Preparation  [x] Action  [] Maintenance  [] Relapse [] Pre Contemplation  [] Contemplation  [] Preparation  [x] Action  [] Maintenance  [] Relapse   EXERCISE ASSESSMENT EXERCISE ASSESSMENT EXERCISE ASSESSMENT EXERCISE ASSESSMENT EXERCISE ASSESSMENT   6 Min Walk Test  Distance walked:   0.09 miles  475 ft.  1.7 METs  Max HR:99 BPM      RPE:  12    THR:  117-130  Rhythm:  NSR    6 Min Walk Test  Na    Pt only uses NuStep to exercise. DASI: 5.1 METs DASI: 4.73 METs DASI: 5.1 METs DASI: na DASI: na   Return to Work  [employer] on returning to work? [] Yes              [] No   [x] Disabled     [] Retired     Return to work:  Disabled   Return to work:  na Return to work:  Has Cresencio returned to work? Disabled. Return to work:  Has David Edwige returned to work?  disabled   Orthopedic Limitations/  [x] Yes    [] No  If yes please list:  left leg weaker     Orthopedic Limitations  *If patient has orthopedic issue:   Actions/  accomodations needed to make Cresencio successful : NuStep only Orthopedic Limitations  NuStep only Orthopedic Limitations  NuStep only Orthopedic Limitations  NuStep only   Fall Risk    [x]Assessed as a fall risk- with fatigue  [] Not a fall risk      [] Balance Issues       [] Walker        [x] Cane       [] Wheelchair  Actions needed: use cane as needed for balance. [x] Safety issues reviewed      Fall Risk  *If patient is a fall risk, action needed to accommodate:  NuStep only Fall Risk  na Fall Risk  na Fall Risk  na   Home Exercise  [x] Yes    [] No  Type: recumbant bike  Frequency: 4 x per week  Duration: 30 min Home Exercise  [x] Yes    [] No  Type: recumbent bike, [employer]   Frequency:3d/wk  Duration: 60mins Home Exercise  [x] Yes    [] No  Type: works out at [employer]  Frequency: 3 x per week  Duration: 60 min Home Exercise  [x] Yes    [] No  Type: [employer]. Home Exercise  [x] Yes    [] No  Type:[employer]   Angina with Activity?   [] Yes    [x] No  Angina Management: na Angina with Activity? [] Yes    [x] No  Angina Management: na Angina with Activity? [] Yes    [x] No  Angina Management: na Angina with Activity? [] Yes    [x] No  Angina Management: na Angina with Activity?   [] Yes    [x] No  Angina Management: na   EXERCISE PLAN EXERCISE PLAN EXERCISE PLAN EXERCISE PLAN EXERCISE PLAN   *Interventions* *Interventions* *Interventions* *Interventions* *Interventions*   Exercise Prescription  (per physician & CR staff) Exercise Prescription  (per physician & CR staff) Exercise Prescription  (per physician & CR staff) Exercise Prescription  (per physician & CR staff) Exercise Prescription  (per physician & CR staff)   Cardiovascular Cardiovascular Cardiovascular Cardiovascular Cardiovascular   Mode:    [x] Treadmill (TM)  [x] Schwinn Airdyne (AD)  [x] Arms Ergometer (AE)  [] NuStep  [] Elliptical (E) MODE:    [] Treadmill (TM)  [] Schwinn Airdyne (AD)  [x] Arms Ergometer (AE)  [x] NuStep  [] Elliptical (E) MODE:    [] Treadmill (TM)  [] Schwinn Airdyne (AD)  [x] Arms Ergometer (AE)  xNuStep  [][] Elliptical (E) MODE:    [] Treadmill (TM)  [] Schwinn Airdyne (AD)  [x] Arms Ergometer (AE)  [x] NuStep  [] Elliptical (E) MODE:    [] Treadmill (TM)  [] Schwinn Airdyne (AD)  [x] Arms Ergometer (AE)  [x] NuStep  [] Elliptical (E)   Initial Workloads  TM: [email or web address] 1.8 METs  AD: 0.6 level = 1.7 METs  NS: 14  [place]oll[place]= 1.7 METs  AE: 0.3 level = 1.4 METs Current Workloads    NS:   40Watts=  2.2METs  AE:  24W=  1.9METs Current Workloads  NS:   52 Pollack=  2.4METs  AE:  24 [place]=  METs Current Workloads  NS:  52 Poll[place]=  2.4 METs  AE:  24 pollack =  1.9 METs Current Workloads  NS:  52 Poll[place]=  2.4 METs  AE:  42 pollack =  2.2 METs     Frequency:    ICR: 3x/week  Home: 2-3x/wk Frequency:   ICR: 3x/week  Home: 3x/wk Frequency:  ICR: 3x/week  Home: 3-4x/wk Frequency:  ICR: 3x/week  Home: 3-4x/wk Frequency:  Cresencio will continue exercise at  5-7 days/week   Duration:   Total aerobic exercise = 30-45 min    5-8 min/mode Duration:  Total aerobic exercises = 35-40 min     15 min/mode Duration:  Total aerobic exercises = 35-40 min     15 min/mode Duration:  Total aerobic exercises = 35-40 min     15 min/mode Duration:  Total erobic exercise =  60-90 min   Intensity:   MET Level = 1.7-1.8  RPE = 12-15 Intensity:  Max MET Level = 2.2  RPE = 12-15 Intensity:  Max MET Level = 2.4  RPE = 12-15 Intensity:  Max MET Level = 2.4  RPE = 12-15 Intensity:  Max MET Level = 2.4  RPE = 12-15   Progression: increase aerobic activity up to 15 min over next 4 weeks by increasing time 2-3 min/week. Progression: increase aerobic intensity 5-10% over the next 4 weeks   Progression:  Increase intensity 5-10% over the next 4 weeks as tolerated Progression:  Increase intensity 5-10% over the next 4 weeks as tolerated Progression:  Increase time/intensity when RPE <13, and HR is in [facility]   [x] Yes      [] No  Upper and Lower body strength training 2x/wk    Wt: 2#       Reps:  8-15    *Increase wt. after completing 15 reps with an RPE of <12/13. [x] Yes      [] No  Upper and Lower body strength training 2x/wk    Wt: 3#       Reps:  8-15    *Increase wt. after completing 15 reps with an RPE of <12/13. [x] Yes      [] No  Upper and Lower body strength training 2x/wk    Wt: 4#       Reps:  8-15    *Increase wt. after completing 15 reps with an RPE of <12/13. [x] Yes      [] No  Upper and Lower body strength training 2x/wk    Wt: 4#       Reps:  8-15    *Increase wt. after completing 15 reps with an RPE of <12/13. Continue Strength Training at home   [x] Exercise Log & Strength training handout given     Wt: 4#       Reps:  8-15    *Increase wt. after completing 15 reps with an RPE of <12/13.    Flexibility Flexibility Flexibility Flexibility Flexibility   [x] Yes      [] No  25 min session of Core Strength & Flexibility 1x/per week  Attends Core Strength & Flexibility   [x] Yes      [] No Attends Core Strength & Flexibility   [x] Yes      [] No Attends Core Strength & Flexibility   [x] Yes      [] No Continue Core Strength & Flexibility at home   Home Exercise  *Cresencio verbalizes planning to bike/walk 3-4 days/week for 20-30 min on days not in rehab. Home Exercise  *Cresencio verbalizes planning to bike/walk 3-4days/week for 30-60 min on days not in rehab. Home Exercise  *Cresencio verbalizes planning to work out 3-4 days/week for 30-60 min on days not in rehab. Home Exercise  *Cresencio verbalizes planning to exercise 3-4 days/week for 30-60 min on days not in rehab. Home Exercise  *Cresencio verbalizes his plan to exercise 3-4 days/week for 30-60 min on days not in rehab. *Education* *Education* *Education* *Education* *Education*   RPE Scale  [x] Yes      [] No  Exercise Safety  [x] Yes      [] No  Equipment Orientation  [x] Yes      [] No  S/S to Report  [x] Yes      [] No  Warm Up/Cool Down  [x] Yes      [] No  Home Exercise  [x] Yes      [] No    All Exercise Education Completed  [x] Yes      [] No    Exercise Education Attended/Date Exercise Education Attended/Date Exercise Education Attended/Date All Sessions Completed?     [x] Yes  [] No   *Goals* *Goals* *Goals* *Goals* *Goals*   Initial Exercise Goals Exercise Goals  Exercise Goals   Exercise Goals  Exercise Goals   Cresencio plans to:  [x] Attend exercise sessions 3x/wk  [x] initiate home exercise 2-3x/wk for 10-20 min  [x] Increase 6 min walk distance by 10%  [] Attend Exercise workshops Cresencio plans to:  [x] Attend exercise sessions 3x/wk  [x] continue home exercise 2-3x/wk for 20-30 min  [x] Attend Exercise workshops Cresencio's plans to:  [x] Attend exercise sessions 3x/wk  [x] continue home exercise 3-4x/wk for 30-45 min  [x] Determine plan of exercise following rehab  [x] Attend Exercise workshops Cresencio's plans to:  [x] Attend exercise sessions 3x/wk  [x] continue home exercise 3-4x/wk for 30-45 min  [x] Determine plan of exercise following rehab  [x] Attend Exercise workshops Cresencio achieved exercise goals? []  Yes    [x] No     Did not complete 6 MWT    [] Increased 6 min walk distance by 10%  [x] Currently exercising 30-60 min/day, 5-7days/wk   [x] Plans to continue exercise on own  [] Plans to join a local fitness center to continue exercise  [] Does not plan to continue to exercise after rehab   Mutually agreed upon goals:  José Luis Bones, travel, household chores Progress towards mutually agreed upon goals:  Trudy Dubon  continues to work on his strength and endurance to be able to return to his goals Progress towards mutually agreed upon goals:  Trudy Jagdish  continues to improve strength and endurance to make his ADL's, yardwork, travel easier. Progress towards mutually agreed upon goals:  Trudy Jagdish continues to improve strength and endurance to make his ADL's, yardwork, travel easier. Progress towards mutually agreed upon goals:  Trudy Jagdish  continues to improve strength and endurance to make his ADL's, yardwork, travel easier. *MET level required for above goal:  5-6 METs MET level Achieved:  2. 2METs MET level Achieved:  2.4 METs MET level Achieved:  2.4 METs MET level Achieved: 2.4 METs     Individual Cardiac Treatment Plan - Nutrition  NUTRITION  ASSESSMENT/PLAN NUTRITION  REASSESSMENT NUTRITION   REASSESSMENT NUTRITION   REASSESSMENT NUTRITION  DISCHARGE/FOLLOW-UP   Stages of Change Stages of Change Stages of Change Stages of Change Stages of Change   [] Pre Contemplation  [x] Contemplation  [] Preparation  [] Action  [] Maintenance  [] Relapse [] Pre Contemplation  [x] Contemplation  [] Preparation  [] Action  [] Maintenance  [] Relapse [] Pre Contemplation  [x] Contemplation  [] Preparation  [] Action  [] Maintenance  [] Relapse [] Pre Contemplation  [x] Contemplation  [] Preparation  [] Action  [] Maintenance  [] Relapse [] Pre Contemplation  [x] Contemplation  [] Preparation  [] Action  [] Maintenance  [] Relapse   NUTRITION ASSESSMENT NUTRITION ASSESSMENT NUTRITION ASSESSMENT NUTRITION ASSESSMENT NUTRITION ASSESSMENT   Weight Management  Weight: 234        Height: 69\"   BMI: 34.6  Weight Management  Weight: 232                 Weight Management  Weight: 233                 Weight Management  Weight: 233.5 Weight Management  Weight: 237                   BMI: 35   Eating Plan  Current eating habits: heart healthy, no salt Eating Plan  Changes: none Eating Plan  Changes: none reported Eating Plan  Changes:na Eating Plan Improvements: na   Alcohol Use  [] none          [] daily  [] weekly      [x] special   Type: beer  Amount: 1       Diet Assessment Tool:  RATE YOUR PLATE  *Given to patient to complete and return. Diet Assessment Tool:    Score: 50/72       Diet Assessment Tool: RATE YOUR PLATE  Score: 61/89   NUTRITION PLAN NUTRITION PLAN NUTRITION PLAN NUTRITION PLAN NUTRITION PLAN   *Interventions* *Interventions* *Interventions* *Interventions* *Interventions*   Initial Survey given Goal Setting Discussion:   [x] Yes      [] No       Follow Up Survey Reviewed & Goals Updated: yes     Professional Referral  Please check if needed. [] Dietitian Consult   [] Wt. Management Referral  [] Other:  Professional Referral  Please check if needed. [] Dietitian Consult   [] Wt. Management Referral  [] Other: Professional Referral  Please check if needed. [] Dietitian Consult   [] Wt. Management Referral  [] Other: Professional Referral  Please check if needed. [] Dietitian Consult   [] Wt. Management Referral  [] Other: Professional Referral  Please check if needed. [] Dietitian Consult   [] Wt. Management Referral  [] Other:    *Education* *Education* *Education* *Education*    Nutritional Education Attended/Date Nutritional Education Attended/Date Nutritional Education Attended/Date All Sessions Completed?     [] Yes  [x] No   Cooking School  Recommended     [x] Adding Flavor  [x] Fast & Healthy     Breakfasts  [x] PSYCHOSOCIAL ASSESSMENT PSYCHOSOCIAL ASSESSMENT   Behavioral Outcomes Behavioral Outcomes Behavioral Outcomes Behavioral Outcomes Behavioral Outcomes   PHQ-9 score 3  Depression Severity  [x]Minimal  []Mild   []Moderate  []Moderately Severe  []Severe    PHQ-9 score 5  Depression Severity  []Minimal  [x]Mild   []Moderate  []Moderately Severe []Severe   Does patient have Family Support? [x] Yes      [] No  No signs of marital/family distress       Within the Past Month:  *Have you wished you were dead or wished you could go to sleep and not wake up? [] Yes      [x] No  *Have you had any thoughts of killing yourself? [] Yes      [x] No         Using a scale of 0-10, 0=none, 10=very:   Rate your depression: 0  Rate your anxiety:  0  Using a scale of 0-10, 0=none, 10=very:   Rate your depression: 0  Rate your anxiety:  0 Using a scale of 0-10, 0=none, 10=very:   Rate your depression: 0  Rate your anxiety:  0 Using a scale of 0-10, 0=none, 10=very:   Rate your depression: 0  Rate your anxiety:  0 Using a scale of 0-10, 0=none, 10=very:   Rate your depression: 0  Rate your anxiety: 0   Signs and Symptoms of Depression Present? [] Yes      [x] No   Signs and Symptoms of Depression Present? [] Yes      [x] No   Signs and Symptoms of Depression Present? [] Yes      [x] No  If yes, please explain:  ** Signs and Symptoms of Depression Present? [] Yes      [x] No   Signs and Symptoms of Depression Present? [] Yes      [x] No     Signs and Symptoms of Anxiety Present? [] Yes      [x] No   Signs and Symptoms of Anxiety Present? [] Yes      [] No Signs and Symptoms of Anxiety Present? [] Yes      [x] No  If yes, please explain:  ** Signs and Symptoms of Anxiety Present? [] Yes      [x] No   Signs and Symptoms of Anxiety Present? [] Yes      [x] No     [] Patient has poor eye contact   [] Flat affect present. [] Signs of anxiety, anger or hostility    [] Signs social isolation present.    []  Signs of alcohol or substance abuse       PSYCHOSOCIAL PLAN PSYCHOSOCIAL PLAN PSYCHOSOCIAL PLAN PSYCHOSOCIAL PLAN PSYCHOSOCIAL PLAN   *Interventions* *Interventions* *Interventions* *Interventions* *Interventions*   *Please check if needed  [] Psych Consult  [] Physician Referral  [x] Stress Management Skills *Please check if needed  [] Psych Consult  [] Physician Referral  [x] Stress Management Skills *Please check if needed  [] Psych Consult  [] Physician Referral  [x] Stress Management Skills *Please check if needed  [] Psych Consult  [] Physician Referral  [x] Stress Management Skills *Please check if needed  [] Psych Consult  [] Physician Referral  [x] Stress Management Skills   Is patient currently taking anti-depressant or anti-anxiety medications? [x] Yes      [] No  If yes, please list medications:  amitriptyline Change in anti-depressant or anti-anxiety medications? [] Yes      [x] No   Change in anti-depressant or anti-anxiety medications? [] Yes      [x] No  If yes, please list medications:  ** Change in anti-depressant or anti-anxiety medications? [] Yes      [x] No  If yes, please list medications:  na Change in anti-depressant or anti-anxiety medications?   [] Yes      [x] No     *Education* *Education* *Education* *Education* *Education*   Healthy Mind-Set Workshops Recommended  [x] Stress & Health  [x] Taking Charge of Stress  [x] New Thoughts, New Behaviors  [x] Managing Moods & Relationships Healthy Mind-Set Workshops Attended/Date    Patient does not attend workshops, switched to [employer] Workshops Attended/Date  na Healthy Mind-Set Workshops Attended/Date    na Healthy Mind-Set Workshops  Completed  [] Yes      [x] No   *Goals* *Goals* *Goals* *Goals* *Goals*   Cresencio's psychosocial goals are as follows:  Complete HADS & Rolan & Evelio, Quality of Life Index, Cardiac Version IV Cresencio's psychosocial goals are as follows:  [] Attend Healthy Mind-Set Workshops  [] Reduce depression symptom severity by 1 level  [] ** Cresencio's psychosocial goals are as follows:  [] Attend Healthy Mind-Set Workshops  [x] Reduce depression symptom severity by 1 level  [] ** Cresencio's psychosocial goals are as follows:  [] Attend Healthy Mind-Set Workshops  [x] Reduce depression symptom severity by 1 level  [] ** Cresencio achieved psychosocial goals?   [x] Yes    [] No    [x] Use methods learned to continue to reduce depression symptom severity by 1 level       Individual Cardiac Treatment Plan - Other:  Risk Factor/Education  RISK FACTOR  ASSESSMENT/PLAN RISK FACTOR  REASSESSMENT  RISK FACTOR  REASSESSMENT RISK FACTOR  REASSESSMENT RISK FACTOR   DISCHARGE/FOLLOW-UP   Stages of Change Stages of Change Stages of Change Stages of Change Stages of Change   [] Pre Contemplation  [x] Contemplation  [] Preparation  [] Action  [] Maintenance  [] Relapse [] Pre Contemplation  [x] Contemplation  [] Preparation  [] Action  [] Maintenance  [] Relapse [] Pre Contemplation  [] Contemplation  [x] Preparation  [] Action  [] Maintenance  [] Relapse [] Pre Contemplation  [] Contemplation  [x] Preparation  [] Action  [] Maintenance  [] Relapse [] Pre Contemplation  [] Contemplation  [x] Preparation  [] Action  [] Maintenance  [] Relapse   RISK FACTOR/EDUCATION ASSESSMENT RISK FACTOR/EDUCATION ASSESSMENT RISK FACTOR/EDUCATION ASSESSMENT RISK FACTOR/EDUCATION ASSESSMENT RISK FACTOR /EDUCATION ASSESSMENT   Hypertension  [x] Yes      [] No    Resting BP: 144/82  Peak Ex BP:152/64  Medication: hydralazine, valsartan  Metoprolol, amlodipine   Hypertension  Resting BP: 134/66  Peak Ex BP:164/72  Medication Changes:  [] Yes      [x] No Hypertension  Resting BP: 140/82  Peak Ex BP: 178/82  Medication Changes:  [] Yes      [x] No Hypertension  Resting BP: 128/80  Peak Ex BP: 158/82  Medication Changes:  [] Yes      [x] No Hypertension  Resting BP: 128/70  Peak Ex BP: 160/60  Medication Changes:  [] Yes      [x] No   Lipids  HLD/DLD  [x] Yes      [] No  TOTAL CHOL: 200  HDL:  102  LDL:  86  TRI  Medication: pravastatin, atorvastatin Lipids  Medication Changes:  [] Yes      [x] No     Lipids  Medication Changes:  [] Yes      [x] No     Lipids  Medication Changes:  [] Yes      [x] No     Lipids  TOTAL CHOL: 200  HDL:  102  LDL:  86  TRI  Medication Changes:  [] Yes      [x] No   Diabetes  [] Yes      [x] No   Diabetes  NA   Diabetes  na     Diabetes  na     Diabetes       [x] No       Tobacco Use  [] Current  [] Former  [x] Never      Smokeless Tobacco use:   [] Yes      [x] No   Tobacco Use  NA   Tobacco Use  Change in smoking status   [] Yes      [x] No       Tobacco Use  Change in smoking status   [] Yes      [x] No     Tobacco Use  Change in smoking status   [] Yes      [x] No                Learning Barriers  Please select one:  [] Speech  [] Literacy  [] Hearing  [] Cognitive  [x] Vision  [x] Ready to Learn Learning Barriers Addressed:   [x] Yes      [] No   Learning Barriers Addressed:   [] Yes      [x] No   Learning Barriers Addressed:  [] Yes      [x] No Learning Barriers Addressed:  [] Yes      [x] No     RISK FACTOR/EDUCATION PLAN RISK FACTOR/EDUCATION PLAN RISK FACTOR/EDUCATION PLAN RISK FACTOR/EDUCATION PLAN RISK FACTOR/EDUCATION PLAN     *Interventions* *Interventions* *Interventions*     Completed Videos/Date Completed Videos/Date Recommended Educational Videos Completed    [] Yes      [x] No    **If not completed, Why? Pt switched to TCR          Smoking Cessation/Relaspe Prevention Intervention needed? [] Yes      [x] No   Smoking Cessation/Relapse Prevention Scheduled? NA Smoking Cessation/Relapse Prevention completed?   na Smoking Cessation/Relapse Prevention completed?   na Smoking Cessation Counseling attended  Not needed.     Professional Referrals:  *Please check if needed  [] Diabetes Clinic  [] Lipid Clinic   [] Other:     Professional Referrals:  *Please check if needed  [] Diabetes Clinic  [] Lipid Clinic   [] Other: Preventative Medication Preventative Medication Preventative Medication Preventative Medication Preventative Medication   Aspirin  [] Yes    [x] No  Blood Thinner: Clopidogrel/Effient/Brillinta  [] Yes    [x] No  Beta Blocker  [x] Yes    [] No  Ace Inhibitor  [] Yes    [x] No  Statin/Lipid Lowering  [x] Yes    [] No Medication Changes? [] Yes    [x] No Medication Changes? [] Yes    [x] No Medication Changes? [] Yes    [x] No Medication Changes? [] Yes    [x] No   *Education* *Education* *Education* *Education* *Education*   Does Cresencio require any additional education? [] Yes    [x] No   Does Cresencio require any additional education? [] Yes    [x] No Does Cresencio require any additional education? [] Yes    [x] No Does Cresencio require any additional education? [] Yes    [x] No Does Cresencio require any additional education?   [] Yes    [x] No   Additional Educational Videos    Exercise  [] Improve Performance    Medical  [] Aging Enhancing Your QoL  [] Biology of Weight Control  [] Decoding Lab Results  [] Diseases of Our Time - Overview  [] Sleep Disorders    Nutrition  [] Dining Out - Part 2  [] Fueling a Healthy Body  [] Menu Workshop  [] Planning Your Eating Strategy  [] Targeting Your Nutrition Priorities  [] Vitamins & Minerals    Healthy Mind-Set  [] Smoking Cessation    Culinary  []Becoming a Pritikin    [] Cooking - Breakfast & Snacks  [] Cooking -Healhty Salads & Dressing  [] Cooking -Dinner & Sides  [] Cooking -Soups & Desserts    Overview  [] The Pritikin Solution Additional Educational Videos Completed Additional Educational Videos Completed Additional Educational Videos Completed Additional Educational Videos Completed    [] Yes    [] No    na   *Goals* *Goals* *Goals* *Goals* *Goals*   Elder risk factor/education goals are as follows:    [x] Optimal BP <140/90  [] Blood Sugar <120  [x] Attend recommended video education sessions  [x] Takes medications as prescribed 100% of the time   [] ** Elder risk factor/education goals are as follows:    [x] Optimal BP <140/90  [] Blood Sugar <120  [x] Attend recommended video education sessions  [x] Takes medications as prescribed 100% of the time   [] ** Cresencio's risk factor/education goals are as follows:    [x] Optimal BP <140/90  [] Blood Sugar <120  [x] Attend recommended video education sessions  [x] Takes medications as prescribed 100% of the time   [] ** Cresencio's risk factor/education goals are as follows:    [x] Optimal BP <140/90  [] Blood Sugar <120  [x] Attend recommended video education sessions  [x] Takes medications as prescribed 100% of the time   [] ** Cresencio achieved risk factor goals?   [x] Yes    [] No       Monitored telemetry has revealed NSR   Monitored telemetry has revealed NSR Monitored telemetry has revealed NSR   Monitored telemetry has revealed NSR Monitored telemetry has revealed NSR     Physician Response    [x] Cardiac rehab is reasonably and medically necessary for continuous cardiac monitoring surveillance  of patient's cardiac activity  [x] Initiate continuous telemetry monitoring and notify me with any concerns  [] Other   Physician Response    [x] Cardiac rehab is reasonably and medically necessary for continuous cardiac monitoring surveillance  of patient's cardiac activity  [x] Continue continuous telemetry monitoring and notify me with any concerns  [] Other     Physician Response    [x] Cardiac rehab is reasonably and medically necessary for continuous cardiac monitoring surveillance  of patient's cardiac activity  [x] Continue continuous telemetry monitoring and notify me with any concerns   [] Other     Physician Response    [x] Cardiac rehab is reasonably and medically necessary for continuous cardiac monitoring surveillance  of patient's cardiac activity  [x] Continue continuous telemetry monitoring and notify me with any concerns   [] Other           Cosigned by: Brie Spence MD at 9/13/2022  1:29 PM     Revision History  Date/Time User Provider Type Action   9/13/2022  1:29 PM Tiffanie Tang MD Physician Cosign   9/13/2022 12:11 PM Yeni Allen PT Exercise Physiologist Sign     Cosigned by: Tiffanie Tang MD at 10/13/2022 11:38 AM     Revision History  Date/Time User Provider Type Action   10/13/2022 11:38 AM Tiffanie Tang MD Physician Cosign   10/10/2022 10:38 AM Maggi Brown Exercise Physiologist Sign     Cosigned by: Tiffanie Tang MD at 11/9/2022  1:00 PM     Revision History  Date/Time User Provider Type Action   11/9/2022  1:00 PM Tiffanie Tang MD Physician Cosign   11/7/2022 12:34 PM Yeni Allen PT Exercise Physiologist Sign     Cosigned by: Tiffanie Tang MD at 12/5/2022  3:33 PM     Revision History  Date/Time User Provider Type Action   12/5/2022  3:33 PM Tiffanie Tang MD Physician Cosign   12/5/2022  8:16 AM Milan Tejeda Exercise Physiologist Sign

## 2023-02-21 NOTE — PROGRESS NOTES
Medtronic dual icd remote  Battery 3.4years  A paced <0.1%  V pACED  <0.1%  p wave 2.3mV  r wave 6.8mV  Atrial impedance 513 ohms  Ventricle impedance 399 ohms  Shock 81 ohms  A fib <0.1%  NS-VT 2 episodes both 2seconds long 5 beats, (one of these episodes occurred for 15beats but was not fast enough to shock or register until the last 5 beats) Detail Level: Detailed Depth Of Biopsy: dermis Was A Bandage Applied: Yes Size Of Lesion In Cm: 0 Biopsy Type: H and E Biopsy Method: Dermablade Anesthesia Type: 1% lidocaine with epinephrine Anesthesia Volume In Cc (Will Not Render If 0): 0.5 Hemostasis: Drysol Wound Care: Petrolatum Dressing: bandage Destruction After The Procedure: No Type Of Destruction Used: Curettage Curettage Text: The wound bed was treated with curettage after the biopsy was performed. Cryotherapy Text: The wound bed was treated with cryotherapy after the biopsy was performed. Electrodesiccation Text: The wound bed was treated with electrodesiccation after the biopsy was performed. Electrodesiccation And Curettage Text: The wound bed was treated with electrodesiccation and curettage after the biopsy was performed. Silver Nitrate Text: The wound bed was treated with silver nitrate after the biopsy was performed. Lab: -51 Lab Facility: 3 Consent: Written consent was obtained and risks were reviewed including but not limited to scarring, infection, bleeding, scabbing, incomplete removal, nerve damage and allergy to anesthesia. Post-Care Instructions: I reviewed with the patient in detail post-care instructions. Patient is to keep the biopsy site dry overnight, and then apply bacitracin twice daily until healed. Patient may apply hydrogen peroxide soaks to remove any crusting. Notification Instructions: Patient will be notified of biopsy results. However, patient instructed to call the office if not contacted within 2 weeks. Billing Type: Third-Party Bill Information: Selecting Yes will display possible errors in your note based on the variables you have selected. This validation is only offered as a suggestion for you. PLEASE NOTE THAT THE VALIDATION TEXT WILL BE REMOVED WHEN YOU FINALIZE YOUR NOTE. IF YOU WANT TO FAX A PRELIMINARY NOTE YOU WILL NEED TO TOGGLE THIS TO 'NO' IF YOU DO NOT WANT IT IN YOUR FAXED NOTE.

## 2023-04-13 NOTE — LETTER
[address] [place] [address] [address] [place] [address]  Phone: [phone]  Fax: [phone]        October 27, 2021    Modesta Goes   [address] [address]      Dear Patient:    Since you have signed up for [employer], our office will begin sending your pacemaker/defibrillator download information through [address] This will replace the mailed letters we have previously sent by [employer]. Utilizing [employer] will give you faster results regarding your home download. You are also able to use [employer] to communicate with your physician. If you have questions, please give the device clinic a call at [phone].       Regards,    Jhonathan Beatty No

## 2023-05-10 ENCOUNTER — TRANSCRIBE ORDERS (OUTPATIENT)
Dept: ADMINISTRATIVE | Age: 63
End: 2023-05-10

## 2023-05-10 DIAGNOSIS — R39.11 URINARY HESITANCY: ICD-10-CM

## 2023-05-10 DIAGNOSIS — R35.1 NOCTURIA: Primary | ICD-10-CM

## 2023-05-11 ENCOUNTER — HOSPITAL ENCOUNTER (OUTPATIENT)
Age: 63
Discharge: HOME | End: 2023-05-11
Payer: MEDICARE

## 2023-05-11 ENCOUNTER — HOSPITAL ENCOUNTER (OUTPATIENT)
Dept: GENERAL RADIOLOGY | Age: 63
Discharge: HOME | End: 2023-05-11
Payer: MEDICARE

## 2023-05-11 DIAGNOSIS — M54.2 CERVICALGIA: ICD-10-CM

## 2023-05-11 PROCEDURE — 72040 X-RAY EXAM NECK SPINE 2-3 VW: CPT

## 2023-05-16 ENCOUNTER — HOSPITAL ENCOUNTER (OUTPATIENT)
Dept: PHYSICAL THERAPY | Age: 63
Setting detail: THERAPIES SERIES
Discharge: HOME | End: 2023-05-16
Payer: MEDICARE

## 2023-05-16 PROCEDURE — 97161 PT EVAL LOW COMPLEX 20 MIN: CPT

## 2023-05-16 PROCEDURE — 97110 THERAPEUTIC EXERCISES: CPT

## 2023-05-16 NOTE — PROGRESS NOTES
** PLEASE PLEASE SIGN, DATE AND TIME CERTIFICATION BELOW AND RETURN TO [facility] OUTPATIENT REHABILITATION (FAX #: [phone]). ATTEST/CO-SIGN IF ACCESSING VIA [employer]. THANK YOU.**    I certify that I have examined the patient below and determined that Physical Medicine and Rehabilitation service is necessary and that I approve the established plan of care for up to 90 days or as specifically noted. Attestation, signature or co-signature of physician indicates approval of certification requirements.    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]  Other specific joint derangements of right ankle, not elsewhere classified [M24.871]    Treatment Diagnosis Bilateral foot pain, Left ankle pain, decreased LE mobility, decreased ankle strength     Date of Evaluation 23    Additional Pertinent History CHF, HTN, OA, Claustrophobia, Spinal stenosis, L4-S1 decompression / L4-5 PSF (), Cardiac Defibrillator, CTR, Cervical Fusion (), Lumbar RFA       Functional Outcome Measure Used LEFS   Functional Outcome Score 40/80 (23)       Insurance: Primary: Payor: MEDICARE /  /  / ,   Secondary: [facility]   Authorization Information: OUTPATIENT BENEFITS:              DEDUCTIBLE:  $[id number]              OUT OF POCKET:  N/A              INSURANCE PAYS AT:   80%              PATIENT RESPONSIBILITY AND/OR CO-PAY:  20%  SECONDARY INSURANCE COMPANY: [facility].

## 2023-05-18 ENCOUNTER — HOSPITAL ENCOUNTER (OUTPATIENT)
Dept: PHYSICAL THERAPY | Age: 63
Setting detail: THERAPIES SERIES
Discharge: HOME | End: 2023-05-18
Payer: MEDICARE

## 2023-05-18 PROCEDURE — 97140 MANUAL THERAPY 1/> REGIONS: CPT

## 2023-05-18 PROCEDURE — 97110 THERAPEUTIC EXERCISES: CPT

## 2023-05-23 ENCOUNTER — APPOINTMENT (OUTPATIENT)
Dept: PHYSICAL THERAPY | Age: 63
End: 2023-05-23
Payer: MEDICARE

## 2023-05-23 PROCEDURE — 97124 MASSAGE THERAPY: CPT

## 2023-05-23 PROCEDURE — 97110 THERAPEUTIC EXERCISES: CPT

## 2023-05-23 NOTE — PROGRESS NOTES
[address] [facility]  PHYSICAL THERAPY  [] EVALUATION  [x] DAILY NOTE (LAND) [] DAILY NOTE (AQUATIC ) [] PROGRESS NOTE [] DISCHARGE NOTE    [x] OUTPATIENT REHABILITATION CENTER [facility]   [] [place] [address]    [] 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]  Other specific joint derangements of right ankle, not elsewhere classified [M24.871]    Treatment Diagnosis Bilateral foot pain, Left ankle pain, decreased LE mobility, decreased ankle strength     Date of Evaluation 23    Additional Pertinent History CHF, HTN, OA, Claustrophobia, Spinal stenosis, L4-S1 decompression / L4-5 PSF (), Cardiac Defibrillator, CTR, Cervical Fusion (), Lumbar RFA       Functional Outcome Measure Used LEFS   Functional Outcome Score 40/80 (23)       Insurance: Primary: Payor: MEDICARE /  /  / ,   Secondary: [facility]   Authorization Information: OUTPATIENT BENEFITS:              DEDUCTIBLE:  $[id number]              OUT OF POCKET:  N/A              INSURANCE PAYS AT:   80%              PATIENT RESPONSIBILITY AND/OR CO-PAY:  20%  SECONDARY INSURANCE COMPANY: [facility]. PRECERTIFICATION REQUIRED:  No  INSURANCE THERAPY BENEFIT:  Patient has unlimited visits based on medical necessity  Benefit will not cover maintenance or preventative treatment.   AQUATIC THERAPY COVERED:   Yes  MODALITIES COVERED:  Yes, with the exception of iontophoresis and hot packs/cold packs   Visit # 3, 3/10 for progress note   Visits Allowed: BMN   Recertification Date: 1/85/15   Physician Follow-Up: 23   Physician Orders: Joanne Roy and treat, ROM, strengthening, DN, ASTYM    History of Present Illness: Pt notes that when he was in the hospital for his heart transplant in 2022 he

## 2023-05-25 ENCOUNTER — APPOINTMENT (OUTPATIENT)
Dept: PHYSICAL THERAPY | Age: 63
End: 2023-05-25
Payer: MEDICARE

## 2023-05-30 ENCOUNTER — APPOINTMENT (OUTPATIENT)
Dept: PHYSICAL THERAPY | Age: 63
End: 2023-05-30
Payer: MEDICARE

## 2023-05-30 PROCEDURE — 97140 MANUAL THERAPY 1/> REGIONS: CPT

## 2023-05-30 PROCEDURE — 97110 THERAPEUTIC EXERCISES: CPT

## 2023-05-30 NOTE — PROGRESS NOTES
had increased foot/ankle swelling for about three weeks. Pt notes having to \"learn how to walk again\". Pt notes that he started Inpatient rehab in March of 2022, Pt notes having bilateral foot/ankle pain (Left more severe than Right), Pt noting \"I just ignored it\". Pt notes that he had his feet assessed by DPM about two months ago. Pt notes being told he has two bone spurs on each foot. Pt notes as he recovered from his heart transplant and started to get active this year, his symptoms elevated. Pt notes that more he is on his feet the more pain he has. SUBJECTIVE: Pt notes that the soreness from having his plantar warts worked on on 5/24/23. Pt notes that he hopes that his next [facility] appointment on 6/7/23 is the last he has to have his plantar warts worked on. Pt notes that he has been having some soreness/cramping within his Left peroneals since dancing over the weekend. TREATMENT   Precautions: Cervical / Lumbar Fusion, Heart transplant.  Warts on bilateral foot     Pain: 0-10/10 bilateral plantar foot / Left anterior ankle      X in shaded column indicates activity completed today   Modalities Parameters/  Location  Notes                     Manual Therapy Time/Technique  Notes   STM Gastroc/Soleus/Plantar fascia -dorsal surface  x Bilat PF    Grade 2-3 AP Talcrural joint mobs   x    DF PROM   x          Totals Manual  25 min  x    Exercise/Intervention   Notes   Ankle AROM PF / DF 15x3s      Ankle AROM Inv / Ev 15x3s   Cueing to isolate from ankle    PF Stretch  3* x30s ea       Seated HR  15*x3s      Soleus stretch 3*x 30 sec      Arch domes X12*/2 sec      Toe ext/flexion X12*/2 sec      Prone Quad stretch *  2x30s ea   x           4 way ankle x10 orange   HEP   Wobble board: f/b, s/s, cw/ccw* X10 ea   Difficulty with cw/ccw motion           Nu step S11 5 min L 3 x    DL HR *  [id number]  x Cueing for eccentric control c completion    Tandem stance *  30s ea   x             Interventions Next

## 2023-05-31 ENCOUNTER — HOSPITAL ENCOUNTER (OUTPATIENT)
Dept: MRI IMAGING | Age: 63
Discharge: HOME | End: 2023-05-31
Payer: MEDICARE

## 2023-05-31 ENCOUNTER — HOSPITAL ENCOUNTER (OUTPATIENT)
Dept: ULTRASOUND IMAGING | Age: 63
Discharge: HOME | End: 2023-05-31
Payer: MEDICARE

## 2023-05-31 DIAGNOSIS — R39.11 URINARY HESITANCY: ICD-10-CM

## 2023-05-31 DIAGNOSIS — R35.1 NOCTURIA: ICD-10-CM

## 2023-05-31 DIAGNOSIS — G44.52 HEADACHE, NEW DAILY PERSISTENT (NDPH): ICD-10-CM

## 2023-05-31 PROCEDURE — 70551 MRI BRAIN STEM W/O DYE: CPT

## 2023-05-31 PROCEDURE — 76775 US EXAM ABDO BACK WALL LIM: CPT | Performed by: FAMILY MEDICINE

## 2023-06-01 ENCOUNTER — HOSPITAL ENCOUNTER (OUTPATIENT)
Dept: PHYSICAL THERAPY | Age: 63
Setting detail: THERAPIES SERIES
Discharge: HOME | End: 2023-06-01
Payer: MEDICARE

## 2023-06-01 PROCEDURE — 97110 THERAPEUTIC EXERCISES: CPT

## 2023-06-01 PROCEDURE — 97140 MANUAL THERAPY 1/> REGIONS: CPT

## 2023-06-01 NOTE — PROGRESS NOTES
had increased foot/ankle swelling for about three weeks. Pt notes having to \"learn how to walk again\". Pt notes that he started Inpatient rehab in March of 2022, Pt notes having bilateral foot/ankle pain (Left more severe than Right), Pt noting \"I just ignored it\". Pt notes that he had his feet assessed by DPM about two months ago. Pt notes being told he has two bone spurs on each foot. Pt notes as he recovered from his heart transplant and started to get active this year, his symptoms elevated. Pt notes that more he is on his feet the more pain he has. SUBJECTIVE: Patient states he is still having work on plantar warts and has another appointment next week on 6/7/23. Patient reports compliance with HEP and that he felt good after previous session. TREATMENT   Precautions: Cervical / Lumbar Fusion, Heart transplant. Warts on bilateral foot     Pain: 2/10 bilateral plantar foot / Left anterior ankle      X in shaded column indicates activity completed today   Modalities Parameters/  Location  Notes                     Manual Therapy Time/Technique  Notes   STM Gastroc/Soleus/Plantar fascia -dorsal surface   Bilat PF    Grade 2-3 AP Talcrural joint mobs  2 min X    DF PROM  5 min X    Astym to Left lower leg* 20 min X Soft tissue restrictions noted throughout entire lower leg but more significant at dorsum of foot, medial/lateral malleolar region, tibialis ant, peroneals, gastroc, achilles, plantar fascia; Avoided areas where plantar warts have been treated.                 Totals Manual  27 min  X    Exercise/Intervention   Notes   Ankle AROM PF / DF 15x3s      Ankle AROM Inv / Ev 15x3s   Cueing to isolate from ankle    PF Stretch  3 x30s ea       Seated HR  15x3s      Soleus stretch 3x 30 sec      Arch domes 12x 2 sec      Toe ext/flexion 12x 2 sec      Prone Quad stretch  2x30s ea   X           4 way ankle x10 orange   HEP   Wobble board: f/b, s/s, cw/ccw X10 ea   Difficulty with cw/ccw motion

## 2023-06-06 ENCOUNTER — HOSPITAL ENCOUNTER (OUTPATIENT)
Dept: PHYSICAL THERAPY | Age: 63
Setting detail: THERAPIES SERIES
Discharge: HOME | End: 2023-06-06
Payer: MEDICARE

## 2023-06-06 PROCEDURE — 97110 THERAPEUTIC EXERCISES: CPT

## 2023-06-06 NOTE — PROGRESS NOTES
[employer] Access Code: Thaddeus Macadamia  []  No new Education completed  []  Reviewed Prior HEP      [x]  Patient verbalized and/or demonstrated understanding of education provided. []  Patient unable to verbalize and/or demonstrate understanding of education provided. Will continue education. []  Barriers to learning:     PLAN:  Treatment Recommendations: Strengthening, Range of Motion, Balance Training, Endurance Training, Gait Training, Neuromuscular Re-education, Manual Therapy - Soft Tissue Mobilization, Manual Therapy - Joint Manipulation, Pain Management, Home Exercise Program, Patient Education, Integrative Dry Needling, and Modalities    []  Plan of care initiated. Plan to see patient 2 times per week for 6 weeks to address the treatment planned outlined above.   [x]  Continue with current plan of care  []  Modify plan of care as follows:    []  Hold pending physician visit  []  Discharge    Time In 0800   Time Out 0832   Timed Code Minutes: 32 min   Total Treatment Time: 32 min     Electronically Signed by: Ling Dia PTA

## 2023-06-08 ENCOUNTER — APPOINTMENT (OUTPATIENT)
Dept: PHYSICAL THERAPY | Age: 63
End: 2023-06-08
Payer: MEDICARE

## 2023-06-20 ENCOUNTER — APPOINTMENT (OUTPATIENT)
Dept: PHYSICAL THERAPY | Age: 63
End: 2023-06-20
Payer: MEDICARE

## 2023-06-22 ENCOUNTER — HOSPITAL ENCOUNTER (OUTPATIENT)
Dept: PHYSICAL THERAPY | Age: 63
Setting detail: THERAPIES SERIES
End: 2023-06-22
Payer: MEDICARE

## 2023-07-05 ENCOUNTER — HOSPITAL ENCOUNTER (OUTPATIENT)
Dept: INTERVENTIONAL RADIOLOGY/VASCULAR | Age: 63
Discharge: HOME | End: 2023-07-05
Attending: FAMILY MEDICINE
Payer: MEDICARE

## 2023-07-05 DIAGNOSIS — M79.89 SWELLING OF LEFT LOWER EXTREMITY: ICD-10-CM

## 2023-07-05 DIAGNOSIS — M79.605 PAIN OF LEFT LEG: ICD-10-CM

## 2023-07-05 PROCEDURE — 93971 EXTREMITY STUDY: CPT

## 2023-11-06 ENCOUNTER — HOSPITAL ENCOUNTER (EMERGENCY)
Age: 63
Discharge: HOME | End: 2023-11-06
Payer: MEDICARE

## 2023-11-06 VITALS
WEIGHT: 243 LBS | TEMPERATURE: 98.3 F | BODY MASS INDEX: 35.99 KG/M2 | SYSTOLIC BLOOD PRESSURE: 131 MMHG | RESPIRATION RATE: 16 BRPM | HEIGHT: 69 IN | HEART RATE: 86 BPM | DIASTOLIC BLOOD PRESSURE: 71 MMHG | OXYGEN SATURATION: 96 %

## 2023-11-06 DIAGNOSIS — J01.40 ACUTE NON-RECURRENT PANSINUSITIS: Primary | ICD-10-CM

## 2023-11-06 PROCEDURE — 99213 OFFICE O/P EST LOW 20 MIN: CPT

## 2023-11-06 RX ORDER — AMOXICILLIN AND CLAVULANATE POTASSIUM 875; 125 MG/1; MG/1
1 TABLET, FILM COATED ORAL 2 TIMES DAILY
Qty: 20 TABLET | Refills: 0 | Status: SHIPPED | OUTPATIENT
Start: 2023-11-06 | End: 2023-11-16

## 2023-11-06 RX ORDER — FLUTICASONE PROPIONATE 50 MCG
2 SPRAY, SUSPENSION (ML) NASAL DAILY
Qty: 16 G | Refills: 0 | Status: SHIPPED | OUTPATIENT
Start: 2023-11-06

## 2023-11-06 ASSESSMENT — ENCOUNTER SYMPTOMS
SINUS PAIN: 1
NAUSEA: 0
EYE DISCHARGE: 0
COUGH: 1
VOMITING: 0
TROUBLE SWALLOWING: 0
SHORTNESS OF BREATH: 0
RHINORRHEA: 1
DIARRHEA: 0
SORE THROAT: 0
SINUS PRESSURE: 1
EYE REDNESS: 0

## 2023-11-06 ASSESSMENT — PAIN - FUNCTIONAL ASSESSMENT: PAIN_FUNCTIONAL_ASSESSMENT: NONE - DENIES PAIN

## 2023-11-06 NOTE — ED TRIAGE NOTES
Pt to [facility] ambulatory with a cough, and nasal congestion. This started 2 weeks ago.

## 2023-11-06 NOTE — ED PROVIDER NOTES
[address]  Urgent Care Encounter      CHIEF COMPLAINT       Chief Complaint   Patient presents with    Cough    Nasal Congestion       Nurses Notes reviewed and I agree except as noted in the HPI. HISTORY OF PRESENT ILLNESS   David Fernandez is a 70 y.o. male who presents Cough and nasal congestion. Cresencio reports symptoms present two weeks ago. His symptoms have included sinus pressure, pain, rhinitis, headache. Endorses and a cough. Cough is productive. Denies any fevers chills body aches. At home treating with Mucinex. Reports he is limited due to his cardiac history on medications he can take over-the-counter. REVIEW OF SYSTEMS     Review of Systems   Constitutional:  Negative for chills, diaphoresis, fatigue and fever. HENT:  Positive for congestion, postnasal drip, rhinorrhea, sinus pressure and sinus pain. Negative for ear pain, sore throat and trouble swallowing. Eyes:  Negative for discharge and redness. Respiratory:  Positive for cough. Negative for shortness of breath. Cardiovascular:  Negative for chest pain. Gastrointestinal:  Negative for diarrhea, nausea and vomiting. Genitourinary:  Negative for decreased urine volume. Musculoskeletal:  Negative for neck pain and neck stiffness. Skin:  Negative for rash. Neurological:  Positive for headaches. Hematological:  Negative for adenopathy. Psychiatric/Behavioral:  Negative for sleep disturbance.         PAST MEDICAL HISTORY         Diagnosis Date    Acute kidney injury ([address])     Cardiomyopathy, dilated ([address])     VIRAL    Claustrophobia     Congestive heart failure (CHF) ([address])     Depression 12/26/2013    HTN (hypertension)     Hyperlipidemia     Medtronic dual ICD 02/14/2014    Osteoarthritis     Osteoarthritis of spine with radiculopathy, lumbar region 12/28/2015    Spinal stenosis     Unspecified sleep apnea     unable to wear CPAP       SURGICAL HISTORY     Patient  has a past surgical history

## 2023-11-06 NOTE — DISCHARGE INSTRUCTIONS
Provided a prescription for Augmentin. Instructed to use Zyrtec, Flonase, and Mucinex. Drink plenty of fluids to thin mucus. Sleep with your head propped up on a pillow. Inhale steam three of four times daily( exp: sit in bathroom with hot shower running.)  Avoid Trigger and not smoking. May also clean nasal passages with salt water to ease congestion. The Patient is instructed to use over-the-counter Tylenol and Motrin for pain or fever. Instructed to follow-up with their PCP or [facility] in 3 to 5 days and worsening symptoms.

## 2023-12-09 ENCOUNTER — NURSE ONLY (OUTPATIENT)
Dept: LAB | Age: 63
End: 2023-12-09

## 2023-12-09 LAB
ANION GAP SERPL CALC-SCNC: 13 MEQ/L (ref 8–16)
BUN SERPL-MCNC: 65 MG/DL (ref 7–22)
CALCIUM SERPL-MCNC: 9.8 MG/DL (ref 8.5–10.5)
CHLORIDE SERPL-SCNC: 96 MEQ/L (ref 98–111)
CO2 SERPL-SCNC: 27 MEQ/L (ref 23–33)
CREAT SERPL-MCNC: 2.7 MG/DL (ref 0.4–1.2)
GFR SERPL CREATININE-BSD FRML MDRD: 25 ML/MIN/1.73M2
GLUCOSE SERPL-MCNC: 101 MG/DL (ref 70–108)
POTASSIUM SERPL-SCNC: 3.6 MEQ/L (ref 3.5–5.2)
SODIUM SERPL-SCNC: 136 MEQ/L (ref 135–145)

## 2024-01-12 ENCOUNTER — PROCEDURE VISIT (OUTPATIENT)
Dept: UROLOGY | Age: 64
End: 2024-01-12
Payer: MEDICARE

## 2024-01-12 VITALS — WEIGHT: 259 LBS | BODY MASS INDEX: 38.36 KG/M2 | RESPIRATION RATE: 16 BRPM | HEIGHT: 69 IN

## 2024-01-12 DIAGNOSIS — N40.1 BENIGN LOCALIZED PROSTATIC HYPERPLASIA WITH LOWER URINARY TRACT SYMPTOMS (LUTS): ICD-10-CM

## 2024-01-12 DIAGNOSIS — N13.30 HYDRONEPHROSIS OF RIGHT KIDNEY: Primary | ICD-10-CM

## 2024-01-12 PROCEDURE — 52000 CYSTOURETHROSCOPY: CPT | Performed by: UROLOGY

## 2024-01-12 NOTE — PROGRESS NOTES
Cystoscopy    Operative Note    Patient:  Cresencio Linares  MRN: [id number]  YOB: 1960    Date: 01/12/24  Surgeon: OFELIA WESTON MD  Anesthesia: [employer]  Indications:     BPH- worsening. Hx of luts that has worsened. offered cont trotter until cysto for becker, with red rubber PRN. He wants trotter out today  Right hyrdo- mild. Hx of horseshoe kidney.     F/u cysto for becker.   Position: Supine  EBL: 0 ml    Findings:   The patient was prepped and draped in the usual sterile fashion.  The flexible cystoscope was advanced through the urethra and into the bladder.  The bladder was thoroughly inspected and the following was noted:    Residual Urine: moderate.  Urine clear, with no obvious infection  Urethra: No abnormalities of the urethra are noted. Urethral dilation was not performed.    Prostate: lateral lobe hypertrophy + present, prostate moderately obstructing, intravesical extension of prostate not present. There was no previous TURP defect.   Bladder: no tumor noted .   Mild trabeculation noted.  no bladder diverticulum.  Ureters: Orifices with normal configuration and location.      The cystoscope was removed.  The patient tolerated the procedure well.  Mild BPH (30) cont bid flomax  I think retention worsened from bowel issues but this has improved  6 mo pvr w mirna

## 2024-02-22 ENCOUNTER — HOSPITAL ENCOUNTER (OUTPATIENT)
Dept: ULTRASOUND IMAGING | Age: 64
Discharge: HOME | End: 2024-02-22
Payer: MEDICARE

## 2024-02-22 DIAGNOSIS — E04.1 THYROID NODULE: ICD-10-CM

## 2024-02-22 PROCEDURE — 76536 US EXAM OF HEAD AND NECK: CPT

## 2024-04-15 ENCOUNTER — HOSPITAL ENCOUNTER (EMERGENCY)
Age: 64
Discharge: HOME | End: 2024-04-15
Payer: MEDICARE

## 2024-04-15 VITALS
OXYGEN SATURATION: 97 % | WEIGHT: 246 LBS | HEART RATE: 97 BPM | SYSTOLIC BLOOD PRESSURE: 139 MMHG | RESPIRATION RATE: 20 BRPM | DIASTOLIC BLOOD PRESSURE: 70 MMHG | TEMPERATURE: 97.7 F | BODY MASS INDEX: 36.31 KG/M2

## 2024-04-15 DIAGNOSIS — J06.9 URI WITH COUGH AND CONGESTION: Primary | ICD-10-CM

## 2024-04-15 PROCEDURE — 99213 OFFICE O/P EST LOW 20 MIN: CPT | Performed by: NURSE PRACTITIONER

## 2024-04-15 PROCEDURE — 99213 OFFICE O/P EST LOW 20 MIN: CPT

## 2024-04-15 RX ORDER — DEXTROMETHORPHAN HYDROBROMIDE AND PROMETHAZINE HYDROCHLORIDE 15; 6.25 MG/5ML; MG/5ML
5 SYRUP ORAL 4 TIMES DAILY PRN
Qty: 118 ML | Refills: 0 | Status: SHIPPED | OUTPATIENT
Start: 2024-04-15 | End: 2024-04-22

## 2024-04-15 RX ORDER — AZITHROMYCIN 250 MG/1
TABLET, FILM COATED ORAL
Qty: 1 PACKET | Refills: 0 | Status: SHIPPED | OUTPATIENT
Start: 2024-04-15 | End: 2024-04-19

## 2024-04-15 RX ORDER — ACETAMINOPHEN 325 MG/1
650 TABLET ORAL EVERY 6 HOURS PRN
COMMUNITY

## 2024-04-15 RX ORDER — TORSEMIDE 20 MG/1
40 TABLET ORAL 2 TIMES DAILY
COMMUNITY

## 2024-04-15 ASSESSMENT — PAIN - FUNCTIONAL ASSESSMENT
PAIN_FUNCTIONAL_ASSESSMENT: 0-10
PAIN_FUNCTIONAL_ASSESSMENT: PREVENTS OR INTERFERES SOME ACTIVE ACTIVITIES AND ADLS

## 2024-04-15 ASSESSMENT — PAIN DESCRIPTION - PAIN TYPE: TYPE: ACUTE PAIN

## 2024-04-15 ASSESSMENT — PAIN DESCRIPTION - ONSET: ONSET: PROGRESSIVE

## 2024-04-15 ASSESSMENT — ENCOUNTER SYMPTOMS
WHEEZING: 0
SORE THROAT: 1
NAUSEA: 0
CHEST TIGHTNESS: 0
COUGH: 1
VOMITING: 0
RHINORRHEA: 1
SHORTNESS OF BREATH: 0

## 2024-04-15 ASSESSMENT — PAIN DESCRIPTION - ORIENTATION: ORIENTATION: LEFT

## 2024-04-15 ASSESSMENT — PAIN DESCRIPTION - LOCATION: LOCATION: RIB CAGE

## 2024-04-15 ASSESSMENT — PAIN SCALES - GENERAL: PAINLEVEL_OUTOF10: 5

## 2024-04-15 ASSESSMENT — PAIN DESCRIPTION - FREQUENCY: FREQUENCY: CONTINUOUS

## 2024-04-15 NOTE — ED PROVIDER NOTES
[facility] URGENT CARE  Urgent Care Encounter       CHIEF COMPLAINT       Chief Complaint   Patient presents with    Cough     productive    Fatigue    Headache    Nasal Congestion       Nurses Notes reviewed and I agree except as noted in the HPI.  HISTORY OF PRESENT ILLNESS   Cresencio Linares is a 63 y.o. male who presents with complaints of a productive cough, fatigue, headache, congestion, and postnasal drainage for the past 5 days.  Patient reports his cough and congestion is the worst.  He has tried multiple over-the-counter cough medications without any relief.  Denies any fever.  No reported shortness of breath.  Patient is concerned for developing pneumonia due to his history of heart transplant.  No known exposure to illness.    The history is provided by the patient.       REVIEW OF SYSTEMS     Review of Systems   Constitutional:  Positive for fatigue. Negative for chills and fever.   HENT:  Positive for congestion, postnasal drip, rhinorrhea and sore throat.    Respiratory:  Positive for cough. Negative for chest tightness, shortness of breath and wheezing.    Cardiovascular:  Negative for chest pain.   Gastrointestinal:  Negative for nausea and vomiting.   Musculoskeletal:  Negative for myalgias.   Neurological:  Positive for headaches.       PAST MEDICAL HISTORY         Diagnosis Date    Acute kidney injury (HCC)     Cardiomyopathy, dilated (HCC)     VIRAL    Claustrophobia     Congestive heart failure (CHF) (HCC)     Depression 12/26/2013    Heart transplanted (HCC) 2021    HTN (hypertension)     Hyperlipidemia     Medtronic dual ICD 02/14/2014    Osteoarthritis     Osteoarthritis of spine with radiculopathy, lumbar region 12/28/2015    Spinal stenosis     Unspecified sleep apnea     unable to wear CPAP       SURGICALHISTORY     Patient  has a past surgical history that includes cardiovascular stress test (09/2013); doppler echocardiography (09/2013); doppler echocardiography (09/2013);    Acetaminophen (TYLENOL ARTHRITIS PAIN PO) Take 500 mg by mouth daily Pt reports taking 4 pills dailyHistorical Med       !! - Potential duplicate medications found. Please discuss with provider.          ALLERGIES     Patient is is allergic to entresto [sacubitril-valsartan].    Patients   Immunization History   Administered Date(s) Administered    COVID-19, PFIZER Bivalent, DO NOT Dilute, (age 12y+), IM, 30 mcg/0.3 mL 10/10/2022    COVID-19, PFIZER PURPLE top, DILUTE for use, (age 12 y+), 30mcg/0.3mL 03/11/2021, 04/01/2021, 10/25/2021    Hep A-Hep B, TWINRIX, (age 18y+), IM, 1mL 09/09/2020    Influenza Virus Vaccine 09/09/2020    Pneumococcal, PCV-13, PREVNAR 13, (age 6w+), IM, 0.5mL 09/09/2020    TDaP, ADACEL (age 10y-64y), BOOSTRIX (age 10y+), IM, 0.5mL 07/18/2018    Zoster Recombinant (Shingrix) 09/11/2020       FAMILY HISTORY     Patient's family history includes Coronary Art Dis in his father; Heart Attack in his father; Heart Disease in his father; High Blood Pressure in his father; Parkinsonism in his father.    SOCIAL HISTORY     Patient  reports that he has never smoked. He has never been exposed to tobacco smoke. He has never used smokeless tobacco. He reports current alcohol use of about 6.0 standard drinks of alcohol per week. He reports that he does not use drugs.    PHYSICAL EXAM     ED TRIAGE VITALS  BP: 139/70, Temp: 97.7 °F (36.5 °C), Pulse: 97, Respirations: 20, SpO2: 97 %,Estimated body mass index is 36.31 kg/m² as calculated from the following:    Height as of 1/12/24: 1.753 m (5' 9.02\").    Weight as of this encounter: 111.6 kg (246 lb).,No LMP for male patient.    Physical Exam  Vitals and nursing note reviewed.   Constitutional:       Appearance: He is obese. He is not ill-appearing.   HENT:      Right Ear: Tympanic membrane, ear canal and external ear normal.      Left Ear: Tympanic membrane, ear canal and external ear normal.      Nose: Congestion and rhinorrhea present.

## 2024-05-01 ENCOUNTER — HOSPITAL ENCOUNTER (OUTPATIENT)
Dept: GENERAL RADIOLOGY | Age: 64
Discharge: HOME | End: 2024-05-01
Payer: MEDICARE

## 2024-05-01 ENCOUNTER — HOSPITAL ENCOUNTER (OUTPATIENT)
Age: 64
Discharge: HOME | End: 2024-05-01
Payer: MEDICARE

## 2024-05-01 DIAGNOSIS — J40 BRONCHITIS: ICD-10-CM

## 2024-05-01 PROCEDURE — 71046 X-RAY EXAM CHEST 2 VIEWS: CPT

## 2024-05-08 ENCOUNTER — APPOINTMENT (OUTPATIENT)
Dept: GENERAL RADIOLOGY | Age: 64
End: 2024-05-08
Payer: MEDICARE

## 2024-05-08 ENCOUNTER — HOSPITAL ENCOUNTER (EMERGENCY)
Age: 64
Discharge: HOME | End: 2024-05-08
Payer: MEDICARE

## 2024-05-08 VITALS
OXYGEN SATURATION: 97 % | SYSTOLIC BLOOD PRESSURE: 139 MMHG | HEART RATE: 90 BPM | TEMPERATURE: 97 F | RESPIRATION RATE: 16 BRPM | DIASTOLIC BLOOD PRESSURE: 69 MMHG | HEIGHT: 69 IN | WEIGHT: 248 LBS | BODY MASS INDEX: 36.73 KG/M2

## 2024-05-08 DIAGNOSIS — S86.911A STRAIN OF RIGHT KNEE, INITIAL ENCOUNTER: ICD-10-CM
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
## 2024-05-08 PROCEDURE — 99213 OFFICE O/P EST LOW 20 MIN: CPT | Performed by: NURSE PRACTITIONER

## 2024-05-08 PROCEDURE — 99213 OFFICE O/P EST LOW 20 MIN: CPT

## 2024-05-08 PROCEDURE — 73564 X-RAY EXAM KNEE 4 OR MORE: CPT

## 2024-05-08 PROCEDURE — 6360000002 HC RX W HCPCS: Performed by: NURSE PRACTITIONER

## 2024-05-08 PROCEDURE — 96372 THER/PROPH/DIAG INJ SC/IM: CPT

## 2024-05-08 RX ORDER — CYCLOBENZAPRINE HCL 10 MG
10 TABLET ORAL 2 TIMES DAILY PRN
Qty: 20 TABLET | Refills: 0 | Status: SHIPPED | OUTPATIENT
Start: 2024-05-08 | End: 2024-05-18

## 2024-05-08 RX ORDER — KETOROLAC TROMETHAMINE 30 MG/ML
60 INJECTION, SOLUTION INTRAMUSCULAR; INTRAVENOUS ONCE
Status: COMPLETED | OUTPATIENT
Start: 2024-05-08 | End: 2024-05-08

## 2024-05-08 RX ADMIN — KETOROLAC TROMETHAMINE 60 MG: 60 INJECTION, SOLUTION INTRAMUSCULAR at 15:15

## 2024-05-08 ASSESSMENT — PAIN DESCRIPTION - LOCATION
LOCATION: KNEE
LOCATION: KNEE

## 2024-05-08 ASSESSMENT — ENCOUNTER SYMPTOMS
COLOR CHANGE: 0
VISUAL CHANGE: 0
COUGH: 0
VOMITING: 0
CHEST TIGHTNESS: 0
APNEA: 0
WHEEZING: 0
NAUSEA: 0
STRIDOR: 0
BOWEL INCONTINENCE: 0
ABDOMINAL PAIN: 0
CHOKING: 0
SHORTNESS OF BREATH: 0

## 2024-05-08 ASSESSMENT — PAIN DESCRIPTION - ORIENTATION
ORIENTATION: RIGHT
ORIENTATION: RIGHT

## 2024-05-08 ASSESSMENT — PAIN DESCRIPTION - PAIN TYPE: TYPE: ACUTE PAIN

## 2024-05-08 ASSESSMENT — PAIN SCALES - GENERAL
PAINLEVEL_OUTOF10: 7
PAINLEVEL_OUTOF10: 4
PAINLEVEL_OUTOF10: 7

## 2024-05-08 ASSESSMENT — PAIN DESCRIPTION - DESCRIPTORS
DESCRIPTORS: ACHING;DISCOMFORT
DESCRIPTORS: ACHING

## 2024-05-08 NOTE — ED PROVIDER NOTES
HISTORY         Diagnosis Date    Acute kidney injury (HCC)     Cardiomyopathy, dilated (HCC)     VIRAL    Claustrophobia     Congestive heart failure (CHF) (HCC)     Depression 12/26/2013    Heart transplanted (HCC) 2021    HTN (hypertension)     Hyperlipidemia     Medtronic dual ICD 02/14/2014    Osteoarthritis     Osteoarthritis of spine with radiculopathy, lumbar region 12/28/2015    Spinal stenosis     Unspecified sleep apnea     unable to wear CPAP       SURGICAL HISTORY     Patient  has a past surgical history that includes cardiovascular stress test (09/2013); doppler echocardiography (09/2013); doppler echocardiography (09/2013); Nasal sinus surgery; Carpal tunnel release (Right); Colonoscopy; back surgery (08/26/2014); Endoscopy, colon, diagnostic; Vasectomy (???); sinus surgery (1980's???); other surgical history (10/09/2015); Neck surgery (2015); back surgery (2013); Tonsillectomy (1980's??); hernia repair (???); other surgical history (01/18/2016); other surgical history (02/08/2016); other surgical history (N/A, 03/21/2016); other surgical history (Bilateral, 05/16/2016); other surgical history (08/01/2016); cervical fusion (05/17/2017); other surgical history (Left, 09/11/2017); Lumbar spine surgery (Left, 09/11/2017); other surgical history (Right, 10/31/2017); Lumbar spine surgery (Right, 10/31/2017); other surgical history (Right, 05/09/2018); pr exc b9 lesion mrgn xcp sk tg s/n/h/f/g 0.5 cm/< (Right, 05/09/2018); pr dstrj neurolytic agent other peripheral nerve (Left, 06/19/2018); Lumbar spine surgery (Left, 04/11/2019); Cardiac catheterization (10/2013); Cardiac defibrillator placement (2013); Injection Procedure For Sacroiliac Joint (Left, 07/02/2019); Injection Procedure For Sacroiliac Joint (Left, 08/26/2019); Lumbar spine surgery (Left, 10/03/2019); Facet joint injection (Right, 05/26/2020); Diagnostic Cardiac Cath Lab Procedure (07/2020); pacemaker placement; Colonoscopy (Left, 03/15/2021);    potassium chloride (KLOR-CON M) 20 MEQ extended release tablet 1.5 tablets dailyHistorical Med      finasteride (PROSCAR) 5 MG tablet Take 1 tablet by mouth dailyHistorical Med      Magnesium 500 MG CAPS Take 400 mg by mouth dailyHistorical Med      allopurinol (ZYLOPRIM) 300 MG tablet Take 100 mg by mouth dailyHistorical Med      Acetaminophen (TYLENOL ARTHRITIS PAIN PO) Take 500 mg by mouth daily Pt reports taking 4 pills dailyHistorical Med             ALLERGIES     Patient is is allergic to entresto [sacubitril-valsartan].    FAMILY HISTORY     Patient's family history includes Coronary Art Dis in his father; Heart Attack in his father; Heart Disease in his father; High Blood Pressure in his father; Parkinsonism in his father.    SOCIAL HISTORY     Patient  reports that he has never smoked. He has never been exposed to tobacco smoke. He has never used smokeless tobacco. He reports current alcohol use of about 6.0 standard drinks of alcohol per week. He reports that he does not use drugs.    PHYSICAL EXAM     ED TRIAGE VITALS  BP: 139/69, Temp: 97 °F (36.1 °C), Pulse: 90, Respirations: 16, SpO2: 97 %  Physical Exam  Vitals and nursing note reviewed.   Constitutional:       General: He is not in acute distress.     Appearance: Normal appearance. He is not ill-appearing, toxic-appearing or diaphoretic.   HENT:      Head: Normocephalic and atraumatic.      Right Ear: External ear normal.      Left Ear: External ear normal.   Eyes:      Extraocular Movements: Extraocular movements intact.      Conjunctiva/sclera: Conjunctivae normal.   Cardiovascular:      Pulses: Normal pulses.   Pulmonary:      Effort: Pulmonary effort is normal.   Musculoskeletal:         General: Swelling, tenderness and signs of injury present.      Cervical back: Normal range of motion.      Right knee: Swelling present. Tenderness present.        Legs:    Skin:     General: Skin is warm and dry.   Neurological:      General: No focal deficit

## 2024-05-08 NOTE — DISCHARGE INSTRUCTIONS
Compression with Wrap/Air Cast  Ice, elevation 15-20 minutes 3 times a day for the first 24-48 hours followed with heat 15-20 minutes 3 times a day thereafter.    Activity as tolerated.    Take medication as directed  Return for worsening symptoms, otherwise if symptoms persist follow up orthopedics in 1 to 3 days

## 2024-05-08 NOTE — ED TRIAGE NOTES
Patient to room via wheelchair from [facility] and splint on right knee. Patient states he was walking down steps last night in the dark and missed a step going down and felt his right knee twist.

## 2024-06-04 ENCOUNTER — HOSPITAL ENCOUNTER (OUTPATIENT)
Dept: NUCLEAR MEDICINE | Age: 64
Discharge: HOME | End: 2024-06-04
Payer: MEDICARE

## 2024-06-04 DIAGNOSIS — E05.90 HYPERTHYROIDISM: ICD-10-CM

## 2024-06-04 PROCEDURE — A9516 IODINE I-123 SOD IODIDE MIC: HCPCS | Performed by: INTERNAL MEDICINE

## 2024-06-04 PROCEDURE — 3430000000 HC RX DIAGNOSTIC RADIOPHARMACEUTICAL: Performed by: INTERNAL MEDICINE

## 2024-06-04 PROCEDURE — 78014 THYROID IMAGING W/BLOOD FLOW: CPT

## 2024-06-04 RX ADMIN — SODIUM IODIDE I 123 412 MICRO CURIE: 100 CAPSULE, GELATIN COATED ORAL at 08:47

## 2024-06-05 ENCOUNTER — HOSPITAL ENCOUNTER (OUTPATIENT)
Dept: NUCLEAR MEDICINE | Age: 64
Discharge: HOME | End: 2024-06-05
Payer: MEDICARE

## 2024-06-05 RX ORDER — SODIUM IODIDE I 123 100 UCI/1
412 CAPSULE, GELATIN COATED ORAL ONCE
Status: COMPLETED | OUTPATIENT
Start: 2024-06-05 | End: 2024-06-04

## 2024-06-29 ENCOUNTER — HOSPITAL ENCOUNTER (OUTPATIENT)
Dept: MRI IMAGING | Age: 64
Discharge: HOME | End: 2024-06-29
Attending: FAMILY MEDICINE
Payer: MEDICARE

## 2024-06-29 DIAGNOSIS — S86.911D STRAIN OF RIGHT KNEE, SUBSEQUENT ENCOUNTER: ICD-10-CM

## 2024-06-29 PROCEDURE — 73721 MRI JNT OF LWR EXTRE W/O DYE: CPT

## 2024-07-22 ENCOUNTER — HOSPITAL ENCOUNTER (OUTPATIENT)
Dept: GENERAL RADIOLOGY | Age: 64
Discharge: HOME | End: 2024-07-22
Payer: MEDICARE

## 2024-07-22 ENCOUNTER — HOSPITAL ENCOUNTER (OUTPATIENT)
Age: 64
Discharge: HOME | End: 2024-07-22
Payer: MEDICARE

## 2024-07-22 ENCOUNTER — TELEPHONE (OUTPATIENT)
Dept: PHYSICAL MEDICINE AND REHAB | Age: 64
End: 2024-07-22

## 2024-07-22 ENCOUNTER — OFFICE VISIT (OUTPATIENT)
Dept: PHYSICAL MEDICINE AND REHAB | Age: 64
End: 2024-07-22
Payer: MEDICARE

## 2024-07-22 VITALS
BODY MASS INDEX: 37.03 KG/M2 | HEIGHT: 69 IN | SYSTOLIC BLOOD PRESSURE: 118 MMHG | WEIGHT: 250 LBS | DIASTOLIC BLOOD PRESSURE: 68 MMHG

## 2024-07-22 DIAGNOSIS — M54.12 CERVICAL RADICULOPATHY: Primary | ICD-10-CM

## 2024-07-22 DIAGNOSIS — Q76.1 CERVICAL FUSION SYNDROME: ICD-10-CM

## 2024-07-22 DIAGNOSIS — M79.18 MYOFASCIAL PAIN: ICD-10-CM

## 2024-07-22 DIAGNOSIS — M79.2 NEUROPATHIC PAIN: ICD-10-CM

## 2024-07-22 DIAGNOSIS — G89.4 CHRONIC PAIN SYNDROME: ICD-10-CM

## 2024-07-22 DIAGNOSIS — M54.12 CERVICAL RADICULOPATHY: ICD-10-CM

## 2024-07-22 PROCEDURE — 1124F ACP DISCUSS-NO DSCNMKR DOCD: CPT | Performed by: NURSE PRACTITIONER

## 2024-07-22 PROCEDURE — 3017F COLORECTAL CA SCREEN DOC REV: CPT | Performed by: NURSE PRACTITIONER

## 2024-07-22 PROCEDURE — 1036F TOBACCO NON-USER: CPT | Performed by: NURSE PRACTITIONER

## 2024-07-22 PROCEDURE — 72050 X-RAY EXAM NECK SPINE 4/5VWS: CPT

## 2024-07-22 PROCEDURE — 3078F DIAST BP <80 MM HG: CPT | Performed by: NURSE PRACTITIONER

## 2024-07-22 PROCEDURE — G8417 CALC BMI ABV UP PARAM F/U: HCPCS | Performed by: NURSE PRACTITIONER

## 2024-07-22 PROCEDURE — 99215 OFFICE O/P EST HI 40 MIN: CPT | Performed by: NURSE PRACTITIONER

## 2024-07-22 PROCEDURE — G8427 DOCREV CUR MEDS BY ELIG CLIN: HCPCS | Performed by: NURSE PRACTITIONER

## 2024-07-22 PROCEDURE — 3074F SYST BP LT 130 MM HG: CPT | Performed by: NURSE PRACTITIONER

## 2024-07-22 NOTE — TELEPHONE ENCOUNTER
Notif. Pt of results of imaging as per TAE Rahman CNP. He verbalized understanding. Scheduled MRI FOR 7/24/24 AT 2:30. Pt. Notif. Of. He also wants to know if their is any other procedure that could be done to help his pain since past injections could not be completed with him having a too narrow space for inserting the needle. He is planning on seeing a  In [place] also.

## 2024-07-22 NOTE — TELEPHONE ENCOUNTER
We would need to review MRI and discuss in office. He can schedule follow up with me, 1 week or 1.5 weeks after MRI to review and discuss possible injections.

## 2024-07-22 NOTE — PROGRESS NOTES
Chronic Pain/PM&R Clinic Note     Encounter Date: 7/22/24    Subjective:   Chief Complaint:   Chief Complaint   Patient presents with    Neck Pain     Neck pain       History of Present Illness:   Cresencio Linares is a 64 y.o. male seen in the clinic initially on 12/28/2015, followed this practice for years for pain and was last seen 7/09/2020. He reports he has been dealing with other health issues and not been able to come back. He had a heart transplant 2/7/2022, has been with occupational therapy and physical therapy for associated weakness, left side is worse.     Today, 7/22/2024, patient presents for requested follow-up.  He states that he has been having pain has been occurring for about 4 weeks.  He states it is a numbness, burning pain that starts around the left shoulder blade and goes down his shoulder to his arm to the fingertips.  He states he does not really have any right-sided pain.  He states that he has had neck pain in the past, and he is having surgeries, had a fusion last where they told him he would probably need extended but did not want to do it at that time.  He states that he does not feel that he is dropping things at this time but the pain is constant and extremely bothersome to him.  He states that he has not gotten any images completed as he wanted to come see us to get them done.  He is asking if we can order an x-ray, and then potentially an MRI later.  He states he has been seeing chiropractor, massage, TENS with no relief.  He states he does not particularly feel weak in the arm, but the burning pain is just getting so severe he can barely tolerate daily activities.  He states that he has been trying to do his home exercises, going to the gym daily, but it has been interfering with his pain and prohibiting him from doing this.  He cannot take any type of anti-inflammatory medications, but he does take Tylenol daily.        History of Interventions:   Surgery: Lumbar Fusion L5-S1,

## 2024-07-22 NOTE — PROGRESS NOTES
Functionality Assessment/Goals Worksheet     On a scale of 0 (Does not Interfere) to 10 (Completely Interferes)     1.  Which number describes how during the past week pain has interfered with           the following:  A.  General Activity:  9  B.  Mood: 0  C.  Walking Ability:  0  D.  Normal Work (Includes both work outside the home and housework):  8  E.  Relations with Other People:   0  F.  Sleep:   9  G.  Enjoyment of Life:   6    2.  Patient Prefers to Take their Pain Medications:     []  On a regular basis   []  Only when necessary    [x]  Does not take pain medications    3.  What are the Patient's Goals/Expectations for Visiting Pain Management?     [x]  Learn about my pain    []  Receive Medication   []  Physical Therapy     []  Treat Depression   []  Receive Injections    []  Treat Sleep   []  Deal with Anxiety and Stress   []  Treat Opoid Dependence/Addiction   []  Other:

## 2024-07-24 ENCOUNTER — HOSPITAL ENCOUNTER (OUTPATIENT)
Dept: MRI IMAGING | Age: 64
Discharge: HOME | End: 2024-07-24
Payer: MEDICARE

## 2024-07-24 DIAGNOSIS — G89.4 CHRONIC PAIN SYNDROME: ICD-10-CM

## 2024-07-24 DIAGNOSIS — M79.2 NEUROPATHIC PAIN: ICD-10-CM

## 2024-07-24 DIAGNOSIS — M79.18 MYOFASCIAL PAIN: ICD-10-CM

## 2024-07-24 DIAGNOSIS — Q76.1 CERVICAL FUSION SYNDROME: ICD-10-CM

## 2024-07-24 DIAGNOSIS — M54.12 CERVICAL RADICULOPATHY: ICD-10-CM

## 2024-07-24 PROCEDURE — 72141 MRI NECK SPINE W/O DYE: CPT

## 2024-07-31 ENCOUNTER — HOSPITAL ENCOUNTER (OUTPATIENT)
Dept: PHYSICAL THERAPY | Age: 64
Setting detail: THERAPIES SERIES
Discharge: HOME | End: 2024-07-31
Payer: MEDICARE

## 2024-07-31 ENCOUNTER — LAB (OUTPATIENT)
Dept: LAB | Age: 64
End: 2024-07-31

## 2024-07-31 LAB
ANION GAP SERPL CALC-SCNC: 12 MEQ/L (ref 8–16)
BUN SERPL-MCNC: 15 MG/DL (ref 7–22)
CALCIUM SERPL-MCNC: 9 MG/DL (ref 8.5–10.5)
CHLORIDE SERPL-SCNC: 97 MEQ/L (ref 98–111)
CO2 SERPL-SCNC: 26 MEQ/L (ref 23–33)
CREAT SERPL-MCNC: 1.5 MG/DL (ref 0.4–1.2)
GFR SERPL CREATININE-BSD FRML MDRD: 51 ML/MIN/1.73M2
GLUCOSE SERPL-MCNC: 102 MG/DL (ref 70–108)
POTASSIUM SERPL-SCNC: 4 MEQ/L (ref 3.5–5.2)
SODIUM SERPL-SCNC: 135 MEQ/L (ref 135–145)

## 2024-07-31 PROCEDURE — 97110 THERAPEUTIC EXERCISES: CPT

## 2024-07-31 PROCEDURE — 97162 PT EVAL MOD COMPLEX 30 MIN: CPT

## 2024-07-31 NOTE — PROGRESS NOTES
** PLEASE SIGN, DATE AND TIME CERTIFICATION BELOW AND RETURN TO [facility] OUTPATIENT REHABILITATION (FAX #: [phone]).  ATTEST/CO-SIGN IF ACCESSING VIA INPlaytika.  THANK YOU.**    I certify that I have examined the patient below and determined that Physical Medicine and Rehabilitation service is necessary and that I approve the established plan of care for up to 90 days or as specifically noted.  Attestation, signature or co-signature of physician indicates approval of certification requirements.    ________________________ ____________ __________  Physician Signature   Date   Time  [facility]  PHYSICAL THERAPY  [x] EVALUATION  [] DAILY NOTE (LAND) [] DAILY NOTE (AQUATIC ) [] PROGRESS NOTE [] DISCHARGE NOTE    [x] OUTPATIENT REHABILITATION [facility]   [] [facility]    [] [facility]   [] [facility]    Date: 2024  Patient Name:  Cresencio Linares  : 1960  MRN: [id number]  CSN: [id number]    Referring Practitioner ComisarCresencio MD    Diagnosis Sprain of unspecified site of right knee, initial encounter  Unilateral primary osteoarthritis, right knee   Treatment Diagnosis M25.561  Right Knee Pain  M25.661 Stiffness of right knee, not elsewhere classified  M25.361 Other instability, right knee  R53.1 Weakness   Date of Evaluation 24    Additional Pertinent History HTN, heart transplant      Functional Outcome Measure Used LEFS    Functional Outcome Score 41/80 (24)       Insurance: Primary: Payor: MEDICARE /  /  / ,   Secondary: [facility]   Authorization Information: PRECERTIFICATION REQUIRED:  No  INSURANCE THERAPY BENEFIT:  Patient has unlimited visits based on medical necessity  Benefit will not cover maintenance or preventative treatment.  AQUATIC THERAPY COVERED:   Yes  MODALITIES COVERED:  Yes, with the exception of iontophoresis and hot packs/cold packs  TELEHEALTH COVERED: Yes   Approved Procedure Codes:

## 2024-08-01 ENCOUNTER — OFFICE VISIT (OUTPATIENT)
Dept: PHYSICAL MEDICINE AND REHAB | Age: 64
End: 2024-08-01
Payer: MEDICARE

## 2024-08-01 VITALS — BODY MASS INDEX: 36.9 KG/M2 | SYSTOLIC BLOOD PRESSURE: 116 MMHG | WEIGHT: 250 LBS | DIASTOLIC BLOOD PRESSURE: 62 MMHG

## 2024-08-01 DIAGNOSIS — G89.29 CHRONIC IDIOPATHIC PAIN SYNDROME: ICD-10-CM

## 2024-08-01 DIAGNOSIS — M47.816 LUMBAR FACET ARTHROPATHY: ICD-10-CM

## 2024-08-01 DIAGNOSIS — M79.18 MYOFASCIAL PAIN: ICD-10-CM

## 2024-08-01 DIAGNOSIS — M46.1 SI (SACROILIAC) JOINT INFLAMMATION (HCC): ICD-10-CM

## 2024-08-01 DIAGNOSIS — G89.4 CHRONIC PAIN SYNDROME: ICD-10-CM

## 2024-08-01 DIAGNOSIS — M96.1 LUMBAR POST-LAMINECTOMY SYNDROME: ICD-10-CM

## 2024-08-01 DIAGNOSIS — M54.12 CERVICAL RADICULOPATHY: Primary | ICD-10-CM

## 2024-08-01 DIAGNOSIS — M79.2 NEUROPATHIC PAIN: ICD-10-CM

## 2024-08-01 DIAGNOSIS — M47.816 SPONDYLOSIS OF LUMBAR SPINE: ICD-10-CM

## 2024-08-01 DIAGNOSIS — Q76.1 CERVICAL FUSION SYNDROME: ICD-10-CM

## 2024-08-01 PROCEDURE — G8427 DOCREV CUR MEDS BY ELIG CLIN: HCPCS | Performed by: NURSE PRACTITIONER

## 2024-08-01 PROCEDURE — 3074F SYST BP LT 130 MM HG: CPT | Performed by: NURSE PRACTITIONER

## 2024-08-01 PROCEDURE — G8417 CALC BMI ABV UP PARAM F/U: HCPCS | Performed by: NURSE PRACTITIONER

## 2024-08-01 PROCEDURE — 3017F COLORECTAL CA SCREEN DOC REV: CPT | Performed by: NURSE PRACTITIONER

## 2024-08-01 PROCEDURE — 1036F TOBACCO NON-USER: CPT | Performed by: NURSE PRACTITIONER

## 2024-08-01 PROCEDURE — 99214 OFFICE O/P EST MOD 30 MIN: CPT | Performed by: NURSE PRACTITIONER

## 2024-08-01 PROCEDURE — 1124F ACP DISCUSS-NO DSCNMKR DOCD: CPT | Performed by: NURSE PRACTITIONER

## 2024-08-01 PROCEDURE — 3078F DIAST BP <80 MM HG: CPT | Performed by: NURSE PRACTITIONER

## 2024-08-01 NOTE — PROGRESS NOTES
Functionality Assessment/Goals Worksheet     On a scale of 0 (Does not Interfere) to 10 (Completely Interferes)     1.  Which number describes how during the past week pain has interfered with           the following:  A.  General Activity:  6  B.  Mood: 1  C.  Walking Ability:  0  D.  Normal Work (Includes both work outside the home and housework):  6  E.  Relations with Other People:   0  F.  Sleep:   6  G.  Enjoyment of Life:   0    2.  Patient Prefers to Take their Pain Medications:     []  On a regular basis   []  Only when necessary    [x]  Does not take pain medications    3.  What are the Patient's Goals/Expectations for Visiting Pain Management?     []  Learn about my pain    []  Receive Medication   []  Physical Therapy     []  Treat Depression   []  Receive Injections    []  Treat Sleep   []  Deal with Anxiety and Stress   []  Treat Opoid Dependence/Addiction   [x]  Other: \"Fix Pain\"                               
and stretching exercises at home and cognitive behavioral and/or group therapy.     Referrals:  None    Prescriptions Written This Visit:   None    Follow-up:   To call    It was my pleasure to evaluate Cresencio Linares today.  I spent over 35 minutes evaluating this patient, reviewing previous notes and images and completing documentation.       KRIS Kathleen - CNP   Interventional Pain Management/PM&R   [facility] [place]

## 2024-08-05 ENCOUNTER — HOSPITAL ENCOUNTER (OUTPATIENT)
Dept: PHYSICAL THERAPY | Age: 64
Setting detail: THERAPIES SERIES
Discharge: HOME | End: 2024-08-05
Payer: MEDICARE

## 2024-08-05 PROCEDURE — 97113 AQUATIC THERAPY/EXERCISES: CPT

## 2024-08-05 NOTE — PROGRESS NOTES
[facility]  PHYSICAL THERAPY  [] EVALUATION  [] DAILY NOTE (LAND) [x] DAILY NOTE (AQUATIC ) [] PROGRESS NOTE [] DISCHARGE NOTE    [x] OUTPATIENT REHABILITATION CENTER [facility]   [] [place] [facility]    [] [facility]   [] [facility]    Date: 2024  Patient Name:  Cresencio Linares  : 1960  MRN: [id number]  CSN: [id number]    Referring Practitioner ComisarCresencio MD    Diagnosis Sprain of unspecified site of right knee, initial encounter  Unilateral primary osteoarthritis, right knee   Treatment Diagnosis M25.561  Right Knee Pain  M25.661 Stiffness of right knee, not elsewhere classified  M25.361 Other instability, right knee  R53.1 Weakness   Date of Evaluation 24    Additional Pertinent History HTN, heart transplant      Functional Outcome Measure Used LEFS    Functional Outcome Score 41/80 (24)       Insurance: Primary: Payor: MEDICARE /  /  / ,   Secondary: [facility]   Authorization Information: PRECERTIFICATION REQUIRED:  No  INSURANCE THERAPY BENEFIT:  Patient has unlimited visits based on medical necessity  Benefit will not cover maintenance or preventative treatment.  AQUATIC THERAPY COVERED:   Yes  MODALITIES COVERED:  Yes, with the exception of iontophoresis and hot packs/cold packs  TELEHEALTH COVERED: Yes   Approved Procedure Codes: Authorization of Specific CPT Codes Not Required  (Codes requested indicated by red font, codes approved indicated by black font)   Visit # 2, 2/10 for progress note (Reporting Period: 24 to  9/3/24)   Visits Allowed: Unlimited based on medical necessity    Recertification Date: 24   Physician Follow-Up:    Physician Orders:    History of Present Illness: Patient reports that he has had R knee pain for about 8 weeks now. Patient had a fall when he missed the last step going into the basement when it was dark without light on. Patient reports that his R knee was bent backwards

## 2024-08-07 ENCOUNTER — HOSPITAL ENCOUNTER (OUTPATIENT)
Dept: PHYSICAL THERAPY | Age: 64
Setting detail: THERAPIES SERIES
Discharge: HOME | End: 2024-08-07
Payer: MEDICARE

## 2024-08-07 PROCEDURE — 97113 AQUATIC THERAPY/EXERCISES: CPT

## 2024-08-07 NOTE — PROGRESS NOTES
strengthening, stretching-HS/IT/quad R/gastroc, gait, balance tasks, quad sets/heel slides at bench, LAQ, HS curls, deep water tasks. Can transition to land as needed.    Activity/Treatment Tolerance:  [x]  Patient tolerated treatment well  []  Patient limited by fatigue  []  Patient limited by pain   []  Patient limited by medical complications  []  Other:     Assessment: Patient continues aquatic exercises as noted above. Minimal progressions due to increased soreness following the last session. Patient did have some knee pain with standing HS curl exercise. Patient reports 5/10 pain after the session. Will progress patient as able to improve functional mobility.       GOALS:  Patient Goal: to improve R knee pain and return to the gym     Short Term Goals: 4 weeks  Patient will report decrease in pain to 3-4/10 at most to allow ease of ADLs and household tasks.  2. Patient will improve R knee extension AROM from 6 degrees lacking to 0 degrees to allow normalized gait pattern.  3. Patient will improve R knee flexion AROM from 105 degrees to 120 degrees to allow normalized gait pattern.    Long Term Goals: 8 weeks  Patient will improve B knee and hip strength to 5/5 to allow ease of walking and stair negoation.  2. Patient will be independent with HEP in order to prevent re-injury and improve functional abilities.   3. Patient will improve LEFS score from 41/80 to 51/80 to allow improved functional mobility and ease of recreational activities.  4. Patient will ambulate without AD without deviation without knee brace to allow return to recreational activities.     Patient Education:   [x]  HEP/Education Completed: continue HEP, continue to use ice as needed    EZ-Ticket Access Code: [email or web address]  []  No new Education completed  []  Reviewed Prior HEP      [x]  Patient verbalized and/or demonstrated understanding of education provided.  []  Patient unable to verbalize and/or demonstrate understanding of education

## 2024-08-07 NOTE — H&P
understanding post procedure directions / restrictions. All other questions and concerns addressed before patient discharge in ambulatory fashion.     Chronic Pain/PM&R Clinic Note     Encounter Date: 8/1/24    Subjective:   Chief Complaint:   No chief complaint on file.      History of Present Illness:   Cresencio Linares is a 64 y.o. male seen in the clinic initially on 12/28/2015, followed this practice for years for pain and was last seen 7/09/2020. He reports he has been dealing with other health issues and not been able to come back. He had a heart transplant 2/7/2022, has been with occupational therapy and physical therapy for associated weakness, left side is worse.     Today, 7/22/2024, patient presents for requested follow-up.  He states that he has been having pain has been occurring for about 4 weeks.  He states it is a numbness, burning pain that starts around the left shoulder blade and goes down his shoulder to his arm to the fingertips.  He states he does not really have any right-sided pain.  He states that he has had neck pain in the past, and he is having surgeries, had a fusion last where they told him he would probably need extended but did not want to do it at that time.  He states that he does not feel that he is dropping things at this time but the pain is constant and extremely bothersome to him.  He states that he has not gotten any images completed as he wanted to come see us to get them done.  He is asking if we can order an x-ray, and then potentially an MRI later.  He states he has been seeing chiropractor, massage, TENS with no relief.  He states he does not particularly feel weak in the arm, but the burning pain is just getting so severe he can barely tolerate daily activities.  He states that he has been trying to do his home exercises, going to the gym daily, but it has been interfering with his pain and prohibiting him from doing this.  He cannot take any type of anti-inflammatory

## 2024-08-08 ENCOUNTER — APPOINTMENT (OUTPATIENT)
Dept: GENERAL RADIOLOGY | Age: 64
End: 2024-08-08
Attending: ANESTHESIOLOGY
Payer: MEDICARE

## 2024-08-08 ENCOUNTER — HOSPITAL ENCOUNTER (OUTPATIENT)
Age: 64
Setting detail: OUTPATIENT SURGERY
Discharge: HOME | End: 2024-08-08
Attending: ANESTHESIOLOGY | Admitting: ANESTHESIOLOGY
Payer: MEDICARE

## 2024-08-08 VITALS
TEMPERATURE: 97.6 F | RESPIRATION RATE: 16 BRPM | HEIGHT: 69 IN | SYSTOLIC BLOOD PRESSURE: 137 MMHG | OXYGEN SATURATION: 96 % | DIASTOLIC BLOOD PRESSURE: 65 MMHG | HEART RATE: 88 BPM | BODY MASS INDEX: 38.12 KG/M2 | WEIGHT: 257.4 LBS

## 2024-08-08 PROCEDURE — 2709999900 HC NON-CHARGEABLE SUPPLY: Performed by: ANESTHESIOLOGY

## 2024-08-08 PROCEDURE — 7100000011 HC PHASE II RECOVERY - ADDTL 15 MIN: Performed by: ANESTHESIOLOGY

## 2024-08-08 PROCEDURE — 3600000054 HC PAIN LEVEL 3 BASE: Performed by: ANESTHESIOLOGY

## 2024-08-08 PROCEDURE — 2500000003 HC RX 250 WO HCPCS: Performed by: ANESTHESIOLOGY

## 2024-08-08 PROCEDURE — 99152 MOD SED SAME PHYS/QHP 5/>YRS: CPT | Performed by: ANESTHESIOLOGY

## 2024-08-08 PROCEDURE — 6360000004 HC RX CONTRAST MEDICATION: Performed by: ANESTHESIOLOGY

## 2024-08-08 PROCEDURE — 7100000010 HC PHASE II RECOVERY - FIRST 15 MIN: Performed by: ANESTHESIOLOGY

## 2024-08-08 PROCEDURE — 6360000002 HC RX W HCPCS: Performed by: ANESTHESIOLOGY

## 2024-08-08 PROCEDURE — 62321 NJX INTERLAMINAR CRV/THRC: CPT | Performed by: ANESTHESIOLOGY

## 2024-08-08 RX ORDER — ACYCLOVIR 200 MG/1
CAPSULE ORAL PRN
Status: DISCONTINUED | OUTPATIENT
Start: 2024-08-08 | End: 2024-08-08 | Stop reason: ALTCHOICE

## 2024-08-08 RX ORDER — DEXAMETHASONE SODIUM PHOSPHATE 4 MG/ML
INJECTION, SOLUTION INTRA-ARTICULAR; INTRALESIONAL; INTRAMUSCULAR; INTRAVENOUS; SOFT TISSUE PRN
Status: DISCONTINUED | OUTPATIENT
Start: 2024-08-08 | End: 2024-08-08 | Stop reason: ALTCHOICE

## 2024-08-08 RX ORDER — IOPAMIDOL 612 MG/ML
INJECTION, SOLUTION INTRATHECAL PRN
Status: DISCONTINUED | OUTPATIENT
Start: 2024-08-08 | End: 2024-08-08 | Stop reason: ALTCHOICE

## 2024-08-08 RX ORDER — MIDAZOLAM HYDROCHLORIDE 1 MG/ML
INJECTION INTRAMUSCULAR; INTRAVENOUS PRN
Status: DISCONTINUED | OUTPATIENT
Start: 2024-08-08 | End: 2024-08-08 | Stop reason: ALTCHOICE

## 2024-08-08 RX ORDER — FENTANYL CITRATE 50 UG/ML
INJECTION, SOLUTION INTRAMUSCULAR; INTRAVENOUS PRN
Status: DISCONTINUED | OUTPATIENT
Start: 2024-08-08 | End: 2024-08-08 | Stop reason: ALTCHOICE

## 2024-08-08 RX ORDER — LIDOCAINE HYDROCHLORIDE 10 MG/ML
INJECTION, SOLUTION EPIDURAL; INFILTRATION; INTRACAUDAL; PERINEURAL PRN
Status: DISCONTINUED | OUTPATIENT
Start: 2024-08-08 | End: 2024-08-08 | Stop reason: ALTCHOICE

## 2024-08-08 ASSESSMENT — PAIN - FUNCTIONAL ASSESSMENT
PAIN_FUNCTIONAL_ASSESSMENT: ACTIVITIES ARE NOT PREVENTED
PAIN_FUNCTIONAL_ASSESSMENT: 0-10
PAIN_FUNCTIONAL_ASSESSMENT: 0-10

## 2024-08-08 ASSESSMENT — PAIN DESCRIPTION - DESCRIPTORS: DESCRIPTORS: ACHING

## 2024-08-08 NOTE — PRE SEDATION
[facility]  Pre-Sedation/Analgesia History & Physical    Pt Name: Cresencio Linares  MRN: [id number]  YOB: 1960  Provider Performing Procedure: Mauro Santos DO   Primary Care Physician: Deborah Plummer MD      MEDICAL HISTORY       has a past medical history of Acute kidney injury (HCC), Cardiomyopathy, dilated (HCC), Claustrophobia, Congestive heart failure (CHF) (HCC), Depression, Heart transplanted (HCC), HTN (hypertension), Hyperlipidemia, Medtronic dual ICD, Osteoarthritis, Osteoarthritis of spine with radiculopathy, lumbar region, Spinal stenosis, and Unspecified sleep apnea.  SURGICAL HISTORY   has a past surgical history that includes cardiovascular stress test (09/2013); doppler echocardiography (09/2013); doppler echocardiography (09/2013); Nasal sinus surgery; Carpal tunnel release (Right); Colonoscopy; back surgery (08/26/2014); Endoscopy, colon, diagnostic; Vasectomy (???); sinus surgery (1980's???); other surgical history (10/09/2015); Neck surgery (2015); back surgery (2013); Tonsillectomy (1980's??); hernia repair (???); other surgical history (01/18/2016); other surgical history (02/08/2016); other surgical history (N/A, 03/21/2016); other surgical history (Bilateral, 05/16/2016); other surgical history (08/01/2016); cervical fusion (05/17/2017); other surgical history (Left, 09/11/2017); Lumbar spine surgery (Left, 09/11/2017); other surgical history (Right, 10/31/2017); Lumbar spine surgery (Right, 10/31/2017); other surgical history (Right, 05/09/2018); pr exc b9 lesion mrgn xcp sk tg s/n/h/f/g 0.5 cm/< (Right, 05/09/2018); pr dstrj neurolytic agent other peripheral nerve (Left, 06/19/2018); Lumbar spine surgery (Left, 04/11/2019); Cardiac catheterization (10/2013); Cardiac defibrillator placement (2013); Injection Procedure For Sacroiliac Joint (Left, 07/02/2019); Injection Procedure For Sacroiliac Joint (Left, 08/26/2019); Lumbar spine surgery (Left,

## 2024-08-08 NOTE — POST SEDATION
[facility]  Sedation/Analgesia Post Sedation Record    Pt Name: Cresencio Linares  MRN: [id number]  YOB: 1960  Procedure Performed By: Mauro Santos DO  Primary Care Physician: Deborah Plummer MD    POST-PROCEDURE    Physicians/Assistants: Mauro Santos DO  Procedure Performed: See Procedure Note   Sedation/Anesthesia: Versed and Fentanyl (See procedure note for amount and duration)  Estimated Blood Loss:     0  ml  Specimens Removed: None        Complications: None           Mauro Santos DO  Electronically signed 8/8/2024 at 1:15 PM

## 2024-08-08 NOTE — PROGRESS NOTES
0947: Patient arrives to recovery room via cart.  Spontaneous respirations, vss, report received from surgical RN.  Patient denies pain, numbness, tingling , nausea.  Injection site clean, dry, intact. HOB elevated. IV capped. Snack and drink given.  Call light within reach.        0955: Patient getting dressed, IV removed. Ride called.     1011:patient ambulated to discharge lobby in stable condition. Patient discharged home with wife.

## 2024-08-13 ENCOUNTER — HOSPITAL ENCOUNTER (OUTPATIENT)
Dept: PHYSICAL THERAPY | Age: 64
Setting detail: THERAPIES SERIES
Discharge: HOME | End: 2024-08-13
Payer: MEDICARE

## 2024-08-13 PROCEDURE — 97113 AQUATIC THERAPY/EXERCISES: CPT

## 2024-08-13 NOTE — PROGRESS NOTES
[facility]  PHYSICAL THERAPY  [] EVALUATION  [] DAILY NOTE (LAND) [x] DAILY NOTE (AQUATIC ) [] PROGRESS NOTE [] DISCHARGE NOTE    [x] OUTPATIENT REHABILITATION CENTER [facility]   [] [place] [facility]    [] [facility]   [] [facility]    Date: 2024  Patient Name:  Cresencio Linares  : 1960  MRN: [id number]  CSN: [id number]    Referring Practitioner Comisar, Cresencio Mantilla MD    Diagnosis Sprain of unspecified site of right knee, initial encounter  Unilateral primary osteoarthritis, right knee   Treatment Diagnosis M25.561  Right Knee Pain  M25.661 Stiffness of right knee, not elsewhere classified  M25.361 Other instability, right knee  R53.1 Weakness   Date of Evaluation 24    Additional Pertinent History HTN, heart transplant      Functional Outcome Measure Used LEFS    Functional Outcome Score 41/80 (24)       Insurance: Primary: Payor: MEDICARE /  /  / ,   Secondary: [facility]   Authorization Information: PRECERTIFICATION REQUIRED:  No  INSURANCE THERAPY BENEFIT:  Patient has unlimited visits based on medical necessity  Benefit will not cover maintenance or preventative treatment.  AQUATIC THERAPY COVERED:   Yes  MODALITIES COVERED:  Yes, with the exception of iontophoresis and hot packs/cold packs  TELEHEALTH COVERED: Yes   Approved Procedure Codes: Authorization of Specific CPT Codes Not Required  (Codes requested indicated by red font, codes approved indicated by black font)   Visit # 4, 4/10 for progress note (Reporting Period: 24 to  9/3/24)   Visits Allowed: Unlimited based on medical necessity    Recertification Date: 24   Physician Follow-Up:    Physician Orders:    History of Present Illness: Patient reports that he has had R knee pain for about 8 weeks now. Patient had a fall when he missed the last step going into the basement when it was dark without light on. Patient reports that his R knee was bent backwards

## 2024-08-15 ENCOUNTER — HOSPITAL ENCOUNTER (OUTPATIENT)
Dept: PHYSICAL THERAPY | Age: 64
Setting detail: THERAPIES SERIES
End: 2024-08-15
Payer: MEDICARE

## 2024-08-20 ENCOUNTER — HOSPITAL ENCOUNTER (OUTPATIENT)
Dept: PHYSICAL THERAPY | Age: 64
Setting detail: THERAPIES SERIES
Discharge: HOME | End: 2024-08-20
Payer: MEDICARE

## 2024-08-20 PROCEDURE — 97113 AQUATIC THERAPY/EXERCISES: CPT

## 2024-08-20 NOTE — PROGRESS NOTES
[facility]  PHYSICAL THERAPY  [] EVALUATION  [] DAILY NOTE (LAND) [x] DAILY NOTE (AQUATIC ) [] PROGRESS NOTE [] DISCHARGE NOTE    [x] OUTPATIENT REHABILITATION CENTER [facility]   [] [place] [facility]    [] [facility]   [] [facility]    Date: 2024  Patient Name:  Cresencio Linares  : 1960  MRN: [id number]  CSN: [id number]    Referring Practitioner Comisar, Cresencio Mantilla MD    Diagnosis Sprain of unspecified site of right knee, initial encounter  Unilateral primary osteoarthritis, right knee   Treatment Diagnosis M25.561  Right Knee Pain  M25.661 Stiffness of right knee, not elsewhere classified  M25.361 Other instability, right knee  R53.1 Weakness   Date of Evaluation 24    Additional Pertinent History HTN, heart transplant      Functional Outcome Measure Used LEFS    Functional Outcome Score 41/80 (24)       Insurance: Primary: Payor: MEDICARE /  /  / ,   Secondary: [facility]   Authorization Information: PRECERTIFICATION REQUIRED:  No  INSURANCE THERAPY BENEFIT:  Patient has unlimited visits based on medical necessity  Benefit will not cover maintenance or preventative treatment.  AQUATIC THERAPY COVERED:   Yes  MODALITIES COVERED:  Yes, with the exception of iontophoresis and hot packs/cold packs  TELEHEALTH COVERED: Yes   Approved Procedure Codes: Authorization of Specific CPT Codes Not Required  (Codes requested indicated by red font, codes approved indicated by black font)   Visit # 5, 5/10 for progress note (Reporting Period: 24 to  9/3/24)   Visits Allowed: Unlimited based on medical necessity    Recertification Date: 24   Physician Follow-Up:    Physician Orders:    History of Present Illness: Patient reports that he has had R knee pain for about 8 weeks now. Patient had a fall when he missed the last step going into the basement when it was dark without light on. Patient reports that his R knee was bent backwards

## 2024-09-03 ENCOUNTER — HOSPITAL ENCOUNTER (OUTPATIENT)
Dept: PHYSICAL THERAPY | Age: 64
Setting detail: THERAPIES SERIES
Discharge: HOME | End: 2024-09-03
Payer: MEDICARE

## 2024-09-03 ENCOUNTER — LAB (OUTPATIENT)
Dept: LAB | Age: 64
End: 2024-09-03

## 2024-09-03 LAB
ANION GAP SERPL CALC-SCNC: 16 MEQ/L (ref 8–16)
BUN SERPL-MCNC: 37 MG/DL (ref 7–22)
CALCIUM SERPL-MCNC: 9 MG/DL (ref 8.5–10.5)
CHLORIDE SERPL-SCNC: 91 MEQ/L (ref 98–111)
CO2 SERPL-SCNC: 29 MEQ/L (ref 23–33)
CREAT SERPL-MCNC: 1.9 MG/DL (ref 0.4–1.2)
GFR SERPL CREATININE-BSD FRML MDRD: 38 ML/MIN/1.73M2
GLUCOSE SERPL-MCNC: 114 MG/DL (ref 70–108)
POTASSIUM SERPL-SCNC: 2.9 MEQ/L (ref 3.5–5.2)
SODIUM SERPL-SCNC: 136 MEQ/L (ref 135–145)

## 2024-09-03 PROCEDURE — 97113 AQUATIC THERAPY/EXERCISES: CPT

## 2024-09-03 NOTE — PROGRESS NOTES
[facility]  PHYSICAL THERAPY  [] EVALUATION  [] DAILY NOTE (LAND) [] DAILY NOTE (AQUATIC ) [x] PROGRESS NOTE [] DISCHARGE NOTE    [x] OUTPATIENT REHABILITATION CENTER [facility]   [] [place] [facility]    [] [facility]   [] [facility]    Date: 9/3/2024  Patient Name:  Cresencio Linares  : 1960  MRN: [id number]  CSN: [id number]    Referring Practitioner Comisar, Cresencio Mantilla MD    Diagnosis Sprain of unspecified site of right knee, initial encounter  Unilateral primary osteoarthritis, right knee   Treatment Diagnosis M25.561  Right Knee Pain  M25.661 Stiffness of right knee, not elsewhere classified  M25.361 Other instability, right knee  R53.1 Weakness   Date of Evaluation 24    Additional Pertinent History HTN, heart transplant      Functional Outcome Measure Used LEFS    Functional Outcome Score 41/80 (24) 51/80 (9/3/24)      Insurance: Primary: Payor: MEDICARE /  /  / ,   Secondary: [facility]   Authorization Information: PRECERTIFICATION REQUIRED:  No  INSURANCE THERAPY BENEFIT:  Patient has unlimited visits based on medical necessity  Benefit will not cover maintenance or preventative treatment.  AQUATIC THERAPY COVERED:   Yes  MODALITIES COVERED:  Yes, with the exception of iontophoresis and hot packs/cold packs  TELEHEALTH COVERED: Yes   Approved Procedure Codes: Authorization of Specific CPT Codes Not Required  (Codes requested indicated by red font, codes approved indicated by black font)   Visit # 6, 0/10 for progress note (Reporting Period: 24 to  9/3/24)   Visits Allowed: Unlimited based on medical necessity    Recertification Date: 24   Physician Follow-Up:    Physician Orders:    History of Present Illness: Patient reports that he has had R knee pain for about 8 weeks now. Patient had a fall when he missed the last step going into the basement when it was dark without light on. Patient reports that his R knee was bent

## 2024-09-06 ENCOUNTER — HOSPITAL ENCOUNTER (OUTPATIENT)
Dept: PHYSICAL THERAPY | Age: 64
Setting detail: THERAPIES SERIES
Discharge: HOME | End: 2024-09-06
Payer: MEDICARE

## 2024-09-06 PROCEDURE — 97113 AQUATIC THERAPY/EXERCISES: CPT

## 2024-09-06 NOTE — PROGRESS NOTES
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 Functional Outcome Measure Used Walking test for 6 minutes, Cordova Balance   Functional Outcome Score walking test: 614 feet in 6 minutes, perceived exertion 5,  Cordova balance: 41/56 (3/30/22)       Insurance: Primary: Payor: Bill Jnesen /  /  / ,   Secondary: [facility]   Authorization Information: PRE CERTIFICATION REQUIRED: NO  INSURANCE THERAPY BENEFIT:  ALLOWED 30 PT, AND 30 OT VISITS PER CALENDAR YEAR, NONE OF THESE VISITS HAVE BEEN USED  AQUATIC THERAPY COVERED: YES  MODALITIES COVERED:  YES, YES MASSAGE  TELEHEALTH COVERED: NO  REFERENCE NUMBER: [id number]   Visit # 15, 6/10 for progress note   Visits Allowed: 30   Recertification Date:    Physician Follow-Up: . Physician Orders: PT evaluate and treat 3x per week per 6 weeks, OT evaluate and treat, 3x per week per 6 weeks   History of Present Illness: Patient reports of heart transplant on 2022 and was in [place] for rehab for 17 days. He takes regular BP and today /70. Since he has had his new heart notes less shortness of breath.  Continues to have neck Patient had relatively uneventful evening.  Patient up to bathroom to void.  No BM at this time.  C-diff sample sent yesterday, no results yet. Remains afebrile.  Received scheduled IVPB Flagyl.  Tolerates clear liquid diet.  Denies abdominal pain.  Alert & oriented x4.  Side rails x2 raised for patient safety.  Up ad yanet with steady gait.  Normal saline infusing at 50 ml/hr.  Will monitor.   test  694.8 feet O2sat 97%, pulse97  Perceived exertion 6   Cordova Balance test 48/56      Bilateral heelraises with cues to wb throug great toes more instead of rolling ankle out  x15       Partial squats with ball between knees on foam x15      3 way hip on foam  x15   BUE on // bars except for hip extension one hand hold; Slow and controlled upright posture   Standing balance, narrow stance eyes closed, tandem stance r/l, l/r with eyes open  (on Foam) 30 seconds each   Decreased EC time- pt LOB, CGA required    Rockerboard fwd/back, side to side  15x Balance 30 seconds      4 inch step ups with eccentric lowering with tap of opposite foot behind-forward  12x      Step ups standing on  6 inch lat/fwd x12   Forward only today   Alternating step taps 6 inch standing  2x 15x    1 UE support   NK table  Extension/flexion LLE  15#  2x15 x    NK table extension/flexion RLE  15#  2x15 x    Total gym squats Level J 2x10   Pillow behind head. Step stance on airex       Heel and toe taps on airex fwd/bwd x12   Mild sway  1UE support    Marilia step overs X4/2 laps green     Dynamic gait: retro, marching, tandem, sidestepping, hamstring curls  x2 laps Taylor rail      SC ambulation 200' In clinic  LLE weaker and tends to lock into place while ambulating. Vcs to avoid sharp turns   SBA- steady    Stair negotiation  4 steps x4      Sit<>stands from chair butt taps with cushion in chair  x12      Gait training without walker     May ambulate without walker in session between stations.  SBA   Nu step LE ONLY  6 min Level 5 X    Hydrohiop BLE Load 5 2x10     Multi- hip 4 way hip  30# x12 x    Leg press   calf raises 2x10  2x10 60#  40# X  x Pt noted enjoyed utilizing the leg press over total gym   Weighted walk: fwd, side/side,retro x2 15# x Pt noted feeling challenged  CGA- slight instability noted   Trampoline ball toss: narrow stance, tandem stance x10 each Red ball x Progress to airex NEXT SESSION          REassessment  30 min Perceive exertion following PT session 0-10  0  x Just legs feeling tired. Specific Interventions Next Treatment: balance retraining, strengthening core and LEs left drop foot, LLE weakness of hip and knee, ankle. Gait training with straight cane to no assistive device. Stair step negotiation. (history of heart transplant 2/7/2022, (history of arachnoiditis, lumbar fusion and cervical fusion)     Activity/Treatment Tolerance:  [x]  Patient tolerated treatment well  []  Patient limited by fatigue   []  Patient limited by pain   []  Patient limited by medical complications  []  Other:     Assessment: Continued therex as stated above and withheld stair climbing due to pt stating it flared up his arachnoiditis. Increased sets of exercises as tolerated by pt. Trial weighted walks, pt tolerated well. Pt appeared fatigued towards end of treatment session and proper technique was compromised. Therapist discontinued treatment. Will continue to progress as tolerated by pt per POC. Body Structures/Functions/Activity Limitations: impaired activity tolerance, impaired balance, impaired endurance, impaired sensation, impaired strength and abnormal gait  Prognosis: good    GOALS:  Patient Goal: to participate in cardiac rehab following PT and be able to have improved activity level tolerance to cook, negotiate stair steps and walk without walker. Short Term Goals:  Time Frame: 4 weeks  1. Patient to demonstrate increased strength LLE: hip flexion 3+ to 4-/5 to 4/5, knee extensors and flexors from 4-/5 to 4+/5, left ankle dorsiflexion from 3/5 3+/5 to 4-/5. With increased stability and improved walking pattern with less high steppage gait with LLE, improved ankle strength with less left foot drop. 2. Patient to negoatiate steps 8 steps without use of handrail with improved balance and strength modified independently.      3. 6 minute walk test with stable vitals with ability to complete distance from 614 feet to 700 feet and perceived exertion from 5 to 3. Long Term Goals:  Time Frame: 8 weeks  1. Cordova Balance test from 48/56 to 52/56    2. 6 minute walk test from 700 feet to 800 feet with perceived exertion <3.     3. Patient to demonstrate independence in HEP with progression in endurance, increased core and LE strength, improved ambulation status to no assistive device with improved walking pattern and balance. Patient Education:   [x]  HEP/Education Completed: Continue HEP, monitor fatigue, BP, SOB   Medbridge Access Code:  []  No new Education completed  [x]  Reviewed Prior HEP      []  Patient verbalized and/or demonstrated understanding of education provided. []  Patient unable to verbalize and/or demonstrate understanding of education provided. Will continue education. []  Barriers to learning: none    PLAN:  Treatment Recommendations: Strengthening, Balance Training, Functional Mobility Training, Transfer Training, Endurance Training, Gait Training, Stair Training, Neuromuscular Re-education, Home Exercise Program and Patient Education    []  Plan of care initiated. Plan to see patient 3 times per week for 8 weeks to address the treatment planned outlined above.   [x]  Continue with current plan of care  []  Modify plan of care as follows:    []  Hold pending physician visit  []  Discharge    Time In 0800   Time Out 0839   Timed Code Minutes: 39   Total Treatment Time: 39       Electronically Signed by: Jessica Rock PTA

## 2024-09-06 NOTE — PROGRESS NOTES
[facility]  PHYSICAL THERAPY  [] EVALUATION  [] DAILY NOTE (LAND) [x] DAILY NOTE (AQUATIC ) [] PROGRESS NOTE [] DISCHARGE NOTE    [x] OUTPATIENT REHABILITATION CENTER [facility]   [] [place] [facility]    [] [facility]   [] [facility]    Date: 2024  Patient Name:  Cresencio Linares  : 1960  MRN: [id number]  CSN: [id number]    Referring Practitioner Comisar, Cresencio Mantilla MD    Diagnosis Sprain of unspecified site of right knee, initial encounter  Unilateral primary osteoarthritis, right knee   Treatment Diagnosis M25.561  Right Knee Pain  M25.661 Stiffness of right knee, not elsewhere classified  M25.361 Other instability, right knee  R53.1 Weakness   Date of Evaluation 24    Additional Pertinent History HTN, heart transplant      Functional Outcome Measure Used LEFS    Functional Outcome Score 41/80 (24) 51/80 (9/3/24)      Insurance: Primary: Payor: MEDICARE /  /  / ,   Secondary: [facility]   Authorization Information: PRECERTIFICATION REQUIRED:  No  INSURANCE THERAPY BENEFIT:  Patient has unlimited visits based on medical necessity  Benefit will not cover maintenance or preventative treatment.  AQUATIC THERAPY COVERED:   Yes  MODALITIES COVERED:  Yes, with the exception of iontophoresis and hot packs/cold packs  TELEHEALTH COVERED: Yes   Approved Procedure Codes: Authorization of Specific CPT Codes Not Required  (Codes requested indicated by red font, codes approved indicated by black font)   Visit # 7, 1/10 for progress note (Reporting Period: 24 to  9/3/24)   Visits Allowed: Unlimited based on medical necessity    Recertification Date: 24   Physician Follow-Up:    Physician Orders:    History of Present Illness: Patient reports that he has had R knee pain for about 8 weeks now. Patient had a fall when he missed the last step going into the basement when it was dark without light on. Patient reports that his R knee was bent

## 2024-09-09 ENCOUNTER — HOSPITAL ENCOUNTER (OUTPATIENT)
Dept: PHYSICAL THERAPY | Age: 64
Setting detail: THERAPIES SERIES
Discharge: HOME | End: 2024-09-09
Payer: MEDICARE

## 2024-09-09 PROCEDURE — 97113 AQUATIC THERAPY/EXERCISES: CPT

## 2024-09-13 ENCOUNTER — HOSPITAL ENCOUNTER (OUTPATIENT)
Dept: PHYSICAL THERAPY | Age: 64
Setting detail: THERAPIES SERIES
Discharge: HOME | End: 2024-09-13
Payer: MEDICARE

## 2024-09-13 PROCEDURE — 97113 AQUATIC THERAPY/EXERCISES: CPT

## 2024-09-16 ENCOUNTER — APPOINTMENT (OUTPATIENT)
Dept: GENERAL RADIOLOGY | Age: 64
End: 2024-09-16
Payer: MEDICARE

## 2024-09-16 ENCOUNTER — APPOINTMENT (OUTPATIENT)
Dept: PHYSICAL THERAPY | Age: 64
End: 2024-09-16
Payer: MEDICARE

## 2024-09-16 ENCOUNTER — HOSPITAL ENCOUNTER (EMERGENCY)
Age: 64
Discharge: HOME | End: 2024-09-16
Payer: MEDICARE

## 2024-09-16 VITALS
TEMPERATURE: 98.1 F | BODY MASS INDEX: 36.92 KG/M2 | RESPIRATION RATE: 20 BRPM | SYSTOLIC BLOOD PRESSURE: 113 MMHG | DIASTOLIC BLOOD PRESSURE: 67 MMHG | WEIGHT: 250 LBS | OXYGEN SATURATION: 93 % | HEART RATE: 92 BPM

## 2024-09-16 DIAGNOSIS — J20.9 ACUTE BRONCHITIS, UNSPECIFIED ORGANISM: Primary | ICD-10-CM

## 2024-09-16 PROCEDURE — 71046 X-RAY EXAM CHEST 2 VIEWS: CPT

## 2024-09-16 PROCEDURE — 99213 OFFICE O/P EST LOW 20 MIN: CPT

## 2024-09-16 RX ORDER — PREDNISONE 20 MG/1
40 TABLET ORAL DAILY
Qty: 10 TABLET | Refills: 0 | Status: SHIPPED | OUTPATIENT
Start: 2024-09-16 | End: 2024-09-21

## 2024-09-16 RX ORDER — TACROLIMUS 1 MG/1
CAPSULE ORAL
COMMUNITY
Start: 2024-09-12

## 2024-09-16 RX ORDER — AZITHROMYCIN 250 MG/1
TABLET, FILM COATED ORAL
Qty: 6 TABLET | Refills: 0 | Status: SHIPPED | OUTPATIENT
Start: 2024-09-16 | End: 2024-09-26

## 2024-09-16 ASSESSMENT — PAIN DESCRIPTION - DESCRIPTORS: DESCRIPTORS: SHARP

## 2024-09-16 ASSESSMENT — PAIN DESCRIPTION - ONSET: ONSET: PROGRESSIVE

## 2024-09-16 ASSESSMENT — ENCOUNTER SYMPTOMS
ALLERGIC/IMMUNOLOGIC NEGATIVE: 1
GASTROINTESTINAL NEGATIVE: 1
COUGH: 1
EYES NEGATIVE: 1

## 2024-09-16 ASSESSMENT — PAIN DESCRIPTION - PAIN TYPE: TYPE: ACUTE PAIN

## 2024-09-16 ASSESSMENT — PAIN SCALES - GENERAL: PAINLEVEL_OUTOF10: 10

## 2024-09-16 ASSESSMENT — PAIN DESCRIPTION - FREQUENCY: FREQUENCY: INTERMITTENT

## 2024-09-17 ENCOUNTER — LAB (OUTPATIENT)
Dept: LAB | Age: 64
End: 2024-09-17

## 2024-09-17 LAB
ALBUMIN SERPL BCG-MCNC: 3.7 G/DL (ref 3.5–5.1)
ALP SERPL-CCNC: 142 U/L (ref 38–126)
ALT SERPL W/O P-5'-P-CCNC: 19 U/L (ref 11–66)
ANION GAP SERPL CALC-SCNC: 17 MEQ/L (ref 8–16)
APTT PPP: 37 SECONDS (ref 22–38)
AST SERPL-CCNC: 22 U/L (ref 5–40)
BASOPHILS ABSOLUTE: 0 THOU/MM3 (ref 0–0.1)
BASOPHILS NFR BLD AUTO: 0.2 %
BILIRUB CONJ SERPL-MCNC: 0.2 MG/DL (ref 0.1–13.8)
BILIRUB SERPL-MCNC: 0.5 MG/DL (ref 0.3–1.2)
BUN SERPL-MCNC: 51 MG/DL (ref 7–22)
CALCIUM SERPL-MCNC: 9.7 MG/DL (ref 8.5–10.5)
CHLORIDE SERPL-SCNC: 91 MEQ/L (ref 98–111)
CO2 SERPL-SCNC: 25 MEQ/L (ref 23–33)
CREAT SERPL-MCNC: 2.7 MG/DL (ref 0.4–1.2)
DEPRECATED RDW RBC AUTO: 42 FL (ref 35–45)
EOSINOPHIL NFR BLD AUTO: 0.1 %
EOSINOPHILS ABSOLUTE: 0 THOU/MM3 (ref 0–0.4)
ERYTHROCYTE [DISTWIDTH] IN BLOOD BY AUTOMATED COUNT: 13.5 % (ref 11.5–14.5)
GFR SERPL CREATININE-BSD FRML MDRD: 25 ML/MIN/1.73M2
GLUCOSE SERPL-MCNC: 117 MG/DL (ref 70–108)
HCT VFR BLD AUTO: 42.3 % (ref 42–52)
HGB BLD-MCNC: 14.1 GM/DL (ref 14–18)
IMM GRANULOCYTES # BLD AUTO: 0.1 THOU/MM3 (ref 0–0.07)
IMM GRANULOCYTES NFR BLD AUTO: 0.9 %
INR PPP: 1.03 (ref 0.85–1.13)
LYMPHOCYTES ABSOLUTE: 0.6 THOU/MM3 (ref 1–4.8)
LYMPHOCYTES NFR BLD AUTO: 5 %
MCH RBC QN AUTO: 28.4 PG (ref 26–33)
MCHC RBC AUTO-ENTMCNC: 33.3 GM/DL (ref 32.2–35.5)
MCV RBC AUTO: 85.1 FL (ref 80–94)
MONOCYTES ABSOLUTE: 0.5 THOU/MM3 (ref 0.4–1.3)
MONOCYTES NFR BLD AUTO: 4 %
NEUTROPHILS ABSOLUTE: 10.2 THOU/MM3 (ref 1.8–7.7)
NEUTROPHILS NFR BLD AUTO: 89.8 %
NRBC BLD AUTO-RTO: 0 /100 WBC
PLATELET # BLD AUTO: 283 THOU/MM3 (ref 130–400)
PMV BLD AUTO: 10.6 FL (ref 9.4–12.4)
POTASSIUM SERPL-SCNC: 3.7 MEQ/L (ref 3.5–5.2)
PROT SERPL-MCNC: 7.5 G/DL (ref 6.1–8)
RBC # BLD AUTO: 4.97 MILL/MM3 (ref 4.7–6.1)
SODIUM SERPL-SCNC: 133 MEQ/L (ref 135–145)
WBC # BLD AUTO: 11.4 THOU/MM3 (ref 4.8–10.8)

## 2024-09-20 ENCOUNTER — HOSPITAL ENCOUNTER (OUTPATIENT)
Dept: PHYSICAL THERAPY | Age: 64
Setting detail: THERAPIES SERIES
Discharge: HOME | End: 2024-09-20
Payer: MEDICARE

## 2024-09-20 PROCEDURE — 97113 AQUATIC THERAPY/EXERCISES: CPT

## 2024-10-09 ENCOUNTER — LAB (OUTPATIENT)
Dept: LAB | Age: 64
End: 2024-10-09

## 2024-10-09 LAB
ANION GAP SERPL CALC-SCNC: 14 MEQ/L (ref 8–16)
BASOPHILS ABSOLUTE: 0.1 THOU/MM3 (ref 0–0.1)
BASOPHILS NFR BLD AUTO: 0.7 %
BUN SERPL-MCNC: 32 MG/DL (ref 7–22)
CALCIUM SERPL-MCNC: 9.2 MG/DL (ref 8.5–10.5)
CHLORIDE SERPL-SCNC: 100 MEQ/L (ref 98–111)
CO2 SERPL-SCNC: 29 MEQ/L (ref 23–33)
CREAT SERPL-MCNC: 2.1 MG/DL (ref 0.4–1.2)
DEPRECATED RDW RBC AUTO: 50.8 FL (ref 35–45)
EOSINOPHIL NFR BLD AUTO: 3.8 %
EOSINOPHILS ABSOLUTE: 0.3 THOU/MM3 (ref 0–0.4)
ERYTHROCYTE [DISTWIDTH] IN BLOOD BY AUTOMATED COUNT: 16.1 % (ref 11.5–14.5)
GFR SERPL CREATININE-BSD FRML MDRD: 34 ML/MIN/1.73M2
GLUCOSE SERPL-MCNC: 90 MG/DL (ref 70–108)
HCT VFR BLD AUTO: 41.5 % (ref 42–52)
HGB BLD-MCNC: 13.1 GM/DL (ref 14–18)
IMM GRANULOCYTES # BLD AUTO: 0.03 THOU/MM3 (ref 0–0.07)
IMM GRANULOCYTES NFR BLD AUTO: 0.4 %
LYMPHOCYTES ABSOLUTE: 1.5 THOU/MM3 (ref 1–4.8)
LYMPHOCYTES NFR BLD AUTO: 19.9 %
MCH RBC QN AUTO: 27.8 PG (ref 26–33)
MCHC RBC AUTO-ENTMCNC: 31.6 GM/DL (ref 32.2–35.5)
MCV RBC AUTO: 88.1 FL (ref 80–94)
MONOCYTES ABSOLUTE: 0.7 THOU/MM3 (ref 0.4–1.3)
MONOCYTES NFR BLD AUTO: 9.6 %
NEUTROPHILS ABSOLUTE: 4.8 THOU/MM3 (ref 1.8–7.7)
NEUTROPHILS NFR BLD AUTO: 65.6 %
NRBC BLD AUTO-RTO: 0 /100 WBC
PLATELET # BLD AUTO: 199 THOU/MM3 (ref 130–400)
PMV BLD AUTO: 11.1 FL (ref 9.4–12.4)
POTASSIUM SERPL-SCNC: 4.2 MEQ/L (ref 3.5–5.2)
RBC # BLD AUTO: 4.71 MILL/MM3 (ref 4.7–6.1)
SODIUM SERPL-SCNC: 143 MEQ/L (ref 135–145)
WBC # BLD AUTO: 7.3 THOU/MM3 (ref 4.8–10.8)

## 2024-10-23 ENCOUNTER — LAB (OUTPATIENT)
Dept: LAB | Age: 64
End: 2024-10-23

## 2024-10-23 ENCOUNTER — APPOINTMENT (OUTPATIENT)
Dept: CT IMAGING | Age: 64
End: 2024-10-23
Payer: MEDICARE

## 2024-10-23 ENCOUNTER — APPOINTMENT (OUTPATIENT)
Dept: GENERAL RADIOLOGY | Age: 64
End: 2024-10-23
Payer: MEDICARE

## 2024-10-23 ENCOUNTER — HOSPITAL ENCOUNTER (INPATIENT)
Age: 64
LOS: 2 days | Discharge: HOME | End: 2024-10-26
Attending: EMERGENCY MEDICINE
Payer: MEDICARE

## 2024-10-23 DIAGNOSIS — I95.9 HYPOTENSION, UNSPECIFIED HYPOTENSION TYPE: Primary | ICD-10-CM

## 2024-10-23 DIAGNOSIS — R55 SYNCOPE, UNSPECIFIED SYNCOPE TYPE: ICD-10-CM

## 2024-10-23 DIAGNOSIS — N18.9 ACUTE KIDNEY INJURY SUPERIMPOSED ON CKD (HCC): ICD-10-CM

## 2024-10-23 DIAGNOSIS — Z94.1 HISTORY OF HEART TRANSPLANT (HCC): ICD-10-CM

## 2024-10-23 DIAGNOSIS — I50.22 CHF (CONGESTIVE HEART FAILURE), NYHA CLASS III, CHRONIC, SYSTOLIC (HCC): ICD-10-CM

## 2024-10-23 DIAGNOSIS — N17.9 ACUTE KIDNEY INJURY SUPERIMPOSED ON CKD (HCC): ICD-10-CM

## 2024-10-23 LAB
ANION GAP SERPL CALC-SCNC: 15 MEQ/L (ref 8–16)
ANION GAP SERPL CALC-SCNC: 22 MEQ/L (ref 8–16)
BASOPHILS ABSOLUTE: 0 THOU/MM3 (ref 0–0.1)
BASOPHILS ABSOLUTE: 0.1 THOU/MM3 (ref 0–0.1)
BASOPHILS NFR BLD AUTO: 0.4 %
BASOPHILS NFR BLD AUTO: 0.6 %
BILIRUB UR QL STRIP.AUTO: NEGATIVE
BUN SERPL-MCNC: 53 MG/DL (ref 7–22)
BUN SERPL-MCNC: 55 MG/DL (ref 7–22)
CALCIUM SERPL-MCNC: 8.9 MG/DL (ref 8.5–10.5)
CALCIUM SERPL-MCNC: 9.3 MG/DL (ref 8.5–10.5)
CHARACTER UR: CLEAR
CHLORIDE SERPL-SCNC: 88 MEQ/L (ref 98–111)
CHLORIDE SERPL-SCNC: 94 MEQ/L (ref 98–111)
CO2 SERPL-SCNC: 24 MEQ/L (ref 23–33)
CO2 SERPL-SCNC: 31 MEQ/L (ref 23–33)
COLOR, UA: YELLOW
CREAT SERPL-MCNC: 2.4 MG/DL (ref 0.4–1.2)
CREAT SERPL-MCNC: 3 MG/DL (ref 0.4–1.2)
DEPRECATED RDW RBC AUTO: 53.8 FL (ref 35–45)
DEPRECATED RDW RBC AUTO: 54.3 FL (ref 35–45)
EOSINOPHIL NFR BLD AUTO: 0.4 %
EOSINOPHIL NFR BLD AUTO: 1.6 %
EOSINOPHILS ABSOLUTE: 0 THOU/MM3 (ref 0–0.4)
EOSINOPHILS ABSOLUTE: 0.2 THOU/MM3 (ref 0–0.4)
ERYTHROCYTE [DISTWIDTH] IN BLOOD BY AUTOMATED COUNT: 16.8 % (ref 11.5–14.5)
ERYTHROCYTE [DISTWIDTH] IN BLOOD BY AUTOMATED COUNT: 17 % (ref 11.5–14.5)
FLUAV RNA RESP QL NAA+PROBE: NOT DETECTED
FLUBV RNA RESP QL NAA+PROBE: NOT DETECTED
GFR SERPL CREATININE-BSD FRML MDRD: 22 ML/MIN/1.73M2
GFR SERPL CREATININE-BSD FRML MDRD: 29 ML/MIN/1.73M2
GLUCOSE SERPL-MCNC: 69 MG/DL (ref 70–108)
GLUCOSE SERPL-MCNC: 97 MG/DL (ref 70–108)
GLUCOSE UR QL STRIP.AUTO: NEGATIVE MG/DL
HCT VFR BLD AUTO: 41.1 % (ref 42–52)
HCT VFR BLD AUTO: 44.7 % (ref 42–52)
HGB BLD-MCNC: 13.3 GM/DL (ref 14–18)
HGB BLD-MCNC: 14.2 GM/DL (ref 14–18)
HGB UR QL STRIP.AUTO: NEGATIVE
IMM GRANULOCYTES # BLD AUTO: 0.12 THOU/MM3 (ref 0–0.07)
IMM GRANULOCYTES # BLD AUTO: 0.2 THOU/MM3 (ref 0–0.07)
IMM GRANULOCYTES NFR BLD AUTO: 1.2 %
IMM GRANULOCYTES NFR BLD AUTO: 1.6 %
KETONES UR QL STRIP.AUTO: NEGATIVE
LACTIC ACID, SEPSIS: 1.7 MMOL/L (ref 0.5–1.9)
LACTIC ACID, SEPSIS: 2.1 MMOL/L (ref 0.5–1.9)
LYMPHOCYTES ABSOLUTE: 1.3 THOU/MM3 (ref 1–4.8)
LYMPHOCYTES ABSOLUTE: 2.2 THOU/MM3 (ref 1–4.8)
LYMPHOCYTES NFR BLD AUTO: 13 %
LYMPHOCYTES NFR BLD AUTO: 17.9 %
MAGNESIUM SERPL-MCNC: 1.8 MG/DL (ref 1.6–2.4)
MCH RBC QN AUTO: 28.1 PG (ref 26–33)
MCH RBC QN AUTO: 28.7 PG (ref 26–33)
MCHC RBC AUTO-ENTMCNC: 31.8 GM/DL (ref 32.2–35.5)
MCHC RBC AUTO-ENTMCNC: 32.4 GM/DL (ref 32.2–35.5)
MCV RBC AUTO: 88.5 FL (ref 80–94)
MCV RBC AUTO: 88.8 FL (ref 80–94)
MONOCYTES ABSOLUTE: 0.8 THOU/MM3 (ref 0.4–1.3)
MONOCYTES ABSOLUTE: 1.4 THOU/MM3 (ref 0.4–1.3)
MONOCYTES NFR BLD AUTO: 11.1 %
MONOCYTES NFR BLD AUTO: 7.6 %
NEUTROPHILS ABSOLUTE: 7.9 THOU/MM3 (ref 1.8–7.7)
NEUTROPHILS ABSOLUTE: 8.3 THOU/MM3 (ref 1.8–7.7)
NEUTROPHILS NFR BLD AUTO: 67.2 %
NEUTROPHILS NFR BLD AUTO: 77.4 %
NITRITE UR QL STRIP: NEGATIVE
NRBC BLD AUTO-RTO: 0 /100 WBC
NRBC BLD AUTO-RTO: 0 /100 WBC
NT-PROBNP SERPL IA-MCNC: 988.2 PG/ML (ref 0–124)
OSMOLALITY SERPL CALC.SUM OF ELEC: 281.7 MOSMOL/KG (ref 275–300)
PH UR STRIP.AUTO: 7 [PH] (ref 5–9)
PLATELET # BLD AUTO: 278 THOU/MM3 (ref 130–400)
PLATELET # BLD AUTO: 282 THOU/MM3 (ref 130–400)
PMV BLD AUTO: 10.6 FL (ref 9.4–12.4)
PMV BLD AUTO: 10.9 FL (ref 9.4–12.4)
POTASSIUM SERPL-SCNC: 3.2 MEQ/L (ref 3.5–5.2)
POTASSIUM SERPL-SCNC: 4.1 MEQ/L (ref 3.5–5.2)
PROT UR STRIP.AUTO-MCNC: NEGATIVE MG/DL
RBC # BLD AUTO: 4.63 MILL/MM3 (ref 4.7–6.1)
RBC # BLD AUTO: 5.05 MILL/MM3 (ref 4.7–6.1)
SARS-COV-2 RNA RESP QL NAA+PROBE: NOT DETECTED
SODIUM SERPL-SCNC: 134 MEQ/L (ref 135–145)
SODIUM SERPL-SCNC: 140 MEQ/L (ref 135–145)
SP GR UR REFRACT.AUTO: 1.01 (ref 1–1.03)
TROPONIN, HIGH SENSITIVITY: 43 NG/L (ref 0–12)
UROBILINOGEN, URINE: 0.2 EU/DL (ref 0–1)
WBC # BLD AUTO: 10.2 THOU/MM3 (ref 4.8–10.8)
WBC # BLD AUTO: 12.4 THOU/MM3 (ref 4.8–10.8)
WBC #/AREA URNS HPF: NEGATIVE /[HPF]

## 2024-10-23 PROCEDURE — 36415 COLL VENOUS BLD VENIPUNCTURE: CPT

## 2024-10-23 PROCEDURE — 81003 URINALYSIS AUTO W/O SCOPE: CPT

## 2024-10-23 PROCEDURE — 2580000003 HC RX 258: Performed by: EMERGENCY MEDICINE

## 2024-10-23 PROCEDURE — 84300 ASSAY OF URINE SODIUM: CPT

## 2024-10-23 PROCEDURE — 83605 ASSAY OF LACTIC ACID: CPT

## 2024-10-23 PROCEDURE — 82570 ASSAY OF URINE CREATININE: CPT

## 2024-10-23 PROCEDURE — 82436 ASSAY OF URINE CHLORIDE: CPT

## 2024-10-23 PROCEDURE — 83930 ASSAY OF BLOOD OSMOLALITY: CPT

## 2024-10-23 PROCEDURE — 87636 SARSCOV2 & INF A&B AMP PRB: CPT

## 2024-10-23 PROCEDURE — 83935 ASSAY OF URINE OSMOLALITY: CPT

## 2024-10-23 PROCEDURE — 96360 HYDRATION IV INFUSION INIT: CPT

## 2024-10-23 PROCEDURE — 84133 ASSAY OF URINE POTASSIUM: CPT

## 2024-10-23 PROCEDURE — 84540 ASSAY OF URINE/UREA-N: CPT

## 2024-10-23 PROCEDURE — 71045 X-RAY EXAM CHEST 1 VIEW: CPT

## 2024-10-23 PROCEDURE — 87040 BLOOD CULTURE FOR BACTERIA: CPT

## 2024-10-23 PROCEDURE — 80048 BASIC METABOLIC PNL TOTAL CA: CPT

## 2024-10-23 PROCEDURE — 85025 COMPLETE CBC W/AUTO DIFF WBC: CPT

## 2024-10-23 PROCEDURE — 70450 CT HEAD/BRAIN W/O DYE: CPT

## 2024-10-23 PROCEDURE — 96361 HYDRATE IV INFUSION ADD-ON: CPT

## 2024-10-23 PROCEDURE — 93005 ELECTROCARDIOGRAM TRACING: CPT | Performed by: EMERGENCY MEDICINE

## 2024-10-23 PROCEDURE — 99285 EMERGENCY DEPT VISIT HI MDM: CPT

## 2024-10-23 PROCEDURE — 83735 ASSAY OF MAGNESIUM: CPT

## 2024-10-23 PROCEDURE — 84484 ASSAY OF TROPONIN QUANT: CPT

## 2024-10-23 PROCEDURE — 83880 ASSAY OF NATRIURETIC PEPTIDE: CPT

## 2024-10-23 RX ORDER — 0.9 % SODIUM CHLORIDE 0.9 %
1000 INTRAVENOUS SOLUTION INTRAVENOUS ONCE
Status: COMPLETED | OUTPATIENT
Start: 2024-10-23 | End: 2024-10-23

## 2024-10-23 RX ORDER — ACETAMINOPHEN 500 MG
1000 TABLET ORAL ONCE
Status: COMPLETED | OUTPATIENT
Start: 2024-10-24 | End: 2024-10-24

## 2024-10-23 RX ADMIN — SODIUM CHLORIDE 1000 ML: 9 INJECTION, SOLUTION INTRAVENOUS at 20:33

## 2024-10-23 ASSESSMENT — PAIN - FUNCTIONAL ASSESSMENT
PAIN_FUNCTIONAL_ASSESSMENT: NONE - DENIES PAIN

## 2024-10-23 NOTE — ED TRIAGE NOTES
Pt presents to ED via EMS following a fall at 4:30 this afternoon d/t dizziness. Pt denies hitting his head and LOC. EMS reports pt did not want to come to ED but wife encouraged visit. Pt hypotensive upon arrival. EMS initiated fluids, fluids resumed following BP reading. Family at bedside, no needs voiced. Pt states he had a heart transplant 3 years ago, and reports taking medications as prescribed since. EKG and labs collected.

## 2024-10-23 NOTE — ED NOTES
Pt resting in bed. Vitals assessed. RR easy and unlabored. Report from Cata VIERA. Assumed care at this time.

## 2024-10-24 PROBLEM — N18.9 ACUTE KIDNEY INJURY SUPERIMPOSED ON CKD (HCC): Status: ACTIVE | Noted: 2024-10-24

## 2024-10-24 PROBLEM — Z94.1 HISTORY OF HEART TRANSPLANT (HCC): Status: ACTIVE | Noted: 2024-10-24

## 2024-10-24 PROBLEM — E86.1 HYPOTENSION DUE TO HYPOVOLEMIA: Status: ACTIVE | Noted: 2024-10-24

## 2024-10-24 PROBLEM — N17.9 AKI (ACUTE KIDNEY INJURY) (HCC): Status: ACTIVE | Noted: 2024-10-24

## 2024-10-24 LAB
ALBUMIN SERPL BCG-MCNC: 4.1 G/DL (ref 3.5–5.1)
ALP SERPL-CCNC: 94 U/L (ref 38–126)
ALT SERPL W/O P-5'-P-CCNC: 10 U/L (ref 11–66)
ANION GAP SERPL CALC-SCNC: 15 MEQ/L (ref 8–16)
AST SERPL-CCNC: 17 U/L (ref 5–40)
BASOPHILS ABSOLUTE: 0.1 THOU/MM3 (ref 0–0.1)
BASOPHILS NFR BLD AUTO: 0.7 %
BILIRUB CONJ SERPL-MCNC: 0.2 MG/DL (ref 0.1–13.8)
BILIRUB SERPL-MCNC: 0.5 MG/DL (ref 0.3–1.2)
BUN SERPL-MCNC: 54 MG/DL (ref 7–22)
CALCIUM SERPL-MCNC: 8.8 MG/DL (ref 8.5–10.5)
CHLORIDE 24H UR-SRATE: 76 MEQ/L
CHLORIDE SERPL-SCNC: 94 MEQ/L (ref 98–111)
CO2 SERPL-SCNC: 26 MEQ/L (ref 23–33)
CREAT SERPL-MCNC: 2.7 MG/DL (ref 0.4–1.2)
CREAT UR-MCNC: 44.2 MG/DL
CRP SERPL-MCNC: 0.51 MG/DL (ref 0–1)
DEPRECATED RDW RBC AUTO: 53.3 FL (ref 35–45)
EKG ATRIAL RATE: 94 BPM
EKG P AXIS: 59 DEGREES
EKG P-R INTERVAL: 188 MS
EKG Q-T INTERVAL: 370 MS
EKG QRS DURATION: 110 MS
EKG QTC CALCULATION (BAZETT): 462 MS
EKG R AXIS: -92 DEGREES
EKG T AXIS: 57 DEGREES
EKG VENTRICULAR RATE: 94 BPM
EOSINOPHIL NFR BLD AUTO: 2.1 %
EOSINOPHILS ABSOLUTE: 0.2 THOU/MM3 (ref 0–0.4)
ERYTHROCYTE [DISTWIDTH] IN BLOOD BY AUTOMATED COUNT: 16.8 % (ref 11.5–14.5)
ERYTHROCYTE [SEDIMENTATION RATE] IN BLOOD BY WESTERGREN METHOD: 7 MM/HR (ref 0–10)
GFR SERPL CREATININE-BSD FRML MDRD: 25 ML/MIN/1.73M2
GLUCOSE SERPL-MCNC: 98 MG/DL (ref 70–108)
HCT VFR BLD AUTO: 39.9 % (ref 42–52)
HGB BLD-MCNC: 12.9 GM/DL (ref 14–18)
IMM GRANULOCYTES # BLD AUTO: 0.17 THOU/MM3 (ref 0–0.07)
IMM GRANULOCYTES NFR BLD AUTO: 1.5 %
LYMPHOCYTES ABSOLUTE: 1.9 THOU/MM3 (ref 1–4.8)
LYMPHOCYTES NFR BLD AUTO: 17.2 %
MCH RBC QN AUTO: 28.4 PG (ref 26–33)
MCHC RBC AUTO-ENTMCNC: 32.3 GM/DL (ref 32.2–35.5)
MCV RBC AUTO: 87.7 FL (ref 80–94)
MONOCYTES ABSOLUTE: 1.2 THOU/MM3 (ref 0.4–1.3)
MONOCYTES NFR BLD AUTO: 10.2 %
MRSA DNA SPEC QL NAA+PROBE: NEGATIVE
NEUTROPHILS ABSOLUTE: 7.7 THOU/MM3 (ref 1.8–7.7)
NEUTROPHILS NFR BLD AUTO: 68.3 %
NRBC BLD AUTO-RTO: 0 /100 WBC
ORIGINAL SAMPLE NUMBER: NORMAL
ORIGINAL SAMPLE NUMBER: NORMAL
OSMOLALITY SERPL CALC.SUM OF ELEC: 284.8 MOSMOL/KG (ref 275–300)
OSMOLALITY SERPL: NORMAL MOSMOL/KG (ref 275–295)
OSMOLALITY UR: NORMAL MOSMOL/KG (ref 250–750)
PLATELET # BLD AUTO: 229 THOU/MM3 (ref 130–400)
PMV BLD AUTO: 10.5 FL (ref 9.4–12.4)
POTASSIUM SERPL-SCNC: 3.5 MEQ/L (ref 3.5–5.2)
POTASSIUM UR-SCNC: 54.4 MEQ/L
PROCALCITONIN SERPL IA-MCNC: 0.11 NG/ML (ref 0.01–0.09)
PROCALCITONIN SERPL IA-MCNC: 0.11 NG/ML (ref 0.01–0.09)
PROT SERPL-MCNC: 6.8 G/DL (ref 6.1–8)
RBC # BLD AUTO: 4.55 MILL/MM3 (ref 4.7–6.1)
REFERENCE LOCATION: NORMAL
REFERENCE LOCATION: NORMAL
REFERENCE RANGE: NORMAL
REFERENCE RANGE: NORMAL
SODIUM SERPL-SCNC: 135 MEQ/L (ref 135–145)
SODIUM UR-SCNC: 53 MEQ/L
TEST RESULTS WITH UNITS: NORMAL
TEST RESULTS WITH UNITS: NORMAL
TEST(S) BEING PERFORMED: NORMAL
TEST(S) BEING PERFORMED: NORMAL
UUN 24H UR-MCNC: 162 MG/DL
WBC # BLD AUTO: 11.3 THOU/MM3 (ref 4.8–10.8)

## 2024-10-24 PROCEDURE — 6360000002 HC RX W HCPCS: Performed by: INTERNAL MEDICINE

## 2024-10-24 PROCEDURE — 2060000000 HC ICU INTERMEDIATE R&B

## 2024-10-24 PROCEDURE — 6370000000 HC RX 637 (ALT 250 FOR IP): Performed by: STUDENT IN AN ORGANIZED HEALTH CARE EDUCATION/TRAINING PROGRAM

## 2024-10-24 PROCEDURE — 2580000003 HC RX 258: Performed by: STUDENT IN AN ORGANIZED HEALTH CARE EDUCATION/TRAINING PROGRAM

## 2024-10-24 PROCEDURE — 99233 SBSQ HOSP IP/OBS HIGH 50: CPT | Performed by: INTERNAL MEDICINE

## 2024-10-24 PROCEDURE — 6360000002 HC RX W HCPCS: Performed by: STUDENT IN AN ORGANIZED HEALTH CARE EDUCATION/TRAINING PROGRAM

## 2024-10-24 PROCEDURE — 99223 1ST HOSP IP/OBS HIGH 75: CPT | Performed by: INTERNAL MEDICINE

## 2024-10-24 PROCEDURE — 84145 PROCALCITONIN (PCT): CPT

## 2024-10-24 PROCEDURE — 97530 THERAPEUTIC ACTIVITIES: CPT

## 2024-10-24 PROCEDURE — 82248 BILIRUBIN DIRECT: CPT

## 2024-10-24 PROCEDURE — 85651 RBC SED RATE NONAUTOMATED: CPT

## 2024-10-24 PROCEDURE — 36415 COLL VENOUS BLD VENIPUNCTURE: CPT

## 2024-10-24 PROCEDURE — 97166 OT EVAL MOD COMPLEX 45 MIN: CPT

## 2024-10-24 PROCEDURE — 85025 COMPLETE CBC W/AUTO DIFF WBC: CPT

## 2024-10-24 PROCEDURE — 86140 C-REACTIVE PROTEIN: CPT

## 2024-10-24 PROCEDURE — 93010 ELECTROCARDIOGRAM REPORT: CPT | Performed by: NUCLEAR MEDICINE

## 2024-10-24 PROCEDURE — 87641 MR-STAPH DNA AMP PROBE: CPT

## 2024-10-24 PROCEDURE — 80053 COMPREHEN METABOLIC PANEL: CPT

## 2024-10-24 RX ORDER — POTASSIUM CHLORIDE 7.45 MG/ML
10 INJECTION INTRAVENOUS PRN
Status: DISCONTINUED | OUTPATIENT
Start: 2024-10-24 | End: 2024-10-25

## 2024-10-24 RX ORDER — PRAVASTATIN SODIUM 20 MG
20 TABLET ORAL DAILY
Status: DISCONTINUED | OUTPATIENT
Start: 2024-10-24 | End: 2024-10-26 | Stop reason: HOSPADM

## 2024-10-24 RX ORDER — TORSEMIDE 20 MG/1
40 TABLET ORAL 2 TIMES DAILY
Status: DISCONTINUED | OUTPATIENT
Start: 2024-10-24 | End: 2024-10-24

## 2024-10-24 RX ORDER — ACETAMINOPHEN 325 MG/1
650 TABLET ORAL EVERY 6 HOURS PRN
Status: DISCONTINUED | OUTPATIENT
Start: 2024-10-24 | End: 2024-10-26 | Stop reason: HOSPADM

## 2024-10-24 RX ORDER — TORSEMIDE 100 MG/1
50 TABLET ORAL 2 TIMES DAILY
Status: ON HOLD | COMMUNITY
Start: 2024-09-22 | End: 2024-10-26 | Stop reason: HOSPADM

## 2024-10-24 RX ORDER — SODIUM CHLORIDE 0.9 % (FLUSH) 0.9 %
5-40 SYRINGE (ML) INJECTION EVERY 12 HOURS SCHEDULED
Status: DISCONTINUED | OUTPATIENT
Start: 2024-10-24 | End: 2024-10-26 | Stop reason: HOSPADM

## 2024-10-24 RX ORDER — HEPARIN SODIUM 5000 [USP'U]/ML
5000 INJECTION, SOLUTION INTRAVENOUS; SUBCUTANEOUS EVERY 8 HOURS SCHEDULED
Status: DISCONTINUED | OUTPATIENT
Start: 2024-10-24 | End: 2024-10-24 | Stop reason: ALTCHOICE

## 2024-10-24 RX ORDER — ALLOPURINOL 100 MG/1
100 TABLET ORAL DAILY
Status: DISCONTINUED | OUTPATIENT
Start: 2024-10-24 | End: 2024-10-26 | Stop reason: HOSPADM

## 2024-10-24 RX ORDER — TORSEMIDE 20 MG/1
50 TABLET ORAL 2 TIMES DAILY
Status: DISCONTINUED | OUTPATIENT
Start: 2024-10-24 | End: 2024-10-26 | Stop reason: HOSPADM

## 2024-10-24 RX ORDER — ONDANSETRON 4 MG/1
4 TABLET, ORALLY DISINTEGRATING ORAL EVERY 8 HOURS PRN
Status: DISCONTINUED | OUTPATIENT
Start: 2024-10-24 | End: 2024-10-26 | Stop reason: HOSPADM

## 2024-10-24 RX ORDER — POTASSIUM CHLORIDE 1500 MG/1
20 TABLET, EXTENDED RELEASE ORAL EVERY EVENING
COMMUNITY

## 2024-10-24 RX ORDER — MYCOPHENOLATE MOFETIL 250 MG/1
1000 CAPSULE ORAL 2 TIMES DAILY
Status: DISCONTINUED | OUTPATIENT
Start: 2024-10-24 | End: 2024-10-26 | Stop reason: HOSPADM

## 2024-10-24 RX ORDER — TACROLIMUS 1 MG/1
1 CAPSULE ORAL 2 TIMES DAILY
COMMUNITY

## 2024-10-24 RX ORDER — POLYETHYLENE GLYCOL 3350 17 G/17G
17 POWDER, FOR SOLUTION ORAL DAILY PRN
Status: DISCONTINUED | OUTPATIENT
Start: 2024-10-24 | End: 2024-10-26 | Stop reason: HOSPADM

## 2024-10-24 RX ORDER — TACROLIMUS 1 MG/1
3 CAPSULE ORAL
Status: DISCONTINUED | OUTPATIENT
Start: 2024-10-24 | End: 2024-10-24

## 2024-10-24 RX ORDER — ASPIRIN 81 MG/1
81 TABLET, CHEWABLE ORAL DAILY
Status: DISCONTINUED | OUTPATIENT
Start: 2024-10-24 | End: 2024-10-26 | Stop reason: HOSPADM

## 2024-10-24 RX ORDER — HYDROCHLOROTHIAZIDE 25 MG/1
25 TABLET ORAL DAILY
Status: ON HOLD | COMMUNITY
Start: 2024-10-21 | End: 2024-10-26 | Stop reason: HOSPADM

## 2024-10-24 RX ORDER — POTASSIUM CHLORIDE 1500 MG/1
60 TABLET, EXTENDED RELEASE ORAL 2 TIMES DAILY
Status: ON HOLD | COMMUNITY
Start: 2024-10-22 | End: 2024-10-26 | Stop reason: HOSPADM

## 2024-10-24 RX ORDER — MAGNESIUM SULFATE IN WATER 40 MG/ML
2000 INJECTION, SOLUTION INTRAVENOUS PRN
Status: DISCONTINUED | OUTPATIENT
Start: 2024-10-24 | End: 2024-10-26 | Stop reason: HOSPADM

## 2024-10-24 RX ORDER — ACETAMINOPHEN 650 MG/1
650 SUPPOSITORY RECTAL EVERY 6 HOURS PRN
Status: DISCONTINUED | OUTPATIENT
Start: 2024-10-24 | End: 2024-10-26 | Stop reason: HOSPADM

## 2024-10-24 RX ORDER — LOSARTAN POTASSIUM 25 MG/1
25 TABLET ORAL DAILY
Status: DISCONTINUED | OUTPATIENT
Start: 2024-10-24 | End: 2024-10-26 | Stop reason: HOSPADM

## 2024-10-24 RX ORDER — HYDRALAZINE HYDROCHLORIDE 50 MG/1
50 TABLET, FILM COATED ORAL 3 TIMES DAILY
Status: ON HOLD | COMMUNITY
End: 2024-10-26 | Stop reason: HOSPADM

## 2024-10-24 RX ORDER — OXYCODONE HYDROCHLORIDE 5 MG/1
5 TABLET ORAL EVERY 4 HOURS PRN
Status: DISCONTINUED | OUTPATIENT
Start: 2024-10-24 | End: 2024-10-26 | Stop reason: HOSPADM

## 2024-10-24 RX ORDER — TAMSULOSIN HYDROCHLORIDE 0.4 MG/1
0.8 CAPSULE ORAL NIGHTLY
Status: DISCONTINUED | OUTPATIENT
Start: 2024-10-24 | End: 2024-10-26 | Stop reason: HOSPADM

## 2024-10-24 RX ORDER — LANOLIN ALCOHOL/MO/W.PET/CERES
400 CREAM (GRAM) TOPICAL DAILY
Status: DISCONTINUED | OUTPATIENT
Start: 2024-10-25 | End: 2024-10-26 | Stop reason: HOSPADM

## 2024-10-24 RX ORDER — TACROLIMUS 0.5 MG/1
0.5 CAPSULE ORAL 2 TIMES DAILY
COMMUNITY

## 2024-10-24 RX ORDER — HEPARIN SODIUM 5000 [USP'U]/ML
5000 INJECTION, SOLUTION INTRAVENOUS; SUBCUTANEOUS EVERY 8 HOURS SCHEDULED
Status: DISCONTINUED | OUTPATIENT
Start: 2024-10-24 | End: 2024-10-26 | Stop reason: HOSPADM

## 2024-10-24 RX ORDER — PANTOPRAZOLE SODIUM 20 MG/1
20 TABLET, DELAYED RELEASE ORAL DAILY
COMMUNITY

## 2024-10-24 RX ORDER — MAGNESIUM OXIDE 400 MG/1
800 TABLET ORAL 2 TIMES DAILY
Status: DISCONTINUED | OUTPATIENT
Start: 2024-10-24 | End: 2024-10-24

## 2024-10-24 RX ORDER — ONDANSETRON 2 MG/ML
4 INJECTION INTRAMUSCULAR; INTRAVENOUS EVERY 6 HOURS PRN
Status: DISCONTINUED | OUTPATIENT
Start: 2024-10-24 | End: 2024-10-26 | Stop reason: HOSPADM

## 2024-10-24 RX ORDER — SODIUM CHLORIDE 9 MG/ML
INJECTION, SOLUTION INTRAVENOUS PRN
Status: DISCONTINUED | OUTPATIENT
Start: 2024-10-24 | End: 2024-10-26 | Stop reason: HOSPADM

## 2024-10-24 RX ORDER — FINASTERIDE 5 MG/1
5 TABLET, FILM COATED ORAL DAILY
Status: DISCONTINUED | OUTPATIENT
Start: 2024-10-24 | End: 2024-10-26 | Stop reason: HOSPADM

## 2024-10-24 RX ORDER — QUETIAPINE FUMARATE 25 MG/1
25 TABLET, FILM COATED ORAL NIGHTLY
Status: ON HOLD | COMMUNITY
Start: 2024-10-19 | End: 2024-10-26 | Stop reason: HOSPADM

## 2024-10-24 RX ORDER — ALLOPURINOL 100 MG/1
100 TABLET ORAL DAILY
COMMUNITY

## 2024-10-24 RX ORDER — ACETAMINOPHEN 500 MG
1000 TABLET ORAL EVERY 6 HOURS PRN
COMMUNITY

## 2024-10-24 RX ORDER — SODIUM CHLORIDE 0.9 % (FLUSH) 0.9 %
5-40 SYRINGE (ML) INJECTION PRN
Status: DISCONTINUED | OUTPATIENT
Start: 2024-10-24 | End: 2024-10-26 | Stop reason: HOSPADM

## 2024-10-24 RX ORDER — AMITRIPTYLINE HYDROCHLORIDE 10 MG/1
10 TABLET ORAL NIGHTLY
Status: DISCONTINUED | OUTPATIENT
Start: 2024-10-24 | End: 2024-10-26 | Stop reason: HOSPADM

## 2024-10-24 RX ORDER — PREGABALIN 50 MG/1
50 CAPSULE ORAL 2 TIMES DAILY
Status: DISCONTINUED | OUTPATIENT
Start: 2024-10-24 | End: 2024-10-26 | Stop reason: HOSPADM

## 2024-10-24 RX ORDER — HYDRALAZINE HYDROCHLORIDE 50 MG/1
50 TABLET, FILM COATED ORAL EVERY 8 HOURS SCHEDULED
Status: DISCONTINUED | OUTPATIENT
Start: 2024-10-24 | End: 2024-10-26 | Stop reason: HOSPADM

## 2024-10-24 RX ORDER — POTASSIUM CHLORIDE 1500 MG/1
40 TABLET, EXTENDED RELEASE ORAL PRN
Status: DISCONTINUED | OUTPATIENT
Start: 2024-10-24 | End: 2024-10-26 | Stop reason: HOSPADM

## 2024-10-24 RX ORDER — ENOXAPARIN SODIUM 100 MG/ML
30 INJECTION SUBCUTANEOUS 2 TIMES DAILY
Status: DISCONTINUED | OUTPATIENT
Start: 2024-10-24 | End: 2024-10-24

## 2024-10-24 RX ORDER — TACROLIMUS 1 MG/1
2 CAPSULE ORAL EVERY EVENING
Status: DISCONTINUED | OUTPATIENT
Start: 2024-10-24 | End: 2024-10-24

## 2024-10-24 RX ORDER — POTASSIUM CHLORIDE 1500 MG/1
60 TABLET, EXTENDED RELEASE ORAL 2 TIMES DAILY WITH MEALS
Status: DISCONTINUED | OUTPATIENT
Start: 2024-10-24 | End: 2024-10-26 | Stop reason: HOSPADM

## 2024-10-24 RX ORDER — FERROUS SULFATE 325(65) MG
325 TABLET ORAL
COMMUNITY

## 2024-10-24 RX ORDER — PANTOPRAZOLE SODIUM 40 MG/1
40 TABLET, DELAYED RELEASE ORAL DAILY
Status: DISCONTINUED | OUTPATIENT
Start: 2024-10-24 | End: 2024-10-26 | Stop reason: HOSPADM

## 2024-10-24 RX ORDER — ACETAMINOPHEN 160 MG
2000 TABLET,DISINTEGRATING ORAL DAILY
COMMUNITY

## 2024-10-24 RX ADMIN — SODIUM CHLORIDE, PRESERVATIVE FREE 10 ML: 5 INJECTION INTRAVENOUS at 21:13

## 2024-10-24 RX ADMIN — HEPARIN SODIUM 5000 UNITS: 5000 INJECTION INTRAVENOUS; SUBCUTANEOUS at 05:59

## 2024-10-24 RX ADMIN — FINASTERIDE 5 MG: 5 TABLET, FILM COATED ORAL at 08:17

## 2024-10-24 RX ADMIN — ENOXAPARIN SODIUM 30 MG: 100 INJECTION SUBCUTANEOUS at 12:20

## 2024-10-24 RX ADMIN — AMITRIPTYLINE HYDROCHLORIDE 10 MG: 10 TABLET, FILM COATED ORAL at 20:57

## 2024-10-24 RX ADMIN — PANTOPRAZOLE SODIUM 40 MG: 40 TABLET, DELAYED RELEASE ORAL at 08:11

## 2024-10-24 RX ADMIN — MYCOPHENOLATE MOFETIL 1000 MG: 250 CAPSULE ORAL at 10:02

## 2024-10-24 RX ADMIN — HEPARIN SODIUM 5000 UNITS: 5000 INJECTION INTRAVENOUS; SUBCUTANEOUS at 22:24

## 2024-10-24 RX ADMIN — OXYCODONE HYDROCHLORIDE 5 MG: 5 TABLET ORAL at 20:57

## 2024-10-24 RX ADMIN — POTASSIUM CHLORIDE 40 MEQ: 1500 TABLET, EXTENDED RELEASE ORAL at 05:59

## 2024-10-24 RX ADMIN — PREGABALIN 50 MG: 50 CAPSULE ORAL at 08:11

## 2024-10-24 RX ADMIN — MYCOPHENOLATE MOFETIL 1000 MG: 250 CAPSULE ORAL at 20:57

## 2024-10-24 RX ADMIN — OXYCODONE HYDROCHLORIDE 5 MG: 5 TABLET ORAL at 05:59

## 2024-10-24 RX ADMIN — TACROLIMUS 1.5 MG: 1 CAPSULE ORAL at 09:46

## 2024-10-24 RX ADMIN — OXYCODONE HYDROCHLORIDE 5 MG: 5 TABLET ORAL at 11:49

## 2024-10-24 RX ADMIN — ASPIRIN 81 MG 81 MG: 81 TABLET ORAL at 08:11

## 2024-10-24 RX ADMIN — PRAVASTATIN SODIUM 20 MG: 20 TABLET ORAL at 08:17

## 2024-10-24 RX ADMIN — PREGABALIN 50 MG: 50 CAPSULE ORAL at 21:11

## 2024-10-24 RX ADMIN — TACROLIMUS 1.5 MG: 1 CAPSULE ORAL at 20:57

## 2024-10-24 RX ADMIN — ACETAMINOPHEN 1000 MG: 500 TABLET ORAL at 00:01

## 2024-10-24 RX ADMIN — POTASSIUM CHLORIDE 40 MEQ: 1500 TABLET, EXTENDED RELEASE ORAL at 15:39

## 2024-10-24 RX ADMIN — TAMSULOSIN HYDROCHLORIDE 0.8 MG: 0.4 CAPSULE ORAL at 20:56

## 2024-10-24 ASSESSMENT — PAIN DESCRIPTION - PAIN TYPE
TYPE: ACUTE PAIN
TYPE: ACUTE PAIN

## 2024-10-24 ASSESSMENT — PAIN DESCRIPTION - ONSET
ONSET: ON-GOING
ONSET: ON-GOING

## 2024-10-24 ASSESSMENT — PAIN DESCRIPTION - DESCRIPTORS
DESCRIPTORS: ACHING
DESCRIPTORS: ACHING;DISCOMFORT
DESCRIPTORS: ACHING;SORE

## 2024-10-24 ASSESSMENT — PAIN DESCRIPTION - ORIENTATION
ORIENTATION: LEFT;RIGHT
ORIENTATION: RIGHT;LEFT
ORIENTATION: RIGHT

## 2024-10-24 ASSESSMENT — PAIN SCALES - GENERAL
PAINLEVEL_OUTOF10: 5
PAINLEVEL_OUTOF10: 8
PAINLEVEL_OUTOF10: 8
PAINLEVEL_OUTOF10: 7
PAINLEVEL_OUTOF10: 7
PAINLEVEL_OUTOF10: 0
PAINLEVEL_OUTOF10: 0

## 2024-10-24 ASSESSMENT — PAIN DESCRIPTION - LOCATION
LOCATION: KNEE;FOOT
LOCATION: ANKLE;BACK
LOCATION: BACK;ANKLE
LOCATION: ANKLE;BACK
LOCATION: FOOT
LOCATION: BACK
LOCATION: ANKLE;KNEE

## 2024-10-24 ASSESSMENT — PAIN - FUNCTIONAL ASSESSMENT
PAIN_FUNCTIONAL_ASSESSMENT: 0-10
PAIN_FUNCTIONAL_ASSESSMENT: PREVENTS OR INTERFERES SOME ACTIVE ACTIVITIES AND ADLS
PAIN_FUNCTIONAL_ASSESSMENT: ACTIVITIES ARE NOT PREVENTED
PAIN_FUNCTIONAL_ASSESSMENT: 0-10
PAIN_FUNCTIONAL_ASSESSMENT: ACTIVITIES ARE NOT PREVENTED

## 2024-10-24 ASSESSMENT — PAIN SCALES - WONG BAKER
WONGBAKER_NUMERICALRESPONSE: NO HURT
WONGBAKER_NUMERICALRESPONSE: NO HURT

## 2024-10-24 ASSESSMENT — PAIN DESCRIPTION - FREQUENCY
FREQUENCY: CONTINUOUS
FREQUENCY: CONTINUOUS

## 2024-10-24 NOTE — ED NOTES
Pt resting in bed. Labs drawn. Vitals assessed. RR easy and unlabored. Pt updated on plan of care. Denies needs at this time.

## 2024-10-24 NOTE — ED NOTES
ED to inpatient nurses report      Chief Complaint:  Chief Complaint   Patient presents with    Hypotension    Loss of Consciousness     Present to ED from: Home    MOA:     LOC: alert and orientated to name, place, date  Mobility: Pt has not been up since coming in   Oxygen Baseline: Room Air    Current needs required: Room Air     Code Status:   Full Code    What abnormal results were found and what did you give/do to treat them? Hypotension, RYAN  Any procedures or intervention occur? 2 L Normal Saline, Tylenol, CT head, Xray chest, Labs, EKG    Mental Status:  Level of Consciousness: Alert (0)    Psych Assessment:        Vitals:  Patient Vitals for the past 24 hrs:   BP Temp Pulse Resp SpO2 Height Weight   10/24/24 0212 (!) 108/54 -- 92 16 94 % -- --   10/24/24 0115 103/74 -- 96 18 96 % -- --   10/24/24 0002 110/67 -- 93 18 96 % -- --   10/23/24 2348 129/72 -- 94 16 94 % -- --   10/23/24 2233 124/73 -- 91 18 96 % -- --   10/23/24 2125 128/79 -- 93 16 95 % -- --   10/23/24 2034 126/76 -- 93 16 97 % -- --   10/23/24 2013 109/68 -- 88 16 93 % -- --   10/23/24 1907 (!) 86/54 -- 91 18 95 % -- --   10/23/24 1849 (!) 82/51 98.8 °F (37.1 °C) 93 23 93 % 1.753 m (5' 9\") 114.3 kg (252 lb)        LDAs:   Peripheral IV 10/23/24 Left Antecubital (Active)   Site Assessment Clean, dry & intact 10/23/24 2016   Phlebitis Assessment No symptoms 10/23/24 2016   Infiltration Assessment 0 10/23/24 2016       Peripheral IV 10/23/24 Right Antecubital (Active)   Site Assessment Clean, dry & intact 10/23/24 2239   Phlebitis Assessment No symptoms 10/23/24 2239   Infiltration Assessment 0 10/23/24 2239       Ambulatory Status:  Presents to emergency department  because of falls (Syncope, seizure, or loss of consciousness): Yes, Age > 70: No, Altered Mental Status, Intoxication with alcohol or substance confusion (Disorientation, impaired judgment, poor safety awaremess, or inability to follow instructions): No, Impaired Mobility: Ambulates     PAIN MANAGEMENT PROCEDURE Left 8/8/2024    cervical 7 epidural steroid injection, left paramedian performed by Mauro Santos DO at [facility] [place]    VT DSTRJ NEUROLYTIC AGENT OTHER PERIPHERAL NERVE Left 06/19/2018    L-RFA @ L2-3, L3-4, L4-5, L5-S1 LEFT SIDE FIRST. performed by Mak Ferrera MD at [facility] [place]    VT EXC B9 LESION MRGN XCP SK TG S/N/H/F/G 0.5 CM/< Right 05/09/2018    EXCISION RIGHT FOREHEAD CYST performed by Lori Cordero MD at [facility] [place]    SINUS SURGERY  1980's???    TONSILLECTOMY  1980's??    VASECTOMY  ???       PAST MEDICAL HISTORY       Past Medical History:   Diagnosis Date    Acute kidney injury (HCC)     Cardiomyopathy, dilated (HCC)     VIRAL    Claustrophobia     Congestive heart failure (CHF) (HCC)     Depression 12/26/2013    Heart transplanted (HCC) 2021    HTN (hypertension)     Hyperlipidemia     [employer] dual ICD 02/14/2014    Osteoarthritis     Osteoarthritis of spine with radiculopathy, lumbar region 12/28/2015    Spinal stenosis     Unspecified sleep apnea     unable to wear CPAP           Electronically signed by Nessa Barney RN on 10/24/2024 at 3:00 AM

## 2024-10-24 NOTE — ED NOTES
Pt resting in bed. Urine sample collected. Vitals assessed. Pt denies pain. RR easy and unlabored. Family at bedside.

## 2024-10-24 NOTE — ED NOTES
[facility] notified this RN of critical lab result: Creat 3.0. This RN notified Dr. Rico face to face.

## 2024-10-24 NOTE — PROGRESS NOTES
Pharmacist Review and Automatic Dose Adjustment of Prophylactic Enoxaparin or Heparin    Reviewed reason for admission/hospital problem list    The reviewing pharmacist has made an adjustment to the ordered enoxaparin or heparin dose or converted to heparin per the approved [facility] protocol and table as identified below.      Recent Labs     10/23/24  0754 10/23/24  1855 10/24/24  0406   CREATININE 2.4* 3.0* 2.7*     Estimated Creatinine Clearance: 34 mL/min (A) (based on SCr of 2.7 mg/dL (H)).    Recent Labs     10/23/24  1855 10/24/24  0543   HGB 13.3* 12.9*   HCT 41.1* 39.9*    229     No results for input(s): \"INR\" in the last 72 hours.    Height:   Ht Readings from Last 1 Encounters:   10/24/24 1.753 m (5' 9\")     Weight:  Wt Readings from Last 1 Encounters:   10/24/24 119.4 kg (263 lb 4.8 oz)       *Do not exceed enoxaparin 40mg daily or UFH 5000 units SUBQ TID in patients with epidurals,  lumbar drains, or external ventricular drains.    Plan:   Changed heparin 5,000 units subq TID to enoxaparin 30 mg subq BID.     Thank you,  Marisol Hou PharmD 10/24/2024 9:27 AM

## 2024-10-24 NOTE — ED PROVIDER NOTES
Transfer of Care Note:   Physician Signing out: Dwayne Rico MD  Receiving Physician: Bob Yeung DO  Sign out time: 2100      Brief history:  64-year-old male with heart transplant in 2022 with hypotension    Items pending that need to be checked:  Follow-up with [facility] transfer line      Tentative Impression of patient:  Hypotension  Acute kidney injury  Heart transplant patient    Expected disposition of patient:  Pending results, transferred.        Additional Assessment and results:   I have personally performed a face to face diagnostic evaluation on this patient. The patient's initial evaluation and plan have been discussed with the prior physician who initially evaluated the patient. Nursing Notes, Past Medical Hx, Past Surgical Hx, Social Hx, Allergies, vital signs and Family Hx were all reviewed.      Vitals:    10/23/24 2233   BP: 124/73   Pulse: 91   Resp: 18   Temp:    SpO2: 96%     Physical Exam      Labs Reviewed   BASIC METABOLIC PANEL W/ REFLEX TO MG FOR LOW K - Abnormal; Notable for the following components:       Result Value    Sodium 134 (*)     Potassium reflex Magnesium 3.2 (*)     Chloride 88 (*)     Glucose 69 (*)     BUN 55 (*)     Creatinine 3.0 (*)     All other components within normal limits   BRAIN NATRIURETIC PEPTIDE - Abnormal; Notable for the following components:    NT Pro-.2 (*)     All other components within normal limits   CBC WITH AUTO DIFFERENTIAL - Abnormal; Notable for the following components:    WBC 12.4 (*)     RBC 4.63 (*)     Hemoglobin 13.3 (*)     Hematocrit 41.1 (*)     RDW-CV 16.8 (*)     RDW-SD 54.3 (*)     Neutrophils Absolute 8.3 (*)     Monocytes Absolute 1.4 (*)     Immature Grans (Abs) 0.20 (*)     All other components within normal limits   TROPONIN - Abnormal; Notable for the following components:    Troponin, High Sensitivity 43 (*)     All other components within normal limits   LACTATE, SEPSIS - Abnormal; Notable for the following 
11.5 - 14.5 %    RDW-SD 54.3 (H) 35.0 - 45.0 fL    Platelets 282 130 - 400 thou/mm3    MPV 10.6 9.4 - 12.4 fL    Seg Neutrophils 67.2 %    Lymphocytes 17.9 %    Monocytes % 11.1 %    Eosinophils 1.6 %    Basophils 0.6 %    Immature Granulocytes % 1.6 %    Neutrophils Absolute 8.3 (H) 1.8 - 7.7 thou/mm3    Lymphocytes Absolute 2.2 1.0 - 4.8 thou/mm3    Monocytes Absolute 1.4 (H) 0.4 - 1.3 thou/mm3    Eosinophils Absolute 0.2 0.0 - 0.4 thou/mm3    Basophils Absolute 0.1 0.0 - 0.1 thou/mm3    Immature Grans (Abs) 0.20 (H) 0.00 - 0.07 thou/mm3    nRBC 0 /100 wbc   Troponin   Result Value Ref Range    Troponin, High Sensitivity 43 (H) 0 - 12 ng/L   Lactate, Sepsis   Result Value Ref Range    Lactic Acid, Sepsis 2.1 (H) 0.5 - 1.9 mmol/L   Urinalysis with Reflex to Culture    Specimen: Urine   Result Value Ref Range    Glucose, Ur NEGATIVE NEGATIVE mg/dl    Bilirubin, Urine NEGATIVE NEGATIVE    Ketones, Urine NEGATIVE NEGATIVE    Specific Gravity, UA 1.008 1.002 - 1.030    Blood, Urine NEGATIVE NEGATIVE    pH, Urine 7.0 5.0 - 9.0    Protein, UA NEGATIVE NEGATIVE    Urobilinogen, Urine 0.2 0.0 - 1.0 eu/dl    Nitrite, Urine NEGATIVE NEGATIVE    Leukocyte Esterase, Urine NEGATIVE NEGATIVE    Color, UA YELLOW STRAW-YELLOW    Character, Urine CLEAR CLEAR-SL CLOUD   Anion Gap   Result Value Ref Range    Anion Gap 22.0 (H) 8.0 - 16.0 meq/L   Osmolality   Result Value Ref Range    Osmolality Calc 281.7 275.0 - 300.0 mOsmol/kg   Glomerular Filtration Rate, Estimated   Result Value Ref Range    Est, Glom Filt Rate 22 (A) >60 ml/min/1.73m2   EKG 12 Lead   Result Value Ref Range    Ventricular Rate 94 BPM    Atrial Rate 94 BPM    P-R Interval 188 ms    QRS Duration 110 ms    Q-T Interval 370 ms    QTc Calculation (Bazett) 462 ms    P Axis 59 degrees    R Axis -92 degrees    T Axis 57 degrees       (Any cultures that may have been sent were not resulted at the time of this patient visit)    ED COURSE AND MEDICAL DECISION MAKING (MDM)

## 2024-10-24 NOTE — PROGRESS NOTES
[facility]  INPATIENT OCCUPATIONAL THERAPY  STRZ ICU STEPDOWN TELEMETRY 4K  EVALUATION      Discharge Recommendations: Continue to assess pending progress, Patient would benefit from continued therapy after discharge  Equipment Recommendations: No        Time In: 1105  Time Out: 1125  Timed Code Treatment Minutes: 10 Minutes  Minutes: 20          Date: 10/24/2024  Patient Name: Cresencio Linares,   Gender: male      MRN: [id number]  : 1960  (64 y.o.)  Referring Practitioner: Mak Ventura DO  Diagnosis: RYAN (acute kidney injury) (HCC)  Additional Pertinent Hx: per chart review; Cresencio Linares is a 64 y.o. 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  who presents to [facility] ED with CC: Hypotension, presyncope.  Patient endorsed having normal BP at 11 AM in AM.  In afternoon he felt dizzy and was about to faint.  BP 80s/50s, on arrival 82/51.  Endorses lightheadedness, dizziness, hypotension, taking medications as prescribed.  Denies N/V, CP, SOB, diarrhea, urinary symptoms, LOC and hitting his head.  Had fluid overload -.  Leukocytosis, RYAN, viral bronchitis, tacrolimus changed to 2 mg bid, now 1.5 mg bid.  Started Seroquel for insomnia 3 days ago.  On interview pt states that he is about to undergo back surgery. He was taken off of Cellcept 2/2 interaction with surgery. Is currently only on Tacrolimus. He had Prograf levels measured yesterday. Endorses that he was in the kitchen making humus yesterday when he got dizzy and lightheaded. He states that his legs gave out underneath him and he fell to the floor. However, he states that he did not pass out and remembers the fall. Prior to his fall he was feeling normal. He went to the gym and did his exercises and then went home to make dinner. Hasn't been sick. No Fvr, Chills, Night  goals.  Treatment time included skilled education and facilitation of tasks to increase safety and independence with ADL's for improved functional independence and quality of life.      Patient Education:          Patient Education  Education Given To: Patient;Family  Education Provided: Role of Therapy;Plan of Care;Precautions;ADL Adaptive Strategies;Transfer Training;Fall Prevention Strategies  Barriers to Learning: None    Plan:  Times Per Week: 3x  Times Per Day: Once a day  Current Treatment Recommendations: Balance training, Functional mobility training, Endurance training, Neuromuscular re-education, Positioning, Patient/Caregiver education & training, Safety education & training, Pain management, Self-Care / ADL, Coordination training.  See long-term goal time frame for expected duration of plan of care.  If no long-term goals established, a short length of stay is anticipated.    Goals:  Patient goals : return home at [facility]  Short Term Goals  Time Frame for Short Term Goals: by discharge  Short Term Goal 1: patient will tolerate 5 min functional standing with (S) to increase ease with toileting and grooming.  Short Term Goal 2: patient will functionally ambulate to/from BR progressing to house hold distances with (S) and least restrictive device.  Short Term Goal 3: patient will complete ADL routine with MOD I and edu in energy conservation tech to increase ADL success.    AM-[facility] Inpatient Daily Activity Raw Score: 19  AM-PAC Inpatient ADL T-Scale Score : 40.22    Following session, patient left in safe position with all fall risk precautions in place.

## 2024-10-24 NOTE — CARE COORDINATION
Case Management Assessment Initial Evaluation    Date/Time of Evaluation: 10/24/2024 11:54 AM  Assessment Completed by: Randal Gracia RN    If patient is discharged prior to next notation, then this note serves as note for discharge by case management.    Patient Name: Cresencio Linares                   YOB: 1960  Diagnosis: History of heart transplant (HCC) [Z94.1]  RYAN (acute kidney injury) (HCC) [N17.9]  Hypotension, unspecified hypotension type [I95.9]  Acute kidney injury superimposed on CKD (HCC) [N17.9, N18.9]                   Date / Time: 10/23/2024  6:41 PM  Location: [address] [place] [address]     Patient Admission Status: Inpatient   Readmission Risk Low 0-14, Mod 15-19), High > 20: Readmission Risk Score: 12.9    Current PCP: Deborah Plummer MD  Health Care Decision Makers:     Additional Case Management Notes:   RYAN/Fall/Hypotension  History: Cardiac Transplant  Creatinine 2.7, elevated WBC; monitor  Await Cultures  Patient Goals/Plan/Treatment Preferences: plans home w spouse Peggy, follows w [facility], still drives, therapy following           10/24/24 1151   Service Assessment   Patient Orientation Alert and Oriented   Cognition Alert   History Provided By Patient;Medical Record   Primary Caregiver Self   Accompanied By/Relationship none   Support Systems Spouse/Significant Other   Patient's Healthcare Decision Maker is: Legal Next of Kin   PCP Verified by CM Yes   Last Visit to PCP Within last 3 months   Prior Functional Level Independent in ADLs/IADLs   Current Functional Level Independent in ADLs/IADLs   Can patient return to prior living arrangement Yes   Ability to make needs known: Good   Family able to assist with home care needs: Yes   Would you like for me to discuss the discharge plan with any other family members/significant others, and if so, who? No   Financial Resources Medicare   Community Resources None   CM/SW Referral ADLs/IADLs   Social/Functional History   Lives With Spouse

## 2024-10-24 NOTE — ED NOTES
Pt resting in bed. Vitals stable. RR easy and unlabored. Pt requesting pain medication. Family at bedside.

## 2024-10-24 NOTE — ED NOTES
Pt resting in bed. Denies pain or discomfort at this time. Vitals stable. RR easy and unlabored. Pt family at bedside. Covid swab collected. Pt states he is unable to give urine sample at this time- urinal in pt reach.

## 2024-10-24 NOTE — ED NOTES
Pt resting in bed. Vitals assessed. RR easy and unlabored. Pt transfer consent signed. Family at bedside.

## 2024-10-24 NOTE — PROGRESS NOTES
Pharmacy Medication History Note    List of current medications patient is taking is complete.    Source of information: patient, wife, dispense history, [facility] thru Care Everywhere    Changes made to medication list:  Medications removed (include reason, ex. therapy complete or physician discontinued):  Tacrolimus 1 mg- take 3 tablets in AM and 2 tablets in afternoon  Torsemide 40 mg BID  Allopurinol 300 mg   Hydralazine 100 mg   Potassium Chloride 20 meq- take 1.5 tablets daily   Amitriptyline- stopped 10/18/24  Vitamin D 00007 units   Magnesium 500 mg- duplicate     Medications added/doses adjusted:  Tacrolimus 1.5 mg BID   Torsemide 50 mg BID   Allopurinol 100 mg daily   Hydralazine 50 mg TID   HCTZ 25 mg daily   Mycophenolate 250 mg capsule- take 1000 mg (4 capsules) BID   Potassium Chloride 20 mEq- take 3 tablets BID 10 AM & 5 PM, 1 tablet QD at 8PM  APAP  Seroquel 25 mg nightly- started 10/19/24  Vitamin D 2000 units daily   Magnesium oxide 400 mg daily   Senna nightly   Iron 325 mg daily     Electronically signed by Alma Forrest RP on 10/24/2024 at 8:06 AM

## 2024-10-24 NOTE — ED NOTES
Pt resting in bed. Pt medicated per MAR for headache. Vitals assessed. Dr. Yeung at bedside to update pt on plan of care.

## 2024-10-24 NOTE — H&P
cardiopulmonary process.      This document has been electronically signed by: Talia Waddell DO on    10/23/2024 08:40 PM             EKG:  I have reviewed the EKG with the following interpretation: Sinus rhythm with fusion complexes, rate 94, QT/QTc 370/ 432 per [place] 462 per bazett, RBBB,      Last echo: 8/8/2023 EF 60-65%, normal LV size and function, RV dilated w/ low normal function stable +2 TR    PT/OT Eval Status: will be assessed  Diet: No diet orders on file  DVT prophylaxis: Heparin SQ  Code Status: Prior  Disposition: admit to stepdown    Thank you Deborah Plummer MD for the opportunity to be involved in this patient's care.    Electronically signed by Mak Ventura DO on 10/24/2024 at 1:59 AM.     Case discussed with Attending, Dr. Munguia

## 2024-10-24 NOTE — PROGRESS NOTES
Hospitalist Progress Note  Internal Medicine Resident      Patient: Cresencio Linares 64 y.o. male      Unit/Bed: -19/019-A    Admit Date: 10/23/2024      ASSESSMENT AND PLAN  Active Problems  Presyncope: Patient reports he was standing making food when he suddenly felt lightheaded and fell to the floor.  Patient denies LOC remembers falling.  Patient denied hitting his head.  Patient endorses drinking roughly 3 L of fluids a day of admission.  Received 1 L fluid in ED.  Patient recently changed from mycophenolate to tacrolimus.  BP on admission was 82/51.  Possibly due to excessive diuresis considering patient takes torsemide and HCTZ.  Orthostatic vitals  PT/OT consulted  RYAN on CKD 3B: Patient has a history of horseshoe kidney.  Baseline creatinine 1.5.  Creatinine on admission 3.0.  Likely due to dehydration.  Patient on a potassium supplement at home, holding currently.  Avoid nephrotoxic agents  Holding Cozaar  Familial CMP s/p heart transplant: Noted per chart review on 2/7/2022.  Performed at [place].  Last echo on 10/30/2023 showed EF of 60%.   Continue tacrolimus  Mycophenolate being held for back surgery  Arachnoiditis of lumbar spine: Patient has upcoming back surgery  Continue amitriptyline and pregabalin for neuropathic pain  Chronic normocytic anemia: Secondary to history of ULISSES and CKD.  Iron studies on 7/20/2023 showed iron 49, TIBC of 364, transferrin 13.5.  Hemoglobin stable at 12.9.  Continue to monitor CBC  Transfuse if hemoglobin less than 7 and symptomatic  Hypertension: Home medications include Cozaar, hydralazine, HCTZ, torsemide.  Holding HCTZ, Cozaar and hydralazine  HLD: Continue pravastatin  SYED: Per chart review.  Intermittent tricuspid valve regurgitation: Noted on echo.   History of chronic urinary retention: Noted per chart review.  Every 4 hours bladder scans.  BPH: Continue finasteride and Flomax      LDA: []CVC / []PICC / []Midline / []Zhao / []Drains / []Mediport /  [x]None  Antibiotics: None  Steroids: None  Labs (still needed?): []Yes / [x]No  IVF (still needed?): []Yes / [x]No    Level of care: [x]Step Down / []Med-Surg  Bed Status: [x]Inpatient / []Observation  Telemetry: [x]Yes / []No  PT/OT: [x]Yes / []No    DVT Prophylaxis: [] Lovenox / [x] Heparin / [] SCDs / [] Already on Systemic Anticoagulation / [] None     Expected discharge date: Pending  Disposition: Pending     Code status: Full Code     ===================================================================    Chief Complaint: Fall  Subjective (past 24 hours): Patient is seen and examined at bedside.  Patient denies any lightheadedness, dizziness, chest pain or shortness of breath.  Transfers initiated to [facility], was subsequently canceled.    HPI / Hospital Course:  Per HPI  \"Cresencio Linares is a 64 y.o. male with PMHx of genetic cardiomyopathy s/p OHT 2/7/2022 HCC, horseshoe kidney, CKD III, arachnoiditis, post transplant course complicated with worsening renal function and recurrent volume overload.  who presents to [facility] ED with CC: Hypotension, presyncope.  Patient endorsed having normal BP at 11 AM in AM.  In afternoon he felt dizzy and was about to faint.  BP 80s/50s, on arrival 82/51.  Endorses lightheadedness, dizziness, hypotension, taking medications as prescribed.  Denies N/V, CP, SOB, diarrhea, urinary symptoms, LOC and hitting his head.  Had fluid overload 9/20-9/23.  Leukocytosis, RYAN, viral bronchitis, tacrolimus changed to 2 mg bid, now 1.5 mg bid.  Started Seroquel for insomnia 3 days ago.  On interview pt states that he is about to undergo back surgery. He was taken off of Cellcept 2/2 interaction with surgery. Is currently only on Tacrolimus. He had Prograf levels measured yesterday. Endorses that he was in the kitchen making humus yesterday when he got dizzy and lightheaded. He states that his legs gave out underneath him and he fell to the floor. However, he states that he did not

## 2024-10-25 LAB
ANION GAP SERPL CALC-SCNC: 13 MEQ/L (ref 8–16)
BACTERIA BLD AEROBE CULT: NORMAL
BACTERIA BLD AEROBE CULT: NORMAL
BASOPHILS ABSOLUTE: 0.1 THOU/MM3 (ref 0–0.1)
BASOPHILS NFR BLD AUTO: 0.6 %
BUN SERPL-MCNC: 46 MG/DL (ref 7–22)
CALCIUM SERPL-MCNC: 8.5 MG/DL (ref 8.5–10.5)
CHLORIDE SERPL-SCNC: 97 MEQ/L (ref 98–111)
CO2 SERPL-SCNC: 26 MEQ/L (ref 23–33)
CREAT SERPL-MCNC: 1.9 MG/DL (ref 0.4–1.2)
DEPRECATED RDW RBC AUTO: 53.9 FL (ref 35–45)
EOSINOPHIL NFR BLD AUTO: 3.4 %
EOSINOPHILS ABSOLUTE: 0.3 THOU/MM3 (ref 0–0.4)
ERYTHROCYTE [DISTWIDTH] IN BLOOD BY AUTOMATED COUNT: 16.6 % (ref 11.5–14.5)
GFR SERPL CREATININE-BSD FRML MDRD: 38 ML/MIN/1.73M2
GLUCOSE SERPL-MCNC: 98 MG/DL (ref 70–108)
HCT VFR BLD AUTO: 38.2 % (ref 42–52)
HGB BLD-MCNC: 12.5 GM/DL (ref 14–18)
IMM GRANULOCYTES # BLD AUTO: 0.11 THOU/MM3 (ref 0–0.07)
IMM GRANULOCYTES NFR BLD AUTO: 1.3 %
LYMPHOCYTES ABSOLUTE: 1.3 THOU/MM3 (ref 1–4.8)
LYMPHOCYTES NFR BLD AUTO: 15.2 %
MCH RBC QN AUTO: 28.9 PG (ref 26–33)
MCHC RBC AUTO-ENTMCNC: 32.7 GM/DL (ref 32.2–35.5)
MCV RBC AUTO: 88.2 FL (ref 80–94)
MONOCYTES ABSOLUTE: 0.9 THOU/MM3 (ref 0.4–1.3)
MONOCYTES NFR BLD AUTO: 10.3 %
NEUTROPHILS ABSOLUTE: 6 THOU/MM3 (ref 1.8–7.7)
NEUTROPHILS NFR BLD AUTO: 69.2 %
NRBC BLD AUTO-RTO: 0 /100 WBC
PLATELET # BLD AUTO: 220 THOU/MM3 (ref 130–400)
PMV BLD AUTO: 11 FL (ref 9.4–12.4)
POTASSIUM SERPL-SCNC: 3.5 MEQ/L (ref 3.5–5.2)
RBC # BLD AUTO: 4.33 MILL/MM3 (ref 4.7–6.1)
SODIUM SERPL-SCNC: 136 MEQ/L (ref 135–145)
WBC # BLD AUTO: 8.6 THOU/MM3 (ref 4.8–10.8)

## 2024-10-25 PROCEDURE — 99233 SBSQ HOSP IP/OBS HIGH 50: CPT | Performed by: INTERNAL MEDICINE

## 2024-10-25 PROCEDURE — 6370000000 HC RX 637 (ALT 250 FOR IP): Performed by: STUDENT IN AN ORGANIZED HEALTH CARE EDUCATION/TRAINING PROGRAM

## 2024-10-25 PROCEDURE — 36415 COLL VENOUS BLD VENIPUNCTURE: CPT

## 2024-10-25 PROCEDURE — 2060000000 HC ICU INTERMEDIATE R&B

## 2024-10-25 PROCEDURE — 85025 COMPLETE CBC W/AUTO DIFF WBC: CPT

## 2024-10-25 PROCEDURE — 97530 THERAPEUTIC ACTIVITIES: CPT

## 2024-10-25 PROCEDURE — 2580000003 HC RX 258: Performed by: STUDENT IN AN ORGANIZED HEALTH CARE EDUCATION/TRAINING PROGRAM

## 2024-10-25 PROCEDURE — 80048 BASIC METABOLIC PNL TOTAL CA: CPT

## 2024-10-25 PROCEDURE — 6360000002 HC RX W HCPCS: Performed by: INTERNAL MEDICINE

## 2024-10-25 PROCEDURE — 6370000000 HC RX 637 (ALT 250 FOR IP): Performed by: INTERNAL MEDICINE

## 2024-10-25 PROCEDURE — 97535 SELF CARE MNGMENT TRAINING: CPT

## 2024-10-25 RX ADMIN — TAMSULOSIN HYDROCHLORIDE 0.8 MG: 0.4 CAPSULE ORAL at 19:43

## 2024-10-25 RX ADMIN — OXYCODONE HYDROCHLORIDE 5 MG: 5 TABLET ORAL at 03:44

## 2024-10-25 RX ADMIN — FINASTERIDE 5 MG: 5 TABLET, FILM COATED ORAL at 08:29

## 2024-10-25 RX ADMIN — HEPARIN SODIUM 5000 UNITS: 5000 INJECTION INTRAVENOUS; SUBCUTANEOUS at 21:03

## 2024-10-25 RX ADMIN — MYCOPHENOLATE MOFETIL 1000 MG: 250 CAPSULE ORAL at 08:27

## 2024-10-25 RX ADMIN — TACROLIMUS 1.5 MG: 1 CAPSULE ORAL at 08:30

## 2024-10-25 RX ADMIN — HEPARIN SODIUM 5000 UNITS: 5000 INJECTION INTRAVENOUS; SUBCUTANEOUS at 06:29

## 2024-10-25 RX ADMIN — TACROLIMUS 1.5 MG: 1 CAPSULE ORAL at 19:43

## 2024-10-25 RX ADMIN — OXYCODONE HYDROCHLORIDE 5 MG: 5 TABLET ORAL at 16:57

## 2024-10-25 RX ADMIN — POLYETHYLENE GLYCOL 3350 17 G: 17 POWDER, FOR SOLUTION ORAL at 17:02

## 2024-10-25 RX ADMIN — MYCOPHENOLATE MOFETIL 1000 MG: 250 CAPSULE ORAL at 19:42

## 2024-10-25 RX ADMIN — POTASSIUM CHLORIDE 40 MEQ: 1500 TABLET, EXTENDED RELEASE ORAL at 13:15

## 2024-10-25 RX ADMIN — ASPIRIN 81 MG 81 MG: 81 TABLET ORAL at 08:28

## 2024-10-25 RX ADMIN — PREGABALIN 50 MG: 50 CAPSULE ORAL at 19:43

## 2024-10-25 RX ADMIN — HEPARIN SODIUM 5000 UNITS: 5000 INJECTION INTRAVENOUS; SUBCUTANEOUS at 13:18

## 2024-10-25 RX ADMIN — AMITRIPTYLINE HYDROCHLORIDE 10 MG: 10 TABLET, FILM COATED ORAL at 19:42

## 2024-10-25 RX ADMIN — PREGABALIN 50 MG: 50 CAPSULE ORAL at 08:28

## 2024-10-25 RX ADMIN — ALLOPURINOL 100 MG: 100 TABLET ORAL at 08:28

## 2024-10-25 RX ADMIN — PRAVASTATIN SODIUM 20 MG: 20 TABLET ORAL at 08:28

## 2024-10-25 RX ADMIN — OXYCODONE HYDROCHLORIDE 5 MG: 5 TABLET ORAL at 08:28

## 2024-10-25 RX ADMIN — SODIUM CHLORIDE, PRESERVATIVE FREE 10 ML: 5 INJECTION INTRAVENOUS at 19:46

## 2024-10-25 RX ADMIN — PANTOPRAZOLE SODIUM 40 MG: 40 TABLET, DELAYED RELEASE ORAL at 08:28

## 2024-10-25 RX ADMIN — OXYCODONE HYDROCHLORIDE 5 MG: 5 TABLET ORAL at 21:03

## 2024-10-25 RX ADMIN — OXYCODONE HYDROCHLORIDE 5 MG: 5 TABLET ORAL at 13:15

## 2024-10-25 ASSESSMENT — PAIN DESCRIPTION - ORIENTATION
ORIENTATION: RIGHT
ORIENTATION: RIGHT;LEFT
ORIENTATION: RIGHT
ORIENTATION: RIGHT
ORIENTATION: RIGHT;LEFT;MID
ORIENTATION: LEFT

## 2024-10-25 ASSESSMENT — PAIN DESCRIPTION - LOCATION
LOCATION: BACK;KNEE;ANKLE
LOCATION: ANKLE
LOCATION: ANKLE
LOCATION: KNEE;ANKLE
LOCATION: KNEE
LOCATION: FOOT

## 2024-10-25 ASSESSMENT — PAIN DESCRIPTION - PAIN TYPE
TYPE: ACUTE PAIN
TYPE: ACUTE PAIN

## 2024-10-25 ASSESSMENT — PAIN - FUNCTIONAL ASSESSMENT
PAIN_FUNCTIONAL_ASSESSMENT: ACTIVITIES ARE NOT PREVENTED

## 2024-10-25 ASSESSMENT — PAIN SCALES - GENERAL
PAINLEVEL_OUTOF10: 7
PAINLEVEL_OUTOF10: 6
PAINLEVEL_OUTOF10: 5
PAINLEVEL_OUTOF10: 7
PAINLEVEL_OUTOF10: 6
PAINLEVEL_OUTOF10: 8
PAINLEVEL_OUTOF10: 3
PAINLEVEL_OUTOF10: 9

## 2024-10-25 ASSESSMENT — PAIN DESCRIPTION - FREQUENCY
FREQUENCY: CONTINUOUS
FREQUENCY: CONTINUOUS

## 2024-10-25 ASSESSMENT — PAIN DESCRIPTION - ONSET
ONSET: ON-GOING
ONSET: ON-GOING

## 2024-10-25 ASSESSMENT — PAIN DESCRIPTION - DESCRIPTORS
DESCRIPTORS: SHARP
DESCRIPTORS: ACHING
DESCRIPTORS: ACHING;DISCOMFORT
DESCRIPTORS: SHARP

## 2024-10-25 NOTE — PROGRESS NOTES
[facility]  PHYSICAL THERAPY MISSED TREATMENT NOTE  STRZ ICU STEPDOWN TELEMETRY 4K    Date: 10/25/2024  Patient Name: Cresencio Linares        MRN: [id number]   : 1960  (64 y.o.)  Gender: male                REASON FOR MISSED TREATMENT:  Missed Treat.  Pt sleeping in bed, did not want to awaken at this time. Per nursing, pt has been up and moving around. Will hold and check back as able.

## 2024-10-25 NOTE — PROGRESS NOTES
[facility]  STRZ ICU STEPDOWN TELEMETRY 4K  Occupational Therapy  Daily Note    Discharge Recommendations: Home with Home Health OT and Patient would benefit from continued OT at discharge  Equipment Recommendations: No        Time In: 0750  Time Out: 0813  Timed Code Treatment Minutes: 23 Minutes  Minutes: 23          Date: 10/25/2024  Patient Name: Cresencio Linares,   Gender: male      Room: [address] [place] [address]  MRN: [id number]  : 1960  (64 y.o.)  Referring Practitioner: Mak Ventura DO  Diagnosis: Acute kidney injury superimposed on CKD (HCC)  Additional Pertinent Hx: per chart review; Cresencio Linares is a 64 y.o. 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  who presents to [facility] ED with CC: Hypotension, presyncope.  Patient endorsed having normal BP at 11 AM in AM.  In afternoon he felt dizzy and was about to faint.  BP 80s/50s, on arrival 82/51.  Endorses lightheadedness, dizziness, hypotension, taking medications as prescribed.  Denies N/V, CP, SOB, diarrhea, urinary symptoms, LOC and hitting his head.  Had fluid overload -.  Leukocytosis, RYAN, viral bronchitis, tacrolimus changed to 2 mg bid, now 1.5 mg bid.  Started Seroquel for insomnia 3 days ago.  On interview pt states that he is about to undergo back surgery. He was taken off of Cellcept 2/2 interaction with surgery. Is currently only on Tacrolimus. He had Prograf levels measured yesterday. Endorses that he was in the kitchen making humus yesterday when he got dizzy and lightheaded. He states that his legs gave out underneath him and he fell to the floor. However, he states that he did not pass out and remembers the fall. Prior to his fall he was feeling normal. He went to the gym and did his exercises and then went home to make dinner. Hasn't been sick. No Fvr, Chills,

## 2024-10-25 NOTE — PLAN OF CARE
Problem: Pain  Goal: Verbalizes/displays adequate comfort level or baseline comfort level  Outcome: Progressing     Problem: Skin/Tissue Integrity  Goal: Absence of new skin breakdown       Outcome: Progressing     Problem: Safety - Adult  Goal: Free from fall injury  Outcome: Progressing

## 2024-10-25 NOTE — PROGRESS NOTES
Hospitalist Progress Note  Internal Medicine Resident      Patient: Cresencio Linares 64 y.o. male      Unit/Bed: 4K-19/019-A    Admit Date: 10/23/2024      ASSESSMENT AND PLAN  Active Problems  Presyncope: Patient reports he was standing making food when he suddenly felt lightheaded and fell to the floor.  Patient denies LOC remembers falling.  Patient denied hitting his head.  Patient endorses drinking roughly 3 L of fluids a day of admission.  Received 1 L fluid in ED.  Patient recently changed from mycophenolate to tacrolimus.  BP on admission was 82/51.  Possibly due to excessive diuresis considering patient takes torsemide and HCTZ.  Orthostatic vitals  Continue to monitor telemetry  PT/OT consulted  RYAN on CKD 3B: Patient has a history of horseshoe kidney.  Baseline creatinine 1.5.  Creatinine on admission 3.0.  Creatinine improving to 1.9.  Likely due to dehydration.  Patient on a potassium supplement at home, holding currently.  Avoid nephrotoxic agents  Holding Cozaar  Familial CMP s/p heart transplant: Noted per chart review on 2/7/2022.  Performed at [facility].  Last echo on 10/30/2023 showed EF of 60%.   Cardiology consulted and recommended transfer to [facility] due to patient's heart transplant being done at [facility].  Patient specified he did not want to be transferred to [facility].  Transfer was initially initiated in ED and subsequently canceled at patient request.  Will continue to manage RYAN and seek additional recommendations from [facility] physicians.  Plan to discharge patient on 72-hour event monitor  Continue tacrolimus  Mycophenolate being held for back surgery  Arachnoiditis of lumbar spine: Patient has upcoming back surgery with consult planned on the 13th of November.  Continue amitriptyline and pregabalin for neuropathic pain  Chronic normocytic anemia: Stable.  Secondary to history of ULISSES and CKD.  Iron studies on 7/20/2023 showed iron 49, TIBC of

## 2024-10-26 ENCOUNTER — APPOINTMENT (OUTPATIENT)
Age: 64
End: 2024-10-26
Attending: INTERNAL MEDICINE
Payer: MEDICARE

## 2024-10-26 ENCOUNTER — APPOINTMENT (OUTPATIENT)
Age: 64
End: 2024-10-26
Payer: MEDICARE

## 2024-10-26 ENCOUNTER — APPOINTMENT (OUTPATIENT)
Dept: GENERAL RADIOLOGY | Age: 64
End: 2024-10-26
Payer: MEDICARE

## 2024-10-26 VITALS
BODY MASS INDEX: 38.86 KG/M2 | HEART RATE: 80 BPM | WEIGHT: 262.35 LBS | RESPIRATION RATE: 20 BRPM | TEMPERATURE: 98 F | HEIGHT: 69 IN | OXYGEN SATURATION: 95 % | SYSTOLIC BLOOD PRESSURE: 127 MMHG | DIASTOLIC BLOOD PRESSURE: 80 MMHG

## 2024-10-26 PROBLEM — I95.9 HYPOTENSION: Status: ACTIVE | Noted: 2024-10-24

## 2024-10-26 LAB
ANION GAP SERPL CALC-SCNC: 14 MEQ/L (ref 8–16)
BUN SERPL-MCNC: 37 MG/DL (ref 7–22)
CALCIUM SERPL-MCNC: 8.4 MG/DL (ref 8.5–10.5)
CHLORIDE SERPL-SCNC: 96 MEQ/L (ref 98–111)
CO2 SERPL-SCNC: 25 MEQ/L (ref 23–33)
CREAT SERPL-MCNC: 1.7 MG/DL (ref 0.4–1.2)
ECHO AO ASC DIAM: 2.7 CM
ECHO AO ASCENDING AORTA INDEX: 1.16 CM/M2
ECHO AV CUSP MM: 1.8 CM
ECHO AV MEAN GRADIENT: 4 MMHG
ECHO AV MEAN VELOCITY: 0.9 M/S
ECHO AV PEAK GRADIENT: 8 MMHG
ECHO AV PEAK VELOCITY: 1.4 M/S
ECHO AV VELOCITY RATIO: 1
ECHO AV VTI: 28.5 CM
ECHO BSA: 2.41 M2
ECHO BSA: 2.41 M2
ECHO EST RA PRESSURE: 3 MMHG
ECHO LA AREA 2C: 26.5 CM2
ECHO LA AREA 4C: 24.8 CM2
ECHO LA DIAMETER INDEX: 2.2 CM/M2
ECHO LA DIAMETER: 5.1 CM
ECHO LA MAJOR AXIS: 6.7 CM
ECHO LA MINOR AXIS: 7 CM
ECHO LA VOL BP: 80 ML (ref 18–58)
ECHO LA VOL MOD A2C: 83 ML (ref 18–58)
ECHO LA VOL MOD A4C: 76 ML (ref 18–58)
ECHO LA VOL/BSA BIPLANE: 34 ML/M2 (ref 16–34)
ECHO LA VOLUME INDEX MOD A2C: 36 ML/M2 (ref 16–34)
ECHO LA VOLUME INDEX MOD A4C: 33 ML/M2 (ref 16–34)
ECHO LV E' LATERAL VELOCITY: 19.5 CM/S
ECHO LV E' SEPTAL VELOCITY: 10.1 CM/S
ECHO LV EF PHYSICIAN: 60 %
ECHO LV FRACTIONAL SHORTENING: 33 % (ref 28–44)
ECHO LV INTERNAL DIMENSION DIASTOLE INDEX: 1.72 CM/M2
ECHO LV INTERNAL DIMENSION DIASTOLIC: 4 CM (ref 4.2–5.9)
ECHO LV INTERNAL DIMENSION SYSTOLIC INDEX: 1.16 CM/M2
ECHO LV INTERNAL DIMENSION SYSTOLIC: 2.7 CM
ECHO LV ISOVOLUMETRIC RELAXATION TIME (IVRT): 77 MS
ECHO LV IVSD: 1.1 CM (ref 0.6–1)
ECHO LV MASS 2D: 145.6 G (ref 88–224)
ECHO LV MASS INDEX 2D: 62.8 G/M2 (ref 49–115)
ECHO LV POSTERIOR WALL DIASTOLIC: 1.1 CM (ref 0.6–1)
ECHO LV RELATIVE WALL THICKNESS RATIO: 0.55
ECHO LVOT AV VTI INDEX: 0.88
ECHO LVOT MEAN GRADIENT: 4 MMHG
ECHO LVOT PEAK GRADIENT: 8 MMHG
ECHO LVOT PEAK VELOCITY: 1.4 M/S
ECHO LVOT VTI: 25 CM
ECHO MV A VELOCITY: 0.59 M/S
ECHO MV E DECELERATION TIME (DT): 116 MS
ECHO MV E VELOCITY: 1.23 M/S
ECHO MV E/A RATIO: 2.08
ECHO MV E/E' LATERAL: 6.31
ECHO MV E/E' RATIO (AVERAGED): 9.24
ECHO MV E/E' SEPTAL: 12.18
ECHO PULMONARY ARTERY END DIASTOLIC PRESSURE: 6 MMHG
ECHO PV MAX VELOCITY: 0.7 M/S
ECHO PV PEAK GRADIENT: 2 MMHG
ECHO PV REGURGITANT MAX VELOCITY: 1.3 M/S
ECHO RIGHT VENTRICULAR SYSTOLIC PRESSURE (RVSP): 41 MMHG
ECHO RV INTERNAL DIMENSION: 4.7 CM
ECHO RV TAPSE: 1.6 CM (ref 1.7–?)
ECHO TV E WAVE: 0.8 M/S
ECHO TV REGURGITANT MAX VELOCITY: 3.1 M/S
ECHO TV REGURGITANT PEAK GRADIENT: 38 MMHG
GFR SERPL CREATININE-BSD FRML MDRD: 44 ML/MIN/1.73M2
GLUCOSE SERPL-MCNC: 85 MG/DL (ref 70–108)
POTASSIUM SERPL-SCNC: 3.8 MEQ/L (ref 3.5–5.2)
SODIUM SERPL-SCNC: 135 MEQ/L (ref 135–145)

## 2024-10-26 PROCEDURE — 6370000000 HC RX 637 (ALT 250 FOR IP): Performed by: INTERNAL MEDICINE

## 2024-10-26 PROCEDURE — 93270 REMOTE 30 DAY ECG REV/REPORT: CPT

## 2024-10-26 PROCEDURE — 6360000002 HC RX W HCPCS: Performed by: INTERNAL MEDICINE

## 2024-10-26 PROCEDURE — 6370000000 HC RX 637 (ALT 250 FOR IP): Performed by: STUDENT IN AN ORGANIZED HEALTH CARE EDUCATION/TRAINING PROGRAM

## 2024-10-26 PROCEDURE — 93306 TTE W/DOPPLER COMPLETE: CPT

## 2024-10-26 PROCEDURE — 93306 TTE W/DOPPLER COMPLETE: CPT | Performed by: INTERNAL MEDICINE

## 2024-10-26 PROCEDURE — 2580000003 HC RX 258: Performed by: STUDENT IN AN ORGANIZED HEALTH CARE EDUCATION/TRAINING PROGRAM

## 2024-10-26 PROCEDURE — 36415 COLL VENOUS BLD VENIPUNCTURE: CPT

## 2024-10-26 PROCEDURE — 73600 X-RAY EXAM OF ANKLE: CPT

## 2024-10-26 PROCEDURE — 99223 1ST HOSP IP/OBS HIGH 75: CPT | Performed by: INTERNAL MEDICINE

## 2024-10-26 PROCEDURE — 99239 HOSP IP/OBS DSCHRG MGMT >30: CPT | Performed by: INTERNAL MEDICINE

## 2024-10-26 PROCEDURE — 80048 BASIC METABOLIC PNL TOTAL CA: CPT

## 2024-10-26 RX ORDER — HYDRALAZINE HYDROCHLORIDE 50 MG/1
50 TABLET, FILM COATED ORAL 3 TIMES DAILY PRN
Qty: 90 TABLET | Refills: 2 | Status: SHIPPED | OUTPATIENT
Start: 2024-10-26

## 2024-10-26 RX ORDER — TORSEMIDE 100 MG/1
50 TABLET ORAL DAILY
Qty: 15 TABLET | Refills: 1 | Status: SHIPPED | OUTPATIENT
Start: 2024-10-26 | End: 2024-12-25

## 2024-10-26 RX ADMIN — OXYCODONE HYDROCHLORIDE 5 MG: 5 TABLET ORAL at 12:11

## 2024-10-26 RX ADMIN — POLYETHYLENE GLYCOL 3350 17 G: 17 POWDER, FOR SOLUTION ORAL at 07:43

## 2024-10-26 RX ADMIN — OXYCODONE HYDROCHLORIDE 5 MG: 5 TABLET ORAL at 01:40

## 2024-10-26 RX ADMIN — PREGABALIN 50 MG: 50 CAPSULE ORAL at 07:32

## 2024-10-26 RX ADMIN — HEPARIN SODIUM 5000 UNITS: 5000 INJECTION INTRAVENOUS; SUBCUTANEOUS at 05:24

## 2024-10-26 RX ADMIN — OXYCODONE HYDROCHLORIDE 5 MG: 5 TABLET ORAL at 07:28

## 2024-10-26 RX ADMIN — PANTOPRAZOLE SODIUM 40 MG: 40 TABLET, DELAYED RELEASE ORAL at 07:32

## 2024-10-26 RX ADMIN — TACROLIMUS 1.5 MG: 1 CAPSULE ORAL at 07:33

## 2024-10-26 RX ADMIN — ALLOPURINOL 100 MG: 100 TABLET ORAL at 07:35

## 2024-10-26 RX ADMIN — ASPIRIN 81 MG 81 MG: 81 TABLET ORAL at 07:32

## 2024-10-26 RX ADMIN — SODIUM CHLORIDE, PRESERVATIVE FREE 10 ML: 5 INJECTION INTRAVENOUS at 07:35

## 2024-10-26 RX ADMIN — FINASTERIDE 5 MG: 5 TABLET, FILM COATED ORAL at 07:35

## 2024-10-26 RX ADMIN — MYCOPHENOLATE MOFETIL 1000 MG: 250 CAPSULE ORAL at 07:32

## 2024-10-26 RX ADMIN — PRAVASTATIN SODIUM 20 MG: 20 TABLET ORAL at 07:35

## 2024-10-26 ASSESSMENT — PAIN DESCRIPTION - ORIENTATION
ORIENTATION: RIGHT

## 2024-10-26 ASSESSMENT — PAIN SCALES - GENERAL
PAINLEVEL_OUTOF10: 7
PAINLEVEL_OUTOF10: 8
PAINLEVEL_OUTOF10: 8
PAINLEVEL_OUTOF10: 7
PAINLEVEL_OUTOF10: 8

## 2024-10-26 ASSESSMENT — PAIN DESCRIPTION - DESCRIPTORS
DESCRIPTORS: ACHING
DESCRIPTORS: ACHING;PRESSURE

## 2024-10-26 ASSESSMENT — PAIN - FUNCTIONAL ASSESSMENT
PAIN_FUNCTIONAL_ASSESSMENT: ACTIVITIES ARE NOT PREVENTED
PAIN_FUNCTIONAL_ASSESSMENT: ACTIVITIES ARE NOT PREVENTED
PAIN_FUNCTIONAL_ASSESSMENT: PREVENTS OR INTERFERES WITH MANY ACTIVE NOT PASSIVE ACTIVITIES

## 2024-10-26 ASSESSMENT — PAIN DESCRIPTION - LOCATION
LOCATION: FOOT
LOCATION: ANKLE;FOOT
LOCATION: ANKLE;FOOT

## 2024-10-26 ASSESSMENT — PAIN DESCRIPTION - FREQUENCY: FREQUENCY: CONTINUOUS

## 2024-10-26 ASSESSMENT — PAIN DESCRIPTION - ONSET: ONSET: ON-GOING

## 2024-10-26 ASSESSMENT — PAIN DESCRIPTION - PAIN TYPE: TYPE: ACUTE PAIN

## 2024-10-26 NOTE — PLAN OF CARE
Problem: Discharge Planning  Goal: Discharge to home or other facility with appropriate resources  Flowsheets (Taken 10/25/2024 2300)  Discharge to home or other facility with appropriate resources:   Identify barriers to discharge with patient and caregiver   Identify discharge learning needs (meds, wound care, etc)     Problem: Pain  Goal: Verbalizes/displays adequate comfort level or baseline comfort level  Flowsheets (Taken 10/25/2024 2300)  Verbalizes/displays adequate comfort level or baseline comfort level:   Encourage patient to monitor pain and request assistance   Assess pain using appropriate pain scale   Administer analgesics based on type and severity of pain and evaluate response   Implement non-pharmacological measures as appropriate and evaluate response   Consider cultural and social influences on pain and pain management     Problem: Safety - Adult  Goal: Free from fall injury  Flowsheets (Taken 10/25/2024 2300)  Free From Fall Injury: Instruct family/caregiver on patient safety

## 2024-10-26 NOTE — CONSULTS
[facility]  Cardiology Consult        Patient:  Cresencio Linares  YOB: 1960  MRN: [id number]     Acct: [id number]    PCP: Deborah Plummer MD    Date of Admission: 10/23/2024      Reason for Consultation: Presyncope      History Of Present Illness:    64 y.o. pleasant male with history of cardiomyopathy status post cardiac transplant on 2/7/2022 at [facility], CKD stage III who presented to the hospital with dizziness and low blood pressure.  As per patient he was apparently in the kitchen when he felt dizzy and lightheaded and his legs gave out making him fall to the floor.  He denies any loss of consciousness.  He did injure his ankle and knees.  On presentation his blood pressure was 80 over 50s with positive orthostatic blood pressures.  Patient states that his antirejection medications were recently adjusted at [facility] and his torsemide and hydrochlorothiazide have also been adjusted since September.  Laboratory workup showed elevated creatinine suggestive of RYAN.  Cardiology was consulted for presyncope.  Over the last couple days patient's diuretics were held and his renal function improved.  His symptoms have also improved.  His only complaints currently are his ankle pain.    All labs, EKG's, diagnostic testing and images as well as cardiac cath, stress testing were reviewed during this encounter.    Past Medical History:          Diagnosis Date    Acute kidney injury (HCC)     Cardiomyopathy, dilated (HCC)     VIRAL    Claustrophobia     Congestive heart failure (CHF) (HCC)     Depression 12/26/2013    Heart transplanted (HCC) 2021    HTN (hypertension)     Hyperlipidemia     [employer] dual ICD 02/14/2014    Osteoarthritis     Osteoarthritis of spine with radiculopathy, lumbar region 12/28/2015    Spinal stenosis     Unspecified sleep apnea     unable to wear CPAP       Past Surgical History:          Procedure Laterality Date    BACK SURGERY  08/26/2014    L4- S1  Lumbar postlaminectomy syndrome    Chronic pain syndrome    Lumbar facet arthropathy    Scalp mass    Sacroiliac inflammation (HCC)    Chronic systolic heart failure (HCC)    Depression    AICD discharge    Leukocytosis    Acute kidney injury (HCC)    Obesity (BMI 30-39.9)    Osteoarthritis of multiple joints    Volume depletion    Increased anion gap metabolic acidosis    Presence of automatic cardioverter/defibrillator (AICD)    CHF (congestive heart failure), NYHA class III, chronic, systolic (HCC)    Acute kidney injury superimposed on CKD (HCC)    Hypotension due to hypovolemia    History of heart transplant (HCC)     Presyncope due to orthostatic hypotension, resolved  RYAN resolved back to baseline creatinine  Cardiac transplantation 2/7/2022    Telemetry monitoring  Dizziness improved  BP improved  Get echo  Would resume diuretics at a lower dose   Monitor BP  Monitor daily weights and symptoms  Follow up XR of the ankle  No further cardiac workup is needed at this time  Discussed with patient and family    Please do note hesitate to contact me for any further questions.   Thank you for the opportunity to be involved in this patient's care.    Code Status: Full Code    Electronically signed by Kenn Motley MD on 10/26/2024 at 10:33 AM

## 2024-10-26 NOTE — FLOWSHEET NOTE
PT discharged home and verbalized understanding of written discharge instructions. PT VSS and denies CP, SOB, or discomfort. PT does complain of right ankle pain and xray is negative for fracture. PT said he has a walking boot and walker at home and does not need any more supplies. PT waiting for event monitor now. Will continue to monitor

## 2024-10-26 NOTE — DISCHARGE SUMMARY
Resident Discharge Summary (Hospitalist)      Patient: Cresencio Linares 64 y.o. male  : 1960  MRN: [id number]   Account: [id number]   Patient's PCP: Deborah Plummer MD    Admit Date: 10/23/2024   Discharge Date:   10/26/2024    Admitting Physician: Yasir Stahl DO  Discharge Physician: Tom Pena MD       Discharge Diagnoses:  Presyncope: Patient reports he was standing making food when he suddenly felt lightheaded and fell to the floor.  Patient denies LOC remembers falling.  Patient denied hitting his head.  Patient endorses drinking roughly 3 L of fluids a day of admission.  Received 1 L fluid in ED.  Patient recently changed from mycophenolate to tacrolimus.  BP on admission was 82/51.  Possibly due to excessive diuresis considering patient takes torsemide and HCTZ.  Orthostatic vitals negative  Familial CMP s/p heart transplant: Noted per chart review on 2022.  Performed at [facility].  Last echo on 10/30/2023 showed EF of 60%.   Cardiology consulted and recommended transfer to [facility] due to patient's heart transplant being done at [facility].  Patient specified he did not want to be transferred to [facility].  Transfer was initially initiated in ED and subsequently canceled at patient request.    Patient being discharged on 7-day event monitor  Continue tacrolimus  Continue mycophenolate  Follow-up with cardiologist, Dr. Winter, at [facility] as soon as possible  Get lab work including BMP and magnesium on 10/28/2024  RYAN on CKD 3B, improved: Patient has a history of horseshoe kidney.  Baseline creatinine 1.5.  Creatinine on admission 3.0.  Creatinine improving to 1.7.  Likely due to dehydration.  Patient on a potassium supplement at home, holding currently.  Continue Cozaar  Follow-up with PCP and get lab work including BMP and magnesium on 10/28/2024  Take potassium supplement 20 mill equals once daily  Arachnoiditis of lumbar spine: Patient

## 2024-10-26 NOTE — PLAN OF CARE
Problem: Chronic Conditions and Co-morbidities  Goal: Patient's chronic conditions and co-morbidity symptoms are monitored and maintained or improved  Outcome: Adequate for Discharge     Problem: Discharge Planning  Goal: Discharge to home or other facility with appropriate resources  Outcome: Adequate for Discharge     Problem: Pain  Goal: Verbalizes/displays adequate comfort level or baseline comfort level  Outcome: Adequate for Discharge     Problem: Skin/Tissue Integrity  Goal: Absence of new skin breakdown  Description: 1.  Monitor for areas of redness and/or skin breakdown  2.  Assess vascular access sites hourly  3.  Every 4-6 hours minimum:  Change oxygen saturation probe site  4.  Every 4-6 hours:  If on nasal continuous positive airway pressure, respiratory therapy assess nares and determine need for appliance change or resting period.  Outcome: Adequate for Discharge     Problem: Safety - Adult  Goal: Free from fall injury  Outcome: Adequate for Discharge

## 2024-10-26 NOTE — DISCHARGE INSTRUCTIONS
- Torsemide changed to 50 mg taken once daily  - Stop taking hydralazine unless blood pressures greater than 160/110  - Stop taking HCTZ until visiting Dr. Winter at [facility]  - Return to emergency room if experiencing any symptoms of chest pain or shortness of breath  - Start taking potassium 20 mEq instead of 60 mEq daily  - Get lab work on 10/28 including BMP and magnesium before visiting PCP and Cardiologist  - Continue taking all other medications as prescribed until visit with PCP and Cardiologist at [facility]  - For right ankle sprain, rest, ice, continue compression sleeve and elevate  -Avoid pain medications such as ibuprofen and other NSAIDs  
DC instructions

## 2024-10-28 ENCOUNTER — LAB (OUTPATIENT)
Dept: LAB | Age: 64
End: 2024-10-28

## 2024-10-28 LAB
ANION GAP SERPL CALC-SCNC: 13 MEQ/L (ref 8–16)
BACTERIA BLD AEROBE CULT: NORMAL
BACTERIA BLD AEROBE CULT: NORMAL
BASOPHILS ABSOLUTE: 0.1 THOU/MM3 (ref 0–0.1)
BASOPHILS NFR BLD AUTO: 0.5 %
BUN SERPL-MCNC: 28 MG/DL (ref 7–22)
CALCIUM SERPL-MCNC: 9.7 MG/DL (ref 8.5–10.5)
CHLORIDE SERPL-SCNC: 97 MEQ/L (ref 98–111)
CO2 SERPL-SCNC: 31 MEQ/L (ref 23–33)
CREAT SERPL-MCNC: 1.7 MG/DL (ref 0.4–1.2)
DEPRECATED RDW RBC AUTO: 55.2 FL (ref 35–45)
EOSINOPHIL NFR BLD AUTO: 1.9 %
EOSINOPHILS ABSOLUTE: 0.2 THOU/MM3 (ref 0–0.4)
ERYTHROCYTE [DISTWIDTH] IN BLOOD BY AUTOMATED COUNT: 16.6 % (ref 11.5–14.5)
GFR SERPL CREATININE-BSD FRML MDRD: 44 ML/MIN/1.73M2
GLUCOSE SERPL-MCNC: 94 MG/DL (ref 70–108)
HCT VFR BLD AUTO: 42.8 % (ref 42–52)
HGB BLD-MCNC: 13.3 GM/DL (ref 14–18)
IMM GRANULOCYTES # BLD AUTO: 0.12 THOU/MM3 (ref 0–0.07)
IMM GRANULOCYTES NFR BLD AUTO: 1.2 %
LYMPHOCYTES ABSOLUTE: 1.7 THOU/MM3 (ref 1–4.8)
LYMPHOCYTES NFR BLD AUTO: 16.7 %
MCH RBC QN AUTO: 28.5 PG (ref 26–33)
MCHC RBC AUTO-ENTMCNC: 31.1 GM/DL (ref 32.2–35.5)
MCV RBC AUTO: 91.6 FL (ref 80–94)
MONOCYTES ABSOLUTE: 1.2 THOU/MM3 (ref 0.4–1.3)
MONOCYTES NFR BLD AUTO: 11.6 %
NEUTROPHILS ABSOLUTE: 7 THOU/MM3 (ref 1.8–7.7)
NEUTROPHILS NFR BLD AUTO: 68.1 %
NRBC BLD AUTO-RTO: 0 /100 WBC
PLATELET # BLD AUTO: 262 THOU/MM3 (ref 130–400)
PMV BLD AUTO: 11.3 FL (ref 9.4–12.4)
POTASSIUM SERPL-SCNC: 4 MEQ/L (ref 3.5–5.2)
RBC # BLD AUTO: 4.67 MILL/MM3 (ref 4.7–6.1)
SODIUM SERPL-SCNC: 141 MEQ/L (ref 135–145)
WBC # BLD AUTO: 10.3 THOU/MM3 (ref 4.8–10.8)

## 2024-11-05 LAB — ECHO BSA: 2.41 M2

## 2024-11-08 ENCOUNTER — LAB (OUTPATIENT)
Dept: LAB | Age: 64
End: 2024-11-08

## 2024-11-08 DIAGNOSIS — E86.9 VOLUME DEPLETION: ICD-10-CM

## 2024-11-08 LAB
ANION GAP SERPL CALC-SCNC: 9 MEQ/L (ref 8–16)
BUN SERPL-MCNC: 26 MG/DL (ref 7–22)
CALCIUM SERPL-MCNC: 9.3 MG/DL (ref 8.5–10.5)
CHLORIDE SERPL-SCNC: 98 MEQ/L (ref 98–111)
CO2 SERPL-SCNC: 30 MEQ/L (ref 23–33)
CREAT SERPL-MCNC: 1.8 MG/DL (ref 0.4–1.2)
DEPRECATED RDW RBC AUTO: 52.6 FL (ref 35–45)
ERYTHROCYTE [DISTWIDTH] IN BLOOD BY AUTOMATED COUNT: 16 % (ref 11.5–14.5)
GFR SERPL CREATININE-BSD FRML MDRD: 41 ML/MIN/1.73M2
GLUCOSE SERPL-MCNC: 94 MG/DL (ref 70–108)
HCT VFR BLD AUTO: 42.5 % (ref 42–52)
HGB BLD-MCNC: 13.5 GM/DL (ref 14–18)
MAGNESIUM SERPL-MCNC: 1.9 MG/DL (ref 1.6–2.4)
MCH RBC QN AUTO: 28.6 PG (ref 26–33)
MCHC RBC AUTO-ENTMCNC: 31.8 GM/DL (ref 32.2–35.5)
MCV RBC AUTO: 90 FL (ref 80–94)
PLATELET # BLD AUTO: 230 THOU/MM3 (ref 130–400)
PMV BLD AUTO: 10.7 FL (ref 9.4–12.4)
POTASSIUM SERPL-SCNC: 4.5 MEQ/L (ref 3.5–5.2)
RBC # BLD AUTO: 4.72 MILL/MM3 (ref 4.7–6.1)
SODIUM SERPL-SCNC: 137 MEQ/L (ref 135–145)
WBC # BLD AUTO: 7.4 THOU/MM3 (ref 4.8–10.8)

## 2025-03-11 ENCOUNTER — LAB (OUTPATIENT)
Dept: LAB | Age: 65
End: 2025-03-11

## 2025-03-11 LAB
ALBUMIN SERPL BCG-MCNC: 4.1 G/DL (ref 3.4–4.9)
ALP SERPL-CCNC: 82 U/L (ref 40–129)
ALT SERPL W/O P-5'-P-CCNC: 13 U/L (ref 10–50)
ANION GAP SERPL CALC-SCNC: 12 MEQ/L (ref 8–16)
APTT PPP: 37 SECONDS (ref 22–38)
AST SERPL-CCNC: 17 U/L (ref 10–50)
BASOPHILS ABSOLUTE: 0.1 THOU/MM3 (ref 0–0.1)
BASOPHILS NFR BLD AUTO: 0.9 %
BILIRUB CONJ SERPL-MCNC: 0.4 MG/DL (ref 0–0.2)
BILIRUB SERPL-MCNC: 0.9 MG/DL (ref 0.3–1.2)
BUN SERPL-MCNC: 30 MG/DL (ref 8–23)
CALCIUM SERPL-MCNC: 9.3 MG/DL (ref 8.8–10.2)
CHLORIDE SERPL-SCNC: 96 MEQ/L (ref 98–111)
CO2 SERPL-SCNC: 30 MEQ/L (ref 22–29)
CREAT SERPL-MCNC: 1.7 MG/DL (ref 0.7–1.2)
DEPRECATED RDW RBC AUTO: 43.8 FL (ref 35–45)
EOSINOPHIL NFR BLD AUTO: 2.3 %
EOSINOPHILS ABSOLUTE: 0.2 THOU/MM3 (ref 0–0.4)
ERYTHROCYTE [DISTWIDTH] IN BLOOD BY AUTOMATED COUNT: 13.7 % (ref 11.5–14.5)
GFR SERPL CREATININE-BSD FRML MDRD: 44 ML/MIN/1.73M2
GLUCOSE SERPL-MCNC: 101 MG/DL (ref 74–109)
HCT VFR BLD AUTO: 46.3 % (ref 42–52)
HGB BLD-MCNC: 15.4 GM/DL (ref 14–18)
IMM GRANULOCYTES # BLD AUTO: 0.03 THOU/MM3 (ref 0–0.07)
IMM GRANULOCYTES NFR BLD AUTO: 0.4 %
INR PPP: 1.1 (ref 0.85–1.13)
LYMPHOCYTES ABSOLUTE: 1.6 THOU/MM3 (ref 1–4.8)
LYMPHOCYTES NFR BLD AUTO: 20.5 %
MCH RBC QN AUTO: 29.3 PG (ref 26–33)
MCHC RBC AUTO-ENTMCNC: 33.3 GM/DL (ref 32.2–35.5)
MCV RBC AUTO: 88 FL (ref 80–94)
MONOCYTES ABSOLUTE: 0.9 THOU/MM3 (ref 0.4–1.3)
MONOCYTES NFR BLD AUTO: 11 %
NEUTROPHILS ABSOLUTE: 5.1 THOU/MM3 (ref 1.8–7.7)
NEUTROPHILS NFR BLD AUTO: 64.9 %
NRBC BLD AUTO-RTO: 0 /100 WBC
PLATELET # BLD AUTO: 204 THOU/MM3 (ref 130–400)
PMV BLD AUTO: 11.5 FL (ref 9.4–12.4)
POTASSIUM SERPL-SCNC: 4.2 MEQ/L (ref 3.5–5.2)
PROT SERPL-MCNC: 6.6 G/DL (ref 6.4–8.3)
RBC # BLD AUTO: 5.26 MILL/MM3 (ref 4.7–6.1)
SODIUM SERPL-SCNC: 138 MEQ/L (ref 135–145)
WBC # BLD AUTO: 7.9 THOU/MM3 (ref 4.8–10.8)

## 2025-03-24 ENCOUNTER — LAB (OUTPATIENT)
Dept: LAB | Age: 65
End: 2025-03-24

## 2025-03-24 LAB
T4 FREE SERPL-MCNC: 1.5 NG/DL (ref 0.92–1.68)
TSH SERPL DL<=0.05 MIU/L-ACNC: 0.64 UIU/ML (ref 0.27–4.2)

## 2025-03-25 LAB — T3 TOTAL: 66 NG/DL (ref 80–200)

## 2025-04-30 ENCOUNTER — LAB (OUTPATIENT)
Dept: LAB | Age: 65
End: 2025-04-30

## 2025-04-30 LAB
ANION GAP SERPL CALC-SCNC: 11 MEQ/L (ref 8–16)
BASOPHILS ABSOLUTE: 0.1 THOU/MM3 (ref 0–0.1)
BASOPHILS NFR BLD AUTO: 0.7 %
BUN SERPL-MCNC: 25 MG/DL (ref 8–23)
CALCIUM SERPL-MCNC: 9.4 MG/DL (ref 8.8–10.2)
CHLORIDE SERPL-SCNC: 98 MEQ/L (ref 98–111)
CO2 SERPL-SCNC: 30 MEQ/L (ref 22–29)
CREAT SERPL-MCNC: 1.8 MG/DL (ref 0.7–1.2)
DEPRECATED RDW RBC AUTO: 43.7 FL (ref 35–45)
EOSINOPHIL NFR BLD AUTO: 3.5 %
EOSINOPHILS ABSOLUTE: 0.3 THOU/MM3 (ref 0–0.4)
ERYTHROCYTE [DISTWIDTH] IN BLOOD BY AUTOMATED COUNT: 13.6 % (ref 11.5–14.5)
GFR SERPL CREATININE-BSD FRML MDRD: 41 ML/MIN/1.73M2
GLUCOSE SERPL-MCNC: 98 MG/DL (ref 74–109)
HCT VFR BLD AUTO: 44.2 % (ref 42–52)
HGB BLD-MCNC: 14.6 GM/DL (ref 14–18)
IMM GRANULOCYTES # BLD AUTO: 0.04 THOU/MM3 (ref 0–0.07)
IMM GRANULOCYTES NFR BLD AUTO: 0.5 %
LYMPHOCYTES ABSOLUTE: 2 THOU/MM3 (ref 1–4.8)
LYMPHOCYTES NFR BLD AUTO: 24.4 %
MCH RBC QN AUTO: 29.3 PG (ref 26–33)
MCHC RBC AUTO-ENTMCNC: 33 GM/DL (ref 32.2–35.5)
MCV RBC AUTO: 88.6 FL (ref 80–94)
MONOCYTES ABSOLUTE: 0.8 THOU/MM3 (ref 0.4–1.3)
MONOCYTES NFR BLD AUTO: 10.4 %
NEUTROPHILS ABSOLUTE: 4.9 THOU/MM3 (ref 1.8–7.7)
NEUTROPHILS NFR BLD AUTO: 60.5 %
NRBC BLD AUTO-RTO: 0 /100 WBC
PLATELET # BLD AUTO: 240 THOU/MM3 (ref 130–400)
PMV BLD AUTO: 10.6 FL (ref 9.4–12.4)
POTASSIUM SERPL-SCNC: 3.9 MEQ/L (ref 3.5–5.2)
RBC # BLD AUTO: 4.99 MILL/MM3 (ref 4.7–6.1)
SODIUM SERPL-SCNC: 139 MEQ/L (ref 135–145)
WBC # BLD AUTO: 8.1 THOU/MM3 (ref 4.8–10.8)

## 2025-06-18 ENCOUNTER — LAB (OUTPATIENT)
Dept: LAB | Age: 65
End: 2025-06-18

## 2025-06-20 LAB — B BURGDOR IGM SER QL IB: NEGATIVE

## 2025-06-30 ENCOUNTER — LAB (OUTPATIENT)
Dept: LAB | Age: 65
End: 2025-06-30

## 2025-06-30 LAB
DEPRECATED RDW RBC AUTO: 43.2 FL (ref 35–45)
ERYTHROCYTE [DISTWIDTH] IN BLOOD BY AUTOMATED COUNT: 12.9 % (ref 11.5–14.5)
HCT VFR BLD AUTO: 36.2 % (ref 42–52)
HGB BLD-MCNC: 11.7 GM/DL (ref 14–18)
MCH RBC QN AUTO: 29.8 PG (ref 26–33)
MCHC RBC AUTO-ENTMCNC: 32.3 GM/DL (ref 32.2–35.5)
MCV RBC AUTO: 92.1 FL (ref 80–94)
PLATELET # BLD AUTO: 413 THOU/MM3 (ref 130–400)
PMV BLD AUTO: 10.2 FL (ref 9.4–12.4)
RBC # BLD AUTO: 3.93 MILL/MM3 (ref 4.7–6.1)
WBC # BLD AUTO: 12.1 THOU/MM3 (ref 4.8–10.8)

## 2025-07-17 ENCOUNTER — LAB (OUTPATIENT)
Dept: LAB | Age: 65
End: 2025-07-17

## 2025-07-17 LAB
ANION GAP SERPL CALC-SCNC: 16 MEQ/L (ref 8–16)
BASOPHILS ABSOLUTE: 0.1 THOU/MM3 (ref 0–0.1)
BASOPHILS NFR BLD AUTO: 0.7 %
BUN SERPL-MCNC: 37 MG/DL (ref 8–23)
CALCIUM SERPL-MCNC: 9.8 MG/DL (ref 8.8–10.2)
CHLORIDE SERPL-SCNC: 100 MEQ/L (ref 98–111)
CO2 SERPL-SCNC: 26 MEQ/L (ref 22–29)
CREAT SERPL-MCNC: 2.3 MG/DL (ref 0.7–1.2)
DEPRECATED RDW RBC AUTO: 40.8 FL (ref 35–45)
EOSINOPHIL NFR BLD AUTO: 5.1 %
EOSINOPHILS ABSOLUTE: 0.4 THOU/MM3 (ref 0–0.4)
ERYTHROCYTE [DISTWIDTH] IN BLOOD BY AUTOMATED COUNT: 12.9 % (ref 11.5–14.5)
GFR SERPL CREATININE-BSD FRML MDRD: 30 ML/MIN/1.73M2
GLUCOSE SERPL-MCNC: 101 MG/DL (ref 74–109)
HCT VFR BLD AUTO: 38.7 % (ref 42–52)
HGB BLD-MCNC: 12.5 GM/DL (ref 14–18)
IMM GRANULOCYTES # BLD AUTO: 0.03 THOU/MM3 (ref 0–0.07)
IMM GRANULOCYTES NFR BLD AUTO: 0.4 %
LYMPHOCYTES ABSOLUTE: 2.3 THOU/MM3 (ref 1–4.8)
LYMPHOCYTES NFR BLD AUTO: 27.3 %
MCH RBC QN AUTO: 28 PG (ref 26–33)
MCHC RBC AUTO-ENTMCNC: 32.3 GM/DL (ref 32.2–35.5)
MCV RBC AUTO: 86.8 FL (ref 80–94)
MONOCYTES ABSOLUTE: 0.8 THOU/MM3 (ref 0.4–1.3)
MONOCYTES NFR BLD AUTO: 9.1 %
NEUTROPHILS ABSOLUTE: 4.8 THOU/MM3 (ref 1.8–7.7)
NEUTROPHILS NFR BLD AUTO: 57.4 %
NRBC BLD AUTO-RTO: 0 /100 WBC
PLATELET # BLD AUTO: 383 THOU/MM3 (ref 130–400)
PMV BLD AUTO: 10.9 FL (ref 9.4–12.4)
POTASSIUM SERPL-SCNC: 3.8 MEQ/L (ref 3.5–5.2)
RBC # BLD AUTO: 4.46 MILL/MM3 (ref 4.7–6.1)
SODIUM SERPL-SCNC: 142 MEQ/L (ref 135–145)
WBC # BLD AUTO: 8.4 THOU/MM3 (ref 4.8–10.8)

## 2025-08-05 ENCOUNTER — LAB (OUTPATIENT)
Dept: LAB | Age: 65
End: 2025-08-05

## 2025-08-05 LAB
ANION GAP SERPL CALC-SCNC: 11 MEQ/L (ref 8–16)
BASOPHILS ABSOLUTE: 0 THOU/MM3 (ref 0–0.1)
BASOPHILS NFR BLD AUTO: 0.4 %
BUN SERPL-MCNC: 33 MG/DL (ref 8–23)
CALCIUM SERPL-MCNC: 9.3 MG/DL (ref 8.8–10.2)
CHLORIDE SERPL-SCNC: 102 MEQ/L (ref 98–111)
CO2 SERPL-SCNC: 30 MEQ/L (ref 22–29)
CREAT SERPL-MCNC: 2.3 MG/DL (ref 0.7–1.2)
DEPRECATED RDW RBC AUTO: 41.1 FL (ref 35–45)
EOSINOPHIL NFR BLD AUTO: 4 %
EOSINOPHILS ABSOLUTE: 0.3 THOU/MM3 (ref 0–0.4)
ERYTHROCYTE [DISTWIDTH] IN BLOOD BY AUTOMATED COUNT: 13.3 % (ref 11.5–14.5)
GFR SERPL CREATININE-BSD FRML MDRD: 30 ML/MIN/1.73M2
GLUCOSE SERPL-MCNC: 97 MG/DL (ref 74–109)
HCT VFR BLD AUTO: 40.5 % (ref 42–52)
HGB BLD-MCNC: 12.8 GM/DL (ref 14–18)
IMM GRANULOCYTES # BLD AUTO: 0.02 THOU/MM3 (ref 0–0.07)
IMM GRANULOCYTES NFR BLD AUTO: 0.3 %
LYMPHOCYTES ABSOLUTE: 1.7 THOU/MM3 (ref 1–4.8)
LYMPHOCYTES NFR BLD AUTO: 22.9 %
MCH RBC QN AUTO: 26.9 PG (ref 26–33)
MCHC RBC AUTO-ENTMCNC: 31.6 GM/DL (ref 32.2–35.5)
MCV RBC AUTO: 85.1 FL (ref 80–94)
MONOCYTES ABSOLUTE: 0.6 THOU/MM3 (ref 0.4–1.3)
MONOCYTES NFR BLD AUTO: 8.3 %
NEUTROPHILS ABSOLUTE: 4.7 THOU/MM3 (ref 1.8–7.7)
NEUTROPHILS NFR BLD AUTO: 64.1 %
NRBC BLD AUTO-RTO: 0 /100 WBC
PLATELET # BLD AUTO: 286 THOU/MM3 (ref 130–400)
PMV BLD AUTO: 11.7 FL (ref 9.4–12.4)
POTASSIUM SERPL-SCNC: 4 MEQ/L (ref 3.5–5.2)
RBC # BLD AUTO: 4.76 MILL/MM3 (ref 4.7–6.1)
SODIUM SERPL-SCNC: 143 MEQ/L (ref 135–145)
WBC # BLD AUTO: 7.4 THOU/MM3 (ref 4.8–10.8)

## 2025-08-19 ENCOUNTER — LAB (OUTPATIENT)
Dept: LAB | Age: 65
End: 2025-08-19

## 2025-08-19 LAB
ANION GAP SERPL CALC-SCNC: 13 MEQ/L (ref 8–16)
BUN SERPL-MCNC: 27 MG/DL (ref 8–23)
CALCIUM SERPL-MCNC: 9.7 MG/DL (ref 8.5–10.5)
CHLORIDE SERPL-SCNC: 100 MEQ/L (ref 98–111)
CO2 SERPL-SCNC: 28 MEQ/L (ref 22–29)
CREAT SERPL-MCNC: 1.9 MG/DL (ref 0.7–1.2)
DEPRECATED RDW RBC AUTO: 47.6 FL (ref 35–45)
ERYTHROCYTE [DISTWIDTH] IN BLOOD BY AUTOMATED COUNT: 15.1 % (ref 11.5–14.5)
GFR SERPL CREATININE-BSD FRML MDRD: 38 ML/MIN/1.73M2
GLUCOSE SERPL-MCNC: 82 MG/DL (ref 74–109)
HCT VFR BLD AUTO: 41 % (ref 42–52)
HGB BLD-MCNC: 12.8 GM/DL (ref 14–18)
MCH RBC QN AUTO: 27.2 PG (ref 26–33)
MCHC RBC AUTO-ENTMCNC: 31.2 GM/DL (ref 32.2–35.5)
MCV RBC AUTO: 87.2 FL (ref 80–94)
PLATELET # BLD AUTO: 194 THOU/MM3 (ref 130–400)
PMV BLD AUTO: 11.8 FL (ref 9.4–12.4)
POTASSIUM SERPL-SCNC: 3.7 MEQ/L (ref 3.5–5.2)
RBC # BLD AUTO: 4.7 MILL/MM3 (ref 4.7–6.1)
SODIUM SERPL-SCNC: 141 MEQ/L (ref 135–145)
WBC # BLD AUTO: 5.4 THOU/MM3 (ref 4.8–10.8)

## 2025-08-20 ENCOUNTER — HOSPITAL ENCOUNTER (OUTPATIENT)
Dept: PHYSICAL THERAPY | Age: 65
Setting detail: THERAPIES SERIES
Discharge: HOME | End: 2025-08-20
Payer: MEDICARE

## 2025-08-20 PROCEDURE — 97110 THERAPEUTIC EXERCISES: CPT

## 2025-08-20 PROCEDURE — 97161 PT EVAL LOW COMPLEX 20 MIN: CPT

## 2025-08-22 ENCOUNTER — HOSPITAL ENCOUNTER (OUTPATIENT)
Dept: PHYSICAL THERAPY | Age: 65
Setting detail: THERAPIES SERIES
Discharge: HOME | End: 2025-08-22
Payer: MEDICARE

## 2025-08-22 PROCEDURE — 97113 AQUATIC THERAPY/EXERCISES: CPT

## 2025-08-27 ENCOUNTER — HOSPITAL ENCOUNTER (OUTPATIENT)
Dept: PHYSICAL THERAPY | Age: 65
Setting detail: THERAPIES SERIES
Discharge: HOME | End: 2025-08-27
Payer: MEDICARE

## 2025-08-27 PROCEDURE — 97110 THERAPEUTIC EXERCISES: CPT

## 2025-08-27 PROCEDURE — 97140 MANUAL THERAPY 1/> REGIONS: CPT

## 2025-08-29 ENCOUNTER — HOSPITAL ENCOUNTER (OUTPATIENT)
Dept: PHYSICAL THERAPY | Age: 65
Setting detail: THERAPIES SERIES
Discharge: HOME | End: 2025-08-29
Payer: MEDICARE

## 2025-08-29 PROCEDURE — 97113 AQUATIC THERAPY/EXERCISES: CPT

## 2025-09-03 ENCOUNTER — LAB (OUTPATIENT)
Dept: LAB | Age: 65
End: 2025-09-03

## 2025-09-03 ENCOUNTER — HOSPITAL ENCOUNTER (OUTPATIENT)
Dept: PHYSICAL THERAPY | Age: 65
Setting detail: THERAPIES SERIES
Discharge: HOME | End: 2025-09-03
Payer: MEDICARE

## 2025-09-03 DIAGNOSIS — I50.22 CHF (CONGESTIVE HEART FAILURE), NYHA CLASS III, CHRONIC, SYSTOLIC (HCC): ICD-10-CM

## 2025-09-03 LAB
ANION GAP SERPL CALC-SCNC: 10 MEQ/L (ref 8–16)
ANION GAP SERPL CALC-SCNC: 13 MEQ/L (ref 8–16)
BUN SERPL-MCNC: 26 MG/DL (ref 8–23)
BUN SERPL-MCNC: 27 MG/DL (ref 8–23)
CALCIUM SERPL-MCNC: 9.2 MG/DL (ref 8.5–10.5)
CALCIUM SERPL-MCNC: 9.7 MG/DL (ref 8.5–10.5)
CHLORIDE SERPL-SCNC: 101 MEQ/L (ref 98–111)
CHLORIDE SERPL-SCNC: 99 MEQ/L (ref 98–111)
CO2 SERPL-SCNC: 29 MEQ/L (ref 22–29)
CO2 SERPL-SCNC: 30 MEQ/L (ref 22–29)
CREAT SERPL-MCNC: 1.8 MG/DL (ref 0.7–1.2)
CREAT SERPL-MCNC: 1.8 MG/DL (ref 0.7–1.2)
DEPRECATED RDW RBC AUTO: 50.4 FL (ref 35–45)
ERYTHROCYTE [DISTWIDTH] IN BLOOD BY AUTOMATED COUNT: 15.6 % (ref 11.5–14.5)
GFR SERPL CREATININE-BSD FRML MDRD: 41 ML/MIN/1.73M2
GFR SERPL CREATININE-BSD FRML MDRD: 41 ML/MIN/1.73M2
GLUCOSE SERPL-MCNC: 101 MG/DL (ref 74–109)
GLUCOSE SERPL-MCNC: 99 MG/DL (ref 74–109)
HCT VFR BLD AUTO: 43.9 % (ref 42–52)
HGB BLD-MCNC: 13.5 GM/DL (ref 14–18)
MAGNESIUM SERPL-MCNC: 2 MG/DL (ref 1.6–2.6)
MCH RBC QN AUTO: 27.1 PG (ref 26–33)
MCHC RBC AUTO-ENTMCNC: 30.8 GM/DL (ref 32.2–35.5)
MCV RBC AUTO: 88.2 FL (ref 80–94)
PLATELET # BLD AUTO: 232 THOU/MM3 (ref 130–400)
PMV BLD AUTO: 11.7 FL (ref 9.4–12.4)
POTASSIUM SERPL-SCNC: 4.2 MEQ/L (ref 3.5–5.2)
POTASSIUM SERPL-SCNC: 4.3 MEQ/L (ref 3.5–5.2)
RBC # BLD AUTO: 4.98 MILL/MM3 (ref 4.7–6.1)
SODIUM SERPL-SCNC: 141 MEQ/L (ref 135–145)
SODIUM SERPL-SCNC: 141 MEQ/L (ref 135–145)
WBC # BLD AUTO: 6.9 THOU/MM3 (ref 4.8–10.8)

## 2025-09-03 PROCEDURE — 97110 THERAPEUTIC EXERCISES: CPT

## 2025-09-03 PROCEDURE — 97140 MANUAL THERAPY 1/> REGIONS: CPT

## 2025-09-05 ENCOUNTER — HOSPITAL ENCOUNTER (OUTPATIENT)
Dept: PHYSICAL THERAPY | Age: 65
Setting detail: THERAPIES SERIES
Discharge: HOME | End: 2025-09-05
Payer: MEDICARE

## 2025-09-05 PROCEDURE — 97113 AQUATIC THERAPY/EXERCISES: CPT

## (undated) DEVICE — NEEDLE SYR 18GA L1.5IN RED PLAS HUB S STL BLNT FILL W/O

## (undated) DEVICE — 3 ML SYRINGE LUER-LOCK TIP: Brand: MONOJECT

## (undated) DEVICE — NEEDLE SPNL 22GA L3.5IN BLK HUB S STL REG WALL FIT STYL W/

## (undated) DEVICE — LABEL MED WHT OR W O INITIALS AND DATE SECT PRESOURCE

## (undated) DEVICE — SHEET,DRAPE,3/4,53X77,STERILE: Brand: MEDLINE

## (undated) DEVICE — SYRINGE MED 10ML LUERLOCK TIP W/O SFTY DISP

## (undated) DEVICE — GAUZE,SPONGE,4"X4",12PLY,STERILE,LF,2'S: Brand: MEDLINE

## (undated) DEVICE — COAGULATOR ELECSURG BLADE 10 FTX1 IN PTFE STRL ULTRACLEAN

## (undated) DEVICE — GLOVE SURG SZ 65 THK91MIL LTX FREE SYN POLYISOPRENE

## (undated) DEVICE — SC PAIN PACK: Brand: MEDLINE INDUSTRIES, INC.

## (undated) DEVICE — ZINACTIVE USE 2537982 PAD GRND FOR RF PAIN MGMT DISP

## (undated) DEVICE — 6 ML SYRINGE LUER-LOCK TIP: Brand: MONOJECT

## (undated) DEVICE — CHLORAPREP 26ML CLEAR

## (undated) DEVICE — Device

## (undated) DEVICE — SOLUTION IV 1000ML 0.9% SOD CHL PH 5 INJ USP VIAFLX PLAS

## (undated) DEVICE — GLOVE ORANGE PI 7 1/2   MSG9075

## (undated) DEVICE — GAUZE,SPONGE,8"X4",12PLY,XRAY,STRL,LF: Brand: MEDLINE

## (undated) DEVICE — TOWEL,OR,DSP,ST,BLUE,DLX,4/PK,20PK/CS: Brand: MEDLINE

## (undated) DEVICE — PAD GRND RF

## (undated) DEVICE — YANKAUER,BULB TIP,W/O VENT,RIGID,STERILE: Brand: MEDLINE

## (undated) DEVICE — HYPODERMIC SAFETY NEEDLE: Brand: MAGELLAN

## (undated) DEVICE — PACK,SET UP,NO DRAPES: Brand: MEDLINE

## (undated) DEVICE — KIT RF 4MM ACT TIP 17GA 75MM MULT PRB PROC COMPONENTS MULT

## (undated) DEVICE — APPLICATOR MEDICATED 26 CC SOLUTION CLR STRL CHLORAPREP

## (undated) DEVICE — GAUZE SPONGES,USP TYPE VII GAUZE, 12 PLY: Brand: CURITY

## (undated) DEVICE — TUBING, SUCTION, 1/4" X 12', STRAIGHT: Brand: MEDLINE

## (undated) DEVICE — GARMENT,MEDLINE,DVT,INT,CALF,MED, GEN2: Brand: MEDLINE

## (undated) DEVICE — SYRINGE MEDICAL 3ML CLEAR PLASTIC STANDARD NON CONTROL LUERLOCK TIP DISPOSABLE

## (undated) DEVICE — GLOVE SURG SZ 7 L12IN FNGR THK94MIL TRNSLUC YEL LTX HYDRGEL

## (undated) DEVICE — GOWN,SIRUS,NONRNF,SETINSLV,XL,20/CS: Brand: MEDLINE

## (undated) DEVICE — Z DISCONTINUED USE 2537982 ELECTRODE DISPER W/ CRD DISP

## (undated) DEVICE — TOWEL,OR,DSP,ST,BLUE,STD,4/PK,20PK/CS: Brand: MEDLINE

## (undated) DEVICE — SURE SET SINGLE BASIN-LF: Brand: MEDLINE INDUSTRIES, INC.

## (undated) DEVICE — GLOVE ORANGE PI 8 1/2   MSG9085

## (undated) DEVICE — INTENDED FOR TISSUE SEPARATION, AND OTHER PROCEDURES THAT REQUIRE A SHARP SURGICAL BLADE TO PUNCTURE OR CUT.: Brand: BARD-PARKER ® CARBON RIB-BACK BLADES

## (undated) DEVICE — 1840 FOAM BLOCK NEEDLE COUNTER: Brand: DEVON

## (undated) DEVICE — CURVED SHARP RF CANNULA, RADIOPAQUE MARKER: Brand: RADIOPAQUE RADIOFREQUENCY CANNULA

## (undated) DEVICE — APPLICATOR MEDICATED 26 CC SOLUTION HI LT ORNG CHLORAPREP

## (undated) DEVICE — SHEET, T, LAPAROTOMY, STERILE: Brand: MEDLINE

## (undated) DEVICE — GOWN,SIRUS,NON REINFRCD,LARGE,SET IN SL: Brand: MEDLINE

## (undated) DEVICE — GLOVE ORANGE PI 8   MSG9080

## (undated) DEVICE — SPONGE GZ W4XL4IN COT 12 PLY TYP VII WVN C FLD DSGN